# Patient Record
Sex: FEMALE | Race: WHITE | Employment: FULL TIME | URBAN - METROPOLITAN AREA
[De-identification: names, ages, dates, MRNs, and addresses within clinical notes are randomized per-mention and may not be internally consistent; named-entity substitution may affect disease eponyms.]

---

## 2017-01-10 ENCOUNTER — MED REC SCAN ONLY (OUTPATIENT)
Dept: FAMILY MEDICINE CLINIC | Facility: CLINIC | Age: 47
End: 2017-01-10

## 2017-01-17 NOTE — TELEPHONE ENCOUNTER
LOV - 12/13/16 Sinusitis  LRF - 11/03/16 #250 strips 1 RF  Future Appointments  Date Time Provider Jovani Haque   1/28/2017 9:00 PM SCHEDULE BY DATE [facility]

## 2017-01-23 RX ORDER — SIMVASTATIN 40 MG
TABLET ORAL
Qty: 30 TABLET | Refills: 0 | Status: SHIPPED | OUTPATIENT
Start: 2017-01-23 | End: 2017-02-25

## 2017-01-23 RX ORDER — PAROXETINE HYDROCHLORIDE 40 MG/1
TABLET, FILM COATED ORAL
Qty: 30 TABLET | Refills: 0 | Status: SHIPPED | OUTPATIENT
Start: 2017-01-23 | End: 2017-02-25

## 2017-01-23 RX ORDER — BUPROPION HYDROCHLORIDE 150 MG/1
TABLET ORAL
Qty: 30 TABLET | Refills: 0 | Status: SHIPPED | OUTPATIENT
Start: 2017-01-23 | End: 2017-02-25

## 2017-01-23 RX ORDER — LEVOTHYROXINE SODIUM 137 UG/1
TABLET ORAL
Qty: 30 TABLET | Refills: 0 | Status: SHIPPED | OUTPATIENT
Start: 2017-01-23 | End: 2017-02-25

## 2017-01-23 NOTE — TELEPHONE ENCOUNTER
LOV:  12/13/16 for sinusitis   Last lipid: 12/26/15    BUPROPION HCL ER, XL, 150 MG Oral Tablet 24 Hr 30 tablet 0 12/27/2016       Sig :  TAKE 1 TABLET BY MOUTH EVERY DAY       Route:   (none)       LEVOTHYROXINE SODIUM 137 MCG Oral Tab 30 tablet 0 12/27/2

## 2017-01-25 NOTE — TELEPHONE ENCOUNTER
LOV  12/13/2016    Last refill    HUMALOG 100 UNIT/ML Subcutaneous Solution 40 mL 5 7/18/2016       Sig :  ADMINISTER UP  UNITS VIA INSULIN PUMP DAILY AS DIRECTED       Patient taking differently :  Basal Rate: 12A=2.8, 3A=3,7A=1.85,1230P=2.8,4P=3.15

## 2017-01-26 RX ORDER — INSULIN LISPRO 100 [IU]/ML
INJECTION, SOLUTION INTRAVENOUS; SUBCUTANEOUS
Qty: 40 ML | Refills: 0 | Status: SHIPPED | OUTPATIENT
Start: 2017-01-26 | End: 2017-02-25

## 2017-01-28 ENCOUNTER — OFFICE VISIT (OUTPATIENT)
Dept: SLEEP CENTER | Facility: HOSPITAL | Age: 47
End: 2017-01-28
Attending: FAMILY MEDICINE
Payer: MEDICAID

## 2017-01-28 PROCEDURE — 95811 POLYSOM 6/>YRS CPAP 4/> PARM: CPT

## 2017-01-30 ENCOUNTER — OFFICE VISIT (OUTPATIENT)
Dept: FAMILY MEDICINE CLINIC | Facility: CLINIC | Age: 47
End: 2017-01-30

## 2017-01-30 ENCOUNTER — PATIENT OUTREACH (OUTPATIENT)
Dept: FAMILY MEDICINE CLINIC | Facility: CLINIC | Age: 47
End: 2017-01-30

## 2017-01-30 VITALS
RESPIRATION RATE: 20 BRPM | DIASTOLIC BLOOD PRESSURE: 76 MMHG | BODY MASS INDEX: 47 KG/M2 | HEART RATE: 88 BPM | TEMPERATURE: 98 F | WEIGHT: 255 LBS | SYSTOLIC BLOOD PRESSURE: 136 MMHG | OXYGEN SATURATION: 98 %

## 2017-01-30 DIAGNOSIS — E11.8 TYPE 2 DIABETES MELLITUS WITH COMPLICATION, WITH LONG-TERM CURRENT USE OF INSULIN (HCC): ICD-10-CM

## 2017-01-30 DIAGNOSIS — Z79.4 TYPE 2 DIABETES MELLITUS WITH COMPLICATION, WITH LONG-TERM CURRENT USE OF INSULIN (HCC): Primary | ICD-10-CM

## 2017-01-30 DIAGNOSIS — L91.8 SKIN TAG: ICD-10-CM

## 2017-01-30 DIAGNOSIS — E11.8 TYPE 2 DIABETES MELLITUS WITH COMPLICATION, WITH LONG-TERM CURRENT USE OF INSULIN (HCC): Primary | ICD-10-CM

## 2017-01-30 DIAGNOSIS — Z00.00 ANNUAL PHYSICAL EXAM: Primary | ICD-10-CM

## 2017-01-30 DIAGNOSIS — Z12.31 VISIT FOR SCREENING MAMMOGRAM: ICD-10-CM

## 2017-01-30 DIAGNOSIS — Z79.4 TYPE 2 DIABETES MELLITUS WITH COMPLICATION, WITH LONG-TERM CURRENT USE OF INSULIN (HCC): ICD-10-CM

## 2017-01-30 DIAGNOSIS — Z01.419 ENCOUNTER FOR GYNECOLOGICAL EXAMINATION WITHOUT ABNORMAL FINDING: ICD-10-CM

## 2017-01-30 DIAGNOSIS — Z12.4 PAP SMEAR FOR CERVICAL CANCER SCREENING: ICD-10-CM

## 2017-01-30 PROCEDURE — 87624 HPV HI-RISK TYP POOLED RSLT: CPT | Performed by: FAMILY MEDICINE

## 2017-01-30 PROCEDURE — 99396 PREV VISIT EST AGE 40-64: CPT | Performed by: FAMILY MEDICINE

## 2017-01-30 PROCEDURE — 88175 CYTOPATH C/V AUTO FLUID REDO: CPT | Performed by: FAMILY MEDICINE

## 2017-01-30 PROCEDURE — 82570 ASSAY OF URINE CREATININE: CPT | Performed by: FAMILY MEDICINE

## 2017-01-30 PROCEDURE — 82043 UR ALBUMIN QUANTITATIVE: CPT | Performed by: FAMILY MEDICINE

## 2017-01-30 NOTE — PROGRESS NOTES
HPI:   Leoncio Rogers is a 42year old female who presents for a complete physical and pap. Needs referral for skin tag removal.  Large skin tag over left groin. Onset: years. Not painful. Gets caught on clothing. No other concerns today. DAILY WITH BREAKFAST Disp: 30 tablet Rfl: 0   Esomeprazole Magnesium 20 MG Oral Capsule Delayed Release Take 20 mg by mouth every morning before breakfast. Disp:  Rfl:    CALCIUM + D OR 1000 mg 2 tablets daily Disp:  Rfl:    Glucose Blood (ONETOUCH ULTRA B uncontrolled    • Hypercholesterolemia    • Obese    • GERD (gastroesophageal reflux disease)    • Cholelithiasis    • Multiple thyroid nodules      on rt,2011, lt.-2008   • Anxiety    • Extrinsic asthma, unspecified    • High cholesterol    • Unspecified REVIEW OF SYSTEMS:   GENERAL: feels well otherwise  SKIN: denies any unusual skin lesions  EYES:denies blurred vision or double vision  HEENT: denies nasal congestion, sinus pain or ST  LUNGS: denies shortness of breath or cough with exertion  CARDIOVA in 1 year. F/U with Dr. Crys Marti for DM check in 3 months - HbA1c prior to visit.

## 2017-01-31 LAB
CREAT UR-SCNC: 138 MG/DL
MICROALBUMIN UR-MCNC: 1.85 MG/DL
MICROALBUMIN/CREAT 24H UR-RTO: 13.4 UG/MG (ref ?–30)

## 2017-02-02 NOTE — PROGRESS NOTES
Quick Note:    Pt will be notified of findings from CPAP titration by coordinator.  However, will not be seen in clinic per [facility] policy until below issue is addressed:    [facility] ONLY: Patient is suspended for bad debt; no services should be rendered until forma

## 2017-02-02 NOTE — PROCEDURES
[address]       Accredited by the [facility] ([facility])    PATIENT'S NAME:        Mary Vasquez  ATTENDING PHYSICIAN:   Pacheco White M.D. REFERRING PHYSICIAN:   SHANTE Heredia Junior 10%; stage REM 40%. RESPIRATORY MEASURES:  The patient was initiated on CPAP at 5 cm of water and titrated up to a final pressure of 15 cm of water.   On this setting, the patient had an overall apnea-hypopnea index of 3 and was able to achieve stage REM

## 2017-02-03 LAB
LAST PAP RESULT: NORMAL
PAP HISTORY (OTHER THAN LAST PAP): NORMAL

## 2017-02-04 ENCOUNTER — HOSPITAL ENCOUNTER (OUTPATIENT)
Dept: MAMMOGRAPHY | Age: 47
Discharge: HOME | End: 2017-02-04
Attending: FAMILY MEDICINE
Payer: MEDICAID

## 2017-02-04 ENCOUNTER — OFFICE VISIT (OUTPATIENT)
Dept: FAMILY MEDICINE CLINIC | Facility: CLINIC | Age: 47
End: 2017-02-04

## 2017-02-04 ENCOUNTER — NURSE ONLY (OUTPATIENT)
Dept: FAMILY MEDICINE CLINIC | Facility: CLINIC | Age: 47
End: 2017-02-04

## 2017-02-04 VITALS
RESPIRATION RATE: 16 BRPM | WEIGHT: 251.5 LBS | BODY MASS INDEX: 46 KG/M2 | HEART RATE: 80 BPM | TEMPERATURE: 99 F | SYSTOLIC BLOOD PRESSURE: 138 MMHG | DIASTOLIC BLOOD PRESSURE: 74 MMHG

## 2017-02-04 DIAGNOSIS — G47.33 OBSTRUCTIVE SLEEP APNEA: Primary | ICD-10-CM

## 2017-02-04 DIAGNOSIS — Z00.00 ANNUAL PHYSICAL EXAM: ICD-10-CM

## 2017-02-04 DIAGNOSIS — Z12.31 VISIT FOR SCREENING MAMMOGRAM: ICD-10-CM

## 2017-02-04 DIAGNOSIS — Z01.419 ENCOUNTER FOR GYNECOLOGICAL EXAMINATION WITHOUT ABNORMAL FINDING: ICD-10-CM

## 2017-02-04 LAB
ALBUMIN SERPL-MCNC: 3.9 G/DL (ref 3.5–4.8)
ALP LIVER SERPL-CCNC: 80 U/L (ref 37–98)
ALT SERPL-CCNC: 30 U/L (ref 14–54)
AST SERPL-CCNC: 13 U/L (ref 15–41)
BASOPHILS # BLD AUTO: 0.06 X10(3) UL (ref 0–0.1)
BASOPHILS NFR BLD AUTO: 0.6 %
BILIRUB SERPL-MCNC: 0.4 MG/DL (ref 0.1–2)
BUN BLD-MCNC: 11 MG/DL (ref 8–20)
CALCIUM BLD-MCNC: 8.7 MG/DL (ref 8.3–10.3)
CHLORIDE: 104 MMOL/L (ref 101–111)
CHOLEST SMN-MCNC: 162 MG/DL (ref ?–200)
CO2: 28 MMOL/L (ref 22–32)
CREAT BLD-MCNC: 0.73 MG/DL (ref 0.55–1.02)
EOSINOPHIL # BLD AUTO: 0.23 X10(3) UL (ref 0–0.3)
EOSINOPHIL NFR BLD AUTO: 2.4 %
ERYTHROCYTE [DISTWIDTH] IN BLOOD BY AUTOMATED COUNT: 12.6 % (ref 11.5–16)
GLUCOSE BLD-MCNC: 99 MG/DL (ref 70–99)
HCT VFR BLD AUTO: 43.7 % (ref 34–50)
HDLC SERPL-MCNC: 64 MG/DL (ref 45–?)
HDLC SERPL: 2.53 {RATIO} (ref ?–4.44)
HGB BLD-MCNC: 14.6 G/DL (ref 12–16)
HPV I/H RISK 1 DNA SPEC QL NAA+PROBE: NEGATIVE
IMMATURE GRANULOCYTE COUNT: 0.03 X10(3) UL (ref 0–1)
IMMATURE GRANULOCYTE RATIO %: 0.3 %
LDLC SERPL CALC-MCNC: 89 MG/DL (ref ?–130)
LYMPHOCYTES # BLD AUTO: 2.66 X10(3) UL (ref 0.9–4)
LYMPHOCYTES NFR BLD AUTO: 27.2 %
M PROTEIN MFR SERPL ELPH: 7.2 G/DL (ref 6.1–8.3)
MCH RBC QN AUTO: 29.7 PG (ref 27–33.2)
MCHC RBC AUTO-ENTMCNC: 33.4 G/DL (ref 31–37)
MCV RBC AUTO: 89 FL (ref 81–100)
MONOCYTES # BLD AUTO: 0.63 X10(3) UL (ref 0.1–0.6)
MONOCYTES NFR BLD AUTO: 6.4 %
NEUTROPHIL ABS PRELIM: 6.17 X10 (3) UL (ref 1.3–6.7)
NEUTROPHILS # BLD AUTO: 6.17 X10(3) UL (ref 1.3–6.7)
NEUTROPHILS NFR BLD AUTO: 63.1 %
NONHDLC SERPL-MCNC: 98 MG/DL (ref ?–130)
PLATELET # BLD AUTO: 310 10(3)UL (ref 150–450)
POTASSIUM SERPL-SCNC: 4.3 MMOL/L (ref 3.6–5.1)
RBC # BLD AUTO: 4.91 X10(6)UL (ref 3.8–5.1)
RED CELL DISTRIBUTION WIDTH-SD: 41.1 FL (ref 35.1–46.3)
SODIUM SERPL-SCNC: 139 MMOL/L (ref 136–144)
TRIGLYCERIDES: 44 MG/DL (ref ?–150)
TSI SER-ACNC: 2.25 MIU/ML (ref 0.35–5.5)
VLDL: 9 MG/DL (ref 5–40)
WBC # BLD AUTO: 9.8 X10(3) UL (ref 4–13)

## 2017-02-04 PROCEDURE — 84443 ASSAY THYROID STIM HORMONE: CPT | Performed by: FAMILY MEDICINE

## 2017-02-04 PROCEDURE — 36415 COLL VENOUS BLD VENIPUNCTURE: CPT | Performed by: FAMILY MEDICINE

## 2017-02-04 PROCEDURE — 85025 COMPLETE CBC W/AUTO DIFF WBC: CPT | Performed by: FAMILY MEDICINE

## 2017-02-04 PROCEDURE — 99214 OFFICE O/P EST MOD 30 MIN: CPT | Performed by: FAMILY MEDICINE

## 2017-02-04 PROCEDURE — 80061 LIPID PANEL: CPT | Performed by: FAMILY MEDICINE

## 2017-02-04 PROCEDURE — 80053 COMPREHEN METABOLIC PANEL: CPT | Performed by: FAMILY MEDICINE

## 2017-02-04 PROCEDURE — 77067 SCR MAMMO BI INCL CAD: CPT

## 2017-02-04 NOTE — PROGRESS NOTES
CC: follow up sleep apnea titration    HPI:     Successful titration. Patient reports the best night sleep in years and felt tremendously better the next day. AHI corrected from 42 to 3. Lowers O2 sat associated was 92.6%.      ROS: no cp or sob, no palpita Packs/Day: 0.25  Years: 2         Types: Cigarettes      Last Attempt to quit: 11/16/1997    Alcohol Use: Yes           0.0 oz/week       0 Standard drinks or equivalent per week       Comment: occasional    EXAM:   /74 mmHg  Pulse 80  Temp(Src) 98.8

## 2017-02-08 ENCOUNTER — OFFICE VISIT (OUTPATIENT)
Dept: FAMILY MEDICINE CLINIC | Facility: CLINIC | Age: 47
End: 2017-02-08

## 2017-02-08 VITALS
RESPIRATION RATE: 20 BRPM | BODY MASS INDEX: 46 KG/M2 | HEART RATE: 98 BPM | OXYGEN SATURATION: 97 % | TEMPERATURE: 98 F | SYSTOLIC BLOOD PRESSURE: 138 MMHG | DIASTOLIC BLOOD PRESSURE: 74 MMHG | WEIGHT: 251 LBS

## 2017-02-08 DIAGNOSIS — J01.00 ACUTE NON-RECURRENT MAXILLARY SINUSITIS: Primary | ICD-10-CM

## 2017-02-08 PROCEDURE — 99213 OFFICE O/P EST LOW 20 MIN: CPT | Performed by: FAMILY MEDICINE

## 2017-02-08 RX ORDER — AZITHROMYCIN 250 MG/1
TABLET, FILM COATED ORAL
Qty: 6 TABLET | Refills: 0 | Status: SHIPPED | OUTPATIENT
Start: 2017-02-08 | End: 2017-02-14 | Stop reason: ALTCHOICE

## 2017-02-08 NOTE — PROGRESS NOTES
HPI:   Latasha Anderson is a 42year old female who presents for sinus pain and pressure over b/l maxillary sinuses for 5 days. Worsening. Nasal congestion with discolored drainage. Slight cough for 2 days. Non-productive. No SOB or chest tightness.   H 6/25/2011   • Dysphagia 6/25/2011   • Thyroiditis, unspecified 6/25/2011   • Unspecified asthma(493.90) 5/7/2012   • Nontoxic multinodular goiter 7/5/2011   • Other and unspecified hyperlipidemia 7/11/2011   • Unspecified musculoskeletal disorders and symp CHOLECYSTECTOMY      OTHER      Comment wisdom    COLONOSCOPY N/A 10/21/2016    Comment Procedure: COLONOSCOPY;  Surgeon: Swathi Mclaughlin DO;  Location: [address] [place] [address] ENDOSCOPY      Family History   Problem Relation Age of Onset   • Heart Disease Father    • Other Statement Selected

## 2017-02-09 ENCOUNTER — TELEPHONE (OUTPATIENT)
Dept: FAMILY MEDICINE CLINIC | Facility: CLINIC | Age: 47
End: 2017-02-09

## 2017-02-09 NOTE — TELEPHONE ENCOUNTER
Pt was seen yesterday for sinus infection. Pt stated she woke up and right eye was tearing. Eye was not crusted over. Eye is not itchy. Eye is painful. Right eye blood shot.   Pt wants to make sure she is ok with just the z-marlon or if something else glenn

## 2017-02-13 RX ORDER — FERROUS SULFATE 325(65) MG
TABLET ORAL
Qty: 30 TABLET | Refills: 0 | Status: SHIPPED | OUTPATIENT
Start: 2017-02-13 | End: 2017-04-05

## 2017-02-13 NOTE — TELEPHONE ENCOUNTER
LOV-2/9/17  LRF-12/27/16 #30+0  Future Appointments  Date Time Provider Jovani Haque   2/14/2017 3:00 PM Alejandra Ngo DO [facility]

## 2017-02-14 ENCOUNTER — TELEPHONE (OUTPATIENT)
Dept: FAMILY MEDICINE CLINIC | Facility: CLINIC | Age: 47
End: 2017-02-14

## 2017-02-14 ENCOUNTER — OFFICE VISIT (OUTPATIENT)
Dept: FAMILY MEDICINE CLINIC | Facility: CLINIC | Age: 47
End: 2017-02-14

## 2017-02-14 VITALS
WEIGHT: 251 LBS | BODY MASS INDEX: 46 KG/M2 | TEMPERATURE: 98 F | SYSTOLIC BLOOD PRESSURE: 128 MMHG | DIASTOLIC BLOOD PRESSURE: 86 MMHG

## 2017-02-14 DIAGNOSIS — L91.8 SKIN TAG: Primary | ICD-10-CM

## 2017-02-14 PROCEDURE — 99242 OFF/OP CONSLTJ NEW/EST SF 20: CPT | Performed by: SURGERY

## 2017-02-14 PROCEDURE — 88304 TISSUE EXAM BY PATHOLOGIST: CPT | Performed by: SURGERY

## 2017-02-14 NOTE — TELEPHONE ENCOUNTER
Michelle Celaya called and stated the CPAP needs to get pre-approved by insurance. They wanted to update us on the status of the order. Patient notified.

## 2017-02-15 NOTE — H&P
[facility]  Progress Note    Elmo Brito Patient Status:  No patient class for patient encounter    3/12/1970 MRN [id number]   Location [address] [place] [address] Attending No att. providers found   Hosp Day #  PCP Siria Anderson Female stress incontinence     Anxiety     Sleep apnea     GERD (gastroesophageal reflux disease)     Obese     Plantar fasciitis     Type I (juvenile type) diabetes mellitus without mention of complication, not stated as uncontrolled     S/P right knee a

## 2017-02-21 ENCOUNTER — OFFICE VISIT (OUTPATIENT)
Dept: FAMILY MEDICINE CLINIC | Facility: CLINIC | Age: 47
End: 2017-02-21

## 2017-02-21 DIAGNOSIS — L91.8 SKIN TAG: Primary | ICD-10-CM

## 2017-02-21 PROCEDURE — 99024 POSTOP FOLLOW-UP VISIT: CPT | Performed by: SURGERY

## 2017-02-21 NOTE — PROGRESS NOTES
Pt here for suture removal from L groin. 3 single sutures removed w/o complication. Incision is clean and dry. No signs of infection or swelling. Steri-strips applied. Pt aware strips will fall off with a week or two. Pt aware path is benign no cancer.  Pt

## 2017-02-23 ENCOUNTER — TELEPHONE (OUTPATIENT)
Dept: FAMILY MEDICINE CLINIC | Facility: CLINIC | Age: 47
End: 2017-02-23

## 2017-02-27 RX ORDER — INSULIN LISPRO 100 [IU]/ML
INJECTION, SOLUTION INTRAVENOUS; SUBCUTANEOUS
Qty: 40 ML | Refills: 0 | Status: SHIPPED | OUTPATIENT
Start: 2017-02-27 | End: 2017-04-05

## 2017-02-27 RX ORDER — SIMVASTATIN 40 MG
TABLET ORAL
Qty: 30 TABLET | Refills: 0 | Status: SHIPPED | OUTPATIENT
Start: 2017-02-27 | End: 2017-04-05

## 2017-02-27 RX ORDER — PAROXETINE HYDROCHLORIDE 40 MG/1
TABLET, FILM COATED ORAL
Qty: 30 TABLET | Refills: 0 | Status: SHIPPED | OUTPATIENT
Start: 2017-02-27 | End: 2017-04-05

## 2017-02-27 RX ORDER — BUPROPION HYDROCHLORIDE 150 MG/1
TABLET ORAL
Qty: 30 TABLET | Refills: 0 | Status: SHIPPED | OUTPATIENT
Start: 2017-02-27 | End: 2017-04-05

## 2017-02-27 RX ORDER — LEVOTHYROXINE SODIUM 137 UG/1
TABLET ORAL
Qty: 30 TABLET | Refills: 0 | Status: SHIPPED | OUTPATIENT
Start: 2017-02-27 | End: 2017-04-07

## 2017-02-27 NOTE — TELEPHONE ENCOUNTER
LOV: 2/8/17 for sinusitis  HGB A1C: 9/9/16 (controlled)    HUMALOG 100 UNIT/ML Subcutaneous Solution 40 mL 0 1/26/2017       Sig :  ADMINISTER UP  UNITS VIA INSULIN PUMP DAILY AS DIRECTED       Route:   (none)        BUPROPION HCL ER, XL, 150 MG Oral

## 2017-03-03 RX ORDER — IBUPROFEN 600 MG/1
TABLET ORAL
Qty: 2 KIT | Refills: 0 | Status: SHIPPED | OUTPATIENT
Start: 2017-03-03 | End: 2018-06-11

## 2017-03-03 NOTE — TELEPHONE ENCOUNTER
Pt calling stating that she is completely out of her Insulin Injection kit because she had to use her last one this morning. She just wanted to make sure Dr. Stephanie Nava knew that she would need it refilled asap.

## 2017-03-03 NOTE — TELEPHONE ENCOUNTER
LOV  02/04/2017    Last refill    GLUCAGON EMERGENCY 1 MG Injection Kit 2 kit 0 1/3/2017      Sig :  INJECT 1MG INTO THE SKIN ONCE AS NEEDED FOR HYPOGLYCEMIA      Medication pending. Please advise. No future appointments.

## 2017-03-16 NOTE — PROGRESS NOTES
Patient coming in for an appointment today. Pt will provide specimen at appointment.
range of motion is not limited and there is no muscle tenderness.

## 2017-04-05 ENCOUNTER — TELEPHONE (OUTPATIENT)
Dept: FAMILY MEDICINE CLINIC | Facility: CLINIC | Age: 47
End: 2017-04-05

## 2017-04-06 RX ORDER — BUPROPION HYDROCHLORIDE 150 MG/1
TABLET ORAL
Qty: 30 TABLET | Refills: 2 | Status: SHIPPED | OUTPATIENT
Start: 2017-04-06 | End: 2017-06-06

## 2017-04-06 RX ORDER — INSULIN LISPRO 100 [IU]/ML
INJECTION, SOLUTION INTRAVENOUS; SUBCUTANEOUS
Qty: 40 ML | Refills: 2 | Status: SHIPPED | OUTPATIENT
Start: 2017-04-06 | End: 2018-05-25

## 2017-04-06 RX ORDER — SIMVASTATIN 40 MG
TABLET ORAL
Qty: 30 TABLET | Refills: 2 | Status: SHIPPED | OUTPATIENT
Start: 2017-04-06 | End: 2017-06-06

## 2017-04-06 RX ORDER — PAROXETINE HYDROCHLORIDE 40 MG/1
TABLET, FILM COATED ORAL
Qty: 30 TABLET | Refills: 2 | Status: SHIPPED | OUTPATIENT
Start: 2017-04-06 | End: 2017-06-06

## 2017-04-06 RX ORDER — FERROUS SULFATE 325(65) MG
TABLET ORAL
Qty: 30 TABLET | Refills: 2 | Status: SHIPPED | OUTPATIENT
Start: 2017-04-06 | End: 2017-07-09

## 2017-04-06 NOTE — TELEPHONE ENCOUNTER
Appointment scheduled.   Future Appointments  Date Time Provider Jovani Haque   4/10/2017 4:15 PM Rome Garcia DO [facility]

## 2017-04-06 NOTE — TELEPHONE ENCOUNTER
LOV: 2/8/17 for sinusitis  TSH: 2/4/17     HUMALOG 100 UNIT/ML Subcutaneous Solution 40 mL 0 2/27/2017      Sig :  ADMINISTER UP  UNITS VIA INSULIN PUMP DAILY AS DIRECTED       Route:   (none)        BUPROPION HCL ER, XL, 150 MG Oral Tablet 24 Hr 30

## 2017-04-06 NOTE — TELEPHONE ENCOUNTER
Patient stated she would need two insulin pens. Humalog and Lantus  Patient has been on the pump for 13 years and is unsure how she would manage with the pens. Patient currently has enough to last a week.   Patient not sure if she would need an appointmen

## 2017-04-07 ENCOUNTER — TELEPHONE (OUTPATIENT)
Dept: FAMILY MEDICINE CLINIC | Facility: CLINIC | Age: 47
End: 2017-04-07

## 2017-04-07 RX ORDER — LEVOTHYROXINE SODIUM 137 UG/1
137 TABLET ORAL
Qty: 30 TABLET | Refills: 2 | Status: SHIPPED | OUTPATIENT
Start: 2017-04-07 | End: 2017-06-06

## 2017-04-07 NOTE — TELEPHONE ENCOUNTER
Order for diabetic supplies received via fax. Order for diabetic supplies signed and faxed to Nia Mauricio at [phone]. LM to notify patient order sent via fax.

## 2017-04-07 NOTE — TELEPHONE ENCOUNTER
LOV: 2/8/17 for sinusitis     LEVOTHYROXINE SODIUM 137 MCG Oral Tab 30 tablet 0 2/27/2017      Sig :  TAKE 1 TABLET BY MOUTH DAILY       Route:   (none)       Future Appointments  Date Time Provider Jovani Haque   4/10/2017 4:15 PM DO TIMI Johnson

## 2017-04-10 ENCOUNTER — TELEPHONE (OUTPATIENT)
Dept: FAMILY MEDICINE CLINIC | Facility: CLINIC | Age: 47
End: 2017-04-10

## 2017-04-10 ENCOUNTER — OFFICE VISIT (OUTPATIENT)
Dept: FAMILY MEDICINE CLINIC | Facility: CLINIC | Age: 47
End: 2017-04-10

## 2017-04-10 VITALS
SYSTOLIC BLOOD PRESSURE: 128 MMHG | BODY MASS INDEX: 47 KG/M2 | RESPIRATION RATE: 14 BRPM | WEIGHT: 255.19 LBS | DIASTOLIC BLOOD PRESSURE: 78 MMHG | HEART RATE: 104 BPM | TEMPERATURE: 99 F

## 2017-04-10 DIAGNOSIS — Z79.4 DIABETES MELLITUS DUE TO UNDERLYING CONDITION WITH DIABETIC NEPHROPATHY, WITH LONG-TERM CURRENT USE OF INSULIN (HCC): ICD-10-CM

## 2017-04-10 DIAGNOSIS — Z79.4 TYPE 2 DIABETES MELLITUS WITH COMPLICATION, WITH LONG-TERM CURRENT USE OF INSULIN (HCC): Primary | ICD-10-CM

## 2017-04-10 DIAGNOSIS — E11.8 TYPE 2 DIABETES MELLITUS WITH COMPLICATION, WITH LONG-TERM CURRENT USE OF INSULIN (HCC): Primary | ICD-10-CM

## 2017-04-10 DIAGNOSIS — F43.21 ADJUSTMENT DISORDER WITH DEPRESSED MOOD: ICD-10-CM

## 2017-04-10 DIAGNOSIS — E78.00 PURE HYPERCHOLESTEROLEMIA: ICD-10-CM

## 2017-04-10 DIAGNOSIS — E06.9 THYROIDITIS, UNSPECIFIED: ICD-10-CM

## 2017-04-10 DIAGNOSIS — E08.21 DIABETES MELLITUS DUE TO UNDERLYING CONDITION WITH DIABETIC NEPHROPATHY, WITH LONG-TERM CURRENT USE OF INSULIN (HCC): ICD-10-CM

## 2017-04-10 PROCEDURE — 99214 OFFICE O/P EST MOD 30 MIN: CPT | Performed by: FAMILY MEDICINE

## 2017-04-10 NOTE — TELEPHONE ENCOUNTER
Patient's insurance card states Bao Hernandez as PCP. Referral cannot be placed until insurance is updated. Patient notified.

## 2017-04-10 NOTE — PROGRESS NOTES
CC: meds check    HPI:     DM: chronic, stable, may need to do lantus/humalog if the pump supplies dont arrive in time. Lipids: chronic, stable. Due for labs. Thyroid: chronic, stable, due for repeat labs.      Mood: stable    ROS: weight stable, no Packs/Day: 0.00  Years:           Types: Cigarettes      Quit date: 03/01/2017    Smokeless Status: Never Used                        Alcohol Use: Yes           0.0 oz/week       0 Standard drinks or equivalent per week       Comment: occasi

## 2017-04-10 NOTE — TELEPHONE ENCOUNTER
Mona Mancilla from [employer] called and stated a referral for patient's pump needs to be placed. Once the referral is authorized, an authorization number needs to be faxed to [employer].   Patient's new insurance needs to be updated before a referral can be margi

## 2017-04-11 PROBLEM — E08.21 DIABETES MELLITUS DUE TO UNDERLYING CONDITION WITH DIABETIC NEPHROPATHY (HCC): Status: ACTIVE | Noted: 2017-04-11

## 2017-04-11 NOTE — TELEPHONE ENCOUNTER
Spoke with Poppy Rg from [facility]  Referral placed for pump supplies  Authorization number: [id number]  [employer] stated they will fax the authorization to [address] [place] [address].        reference number for updated insurance is [id number]

## 2017-04-12 ENCOUNTER — MED REC SCAN ONLY (OUTPATIENT)
Dept: FAMILY MEDICINE CLINIC | Facility: CLINIC | Age: 47
End: 2017-04-12

## 2017-04-12 ENCOUNTER — TELEPHONE (OUTPATIENT)
Dept: FAMILY MEDICINE CLINIC | Facility: CLINIC | Age: 47
End: 2017-04-12

## 2017-04-12 NOTE — TELEPHONE ENCOUNTER
Patient notified. Patient verbalized understanding. Lantus samples up front. Patient to call Jad Hernandez for dosing instructions.

## 2017-05-09 ENCOUNTER — TELEPHONE (OUTPATIENT)
Dept: FAMILY MEDICINE CLINIC | Facility: CLINIC | Age: 47
End: 2017-05-09

## 2017-05-09 DIAGNOSIS — Z79.4 TYPE 2 DIABETES MELLITUS WITH COMPLICATION, WITH LONG-TERM CURRENT USE OF INSULIN (HCC): Primary | ICD-10-CM

## 2017-05-09 DIAGNOSIS — E11.8 TYPE 2 DIABETES MELLITUS WITH COMPLICATION, WITH LONG-TERM CURRENT USE OF INSULIN (HCC): Primary | ICD-10-CM

## 2017-05-09 RX ORDER — BLOOD-GLUCOSE METER
1 EACH MISCELLANEOUS 2 TIMES DAILY
Qty: 1 DEVICE | Refills: 0 | Status: SHIPPED | OUTPATIENT
Start: 2017-05-09 | End: 2018-05-09

## 2017-05-09 RX ORDER — LANCETS
EACH MISCELLANEOUS
Qty: 600 BOX | Refills: 3 | Status: SHIPPED | OUTPATIENT
Start: 2017-05-09 | End: 2017-05-11 | Stop reason: ALTCHOICE

## 2017-05-09 NOTE — TELEPHONE ENCOUNTER
Patient due for dilated eye exam   Needs new testing supplies. Referral pended. Testing supplies pended.

## 2017-05-11 ENCOUNTER — OFFICE VISIT (OUTPATIENT)
Dept: FAMILY MEDICINE CLINIC | Facility: CLINIC | Age: 47
End: 2017-05-11

## 2017-05-11 VITALS
RESPIRATION RATE: 14 BRPM | OXYGEN SATURATION: 96 % | TEMPERATURE: 98 F | WEIGHT: 255 LBS | SYSTOLIC BLOOD PRESSURE: 136 MMHG | HEIGHT: 62 IN | DIASTOLIC BLOOD PRESSURE: 84 MMHG | HEART RATE: 72 BPM | BODY MASS INDEX: 46.93 KG/M2

## 2017-05-11 DIAGNOSIS — M65.9 FLEXOR TENOSYNOVITIS OF THUMB: Primary | ICD-10-CM

## 2017-05-11 PROCEDURE — 99214 OFFICE O/P EST MOD 30 MIN: CPT | Performed by: FAMILY MEDICINE

## 2017-05-11 RX ORDER — PREDNISONE 20 MG/1
20 TABLET ORAL DAILY
Qty: 21 TABLET | Refills: 0 | Status: SHIPPED | OUTPATIENT
Start: 2017-05-11 | End: 2017-05-11 | Stop reason: CLARIF

## 2017-05-11 RX ORDER — PREDNISONE 20 MG/1
40 TABLET ORAL DAILY
Qty: 10 TABLET | Refills: 0 | Status: SHIPPED | OUTPATIENT
Start: 2017-05-11 | End: 2017-05-16

## 2017-05-11 NOTE — PROGRESS NOTES
CC: thumb pain    HPI:     Location rt thumb and wrist  Quality popping  Severity moderate  Duration 5 days  Timing constant with intermittent exacerbation    ROS: no bruising or edema, no rash, some decreased     Past Medical History   Diagnosis Date Status: Former Smoker                   Packs/Day: 0.00  Years:           Types: Cigarettes      Quit date: 03/01/2017    Smokeless Status: Never Used                        Alcohol Use: Yes           0.0 oz/week       0 Standard drinks or equivalent per w

## 2017-05-20 ENCOUNTER — TELEPHONE (OUTPATIENT)
Dept: FAMILY MEDICINE CLINIC | Facility: CLINIC | Age: 47
End: 2017-05-20

## 2017-05-20 NOTE — TELEPHONE ENCOUNTER
Received fax from [facility] us that patient has started PAP therapy and recommends f/u with Dr. Samual Pallas 31-90 days after starting. Yaakov  Fax is in nurse folder under pending.

## 2017-05-22 NOTE — TELEPHONE ENCOUNTER
Patient advised. Patient verbalized understanding. Appointment scheduled.   Future Appointments  Date Time Provider Jovani Haque   6/26/2017 4:00 PM Marianna Yanez DO [facility]

## 2017-06-01 ENCOUNTER — OFFICE VISIT (OUTPATIENT)
Dept: FAMILY MEDICINE CLINIC | Facility: CLINIC | Age: 47
End: 2017-06-01

## 2017-06-01 VITALS
BODY MASS INDEX: 47 KG/M2 | DIASTOLIC BLOOD PRESSURE: 74 MMHG | SYSTOLIC BLOOD PRESSURE: 128 MMHG | TEMPERATURE: 98 F | HEART RATE: 92 BPM | OXYGEN SATURATION: 98 % | WEIGHT: 255 LBS | RESPIRATION RATE: 14 BRPM

## 2017-06-01 DIAGNOSIS — G56.02 CARPAL TUNNEL SYNDROME OF LEFT WRIST: ICD-10-CM

## 2017-06-01 DIAGNOSIS — Z79.4 TYPE 2 DIABETES MELLITUS WITH COMPLICATION, WITH LONG-TERM CURRENT USE OF INSULIN (HCC): ICD-10-CM

## 2017-06-01 DIAGNOSIS — E11.8 TYPE 2 DIABETES MELLITUS WITH COMPLICATION, WITH LONG-TERM CURRENT USE OF INSULIN (HCC): ICD-10-CM

## 2017-06-01 DIAGNOSIS — M65.9 FLEXOR TENOSYNOVITIS OF THUMB: Primary | ICD-10-CM

## 2017-06-01 PROCEDURE — 99213 OFFICE O/P EST LOW 20 MIN: CPT | Performed by: FAMILY MEDICINE

## 2017-06-01 NOTE — PROGRESS NOTES
CC: follow up hand issues    HPI:     Rt hand: the thumb still painful despite oral steroid therapy. She would like further evaluation with specialist.     Lt hand: ongoing carpal tunnel syndrome. Apparently needs surgery.     ROS: some left hand weakness

## 2017-06-05 ENCOUNTER — TELEPHONE (OUTPATIENT)
Dept: FAMILY MEDICINE CLINIC | Facility: CLINIC | Age: 47
End: 2017-06-05

## 2017-06-05 DIAGNOSIS — M79.644 THUMB PAIN, RIGHT: ICD-10-CM

## 2017-06-05 DIAGNOSIS — G56.02 CARPAL TUNNEL SYNDROME OF LEFT WRIST: Primary | ICD-10-CM

## 2017-06-05 NOTE — TELEPHONE ENCOUNTER
Patient would like new ortho referral.  Patient does not want to go through [facility]. Please advise.

## 2017-06-06 NOTE — TELEPHONE ENCOUNTER
Spoke with patient regarding referral for ortho. Patient stated she cannot see [facility] due to unpaid bill by ex . Patient is going to double check with [facility]. If she cannot see them, referral pended for Dr. Armando coburn.   Patient will call [facility]

## 2017-06-07 NOTE — TELEPHONE ENCOUNTER
LOV - 06/01/2017 carpal tunnel     LRF -  Bupropion 04/06/17 #30 RF 2  Simvastatin 04/06/17 #30 RF 2  Paroxetine 04/06/17 #30 RF 2  Levothyroxine 04/07/17 #30 RF 2    Future Appointments  Date Time Provider Jovani Haque   6/26/2017 4:00 PM Gemini Houser

## 2017-06-08 ENCOUNTER — TELEPHONE (OUTPATIENT)
Dept: FAMILY MEDICINE CLINIC | Facility: CLINIC | Age: 47
End: 2017-06-08

## 2017-06-08 RX ORDER — LEVOTHYROXINE SODIUM 137 UG/1
TABLET ORAL
Qty: 30 TABLET | Refills: 0 | Status: SHIPPED | OUTPATIENT
Start: 2017-06-08 | End: 2017-09-01

## 2017-06-08 RX ORDER — SIMVASTATIN 40 MG
TABLET ORAL
Qty: 30 TABLET | Refills: 0 | Status: SHIPPED | OUTPATIENT
Start: 2017-06-08 | End: 2017-08-02

## 2017-06-08 RX ORDER — PAROXETINE HYDROCHLORIDE 40 MG/1
TABLET, FILM COATED ORAL
Qty: 30 TABLET | Refills: 0 | Status: SHIPPED | OUTPATIENT
Start: 2017-06-08 | End: 2017-08-02

## 2017-06-08 RX ORDER — BUPROPION HYDROCHLORIDE 150 MG/1
TABLET ORAL
Qty: 30 TABLET | Refills: 0 | Status: SHIPPED | OUTPATIENT
Start: 2017-06-08 | End: 2017-06-26 | Stop reason: DRUGHIGH

## 2017-06-08 NOTE — TELEPHONE ENCOUNTER
Referral for Dr. Yessenia Matamoros [facility] canceled. Pt requested referral for alternative physician. Referral placed for Dr. Armando coburn. Patient given contact information.

## 2017-06-08 NOTE — TELEPHONE ENCOUNTER
Referral for Dr. Greene [facility] canceled. Pt requested referral for alternative physician. Referral placed for Dr. Socorro coburn. Patient given contact information.

## 2017-06-08 NOTE — TELEPHONE ENCOUNTER
Referral updated to preferred provider. Referral faxed to number listed. Called [facility] and verified provider is within network. Patient notified referral updated, and placed up front for .

## 2017-06-14 NOTE — TELEPHONE ENCOUNTER
LOV  06/01/2017    Last refill    HUMALOG 100 UNIT/ML Subcutaneous Solution 40 mL 2 4/6/2017      Sig :  ADMINISTER UP  UNITS VIA INSULIN PUMP DAILY AS DIRECTED      Medication pending. Please advise.     Future Appointments  Date Time Provider Depar

## 2017-06-15 RX ORDER — INSULIN ASPART 100 [IU]/ML
INJECTION, SOLUTION INTRAVENOUS; SUBCUTANEOUS
Qty: 40 ML | Refills: 0 | Status: SHIPPED | OUTPATIENT
Start: 2017-06-15 | End: 2017-07-05

## 2017-06-26 ENCOUNTER — OFFICE VISIT (OUTPATIENT)
Dept: FAMILY MEDICINE CLINIC | Facility: CLINIC | Age: 47
End: 2017-06-26

## 2017-06-26 VITALS
HEART RATE: 94 BPM | HEIGHT: 62 IN | WEIGHT: 255 LBS | RESPIRATION RATE: 12 BRPM | BODY MASS INDEX: 46.93 KG/M2 | DIASTOLIC BLOOD PRESSURE: 84 MMHG | OXYGEN SATURATION: 97 % | SYSTOLIC BLOOD PRESSURE: 134 MMHG | TEMPERATURE: 99 F

## 2017-06-26 DIAGNOSIS — G47.33 OSA ON CPAP: Primary | ICD-10-CM

## 2017-06-26 DIAGNOSIS — Z99.89 OSA ON CPAP: Primary | ICD-10-CM

## 2017-06-26 DIAGNOSIS — R45.86 MOOD CHANGES: ICD-10-CM

## 2017-06-26 PROCEDURE — 99214 OFFICE O/P EST MOD 30 MIN: CPT | Performed by: FAMILY MEDICINE

## 2017-06-26 RX ORDER — BUPROPION HYDROCHLORIDE 300 MG/1
300 TABLET ORAL DAILY
Qty: 30 TABLET | Refills: 0 | Status: SHIPPED | OUTPATIENT
Start: 2017-06-26 | End: 2017-07-18

## 2017-06-26 NOTE — PROGRESS NOTES
CC: follow up     HPI:     CORINNE: pt using CPAP. Only sleeping 5-6 hours per night. She is dreaming indicating that she is getting REM sleep. She still gets sleepy at times. Mood changes: is on wellbutrin and paxil. Feels she needs higher dose.  More emot musculoskeletal disorders and symptoms referable to neck 8/5/2011   • Unspecified tinnitus 2/18/2012   • Unspecified urinary incontinence 12/5/2011   • Visual impairment     glasses     EXAM:    /84 (BP Location: Left arm, Patient Position: Sitting,

## 2017-06-26 NOTE — ADDENDUM NOTE
Encounter addended by: Violetta Lau RN on: 6/26/2017  5:16 PM<BR>    Actions taken: Letter status changed

## 2017-06-29 ENCOUNTER — TELEPHONE (OUTPATIENT)
Dept: FAMILY MEDICINE CLINIC | Facility: CLINIC | Age: 47
End: 2017-06-29

## 2017-06-29 DIAGNOSIS — G56.02 CARPAL TUNNEL SYNDROME ON LEFT: Primary | ICD-10-CM

## 2017-06-29 NOTE — TELEPHONE ENCOUNTER
Spoke with Jimmy Salazar at [facility] to verify surgeon is within network. Jimmy Salazar stated provider is coming up as an in-network provider. Referral faxed to Dr. Sherri Reveles office.

## 2017-06-29 NOTE — TELEPHONE ENCOUNTER
DR Flores Malave IS DOING PTS SURGERY ON July 7TH THEY SAID SHE NEEDS A REFERRAL FOR THE SURGERY.   FAX # [phone]

## 2017-06-29 NOTE — TELEPHONE ENCOUNTER
Referral pended for Dr. Chaitanya Rdz. Called Dr. Parish Noel office for dx code/CPT code. Dx code left carpal tunnel syndrome  Referral pended. Please advise if ok to place.

## 2017-07-01 ENCOUNTER — HOSPITAL ENCOUNTER (OUTPATIENT)
Dept: GENERAL RADIOLOGY | Age: 47
Discharge: HOME | End: 2017-07-01
Attending: ORTHOPAEDIC SURGERY
Payer: COMMERCIAL

## 2017-07-01 ENCOUNTER — EKG ENCOUNTER (OUTPATIENT)
Dept: LAB | Age: 47
End: 2017-07-01
Attending: ORTHOPAEDIC SURGERY
Payer: COMMERCIAL

## 2017-07-01 ENCOUNTER — LAB ENCOUNTER (OUTPATIENT)
Dept: LAB | Age: 47
End: 2017-07-01
Attending: ORTHOPAEDIC SURGERY
Payer: COMMERCIAL

## 2017-07-01 DIAGNOSIS — G56.02 CARPAL TUNNEL SYNDROME, LEFT UPPER LIMB: ICD-10-CM

## 2017-07-01 DIAGNOSIS — G56.02 CARPAL TUNNEL SYNDROME ON LEFT: ICD-10-CM

## 2017-07-01 DIAGNOSIS — Z01.818 PRE-OP TESTING: Primary | ICD-10-CM

## 2017-07-01 LAB
ALBUMIN SERPL-MCNC: 3.6 G/DL (ref 3.5–4.8)
ALP LIVER SERPL-CCNC: 74 U/L (ref 37–98)
ALT SERPL-CCNC: 24 U/L (ref 14–54)
AST SERPL-CCNC: 11 U/L (ref 15–41)
BILIRUB SERPL-MCNC: 0.3 MG/DL (ref 0.1–2)
BUN BLD-MCNC: 13 MG/DL (ref 8–20)
CALCIUM BLD-MCNC: 9 MG/DL (ref 8.3–10.3)
CHLORIDE: 107 MMOL/L (ref 101–111)
CO2: 31 MMOL/L (ref 22–32)
CREAT BLD-MCNC: 0.96 MG/DL (ref 0.55–1.02)
GLUCOSE BLD-MCNC: 152 MG/DL (ref 70–99)
M PROTEIN MFR SERPL ELPH: 6.7 G/DL (ref 6.1–8.3)
POTASSIUM SERPL-SCNC: 3.9 MMOL/L (ref 3.6–5.1)
SODIUM SERPL-SCNC: 141 MMOL/L (ref 136–144)

## 2017-07-01 PROCEDURE — 71020 XR CHEST PA + LAT CHEST (CPT=71020): CPT | Performed by: ORTHOPAEDIC SURGERY

## 2017-07-01 PROCEDURE — 80053 COMPREHEN METABOLIC PANEL: CPT

## 2017-07-01 PROCEDURE — 36415 COLL VENOUS BLD VENIPUNCTURE: CPT

## 2017-07-01 PROCEDURE — 93010 ELECTROCARDIOGRAM REPORT: CPT | Performed by: INTERNAL MEDICINE

## 2017-07-01 PROCEDURE — 93005 ELECTROCARDIOGRAM TRACING: CPT

## 2017-07-02 LAB
ATRIAL RATE: 81 BPM
P AXIS: 72 DEGREES
P-R INTERVAL: 132 MS
Q-T INTERVAL: 374 MS
QRS DURATION: 84 MS
QTC CALCULATION (BEZET): 434 MS
R AXIS: 47 DEGREES
T AXIS: 35 DEGREES
VENTRICULAR RATE: 81 BPM

## 2017-07-03 ENCOUNTER — TELEPHONE (OUTPATIENT)
Dept: FAMILY MEDICINE CLINIC | Facility: CLINIC | Age: 47
End: 2017-07-03

## 2017-07-06 ENCOUNTER — TELEPHONE (OUTPATIENT)
Dept: FAMILY MEDICINE CLINIC | Facility: CLINIC | Age: 47
End: 2017-07-06

## 2017-07-06 NOTE — TELEPHONE ENCOUNTER
Spoke with Julissa from Dr. Keke Vizcarra office. Informed her clinical information from last OV needs to be faxed. She will fax last OV notes to [facility] at [phone]  Informed referral is pending until information is received.   Verbalized understandi

## 2017-07-06 NOTE — TELEPHONE ENCOUNTER
Julissa called state she received a call from the surgery center regarding the referral and the site where she is having the surgery is not on there. So the site needs to be [facility]. The referral needs to be updated.  Pt is having surger

## 2017-07-06 NOTE — TELEPHONE ENCOUNTER
LOV : 6/26/17 for CORINNE  Pt completed eye exam 6/30/17  A1C: 9/9/17    NOVOLOG 100 UNIT/ML Subcutaneous Solution 40 mL 0 6/15/2017    Sig :  ADMINISTER UP  UNITS VIA INSULIN PUMP DAILY AS DIRECTED     Route:   (none)       Future Appointments  Date Chinmay Schrader

## 2017-07-07 ENCOUNTER — MED REC SCAN ONLY (OUTPATIENT)
Dept: FAMILY MEDICINE CLINIC | Facility: CLINIC | Age: 47
End: 2017-07-07

## 2017-07-07 RX ORDER — INSULIN ASPART 100 [IU]/ML
INJECTION, SOLUTION INTRAVENOUS; SUBCUTANEOUS
Qty: 40 ML | Refills: 1 | Status: SHIPPED | OUTPATIENT
Start: 2017-07-07 | End: 2017-08-30

## 2017-07-07 NOTE — TELEPHONE ENCOUNTER
Pt called stated she is going to need her Novolog today she just had surgery so her sugars are going to be off for a least a week due to having a steroid shot.

## 2017-07-10 NOTE — TELEPHONE ENCOUNTER
LOV: 6/26/17 for CROINNE    FERROUS SULFATE 325 (65 Fe) MG Oral Tab 30 tablet 2 4/6/2017    Sig :  TAKE 1 TABLET BY MOUTH DAILY WITH BREAKFAST     Route:   (none)       Future Appointments  Date Time Provider Jovani Haque   7/25/2017 4:30 PM Guerline Faulkner

## 2017-07-11 RX ORDER — FERROUS SULFATE 325(65) MG
TABLET ORAL
Qty: 30 TABLET | Refills: 1 | Status: SHIPPED | OUTPATIENT
Start: 2017-07-11 | End: 2017-09-18

## 2017-07-18 PROCEDURE — 86038 ANTINUCLEAR ANTIBODIES: CPT | Performed by: FAMILY MEDICINE

## 2017-07-18 PROCEDURE — 86225 DNA ANTIBODY NATIVE: CPT | Performed by: FAMILY MEDICINE

## 2017-07-18 PROCEDURE — 85652 RBC SED RATE AUTOMATED: CPT | Performed by: FAMILY MEDICINE

## 2017-07-18 PROCEDURE — 86235 NUCLEAR ANTIGEN ANTIBODY: CPT | Performed by: FAMILY MEDICINE

## 2017-07-18 PROCEDURE — 86431 RHEUMATOID FACTOR QUANT: CPT | Performed by: FAMILY MEDICINE

## 2017-07-18 PROCEDURE — 84550 ASSAY OF BLOOD/URIC ACID: CPT | Performed by: FAMILY MEDICINE

## 2017-07-18 PROCEDURE — 86140 C-REACTIVE PROTEIN: CPT | Performed by: FAMILY MEDICINE

## 2017-07-18 NOTE — PROGRESS NOTES
CC: follow up mood    HPI:     Overall the increase in bupropion to 300 mg daily has helped the mood. Less irritable. She would like to continue.      Joint pain:   Location multiple  Quality dull aching to knife like  Severity moderate  Duration 6 months mention of complication, not stated as uncontrolled    • Unspecified asthma(493.90) 5/7/2012   • Unspecified disorder of thyroid    • Unspecified hypothyroidism 8/26/2011   • Unspecified musculoskeletal disorders and symptoms referable to neck 8/5/2011   •

## 2017-07-26 ENCOUNTER — HOSPITAL ENCOUNTER (OUTPATIENT)
Dept: OCCUPATIONAL MEDICINE | Facility: HOSPITAL | Age: 47
Setting detail: THERAPIES SERIES
Discharge: HOME | End: 2017-07-26
Attending: ORTHOPAEDIC SURGERY
Payer: COMMERCIAL

## 2017-07-26 DIAGNOSIS — G56.02 CARPAL TUNNEL SYNDROME, LEFT UPPER LIMB: ICD-10-CM

## 2017-07-26 PROCEDURE — 97110 THERAPEUTIC EXERCISES: CPT

## 2017-07-26 PROCEDURE — 97165 OT EVAL LOW COMPLEX 30 MIN: CPT

## 2017-07-26 NOTE — PROGRESS NOTES
OCCUPATIONAL THERAPY UPPER EXTREMITY EVALUATION   Referring Physician: Dr. Remy Bailey  Diagnosis: L CTS  With surgical release      Date of Service: 7/26/2017     PATIENT SUMMARY   Kirill Lewis is a 41year old  female who presents to therapy today Adi WNL    EDEMA:  No wrist edema    ROM: WNL MASON UE except below  Shoulder  WNL Elbow  WNL Wrist     Flexion: R =WNL, L =44  Extension: R =WNL, L =60  Ulnar Deviation: R =WNL L= 20  Radial Deviation R =WNL, L= 20     AROM/PROM:(Degrees)  LEFT HAND: [phone].  If you have any questions, please contact me at Dept: [phone]    Sincerely,  Electronically signed by therapist: ABHISHEK Maki/L   CLT    Physician's certification required: Yes  I certify the need for these services furnished un

## 2017-08-01 NOTE — TELEPHONE ENCOUNTER
LOV   07/18/2017    Last refill    PAROXETINE HCL 40 MG Oral Tab 30 tablet 0 6/8/2017    Sig :  TAKE 1 TABLET BY MOUTH EVERY MORNING       SIMVASTATIN 40 MG Oral Tab 30 tablet 0 6/8/2017    Sig :  TAKE 1 TABLET BY MOUTH EVERY NIGHT         LEVOTHYROXINE SO

## 2017-08-02 RX ORDER — PAROXETINE HYDROCHLORIDE 40 MG/1
TABLET, FILM COATED ORAL
Qty: 30 TABLET | Refills: 5 | Status: SHIPPED | OUTPATIENT
Start: 2017-08-02 | End: 2018-02-12

## 2017-08-02 RX ORDER — LEVOTHYROXINE SODIUM 137 UG/1
TABLET ORAL
Qty: 30 TABLET | Refills: 5 | Status: SHIPPED | OUTPATIENT
Start: 2017-08-02 | End: 2018-02-12

## 2017-08-02 RX ORDER — SIMVASTATIN 40 MG
TABLET ORAL
Qty: 30 TABLET | Refills: 5 | Status: SHIPPED | OUTPATIENT
Start: 2017-08-02 | End: 2018-02-12

## 2017-08-03 ENCOUNTER — APPOINTMENT (OUTPATIENT)
Dept: OCCUPATIONAL MEDICINE | Facility: HOSPITAL | Age: 47
End: 2017-08-03
Attending: ORTHOPAEDIC SURGERY
Payer: COMMERCIAL

## 2017-08-08 ENCOUNTER — APPOINTMENT (OUTPATIENT)
Dept: OCCUPATIONAL MEDICINE | Facility: HOSPITAL | Age: 47
End: 2017-08-08
Attending: ORTHOPAEDIC SURGERY
Payer: COMMERCIAL

## 2017-08-08 ENCOUNTER — MED REC SCAN ONLY (OUTPATIENT)
Dept: FAMILY MEDICINE CLINIC | Facility: CLINIC | Age: 47
End: 2017-08-08

## 2017-08-10 ENCOUNTER — APPOINTMENT (OUTPATIENT)
Dept: OCCUPATIONAL MEDICINE | Facility: HOSPITAL | Age: 47
End: 2017-08-10
Attending: ORTHOPAEDIC SURGERY
Payer: COMMERCIAL

## 2017-08-14 ENCOUNTER — TELEPHONE (OUTPATIENT)
Dept: FAMILY MEDICINE CLINIC | Facility: CLINIC | Age: 47
End: 2017-08-14

## 2017-08-14 ENCOUNTER — OFFICE VISIT (OUTPATIENT)
Dept: FAMILY MEDICINE CLINIC | Facility: CLINIC | Age: 47
End: 2017-08-14

## 2017-08-14 ENCOUNTER — TELEPHONE (OUTPATIENT)
Dept: ENDOCRINOLOGY CLINIC | Facility: CLINIC | Age: 47
End: 2017-08-14

## 2017-08-14 VITALS
HEIGHT: 60 IN | OXYGEN SATURATION: 97 % | RESPIRATION RATE: 14 BRPM | HEART RATE: 97 BPM | DIASTOLIC BLOOD PRESSURE: 84 MMHG | SYSTOLIC BLOOD PRESSURE: 136 MMHG | TEMPERATURE: 98 F | BODY MASS INDEX: 51.24 KG/M2 | WEIGHT: 261 LBS

## 2017-08-14 DIAGNOSIS — Z79.4 TYPE 2 DIABETES MELLITUS WITH COMPLICATION, WITH LONG-TERM CURRENT USE OF INSULIN (HCC): Primary | ICD-10-CM

## 2017-08-14 DIAGNOSIS — E10.9 TYPE 1 DIABETES MELLITUS WITHOUT COMPLICATION (HCC): Primary | ICD-10-CM

## 2017-08-14 DIAGNOSIS — M25.551 PAIN OF BOTH HIP JOINTS: ICD-10-CM

## 2017-08-14 DIAGNOSIS — E11.8 TYPE 2 DIABETES MELLITUS WITH COMPLICATION, WITH LONG-TERM CURRENT USE OF INSULIN (HCC): Primary | ICD-10-CM

## 2017-08-14 DIAGNOSIS — M25.552 PAIN OF BOTH HIP JOINTS: ICD-10-CM

## 2017-08-14 DIAGNOSIS — R76.0 ABNORMAL ANTIBODY TITER: ICD-10-CM

## 2017-08-14 PROCEDURE — 99214 OFFICE O/P EST MOD 30 MIN: CPT | Performed by: FAMILY MEDICINE

## 2017-08-14 RX ORDER — BLOOD SUGAR DIAGNOSTIC
1 STRIP MISCELLANEOUS DAILY
Qty: 1 BOX | Refills: 0 | Status: SHIPPED | OUTPATIENT
Start: 2017-08-14 | End: 2018-08-14

## 2017-08-14 NOTE — TELEPHONE ENCOUNTER
Dr. Libia Contreras called today, insulin pump has button-push error.  I hope it's okay I have ordered her syringes for her vials of humalog at home and Levemir pen and pen needles to cover her until [employer] ships her a new pump which normally takes 24-

## 2017-08-14 NOTE — PROGRESS NOTES
CC: blood sugar issue    HPI:     The insulin pump failed. Is going on a basal bolus regimen until new pump arrives. Also has pos ALVARO titer with very low pos single analyte. She has ongoing joint and muscle pain.      ROS: no edema or rash, no cp or sob impairment     glasses         EXAM: /84 (BP Location: Right arm, Patient Position: Sitting, Cuff Size: large)   Pulse 97   Temp 98 °F (36.7 °C) (Temporal)   Resp 14   Ht 60\"   Wt 261 lb   SpO2 97%   BMI 50.97 kg/m²     A/P:   Type 2 diabetes mellit

## 2017-08-14 NOTE — TELEPHONE ENCOUNTER
Patient's pump broke one hour ago. Patient already called company to get replacement. Patient does not know how much Humalog to take. Patient would need a long lasting insulin at night as well.   Patient wanted to speak with [facility] for recommendat

## 2017-08-14 NOTE — TELEPHONE ENCOUNTER
Pt called stated she talked to the diabetic dept and she is ok. She stated she has an appt with Dr Dedra Holland today and will explain it to him then.

## 2017-08-15 ENCOUNTER — APPOINTMENT (OUTPATIENT)
Dept: OCCUPATIONAL MEDICINE | Facility: HOSPITAL | Age: 47
End: 2017-08-15
Attending: ORTHOPAEDIC SURGERY
Payer: COMMERCIAL

## 2017-08-15 ENCOUNTER — TELEPHONE (OUTPATIENT)
Dept: ENDOCRINOLOGY CLINIC | Facility: CLINIC | Age: 47
End: 2017-08-15

## 2017-08-15 ENCOUNTER — MED REC SCAN ONLY (OUTPATIENT)
Dept: FAMILY MEDICINE CLINIC | Facility: CLINIC | Age: 47
End: 2017-08-15

## 2017-08-17 ENCOUNTER — TELEPHONE (OUTPATIENT)
Dept: FAMILY MEDICINE CLINIC | Facility: CLINIC | Age: 47
End: 2017-08-17

## 2017-08-17 DIAGNOSIS — G47.30 SLEEP APNEA, UNSPECIFIED TYPE: Primary | ICD-10-CM

## 2017-08-17 NOTE — TELEPHONE ENCOUNTER
Received fax from [facility]. Order for CPAP supplies requested. Spoke with patient and this supplies is needed. Codes are as follows: nabilaanne 9, [id number], [id number], [id number], [id number], [id number], [id number], [id number], [id number], [id number], [id number], [id number], [id number], [id number]. Order placed.

## 2017-08-22 ENCOUNTER — TELEPHONE (OUTPATIENT)
Dept: FAMILY MEDICINE CLINIC | Facility: CLINIC | Age: 47
End: 2017-08-22

## 2017-08-22 ENCOUNTER — APPOINTMENT (OUTPATIENT)
Dept: OCCUPATIONAL MEDICINE | Facility: HOSPITAL | Age: 47
End: 2017-08-22
Attending: ORTHOPAEDIC SURGERY
Payer: COMMERCIAL

## 2017-08-22 NOTE — TELEPHONE ENCOUNTER
Patient's CPAP supplies referral has been approved by insurance. Patient notified.   Referral and approval faxed to George Franklin at [place] [address] [phone]

## 2017-08-30 NOTE — TELEPHONE ENCOUNTER
LOV: 8/14/17 for diabetes  A1C: 9/9/16  Urine micro: 1/30/17    NOVOLOG 100 UNIT/ML Subcutaneous Solution 40 mL 1 7/7/2017    Sig :  ADMINISTER UP  UNITS VIA INSULIN PUMP DAILY AS DIRECTED     Route:   (none)       No future appointments.   Please adv

## 2017-08-31 ENCOUNTER — TELEPHONE (OUTPATIENT)
Dept: FAMILY MEDICINE CLINIC | Facility: CLINIC | Age: 47
End: 2017-08-31

## 2017-08-31 RX ORDER — INSULIN ASPART 100 [IU]/ML
INJECTION, SOLUTION INTRAVENOUS; SUBCUTANEOUS
Qty: 40 ML | Refills: 0 | Status: SHIPPED | OUTPATIENT
Start: 2017-08-31 | End: 2017-09-23

## 2017-08-31 NOTE — TELEPHONE ENCOUNTER
Routing to Dr. Dietz Prior. Please advise if patient should be worked in or should go to [address] [place] [address]? Thank you.

## 2017-08-31 NOTE — TELEPHONE ENCOUNTER
Pt called stated she is on day 13 with a HA. Pt stated it is a pounding HA and she thinks that it could be a migraine. Sensitivity to light and noise, back of neck hurts, no other symptoms. Pt was wondering if she could be seen tomorrow.

## 2017-09-01 ENCOUNTER — OFFICE VISIT (OUTPATIENT)
Dept: FAMILY MEDICINE CLINIC | Facility: CLINIC | Age: 47
End: 2017-09-01

## 2017-09-01 VITALS
SYSTOLIC BLOOD PRESSURE: 130 MMHG | HEART RATE: 92 BPM | RESPIRATION RATE: 16 BRPM | BODY MASS INDEX: 51.46 KG/M2 | DIASTOLIC BLOOD PRESSURE: 70 MMHG | OXYGEN SATURATION: 97 % | WEIGHT: 262.13 LBS | HEIGHT: 60 IN | TEMPERATURE: 98 F

## 2017-09-01 DIAGNOSIS — G44.52 NEW DAILY PERSISTENT HEADACHE: Primary | ICD-10-CM

## 2017-09-01 PROCEDURE — 99214 OFFICE O/P EST MOD 30 MIN: CPT | Performed by: FAMILY MEDICINE

## 2017-09-01 RX ORDER — SUMATRIPTAN 50 MG/1
50 TABLET, FILM COATED ORAL EVERY 2 HOUR PRN
Qty: 9 TABLET | Refills: 1 | Status: SHIPPED | OUTPATIENT
Start: 2017-09-01 | End: 2017-09-11

## 2017-09-01 RX ORDER — BUTALBITAL, ACETAMINOPHEN AND CAFFEINE 50; 325; 40 MG/1; MG/1; MG/1
1 TABLET ORAL EVERY 4 HOURS PRN
Qty: 20 TABLET | Refills: 0 | Status: SHIPPED | OUTPATIENT
Start: 2017-09-01 | End: 2017-09-11 | Stop reason: ALTCHOICE

## 2017-09-01 NOTE — PROGRESS NOTES
CC: headache    HPI:     Location around the whole head  Quality pressure, band like, throbbing  Severity moderate  Duration 2 weeks  Timing daily  Modifying factors has tried excedrine, aleve, tylenol - not helping  Associated symptoms possible visual kriss referable to neck 8/5/2011   • Unspecified tinnitus 2/18/2012   • Unspecified urinary incontinence 12/5/2011   • Visual impairment     glasses     EXAM:   /70 (BP Location: Left arm, Patient Position: Sitting, Cuff Size: adult)   Pulse 92   Temp 98 °

## 2017-09-07 ENCOUNTER — OFFICE VISIT (OUTPATIENT)
Dept: FAMILY MEDICINE CLINIC | Facility: CLINIC | Age: 47
End: 2017-09-07

## 2017-09-07 VITALS
HEIGHT: 60 IN | SYSTOLIC BLOOD PRESSURE: 132 MMHG | DIASTOLIC BLOOD PRESSURE: 78 MMHG | OXYGEN SATURATION: 97 % | TEMPERATURE: 97 F | HEART RATE: 92 BPM | BODY MASS INDEX: 50.87 KG/M2 | RESPIRATION RATE: 16 BRPM | WEIGHT: 259.13 LBS

## 2017-09-07 DIAGNOSIS — J98.01 BRONCHOSPASM: Primary | ICD-10-CM

## 2017-09-07 PROCEDURE — 99214 OFFICE O/P EST MOD 30 MIN: CPT | Performed by: FAMILY MEDICINE

## 2017-09-07 PROCEDURE — 94640 AIRWAY INHALATION TREATMENT: CPT | Performed by: FAMILY MEDICINE

## 2017-09-07 RX ORDER — PROMETHAZINE HYDROCHLORIDE AND CODEINE PHOSPHATE 6.25; 1 MG/5ML; MG/5ML
5 SYRUP ORAL EVERY 6 HOURS PRN
Qty: 120 ML | Refills: 0 | Status: SHIPPED | OUTPATIENT
Start: 2017-09-07 | End: 2018-03-09

## 2017-09-07 RX ORDER — ALBUTEROL SULFATE 2.5 MG/3ML
2.5 SOLUTION RESPIRATORY (INHALATION) ONCE
Status: COMPLETED | OUTPATIENT
Start: 2017-09-07 | End: 2017-09-07

## 2017-09-07 RX ORDER — PREDNISONE 20 MG/1
40 TABLET ORAL DAILY
Qty: 10 TABLET | Refills: 0 | Status: SHIPPED | OUTPATIENT
Start: 2017-09-07 | End: 2017-09-12

## 2017-09-07 RX ORDER — ALBUTEROL SULFATE 2.5 MG/3ML
2.5 SOLUTION RESPIRATORY (INHALATION) EVERY 4 HOURS PRN
Qty: 150 ML | Refills: 0 | Status: SHIPPED | OUTPATIENT
Start: 2017-09-07 | End: 2018-05-25

## 2017-09-07 RX ADMIN — ALBUTEROL SULFATE 2.5 MG: 2.5 SOLUTION RESPIRATORY (INHALATION) at 15:12:00

## 2017-09-07 NOTE — PROGRESS NOTES
CC: cough      HPI:     Location chest  Quality tight  Severity moderate  Duration <24 hours  Timing frequent    ROS: no fever, no sputum or hemoptysis, still having headaches    Past Medical History:   Diagnosis Date   • Abdominal pain, unspecified site 1 Packs/day: 0.00      Years: 0.00         Types: Cigarettes     Quit date: 3/1/2017  Smokeless tobacco: Never Used                      Alcohol use: Yes           0.0 oz/week     Comment: occasional    EXAM:   /78 (BP

## 2017-09-11 ENCOUNTER — OFFICE VISIT (OUTPATIENT)
Dept: FAMILY MEDICINE CLINIC | Facility: CLINIC | Age: 47
End: 2017-09-11

## 2017-09-11 VITALS
OXYGEN SATURATION: 98 % | DIASTOLIC BLOOD PRESSURE: 80 MMHG | HEIGHT: 61.75 IN | HEART RATE: 101 BPM | TEMPERATURE: 99 F | RESPIRATION RATE: 14 BRPM | BODY MASS INDEX: 47.66 KG/M2 | WEIGHT: 259 LBS | SYSTOLIC BLOOD PRESSURE: 132 MMHG

## 2017-09-11 DIAGNOSIS — G43.709 CHRONIC MIGRAINE WITHOUT AURA WITHOUT STATUS MIGRAINOSUS, NOT INTRACTABLE: Primary | ICD-10-CM

## 2017-09-11 PROCEDURE — 99214 OFFICE O/P EST MOD 30 MIN: CPT | Performed by: FAMILY MEDICINE

## 2017-09-11 RX ORDER — SUMATRIPTAN 50 MG/1
50 TABLET, FILM COATED ORAL EVERY 2 HOUR PRN
Qty: 9 TABLET | Refills: 1 | Status: SHIPPED | OUTPATIENT
Start: 2017-09-11 | End: 2020-02-11

## 2017-09-11 NOTE — PROGRESS NOTES
CC: follow up headache    HPI:     Overall the headaches are persisting. Moderate to severe at times. Had 2 days headache free. Now back again. She is not sure which medication to use. Recent prednisone for the respiratory issues did seem to help.  Stress i tinnitus 2/18/2012   • Unspecified urinary incontinence 12/5/2011   • Visual impairment     glasses     Smoking status: Former Smoker                                                              Packs/day: 0.00      Years: 0.00         Types: Cigarettes

## 2017-09-21 RX ORDER — FERROUS SULFATE 325(65) MG
TABLET ORAL
Qty: 30 TABLET | Refills: 5 | Status: SHIPPED | OUTPATIENT
Start: 2017-09-21 | End: 2018-07-20

## 2017-09-22 ENCOUNTER — OFFICE VISIT (OUTPATIENT)
Dept: FAMILY MEDICINE CLINIC | Facility: CLINIC | Age: 47
End: 2017-09-22

## 2017-09-22 VITALS
WEIGHT: 259 LBS | HEART RATE: 94 BPM | TEMPERATURE: 98 F | SYSTOLIC BLOOD PRESSURE: 136 MMHG | HEIGHT: 61.75 IN | RESPIRATION RATE: 14 BRPM | DIASTOLIC BLOOD PRESSURE: 84 MMHG | OXYGEN SATURATION: 97 % | BODY MASS INDEX: 47.66 KG/M2

## 2017-09-22 DIAGNOSIS — M71.22 SYNOVIAL CYST OF LEFT POPLITEAL SPACE: ICD-10-CM

## 2017-09-22 DIAGNOSIS — B37.0 ORAL CANDIDIASIS: Primary | ICD-10-CM

## 2017-09-22 PROCEDURE — 99214 OFFICE O/P EST MOD 30 MIN: CPT | Performed by: FAMILY MEDICINE

## 2017-09-22 RX ORDER — CLOTRIMAZOLE 10 MG/1
10 LOZENGE ORAL; TOPICAL 3 TIMES DAILY
Qty: 21 TROCHE | Refills: 0 | Status: SHIPPED | OUTPATIENT
Start: 2017-09-22 | End: 2017-09-29

## 2017-09-22 NOTE — PROGRESS NOTES
CC: sore throat, knee pain    HPI:   Throat:   Location diffuse  Quality aching, burning, not typical feeling  Severity moderate  Duration several days  Context recent steroid use    Knee pain:   Location left knee   Quality tight  Severity moderate  Durat symptoms referable to neck 8/5/2011   • Unspecified tinnitus 2/18/2012   • Unspecified urinary incontinence 12/5/2011   • Visual impairment     glasses     EXAM:   /84 (BP Location: Left arm, Patient Position: Sitting, Cuff Size: large)   Pulse 94

## 2017-09-23 RX ORDER — INSULIN ASPART 100 [IU]/ML
INJECTION, SOLUTION INTRAVENOUS; SUBCUTANEOUS
Qty: 40 ML | Refills: 2 | Status: SHIPPED | OUTPATIENT
Start: 2017-09-23 | End: 2018-05-12

## 2017-09-23 NOTE — TELEPHONE ENCOUNTER
LOV: 9/22/17 for oral candidiasis    NOVOLOG 100 UNIT/ML Subcutaneous Solution 40 mL 0 8/31/2017    Sig :  ADMINISTER UP  UNITS VIA INSULIN PUMP DAILY AS DIRECTED     Route:   (none)       Future Appointments  Date Time Provider Jovani Conteh

## 2017-09-25 ENCOUNTER — TELEPHONE (OUTPATIENT)
Dept: FAMILY MEDICINE CLINIC | Facility: CLINIC | Age: 47
End: 2017-09-25

## 2017-09-25 DIAGNOSIS — E10.9 TYPE 1 DIABETES MELLITUS WITHOUT COMPLICATION (HCC): Primary | ICD-10-CM

## 2017-09-26 NOTE — TELEPHONE ENCOUNTER
Spoke with [facility] at [facility] - referral number [id number]   (for diabetic supplies) extended for 6 months.

## 2017-09-29 ENCOUNTER — HOSPITAL ENCOUNTER (OUTPATIENT)
Dept: ULTRASOUND IMAGING | Facility: HOSPITAL | Age: 47
Discharge: HOME | End: 2017-09-29
Attending: FAMILY MEDICINE
Payer: COMMERCIAL

## 2017-09-29 DIAGNOSIS — M71.22 SYNOVIAL CYST OF LEFT POPLITEAL SPACE: ICD-10-CM

## 2017-09-29 PROCEDURE — 76882 US LMTD JT/FCL EVL NVASC XTR: CPT | Performed by: FAMILY MEDICINE

## 2017-10-05 ENCOUNTER — OFFICE VISIT (OUTPATIENT)
Dept: FAMILY MEDICINE CLINIC | Facility: CLINIC | Age: 47
End: 2017-10-05

## 2017-10-05 VITALS
BODY MASS INDEX: 48 KG/M2 | DIASTOLIC BLOOD PRESSURE: 78 MMHG | RESPIRATION RATE: 20 BRPM | WEIGHT: 262.81 LBS | TEMPERATURE: 99 F | SYSTOLIC BLOOD PRESSURE: 122 MMHG | HEART RATE: 92 BPM

## 2017-10-05 DIAGNOSIS — M71.22 SYNOVIAL CYST OF LEFT POPLITEAL SPACE: Primary | ICD-10-CM

## 2017-10-05 NOTE — PROGRESS NOTES
CC: knee pain    HPI:     Left knee pain ongoing.  The u/s confirms complex Bakers cyst.     ROS: pos edema    EXAM:   /78   Pulse 92   Temp 98.6 °F (37 °C) (Temporal)   Resp 20   Wt 262 lb 12.8 oz   BMI 48.46 kg/m²       A/P:  Synovial cyst of left p

## 2017-10-06 ENCOUNTER — TELEPHONE (OUTPATIENT)
Dept: FAMILY MEDICINE CLINIC | Facility: CLINIC | Age: 47
End: 2017-10-06

## 2017-10-06 NOTE — TELEPHONE ENCOUNTER
Pt called stated she notes a note stating that she still can't work out so she does not get charged for [employer].

## 2017-10-27 ENCOUNTER — TELEPHONE (OUTPATIENT)
Dept: FAMILY MEDICINE CLINIC | Facility: CLINIC | Age: 47
End: 2017-10-27

## 2017-10-27 NOTE — TELEPHONE ENCOUNTER
PT CAN NOT GET INSULIN UNTIL NOV 1 DUE TO HIGH DEDUCTABLE. PT WONDERING IF THERE IS ANY SAMPLES OF NOVOLOG OR HUMALOG    PLEASE CALL.  PT HAS TO CANCEL APPT ON Monday DUE TO INS REASONS    THANK YOU

## 2017-11-04 ENCOUNTER — TELEPHONE (OUTPATIENT)
Dept: FAMILY MEDICINE CLINIC | Facility: CLINIC | Age: 47
End: 2017-11-04

## 2017-11-04 RX ORDER — INSULIN LISPRO 100 [IU]/ML
INJECTION, SOLUTION INTRAVENOUS; SUBCUTANEOUS
Qty: 1 VIAL | Refills: 0 | COMMUNITY
Start: 2017-11-04 | End: 2018-05-25

## 2017-11-04 NOTE — TELEPHONE ENCOUNTER
No humalog or novolog samples available. Patient notified. Advised patient to contact [facility] to see if they have any samples available. [facility] location. Samples of both insulins available.   Patient will go to pick some up today bef

## 2017-11-04 NOTE — TELEPHONE ENCOUNTER
Suman is having trouble getting her insulin from her insurance company. Patient states that Dagoberto Moya RN is familiar with her situation.      NOVOLOG 100 UNIT/ML Subcutaneous Solution 40 mL 2 9/23/2017     Sig: ADMINISTER UP  UNITS VIA INSULIN PUMP DA

## 2017-11-04 NOTE — TELEPHONE ENCOUNTER
Jason Frankel from [facility] called wondering if we had any Novolog or humalog insulin files or pens. Per Freda Can and Lillian yes we do. Can we set aside 2 Humolog viles for pt? They will send pt over here to .

## 2017-11-06 ENCOUNTER — TELEPHONE (OUTPATIENT)
Dept: ENDOCRINOLOGY CLINIC | Facility: CLINIC | Age: 47
End: 2017-11-06

## 2017-11-06 DIAGNOSIS — Z79.4 DIABETES MELLITUS DUE TO UNDERLYING CONDITION WITH DIABETIC NEPHROPATHY, WITH LONG-TERM CURRENT USE OF INSULIN (HCC): Primary | ICD-10-CM

## 2017-11-06 DIAGNOSIS — E08.21 DIABETES MELLITUS DUE TO UNDERLYING CONDITION WITH DIABETIC NEPHROPATHY, WITH LONG-TERM CURRENT USE OF INSULIN (HCC): Primary | ICD-10-CM

## 2017-11-06 RX ORDER — INSULIN LISPRO 100 [IU]/ML
INJECTION, SOLUTION INTRAVENOUS; SUBCUTANEOUS
Qty: 2 VIAL | Refills: 0 | Status: CANCELLED | COMMUNITY
Start: 2017-11-06

## 2017-11-06 NOTE — TELEPHONE ENCOUNTER
Patient called because she is low on insulin and asked for samples until she can get into the [facility]. Per Nati Gallegos, it is OK to give her 2 sample vials of Humalog. She will pick it up Tuesday.   Lot [id number]   Exp 2/2020

## 2017-11-30 ENCOUNTER — MED REC SCAN ONLY (OUTPATIENT)
Dept: FAMILY MEDICINE CLINIC | Facility: CLINIC | Age: 47
End: 2017-11-30

## 2017-12-21 ENCOUNTER — MED REC SCAN ONLY (OUTPATIENT)
Dept: FAMILY MEDICINE CLINIC | Facility: CLINIC | Age: 47
End: 2017-12-21

## 2018-01-02 ENCOUNTER — TELEPHONE (OUTPATIENT)
Dept: ENDOCRINOLOGY CLINIC | Facility: CLINIC | Age: 48
End: 2018-01-02

## 2018-01-15 ENCOUNTER — TELEPHONE (OUTPATIENT)
Dept: ENDOCRINOLOGY CLINIC | Facility: CLINIC | Age: 48
End: 2018-01-15

## 2018-01-15 NOTE — TELEPHONE ENCOUNTER
Pt. called to ask if I had any extra medtronic pump supplies; I informed her that a pt. Switched to a different pump so has some supplies. Pt. Will come .

## 2018-01-30 ENCOUNTER — TELEPHONE (OUTPATIENT)
Dept: FAMILY MEDICINE CLINIC | Facility: CLINIC | Age: 48
End: 2018-01-30

## 2018-01-30 NOTE — TELEPHONE ENCOUNTER
Chris Perez called [facility] stated that pt needs samples of Humalog and Novalog was wondering if we had any. She stated pt needs these today and has an insulin pump.

## 2018-01-30 NOTE — TELEPHONE ENCOUNTER
ANDREIA for [facility] - we have two samples of novolog flex pens. Will this work for the patient?

## 2018-02-01 NOTE — TELEPHONE ENCOUNTER
Pt. Called & LM that she still needs more infusion sets if I have some. Returned her call & pt. Said she will come to [place] location tomorrow to pick them up.  I also informed her that I checked with my Medtronic rep & she would bring some to the main

## 2018-02-12 RX ORDER — SIMVASTATIN 40 MG
TABLET ORAL
Qty: 30 TABLET | Refills: 5 | Status: SHIPPED | OUTPATIENT
Start: 2018-02-12 | End: 2018-05-25

## 2018-02-12 RX ORDER — LEVOTHYROXINE SODIUM 137 UG/1
137 TABLET ORAL
Qty: 30 TABLET | Refills: 5 | Status: SHIPPED | OUTPATIENT
Start: 2018-02-12 | End: 2018-03-09

## 2018-02-12 RX ORDER — PAROXETINE HYDROCHLORIDE 40 MG/1
TABLET, FILM COATED ORAL
Qty: 30 TABLET | Refills: 5 | Status: SHIPPED | OUTPATIENT
Start: 2018-02-12 | End: 2018-05-25

## 2018-02-12 RX ORDER — BUPROPION HYDROCHLORIDE 300 MG/1
300 TABLET ORAL DAILY
Qty: 90 TABLET | Refills: 1 | Status: SHIPPED | OUTPATIENT
Start: 2018-02-12 | End: 2018-05-25

## 2018-02-12 NOTE — TELEPHONE ENCOUNTER
LOV: 10/5/17 synovial cyst  Patient would like all medications sent to [facility] except synthroid. She would [facility].     SIMVASTATIN 40 MG Oral Tab 30 tablet 5 8/2/2017    Sig :  TAKE 1 TABLET BY MOUTH EVERY NIGHT     Route:   (none)

## 2018-02-28 ENCOUNTER — TELEPHONE (OUTPATIENT)
Dept: ENDOCRINOLOGY CLINIC | Facility: CLINIC | Age: 48
End: 2018-02-28

## 2018-02-28 NOTE — TELEPHONE ENCOUNTER
Returned pt.'s call re: samples she is requesting. Pt. Informed I would leave them up front in [place] location . Pt. V/u & was agreeable w/plan.

## 2018-03-05 ENCOUNTER — TELEPHONE (OUTPATIENT)
Dept: FAMILY MEDICINE CLINIC | Facility: CLINIC | Age: 48
End: 2018-03-05

## 2018-03-05 DIAGNOSIS — E10.8 TYPE 1 DIABETES MELLITUS WITH COMPLICATION (HCC): Primary | ICD-10-CM

## 2018-03-05 NOTE — TELEPHONE ENCOUNTER
Patient needs DME for diabetic pump supplies. [facility]: , [id number]  Referral placed. Referral pending approval with insurance company.

## 2018-03-08 ENCOUNTER — TELEPHONE (OUTPATIENT)
Dept: FAMILY MEDICINE CLINIC | Facility: CLINIC | Age: 48
End: 2018-03-08

## 2018-03-09 ENCOUNTER — OFFICE VISIT (OUTPATIENT)
Dept: FAMILY MEDICINE CLINIC | Facility: CLINIC | Age: 48
End: 2018-03-09

## 2018-03-09 ENCOUNTER — MED REC SCAN ONLY (OUTPATIENT)
Dept: FAMILY MEDICINE CLINIC | Facility: CLINIC | Age: 48
End: 2018-03-09

## 2018-03-09 VITALS
OXYGEN SATURATION: 98 % | RESPIRATION RATE: 18 BRPM | BODY MASS INDEX: 49.15 KG/M2 | WEIGHT: 257 LBS | HEART RATE: 71 BPM | DIASTOLIC BLOOD PRESSURE: 84 MMHG | HEIGHT: 60.5 IN | TEMPERATURE: 98 F | SYSTOLIC BLOOD PRESSURE: 130 MMHG

## 2018-03-09 DIAGNOSIS — Z12.39 BREAST CANCER SCREENING: Primary | ICD-10-CM

## 2018-03-09 DIAGNOSIS — E03.8 HYPOTHYROIDISM DUE TO HASHIMOTO'S THYROIDITIS: ICD-10-CM

## 2018-03-09 DIAGNOSIS — E06.3 HYPOTHYROIDISM DUE TO HASHIMOTO'S THYROIDITIS: ICD-10-CM

## 2018-03-09 DIAGNOSIS — E11.65 TYPE 2 DIABETES MELLITUS WITH HYPERGLYCEMIA, WITH LONG-TERM CURRENT USE OF INSULIN (HCC): ICD-10-CM

## 2018-03-09 DIAGNOSIS — Z79.4 TYPE 2 DIABETES MELLITUS WITH HYPERGLYCEMIA, WITH LONG-TERM CURRENT USE OF INSULIN (HCC): ICD-10-CM

## 2018-03-09 DIAGNOSIS — M65.312 TRIGGER FINGER OF LEFT THUMB: ICD-10-CM

## 2018-03-09 LAB
ALBUMIN SERPL-MCNC: 3.6 G/DL (ref 3.5–4.8)
ALP LIVER SERPL-CCNC: 85 U/L (ref 37–98)
ALT SERPL-CCNC: 20 U/L (ref 14–54)
AST SERPL-CCNC: 9 U/L (ref 15–41)
BASOPHILS # BLD AUTO: 0.04 X10(3) UL (ref 0–0.1)
BASOPHILS NFR BLD AUTO: 0.3 %
BILIRUB SERPL-MCNC: 0.3 MG/DL (ref 0.1–2)
BUN BLD-MCNC: 15 MG/DL (ref 8–20)
CALCIUM BLD-MCNC: 8.6 MG/DL (ref 8.3–10.3)
CHLORIDE: 107 MMOL/L (ref 101–111)
CHOLEST SMN-MCNC: 155 MG/DL (ref ?–200)
CO2: 25 MMOL/L (ref 22–32)
CREAT BLD-MCNC: 0.74 MG/DL (ref 0.55–1.02)
CREAT UR-SCNC: 91.4 MG/DL
EOSINOPHIL # BLD AUTO: 0.2 X10(3) UL (ref 0–0.3)
EOSINOPHIL NFR BLD AUTO: 1.7 %
ERYTHROCYTE [DISTWIDTH] IN BLOOD BY AUTOMATED COUNT: 12.4 % (ref 11.5–16)
EST. AVERAGE GLUCOSE BLD GHB EST-MCNC: 131 MG/DL (ref 68–126)
FREE T4: 0.8 NG/DL (ref 0.9–1.8)
GLUCOSE BLD-MCNC: 98 MG/DL (ref 70–99)
HBA1C MFR BLD HPLC: 6.2 % (ref ?–5.7)
HCT VFR BLD AUTO: 39.6 % (ref 34–50)
HDLC SERPL-MCNC: 48 MG/DL (ref 45–?)
HDLC SERPL: 3.23 {RATIO} (ref ?–4.44)
HGB BLD-MCNC: 13.4 G/DL (ref 12–16)
IMMATURE GRANULOCYTE COUNT: 0.03 X10(3) UL (ref 0–1)
IMMATURE GRANULOCYTE RATIO %: 0.3 %
LDLC SERPL CALC-MCNC: 92 MG/DL (ref ?–130)
LYMPHOCYTES # BLD AUTO: 3.06 X10(3) UL (ref 0.9–4)
LYMPHOCYTES NFR BLD AUTO: 26.7 %
M PROTEIN MFR SERPL ELPH: 7.1 G/DL (ref 6.1–8.3)
MCH RBC QN AUTO: 29.5 PG (ref 27–33.2)
MCHC RBC AUTO-ENTMCNC: 33.8 G/DL (ref 31–37)
MCV RBC AUTO: 87 FL (ref 81–100)
MICROALBUMIN UR-MCNC: 1.09 MG/DL
MICROALBUMIN/CREAT 24H UR-RTO: 11.9 UG/MG (ref ?–30)
MONOCYTES # BLD AUTO: 0.73 X10(3) UL (ref 0.1–1)
MONOCYTES NFR BLD AUTO: 6.4 %
NEUTROPHIL ABS PRELIM: 7.38 X10 (3) UL (ref 1.3–6.7)
NEUTROPHILS # BLD AUTO: 7.38 X10(3) UL (ref 1.3–6.7)
NEUTROPHILS NFR BLD AUTO: 64.6 %
NONHDLC SERPL-MCNC: 107 MG/DL (ref ?–130)
PLATELET # BLD AUTO: 298 10(3)UL (ref 150–450)
POTASSIUM SERPL-SCNC: 3.7 MMOL/L (ref 3.6–5.1)
RBC # BLD AUTO: 4.55 X10(6)UL (ref 3.8–5.1)
RED CELL DISTRIBUTION WIDTH-SD: 39.6 FL (ref 35.1–46.3)
SODIUM SERPL-SCNC: 140 MMOL/L (ref 136–144)
THYROXINE (T4): 9.3 UG/DL (ref 4.5–10.9)
TRIGL SERPL-MCNC: 75 MG/DL (ref ?–150)
TSI SER-ACNC: 5.65 MIU/ML (ref 0.35–5.5)
VLDLC SERPL CALC-MCNC: 15 MG/DL (ref 5–40)
WBC # BLD AUTO: 11.4 X10(3) UL (ref 4–13)

## 2018-03-09 PROCEDURE — 84439 ASSAY OF FREE THYROXINE: CPT | Performed by: FAMILY MEDICINE

## 2018-03-09 PROCEDURE — 82043 UR ALBUMIN QUANTITATIVE: CPT | Performed by: FAMILY MEDICINE

## 2018-03-09 PROCEDURE — 83036 HEMOGLOBIN GLYCOSYLATED A1C: CPT | Performed by: FAMILY MEDICINE

## 2018-03-09 PROCEDURE — 80061 LIPID PANEL: CPT | Performed by: FAMILY MEDICINE

## 2018-03-09 PROCEDURE — 82570 ASSAY OF URINE CREATININE: CPT | Performed by: FAMILY MEDICINE

## 2018-03-09 PROCEDURE — 80050 GENERAL HEALTH PANEL: CPT | Performed by: FAMILY MEDICINE

## 2018-03-09 PROCEDURE — 99214 OFFICE O/P EST MOD 30 MIN: CPT | Performed by: FAMILY MEDICINE

## 2018-03-09 RX ORDER — LEVOTHYROXINE SODIUM 137 UG/1
137 TABLET ORAL
Qty: 30 TABLET | Refills: 5 | Status: SHIPPED | OUTPATIENT
Start: 2018-03-09 | End: 2018-09-10

## 2018-03-09 NOTE — PROGRESS NOTES
CC: multiple issues    HPI:     Thumb: left sided, painful with flexion, feels a bump    Knee: due for mri as previously ordered. DM educator. Due for visit. Due for labs today. Thyroid: no medication for 5 months. Weight: elevated.  Diet has be Unspecified tinnitus 2/18/2012   • Unspecified urinary incontinence 12/5/2011   • Visual impairment     glasses     Smoking status: Former Smoker                                                              Packs/day: 0.00      Years: 0.00         Types: C STIM HORMONE    3. Trigger finger of left thumb  May need injection.   - ORTHOPEDIC - INTERNAL    4. Breast cancer screening  Check mammogram.     - [facility] SCREENING BILAT (CPT=77067);  Future      Orders Placed This Encounter      Microalb/Creat Ratio, Random

## 2018-03-16 ENCOUNTER — TELEPHONE (OUTPATIENT)
Dept: FAMILY MEDICINE CLINIC | Facility: CLINIC | Age: 48
End: 2018-03-16

## 2018-03-16 NOTE — TELEPHONE ENCOUNTER
Patient's symptoms started Monday. Patient states she has a headache  Sore throat. Congestion. No fever. Patient is not sure if she has body aches or chills. Patient wanted to know if she can taker OTC cold medication.   Informed patient she may, but a

## 2018-03-17 ENCOUNTER — OFFICE VISIT (OUTPATIENT)
Dept: FAMILY MEDICINE CLINIC | Facility: CLINIC | Age: 48
End: 2018-03-17

## 2018-03-17 VITALS
TEMPERATURE: 98 F | HEIGHT: 60.5 IN | BODY MASS INDEX: 49.15 KG/M2 | SYSTOLIC BLOOD PRESSURE: 122 MMHG | HEART RATE: 108 BPM | OXYGEN SATURATION: 98 % | RESPIRATION RATE: 16 BRPM | DIASTOLIC BLOOD PRESSURE: 72 MMHG | WEIGHT: 257 LBS

## 2018-03-17 DIAGNOSIS — J02.9 SORE THROAT: ICD-10-CM

## 2018-03-17 DIAGNOSIS — J01.00 ACUTE NON-RECURRENT MAXILLARY SINUSITIS: Primary | ICD-10-CM

## 2018-03-17 LAB — CONTROL LINE PRESENT WITH A CLEAR BACKGROUND (YES/NO): YES YES/NO

## 2018-03-17 PROCEDURE — 87880 STREP A ASSAY W/OPTIC: CPT | Performed by: PHYSICIAN ASSISTANT

## 2018-03-17 PROCEDURE — 99213 OFFICE O/P EST LOW 20 MIN: CPT | Performed by: PHYSICIAN ASSISTANT

## 2018-03-17 RX ORDER — AMOXICILLIN 875 MG/1
875 TABLET, COATED ORAL 2 TIMES DAILY
Qty: 20 TABLET | Refills: 0 | Status: SHIPPED | OUTPATIENT
Start: 2018-03-17 | End: 2018-03-27

## 2018-03-17 NOTE — PATIENT INSTRUCTIONS
1.  Amoxicillin twice daily for 10 days.    2.  Encourage fluids, humidifier/vaporizor at bedside, elevate head of bed (sleep with extra pillow), vapor rub to chest, steam therapy if no fever, warm compresses for sinus pressure if no fever, salt water gargl · Over-the-counter decongestants may be used unless a similar medicine was prescribed. Nasal sprays work the fastest. Use one that contains phenylephrine or oxymetazoline. First blow the nose gently. Then use the spray.  Do not use these medicines more ofte © 7134-3923 [address] [facility], [facility] [place]. All rights reserved. This information is not intended as a substitute for professional medical care. Always follow your healthcare professional's instructions.

## 2018-03-17 NOTE — PROGRESS NOTES
CHIEF COMPLAINT:   Patient presents with:  Headache: HA, sore throat , ear pain , runny nose 4 days       HPI:   Diya Friend is a 57year old female who presents for upper respiratory symptoms for  4 days.  Patient reports sore throat, congestion, s Insulin Syringe-Needle U-100 (BD INSULIN SYRINGE ULTRAFINE) 31G X 5/16\" 1 ML Does not apply Misc Inject 1 Syringe into the skin daily.  Use with long-acting insulin for pump failure (currently occurring) Disp: 1 Box Rfl: 0   Insulin Aspart (NOVOLOG SC) Inj • Lipoma of unspecified site 11/22/2011   • Multiple thyroid nodules     on rt,2011, lt.-2008   • Neoplasm of uncertain behavior of skin 4/24/2012   • Nontoxic multinodular goiter 7/5/2011   • Obese    • Other abnormal clinical finding 2/18/2012   • Other Smokeless tobacco: Never Used                      Comment: socially  Alcohol use: Yes           0.0 oz/week     Comment: socially        REVIEW OF SYSTEMS:   GENERAL: decreased appetite d/t sore throat  SKIN: no rashes or abnormal skin lesions  HEENT: See The patient is asked to f/u with PCP if sx's persist or worsen. Patient Instructions   1. Amoxicillin twice daily for 10 days.    2.  Encourage fluids, humidifier/vaporizor at bedside, elevate head of bed (sleep with extra pillow), vapor rub to chest, susan · Over-the-counter decongestants may be used unless a similar medicine was prescribed. Nasal sprays work the fastest. Use one that contains phenylephrine or oxymetazoline. First blow the nose gently. Then use the spray.  Do not use these medicines more ofte © 0849-5518 [address]. [address] [facility], [address] [place]. All rights reserved. This information is not intended as a substitute for professional medical care. Always follow your healthcare professional's instructions.             Toshia

## 2018-03-19 ENCOUNTER — HOSPITAL ENCOUNTER (OUTPATIENT)
Dept: MRI IMAGING | Age: 48
Discharge: HOME | End: 2018-03-19
Attending: FAMILY MEDICINE
Payer: COMMERCIAL

## 2018-03-19 DIAGNOSIS — M71.22 SYNOVIAL CYST OF LEFT POPLITEAL SPACE: ICD-10-CM

## 2018-03-19 PROCEDURE — 73721 MRI JNT OF LWR EXTRE W/O DYE: CPT | Performed by: FAMILY MEDICINE

## 2018-03-20 ENCOUNTER — TELEPHONE (OUTPATIENT)
Dept: FAMILY MEDICINE CLINIC | Facility: CLINIC | Age: 48
End: 2018-03-20

## 2018-03-20 NOTE — TELEPHONE ENCOUNTER
Pt called stated she was seen in the [facility] on Friday and they gave her an antibiotic and she is still not feeling well and coughing was wondering if DR. Ivan Dominguez wants to see her or if he wants to put her on something else.

## 2018-03-20 NOTE — TELEPHONE ENCOUNTER
Patient seen in the [facility] 3/17  Patient started on amox  Patient states she is still \"hacking\"  Dry unproductive cough  Patient states has a lot of sinus pressure  Patient has a headache  Nasal drainage - clear  Patient is not sure if she has a temp  Patie

## 2018-03-23 RX ORDER — PAROXETINE HYDROCHLORIDE 40 MG/1
TABLET, FILM COATED ORAL
Qty: 30 TABLET | Refills: 0 | Status: SHIPPED | OUTPATIENT
Start: 2018-03-23 | End: 2018-05-25

## 2018-03-26 ENCOUNTER — TELEPHONE (OUTPATIENT)
Dept: FAMILY MEDICINE CLINIC | Facility: CLINIC | Age: 48
End: 2018-03-26

## 2018-03-26 ENCOUNTER — NURSE ONLY (OUTPATIENT)
Dept: ENDOCRINOLOGY CLINIC | Facility: CLINIC | Age: 48
End: 2018-03-26

## 2018-03-26 DIAGNOSIS — Z79.4 TYPE 2 DIABETES MELLITUS WITH HYPERGLYCEMIA, WITH LONG-TERM CURRENT USE OF INSULIN (HCC): ICD-10-CM

## 2018-03-26 DIAGNOSIS — R93.6 ABNORMAL MRI, KNEE: Primary | ICD-10-CM

## 2018-03-26 DIAGNOSIS — E11.65 TYPE 2 DIABETES MELLITUS WITH HYPERGLYCEMIA, WITH LONG-TERM CURRENT USE OF INSULIN (HCC): ICD-10-CM

## 2018-03-26 PROCEDURE — G0108 DIAB MANAGE TRN  PER INDIV: HCPCS | Performed by: DIETITIAN, REGISTERED

## 2018-03-26 NOTE — TELEPHONE ENCOUNTER
Pt stated she cannot see the doctor you referred her to. Can you refer her to another orthopedic doctor?

## 2018-03-26 NOTE — TELEPHONE ENCOUNTER
Spoke with patient, patient states she cannot go to [facility] ortho. Referral placed for [facility]  Contact information given to patient.

## 2018-03-28 ENCOUNTER — TELEPHONE (OUTPATIENT)
Dept: ENDOCRINOLOGY CLINIC | Facility: CLINIC | Age: 48
End: 2018-03-28

## 2018-03-28 VITALS — DIASTOLIC BLOOD PRESSURE: 84 MMHG | SYSTOLIC BLOOD PRESSURE: 126 MMHG

## 2018-03-28 DIAGNOSIS — IMO0001 UNCONTROLLED TYPE 2 DIABETES MELLITUS WITHOUT COMPLICATION, WITH LONG-TERM CURRENT USE OF INSULIN: Primary | ICD-10-CM

## 2018-03-28 NOTE — PROGRESS NOTES
Brooks Patiñoix  : 3/12/1970 was seen for Insulin Pump Follow up:    Date: 3/26/2018 Referring MD: Dr. Fawn Edmonds  Start time: 4pm End time: 5pm    Insertion site assessed: no lypodystrophy    Type of pump: Medtronic Paradigm    Type of infusion set

## 2018-04-02 ENCOUNTER — TELEPHONE (OUTPATIENT)
Dept: ENDOCRINOLOGY CLINIC | Facility: CLINIC | Age: 48
End: 2018-04-02

## 2018-04-02 DIAGNOSIS — IMO0001 UNCONTROLLED TYPE 2 DIABETES MELLITUS WITHOUT COMPLICATION, WITH LONG-TERM CURRENT USE OF INSULIN: Primary | ICD-10-CM

## 2018-04-02 NOTE — TELEPHONE ENCOUNTER
Pt. cancelled her appt. for tomorrow because she is afraid her insurance won't cover it. Her new Member ID # is [id number] & Grp. # is [id number]. I contacted her insurance co. [address] @[phone] .  He didn't see any problems with cove

## 2018-04-03 ENCOUNTER — NURSE ONLY (OUTPATIENT)
Dept: ENDOCRINOLOGY CLINIC | Facility: CLINIC | Age: 48
End: 2018-04-03

## 2018-04-03 VITALS — WEIGHT: 259.63 LBS | SYSTOLIC BLOOD PRESSURE: 136 MMHG | DIASTOLIC BLOOD PRESSURE: 66 MMHG | BODY MASS INDEX: 50 KG/M2

## 2018-04-03 DIAGNOSIS — E08.21 DIABETES MELLITUS DUE TO UNDERLYING CONDITION WITH DIABETIC NEPHROPATHY, UNSPECIFIED WHETHER LONG TERM INSULIN USE (HCC): ICD-10-CM

## 2018-04-03 PROCEDURE — 95249 CONT GLUC MNTR PT PROV EQP: CPT | Performed by: DIETITIAN, REGISTERED

## 2018-04-03 NOTE — PROGRESS NOTES
Becca King  3/12/1970 was seen for Diagnostic Continuous Glucose Sensor Initial Evaluation:    Date: 4/3/2018  Start time: 4pm End time: 4:30pm    Dexcom G4 Platinum  Sensor Lot: [id number]  Expiration: 18  Sensor placed: mid-abd    Patient was i

## 2018-04-09 RX ORDER — BUPROPION HYDROCHLORIDE 300 MG/1
TABLET ORAL
Qty: 90 TABLET | Refills: 0 | Status: SHIPPED | OUTPATIENT
Start: 2018-04-09 | End: 2018-05-25

## 2018-04-09 RX ORDER — PAROXETINE HYDROCHLORIDE 40 MG/1
TABLET, FILM COATED ORAL
Qty: 90 TABLET | Refills: 0 | Status: SHIPPED | OUTPATIENT
Start: 2018-04-09 | End: 2018-05-25

## 2018-04-09 NOTE — TELEPHONE ENCOUNTER
Patient needs rx sent to [facility]  Patient cannot get rx from [place] transferred over.       PARoxetine HCl 40 MG Oral Tab 30 tablet 5 2/12/2018    Sig :  TAKE 1 TABLET BY MOUTH EVERY MORNING     Route:   (none)       BuPROPion HCl ER, XL, 300 MG Oral Tab

## 2018-04-10 ENCOUNTER — NURSE ONLY (OUTPATIENT)
Dept: ENDOCRINOLOGY CLINIC | Facility: CLINIC | Age: 48
End: 2018-04-10

## 2018-04-10 DIAGNOSIS — IMO0001 UNCONTROLLED TYPE 2 DIABETES MELLITUS WITHOUT COMPLICATION, WITH LONG-TERM CURRENT USE OF INSULIN: ICD-10-CM

## 2018-04-10 PROCEDURE — G0108 DIAB MANAGE TRN  PER INDIV: HCPCS | Performed by: DIETITIAN, REGISTERED

## 2018-04-10 NOTE — PROGRESS NOTES
Rashmi Naya  : 3/12/1970 was seen for Continuous Glucose Sensor Follow-up Visit:    Date: 4/10/2018 Referring MD: Dr. Rhys Rodriguez Start time: 4:15pm End time: 5:15pm      Sensor Type: Dexcom     Site Assessment: starting to come off;had hit arm on rac

## 2018-04-11 VITALS — BODY MASS INDEX: 50 KG/M2 | DIASTOLIC BLOOD PRESSURE: 70 MMHG | SYSTOLIC BLOOD PRESSURE: 132 MMHG | WEIGHT: 258.38 LBS

## 2018-05-10 ENCOUNTER — TELEPHONE (OUTPATIENT)
Dept: FAMILY MEDICINE CLINIC | Facility: CLINIC | Age: 48
End: 2018-05-10

## 2018-05-10 DIAGNOSIS — E10.65 UNCONTROLLED TYPE 1 DIABETES MELLITUS WITH COMPLICATION (HCC): Primary | ICD-10-CM

## 2018-05-10 DIAGNOSIS — E10.8 UNCONTROLLED TYPE 1 DIABETES MELLITUS WITH COMPLICATION (HCC): Primary | ICD-10-CM

## 2018-05-14 ENCOUNTER — TELEPHONE (OUTPATIENT)
Dept: FAMILY MEDICINE CLINIC | Facility: CLINIC | Age: 48
End: 2018-05-14

## 2018-05-14 NOTE — TELEPHONE ENCOUNTER
NOVOLOG 100 UNIT/ML Subcutaneous Solution 40 mL 2 9/23/2017   Last labs 03/09/18. HgbA1C <5.7 % 6.2       Last seen on 03/09/18. /84. No future appointments. Thank you.

## 2018-05-15 RX ORDER — INSULIN ASPART 100 [IU]/ML
INJECTION, SOLUTION INTRAVENOUS; SUBCUTANEOUS
Qty: 40 ML | Refills: 0 | Status: SHIPPED | OUTPATIENT
Start: 2018-05-15 | End: 2018-06-07

## 2018-05-15 NOTE — TELEPHONE ENCOUNTER
Called dariela to check status of referral.  Per Varsha Zavala she will work on right away. Awaiting determination.

## 2018-05-19 ENCOUNTER — TELEPHONE (OUTPATIENT)
Dept: FAMILY MEDICINE CLINIC | Facility: CLINIC | Age: 48
End: 2018-05-19

## 2018-05-19 NOTE — TELEPHONE ENCOUNTER
Patient having surgery stefanie Lyons  Pre-op paperwork faxed to Dr. Sewell [place] office [phone]  Patient aware we will call to schedule once paperwork is received.

## 2018-05-19 NOTE — TELEPHONE ENCOUNTER
LOV:  3/9/18 Breast Cancer screening  Lipid: 3/9/18    simvastatin 40 MG Oral Tab 30 tablet 5 2/12/2018    Sig :  TAKE 1 TABLET BY MOUTH EVERY NIGHT     Route:   (none)       No future appointments. Please advise.

## 2018-05-21 ENCOUNTER — TELEPHONE (OUTPATIENT)
Dept: ENDOCRINOLOGY CLINIC | Facility: CLINIC | Age: 48
End: 2018-05-21

## 2018-05-21 DIAGNOSIS — E10.9 TYPE 1 DIABETES MELLITUS WITHOUT COMPLICATION (HCC): Primary | ICD-10-CM

## 2018-05-21 NOTE — TELEPHONE ENCOUNTER
Pt is scheduled for insulin pump training for upgrad to new Medtronic pump. I have pended an order. Please associate dx and sign order.  Thank you for the referral.

## 2018-05-21 NOTE — TELEPHONE ENCOUNTER
Glucose Blood (DONNA CONTOUR NEXT TEST) In Vitro Strip 250 strip 11 3/28/2018 3/28/2019   Sig :  Test 6-8 times daily     Route:   (none)       Spoke with pharmacy - patient should have refills available.   Per pharmacist they will get med refilled for venessa

## 2018-05-24 NOTE — TELEPHONE ENCOUNTER
LOV:  3/9/18 breast cancer screening  Lipid: 3/9/18    simvastatin 40 MG Oral Tab 30 tablet 5 2/12/2018    Sig :  TAKE 1 TABLET BY MOUTH EVERY NIGHT     Route:   (none)       Future Appointments  Date Time Provider Jovani Haque   5/25/2018 8:15 AM ELENITA

## 2018-05-25 ENCOUNTER — EKG ENCOUNTER (OUTPATIENT)
Dept: LAB | Age: 48
End: 2018-05-25
Attending: ORTHOPAEDIC SURGERY
Payer: COMMERCIAL

## 2018-05-25 ENCOUNTER — LAB ENCOUNTER (OUTPATIENT)
Dept: LAB | Age: 48
End: 2018-05-25
Attending: ORTHOPAEDIC SURGERY
Payer: COMMERCIAL

## 2018-05-25 ENCOUNTER — OFFICE VISIT (OUTPATIENT)
Dept: FAMILY MEDICINE CLINIC | Facility: CLINIC | Age: 48
End: 2018-05-25

## 2018-05-25 VITALS
RESPIRATION RATE: 16 BRPM | SYSTOLIC BLOOD PRESSURE: 138 MMHG | OXYGEN SATURATION: 96 % | HEART RATE: 84 BPM | TEMPERATURE: 98 F | WEIGHT: 260 LBS | BODY MASS INDEX: 50 KG/M2 | DIASTOLIC BLOOD PRESSURE: 70 MMHG

## 2018-05-25 DIAGNOSIS — Z01.818 PREOP EXAMINATION: ICD-10-CM

## 2018-05-25 DIAGNOSIS — S83.249A TEAR OF MEDIAL MENISCUS OF KNEE: ICD-10-CM

## 2018-05-25 DIAGNOSIS — M17.9 OSTEOARTHRITIS OF KNEE, UNSPECIFIED (CODE): ICD-10-CM

## 2018-05-25 DIAGNOSIS — Z01.818 PREOP EXAMINATION: Primary | ICD-10-CM

## 2018-05-25 DIAGNOSIS — R26.2 DIFFICULTY WALKING: Primary | ICD-10-CM

## 2018-05-25 DIAGNOSIS — S83.282D ACUTE LATERAL MENISCUS TEAR OF LEFT KNEE, SUBSEQUENT ENCOUNTER: ICD-10-CM

## 2018-05-25 DIAGNOSIS — R26.9 GAIT ABNORMALITY: ICD-10-CM

## 2018-05-25 DIAGNOSIS — E11.9 TYPE 2 DIABETES MELLITUS WITHOUT COMPLICATION, WITH LONG-TERM CURRENT USE OF INSULIN (HCC): ICD-10-CM

## 2018-05-25 DIAGNOSIS — M25.562 LEFT KNEE PAIN: ICD-10-CM

## 2018-05-25 DIAGNOSIS — Z79.4 TYPE 2 DIABETES MELLITUS WITHOUT COMPLICATION, WITH LONG-TERM CURRENT USE OF INSULIN (HCC): ICD-10-CM

## 2018-05-25 PROCEDURE — 87086 URINE CULTURE/COLONY COUNT: CPT

## 2018-05-25 PROCEDURE — 85025 COMPLETE CBC W/AUTO DIFF WBC: CPT

## 2018-05-25 PROCEDURE — 36415 COLL VENOUS BLD VENIPUNCTURE: CPT

## 2018-05-25 PROCEDURE — 83036 HEMOGLOBIN GLYCOSYLATED A1C: CPT

## 2018-05-25 PROCEDURE — 93010 ELECTROCARDIOGRAM REPORT: CPT | Performed by: INTERNAL MEDICINE

## 2018-05-25 PROCEDURE — 99214 OFFICE O/P EST MOD 30 MIN: CPT | Performed by: FAMILY MEDICINE

## 2018-05-25 PROCEDURE — 85652 RBC SED RATE AUTOMATED: CPT

## 2018-05-25 PROCEDURE — 93005 ELECTROCARDIOGRAM TRACING: CPT

## 2018-05-25 PROCEDURE — 81001 URINALYSIS AUTO W/SCOPE: CPT

## 2018-05-25 PROCEDURE — 80053 COMPREHEN METABOLIC PANEL: CPT

## 2018-05-25 PROCEDURE — 86140 C-REACTIVE PROTEIN: CPT

## 2018-05-25 RX ORDER — SIMVASTATIN 40 MG
TABLET ORAL
Qty: 30 TABLET | Refills: 0 | Status: SHIPPED | OUTPATIENT
Start: 2018-05-25 | End: 2018-05-25

## 2018-05-25 RX ORDER — SIMVASTATIN 40 MG
TABLET ORAL
Qty: 30 TABLET | Refills: 5 | Status: SHIPPED | OUTPATIENT
Start: 2018-05-25 | End: 2019-01-13

## 2018-05-25 NOTE — PROGRESS NOTES
CC: preop exam    HPI:     I am being asked to see the patient in consultation by Dr. Dot Godoy for evaluation of type 2 diabetes prior to left knee arthroscopy. Generally feeling ok. The left knee is having a lot of pain.  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

## 2018-05-26 ENCOUNTER — TELEPHONE (OUTPATIENT)
Dept: FAMILY MEDICINE CLINIC | Facility: CLINIC | Age: 48
End: 2018-05-26

## 2018-05-26 RX ORDER — FLUCONAZOLE 150 MG/1
150 TABLET ORAL ONCE
Qty: 1 TABLET | Refills: 0 | Status: SHIPPED | OUTPATIENT
Start: 2018-05-26 | End: 2018-05-26

## 2018-05-26 NOTE — TELEPHONE ENCOUNTER
Spoke with patient. Results reviewed, urine culture still pending. She is symptom free but will rx empirically for the yeast given diabetic status. Fluconazole sent.

## 2018-05-26 NOTE — TELEPHONE ENCOUNTER
Phoned patient cell and work number, could not reach patient. Left message for patient to call back regarding preop testing findings (urine).

## 2018-05-29 ENCOUNTER — TELEPHONE (OUTPATIENT)
Dept: FAMILY MEDICINE CLINIC | Facility: CLINIC | Age: 48
End: 2018-05-29

## 2018-05-29 NOTE — TELEPHONE ENCOUNTER
[facility]gery Center looking for recent labs done by Amber Trujillo to be faxed over. Surgery is Fri, June 1.

## 2018-05-29 NOTE — PROGRESS NOTES
Labs reviewed and acceptable. EKG normal. Medically stable and cleared for surgery with Dr. Paula Damon.

## 2018-05-31 ENCOUNTER — NURSE ONLY (OUTPATIENT)
Dept: ENDOCRINOLOGY CLINIC | Facility: CLINIC | Age: 48
End: 2018-05-31

## 2018-05-31 DIAGNOSIS — E10.9 TYPE 1 DIABETES MELLITUS WITHOUT COMPLICATION (HCC): ICD-10-CM

## 2018-06-01 VITALS — DIASTOLIC BLOOD PRESSURE: 74 MMHG | SYSTOLIC BLOOD PRESSURE: 136 MMHG | BODY MASS INDEX: 50 KG/M2 | WEIGHT: 261.38 LBS

## 2018-06-02 NOTE — PROGRESS NOTES
Benja Lundberg  DOB3/12/1970 was seen for Initial Pump Education:    Date: 5/31/2018 Referring MD: Dr. Melida Armstrong Start time: 3:30pm End time: 5pm    Assessment: /74 (BP Location: Left arm, Patient Position: Sitting, Cuff Size: large)   Wt 261 lb 6.

## 2018-06-08 ENCOUNTER — MED REC SCAN ONLY (OUTPATIENT)
Dept: FAMILY MEDICINE CLINIC | Facility: CLINIC | Age: 48
End: 2018-06-08

## 2018-06-08 RX ORDER — INSULIN ASPART 100 [IU]/ML
INJECTION, SOLUTION INTRAVENOUS; SUBCUTANEOUS
Qty: 80 ML | Refills: 2 | Status: SHIPPED | OUTPATIENT
Start: 2018-06-08 | End: 2018-10-25

## 2018-06-08 NOTE — TELEPHONE ENCOUNTER
LOV: 5/25/18 for pre-op exam    NOVOLOG 100 UNIT/ML Subcutaneous Solution 40 mL 0 5/15/2018    Sig :  ADMINISTER UP  UNITS VIA INSULIN PUMP DAILY AS DIRECTED     Route:   (none)       No future appointments. Please advise.

## 2018-06-22 NOTE — TELEPHONE ENCOUNTER
LOV:  5/25/18 for pre-op exam    PAROXETINE HCL 40 MG Oral Tab (Discontinued) 30 tablet 0 3/23/2018 5/25/2018   Sig :  TAKE 1 TABLET BY MOUTH EVERY MORNING     Route:   (none)     Reason for Discontinue:   Duplicate therapy       Future Appointments  Date

## 2018-06-22 NOTE — TELEPHONE ENCOUNTER
LOV: 5/25/18 for pre-op    BUPROPION HCL ER, XL, 300 MG Oral Tablet 24 Hr  90 tablet 0 4/9/2018 5/25/2018   Sig :  TAKE 1 TABLET(300 MG) BY MOUTH DAILY     Route:   (none)       Future Appointments  Date Time Provider Jovani Haque   6/29/2018 9:00 AM

## 2018-06-26 RX ORDER — PAROXETINE HYDROCHLORIDE 40 MG/1
TABLET, FILM COATED ORAL
Qty: 90 TABLET | Refills: 0 | Status: SHIPPED | OUTPATIENT
Start: 2018-06-26 | End: 2018-10-15

## 2018-06-26 RX ORDER — BUPROPION HYDROCHLORIDE 300 MG/1
TABLET ORAL
Qty: 90 TABLET | Refills: 0 | Status: SHIPPED | OUTPATIENT
Start: 2018-06-26 | End: 2018-09-24

## 2018-06-27 ENCOUNTER — APPOINTMENT (OUTPATIENT)
Dept: PHYSICAL THERAPY | Age: 48
End: 2018-06-27
Attending: ORTHOPAEDIC SURGERY
Payer: COMMERCIAL

## 2018-06-29 ENCOUNTER — NURSE ONLY (OUTPATIENT)
Dept: ENDOCRINOLOGY CLINIC | Facility: CLINIC | Age: 48
End: 2018-06-29

## 2018-06-29 DIAGNOSIS — E11.9 TYPE 2 DIABETES MELLITUS WITHOUT COMPLICATION, WITH LONG-TERM CURRENT USE OF INSULIN (HCC): ICD-10-CM

## 2018-06-29 DIAGNOSIS — Z79.4 TYPE 2 DIABETES MELLITUS WITHOUT COMPLICATION, WITH LONG-TERM CURRENT USE OF INSULIN (HCC): ICD-10-CM

## 2018-06-30 NOTE — PROGRESS NOTES
Ministerio Mcnally  : 3/12/1970 was seen for Insulin Pump Follow up:  VARSHA Reddy Certified pump  for Medtronic present at the appt.  For auto mode training  Date: 2018   Start time: 9am End time: 10:15am    Insertion site assessed: no lypodystroph

## 2018-07-02 ENCOUNTER — TELEPHONE (OUTPATIENT)
Dept: FAMILY MEDICINE CLINIC | Facility: CLINIC | Age: 48
End: 2018-07-02

## 2018-07-02 ENCOUNTER — OFFICE VISIT (OUTPATIENT)
Dept: PHYSICAL THERAPY | Age: 48
End: 2018-07-02
Attending: ORTHOPAEDIC SURGERY
Payer: COMMERCIAL

## 2018-07-02 DIAGNOSIS — M25.562 LEFT KNEE PAIN: ICD-10-CM

## 2018-07-02 PROCEDURE — 97161 PT EVAL LOW COMPLEX 20 MIN: CPT | Performed by: PHYSICAL THERAPIST

## 2018-07-02 PROCEDURE — 97140 MANUAL THERAPY 1/> REGIONS: CPT | Performed by: PHYSICAL THERAPIST

## 2018-07-02 PROCEDURE — 97110 THERAPEUTIC EXERCISES: CPT | Performed by: PHYSICAL THERAPIST

## 2018-07-02 NOTE — TELEPHONE ENCOUNTER
----- Message from María Hernandez RN sent at 3/12/2018 10:59 AM CDT -----  Repeat all labs in July.

## 2018-07-02 NOTE — TELEPHONE ENCOUNTER
Spoke with pt,  Notify her due for repeat all labs in July per DR. Theodora Osorio. Pt states she got some Blood work on 05/25/18 before her surgery. Surgery was on 06/01/18. Pt is wondering if dr. Theodora Osorio still wants this blood work ? ? Please advise.

## 2018-07-02 NOTE — PROGRESS NOTES
LOWER EXTREMITY EVALUATION:   Referring Physician: Dr. Ga  Diagnosis: Left knee pain (M25.562)       DOS: 6/1/2018  Date of Service: 7/2/2018     PATIENT SUMMARY   Gala Charles is a 57year old y/o female who presents to therapy today with complaint contributing to her functional deficits. Current tissue impairments impacts her ADL's as listed above. Rahel Rosas would benefit from skilled Physical Therapy to address the above impairments to return to prior ADL's.      Precautions:  Drug Allergy  OBJECTIVE: visits)  · Pt will increase hip and knee strength to grossly 4+/5 to be able to get up and down from the floor safely (12 visits)  · Pt will improve SLS to >10s to improve safety and independence with gait on uneven surfaces such as grass (8 visits)  · Pt

## 2018-07-07 ENCOUNTER — TELEPHONE (OUTPATIENT)
Dept: FAMILY MEDICINE CLINIC | Facility: CLINIC | Age: 48
End: 2018-07-07

## 2018-07-07 NOTE — TELEPHONE ENCOUNTER
Spoke with pt,  Pt aware Hill Stevenson is not in the office and she will like her asthma medication sent over to her pharmacy on Monday when Dr. Barrie Gannon is here.     Pt states her son and daugther have different insurance and we do not take their insurance but

## 2018-07-07 NOTE — TELEPHONE ENCOUNTER
PROAIR  (90 BASE) MCG/ACT Inhalation Aero Soln 8.5 g 0 10/31/2016     Sig: INHALE 2 PUFFS INTO LUNGS BY MOUTH EVERY 4 HOURS AS NEEDED FOR WHEEZING      Last labs 03/09/18  Last seen on 05/25/18 CC: Pre- op px  /70.   Future Appointments  Date T

## 2018-07-07 NOTE — TELEPHONE ENCOUNTER
asthama patient. Has been using her daughter's inhaler. Would like inhaler called in to [address], [place]. Does not want to come in for appointment if she does not have to.     Also, wants to know if Dr. Naman Saunders refers any of their jay

## 2018-07-09 ENCOUNTER — APPOINTMENT (OUTPATIENT)
Dept: PHYSICAL THERAPY | Age: 48
End: 2018-07-09
Attending: ORTHOPAEDIC SURGERY
Payer: COMMERCIAL

## 2018-07-09 ENCOUNTER — TELEPHONE (OUTPATIENT)
Dept: PHYSICAL THERAPY | Age: 48
End: 2018-07-09

## 2018-07-09 RX ORDER — ALBUTEROL SULFATE 90 UG/1
AEROSOL, METERED RESPIRATORY (INHALATION)
Qty: 8.5 G | Refills: 0 | Status: SHIPPED | OUTPATIENT
Start: 2018-07-09 | End: 2018-07-18

## 2018-07-11 ENCOUNTER — OFFICE VISIT (OUTPATIENT)
Dept: PHYSICAL THERAPY | Age: 48
End: 2018-07-11
Attending: ORTHOPAEDIC SURGERY
Payer: COMMERCIAL

## 2018-07-11 PROCEDURE — 97112 NEUROMUSCULAR REEDUCATION: CPT | Performed by: PHYSICAL THERAPIST

## 2018-07-11 PROCEDURE — 97140 MANUAL THERAPY 1/> REGIONS: CPT | Performed by: PHYSICAL THERAPIST

## 2018-07-11 PROCEDURE — 97110 THERAPEUTIC EXERCISES: CPT | Performed by: PHYSICAL THERAPIST

## 2018-07-11 NOTE — PROGRESS NOTES
Referring Physician: Dr. Vineet Millan    Dx:  Left knee pain (W84.433),    DOS: 6/1/2018          Authorized # of Visits:  15       Next MD visit: none scheduled Fall Risk: standard         Precautions: n/a       Subjective: Reports that her knee is sore and feel TX#: 6/ Date:               TX#: 7/ Date:               TX#: 8/   Nustep x 6', hill profile         Calf stretch x 2, 1 min         BB tilt x 1 min AP, lat         Tandem stance ball toss x 15 each way         Lateral stepping x 2L rtb         Monster walk

## 2018-07-13 ENCOUNTER — NURSE ONLY (OUTPATIENT)
Dept: ENDOCRINOLOGY CLINIC | Facility: CLINIC | Age: 48
End: 2018-07-13

## 2018-07-13 VITALS — SYSTOLIC BLOOD PRESSURE: 138 MMHG | WEIGHT: 261.81 LBS | BODY MASS INDEX: 50 KG/M2 | DIASTOLIC BLOOD PRESSURE: 78 MMHG

## 2018-07-13 DIAGNOSIS — Z79.4 TYPE 2 DIABETES MELLITUS WITHOUT COMPLICATION, WITH LONG-TERM CURRENT USE OF INSULIN (HCC): ICD-10-CM

## 2018-07-13 DIAGNOSIS — E11.9 TYPE 2 DIABETES MELLITUS WITHOUT COMPLICATION, WITH LONG-TERM CURRENT USE OF INSULIN (HCC): ICD-10-CM

## 2018-07-13 NOTE — PROGRESS NOTES
Charisse Cantu  : 3/12/1970 was seen for Insulin Pump Follow up:  VARSHA Reddy, Certified Medtronic  present for visit  Date: 2018  Referring MD: Dr. Gio Rausch   Start time: 2:45pm End time: 3:45pm    Insertion site assessed: no problem

## 2018-07-14 ENCOUNTER — TELEPHONE (OUTPATIENT)
Dept: FAMILY MEDICINE CLINIC | Facility: CLINIC | Age: 48
End: 2018-07-14

## 2018-07-14 NOTE — TELEPHONE ENCOUNTER
Pt called stated she needs to have a 1 step TB test done. Was wondering if she could have it done on Friday and read on Sat.

## 2018-07-14 NOTE — TELEPHONE ENCOUNTER
Patient stated she needs Tb screen for school, told her to bring forms to document if needed. Will have DB order. Patient informed after test administered she must return 48-72 hours after to have test read, patient verbalized understanding.     Please call

## 2018-07-16 ENCOUNTER — OFFICE VISIT (OUTPATIENT)
Dept: PHYSICAL THERAPY | Age: 48
End: 2018-07-16
Attending: ORTHOPAEDIC SURGERY
Payer: COMMERCIAL

## 2018-07-16 PROCEDURE — 97110 THERAPEUTIC EXERCISES: CPT | Performed by: PHYSICAL THERAPIST

## 2018-07-16 PROCEDURE — 97112 NEUROMUSCULAR REEDUCATION: CPT | Performed by: PHYSICAL THERAPIST

## 2018-07-16 PROCEDURE — 97140 MANUAL THERAPY 1/> REGIONS: CPT | Performed by: PHYSICAL THERAPIST

## 2018-07-16 NOTE — TELEPHONE ENCOUNTER
Nurse appointment scheduled. Please place order if ok to proceed with PPD placement.     Future Appointments  Date Time Provider Jovani Haque   7/18/2018 3:30 PM Zach Peterson PT ELENITA [place]   7/21/2018 9:15 AM [facility] NURSE [facility]

## 2018-07-16 NOTE — PROGRESS NOTES
Referring Physician: Dr. Alicia Sellers    Dx:  Left knee pain (H05.786),    DOS: 6/1/2018          Authorized # of Visits:  15       Next MD visit: none scheduled Fall Risk: standard         Precautions: n/a       Subjective: Reports that her knee is sore and feel TX#: 5/ Date:               TX#: 6/ Date:               TX#: 7/ Date:               TX#: 8/   Nustep x 6', hill profile X 10' random profile        Calf stretch x 2, 1 min X 2 min        BB tilt x 1 min AP, lat 1 min AP        Tandem stance ball to

## 2018-07-19 NOTE — TELEPHONE ENCOUNTER
LOV: 5/25/18 for pre-op    Glucagon, rDNA, (GLUCAGON EMERGENCY) 1 MG Injection Kit 2 kit 0 6/11/2018    Sig :  INJECT 1MG INTO THE SKIN ONCE AS NEEDED FOR HYPOGLYCEMIA       FERROUS SULFATE 325 (65 Fe) MG Oral Tab 30 tablet 5 9/21/2017    Sig :  TAKE 1 TAB

## 2018-07-20 RX ORDER — IBUPROFEN 600 MG/1
TABLET ORAL
Qty: 2 KIT | Refills: 0 | Status: SHIPPED | OUTPATIENT
Start: 2018-07-20 | End: 2020-07-20

## 2018-07-20 RX ORDER — IBUPROFEN 600 MG/1
TABLET ORAL
Qty: 2 KIT | Refills: 0 | OUTPATIENT
Start: 2018-07-20

## 2018-07-20 RX ORDER — FERROUS SULFATE 325(65) MG
TABLET ORAL
Qty: 30 TABLET | Refills: 0 | Status: SHIPPED | OUTPATIENT
Start: 2018-07-20 | End: 2019-03-15

## 2018-07-20 NOTE — TELEPHONE ENCOUNTER
LOV: 5/25/18     FERROUS SULFATE 325 (65 Fe) MG Oral Tab 30 tablet 5 9/21/2017    Sig :  TAKE 1 TABLET BY MOUTH DAILY WITH BREAKFAST     Route:   (none)       Future Appointments  Date Time Provider Jovani Haque   7/21/2018 9:15 AM [facility] NURSE EM

## 2018-07-21 ENCOUNTER — NURSE ONLY (OUTPATIENT)
Dept: FAMILY MEDICINE CLINIC | Facility: CLINIC | Age: 48
End: 2018-07-21

## 2018-07-21 PROCEDURE — 86580 TB INTRADERMAL TEST: CPT | Performed by: FAMILY MEDICINE

## 2018-07-23 ENCOUNTER — APPOINTMENT (OUTPATIENT)
Dept: PHYSICAL THERAPY | Age: 48
End: 2018-07-23
Attending: ORTHOPAEDIC SURGERY
Payer: COMMERCIAL

## 2018-07-23 ENCOUNTER — NURSE ONLY (OUTPATIENT)
Dept: FAMILY MEDICINE CLINIC | Facility: CLINIC | Age: 48
End: 2018-07-23

## 2018-07-23 ENCOUNTER — TELEPHONE (OUTPATIENT)
Dept: PHYSICAL THERAPY | Age: 48
End: 2018-07-23

## 2018-07-23 LAB — INDURATION (): 0 MM (ref 0–11)

## 2018-07-23 NOTE — PROGRESS NOTES
PT came in for a TB read. TB was negative. Gave pt results she needed for school. No other questions from patient.

## 2018-07-24 RX ORDER — FERROUS SULFATE 325(65) MG
TABLET ORAL
Qty: 90 TABLET | Refills: 0 | Status: SHIPPED | OUTPATIENT
Start: 2018-07-24 | End: 2018-12-04

## 2018-07-26 ENCOUNTER — OFFICE VISIT (OUTPATIENT)
Dept: FAMILY MEDICINE CLINIC | Facility: CLINIC | Age: 48
End: 2018-07-26

## 2018-07-26 VITALS
BODY MASS INDEX: 48.76 KG/M2 | HEART RATE: 106 BPM | TEMPERATURE: 98 F | RESPIRATION RATE: 20 BRPM | DIASTOLIC BLOOD PRESSURE: 80 MMHG | HEIGHT: 62 IN | WEIGHT: 265 LBS | SYSTOLIC BLOOD PRESSURE: 136 MMHG

## 2018-07-26 DIAGNOSIS — M75.21 BICEPS TENDINITIS OF RIGHT UPPER EXTREMITY: Primary | ICD-10-CM

## 2018-07-26 PROCEDURE — 99214 OFFICE O/P EST MOD 30 MIN: CPT | Performed by: FAMILY MEDICINE

## 2018-07-26 RX ORDER — METHYLPREDNISOLONE 4 MG/1
TABLET ORAL
Qty: 1 KIT | Refills: 0 | Status: SHIPPED | OUTPATIENT
Start: 2018-07-26 | End: 2019-01-04

## 2018-07-26 NOTE — PROGRESS NOTES
CC: shoulder pain    HPI:     Location rt sided anterior, superior  Quality sharp shooting  Severity moderate  Duration 6 weeks  Timing intermittent  Context did start after using crutches due to knee surgery  Modifying factors tried nothing to treat; wors disorders and symptoms referable to neck 8/5/2011   • Unspecified tinnitus 2/18/2012   • Unspecified urinary incontinence 12/5/2011   • Visual impairment     glasses     Smoking status: Former Smoker

## 2018-07-31 NOTE — TELEPHONE ENCOUNTER
LOV: 7/26/18 for bicep tendinitis    PROAIR  (90 Base) MCG/ACT Inhalation Aero Soln 8.5 g 0 7/20/2018    Sig : Giselle Cordon 2 PUFFS BY MOUTH INTO THE LUNGS EVERY 4 HOURS AS NEEDED FOR WHEEZING       Future Appointments  Date Time Provider Department Cent

## 2018-08-06 ENCOUNTER — APPOINTMENT (OUTPATIENT)
Dept: PHYSICAL THERAPY | Age: 48
End: 2018-08-06
Attending: ORTHOPAEDIC SURGERY
Payer: COMMERCIAL

## 2018-08-08 ENCOUNTER — APPOINTMENT (OUTPATIENT)
Dept: PHYSICAL THERAPY | Age: 48
End: 2018-08-08
Attending: ORTHOPAEDIC SURGERY
Payer: COMMERCIAL

## 2018-08-13 ENCOUNTER — HOSPITAL ENCOUNTER (OUTPATIENT)
Age: 48
Discharge: HOME | End: 2018-08-13
Attending: FAMILY MEDICINE
Payer: COMMERCIAL

## 2018-08-13 VITALS
OXYGEN SATURATION: 97 % | RESPIRATION RATE: 20 BRPM | HEART RATE: 94 BPM | HEIGHT: 62 IN | WEIGHT: 245 LBS | BODY MASS INDEX: 45.08 KG/M2 | TEMPERATURE: 98 F | DIASTOLIC BLOOD PRESSURE: 84 MMHG | SYSTOLIC BLOOD PRESSURE: 130 MMHG

## 2018-08-13 DIAGNOSIS — S61.512A LACERATION OF LEFT WRIST, INITIAL ENCOUNTER: Primary | ICD-10-CM

## 2018-08-13 PROCEDURE — 99202 OFFICE O/P NEW SF 15 MIN: CPT

## 2018-08-13 PROCEDURE — 99212 OFFICE O/P EST SF 10 MIN: CPT

## 2018-08-13 NOTE — ED INITIAL ASSESSMENT (HPI)
0300 Pt cut her left hand laceration, 2 cm laceration Pt states the wound was bleeding at the time but bleeding controlled at this time, Last Tetanus was 8/26/2014.

## 2018-08-14 NOTE — ED PROVIDER NOTES
Patient Seen in: [facility] [place] [facility] [place]    History   Patient presents with:  Laceration Abrasion (integumentary)    Stated Complaint: left hand laceration    HPI    57year old female presents for left wrist laceration.  States she accidentally s Unspecified asthma(493.90) 5/7/2012   • Unspecified disorder of thyroid    • Unspecified hypothyroidism 8/26/2011   • Unspecified musculoskeletal disorders and symptoms referable to neck 8/5/2011   • Unspecified tinnitus 2/18/2012   • Unspecified urinary i Constitutional: She is oriented to person, place, and time. She appears well-developed and well-nourished. Cardiovascular: Normal rate, regular rhythm, normal heart sounds and intact distal pulses.     Pulmonary/Chest: Effort normal and breath sounds no

## 2018-08-15 ENCOUNTER — TELEPHONE (OUTPATIENT)
Dept: PHYSICAL THERAPY | Age: 48
End: 2018-08-15

## 2018-08-20 ENCOUNTER — APPOINTMENT (OUTPATIENT)
Dept: PHYSICAL THERAPY | Age: 48
End: 2018-08-20
Attending: ORTHOPAEDIC SURGERY
Payer: COMMERCIAL

## 2018-08-27 ENCOUNTER — APPOINTMENT (OUTPATIENT)
Dept: PHYSICAL THERAPY | Age: 48
End: 2018-08-27
Attending: ORTHOPAEDIC SURGERY
Payer: COMMERCIAL

## 2018-08-28 ENCOUNTER — TELEPHONE (OUTPATIENT)
Dept: FAMILY MEDICINE CLINIC | Facility: CLINIC | Age: 48
End: 2018-08-28

## 2018-08-28 DIAGNOSIS — IMO0001 UNCONTROLLED DIABETES MELLITUS TYPE 1 WITHOUT COMPLICATIONS: Primary | ICD-10-CM

## 2018-08-28 NOTE — TELEPHONE ENCOUNTER
Received fax from [facility] for supplies, calling pt to make sure pt did request these supplies. Left message for patient to call back.

## 2018-08-29 ENCOUNTER — APPOINTMENT (OUTPATIENT)
Dept: PHYSICAL THERAPY | Age: 48
End: 2018-08-29
Attending: ORTHOPAEDIC SURGERY
Payer: COMMERCIAL

## 2018-08-29 NOTE — TELEPHONE ENCOUNTER
Call to [facility] and left message for Cb Hu to call back. Need clarification on provider referral request for patient.  Advised office phone number

## 2018-08-30 NOTE — TELEPHONE ENCOUNTER
Referral place x medical equipment.     Dx: Uncontrolled diabetes mellitus type 1 without complications [facility] [K28.55 (ICD-10-CM)]   Comments: HCPS Codes:  -OELEM MMT-397 QCKSTPRDGM 40 UNITS QTY # 4  -DGYI MMT-332A 10PK PRDGM 40 UNITS  QTY # 4  A9

## 2018-09-04 ENCOUNTER — MED REC SCAN ONLY (OUTPATIENT)
Dept: FAMILY MEDICINE CLINIC | Facility: CLINIC | Age: 48
End: 2018-09-04

## 2018-09-05 ENCOUNTER — APPOINTMENT (OUTPATIENT)
Dept: PHYSICAL THERAPY | Age: 48
End: 2018-09-05
Attending: ORTHOPAEDIC SURGERY
Payer: COMMERCIAL

## 2018-09-10 ENCOUNTER — APPOINTMENT (OUTPATIENT)
Dept: PHYSICAL THERAPY | Age: 48
End: 2018-09-10
Attending: ORTHOPAEDIC SURGERY
Payer: COMMERCIAL

## 2018-09-11 ENCOUNTER — TELEPHONE (OUTPATIENT)
Dept: FAMILY MEDICINE CLINIC | Facility: CLINIC | Age: 48
End: 2018-09-11

## 2018-09-11 RX ORDER — LEVOTHYROXINE SODIUM 137 UG/1
TABLET ORAL
Qty: 90 TABLET | Refills: 1 | Status: SHIPPED | OUTPATIENT
Start: 2018-09-11 | End: 2019-01-13

## 2018-09-11 NOTE — TELEPHONE ENCOUNTER
LOV: 7/26/18 for bicep tendinitis  TSH: 3/9/18    Levothyroxine Sodium 137 MCG Oral Tab 30 tablet 5 3/9/2018    Sig :  Take 137 mcg by mouth once daily. Route:   Oral       No future appointments. Please advise.

## 2018-09-11 NOTE — TELEPHONE ENCOUNTER
Insertion site where he pump was - swollen to size of hand  Area is hard and tender to the touch. Area very pink  No bruising. Denies drainage. No blister. No fever.   Sugars are controlled  Patient changed insertion site yesterday after it became swoll

## 2018-09-11 NOTE — TELEPHONE ENCOUNTER
Pt called stated where her insulin pump site is it's all red and hard very tender to the touch. No fever, no oozing from the size. Pt was wondering what she should do.  If called with in the next 10 mins will answer phone but other wise can't talk until aft

## 2018-09-14 ENCOUNTER — OFFICE VISIT (OUTPATIENT)
Dept: FAMILY MEDICINE CLINIC | Facility: CLINIC | Age: 48
End: 2018-09-14

## 2018-09-14 VITALS
WEIGHT: 245 LBS | RESPIRATION RATE: 16 BRPM | BODY MASS INDEX: 45.08 KG/M2 | TEMPERATURE: 99 F | HEART RATE: 103 BPM | HEIGHT: 62 IN | SYSTOLIC BLOOD PRESSURE: 132 MMHG | DIASTOLIC BLOOD PRESSURE: 74 MMHG

## 2018-09-14 DIAGNOSIS — Z79.4 TYPE 2 DIABETES MELLITUS WITH OTHER SKIN COMPLICATION, WITH LONG-TERM CURRENT USE OF INSULIN (HCC): Primary | ICD-10-CM

## 2018-09-14 DIAGNOSIS — E11.628 TYPE 2 DIABETES MELLITUS WITH OTHER SKIN COMPLICATION, WITH LONG-TERM CURRENT USE OF INSULIN (HCC): Primary | ICD-10-CM

## 2018-09-14 LAB
CREAT UR-SCNC: 47.8 MG/DL
MICROALBUMIN UR-MCNC: 0.54 MG/DL
MICROALBUMIN/CREAT 24H UR-RTO: 11.3 UG/MG (ref ?–30)

## 2018-09-14 PROCEDURE — 99214 OFFICE O/P EST MOD 30 MIN: CPT | Performed by: FAMILY MEDICINE

## 2018-09-14 PROCEDURE — 82043 UR ALBUMIN QUANTITATIVE: CPT | Performed by: FAMILY MEDICINE

## 2018-09-14 PROCEDURE — 36415 COLL VENOUS BLD VENIPUNCTURE: CPT | Performed by: FAMILY MEDICINE

## 2018-09-14 PROCEDURE — 83036 HEMOGLOBIN GLYCOSYLATED A1C: CPT | Performed by: FAMILY MEDICINE

## 2018-09-14 PROCEDURE — 82570 ASSAY OF URINE CREATININE: CPT | Performed by: FAMILY MEDICINE

## 2018-09-14 RX ORDER — AMOXICILLIN AND CLAVULANATE POTASSIUM 875; 125 MG/1; MG/1
1 TABLET, FILM COATED ORAL 2 TIMES DAILY
Qty: 20 TABLET | Refills: 0 | Status: SHIPPED | OUTPATIENT
Start: 2018-09-14 | End: 2018-09-24

## 2018-09-15 LAB
EST. AVERAGE GLUCOSE BLD GHB EST-MCNC: 140 MG/DL (ref 68–126)
HBA1C MFR BLD HPLC: 6.5 % (ref ?–5.7)

## 2018-09-21 NOTE — PROGRESS NOTES
HPI:    Patient ID: Karson Mccann is a 57year old female. CC: skin lesion          Skin   This is a new (redness and swelling in the abdominal wall) problem. The current episode started 1 to 4 weeks ago. The problem occurs constantly.  The problem has No date: Type I (juvenile type) diabetes mellitus without mention of   complication, not stated as uncontrolled  5/7/2012: Unspecified asthma(493.90)  No date: Unspecified disorder of thyroid  8/26/2011: Unspecified hypothyroidism  8/5/2011: Unspecified mu NOVOLOG 100 UNIT/ML Subcutaneous Solution ADMINISTER UP  UNITS VIA INSULIN PUMP DAILY AS DIRECTED Disp: 80 mL Rfl: 2   SIMVASTATIN 40 MG Oral Tab TAKE 1 TABLET BY MOUTH EVERY NIGHT Disp: 30 tablet Rfl: 5   Glucose Blood (DONNA CONTOUR NEXT TEST) In V Sig: Take 1 tablet by mouth 2 (two) times daily for 10 days.        Imaging & Referrals:  None       ND#[id number]

## 2018-09-24 NOTE — TELEPHONE ENCOUNTER
BUPROPION HCL ER, XL, 300 MG Oral Tablet 24 Hr 90 tablet 0 6/26/2018    Sig :  TAKE 1 TABLET BY MOUTH EVERY DAY       Last labs 09/14/18  Last seen on 09/14/18  CC: skin lesion  /74.   Future Appointments   Date Time Provider Jovani Haque   10/11

## 2018-09-25 RX ORDER — BUPROPION HYDROCHLORIDE 300 MG/1
TABLET ORAL
Qty: 90 TABLET | Refills: 0 | Status: SHIPPED | OUTPATIENT
Start: 2018-09-25 | End: 2019-02-26

## 2018-10-05 ENCOUNTER — TELEPHONE (OUTPATIENT)
Dept: FAMILY MEDICINE CLINIC | Facility: CLINIC | Age: 48
End: 2018-10-05

## 2018-10-16 RX ORDER — PAROXETINE HYDROCHLORIDE 40 MG/1
TABLET, FILM COATED ORAL
Qty: 90 TABLET | Refills: 0 | Status: SHIPPED | OUTPATIENT
Start: 2018-10-16 | End: 2019-01-13

## 2018-10-23 ENCOUNTER — TELEPHONE (OUTPATIENT)
Dept: FAMILY MEDICINE CLINIC | Facility: CLINIC | Age: 48
End: 2018-10-23

## 2018-10-23 NOTE — TELEPHONE ENCOUNTER
Received information from insurance. Medication needs to be ordered as 90 day supply.     Last refill of Novolog - 6/8/18 - #80 ml with 2 refills with sig - Administer up to 120 units via insulin pump daily as directed   Per Dr. Mariah marte to change to 9

## 2018-10-24 NOTE — TELEPHONE ENCOUNTER
Spoke with patient. Patient states she needs refills x her Novolog. Patient states she does not have the SIG x her insulin. Patient would call back with the Novolog directions.

## 2018-10-25 ENCOUNTER — TELEPHONE (OUTPATIENT)
Dept: FAMILY MEDICINE CLINIC | Facility: CLINIC | Age: 48
End: 2018-10-25

## 2018-10-25 RX ORDER — INSULIN ASPART 100 [IU]/ML
INJECTION, SOLUTION INTRAVENOUS; SUBCUTANEOUS
Qty: 120 ML | Refills: 1 | Status: SHIPPED | OUTPATIENT
Start: 2018-10-25 | End: 2019-01-14

## 2018-10-25 NOTE — TELEPHONE ENCOUNTER
Pt called stated she injects up to 120 units per day for her insulin. So she can get her insulin refilled. Sig updated.   Rx sent to patient's pharmacy for 90 day supply

## 2018-10-25 NOTE — TELEPHONE ENCOUNTER
Pt called stated she injects up to 120 units per day for her insulin. So she can get her insulin refilled.

## 2018-11-20 ENCOUNTER — TELEPHONE (OUTPATIENT)
Dept: FAMILY MEDICINE CLINIC | Facility: CLINIC | Age: 48
End: 2018-11-20

## 2018-11-20 RX ORDER — POLYMYXIN B SULFATE AND TRIMETHOPRIM 1; 10000 MG/ML; [USP'U]/ML
1 SOLUTION OPHTHALMIC 3 TIMES DAILY
Qty: 1 BOTTLE | Refills: 0 | Status: SHIPPED | OUTPATIENT
Start: 2018-11-20 | End: 2018-11-27

## 2018-11-20 NOTE — TELEPHONE ENCOUNTER
Per verbal Dr. Gurmeet Mendoza send polytrim eye drops one drop to right eye TID for 7 days and follow up Friday  Left detailed message for patient per patient request

## 2018-11-20 NOTE — TELEPHONE ENCOUNTER
Pt called stated she thinks she may have pink eye. Stated she is not able to make it in today or tomorrow. She was wondering if Dr. Crys Marti would be able to call in eye drops for her.

## 2018-11-20 NOTE — TELEPHONE ENCOUNTER
Symptoms started two days ago  Patient initially thought it was allergies but took zyrtec with no relief  Patient thinks it may be pink eye  Right eye - red  Eye is itchy  Eye is crusty - yellow drainage  No fever.   Patient cannot come in today - stuck at

## 2018-11-30 ENCOUNTER — TELEPHONE (OUTPATIENT)
Dept: FAMILY MEDICINE CLINIC | Facility: CLINIC | Age: 48
End: 2018-11-30

## 2018-11-30 DIAGNOSIS — E10.9 TYPE 1 DIABETES MELLITUS WITHOUT COMPLICATION (HCC): Primary | ICD-10-CM

## 2018-11-30 NOTE — TELEPHONE ENCOUNTER
Patient needs a referral for diabetic supplies. Referral placed - awaiting approval from insurance to fax.

## 2018-12-07 RX ORDER — LIDOCAINE HYDROCHLORIDE 20 MG/ML
SOLUTION ORAL; TOPICAL
Qty: 90 TABLET | Refills: 0 | Status: SHIPPED | OUTPATIENT
Start: 2018-12-07 | End: 2018-12-11

## 2018-12-11 ENCOUNTER — OFFICE VISIT (OUTPATIENT)
Dept: FAMILY MEDICINE CLINIC | Facility: CLINIC | Age: 48
End: 2018-12-11

## 2018-12-11 VITALS
WEIGHT: 267 LBS | TEMPERATURE: 98 F | HEART RATE: 102 BPM | DIASTOLIC BLOOD PRESSURE: 68 MMHG | OXYGEN SATURATION: 98 % | BODY MASS INDEX: 49 KG/M2 | SYSTOLIC BLOOD PRESSURE: 130 MMHG

## 2018-12-11 DIAGNOSIS — S09.90XA CLOSED HEAD INJURY, INITIAL ENCOUNTER: Primary | ICD-10-CM

## 2018-12-11 DIAGNOSIS — S16.1XXA STRAIN OF NECK MUSCLE, INITIAL ENCOUNTER: ICD-10-CM

## 2018-12-11 PROCEDURE — 99214 OFFICE O/P EST MOD 30 MIN: CPT | Performed by: FAMILY MEDICINE

## 2018-12-11 RX ORDER — CYCLOBENZAPRINE HCL 10 MG
10 TABLET ORAL 3 TIMES DAILY PRN
Qty: 15 TABLET | Refills: 0 | Status: SHIPPED | OUTPATIENT
Start: 2018-12-11 | End: 2018-12-26

## 2018-12-11 NOTE — PROGRESS NOTES
CC: follow up ER -head injury    HPI:     Location at family residence  Quality contusion  Severity moderate  Duration couple days  Context pt tried being on hover board, fell backward, hit occiput forcefully   Modifying factors went to ER, had CT scan, se diabetes mellitus without mention of complication, not stated as uncontrolled    • Unspecified asthma(493.90) 5/7/2012   • Unspecified disorder of thyroid    • Unspecified hypothyroidism 8/26/2011   • Unspecified musculoskeletal disorders and symptoms refe

## 2019-01-03 ENCOUNTER — TELEPHONE (OUTPATIENT)
Dept: FAMILY MEDICINE CLINIC | Facility: CLINIC | Age: 49
End: 2019-01-03

## 2019-01-03 NOTE — TELEPHONE ENCOUNTER
Patient woke up sandi banks with charley horse in hamstring  Patient states knee is locked. Patient states it is painful to walk.   No injury  No bruising around the knee  Swelling around knee  Patient has been taking muscle relaxer (flexeril) last night

## 2019-01-03 NOTE — TELEPHONE ENCOUNTER
Pt called stated she is having Rt knee issues. She stated she was having issues since Christmas Eve and it is not the same now. Was wondering if she could be seen.

## 2019-01-04 ENCOUNTER — OFFICE VISIT (OUTPATIENT)
Dept: FAMILY MEDICINE CLINIC | Facility: CLINIC | Age: 49
End: 2019-01-04
Payer: COMMERCIAL

## 2019-01-04 VITALS
BODY MASS INDEX: 49 KG/M2 | RESPIRATION RATE: 18 BRPM | TEMPERATURE: 99 F | HEART RATE: 102 BPM | HEIGHT: 62 IN | OXYGEN SATURATION: 98 % | DIASTOLIC BLOOD PRESSURE: 88 MMHG | SYSTOLIC BLOOD PRESSURE: 126 MMHG

## 2019-01-04 DIAGNOSIS — M25.361 INSTABILITY OF RIGHT KNEE JOINT: ICD-10-CM

## 2019-01-04 DIAGNOSIS — M23.91 INTERNAL DERANGEMENT OF RIGHT KNEE: Primary | ICD-10-CM

## 2019-01-04 PROCEDURE — 99214 OFFICE O/P EST MOD 30 MIN: CPT | Performed by: FAMILY MEDICINE

## 2019-01-04 RX ORDER — TRAMADOL HYDROCHLORIDE 50 MG/1
50 TABLET ORAL EVERY 6 HOURS PRN
Qty: 30 TABLET | Refills: 0 | Status: SHIPPED | OUTPATIENT
Start: 2019-01-04 | End: 2019-03-15

## 2019-01-04 RX ORDER — METHYLPREDNISOLONE 4 MG/1
TABLET ORAL
Qty: 1 KIT | Refills: 0 | Status: SHIPPED | OUTPATIENT
Start: 2019-01-04 | End: 2019-03-15 | Stop reason: ALTCHOICE

## 2019-01-04 NOTE — PROGRESS NOTES
CC: knee pain    HPI:     Location rt sided  Quality sharp, tight, unstable, locking  Severity severe  Duration several weeks has been worse, but pain on and off for several months  Timing constant  Context has had previous meniscal surgery  Modifying fact Unspecified hypothyroidism 8/26/2011   • Unspecified musculoskeletal disorders and symptoms referable to neck 8/5/2011   • Unspecified tinnitus 2/18/2012   • Unspecified urinary incontinence 12/5/2011   • Visual impairment     glasses     Social History

## 2019-01-05 ENCOUNTER — TELEPHONE (OUTPATIENT)
Dept: FAMILY MEDICINE CLINIC | Facility: CLINIC | Age: 49
End: 2019-01-05

## 2019-01-05 NOTE — TELEPHONE ENCOUNTER
Patient wants to know if Dr. Lauren Dial will write a letter stating she can not attend  [employer] at this time due to a knee injury. They will hold her membership for 3 months and not charge her for re activation is she has letter.

## 2019-01-05 NOTE — TELEPHONE ENCOUNTER
Left message that Dr. Amarilis Arroyo will write letter for health club and patient can pick it up front office.

## 2019-01-05 NOTE — TELEPHONE ENCOUNTER
Pt was seen yesterday for knee injury wants a note sent to [employer] to suspend the membership for 3 months. Please call.

## 2019-01-10 ENCOUNTER — TELEPHONE (OUTPATIENT)
Dept: FAMILY MEDICINE CLINIC | Facility: CLINIC | Age: 49
End: 2019-01-10

## 2019-01-10 RX ORDER — MELOXICAM 15 MG/1
15 TABLET ORAL DAILY
Qty: 15 TABLET | Refills: 0 | Status: SHIPPED | OUTPATIENT
Start: 2019-01-10 | End: 2019-01-31

## 2019-01-14 ENCOUNTER — TELEPHONE (OUTPATIENT)
Dept: FAMILY MEDICINE CLINIC | Facility: CLINIC | Age: 49
End: 2019-01-14

## 2019-01-14 DIAGNOSIS — M25.361 UNSTABLE RIGHT KNEE: ICD-10-CM

## 2019-01-14 DIAGNOSIS — M23.91 DERANGEMENT OF RIGHT KNEE: Primary | ICD-10-CM

## 2019-01-14 RX ORDER — LEVOTHYROXINE SODIUM 137 UG/1
TABLET ORAL
Qty: 90 TABLET | Refills: 0 | Status: SHIPPED | OUTPATIENT
Start: 2019-01-14 | End: 2019-07-24

## 2019-01-14 RX ORDER — SIMVASTATIN 40 MG
TABLET ORAL
Qty: 30 TABLET | Refills: 0 | Status: SHIPPED | OUTPATIENT
Start: 2019-01-14 | End: 2019-02-25

## 2019-01-14 RX ORDER — INSULIN LISPRO 100 [IU]/ML
INJECTION, SOLUTION INTRAVENOUS; SUBCUTANEOUS
Qty: 120 ML | Refills: 1 | Status: SHIPPED | OUTPATIENT
Start: 2019-01-14 | End: 2019-08-15

## 2019-01-14 RX ORDER — PAROXETINE HYDROCHLORIDE 40 MG/1
TABLET, FILM COATED ORAL
Qty: 90 TABLET | Refills: 0 | Status: SHIPPED | OUTPATIENT
Start: 2019-01-14 | End: 2019-04-30

## 2019-01-14 NOTE — TELEPHONE ENCOUNTER
Toy Lowry from [place] called regarding MRI prior auth. She stated she would like a call back due to the MRI being denied.

## 2019-01-14 NOTE — TELEPHONE ENCOUNTER
Last OV 1/4/19 for internal derangement of her knee. TSH on 3/9/18 was 5.6 and cholesterol was 155.       PAROXETINE HCL 40 MG Oral Tab 90 tablet 0 10/16/2018    Sig :  TAKE 1 TABLET BY MOUTH EVERY MORNING     Route:   (none)       SIMVASTATIN 40 MG Oral T

## 2019-01-14 NOTE — TELEPHONE ENCOUNTER
Fax received from [facility] in [place] [facility] on [address]  stating Novolog U-100 is not covered. Plan alternative is Humalog. Please advise, ok to change? *Humalog pended below with the same directions as Novolog*    Last A1C on 9/14/18 was 6.5.

## 2019-01-24 NOTE — TELEPHONE ENCOUNTER
Per referral:       Note    Patient is Eligible for coverage and has [facility]  No auth required for specialist visits. For tracking only. Patient notified and verbalized understanding.

## 2019-01-31 ENCOUNTER — HOSPITAL ENCOUNTER (OUTPATIENT)
Dept: GENERAL RADIOLOGY | Age: 49
Discharge: HOME | End: 2019-01-31
Attending: FAMILY MEDICINE
Payer: COMMERCIAL

## 2019-01-31 ENCOUNTER — OFFICE VISIT (OUTPATIENT)
Dept: FAMILY MEDICINE CLINIC | Facility: CLINIC | Age: 49
End: 2019-01-31
Payer: COMMERCIAL

## 2019-01-31 VITALS
HEART RATE: 84 BPM | SYSTOLIC BLOOD PRESSURE: 130 MMHG | OXYGEN SATURATION: 98 % | TEMPERATURE: 98 F | DIASTOLIC BLOOD PRESSURE: 68 MMHG | WEIGHT: 265 LBS | RESPIRATION RATE: 18 BRPM | BODY MASS INDEX: 48 KG/M2

## 2019-01-31 DIAGNOSIS — M79.672 LEFT FOOT PAIN: Primary | ICD-10-CM

## 2019-01-31 DIAGNOSIS — M79.672 LEFT FOOT PAIN: ICD-10-CM

## 2019-01-31 DIAGNOSIS — M23.91 DERANGEMENT OF RIGHT KNEE: ICD-10-CM

## 2019-01-31 DIAGNOSIS — M25.361 UNSTABLE RIGHT KNEE: ICD-10-CM

## 2019-01-31 PROCEDURE — 73630 X-RAY EXAM OF FOOT: CPT | Performed by: FAMILY MEDICINE

## 2019-01-31 PROCEDURE — 73560 X-RAY EXAM OF KNEE 1 OR 2: CPT | Performed by: FAMILY MEDICINE

## 2019-01-31 PROCEDURE — 99214 OFFICE O/P EST MOD 30 MIN: CPT | Performed by: FAMILY MEDICINE

## 2019-01-31 RX ORDER — OMEPRAZOLE 40 MG/1
40 CAPSULE, DELAYED RELEASE ORAL DAILY
Qty: 90 CAPSULE | Refills: 0 | Status: SHIPPED | OUTPATIENT
Start: 2019-01-31 | End: 2019-04-30

## 2019-01-31 RX ORDER — MELOXICAM 15 MG/1
15 TABLET ORAL DAILY
Qty: 15 TABLET | Refills: 0 | Status: SHIPPED | OUTPATIENT
Start: 2019-01-31 | End: 2019-02-11

## 2019-01-31 NOTE — PROGRESS NOTES
CC: foot pain    HPI:     Location left sided forefoot  Quality sharp pain  Severity moderate to severe  Duration 1 week  Timing constant  Context no known injury  Associated symptoms bruising and edema    ROS:   GENERAL HEALTH: feels well otherwise  SKIN: • Unspecified tinnitus 2/18/2012   • Unspecified urinary incontinence 12/5/2011   • Visual impairment     glasses     Social History    Tobacco Use      Smoking status: Former Smoker        Types: Cigarettes        Quit date: 3/1/2017        Years since

## 2019-02-02 ENCOUNTER — TELEPHONE (OUTPATIENT)
Dept: FAMILY MEDICINE CLINIC | Facility: CLINIC | Age: 49
End: 2019-02-02

## 2019-02-05 NOTE — TELEPHONE ENCOUNTER
Per [facility] sent by Dr. Amber Trujillo:    Foot XR is unrevealing. Finish Rx and f/u if not resolved. Knee XR shows worsening arthritis. LMTCB for pt.

## 2019-02-11 NOTE — TELEPHONE ENCOUNTER
LOV 1/31/19 for L foot pain. Meloxicam 15 MG Oral Tab 15 tablet 0 1/31/2019    Sig :  Take 1 tablet (15 mg total) by mouth daily.      Route:   Oral       Future Appointments   Date Time Provider Jovani Shabnam   2/19/2019  3:15 PM Chad Field PT

## 2019-02-12 RX ORDER — MELOXICAM 15 MG/1
TABLET ORAL
Qty: 15 TABLET | Refills: 0 | Status: SHIPPED | OUTPATIENT
Start: 2019-02-12 | End: 2019-03-19

## 2019-02-19 ENCOUNTER — TELEPHONE (OUTPATIENT)
Dept: FAMILY MEDICINE CLINIC | Facility: CLINIC | Age: 49
End: 2019-02-19

## 2019-02-19 ENCOUNTER — APPOINTMENT (OUTPATIENT)
Dept: PHYSICAL THERAPY | Age: 49
End: 2019-02-19
Attending: FAMILY MEDICINE
Payer: COMMERCIAL

## 2019-02-19 DIAGNOSIS — M25.361 UNSTABLE RIGHT KNEE: Primary | ICD-10-CM

## 2019-02-19 DIAGNOSIS — M23.91 DERANGEMENT OF RIGHT KNEE: ICD-10-CM

## 2019-02-19 NOTE — TELEPHONE ENCOUNTER
Patient advised and verbalized understanding. Referral and contact information given for Dr. Sonu Diallo.

## 2019-02-19 NOTE — TELEPHONE ENCOUNTER
Patient stated PT will cost her $6,000 out of pockets, which she cannot pay. Wanted our office to know, and asked if there might be other alternatives.

## 2019-02-22 ENCOUNTER — APPOINTMENT (OUTPATIENT)
Dept: PHYSICAL THERAPY | Age: 49
End: 2019-02-22
Attending: FAMILY MEDICINE
Payer: COMMERCIAL

## 2019-02-25 ENCOUNTER — APPOINTMENT (OUTPATIENT)
Dept: PHYSICAL THERAPY | Age: 49
End: 2019-02-25
Attending: FAMILY MEDICINE
Payer: COMMERCIAL

## 2019-02-25 RX ORDER — SIMVASTATIN 40 MG
TABLET ORAL
Qty: 90 TABLET | Refills: 0 | Status: SHIPPED | OUTPATIENT
Start: 2019-02-25 | End: 2019-07-13

## 2019-02-25 NOTE — TELEPHONE ENCOUNTER
LOV: 1/31/19 for left foot pain  Lipid: 3/9/18    SIMVASTATIN 40 MG Oral Tab 30 tablet 0 1/14/2019    Sig :  TAKE 1 TABLET BY MOUTH EVERY EVENING     Route:   (none)       No future appointments. Please advise.

## 2019-02-26 RX ORDER — BUPROPION HYDROCHLORIDE 300 MG/1
TABLET ORAL
Qty: 90 TABLET | Refills: 0 | Status: SHIPPED | OUTPATIENT
Start: 2019-02-26 | End: 2019-06-01

## 2019-02-26 NOTE — TELEPHONE ENCOUNTER
BUPROPION HCL ER, XL, 300 MG Oral Tablet 24 Hr 90 tablet 0 9/25/2018     Sig: TAKE 1 TABLET BY MOUTH EVERY DAY    Sent to pharmacy as: BuPROPion HCl ER (XL) 300 MG Oral Tablet Extended Release 24 Hour      Last seen on 01/31/19 cc:foot pain. /68.

## 2019-02-27 ENCOUNTER — APPOINTMENT (OUTPATIENT)
Dept: PHYSICAL THERAPY | Age: 49
End: 2019-02-27
Attending: FAMILY MEDICINE
Payer: COMMERCIAL

## 2019-03-01 ENCOUNTER — HOSPITAL ENCOUNTER (OUTPATIENT)
Age: 49
Discharge: HOME | End: 2019-03-01
Payer: COMMERCIAL

## 2019-03-01 ENCOUNTER — APPOINTMENT (OUTPATIENT)
Dept: GENERAL RADIOLOGY | Age: 49
End: 2019-03-01
Attending: NURSE PRACTITIONER
Payer: COMMERCIAL

## 2019-03-01 ENCOUNTER — TELEPHONE (OUTPATIENT)
Dept: FAMILY MEDICINE CLINIC | Facility: CLINIC | Age: 49
End: 2019-03-01

## 2019-03-01 VITALS
BODY MASS INDEX: 45 KG/M2 | WEIGHT: 246 LBS | HEART RATE: 82 BPM | DIASTOLIC BLOOD PRESSURE: 72 MMHG | OXYGEN SATURATION: 97 % | RESPIRATION RATE: 16 BRPM | TEMPERATURE: 98 F | SYSTOLIC BLOOD PRESSURE: 135 MMHG

## 2019-03-01 DIAGNOSIS — M19.072 ARTHRITIS OF LEFT FOOT: Primary | ICD-10-CM

## 2019-03-01 PROCEDURE — 99213 OFFICE O/P EST LOW 20 MIN: CPT

## 2019-03-01 PROCEDURE — 73630 X-RAY EXAM OF FOOT: CPT | Performed by: NURSE PRACTITIONER

## 2019-03-01 PROCEDURE — 73610 X-RAY EXAM OF ANKLE: CPT | Performed by: NURSE PRACTITIONER

## 2019-03-01 NOTE — TELEPHONE ENCOUNTER
Patient states foot is very painful - has been for a few weeks.   Patient states yesterday she stepped down on it yesterday and felt something tweak/pop  Foot is swollen and bruised  Patient has been trying to ice the area   Patient works until 6 pm  [employer]

## 2019-03-01 NOTE — TELEPHONE ENCOUNTER
Stated her left foot is getting worse. Cannot stand on it or walk on it. Hurts all the time    Should she go to [employer]  [facility] (has to work all day) or are there other things she can do?

## 2019-03-02 NOTE — ED PROVIDER NOTES
[place] [address] [facility]    [place]   Patient presents with: Foot Pain    Stated Complaint: Left foot pain no injury    46-year-old female who presents to the immediate care with complaints of right foot/ankle pain since 2/28.   P • Other abnormal clinical finding 2/18/2012   • Other acquired absence of organ 11/14/2011   • Other and unspecified hyperlipidemia 7/11/2011   • Pain in joint, lower leg 03/21/12   • Pain in joint, site unspecified 6/25/2011   • Plantar fasciitis    • Rou Review of Systems   Constitutional: Negative. HENT: Negative. Musculoskeletal:        Foot and ankle pain   Hematological: Negative. Psychiatric/Behavioral: Negative. All other systems reviewed and are negative.       Positive for stated complai PROCEDURE:  XR ANKLE (MIN 3 VIEWS) LEFT (CPT=73610)  TECHNIQUE:  Three views were obtained. COMPARISON:  None.   INDICATIONS:  Left foot pain no injury  PATIENT STATED HISTORY: (As transcribed by Technologist)  Patient states to have pain to the left later CONCLUSION:  No acute abnormality. Calcaneal enthesopathy and mild degenerative changes.     Dictated by: Radha Gagnon MD on 1/31/2019 at 16:38     Approved by: Radha Gagnon MD                       [facility]   I have discussed with the patient and/or family t

## 2019-03-02 NOTE — ED INITIAL ASSESSMENT (HPI)
1/31 Pt saw PCP for Xrays and WNL, Pt states pain was \"stable\"    2/28 Pt was stepping down stairs and \"I felt an intense pain in my ankle to toes\"  Pt c/o medial right foot pain and ankle.

## 2019-03-04 ENCOUNTER — APPOINTMENT (OUTPATIENT)
Dept: PHYSICAL THERAPY | Age: 49
End: 2019-03-04
Attending: FAMILY MEDICINE
Payer: COMMERCIAL

## 2019-03-06 ENCOUNTER — APPOINTMENT (OUTPATIENT)
Dept: PHYSICAL THERAPY | Age: 49
End: 2019-03-06
Attending: FAMILY MEDICINE
Payer: COMMERCIAL

## 2019-03-06 NOTE — TELEPHONE ENCOUNTER
LOV 1/31/19 for foot pain. Last CBC done 5/25/18 with Dr. Kathrine Carrel. FERROUS SULFATE 325 (65 Fe) MG Oral Tab 30 tablet 0 7/20/2018    Sig :  TAKE 1 TABLET BY MOUTH DAILY WITH BREAKFAST     Route:   (none)       No future appointments. Please advise.

## 2019-03-08 RX ORDER — LIDOCAINE HYDROCHLORIDE 20 MG/ML
SOLUTION ORAL; TOPICAL
Qty: 90 TABLET | Refills: 0 | Status: SHIPPED | OUTPATIENT
Start: 2019-03-08 | End: 2020-02-21

## 2019-03-08 RX ORDER — LIDOCAINE HYDROCHLORIDE 20 MG/ML
SOLUTION ORAL; TOPICAL
Qty: 90 TABLET | Refills: 0 | Status: SHIPPED | OUTPATIENT
Start: 2019-03-08 | End: 2019-03-15

## 2019-03-08 NOTE — TELEPHONE ENCOUNTER
LOV: 1/31/19 for left foot pain    FEROSUL 325 (65 Fe) MG Oral Tab (Discontinued) 90 tablet 0 12/7/2018 1/31/19   Sig :  TAKE 1 TABLET BY MOUTH DAILY WITH BREAKFAST     Route:   (none)     Reason for Discontinue:   Duplicate therapy       Future Appointmen

## 2019-03-11 ENCOUNTER — APPOINTMENT (OUTPATIENT)
Dept: PHYSICAL THERAPY | Age: 49
End: 2019-03-11
Attending: FAMILY MEDICINE
Payer: COMMERCIAL

## 2019-03-14 ENCOUNTER — APPOINTMENT (OUTPATIENT)
Dept: PHYSICAL THERAPY | Age: 49
End: 2019-03-14
Attending: FAMILY MEDICINE
Payer: COMMERCIAL

## 2019-03-15 ENCOUNTER — OFFICE VISIT (OUTPATIENT)
Dept: FAMILY MEDICINE CLINIC | Facility: CLINIC | Age: 49
End: 2019-03-15
Payer: COMMERCIAL

## 2019-03-15 VITALS
DIASTOLIC BLOOD PRESSURE: 81 MMHG | WEIGHT: 261 LBS | RESPIRATION RATE: 18 BRPM | TEMPERATURE: 99 F | SYSTOLIC BLOOD PRESSURE: 122 MMHG | BODY MASS INDEX: 48 KG/M2 | HEART RATE: 81 BPM

## 2019-03-15 DIAGNOSIS — M79.10 MYALGIA: Primary | ICD-10-CM

## 2019-03-15 DIAGNOSIS — Z00.00 GENERAL MEDICAL EXAM: ICD-10-CM

## 2019-03-15 DIAGNOSIS — M25.50 ARTHRALGIA, UNSPECIFIED JOINT: ICD-10-CM

## 2019-03-15 LAB
ALBUMIN SERPL-MCNC: 3.8 G/DL (ref 3.4–5)
ALBUMIN/GLOB SERPL: 1.2 {RATIO} (ref 1–2)
ALP LIVER SERPL-CCNC: 76 U/L (ref 39–100)
ALT SERPL-CCNC: 27 U/L (ref 13–56)
ANA SCREEN: POSITIVE
ANION GAP SERPL CALC-SCNC: 7 MMOL/L (ref 0–18)
AST SERPL-CCNC: 12 U/L (ref 15–37)
BASOPHILS # BLD AUTO: 0.03 X10(3) UL (ref 0–0.2)
BASOPHILS NFR BLD AUTO: 0.3 %
BILIRUB SERPL-MCNC: 0.5 MG/DL (ref 0.1–2)
BUN BLD-MCNC: 13 MG/DL (ref 7–18)
BUN/CREAT SERPL: 16.9 (ref 10–20)
CALCIUM BLD-MCNC: 9 MG/DL (ref 8.5–10.1)
CENTROMERE AUTOAB: <100 AU/ML (ref ?–100)
CHLORIDE SERPL-SCNC: 105 MMOL/L (ref 98–107)
CHOLEST SMN-MCNC: 196 MG/DL (ref ?–200)
CK SERPL-CCNC: 255 U/L (ref 26–192)
CO2 SERPL-SCNC: 29 MMOL/L (ref 21–32)
CREAT BLD-MCNC: 0.77 MG/DL (ref 0.55–1.02)
CRP SERPL-MCNC: 1.05 MG/DL (ref ?–0.3)
DEPRECATED RDW RBC AUTO: 42 FL (ref 35.1–46.3)
DSDNA AUTOAB: <100 IU/ML (ref ?–100)
EOSINOPHIL # BLD AUTO: 0.13 X10(3) UL (ref 0–0.7)
EOSINOPHIL NFR BLD AUTO: 1.4 %
ERYTHROCYTE [DISTWIDTH] IN BLOOD BY AUTOMATED COUNT: 12.9 % (ref 11–15)
EST. AVERAGE GLUCOSE BLD GHB EST-MCNC: 131 MG/DL (ref 68–126)
GLOBULIN PLAS-MCNC: 3.3 G/DL (ref 2.8–4.4)
GLUCOSE BLD-MCNC: 116 MG/DL (ref 70–99)
HBA1C MFR BLD HPLC: 6.2 % (ref ?–5.7)
HCT VFR BLD AUTO: 41.3 % (ref 35–48)
HDLC SERPL-MCNC: 53 MG/DL (ref 40–59)
HGB BLD-MCNC: 13.9 G/DL (ref 12–16)
HISTONE AUTOAB: <100 AU/ML (ref ?–100)
IMM GRANULOCYTES # BLD AUTO: 0.02 X10(3) UL (ref 0–1)
IMM GRANULOCYTES NFR BLD: 0.2 %
JO-1 AUTOAB: <100 AU/ML (ref ?–100)
LDLC SERPL CALC-MCNC: 129 MG/DL (ref ?–100)
LYMPHOCYTES # BLD AUTO: 2.91 X10(3) UL (ref 1–4)
LYMPHOCYTES NFR BLD AUTO: 32.4 %
M PROTEIN MFR SERPL ELPH: 7.1 G/DL (ref 6.4–8.2)
MCH RBC QN AUTO: 30.1 PG (ref 26–34)
MCHC RBC AUTO-ENTMCNC: 33.7 G/DL (ref 31–37)
MCV RBC AUTO: 89.4 FL (ref 80–100)
MONOCYTES # BLD AUTO: 0.61 X10(3) UL (ref 0.1–1)
MONOCYTES NFR BLD AUTO: 6.8 %
NEUTROPHILS # BLD AUTO: 5.28 X10 (3) UL (ref 1.5–7.7)
NEUTROPHILS # BLD AUTO: 5.28 X10(3) UL (ref 1.5–7.7)
NEUTROPHILS NFR BLD AUTO: 58.9 %
NONHDLC SERPL-MCNC: 143 MG/DL (ref ?–130)
OSMOLALITY SERPL CALC.SUM OF ELEC: 293 MOSM/KG (ref 275–295)
PLATELET # BLD AUTO: 251 10(3)UL (ref 150–450)
POTASSIUM SERPL-SCNC: 3.9 MMOL/L (ref 3.5–5.1)
RBC # BLD AUTO: 4.62 X10(6)UL (ref 3.8–5.3)
RHEUMATOID FACT SERPL-ACNC: <10 IU/ML (ref ?–15)
RNP AUTOAB: <100 AU/ML (ref ?–100)
SCL-70 AUTOAB: <100 AU/ML (ref ?–100)
SED RATE-ML: 8 MM/HR (ref 0–25)
SM AUTOAB (SMITH): <100 AU/ML (ref ?–100)
SODIUM SERPL-SCNC: 141 MMOL/L (ref 136–145)
SSA AUTOAB: 135 AU/ML (ref ?–100)
SSB AUTOAB: <100 AU/ML (ref ?–100)
TRIGL SERPL-MCNC: 69 MG/DL (ref 30–149)
TSI SER-ACNC: 1.56 MIU/ML (ref 0.36–3.74)
URATE SERPL-MCNC: 4.5 MG/DL (ref 2.6–6)
VLDLC SERPL CALC-MCNC: 14 MG/DL (ref 0–30)
WBC # BLD AUTO: 9 X10(3) UL (ref 4–11)

## 2019-03-15 PROCEDURE — 86235 NUCLEAR ANTIGEN ANTIBODY: CPT | Performed by: FAMILY MEDICINE

## 2019-03-15 PROCEDURE — 80061 LIPID PANEL: CPT | Performed by: FAMILY MEDICINE

## 2019-03-15 PROCEDURE — 84550 ASSAY OF BLOOD/URIC ACID: CPT | Performed by: FAMILY MEDICINE

## 2019-03-15 PROCEDURE — 99213 OFFICE O/P EST LOW 20 MIN: CPT | Performed by: FAMILY MEDICINE

## 2019-03-15 PROCEDURE — 86038 ANTINUCLEAR ANTIBODIES: CPT | Performed by: FAMILY MEDICINE

## 2019-03-15 PROCEDURE — 36415 COLL VENOUS BLD VENIPUNCTURE: CPT | Performed by: FAMILY MEDICINE

## 2019-03-15 PROCEDURE — 86225 DNA ANTIBODY NATIVE: CPT | Performed by: FAMILY MEDICINE

## 2019-03-15 PROCEDURE — 82550 ASSAY OF CK (CPK): CPT | Performed by: FAMILY MEDICINE

## 2019-03-15 PROCEDURE — 86431 RHEUMATOID FACTOR QUANT: CPT | Performed by: FAMILY MEDICINE

## 2019-03-15 PROCEDURE — 85652 RBC SED RATE AUTOMATED: CPT | Performed by: FAMILY MEDICINE

## 2019-03-15 PROCEDURE — 86140 C-REACTIVE PROTEIN: CPT | Performed by: FAMILY MEDICINE

## 2019-03-15 PROCEDURE — 83036 HEMOGLOBIN GLYCOSYLATED A1C: CPT | Performed by: FAMILY MEDICINE

## 2019-03-15 PROCEDURE — 80050 GENERAL HEALTH PANEL: CPT | Performed by: FAMILY MEDICINE

## 2019-03-15 NOTE — PROGRESS NOTES
CC: myalgias/arthralgias    HPI:     Location diffuse muscles, joints  Quality aching, stiff  Severity moderate  Duration chronic years  Timing frequent  Context getting worse lately  Modifying factors having to get help with some daily activities; she has • Unspecified disorder of thyroid    • Unspecified hypothyroidism 8/26/2011   • Unspecified musculoskeletal disorders and symptoms referable to neck 8/5/2011   • Unspecified tinnitus 2/18/2012   • Unspecified urinary incontinence 12/5/2011   • Visual imp Aero Soln INHALE 2 PUFFS BY MOUTH EVERY 4 HOURS AS NEEDED FOR WHEEZING Disp: 8.5 g Rfl: 0   SUMAtriptan Succinate (IMITREX) 50 MG Oral Tab Take 1 tablet (50 mg total) by mouth every 2 (two) hours as needed for Migraine.  Use at onset; repeat once after 2 HR

## 2019-03-19 ENCOUNTER — PATIENT MESSAGE (OUTPATIENT)
Dept: FAMILY MEDICINE CLINIC | Facility: CLINIC | Age: 49
End: 2019-03-19

## 2019-03-19 RX ORDER — MELOXICAM 15 MG/1
TABLET ORAL
Qty: 15 TABLET | Refills: 0 | Status: SHIPPED | OUTPATIENT
Start: 2019-03-19 | End: 2019-04-03

## 2019-03-19 NOTE — TELEPHONE ENCOUNTER
LOV: 3/15/19 for myalgia    MELOXICAM 15 MG Oral Tab 15 tablet 0 2/12/2019    Sig :  TAKE 1 TABLET BY MOUTH DAILY     Route:   (none)       No future appointments.   Please advise

## 2019-03-19 NOTE — TELEPHONE ENCOUNTER
From: Young Connolly  To: Anton Pérez DO  Sent: 3/19/2019 4:48 PM CDT  Subject: Test Results Question    I need a name of someone to go see?   Drew Parsons

## 2019-03-20 ENCOUNTER — TELEPHONE (OUTPATIENT)
Dept: FAMILY MEDICINE CLINIC | Facility: CLINIC | Age: 49
End: 2019-03-20

## 2019-04-01 ENCOUNTER — MED REC SCAN ONLY (OUTPATIENT)
Dept: FAMILY MEDICINE CLINIC | Facility: CLINIC | Age: 49
End: 2019-04-01

## 2019-04-03 NOTE — TELEPHONE ENCOUNTER
LOV 3/15/19 for a general exam and myalgia. MELOXICAM 15 MG Oral Tab 15 tablet 0 3/19/2019    Sig :  TAKE 1 TABLET BY MOUTH DAILY     Route:   (none)       No future appointments. Please advise.

## 2019-04-04 RX ORDER — MELOXICAM 15 MG/1
TABLET ORAL
Qty: 15 TABLET | Refills: 0 | Status: SHIPPED | OUTPATIENT
Start: 2019-04-04 | End: 2020-02-11

## 2019-04-08 ENCOUNTER — TELEPHONE (OUTPATIENT)
Dept: FAMILY MEDICINE CLINIC | Facility: CLINIC | Age: 49
End: 2019-04-08

## 2019-04-08 DIAGNOSIS — Z12.39 BREAST SCREENING, UNSPECIFIED: Primary | ICD-10-CM

## 2019-04-29 ENCOUNTER — TELEPHONE (OUTPATIENT)
Dept: FAMILY MEDICINE CLINIC | Facility: CLINIC | Age: 49
End: 2019-04-29

## 2019-04-29 NOTE — TELEPHONE ENCOUNTER
Order signed by [facility] and faxed to [employer] for pts pump supply refills.

## 2019-04-30 RX ORDER — OMEPRAZOLE 40 MG/1
CAPSULE, DELAYED RELEASE ORAL
Qty: 90 CAPSULE | Refills: 0 | Status: SHIPPED | OUTPATIENT
Start: 2019-04-30 | End: 2019-07-24

## 2019-04-30 RX ORDER — PAROXETINE HYDROCHLORIDE 40 MG/1
TABLET, FILM COATED ORAL
Qty: 90 TABLET | Refills: 0 | Status: SHIPPED | OUTPATIENT
Start: 2019-04-30 | End: 2019-07-24

## 2019-04-30 NOTE — TELEPHONE ENCOUNTER
LOV: 3/15/19 for myalgia    PAROXETINE HCL 40 MG Oral Tab 90 tablet 0 1/14/2019    Sig:   TAKE 1 TABLET BY MOUTH EVERY MORNING     Route:   (none)       Omeprazole 40 MG Oral Capsule Delayed Release 90 capsule 0 1/31/2019 1/26/2020   Sig:   Take 1 capsule

## 2019-05-07 ENCOUNTER — TELEPHONE (OUTPATIENT)
Dept: FAMILY MEDICINE CLINIC | Facility: CLINIC | Age: 49
End: 2019-05-07

## 2019-05-07 NOTE — TELEPHONE ENCOUNTER
Received order form from [address] [place] [address] signed by Dr. Dietz Prior and faxed [phone]

## 2019-05-13 RX ORDER — BLOOD SUGAR DIAGNOSTIC
STRIP MISCELLANEOUS
Qty: 200 STRIP | Refills: 0 | Status: SHIPPED | OUTPATIENT
Start: 2019-05-13 | End: 2019-06-15

## 2019-06-03 RX ORDER — BUPROPION HYDROCHLORIDE 300 MG/1
TABLET ORAL
Qty: 90 TABLET | Refills: 0 | Status: SHIPPED | OUTPATIENT
Start: 2019-06-03 | End: 2019-08-20

## 2019-06-03 NOTE — TELEPHONE ENCOUNTER
Medication Quantity Refills Start End   BUPROPION HCL ER, XL, 300 MG Oral Tablet 24 Hr 90 tablet 0 2/26/2019    Sig:   TAKE 1 TABLET BY MOUTH EVERY DAY       Last labs 03/15/19. Last seen on 03/15/19 CC:Myalgias/arthralgias. /81.   No future appo

## 2019-06-18 RX ORDER — BLOOD SUGAR DIAGNOSTIC
STRIP MISCELLANEOUS
Qty: 200 STRIP | Refills: 0 | Status: SHIPPED | OUTPATIENT
Start: 2019-06-18 | End: 2019-09-20

## 2019-07-01 ENCOUNTER — TELEPHONE (OUTPATIENT)
Dept: FAMILY MEDICINE CLINIC | Facility: CLINIC | Age: 49
End: 2019-07-01

## 2019-07-01 NOTE — TELEPHONE ENCOUNTER
LMTCB. Fax received from [facility] stating pt may not be compliant with her Simvastatin. Please verify with pt that she is still taking. Fax sent to scan.

## 2019-07-02 ENCOUNTER — MED REC SCAN ONLY (OUTPATIENT)
Dept: FAMILY MEDICINE CLINIC | Facility: CLINIC | Age: 49
End: 2019-07-02

## 2019-07-03 NOTE — TELEPHONE ENCOUNTER
Pt calling back, she states she found an extra bottle of Simvastatin so she has not needed a refill in a while. She is still taking it daily.

## 2019-07-15 RX ORDER — SIMVASTATIN 40 MG
TABLET ORAL
Qty: 90 TABLET | Refills: 0 | Status: SHIPPED | OUTPATIENT
Start: 2019-07-15 | End: 2019-11-23

## 2019-07-15 NOTE — TELEPHONE ENCOUNTER
LOV 3/15/19 for myalgia, arthralgia, general exam.  Last Tchol: 3/15/19 - 196. SIMVASTATIN 40 MG Oral Tab 90 tablet 0 2/25/2019    Sig:   TAKE 1 TABLET BY MOUTH EVERY EVENING     Route:   (none)       No future appointments.

## 2019-07-24 NOTE — TELEPHONE ENCOUNTER
LOV 3/15/19 for a general exam.  TSH done on the above date.     LEVOTHYROXINE SODIUM 137 MCG Oral Tab 90 tablet 0 1/14/2019    Sig:   TAKE 1 TABLET BY MOUTH EVERY DAY     Route:   (none)       PAROXETINE HCL 40 MG Oral Tab 90 tablet 0 4/30/2019    Sig:   T

## 2019-07-25 RX ORDER — OMEPRAZOLE 40 MG/1
CAPSULE, DELAYED RELEASE ORAL
Qty: 90 CAPSULE | Refills: 0 | Status: SHIPPED | OUTPATIENT
Start: 2019-07-25 | End: 2019-10-28

## 2019-07-25 RX ORDER — PAROXETINE HYDROCHLORIDE 40 MG/1
TABLET, FILM COATED ORAL
Qty: 90 TABLET | Refills: 0 | Status: SHIPPED | OUTPATIENT
Start: 2019-07-25 | End: 2019-10-28

## 2019-07-25 RX ORDER — LEVOTHYROXINE SODIUM 137 UG/1
TABLET ORAL
Qty: 90 TABLET | Refills: 0 | Status: SHIPPED | OUTPATIENT
Start: 2019-07-25 | End: 2019-10-28

## 2019-08-07 NOTE — TELEPHONE ENCOUNTER
LOV 3/15/19 for a general exam.  BW also done on that date.     FEROSUL 325 (65 Fe) MG Oral Tab 90 tablet 0 3/8/2019    Sig:   TAKE 1 TABLET BY MOUTH DAILY WITH BREAKFAST     Route:   (none)       Future Appointments   Date Time Provider Jovani Haque

## 2019-08-08 NOTE — TELEPHONE ENCOUNTER
LOV: 3/15/19 for myalgia    FEROSUL 325 (65 Fe) MG Oral Tab 90 tablet 0 3/8/2019    Sig:   TAKE 1 TABLET BY MOUTH DAILY WITH BREAKFAST     Route:   (none)       Future Appointments   Date Time Provider Jovani Haque   8/15/2019 11:30 AM Mickey Obregon

## 2019-08-10 RX ORDER — LIDOCAINE HYDROCHLORIDE 20 MG/ML
SOLUTION ORAL; TOPICAL
Qty: 90 TABLET | Refills: 0 | Status: SHIPPED | OUTPATIENT
Start: 2019-08-10 | End: 2019-12-07

## 2019-08-10 RX ORDER — LIDOCAINE HYDROCHLORIDE 20 MG/ML
SOLUTION ORAL; TOPICAL
Qty: 90 TABLET | Refills: 0 | Status: SHIPPED | OUTPATIENT
Start: 2019-08-10 | End: 2019-10-29

## 2019-08-15 NOTE — TELEPHONE ENCOUNTER
LOV: 3/15/19 for myalgia  hgb a1C: 3/15/19    Insulin Lispro (HUMALOG) 100 UNIT/ML Subcutaneous Solution 120 mL 1 1/14/2019    Sig:   Administer up to 120 units daily via insulin pump as directed. Route:   (none)       No future appointments.   Please a

## 2019-08-16 RX ORDER — INSULIN LISPRO 100 [IU]/ML
INJECTION, SOLUTION INTRAVENOUS; SUBCUTANEOUS
Qty: 120 ML | Refills: 0 | Status: SHIPPED | OUTPATIENT
Start: 2019-08-16 | End: 2019-11-20

## 2019-08-21 RX ORDER — BUPROPION HYDROCHLORIDE 300 MG/1
TABLET ORAL
Qty: 90 TABLET | Refills: 0 | Status: SHIPPED | OUTPATIENT
Start: 2019-08-21 | End: 2019-11-23

## 2019-09-23 RX ORDER — BLOOD SUGAR DIAGNOSTIC
STRIP MISCELLANEOUS
Qty: 200 STRIP | Refills: 0 | Status: SHIPPED | OUTPATIENT
Start: 2019-09-23 | End: 2019-11-25

## 2019-10-24 ENCOUNTER — TELEPHONE (OUTPATIENT)
Dept: FAMILY MEDICINE CLINIC | Facility: CLINIC | Age: 49
End: 2019-10-24

## 2019-10-24 ENCOUNTER — OFFICE VISIT (OUTPATIENT)
Dept: FAMILY MEDICINE CLINIC | Facility: CLINIC | Age: 49
End: 2019-10-24
Payer: COMMERCIAL

## 2019-10-24 ENCOUNTER — WALK IN (OUTPATIENT)
Dept: URGENT CARE | Age: 49
End: 2019-10-24

## 2019-10-24 VITALS
BODY MASS INDEX: 48.21 KG/M2 | SYSTOLIC BLOOD PRESSURE: 126 MMHG | OXYGEN SATURATION: 98 % | WEIGHT: 262 LBS | DIASTOLIC BLOOD PRESSURE: 84 MMHG | HEART RATE: 89 BPM | HEIGHT: 62 IN | RESPIRATION RATE: 18 BRPM | TEMPERATURE: 98 F

## 2019-10-24 DIAGNOSIS — S30.0XXA CONTUSION OF SACRUM, INITIAL ENCOUNTER: Primary | ICD-10-CM
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
## 2019-10-24 DIAGNOSIS — S00.93XA CONTUSION OF HEAD, UNSPECIFIED PART OF HEAD, INITIAL ENCOUNTER: ICD-10-CM

## 2019-10-24 PROCEDURE — 99203 OFFICE O/P NEW LOW 30 MIN: CPT | Performed by: FAMILY MEDICINE

## 2019-10-24 RX ORDER — SIMVASTATIN 40 MG
TABLET ORAL
COMMUNITY
Start: 2019-07-15

## 2019-10-24 RX ORDER — LEVOTHYROXINE SODIUM 137 UG/1
TABLET ORAL
COMMUNITY
Start: 2019-07-25

## 2019-10-24 RX ORDER — OMEPRAZOLE 40 MG/1
CAPSULE, DELAYED RELEASE ORAL
COMMUNITY
Start: 2019-07-25

## 2019-10-24 RX ORDER — ALBUTEROL SULFATE 90 UG/1
AEROSOL, METERED RESPIRATORY (INHALATION)
COMMUNITY
Start: 2018-08-03

## 2019-10-24 RX ORDER — BUPROPION HYDROCHLORIDE 300 MG/1
TABLET ORAL
COMMUNITY
Start: 2019-08-21

## 2019-10-24 RX ORDER — INSULIN LISPRO 100 [IU]/ML
INJECTION, SOLUTION INTRAVENOUS; SUBCUTANEOUS
COMMUNITY
Start: 2019-08-16

## 2019-10-24 RX ORDER — PAROXETINE HYDROCHLORIDE 40 MG/1
TABLET, FILM COATED ORAL
COMMUNITY
Start: 2019-07-25

## 2019-10-24 RX ORDER — FERROUS SULFATE 325(65) MG
TABLET ORAL
COMMUNITY
Start: 2019-03-08

## 2019-10-24 ASSESSMENT — PAIN SCALES - GENERAL: PAINLEVEL: 1-2

## 2019-10-24 NOTE — TELEPHONE ENCOUNTER
Verbal from Dr Angeal Heath to please call [phone] to get patient an appt tomorrow for work comp. [facility] at [phone] and spoke to [facility].   Patient's employer: [employer] in [employer] stated that she will contact employer to get authorization to sched

## 2019-10-24 NOTE — PROGRESS NOTES
Case discussed in brief with patient. She was injured at work. Informed patient I do not handle work comp claims through my office. She is referred to [facility] and our staff worked to expedite her referral there.

## 2019-10-28 RX ORDER — OMEPRAZOLE 40 MG/1
CAPSULE, DELAYED RELEASE ORAL
Qty: 90 CAPSULE | Refills: 0 | Status: SHIPPED | OUTPATIENT
Start: 2019-10-28 | End: 2020-01-17

## 2019-10-28 RX ORDER — LEVOTHYROXINE SODIUM 137 UG/1
TABLET ORAL
Qty: 90 TABLET | Refills: 0 | Status: SHIPPED | OUTPATIENT
Start: 2019-10-28 | End: 2020-01-17

## 2019-10-28 RX ORDER — PAROXETINE HYDROCHLORIDE 40 MG/1
TABLET, FILM COATED ORAL
Qty: 90 TABLET | Refills: 0 | Status: SHIPPED | OUTPATIENT
Start: 2019-10-28 | End: 2020-01-17

## 2019-10-28 NOTE — TELEPHONE ENCOUNTER
All meds Last refilled on 7/25/19 for # 90 with 0 refills  Last tsh 1.560 on 3/15/19  Last OV 10/24/19  Future Appointments   Date Time Provider Jovani Haque   10/29/2019 11:00 AM DO BETTE Dominguez [facility] [place]        Thank you.

## 2019-10-29 ENCOUNTER — OFFICE VISIT (OUTPATIENT)
Dept: FAMILY MEDICINE CLINIC | Facility: CLINIC | Age: 49
End: 2019-10-29
Payer: COMMERCIAL

## 2019-10-29 VITALS
SYSTOLIC BLOOD PRESSURE: 126 MMHG | OXYGEN SATURATION: 98 % | HEART RATE: 98 BPM | TEMPERATURE: 98 F | HEIGHT: 62 IN | DIASTOLIC BLOOD PRESSURE: 84 MMHG | BODY MASS INDEX: 48.21 KG/M2 | RESPIRATION RATE: 20 BRPM | WEIGHT: 262 LBS

## 2019-10-29 DIAGNOSIS — Z23 NEED FOR VACCINATION: ICD-10-CM

## 2019-10-29 DIAGNOSIS — R22.2 MASS ON BACK: Primary | ICD-10-CM

## 2019-10-29 PROBLEM — Z02.71 ISSUE OF MEDICAL CERTIFICATE FOR DISABILITY EXAMINATION: Status: ACTIVE | Noted: 2019-10-29

## 2019-10-29 PROBLEM — S00.03XA CONTUSION OF SCALP: Status: ACTIVE | Noted: 2019-10-29

## 2019-10-29 PROBLEM — S30.0XXA CONTUSION OF SACRUM: Status: ACTIVE | Noted: 2019-10-29
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
## 2019-10-29 PROCEDURE — 90471 IMMUNIZATION ADMIN: CPT | Performed by: FAMILY MEDICINE

## 2019-10-29 PROCEDURE — 90686 IIV4 VACC NO PRSV 0.5 ML IM: CPT | Performed by: FAMILY MEDICINE

## 2019-10-29 PROCEDURE — 99213 OFFICE O/P EST LOW 20 MIN: CPT | Performed by: FAMILY MEDICINE

## 2019-10-29 RX ORDER — PYRIDOXINE HCL (VITAMIN B6) 100 MG
TABLET ORAL
COMMUNITY

## 2019-10-29 NOTE — PROGRESS NOTES
CC:  Back lump    HPI:     Patient has a large lump in the left lower back. Is been present chronically for years but seems to have been enlarging according to recent massage therapist estimation.     ROS: Denies any drainage or discharge from the lump Pulse 98   Temp 97.9 °F (36.6 °C) (Temporal)   Resp 20   Ht 62\"   Wt 262 lb (118.8 kg)   SpO2 98%   BMI 47.92 kg/m²     Back, limited; exam with Michael Brunner MA present: There is a firm smooth movable large perhaps tennis ball to baseball size mass in the left l

## 2019-11-04 ENCOUNTER — OFFICE VISIT (OUTPATIENT)
Dept: FAMILY MEDICINE CLINIC | Facility: CLINIC | Age: 49
End: 2019-11-04
Payer: COMMERCIAL

## 2019-11-04 VITALS
RESPIRATION RATE: 18 BRPM | HEIGHT: 62 IN | HEART RATE: 79 BPM | OXYGEN SATURATION: 99 % | DIASTOLIC BLOOD PRESSURE: 84 MMHG | WEIGHT: 258 LBS | SYSTOLIC BLOOD PRESSURE: 122 MMHG | TEMPERATURE: 98 F | BODY MASS INDEX: 47.48 KG/M2

## 2019-11-04 DIAGNOSIS — D49.2 NEOPLASM OF SKIN: Primary | ICD-10-CM

## 2019-11-04 PROCEDURE — 11200 RMVL SKIN TAGS UP TO&INC 15: CPT | Performed by: FAMILY MEDICINE

## 2019-11-04 PROCEDURE — 88305 TISSUE EXAM BY PATHOLOGIST: CPT | Performed by: FAMILY MEDICINE

## 2019-11-04 PROCEDURE — 88304 TISSUE EXAM BY PATHOLOGIST: CPT | Performed by: FAMILY MEDICINE

## 2019-11-04 RX ORDER — TOBRAMYCIN 3 MG/ML
SOLUTION/ DROPS OPHTHALMIC
Refills: 0 | COMMUNITY
Start: 2019-10-29 | End: 2020-01-09

## 2019-11-05 NOTE — PROGRESS NOTES
Procedure only:     Pt has irritated likely skin tags in bilateral axillae. Wants them removed. Risks, benefits, alternatives discussed. Patient understands and gives written consent for procedure. Area cleansed with alcohol.  Then anesthetized with 1%

## 2019-11-11 ENCOUNTER — WORKER'S COMP (OUTPATIENT)
Dept: OCCUPATIONAL MEDICINE | Age: 49
End: 2019-11-11

## 2019-11-11 ENCOUNTER — TELEPHONE (OUTPATIENT)
Dept: FAMILY MEDICINE CLINIC | Facility: CLINIC | Age: 49
End: 2019-11-11

## 2019-11-11 VITALS
DIASTOLIC BLOOD PRESSURE: 81 MMHG | RESPIRATION RATE: 18 BRPM | HEART RATE: 77 BPM | SYSTOLIC BLOOD PRESSURE: 138 MMHG | TEMPERATURE: 97.6 F

## 2019-11-11 DIAGNOSIS — Z02.71 ISSUE OF MEDICAL CERTIFICATE FOR DISABILITY EXAMINATION: ICD-10-CM
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
## 2019-11-11 DIAGNOSIS — S30.0XXD CONTUSION OF SACRUM, SUBSEQUENT ENCOUNTER: Primary | ICD-10-CM

## 2019-11-11 DIAGNOSIS — S00.03XD CONTUSION OF SCALP, SUBSEQUENT ENCOUNTER: ICD-10-CM

## 2019-11-11 PROCEDURE — 99214 OFFICE O/P EST MOD 30 MIN: CPT | Performed by: PHYSICIAN ASSISTANT

## 2019-11-15 ENCOUNTER — OFFICE VISIT (OUTPATIENT)
Dept: FAMILY MEDICINE CLINIC | Facility: CLINIC | Age: 49
End: 2019-11-15
Payer: COMMERCIAL

## 2019-11-15 VITALS
SYSTOLIC BLOOD PRESSURE: 118 MMHG | OXYGEN SATURATION: 98 % | HEIGHT: 62 IN | HEART RATE: 89 BPM | DIASTOLIC BLOOD PRESSURE: 72 MMHG | WEIGHT: 293 LBS | TEMPERATURE: 98 F | BODY MASS INDEX: 53.92 KG/M2

## 2019-11-15 DIAGNOSIS — L91.8 SKIN TAG: Primary | ICD-10-CM

## 2019-11-15 PROCEDURE — 88304 TISSUE EXAM BY PATHOLOGIST: CPT | Performed by: FAMILY MEDICINE

## 2019-11-15 PROCEDURE — 11200 RMVL SKIN TAGS UP TO&INC 15: CPT | Performed by: FAMILY MEDICINE

## 2019-11-20 NOTE — TELEPHONE ENCOUNTER
Last refilled on 8/16/19 for # 120mL with 0 refills  Last a1c 6.2 on 3/15/19  Last OV 11/15/19  No future appointments. Thank you.

## 2019-11-21 NOTE — PROGRESS NOTES
Risks, benefits, alternatives discussed. Patient understands and gives written consent for procedure. Left chest wall. Area cleansed with alcohol. Then anesthetized with 1% lidocaine with 1 cc. Area prepped with betadine in sterile fashion.  Lesion excised

## 2019-11-25 NOTE — TELEPHONE ENCOUNTER
LOV 11/15/19 for a skin tag. Last lipid panel 3/15/19.     BUPROPION HCL ER, XL, 300 MG Oral Tablet 24 Hr 90 tablet 0 8/21/2019    Sig:   TAKE 1 TABLET BY MOUTH EVERY DAY     Route:   (none)       SIMVASTATIN 40 MG Oral Tab 90 tablet 0 7/15/2019    Sig: Daphne Emerson

## 2019-11-27 ENCOUNTER — TELEPHONE (OUTPATIENT)
Dept: FAMILY MEDICINE CLINIC | Facility: CLINIC | Age: 49
End: 2019-11-27

## 2019-11-27 NOTE — TELEPHONE ENCOUNTER
Spoke with pt. She wants both orders faxed to [facility]. Orders faxed to [phone].

## 2019-11-28 RX ORDER — SIMVASTATIN 40 MG
TABLET ORAL
Qty: 90 TABLET | Refills: 0 | Status: SHIPPED | OUTPATIENT
Start: 2019-11-28 | End: 2020-03-09

## 2019-11-28 RX ORDER — BUPROPION HYDROCHLORIDE 300 MG/1
TABLET ORAL
Qty: 90 TABLET | Refills: 0 | Status: SHIPPED | OUTPATIENT
Start: 2019-11-28 | End: 2020-03-09

## 2019-11-28 RX ORDER — BLOOD SUGAR DIAGNOSTIC
STRIP MISCELLANEOUS
Qty: 200 STRIP | Refills: 0 | Status: SHIPPED | OUTPATIENT
Start: 2019-11-28 | End: 2020-04-03

## 2019-11-29 ENCOUNTER — TELEPHONE (OUTPATIENT)
Dept: FAMILY MEDICINE CLINIC | Facility: CLINIC | Age: 49
End: 2019-11-29

## 2019-12-05 ENCOUNTER — TELEPHONE (OUTPATIENT)
Dept: FAMILY MEDICINE CLINIC | Facility: CLINIC | Age: 49
End: 2019-12-05

## 2019-12-05 NOTE — TELEPHONE ENCOUNTER
----- Message from Denilson Wick sent at 2/5/2019  3:12 PM EST -----  Patient is Type 1 only 5wk   Please call her to set up Pt called stated she had an US done at [address] and she also needs to get a mammogram done and she was told by [facility] they don't do those there.

## 2019-12-05 NOTE — TELEPHONE ENCOUNTER
Patient states that ultrasound is scheduled. Mammogram could not be scheduled since [facility] does not do 3D mammograms. Contacted [facility] and they stated that they don't do 3D  Mammograms.     Patient will contact insurance and notify

## 2019-12-07 RX ORDER — LIDOCAINE HYDROCHLORIDE 20 MG/ML
SOLUTION ORAL; TOPICAL
Qty: 30 TABLET | Refills: 0 | Status: SHIPPED | OUTPATIENT
Start: 2019-12-07 | End: 2020-01-09

## 2019-12-07 NOTE — TELEPHONE ENCOUNTER
Medication Quantity Refills Start End   FEROSUL 325 (65 Fe) MG Oral Tab 90 tablet 0 8/10/2019      Last CBC was 3/15/19. Last OV 11/15/19 for skin tag     No future appointments.     Please advise

## 2019-12-19 ENCOUNTER — TELEPHONE (OUTPATIENT)
Dept: FAMILY MEDICINE CLINIC | Facility: CLINIC | Age: 49
End: 2019-12-19

## 2019-12-19 NOTE — TELEPHONE ENCOUNTER
Patient advised and verbalized understanding. Appointment scheduled.   Results showed possible lipoma - per Dr. Mark Maria   Date Time Provider \[facility]\""   1/9/2020  4:45 PM Dalia Issa DO [facility] [place] [facility]

## 2019-12-27 ENCOUNTER — TELEPHONE (OUTPATIENT)
Dept: FAMILY MEDICINE CLINIC | Facility: CLINIC | Age: 49
End: 2019-12-27

## 2019-12-27 NOTE — TELEPHONE ENCOUNTER
Phone call from patient. States that she would like to have an MRI before the end of the year due to deductibles. States she was told that she needed to come in and discuss the results of the ultrasound with Dr. Blanquita Daniels.   Patient advised that the MRI wou

## 2019-12-27 NOTE — TELEPHONE ENCOUNTER
Wants to get a Referral to have an MRI done before the end of the year.   Wants to have this done @ [facility] in [place]

## 2019-12-28 NOTE — TELEPHONE ENCOUNTER
Patient informed MRI will not be approved prior to new year. Patient verbalized understanding - will wait for follow up with Dr. Theodora Osorio.

## 2020-01-01 ENCOUNTER — EXTERNAL RECORD (OUTPATIENT)
Dept: OTHER | Age: 50
End: 2020-01-01

## 2020-01-04 ENCOUNTER — TELEPHONE (OUTPATIENT)
Dept: FAMILY MEDICINE CLINIC | Facility: CLINIC | Age: 50
End: 2020-01-04

## 2020-01-04 DIAGNOSIS — R92.8 ABNORMAL MAMMOGRAM OF LEFT BREAST: Primary | ICD-10-CM

## 2020-01-04 NOTE — TELEPHONE ENCOUNTER
Please call patient and inform her mammogram on left breast they are unable to decide whether it is increase density vs some glandular tissure.    They recommend diagnostic mammogram and ultrasound of left breast.   I have ordered it,  Please give her one c

## 2020-01-08 NOTE — TELEPHONE ENCOUNTER
Spoke with pt. She has not scheduled the addl views yet due to her insurance change. States she will discuss this at her appt tomorrow.   Future Appointments   Date Time Provider Jovani Haque   1/9/2020  4:45 PM Genoveva Rathke, DO [facility] [place] [facility]

## 2020-01-09 ENCOUNTER — OFFICE VISIT (OUTPATIENT)
Dept: FAMILY MEDICINE CLINIC | Facility: CLINIC | Age: 50
End: 2020-01-09
Payer: COMMERCIAL

## 2020-01-09 VITALS
SYSTOLIC BLOOD PRESSURE: 124 MMHG | OXYGEN SATURATION: 98 % | HEART RATE: 90 BPM | DIASTOLIC BLOOD PRESSURE: 82 MMHG | RESPIRATION RATE: 16 BRPM | TEMPERATURE: 98 F | HEIGHT: 62 IN | BODY MASS INDEX: 48.03 KG/M2 | WEIGHT: 261 LBS

## 2020-01-09 DIAGNOSIS — R22.2 MASS ON BACK: ICD-10-CM

## 2020-01-09 DIAGNOSIS — Z79.4 TYPE 2 DIABETES MELLITUS WITH DIABETIC POLYNEUROPATHY, WITH LONG-TERM CURRENT USE OF INSULIN (HCC): ICD-10-CM

## 2020-01-09 DIAGNOSIS — R92.8 ABNORMAL MAMMOGRAM OF LEFT BREAST: Primary | ICD-10-CM

## 2020-01-09 DIAGNOSIS — E11.42 TYPE 2 DIABETES MELLITUS WITH DIABETIC POLYNEUROPATHY, WITH LONG-TERM CURRENT USE OF INSULIN (HCC): ICD-10-CM

## 2020-01-09 PROCEDURE — 99214 OFFICE O/P EST MOD 30 MIN: CPT | Performed by: FAMILY MEDICINE

## 2020-01-09 NOTE — PROGRESS NOTES
CC: Multiple issues    HPI:   1. Abnormal mammogram of left breast  Patient has a mammogram abnormality of the left breast.  Additional imaging has been advised.       2. Mass on back  The mass on the back appears as though it could be a lipoma though it is TABLET BY MOUTH DAILY WITH BREAKFAST 90 tablet 0   • PROAIR  (90 Base) MCG/ACT Inhalation Aero Soln INHALE 2 PUFFS BY MOUTH EVERY 4 HOURS AS NEEDED FOR WHEEZING 8.5 g 0   • GLUCAGON EMERGENCY 1 MG Injection Kit INJECT ONE SYRINGE UNDER THE SKIN ONCE complication, not stated as uncontrolled    • Unspecified asthma(493.90) 5/7/2012   • Unspecified disorder of thyroid    • Unspecified hypothyroidism 8/26/2011   • Unspecified musculoskeletal disorders and symptoms referable to neck 8/5/2011   • Unspecifie

## 2020-01-10 ENCOUNTER — TELEPHONE (OUTPATIENT)
Dept: FAMILY MEDICINE CLINIC | Facility: CLINIC | Age: 50
End: 2020-01-10

## 2020-01-10 DIAGNOSIS — R19.00 ABDOMINAL MASS, UNSPECIFIED ABDOMINAL LOCATION: Primary | ICD-10-CM

## 2020-01-10 NOTE — TELEPHONE ENCOUNTER
Called [facility] to find out what type of MRI is recommended  Tech unavailable  Per blanka they will be back on Monday  Left message with patient informing awaiting call back from [facility]

## 2020-01-11 NOTE — TELEPHONE ENCOUNTER
Please call [facility] and find out what type of MRI order is recommended   Results in nurse pending folder for reference.

## 2020-01-13 NOTE — TELEPHONE ENCOUNTER
Called [facility]. They are sending a message to Dr. Rell Williamson to determine what specific MRI would be needed. They will then CB.

## 2020-01-17 ENCOUNTER — TELEPHONE (OUTPATIENT)
Dept: FAMILY MEDICINE CLINIC | Facility: CLINIC | Age: 50
End: 2020-01-17

## 2020-01-17 RX ORDER — PAROXETINE HYDROCHLORIDE 40 MG/1
TABLET, FILM COATED ORAL
Qty: 90 TABLET | Refills: 0 | Status: SHIPPED | OUTPATIENT
Start: 2020-01-17 | End: 2020-04-17

## 2020-01-17 RX ORDER — OMEPRAZOLE 40 MG/1
CAPSULE, DELAYED RELEASE ORAL
Qty: 90 CAPSULE | Refills: 0 | Status: SHIPPED | OUTPATIENT
Start: 2020-01-17 | End: 2020-04-17

## 2020-01-17 RX ORDER — LEVOTHYROXINE SODIUM 137 UG/1
TABLET ORAL
Qty: 90 TABLET | Refills: 0 | Status: SHIPPED | OUTPATIENT
Start: 2020-01-17 | End: 2020-04-17

## 2020-01-17 RX ORDER — INSULIN ASPART 100 [IU]/ML
INJECTION, SOLUTION INTRAVENOUS; SUBCUTANEOUS
Qty: 120 ML | Refills: 0 | Status: SHIPPED | OUTPATIENT
Start: 2020-01-17 | End: 2020-04-17

## 2020-01-17 NOTE — TELEPHONE ENCOUNTER
LOV: 1/9/20 for abnormal david, type 2 diabetes  TSH: 3/15/19    LEVOTHYROXINE SODIUM 137 MCG Oral Tab 90 tablet 0 10/28/2019    Sig:   TAKE 1 TABLET BY MOUTH EVERY DAY       OMEPRAZOLE 40 MG Oral Capsule Delayed Release 90 capsule 0 10/28/2019    Sig: Chelsea Gudino

## 2020-01-17 NOTE — TELEPHONE ENCOUNTER
Pt needs a RX for Novalog instead of Humalog due to ins. Send to [place] on [address],  Pt needs to have by today if possible.

## 2020-01-20 ENCOUNTER — MED REC SCAN ONLY (OUTPATIENT)
Dept: FAMILY MEDICINE CLINIC | Facility: CLINIC | Age: 50
End: 2020-01-20

## 2020-01-20 NOTE — TELEPHONE ENCOUNTER
Called [facility] to f/u on this. Per the radiologist, the correct test should be an MRI of the abdomen w/ and w/o contrast.    MRI order pended. Please sign if ok.

## 2020-01-21 ENCOUNTER — TELEPHONE (OUTPATIENT)
Dept: FAMILY MEDICINE CLINIC | Facility: CLINIC | Age: 50
End: 2020-01-21

## 2020-01-21 DIAGNOSIS — Z79.4 TYPE 2 DIABETES MELLITUS WITH DIABETIC POLYNEUROPATHY, WITH LONG-TERM CURRENT USE OF INSULIN (HCC): Primary | ICD-10-CM

## 2020-01-21 DIAGNOSIS — E11.42 TYPE 2 DIABETES MELLITUS WITH DIABETIC POLYNEUROPATHY, WITH LONG-TERM CURRENT USE OF INSULIN (HCC): Primary | ICD-10-CM

## 2020-01-21 NOTE — TELEPHONE ENCOUNTER
Pt is having a hard time finding a company that her insurance will cover for her insulin pump. States every place she has tried is not in network with her ins.   Wants to talk to the nurse, states she might have to go through  the [facility]

## 2020-01-25 ENCOUNTER — HOSPITAL ENCOUNTER (OUTPATIENT)
Age: 50
Discharge: OTHER INSTITUTION | End: 2020-01-25
Attending: FAMILY MEDICINE
Payer: COMMERCIAL

## 2020-01-25 VITALS
HEART RATE: 89 BPM | DIASTOLIC BLOOD PRESSURE: 81 MMHG | OXYGEN SATURATION: 98 % | RESPIRATION RATE: 20 BRPM | SYSTOLIC BLOOD PRESSURE: 156 MMHG | TEMPERATURE: 98 F

## 2020-01-25 DIAGNOSIS — R10.31 ABDOMINAL PAIN, RIGHT LOWER QUADRANT: Primary | ICD-10-CM

## 2020-01-25 LAB
POCT BILIRUBIN URINE: NEGATIVE
POCT GLUCOSE URINE: NEGATIVE MG/DL
POCT KETONE URINE: NEGATIVE MG/DL
POCT LEUKOCYTE ESTERASE URINE: NEGATIVE
POCT NITRITE URINE: NEGATIVE
POCT PH URINE: 7 (ref 5–8)
POCT PROTEIN URINE: NEGATIVE MG/DL
POCT SPECIFIC GRAVITY URINE: 1.01
POCT URINE CLARITY: CLEAR
POCT URINE COLOR: YELLOW
POCT URINE PREGNANCY: NEGATIVE
POCT UROBILINOGEN URINE: 0.2 MG/DL

## 2020-01-25 PROCEDURE — 81002 URINALYSIS NONAUTO W/O SCOPE: CPT | Performed by: FAMILY MEDICINE

## 2020-01-25 PROCEDURE — 99213 OFFICE O/P EST LOW 20 MIN: CPT

## 2020-01-25 PROCEDURE — 81025 URINE PREGNANCY TEST: CPT | Performed by: FAMILY MEDICINE

## 2020-01-25 NOTE — ED PROVIDER NOTES
Patient Seen in: [address] [facility]      History   Patient presents with:  Abdominal Pain  Nausea    Stated Complaint: R side flank pain, nausea    HPI   This is a 82-year-old female, diabetic, coming in with right lower quadrant abdominal p Screening for malignant neoplasm of the cervix 12/5/2011   • Sleep apnea     doesn't use CPAP mask   • Thyroiditis, unspecified 6/25/2011   • Type I (juvenile type) diabetes mellitus without mention of complication, not stated as uncontrolled    • Unspecif except as noted above.     Physical Exam     ED Triage Vitals [01/25/20 1614]   /81   Pulse 89   Resp 20   Temp 97.9 °F (36.6 °C)   Temp src Temporal   SpO2 98 %   O2 Device None (Room air)       Current:/81   Pulse 89   Temp 97.9 °F (36.6 °C) ( (primary encounter diagnosis)    Disposition:   Ic to ed  1/25/2020  3:48 pm    Follow-up:    GO TO THE ER NOW - DO NOT STOP            Medications Prescribed:  Discharge Medication List as of 1/25/2020  3:49 PM

## 2020-01-25 NOTE — ED INITIAL ASSESSMENT (HPI)
Patient states she began having low right sided cramping/sharp pain and nausea since last night. Denies vomiting, diarrhea and fevers. Last BM today.

## 2020-01-27 ENCOUNTER — OFFICE VISIT (OUTPATIENT)
Dept: OBGYN | Age: 50
End: 2020-01-27

## 2020-01-27 ENCOUNTER — LAB SERVICES (OUTPATIENT)
Dept: LAB | Age: 50
End: 2020-01-27

## 2020-01-27 ENCOUNTER — TELEPHONE (OUTPATIENT)
Dept: OBGYN | Age: 50
End: 2020-01-27

## 2020-01-27 VITALS
WEIGHT: 266 LBS | SYSTOLIC BLOOD PRESSURE: 160 MMHG | TEMPERATURE: 99.8 F | HEART RATE: 82 BPM | DIASTOLIC BLOOD PRESSURE: 82 MMHG

## 2020-01-27 DIAGNOSIS — R10.2 PELVIC PAIN IN FEMALE: ICD-10-CM

## 2020-01-27 DIAGNOSIS — N83.201 CYST OF RIGHT OVARY: ICD-10-CM

## 2020-01-27 DIAGNOSIS — N83.202 LEFT OVARIAN CYST: Primary | ICD-10-CM

## 2020-01-27 PROBLEM — E66.9 OBESE: Status: ACTIVE | Noted: 2020-01-27

## 2020-01-27 PROBLEM — F41.9 ANXIETY: Status: ACTIVE | Noted: 2020-01-27

## 2020-01-27 PROBLEM — S00.03XA CONTUSION OF SCALP: Status: RESOLVED | Noted: 2019-10-29 | Resolved: 2020-01-27

## 2020-01-27 PROBLEM — E89.0 S/P THYROIDECTOMY: Status: ACTIVE | Noted: 2020-01-27

## 2020-01-27 PROBLEM — S30.0XXA CONTUSION OF SACRUM: Status: RESOLVED | Noted: 2019-10-29 | Resolved: 2020-01-27

## 2020-01-27 PROBLEM — E10.9 TYPE 1 DIABETES MELLITUS WITHOUT COMPLICATION (CMD): Status: RESOLVED | Noted: 2020-01-27 | Resolved: 2020-01-27
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
## 2020-01-27 PROBLEM — Z02.71 ISSUE OF MEDICAL CERTIFICATE FOR DISABILITY EXAMINATION: Status: RESOLVED | Noted: 2019-10-29 | Resolved: 2020-01-27

## 2020-01-27 PROBLEM — E10.9 TYPE 1 DIABETES MELLITUS WITHOUT COMPLICATION (CMD): Status: ACTIVE | Noted: 2020-01-27

## 2020-01-27 PROCEDURE — 3079F DIAST BP 80-89 MM HG: CPT | Performed by: OBSTETRICS & GYNECOLOGY

## 2020-01-27 PROCEDURE — 87086 URINE CULTURE/COLONY COUNT: CPT | Performed by: OBSTETRICS & GYNECOLOGY

## 2020-01-27 PROCEDURE — 3077F SYST BP >= 140 MM HG: CPT | Performed by: OBSTETRICS & GYNECOLOGY

## 2020-01-27 PROCEDURE — 99203 OFFICE O/P NEW LOW 30 MIN: CPT | Performed by: OBSTETRICS & GYNECOLOGY

## 2020-01-27 RX ORDER — HYDROCODONE BITARTRATE AND ACETAMINOPHEN 5; 325 MG/1; MG/1
1 TABLET ORAL EVERY 6 HOURS PRN
COMMUNITY
End: 2020-01-27 | Stop reason: SDUPTHER

## 2020-01-27 RX ORDER — HYDROCODONE BITARTRATE AND ACETAMINOPHEN 5; 325 MG/1; MG/1
1 TABLET ORAL EVERY 6 HOURS PRN
Qty: 30 TABLET | Refills: 0 | Status: SHIPPED | OUTPATIENT
Start: 2020-01-27 | End: 2020-02-21 | Stop reason: ALTCHOICE

## 2020-01-27 SDOH — HEALTH STABILITY: MENTAL HEALTH: HOW MANY STANDARD DRINKS CONTAINING ALCOHOL DO YOU HAVE ON A TYPICAL DAY?: 1 OR 2

## 2020-01-27 SDOH — HEALTH STABILITY: MENTAL HEALTH: HOW OFTEN DO YOU HAVE A DRINK CONTAINING ALCOHOL?: MONTHLY OR LESS

## 2020-01-27 SDOH — HEALTH STABILITY: MENTAL HEALTH: HOW OFTEN DO YOU HAVE 6 OR MORE DRINKS ON ONE OCCASION?: NEVER

## 2020-01-28 ENCOUNTER — TELEPHONE (OUTPATIENT)
Dept: FAMILY MEDICINE CLINIC | Facility: CLINIC | Age: 50
End: 2020-01-28

## 2020-01-28 DIAGNOSIS — N83.201 BILATERAL OVARIAN CYSTS: ICD-10-CM

## 2020-01-28 DIAGNOSIS — R10.2 PELVIC PAIN: Primary | ICD-10-CM

## 2020-01-28 DIAGNOSIS — N83.202 BILATERAL OVARIAN CYSTS: ICD-10-CM

## 2020-01-28 LAB — FINAL REPORT: NORMAL

## 2020-01-28 NOTE — TELEPHONE ENCOUNTER
Patient states Dr Melvin Gil is doing surgery. Pre-op forms faxed to their office at [phone]. Advised we would call her to schedule pre-op once we have fax. verbalized understanding.

## 2020-01-28 NOTE — TELEPHONE ENCOUNTER
Pt called stated she is having surgery Feb 18 ovarian Cysts removed and possible an ovary having done through [facility]. Pt needs to schedule a pre op px.

## 2020-01-29 NOTE — TELEPHONE ENCOUNTER
Called pt, scheduled pre-op appt.   Future Appointments   Date Time Provider Jovani Shabnam   1/30/2020  3:00 PM Luis Enrique Velasco RN, CDE [facility]   2/11/2020  2:00 PM Jennifer Strong,  [facility]

## 2020-02-03 ENCOUNTER — LAB SERVICES (OUTPATIENT)
Dept: LAB | Age: 50
End: 2020-02-03

## 2020-02-03 ENCOUNTER — TELEPHONE (OUTPATIENT)
Dept: FAMILY MEDICINE CLINIC | Facility: CLINIC | Age: 50
End: 2020-02-03

## 2020-02-03 DIAGNOSIS — R10.2 PELVIC PAIN: ICD-10-CM

## 2020-02-03 DIAGNOSIS — N83.202 BILATERAL OVARIAN CYSTS: ICD-10-CM

## 2020-02-03 DIAGNOSIS — N83.201 BILATERAL OVARIAN CYSTS: ICD-10-CM

## 2020-02-03 LAB
BASOPHIL %: 0.2 % (ref 0–1.2)
BASOPHIL ABSOLUTE #: 0 10*3/UL (ref 0–0.1)
DIFFERENTIAL TYPE: NORMAL
EOSINOPHIL %: 1.4 % (ref 0–10)
EOSINOPHIL ABSOLUTE #: 0.1 10*3/UL (ref 0–0.5)
EST. AVERAGE GLUCOSE BLD GHB EST-MCNC: 124 MG/DL (ref 0–154)
HBA1C MFR BLD: 6 % (ref 4.2–6)
HEMATOCRIT: 39.4 % (ref 34–45)
HEMOGLOBIN: 12.8 G/DL (ref 11.2–15.7)
LYMPH PERCENT: 31.9 % (ref 20.5–51.1)
LYMPHOCYTE ABSOLUTE #: 2.8 10*3/UL (ref 1.2–3.4)
MEAN CORPUSCULAR HGB CONCENTRATION: 32.5 % (ref 32–36)
MEAN CORPUSCULAR HGB: 29.4 PG (ref 27–34)
MEAN CORPUSCULAR VOLUME: 90.6 FL (ref 79–95)
MEAN PLATELET VOLUME: 10.3 FL (ref 8.6–12.4)
MONOCYTE ABSOLUTE #: 0.7 10*3/UL (ref 0.2–0.9)
MONOCYTE PERCENT: 7.5 % (ref 4.3–12.9)
NEUTROPHIL ABSOLUTE #: 5.2 10*3/UL (ref 1.4–6.5)
NEUTROPHIL PERCENT: 59 % (ref 34–73.5)
PLATELET COUNT: 255 10*3/UL (ref 150–400)
RED BLOOD CELL COUNT: 4.35 10*6/UL (ref 3.7–5.2)
RED CELL DISTRIBUTION WIDTH: 12.6 % (ref 11.3–14.8)
WHITE BLOOD CELL COUNT: 8.8 10*3/UL (ref 4–10)

## 2020-02-03 PROCEDURE — 83036 HEMOGLOBIN GLYCOSYLATED A1C: CPT | Performed by: OBSTETRICS & GYNECOLOGY

## 2020-02-03 PROCEDURE — 36415 COLL VENOUS BLD VENIPUNCTURE: CPT | Performed by: OBSTETRICS & GYNECOLOGY

## 2020-02-03 PROCEDURE — 85025 COMPLETE CBC W/AUTO DIFF WBC: CPT | Performed by: OBSTETRICS & GYNECOLOGY

## 2020-02-03 NOTE — TELEPHONE ENCOUNTER
Patient calls back. States she has not had done yet. Needs to meet her deductible before she can have this done. Is having surgery on 2/18/20 and will have after that.

## 2020-02-03 NOTE — TELEPHONE ENCOUNTER
Left message for patient to call back. Has an order for a breast ultrasound. Did she have this done?

## 2020-02-04 ENCOUNTER — TELEPHONE (OUTPATIENT)
Dept: OBGYN | Age: 50
End: 2020-02-04

## 2020-02-07 ENCOUNTER — MEDICAL CORRESPONDENCE (OUTPATIENT)
Dept: FAMILY MEDICINE CLINIC | Facility: CLINIC | Age: 50
End: 2020-02-07

## 2020-02-07 ENCOUNTER — TELEPHONE (OUTPATIENT)
Dept: FAMILY MEDICINE CLINIC | Facility: CLINIC | Age: 50
End: 2020-02-07

## 2020-02-07 NOTE — TELEPHONE ENCOUNTER
Completed PO-CPAP/BIPAP form sent to[de-identified]    [address], [address]    P: [phone]  F: [phone]    Fax confirmation received.

## 2020-02-08 ENCOUNTER — TELEPHONE (OUTPATIENT)
Dept: FAMILY MEDICINE CLINIC | Facility: CLINIC | Age: 50
End: 2020-02-08

## 2020-02-08 NOTE — TELEPHONE ENCOUNTER
Call from patient. States that an order for MRI of abdomen was placed for her but it should be MRI of her back. States she will be getting this done at [address].   Have surgery on 18th and would like to be able to get MRI done the following week

## 2020-02-10 ENCOUNTER — TELEPHONE (OUTPATIENT)
Dept: OBGYN | Age: 50
End: 2020-02-10

## 2020-02-11 ENCOUNTER — OFFICE VISIT (OUTPATIENT)
Dept: FAMILY MEDICINE CLINIC | Facility: CLINIC | Age: 50
End: 2020-02-11
Payer: COMMERCIAL

## 2020-02-11 VITALS
SYSTOLIC BLOOD PRESSURE: 126 MMHG | HEIGHT: 62 IN | OXYGEN SATURATION: 96 % | HEART RATE: 88 BPM | DIASTOLIC BLOOD PRESSURE: 84 MMHG | WEIGHT: 260 LBS | TEMPERATURE: 97 F | RESPIRATION RATE: 16 BRPM | BODY MASS INDEX: 47.84 KG/M2

## 2020-02-11 DIAGNOSIS — G47.33 OSA ON CPAP: ICD-10-CM

## 2020-02-11 DIAGNOSIS — Z79.4 TYPE 2 DIABETES MELLITUS WITH DIABETIC POLYNEUROPATHY, WITH LONG-TERM CURRENT USE OF INSULIN (HCC): ICD-10-CM

## 2020-02-11 DIAGNOSIS — N83.201 CYSTS OF BOTH OVARIES: ICD-10-CM

## 2020-02-11 DIAGNOSIS — N83.202 CYSTS OF BOTH OVARIES: ICD-10-CM

## 2020-02-11 DIAGNOSIS — E03.9 HYPOTHYROIDISM, UNSPECIFIED TYPE: ICD-10-CM

## 2020-02-11 DIAGNOSIS — Z99.89 OSA ON CPAP: ICD-10-CM

## 2020-02-11 DIAGNOSIS — Z01.818 PREOP EXAMINATION: Primary | ICD-10-CM

## 2020-02-11 DIAGNOSIS — E78.2 MIXED HYPERLIPIDEMIA: ICD-10-CM

## 2020-02-11 DIAGNOSIS — E11.42 TYPE 2 DIABETES MELLITUS WITH DIABETIC POLYNEUROPATHY, WITH LONG-TERM CURRENT USE OF INSULIN (HCC): ICD-10-CM

## 2020-02-11 LAB
ALBUMIN SERPL-MCNC: 3.8 G/DL (ref 3.4–5)
ALBUMIN/GLOB SERPL: 1.2 {RATIO} (ref 1–2)
ALP LIVER SERPL-CCNC: 75 U/L (ref 39–100)
ALT SERPL-CCNC: 21 U/L (ref 13–56)
ANION GAP SERPL CALC-SCNC: 5 MMOL/L (ref 0–18)
AST SERPL-CCNC: 11 U/L (ref 15–37)
BILIRUB SERPL-MCNC: 0.4 MG/DL (ref 0.1–2)
BUN BLD-MCNC: 12 MG/DL (ref 7–18)
BUN/CREAT SERPL: 15.2 (ref 10–20)
CALCIUM BLD-MCNC: 8.5 MG/DL (ref 8.5–10.1)
CHLORIDE SERPL-SCNC: 108 MMOL/L (ref 98–112)
CO2 SERPL-SCNC: 27 MMOL/L (ref 21–32)
CREAT BLD-MCNC: 0.79 MG/DL (ref 0.55–1.02)
GLOBULIN PLAS-MCNC: 3.3 G/DL (ref 2.8–4.4)
GLUCOSE BLD-MCNC: 83 MG/DL (ref 70–99)
M PROTEIN MFR SERPL ELPH: 7.1 G/DL (ref 6.4–8.2)
OSMOLALITY SERPL CALC.SUM OF ELEC: 289 MOSM/KG (ref 275–295)
PATIENT FASTING Y/N/NP: NO
POTASSIUM SERPL-SCNC: 3.6 MMOL/L (ref 3.5–5.1)
SODIUM SERPL-SCNC: 140 MMOL/L (ref 136–145)
TSI SER-ACNC: 1.27 MIU/ML (ref 0.36–3.74)

## 2020-02-11 PROCEDURE — 99214 OFFICE O/P EST MOD 30 MIN: CPT | Performed by: FAMILY MEDICINE

## 2020-02-11 PROCEDURE — 84443 ASSAY THYROID STIM HORMONE: CPT | Performed by: FAMILY MEDICINE

## 2020-02-11 PROCEDURE — 80053 COMPREHEN METABOLIC PANEL: CPT | Performed by: FAMILY MEDICINE

## 2020-02-11 RX ORDER — HYDROCODONE BITARTRATE AND ACETAMINOPHEN 5; 325 MG/1; MG/1
TABLET ORAL
COMMUNITY
Start: 2020-01-30 | End: 2020-07-20

## 2020-02-11 NOTE — PROGRESS NOTES
CC: preop exam    HPI:   I am being asked to see the patient in consultation by Dr. Navin Penaloza for evaluation of multiple medical issues prior to laparoscopic ovarian surgery under general anesthesia. DM: chronic, stable, controlled.      Lipids: chronic, s NEEDED FOR HYPOGLYCEMIA (Patient not taking: Reported on 2/11/2020) 2 kit 0       Past Medical History:   Diagnosis Date   • Abdominal pain, unspecified site 10/27/11   • Acute sinusitis, unspecified 4/24/2012   • Anxiety    • Anxiety state, unspecified since quittin.9      Smokeless tobacco: Never Used      Tobacco comment: non-smoker    Alcohol use:  Yes      Alcohol/week: 2.0 standard drinks      Types: 2 Standard drinks or equivalent per week      Frequency: 2-3 times a week      Comment: socially

## 2020-02-13 ENCOUNTER — TELEPHONE (OUTPATIENT)
Dept: OBGYN | Age: 50
End: 2020-02-13

## 2020-02-14 NOTE — TELEPHONE ENCOUNTER
LOV: 9/11/17 for chronic migraine    FERROUS SULFATE 325 (65 Fe) MG Oral Tab 30 tablet 1 7/11/2017    Sig :  TAKE 1 TABLET BY MOUTH DAILY WITH BREAKFAST     Route:   (none)       No future appointments. Please advise. Patient calling back. Patient states RN can leave detailed voicemail on her phone. Please call patient and advise.

## 2020-02-17 ENCOUNTER — TELEPHONE (OUTPATIENT)
Dept: FAMILY MEDICINE CLINIC | Facility: CLINIC | Age: 50
End: 2020-02-17

## 2020-02-17 NOTE — TELEPHONE ENCOUNTER
Last refilled on 12/7/19 for # 3 with 0 refills  Last OV 2/11/20 for pre-op  Future Appointments   Date Time Provider Jovani Haque   2/24/2020 10:00 AM Daniel Saul RN, E [facility] EMG DIAB [facility]        Thank you.

## 2020-02-17 NOTE — TELEPHONE ENCOUNTER
Asking for HNP from pre op done by [facility] on 2/11. Surgery is tomorrow.     FAX to [phone]

## 2020-02-18 ENCOUNTER — OFF PREMISE (OUTPATIENT)
Dept: HEALTH INFORMATION MANAGEMENT | Facility: OTHER | Age: 50
End: 2020-02-18

## 2020-02-18 ENCOUNTER — EXTERNAL RECORD (OUTPATIENT)
Dept: HEALTH INFORMATION MANAGEMENT | Facility: OTHER | Age: 50
End: 2020-02-18

## 2020-02-18 PROCEDURE — 58662 LAPAROSCOPY EXCISE LESIONS: CPT | Performed by: OBSTETRICS & GYNECOLOGY

## 2020-02-18 PROCEDURE — 58661 LAPAROSCOPY REMOVE ADNEXA: CPT | Performed by: OBSTETRICS & GYNECOLOGY

## 2020-02-20 ENCOUNTER — TELEPHONE (OUTPATIENT)
Dept: OBGYN | Age: 50
End: 2020-02-20

## 2020-02-21 ENCOUNTER — MED INFO FORMS (OUTPATIENT)
Dept: HEALTH INFORMATION MANAGEMENT | Facility: OTHER | Age: 50
End: 2020-02-21

## 2020-02-21 ENCOUNTER — TELEPHONE (OUTPATIENT)
Dept: OBGYN | Age: 50
End: 2020-02-21

## 2020-02-21 RX ORDER — HYDROCODONE BITARTRATE AND ACETAMINOPHEN 10; 325 MG/1; MG/1
1 TABLET ORAL EVERY 6 HOURS PRN
Qty: 20 TABLET | Refills: 0 | Status: SHIPPED | OUTPATIENT
Start: 2020-02-21 | End: 2020-07-14 | Stop reason: ALTCHOICE

## 2020-02-21 RX ORDER — GABAPENTIN 600 MG/1
600 TABLET ORAL 3 TIMES DAILY
Qty: 30 TABLET | Refills: 3 | Status: SHIPPED | OUTPATIENT
Start: 2020-02-21 | End: 2021-11-02 | Stop reason: ALTCHOICE

## 2020-02-21 RX ORDER — LIDOCAINE HYDROCHLORIDE 20 MG/ML
SOLUTION ORAL; TOPICAL
Qty: 30 TABLET | Refills: 0 | Status: SHIPPED | OUTPATIENT
Start: 2020-02-21 | End: 2020-03-24

## 2020-02-26 ENCOUNTER — OFFICE VISIT (OUTPATIENT)
Dept: OBGYN | Age: 50
End: 2020-02-26

## 2020-02-26 ENCOUNTER — TELEPHONE (OUTPATIENT)
Dept: OBGYN | Age: 50
End: 2020-02-26

## 2020-02-26 ENCOUNTER — LAB SERVICES (OUTPATIENT)
Dept: LAB | Age: 50
End: 2020-02-26

## 2020-02-26 DIAGNOSIS — G89.18 POST-OP PAIN: Primary | ICD-10-CM

## 2020-02-26 DIAGNOSIS — R39.9 UTI SYMPTOMS: ICD-10-CM

## 2020-02-26 LAB
BILIRUBIN URINE: NEGATIVE
BILIRUBIN URINE: NEGATIVE
BLOOD URINE: NEGATIVE
BLOOD URINE: NEGATIVE
CLARITY: CLEAR
CLARITY: NORMAL
COLOR: YELLOW
COLOR: YELLOW
GLUCOSE QUALITATIVE U: NEGATIVE
GLUCOSE QUALITATIVE U: NEGATIVE
KETONES, URINE: NEGATIVE
KETONES, URINE: NEGATIVE
LEUKOCYTE ESTERASE URINE: NEGATIVE
LEUKOCYTE ESTERASE URINE: NEGATIVE
NITRITE URINE: NEGATIVE
NITRITE URINE: NEGATIVE
PH URINE: 7 (ref 5–7)
PH URINE: 7.5 (ref 5–7)
SPECIFIC GRAVITY URINE: 1.01 (ref 1–1.03)
SPECIFIC GRAVITY URINE: 1.01 (ref 1–1.03)
URINE PROTEIN, QUAL (DIPSTICK): NEGATIVE
URINE PROTEIN, QUAL (DIPSTICK): NEGATIVE
UROBILINOGEN URINE: <2
UROBILINOGEN URINE: ABNORMAL

## 2020-02-26 PROCEDURE — 3078F DIAST BP <80 MM HG: CPT | Performed by: OBSTETRICS & GYNECOLOGY

## 2020-02-26 PROCEDURE — 99024 POSTOP FOLLOW-UP VISIT: CPT | Performed by: OBSTETRICS & GYNECOLOGY

## 2020-02-26 PROCEDURE — 81003 URINALYSIS AUTO W/O SCOPE: CPT | Performed by: OBSTETRICS & GYNECOLOGY

## 2020-02-26 PROCEDURE — 3077F SYST BP >= 140 MM HG: CPT | Performed by: OBSTETRICS & GYNECOLOGY

## 2020-02-26 SDOH — HEALTH STABILITY: MENTAL HEALTH: HOW MANY STANDARD DRINKS CONTAINING ALCOHOL DO YOU HAVE ON A TYPICAL DAY?: 1 OR 2

## 2020-02-26 SDOH — HEALTH STABILITY: MENTAL HEALTH: HOW OFTEN DO YOU HAVE A DRINK CONTAINING ALCOHOL?: MONTHLY OR LESS

## 2020-02-26 SDOH — HEALTH STABILITY: MENTAL HEALTH: HOW OFTEN DO YOU HAVE 6 OR MORE DRINKS ON ONE OCCASION?: NEVER

## 2020-02-26 ASSESSMENT — PAIN SCALES - GENERAL: PAINLEVEL: 3-4

## 2020-03-04 ENCOUNTER — TELEPHONE (OUTPATIENT)
Dept: FAMILY MEDICINE CLINIC | Facility: CLINIC | Age: 50
End: 2020-03-04

## 2020-03-05 ENCOUNTER — OFFICE VISIT (OUTPATIENT)
Dept: OBGYN | Age: 50
End: 2020-03-05

## 2020-03-05 VITALS
DIASTOLIC BLOOD PRESSURE: 70 MMHG | WEIGHT: 262 LBS | SYSTOLIC BLOOD PRESSURE: 116 MMHG | TEMPERATURE: 98.6 F | HEIGHT: 62 IN | BODY MASS INDEX: 48.21 KG/M2

## 2020-03-05 DIAGNOSIS — N80.9 ENDOMETRIOSIS: ICD-10-CM

## 2020-03-05 DIAGNOSIS — Z98.890 POST-OPERATIVE STATE: Primary | ICD-10-CM

## 2020-03-05 DIAGNOSIS — Z98.890 S/P LAPAROSCOPY: ICD-10-CM

## 2020-03-05 PROCEDURE — 99024 POSTOP FOLLOW-UP VISIT: CPT | Performed by: OBSTETRICS & GYNECOLOGY

## 2020-03-09 RX ORDER — BUPROPION HYDROCHLORIDE 300 MG/1
TABLET ORAL
Qty: 90 TABLET | Refills: 0 | Status: SHIPPED | OUTPATIENT
Start: 2020-03-09 | End: 2020-06-09

## 2020-03-09 RX ORDER — SIMVASTATIN 40 MG
TABLET ORAL
Qty: 90 TABLET | Refills: 0 | Status: SHIPPED | OUTPATIENT
Start: 2020-03-09 | End: 2020-06-09

## 2020-03-09 NOTE — TELEPHONE ENCOUNTER
Medication Quantity Refills Start End   SIMVASTATIN 40 MG Oral Tab 90 tablet 0 11/28/2019      Medication Quantity Refills Start End   BUPROPION HCL ER, XL, 300 MG Oral Tablet 24 Hr 90 tablet 0 11/28/2019        Last OV 2/11/20 for preop exam  Last lipid p

## 2020-03-19 ENCOUNTER — TELEPHONE (OUTPATIENT)
Dept: FAMILY MEDICINE CLINIC | Facility: CLINIC | Age: 50
End: 2020-03-19

## 2020-03-19 NOTE — TELEPHONE ENCOUNTER
Misty Dey from [address] and stated they need an order faxed for a breast US and Diagnostic Mammogram faxed to [phone]

## 2020-03-24 RX ORDER — LIDOCAINE HYDROCHLORIDE 20 MG/ML
SOLUTION ORAL; TOPICAL
Qty: 90 TABLET | Refills: 0 | Status: SHIPPED | OUTPATIENT
Start: 2020-03-24 | End: 2020-06-15

## 2020-03-24 NOTE — TELEPHONE ENCOUNTER
Medication Quantity Refills Start End   FEROSUL 325 (65 Fe) MG Oral Tab 30 tablet 0 2/21/2020      Last OV 2/11/20 for preop   No future appointments. Please advise.  Thank you

## 2020-03-30 ENCOUNTER — TELEPHONE (OUTPATIENT)
Dept: FAMILY MEDICINE CLINIC | Facility: CLINIC | Age: 50
End: 2020-03-30

## 2020-04-03 RX ORDER — BLOOD SUGAR DIAGNOSTIC
STRIP MISCELLANEOUS
Qty: 200 STRIP | Refills: 0 | Status: SHIPPED | OUTPATIENT
Start: 2020-04-03

## 2020-04-06 ENCOUNTER — PATIENT MESSAGE (OUTPATIENT)
Dept: FAMILY MEDICINE CLINIC | Facility: CLINIC | Age: 50
End: 2020-04-06

## 2020-04-06 DIAGNOSIS — E11.42 TYPE 2 DIABETES MELLITUS WITH DIABETIC POLYNEUROPATHY, WITH LONG-TERM CURRENT USE OF INSULIN (HCC): Primary | ICD-10-CM

## 2020-04-06 DIAGNOSIS — Z79.4 TYPE 2 DIABETES MELLITUS WITH DIABETIC POLYNEUROPATHY, WITH LONG-TERM CURRENT USE OF INSULIN (HCC): Primary | ICD-10-CM

## 2020-04-06 RX ORDER — BLOOD-GLUCOSE METER
EACH MISCELLANEOUS
Qty: 1 KIT | Refills: 0 | Status: SHIPPED | OUTPATIENT
Start: 2020-04-06 | End: 2020-04-10

## 2020-04-06 NOTE — TELEPHONE ENCOUNTER
From: Cedrick Nurse  To: Hue Berger DO  Sent: 4/6/2020 11:20 AM CDT  Subject: Prescription Question    My current bg meter isn't covered by my insurance. [facility] said they would send you a new request on Saturday.  Just following up to see if you re

## 2020-04-06 NOTE — TELEPHONE ENCOUNTER
Called [facility] and was advised that preferred brand is Teranodeva meter and supplies. Able to send new supplies?

## 2020-04-07 ENCOUNTER — PATIENT MESSAGE (OUTPATIENT)
Dept: FAMILY MEDICINE CLINIC | Facility: CLINIC | Age: 50
End: 2020-04-07

## 2020-04-07 RX ORDER — LANCETS 33 GAUGE
1 EACH MISCELLANEOUS 4 TIMES DAILY
Qty: 400 EACH | Refills: 1 | Status: SHIPPED | OUTPATIENT
Start: 2020-04-07

## 2020-04-07 NOTE — TELEPHONE ENCOUNTER
From: Henry Kern  To: Sandip Martinez DO  Sent: 4/7/2020 1:55 PM CDT  Subject: Prescription Question    Hi all,    I was able to  my new blood glucose meter and strips yesterday. I need a script sent for the lancets that go with the new meter.  Jerry German

## 2020-04-07 NOTE — TELEPHONE ENCOUNTER
Guerline Faulkner, DO  Guerline Faulkner Nurse 10 minutes ago (2:26 PM)     Ok to send rx for 400 lancets measure 4 times daily with 1 refill.    Juanito 327    Routing comment

## 2020-04-10 ENCOUNTER — TELEPHONE (OUTPATIENT)
Dept: ENDOCRINOLOGY CLINIC | Facility: CLINIC | Age: 50
End: 2020-04-10

## 2020-04-10 DIAGNOSIS — E10.9 TYPE 1 DIABETES MELLITUS WITHOUT COMPLICATION (HCC): Primary | ICD-10-CM

## 2020-04-10 DIAGNOSIS — E08.21 DIABETES MELLITUS DUE TO UNDERLYING CONDITION WITH DIABETIC NEPHROPATHY, WITH LONG-TERM CURRENT USE OF INSULIN (HCC): Primary | ICD-10-CM

## 2020-04-10 DIAGNOSIS — Z79.4 DIABETES MELLITUS DUE TO UNDERLYING CONDITION WITH DIABETIC NEPHROPATHY, WITH LONG-TERM CURRENT USE OF INSULIN (HCC): Primary | ICD-10-CM

## 2020-04-10 RX ORDER — BLOOD SUGAR DIAGNOSTIC
STRIP MISCELLANEOUS
Qty: 250 STRIP | Refills: 5 | COMMUNITY
Start: 2020-04-10

## 2020-04-10 RX ORDER — BLOOD-GLUCOSE TRANSMITTER
EACH MISCELLANEOUS
Qty: 1 EACH | Refills: 3 | Status: SHIPPED | OUTPATIENT
Start: 2020-04-10 | End: 2021-03-02

## 2020-04-10 RX ORDER — BLOOD SUGAR DIAGNOSTIC
STRIP MISCELLANEOUS
Qty: 350 EACH | Refills: 3 | Status: SHIPPED | OUTPATIENT
Start: 2020-04-10 | End: 2020-07-20

## 2020-04-10 RX ORDER — BLOOD-GLUCOSE SENSOR
EACH MISCELLANEOUS
Qty: 9 EACH | Refills: 3 | Status: SHIPPED | OUTPATIENT
Start: 2020-04-10 | End: 2021-03-02

## 2020-04-10 RX ORDER — BLOOD-GLUCOSE,RECEIVER,CONT
EACH MISCELLANEOUS
Qty: 1 DEVICE | Refills: 0 | Status: SHIPPED | OUTPATIENT
Start: 2020-04-10

## 2020-04-10 NOTE — TELEPHONE ENCOUNTER
Spoke w/pt & she would have to pay $600 for a new Medtronic CGM. She prefers to try Dexcom G6. Pt. informed I will start the process.

## 2020-04-10 NOTE — TELEPHONE ENCOUNTER
Received request for Accu-chek testing supplies due to insurance coverage. However, pt. Uses a Medtronic insulin pump so requested a P.A. for Contour Next strips & was approved starting 4/30/20 - 4/30-21. Copy of approval will be faxed to office.

## 2020-04-13 NOTE — TELEPHONE ENCOUNTER
Copy of PA approval for Contour Next Test Strips from 410/20-4/10/21 sent to scanning. Pharmacy advised.

## 2020-04-17 RX ORDER — OMEPRAZOLE 40 MG/1
CAPSULE, DELAYED RELEASE ORAL
Qty: 90 CAPSULE | Refills: 0 | Status: SHIPPED | OUTPATIENT
Start: 2020-04-17 | End: 2020-07-15

## 2020-04-17 RX ORDER — PAROXETINE HYDROCHLORIDE 40 MG/1
TABLET, FILM COATED ORAL
Qty: 90 TABLET | Refills: 0 | Status: SHIPPED | OUTPATIENT
Start: 2020-04-17 | End: 2020-07-15

## 2020-04-17 RX ORDER — INSULIN ASPART 100 [IU]/ML
INJECTION, SOLUTION INTRAVENOUS; SUBCUTANEOUS
Qty: 120 ML | Refills: 0 | Status: SHIPPED | OUTPATIENT
Start: 2020-04-17 | End: 2020-07-15

## 2020-04-17 RX ORDER — LEVOTHYROXINE SODIUM 137 UG/1
TABLET ORAL
Qty: 90 TABLET | Refills: 0 | Status: SHIPPED | OUTPATIENT
Start: 2020-04-17 | End: 2020-06-27

## 2020-04-17 NOTE — TELEPHONE ENCOUNTER
LOV: 2/11/20 pre-op  A1C: 3/15/19 6.2  TSH: 2/11/20 1.270    PAROXETINE HCL 40 MG Oral Tab 90 tablet 0 1/17/2020    Sig:   TAKE 1 TABLET BY MOUTH EVERY MORNING       OMEPRAZOLE 40 MG Oral Capsule Delayed Release 90 capsule 0 1/17/2020    Sig:   TAKE ONE CA

## 2020-04-24 NOTE — TELEPHONE ENCOUNTER
Pt. received her Dexcom. G6 today. Informed I would send her an invite for [employer] to share her data. Will need to give me her username & password for Medtronic. Pt was agreeable w/plan.

## 2020-05-01 ENCOUNTER — OFFICE VISIT (OUTPATIENT)
Dept: OBGYN | Age: 50
End: 2020-05-01

## 2020-05-01 VITALS
WEIGHT: 263 LBS | HEART RATE: 78 BPM | BODY MASS INDEX: 48.1 KG/M2 | DIASTOLIC BLOOD PRESSURE: 82 MMHG | TEMPERATURE: 98 F | SYSTOLIC BLOOD PRESSURE: 130 MMHG

## 2020-05-01 DIAGNOSIS — Z30.46 NEXPLANON REMOVAL: Primary | ICD-10-CM

## 2020-05-01 DIAGNOSIS — Z32.02 ENCOUNTER FOR PREGNANCY TEST WITH RESULT NEGATIVE: ICD-10-CM

## 2020-05-01 DIAGNOSIS — Z30.017 NEXPLANON INSERTION: ICD-10-CM

## 2020-05-01 LAB — B-HCG UR QL: NEGATIVE

## 2020-05-01 PROCEDURE — 81025 URINE PREGNANCY TEST: CPT | Performed by: OBSTETRICS & GYNECOLOGY

## 2020-05-01 PROCEDURE — 11983 REMOVE/INSERT DRUG IMPLANT: CPT | Performed by: OBSTETRICS & GYNECOLOGY

## 2020-05-01 SDOH — HEALTH STABILITY: MENTAL HEALTH: HOW OFTEN DO YOU HAVE 6 OR MORE DRINKS ON ONE OCCASION?: NEVER

## 2020-05-01 SDOH — HEALTH STABILITY: MENTAL HEALTH: HOW OFTEN DO YOU HAVE A DRINK CONTAINING ALCOHOL?: MONTHLY OR LESS

## 2020-05-01 SDOH — HEALTH STABILITY: MENTAL HEALTH: HOW MANY STANDARD DRINKS CONTAINING ALCOHOL DO YOU HAVE ON A TYPICAL DAY?: 1 OR 2

## 2020-05-14 ENCOUNTER — VIRTUAL PHONE E/M (OUTPATIENT)
Dept: FAMILY MEDICINE CLINIC | Facility: CLINIC | Age: 50
End: 2020-05-14

## 2020-05-14 DIAGNOSIS — M17.0 PRIMARY OSTEOARTHRITIS OF BOTH KNEES: Primary | ICD-10-CM

## 2020-05-14 PROCEDURE — 99442 PHONE E/M BY PHYS 11-20 MIN: CPT | Performed by: FAMILY MEDICINE

## 2020-05-14 RX ORDER — TRAMADOL HYDROCHLORIDE 50 MG/1
50 TABLET ORAL EVERY 6 HOURS PRN
Qty: 30 TABLET | Refills: 0 | Status: SHIPPED | OUTPATIENT
Start: 2020-05-14 | End: 2020-12-16

## 2020-05-14 NOTE — PROGRESS NOTES
Virtual Telephone Check-In    Berna Knight verbally consents to a Virtual/Telephone Check-In visit on 05/14/20. Patient understands and accepts financial responsibility for any deductible, co-insurance and/or co-pays associated with this service. stick route 4 (four) times daily.  400 each 1   • CONTOUR NEXT TEST In Vitro Strip USE TO TEST 6 TO 8 TIMES EVERY  strip 0   • FEROSUL 325 (65 Fe) MG Oral Tab TAKE 1 TABLET BY MOUTH DAILY WITH BREAKFAST 90 tablet 0   • SIMVASTATIN 40 MG Oral Tab TAKE in joint, lower leg 03/21/12   • Pain in joint, site unspecified 6/25/2011   • Plantar fasciitis    • Routine gynecological examination 12/5/2011   • Screening for malignant neoplasm of the cervix 12/5/2011   • Sleep apnea     doesn't use CPAP mask   • Thy

## 2020-05-26 ENCOUNTER — PATIENT MESSAGE (OUTPATIENT)
Dept: FAMILY MEDICINE CLINIC | Facility: CLINIC | Age: 50
End: 2020-05-26

## 2020-05-27 ENCOUNTER — TELEPHONE (OUTPATIENT)
Dept: OBGYN | Age: 50
End: 2020-05-27

## 2020-05-27 NOTE — TELEPHONE ENCOUNTER
From: Celina Burns  To: Neva Monday, DO  Sent: 5/26/2020 9:12 PM CDT  Subject: Visit Follow-up Question    Hi Dr Supriya Plaza,    I thought when we talked over the phone, you said that I needed to have xrays on my knees.  I'm not seeing anything in my chart

## 2020-05-29 ENCOUNTER — E-ADVICE (OUTPATIENT)
Dept: OBGYN | Age: 50
End: 2020-05-29

## 2020-06-05 NOTE — TELEPHONE ENCOUNTER
Verbal from Dr Patti Villanueva to please reschedule patient from 11/12/19 at 9:15. Another pt with skin tag removal coming in tomorrow as well. Instruments will not be sterile. Patient advised and appt moved.      Future Appointments   Date Time Provider Departme RN

## 2020-06-05 NOTE — TELEPHONE ENCOUNTER
Last refilled on 3/9/20 for # 90 with 0 refills  Bupropion 3/9/20 #90 0 refill  Last lipid 3/15/19 protocol failed  Last OV 5/14/20 televisit for osteoarthritis  Future Appointments   Date Time Provider Jovani Haque   6/8/2020  4:30 PM Chad Field

## 2020-06-08 ENCOUNTER — OFFICE VISIT (OUTPATIENT)
Dept: PHYSICAL THERAPY | Age: 50
End: 2020-06-08
Attending: FAMILY MEDICINE
Payer: COMMERCIAL

## 2020-06-08 DIAGNOSIS — M17.0 PRIMARY OSTEOARTHRITIS OF BOTH KNEES: ICD-10-CM

## 2020-06-08 PROCEDURE — 97110 THERAPEUTIC EXERCISES: CPT

## 2020-06-08 PROCEDURE — 97162 PT EVAL MOD COMPLEX 30 MIN: CPT

## 2020-06-08 PROCEDURE — 97140 MANUAL THERAPY 1/> REGIONS: CPT

## 2020-06-08 NOTE — PROGRESS NOTES
INITIAL EVALUATION:   Referring Physician: Dr. Nona Marques  Diagnosis:   -Primary osteoarthritis of both knees   -Back pain  -Hip pain  -Abnormality of gait      Date of Service: 6/8/2020     PATIENT SUMMARY    [place] Erickson \"Penelope\"is a 52year old fem day    OBJECTIVE:   Observation/gait/posture:   -Genu valgus, gait is wide based with short stride length; lateral trunk lean bilaterally corresponding to ipsilatleral stance phase  Functional assessment:    -Sit stand is limited secondary to requirement o length:   -1-2 joint hip flexor shortness  Neural dynamics:   -SLR to 90 with sensitizing maneuver of DF with posterior pulling    Today’s Treatment and Response:  -Trail treatment consisted patellar femoral mobilization with instruction in quad stretch, s Precautions:  None    PLAN OF CARE:    Goals:    (16 visits)  1.   Patient will demonstrate at least 110 degrees of knee flexion without pain complaint allow her to attend to movement transitions with minimal to no complaint of anterior knee pain/sympto Davion Childress

## 2020-06-09 ENCOUNTER — E-ADVICE (OUTPATIENT)
Dept: OBGYN | Age: 50
End: 2020-06-09

## 2020-06-09 DIAGNOSIS — N92.1 MENORRHAGIA WITH IRREGULAR CYCLE: Primary | ICD-10-CM

## 2020-06-09 RX ORDER — SIMVASTATIN 40 MG
TABLET ORAL
Qty: 90 TABLET | Refills: 0 | Status: SHIPPED | OUTPATIENT
Start: 2020-06-09 | End: 2020-09-05

## 2020-06-09 RX ORDER — MEGESTROL ACETATE 20 MG/1
20 TABLET ORAL DAILY
Qty: 7 TABLET | Refills: 0 | Status: SHIPPED | OUTPATIENT
Start: 2020-06-09 | End: 2020-09-01 | Stop reason: ALTCHOICE

## 2020-06-09 RX ORDER — BUPROPION HYDROCHLORIDE 300 MG/1
TABLET ORAL
Qty: 90 TABLET | Refills: 0 | Status: SHIPPED | OUTPATIENT
Start: 2020-06-09 | End: 2020-09-09

## 2020-06-10 ENCOUNTER — TELEPHONE (OUTPATIENT)
Dept: PHYSICAL THERAPY | Age: 50
End: 2020-06-10

## 2020-06-10 ENCOUNTER — APPOINTMENT (OUTPATIENT)
Dept: PHYSICAL THERAPY | Age: 50
End: 2020-06-10
Attending: FAMILY MEDICINE
Payer: COMMERCIAL

## 2020-06-10 ENCOUNTER — LAB SERVICES (OUTPATIENT)
Dept: LAB | Age: 50
End: 2020-06-10

## 2020-06-10 DIAGNOSIS — N92.1 MENORRHAGIA WITH IRREGULAR CYCLE: ICD-10-CM

## 2020-06-10 LAB
BASOPHIL %: 0.2 % (ref 0–1.2)
BASOPHIL ABSOLUTE #: 0 10*3/UL (ref 0–0.1)
DIFFERENTIAL TYPE: NORMAL
EOSINOPHIL %: 1.8 % (ref 0–10)
EOSINOPHIL ABSOLUTE #: 0.2 10*3/UL (ref 0–0.5)
HEMATOCRIT: 37.4 % (ref 34–45)
HEMOGLOBIN: 12.7 G/DL (ref 11.2–15.7)
LYMPH PERCENT: 29.5 % (ref 20.5–51.1)
LYMPHOCYTE ABSOLUTE #: 2.9 10*3/UL (ref 1.2–3.4)
MEAN CORPUSCULAR HGB CONCENTRATION: 34 % (ref 32–36)
MEAN CORPUSCULAR HGB: 30.1 PG (ref 27–34)
MEAN CORPUSCULAR VOLUME: 88.6 FL (ref 79–95)
MEAN PLATELET VOLUME: 10.5 FL (ref 8.6–12.4)
MONOCYTE ABSOLUTE #: 0.7 10*3/UL (ref 0.2–0.9)
MONOCYTE PERCENT: 7.5 % (ref 4.3–12.9)
NEUTROPHIL ABSOLUTE #: 5.9 10*3/UL (ref 1.4–6.5)
NEUTROPHIL PERCENT: 61 % (ref 34–73.5)
PLATELET COUNT: 303 10*3/UL (ref 150–400)
RED BLOOD CELL COUNT: 4.22 10*6/UL (ref 3.7–5.2)
RED CELL DISTRIBUTION WIDTH: 12.6 % (ref 11.3–14.8)
WHITE BLOOD CELL COUNT: 9.7 10*3/UL (ref 4–10)

## 2020-06-10 PROCEDURE — 85025 COMPLETE CBC W/AUTO DIFF WBC: CPT | Performed by: OBSTETRICS & GYNECOLOGY

## 2020-06-10 PROCEDURE — 36415 COLL VENOUS BLD VENIPUNCTURE: CPT | Performed by: OBSTETRICS & GYNECOLOGY

## 2020-06-11 ENCOUNTER — E-ADVICE (OUTPATIENT)
Dept: OBGYN | Age: 50
End: 2020-06-11

## 2020-06-15 ENCOUNTER — TELEPHONE (OUTPATIENT)
Dept: PHYSICAL THERAPY | Age: 50
End: 2020-06-15

## 2020-06-15 ENCOUNTER — TELEPHONE (OUTPATIENT)
Dept: FAMILY MEDICINE CLINIC | Facility: CLINIC | Age: 50
End: 2020-06-15

## 2020-06-15 RX ORDER — LIDOCAINE HYDROCHLORIDE 20 MG/ML
SOLUTION ORAL; TOPICAL
Qty: 90 TABLET | Refills: 0 | Status: SHIPPED | OUTPATIENT
Start: 2020-06-15 | End: 2020-09-21

## 2020-06-15 NOTE — TELEPHONE ENCOUNTER
Last refilled on 03/24/2020 for # 90 with *0 rf. Last seen on 05/14/2020 virtual phone apt.      Future Appointments   Date Time Provider Jovani Shabnam   6/16/2020  3:30 PM Jose Wahl [facility]   6/18/2020  5:00 PM REINIER Wahl

## 2020-06-16 ENCOUNTER — APPOINTMENT (OUTPATIENT)
Dept: PHYSICAL THERAPY | Age: 50
End: 2020-06-16
Attending: FAMILY MEDICINE
Payer: COMMERCIAL

## 2020-06-18 ENCOUNTER — OFFICE VISIT (OUTPATIENT)
Dept: PHYSICAL THERAPY | Age: 50
End: 2020-06-18
Attending: FAMILY MEDICINE
Payer: COMMERCIAL

## 2020-06-18 DIAGNOSIS — M17.0 PRIMARY OSTEOARTHRITIS OF BOTH KNEES: ICD-10-CM

## 2020-06-18 PROCEDURE — 97110 THERAPEUTIC EXERCISES: CPT

## 2020-06-18 PROCEDURE — 97140 MANUAL THERAPY 1/> REGIONS: CPT

## 2020-06-18 NOTE — PROGRESS NOTES
Dx:       -Primary osteoarthritis of both knees   -Back pain  -Hip pain  -Abnormality of gait       Authorized # of Visits:  No prior authorization is required per appointment notes        Next MD visit: none scheduled  Fall Risk: standard           Precau Manual Therapy   -L/S distraction; C/R stretch                          Charges:    Therapeutic excercise x 2  Manual Therapy x 1         Total Timed Treatment: 40 min    Total Treatment Time: 40 min

## 2020-06-19 ENCOUNTER — E-ADVICE (OUTPATIENT)
Dept: OBGYN | Age: 50
End: 2020-06-19

## 2020-06-22 ENCOUNTER — OFFICE VISIT (OUTPATIENT)
Dept: PHYSICAL THERAPY | Age: 50
End: 2020-06-22
Attending: FAMILY MEDICINE
Payer: COMMERCIAL

## 2020-06-22 DIAGNOSIS — M17.0 PRIMARY OSTEOARTHRITIS OF BOTH KNEES: ICD-10-CM

## 2020-06-22 PROCEDURE — 97110 THERAPEUTIC EXERCISES: CPT

## 2020-06-22 PROCEDURE — 97140 MANUAL THERAPY 1/> REGIONS: CPT

## 2020-06-22 RX ORDER — MEGESTROL ACETATE 20 MG/1
20 TABLET ORAL 2 TIMES DAILY
Qty: 14 TABLET | Refills: 0 | Status: SHIPPED | OUTPATIENT
Start: 2020-06-22 | End: 2020-09-01 | Stop reason: ALTCHOICE

## 2020-06-22 NOTE — PROGRESS NOTES
Dx:       -Primary osteoarthritis of both knees   -Back pain  -Hip pain  -Abnormality of gait       Authorized # of Visits:  No prior authorization is required per appointment notes        Next MD visit: none scheduled  Fall Risk: standard           Precau alternating LE march x 3 minutes  -Prone rectus femoris stretch  -Prone Iliopsoas stretch   Therapeutic excercise   -Supine 90/90 neural mobilization x 20 reps   -Supine PPT with leg slide x 20 reps x 2 sets  -Supine hooklying uniteral bent knee fall out:

## 2020-06-24 ENCOUNTER — APPOINTMENT (OUTPATIENT)
Dept: PHYSICAL THERAPY | Age: 50
End: 2020-06-24
Attending: FAMILY MEDICINE
Payer: COMMERCIAL

## 2020-06-26 ENCOUNTER — E-ADVICE (OUTPATIENT)
Dept: OBGYN | Age: 50
End: 2020-06-26

## 2020-06-26 DIAGNOSIS — N92.1 MENORRHAGIA WITH IRREGULAR CYCLE: Primary | ICD-10-CM

## 2020-06-26 RX ORDER — TRANEXAMIC ACID 650 MG/1
650 TABLET ORAL 3 TIMES DAILY
Qty: 15 TABLET | Refills: 0 | Status: SHIPPED | OUTPATIENT
Start: 2020-06-26 | End: 2020-09-01 | Stop reason: ALTCHOICE

## 2020-06-27 RX ORDER — LEVOTHYROXINE SODIUM 137 UG/1
TABLET ORAL
Qty: 90 TABLET | Refills: 0 | Status: SHIPPED | OUTPATIENT
Start: 2020-06-27 | End: 2020-09-23

## 2020-06-27 NOTE — TELEPHONE ENCOUNTER
LOV 5/26/20 virtual visit for osteoarthritis.   TSH done 2/11/20    LEVOTHYROXINE SODIUM 137 MCG Oral Tab 90 tablet 0 4/17/2020    Sig:   TAKE 1 TABLET BY MOUTH EVERY DAY     Route:   (none)       Future Appointments   Date Time Provider Jovani Haque

## 2020-06-29 ENCOUNTER — OFFICE VISIT (OUTPATIENT)
Dept: PHYSICAL THERAPY | Age: 50
End: 2020-06-29
Attending: FAMILY MEDICINE
Payer: COMMERCIAL

## 2020-06-29 DIAGNOSIS — M17.0 PRIMARY OSTEOARTHRITIS OF BOTH KNEES: ICD-10-CM

## 2020-06-29 PROCEDURE — 97140 MANUAL THERAPY 1/> REGIONS: CPT

## 2020-06-29 PROCEDURE — 97110 THERAPEUTIC EXERCISES: CPT

## 2020-06-29 NOTE — PROGRESS NOTES
Dx:       -Primary osteoarthritis of both knees   -Back pain  -Hip pain  -Abnormality of gait       Authorized # of Visits:  No prior authorization is required per appointment notes        Next MD visit: none scheduled  Fall Risk: standard           Precau excercise   -Supine 90/90 neural mobilization x 20 reps   -Supine PPT with leg slide x 20 reps x 2 sets  -Supine hooklying uniteral bent knee fall out:  20 reps x 2 sets each LE  -Supine PPT with alternating LE march x 3 minutes  -S/L gluteus medius level

## 2020-06-30 ENCOUNTER — IMAGING SERVICES (OUTPATIENT)
Dept: ULTRASOUND IMAGING | Age: 50
End: 2020-06-30
Attending: OBSTETRICS & GYNECOLOGY

## 2020-06-30 DIAGNOSIS — N92.1 MENORRHAGIA WITH IRREGULAR CYCLE: ICD-10-CM

## 2020-06-30 PROCEDURE — 76856 US EXAM PELVIC COMPLETE: CPT | Performed by: RADIOLOGY

## 2020-06-30 PROCEDURE — 76830 TRANSVAGINAL US NON-OB: CPT | Performed by: RADIOLOGY

## 2020-07-01 ENCOUNTER — APPOINTMENT (OUTPATIENT)
Dept: PHYSICAL THERAPY | Age: 50
End: 2020-07-01
Attending: FAMILY MEDICINE
Payer: COMMERCIAL

## 2020-07-01 ENCOUNTER — TELEPHONE (OUTPATIENT)
Dept: PHYSICAL THERAPY | Age: 50
End: 2020-07-01

## 2020-07-02 ENCOUNTER — E-ADVICE (OUTPATIENT)
Dept: OBGYN | Age: 50
End: 2020-07-02

## 2020-07-03 ENCOUNTER — E-ADVICE (OUTPATIENT)
Dept: OBGYN | Age: 50
End: 2020-07-03

## 2020-07-06 ENCOUNTER — TELEPHONE (OUTPATIENT)
Dept: OBGYN | Age: 50
End: 2020-07-06

## 2020-07-06 ENCOUNTER — TELEPHONE (OUTPATIENT)
Dept: FAMILY MEDICINE CLINIC | Facility: CLINIC | Age: 50
End: 2020-07-06

## 2020-07-06 NOTE — TELEPHONE ENCOUNTER
Pt needs preop px, Surgery scheduled 7/24/20 @ [facility] in [place] for Histerectomy With Dr Michael Gagnon  Ph. [phone]

## 2020-07-10 NOTE — TELEPHONE ENCOUNTER
Apt scheduled X pre op surgery. Future Appointments   Date Time Provider Jovani Haque   7/16/2020  5:00 PM MD Lillian Peraza Dr. pt.      No availability to make apt before her surgery date due to dr. Kenisha Redd schedule

## 2020-07-14 ENCOUNTER — OFFICE VISIT (OUTPATIENT)
Dept: OBGYN | Age: 50
End: 2020-07-14

## 2020-07-14 ENCOUNTER — MED INFO FORMS (OUTPATIENT)
Dept: HEALTH INFORMATION MANAGEMENT | Facility: OTHER | Age: 50
End: 2020-07-14

## 2020-07-14 VITALS
SYSTOLIC BLOOD PRESSURE: 128 MMHG | BODY MASS INDEX: 45.82 KG/M2 | WEIGHT: 249 LBS | HEIGHT: 62 IN | DIASTOLIC BLOOD PRESSURE: 60 MMHG

## 2020-07-14 DIAGNOSIS — E66.01 CLASS 3 SEVERE OBESITY DUE TO EXCESS CALORIES WITH BODY MASS INDEX (BMI) OF 45.0 TO 49.9 IN ADULT, UNSPECIFIED WHETHER SERIOUS COMORBIDITY PRESENT (CMD): ICD-10-CM

## 2020-07-14 DIAGNOSIS — N92.1 MENORRHAGIA WITH IRREGULAR CYCLE: Primary | ICD-10-CM

## 2020-07-14 DIAGNOSIS — N93.9 ABNORMAL UTERINE BLEEDING: ICD-10-CM

## 2020-07-14 PROCEDURE — 99214 OFFICE O/P EST MOD 30 MIN: CPT | Performed by: OBSTETRICS & GYNECOLOGY

## 2020-07-14 PROCEDURE — 3078F DIAST BP <80 MM HG: CPT | Performed by: OBSTETRICS & GYNECOLOGY

## 2020-07-14 PROCEDURE — 3074F SYST BP LT 130 MM HG: CPT | Performed by: OBSTETRICS & GYNECOLOGY

## 2020-07-14 ASSESSMENT — ENCOUNTER SYMPTOMS
COLOR CHANGE: 0
HEADACHES: 0
ABDOMINAL PAIN: 0
ABDOMINAL DISTENTION: 0
CHEST TIGHTNESS: 0
CONFUSION: 0
BRUISES/BLEEDS EASILY: 0
APPETITE CHANGE: 0
SHORTNESS OF BREATH: 0
DIZZINESS: 0
COUGH: 0
NERVOUS/ANXIOUS: 0
CONSTIPATION: 0
ACTIVITY CHANGE: 0
PHOTOPHOBIA: 0
SLEEP DISTURBANCE: 0
UNEXPECTED WEIGHT CHANGE: 0

## 2020-07-14 ASSESSMENT — VISUAL ACUITY: OU: 1

## 2020-07-15 RX ORDER — OMEPRAZOLE 40 MG/1
CAPSULE, DELAYED RELEASE ORAL
Qty: 90 CAPSULE | Refills: 0 | Status: SHIPPED | OUTPATIENT
Start: 2020-07-15 | End: 2020-10-16

## 2020-07-15 RX ORDER — PAROXETINE HYDROCHLORIDE 40 MG/1
TABLET, FILM COATED ORAL
Qty: 90 TABLET | Refills: 0 | Status: SHIPPED | OUTPATIENT
Start: 2020-07-15 | End: 2020-10-16

## 2020-07-15 RX ORDER — INSULIN ASPART 100 [IU]/ML
INJECTION, SOLUTION INTRAVENOUS; SUBCUTANEOUS
Qty: 120 ML | Refills: 0 | Status: SHIPPED | OUTPATIENT
Start: 2020-07-15 | End: 2020-10-16

## 2020-07-15 RX ORDER — LANCETS
EACH MISCELLANEOUS
Qty: 408 EACH | Refills: 0 | Status: SHIPPED | OUTPATIENT
Start: 2020-07-15 | End: 2020-11-23

## 2020-07-15 NOTE — TELEPHONE ENCOUNTER
Last refilled on 4/17/20 for # 120mL with 0 refills  Last a1c 6.2 on 3/15/19  Last OV  5/14/20 televisit  Future Appointments   Date Time Provider Jovani Haque   7/16/2020  5:00 PM Rupesh Jaime MD [facility]        Thank you.

## 2020-07-15 NOTE — TELEPHONE ENCOUNTER
Diabetic Supplies Protocol Passed7/14 7:38 PM   Appointment in the past 12 or next 3 months     Lancets 4/7/20 #400 1 refill-refill sent  Paroxetine 4/17/20 #90 0 refill  Omeprazole 4/17/20 #90 0 refill  Last OV 5/14/20 televisit   Future Appointments   Da

## 2020-07-16 ENCOUNTER — TELEPHONE (OUTPATIENT)
Dept: FAMILY MEDICINE CLINIC | Facility: CLINIC | Age: 50
End: 2020-07-16

## 2020-07-16 ENCOUNTER — TELEPHONE (OUTPATIENT)
Dept: OBGYN | Age: 50
End: 2020-07-16

## 2020-07-16 NOTE — TELEPHONE ENCOUNTER
Received fax from Dr Cherie Mathis to [facility]    Fax results o Dr Karen Harper at [place] [address] at [phone]

## 2020-07-16 NOTE — TELEPHONE ENCOUNTER
Patient coming today for Preop with Dr Coty Morales. Re-faxed pre op paperwork to Dr Bibi George office.

## 2020-07-20 ENCOUNTER — E-ADVICE (OUTPATIENT)
Dept: OBGYN | Age: 50
End: 2020-07-20

## 2020-07-20 ENCOUNTER — OFFICE VISIT (OUTPATIENT)
Dept: FAMILY MEDICINE CLINIC | Facility: CLINIC | Age: 50
End: 2020-07-20
Payer: COMMERCIAL

## 2020-07-20 ENCOUNTER — TELEPHONE (OUTPATIENT)
Dept: FAMILY MEDICINE CLINIC | Facility: CLINIC | Age: 50
End: 2020-07-20

## 2020-07-20 VITALS
RESPIRATION RATE: 16 BRPM | BODY MASS INDEX: 46.38 KG/M2 | DIASTOLIC BLOOD PRESSURE: 82 MMHG | TEMPERATURE: 98 F | SYSTOLIC BLOOD PRESSURE: 126 MMHG | HEIGHT: 62 IN | OXYGEN SATURATION: 98 % | HEART RATE: 95 BPM | WEIGHT: 252 LBS

## 2020-07-20 DIAGNOSIS — N92.1 MENORRHAGIA WITH IRREGULAR CYCLE: ICD-10-CM

## 2020-07-20 DIAGNOSIS — E11.42 TYPE 2 DIABETES MELLITUS WITH DIABETIC POLYNEUROPATHY, WITH LONG-TERM CURRENT USE OF INSULIN (HCC): ICD-10-CM

## 2020-07-20 DIAGNOSIS — Z79.4 TYPE 2 DIABETES MELLITUS WITH DIABETIC POLYNEUROPATHY, WITH LONG-TERM CURRENT USE OF INSULIN (HCC): ICD-10-CM

## 2020-07-20 DIAGNOSIS — E03.9 HYPOTHYROIDISM, UNSPECIFIED TYPE: ICD-10-CM

## 2020-07-20 DIAGNOSIS — E78.2 MIXED HYPERLIPIDEMIA: ICD-10-CM

## 2020-07-20 DIAGNOSIS — Z01.818 PREOP EXAMINATION: Primary | ICD-10-CM

## 2020-07-20 LAB
ALBUMIN SERPL-MCNC: 3.7 G/DL (ref 3.4–5)
ALBUMIN/GLOB SERPL: 1.1 {RATIO} (ref 1–2)
ALP LIVER SERPL-CCNC: 81 U/L (ref 39–100)
ALT SERPL-CCNC: 24 U/L (ref 13–56)
ANION GAP SERPL CALC-SCNC: 2 MMOL/L (ref 0–18)
APTT PPP: 30.3 SECONDS (ref 25.4–36.1)
AST SERPL-CCNC: 14 U/L (ref 15–37)
BILIRUB SERPL-MCNC: 0.4 MG/DL (ref 0.1–2)
BUN BLD-MCNC: 11 MG/DL (ref 7–18)
BUN/CREAT SERPL: 11.5 (ref 10–20)
CALCIUM BLD-MCNC: 8.7 MG/DL (ref 8.5–10.1)
CHLORIDE SERPL-SCNC: 107 MMOL/L (ref 98–112)
CHOLEST SMN-MCNC: 167 MG/DL (ref ?–200)
CO2 SERPL-SCNC: 29 MMOL/L (ref 21–32)
CREAT BLD-MCNC: 0.96 MG/DL (ref 0.55–1.02)
DEPRECATED RDW RBC AUTO: 39.8 FL (ref 35.1–46.3)
ERYTHROCYTE [DISTWIDTH] IN BLOOD BY AUTOMATED COUNT: 12.3 % (ref 11–15)
GLOBULIN PLAS-MCNC: 3.4 G/DL (ref 2.8–4.4)
GLUCOSE BLD-MCNC: 139 MG/DL (ref 70–99)
HCT VFR BLD AUTO: 40.2 % (ref 35–48)
HDLC SERPL-MCNC: 46 MG/DL (ref 40–59)
HGB BLD-MCNC: 13.3 G/DL (ref 12–16)
LDLC SERPL CALC-MCNC: 102 MG/DL (ref ?–100)
M PROTEIN MFR SERPL ELPH: 7.1 G/DL (ref 6.4–8.2)
MCH RBC QN AUTO: 29.8 PG (ref 26–34)
MCHC RBC AUTO-ENTMCNC: 33.1 G/DL (ref 31–37)
MCV RBC AUTO: 90.1 FL (ref 80–100)
NONHDLC SERPL-MCNC: 121 MG/DL (ref ?–130)
OSMOLALITY SERPL CALC.SUM OF ELEC: 288 MOSM/KG (ref 275–295)
PATIENT FASTING Y/N/NP: NO
PATIENT FASTING Y/N/NP: NO
PLATELET # BLD AUTO: 296 10(3)UL (ref 150–450)
POTASSIUM SERPL-SCNC: 4 MMOL/L (ref 3.5–5.1)
RBC # BLD AUTO: 4.46 X10(6)UL (ref 3.8–5.3)
SODIUM SERPL-SCNC: 138 MMOL/L (ref 136–145)
TRIGL SERPL-MCNC: 93 MG/DL (ref 30–149)
TSI SER-ACNC: 1.66 MIU/ML (ref 0.36–3.74)
VLDLC SERPL CALC-MCNC: 19 MG/DL (ref 0–30)
WBC # BLD AUTO: 10.4 X10(3) UL (ref 4–11)

## 2020-07-20 PROCEDURE — 80053 COMPREHEN METABOLIC PANEL: CPT | Performed by: FAMILY MEDICINE

## 2020-07-20 PROCEDURE — 3079F DIAST BP 80-89 MM HG: CPT | Performed by: FAMILY MEDICINE

## 2020-07-20 PROCEDURE — 99214 OFFICE O/P EST MOD 30 MIN: CPT | Performed by: FAMILY MEDICINE

## 2020-07-20 PROCEDURE — 85610 PROTHROMBIN TIME: CPT | Performed by: FAMILY MEDICINE

## 2020-07-20 PROCEDURE — 85730 THROMBOPLASTIN TIME PARTIAL: CPT | Performed by: FAMILY MEDICINE

## 2020-07-20 PROCEDURE — 80061 LIPID PANEL: CPT | Performed by: FAMILY MEDICINE

## 2020-07-20 PROCEDURE — 85027 COMPLETE CBC AUTOMATED: CPT | Performed by: FAMILY MEDICINE

## 2020-07-20 PROCEDURE — 93000 ELECTROCARDIOGRAM COMPLETE: CPT | Performed by: FAMILY MEDICINE

## 2020-07-20 PROCEDURE — 83036 HEMOGLOBIN GLYCOSYLATED A1C: CPT | Performed by: FAMILY MEDICINE

## 2020-07-20 PROCEDURE — 3074F SYST BP LT 130 MM HG: CPT | Performed by: FAMILY MEDICINE

## 2020-07-20 PROCEDURE — 3008F BODY MASS INDEX DOCD: CPT | Performed by: FAMILY MEDICINE

## 2020-07-20 PROCEDURE — 84443 ASSAY THYROID STIM HORMONE: CPT | Performed by: FAMILY MEDICINE

## 2020-07-20 NOTE — PROGRESS NOTES
CC: preop exam    HPI:     Patient presents for preop examination today. She has menorrhagia with irregular cycle. She is scheduled for hysterectomy. Currently has no other complaints. Feeling well.     Diabetes has been well controlled with insulin pum Transmit (DEXCOM G6 TRANSMITTER) Does not apply Misc Replace every 90 days 1 each 3   • Continuous Blood Gluc Sensor (DEXCOM G6 SENSOR) Does not apply Misc Replace sensor every 10 days 9 each 3   • Continuous Blood Gluc  (DEXCOM G6 ) Does n fasciitis    • Routine gynecological examination 12/5/2011   • Screening for malignant neoplasm of the cervix 12/5/2011   • Sleep apnea     doesn't use CPAP mask   • Thyroiditis, unspecified 6/25/2011   • Type I (juvenile type) diabetes mellitus without me suspicious lesions  HEENT: atraumatic, normocephalic,ears and throat are clear  NECK: supple,no adenopathy,no bruits  LUNGS: clear to auscultation  CARDIO: RRR without murmur  GI: good BS's,no masses, HSM or tenderness  EXTREMITIES: no cyanosis, clubbing o

## 2020-07-21 ENCOUNTER — TELEPHONE (OUTPATIENT)
Dept: FAMILY MEDICINE CLINIC | Facility: CLINIC | Age: 50
End: 2020-07-21

## 2020-07-21 ENCOUNTER — VIRTUAL PHONE E/M (OUTPATIENT)
Dept: FAMILY MEDICINE CLINIC | Facility: CLINIC | Age: 50
End: 2020-07-21
Payer: COMMERCIAL

## 2020-07-21 ENCOUNTER — E-ADVICE (OUTPATIENT)
Dept: OBGYN | Age: 50
End: 2020-07-21

## 2020-07-21 DIAGNOSIS — Z20.822 ENCOUNTER BY TELEHEALTH FOR SUSPECTED COVID-19: Primary | ICD-10-CM

## 2020-07-21 LAB
EST. AVERAGE GLUCOSE BLD GHB EST-MCNC: 117 MG/DL (ref 68–126)
HBA1C MFR BLD HPLC: 5.7 % (ref ?–5.7)
INR BLD: 0.96 (ref 0.89–1.11)
PSA SERPL DL<=0.01 NG/ML-MCNC: 13.1 SECONDS (ref 12.4–14.6)

## 2020-07-21 PROCEDURE — 99441 PHONE E/M BY PHYS 5-10 MIN: CPT | Performed by: FAMILY MEDICINE

## 2020-07-21 NOTE — TELEPHONE ENCOUNTER
Pts daughter Aidan Richey tested positive for covid,  Pt is having surgery on 7/27/20, Pts wants to know if she can get tested today, since her daughter Dola Leyden is getting tested today @ 6.

## 2020-07-21 NOTE — TELEPHONE ENCOUNTER
Pt called back, she talked to the Testing center and she needs a different order. The current order in place is for her surgery.

## 2020-07-21 NOTE — PROGRESS NOTES
Virtual Telephone Check-In    Jerrod Chang verbally consent Patient has been referred to the [facility] website at [email or web address] to review the yearly Consent to Treat document.     Patient understands and accepts financial responsibility for any de

## 2020-07-21 NOTE — TELEPHONE ENCOUNTER
Patient's daughter Covid +. An order was placed by [facility] for her upcoming surgery, but she can only have that done 4 days prior to surgery. Wants new order to have testing done right now since daughter is positive. Routing to covering provider.

## 2020-07-22 ENCOUNTER — TELEPHONE (OUTPATIENT)
Dept: FAMILY MEDICINE CLINIC | Facility: CLINIC | Age: 50
End: 2020-07-22

## 2020-07-22 NOTE — TELEPHONE ENCOUNTER
[facility] Pre admission called lvm stated they pt's Hx and PX and any pre-op orders faxed to [phone].

## 2020-07-22 NOTE — TELEPHONE ENCOUNTER
Yes okay to fax office note, EKG, lab results. She is medically stable and cleared to proceed with surgery.

## 2020-07-25 ENCOUNTER — EXTERNAL RECORD (OUTPATIENT)
Dept: HEALTH INFORMATION MANAGEMENT | Facility: OTHER | Age: 50
End: 2020-07-25

## 2020-07-25 LAB
SARS-COV-2 RNA SPEC QL NAA+PROBE: NEGATIVE
SPECIMEN SOURCE: NORMAL

## 2020-07-27 ENCOUNTER — EXTERNAL RECORD (OUTPATIENT)
Dept: HEALTH INFORMATION MANAGEMENT | Facility: OTHER | Age: 50
End: 2020-07-27

## 2020-07-27 PROCEDURE — 58571 TLH W/T/O 250 G OR LESS: CPT | Performed by: OBSTETRICS & GYNECOLOGY

## 2020-07-28 ENCOUNTER — TELEPHONE (OUTPATIENT)
Dept: OBGYN | Age: 50
End: 2020-07-28

## 2020-07-30 ENCOUNTER — TELEPHONE (OUTPATIENT)
Dept: OBGYN | Age: 50
End: 2020-07-30

## 2020-07-31 RX ORDER — HYDROCODONE BITARTRATE AND ACETAMINOPHEN 7.5; 325 MG/1; MG/1
1 TABLET ORAL EVERY 6 HOURS PRN
Qty: 10 TABLET | Refills: 0 | Status: SHIPPED | OUTPATIENT
Start: 2020-07-31 | End: 2020-08-07 | Stop reason: SDUPTHER

## 2020-08-03 ENCOUNTER — E-ADVICE (OUTPATIENT)
Dept: OBGYN | Age: 50
End: 2020-08-03

## 2020-08-03 RX ORDER — CEPHALEXIN 500 MG/1
500 CAPSULE ORAL 4 TIMES DAILY
Qty: 28 CAPSULE | Refills: 0 | Status: SHIPPED | OUTPATIENT
Start: 2020-08-03 | End: 2021-11-02 | Stop reason: ALTCHOICE

## 2020-08-03 RX ORDER — HYDROCODONE BITARTRATE AND ACETAMINOPHEN 7.5; 325 MG/1; MG/1
1 TABLET ORAL EVERY 6 HOURS PRN
Qty: 10 TABLET | Refills: 0 | Status: SHIPPED | OUTPATIENT
Start: 2020-08-03 | End: 2020-08-13 | Stop reason: SDUPTHER

## 2020-08-06 NOTE — TELEPHONE ENCOUNTER
From: Anant Stafford  To: Rell Hurst DO  Sent: 8/6/2020 2:43 PM CDT  Subject: Referral Request    Looking for a referral for Charley Ballard, I'm wanting to have her start with a psychiatrist to help with her medication.     Thanks  [employer]

## 2020-08-07 ENCOUNTER — OFFICE VISIT (OUTPATIENT)
Dept: OBGYN | Age: 50
End: 2020-08-07

## 2020-08-07 VITALS
TEMPERATURE: 97.6 F | HEART RATE: 81 BPM | WEIGHT: 255 LBS | BODY MASS INDEX: 46.93 KG/M2 | HEIGHT: 62 IN | DIASTOLIC BLOOD PRESSURE: 66 MMHG | SYSTOLIC BLOOD PRESSURE: 138 MMHG

## 2020-08-07 DIAGNOSIS — Z30.46 NEXPLANON REMOVAL: ICD-10-CM

## 2020-08-07 DIAGNOSIS — Z98.890 POST-OPERATIVE STATE: Primary | ICD-10-CM

## 2020-08-07 DIAGNOSIS — Z90.710 HISTORY OF ROBOT-ASSISTED LAPAROSCOPIC HYSTERECTOMY: ICD-10-CM

## 2020-08-07 PROCEDURE — 11982 REMOVE DRUG IMPLANT DEVICE: CPT | Performed by: OBSTETRICS & GYNECOLOGY

## 2020-08-07 PROCEDURE — 99024 POSTOP FOLLOW-UP VISIT: CPT | Performed by: OBSTETRICS & GYNECOLOGY

## 2020-08-07 RX ORDER — HYDROCODONE BITARTRATE AND ACETAMINOPHEN 7.5; 325 MG/1; MG/1
1 TABLET ORAL EVERY 6 HOURS PRN
Qty: 15 TABLET | Refills: 0 | Status: SHIPPED | OUTPATIENT
Start: 2020-08-07 | End: 2021-11-02 | Stop reason: ALTCHOICE

## 2020-08-11 ENCOUNTER — APPOINTMENT (OUTPATIENT)
Dept: OBGYN | Age: 50
End: 2020-08-11

## 2020-08-13 ENCOUNTER — PATIENT MESSAGE (OUTPATIENT)
Dept: FAMILY MEDICINE CLINIC | Facility: CLINIC | Age: 50
End: 2020-08-13

## 2020-08-13 ENCOUNTER — E-ADVICE (OUTPATIENT)
Dept: OBGYN | Age: 50
End: 2020-08-13

## 2020-08-13 RX ORDER — HYDROCODONE BITARTRATE AND ACETAMINOPHEN 7.5; 325 MG/1; MG/1
1 TABLET ORAL EVERY 6 HOURS PRN
Qty: 15 TABLET | Refills: 0 | Status: SHIPPED | OUTPATIENT
Start: 2020-08-13 | End: 2021-11-02 | Stop reason: ALTCHOICE

## 2020-08-13 NOTE — TELEPHONE ENCOUNTER
From: Eliseo Keenan  To: Hernan Ramirez DO  Sent: 8/13/2020 10:57 AM CDT  Subject: Non-Urgent Medical Question    Wondering if something can be recommended for whatever this is on my finger.  I tried a wart remover on a bandaid once and didn't see a nguyen

## 2020-08-14 RX ORDER — HYDROCODONE BITARTRATE AND ACETAMINOPHEN 7.5; 325 MG/1; MG/1
1 TABLET ORAL EVERY 6 HOURS PRN
Qty: 15 TABLET | Refills: 0 | OUTPATIENT
Start: 2020-08-14

## 2020-08-22 ENCOUNTER — E-ADVICE (OUTPATIENT)
Dept: OBGYN | Age: 50
End: 2020-08-22

## 2020-08-26 ENCOUNTER — TELEPHONE (OUTPATIENT)
Dept: OBGYN | Age: 50
End: 2020-08-26

## 2020-08-27 ENCOUNTER — TELEPHONE (OUTPATIENT)
Dept: FAMILY MEDICINE CLINIC | Facility: CLINIC | Age: 50
End: 2020-08-27

## 2020-08-27 NOTE — TELEPHONE ENCOUNTER
Pt called stated she works at [employer] and had lunch with someone who works at [employer] who tested positive for Laurie Pan. They sent everyone home from work so she was wondering if she needs to get tested.

## 2020-08-27 NOTE — TELEPHONE ENCOUNTER
MAXIM Cali    Patient possibly exposed to someone at work who is COVID positive. Patient has a headache - no different than her typical headache  Loose stool this afternoon. Patient denies fever.   Patient denies difficulty breathing/SOB  Work is requiring patient to be tested in order to return  Patient informed about state testing site, as well as physic's IC  [facility] rapid test.

## 2020-09-01 ENCOUNTER — OFFICE VISIT (OUTPATIENT)
Dept: OBGYN | Age: 50
End: 2020-09-01

## 2020-09-01 ENCOUNTER — PATIENT MESSAGE (OUTPATIENT)
Dept: FAMILY MEDICINE CLINIC | Facility: CLINIC | Age: 50
End: 2020-09-01

## 2020-09-01 ENCOUNTER — TELEMEDICINE (OUTPATIENT)
Dept: FAMILY MEDICINE CLINIC | Facility: CLINIC | Age: 50
End: 2020-09-01
Payer: COMMERCIAL

## 2020-09-01 ENCOUNTER — APPOINTMENT (OUTPATIENT)
Dept: OBGYN | Age: 50
End: 2020-09-01

## 2020-09-01 VITALS
DIASTOLIC BLOOD PRESSURE: 80 MMHG | HEIGHT: 62 IN | HEART RATE: 98 BPM | SYSTOLIC BLOOD PRESSURE: 136 MMHG | BODY MASS INDEX: 47.29 KG/M2 | WEIGHT: 257 LBS | TEMPERATURE: 97.7 F

## 2020-09-01 DIAGNOSIS — M62.838 NECK MUSCLE SPASM: Primary | ICD-10-CM

## 2020-09-01 DIAGNOSIS — Z90.710 HISTORY OF ROBOT-ASSISTED LAPAROSCOPIC HYSTERECTOMY: ICD-10-CM

## 2020-09-01 DIAGNOSIS — Z98.890 POST-OPERATIVE STATE: Primary | ICD-10-CM

## 2020-09-01 PROCEDURE — 99024 POSTOP FOLLOW-UP VISIT: CPT | Performed by: OBSTETRICS & GYNECOLOGY

## 2020-09-01 PROCEDURE — 99213 OFFICE O/P EST LOW 20 MIN: CPT | Performed by: FAMILY MEDICINE

## 2020-09-01 RX ORDER — CYCLOBENZAPRINE HCL 10 MG
10 TABLET ORAL NIGHTLY
Qty: 10 TABLET | Refills: 0 | Status: SHIPPED | OUTPATIENT
Start: 2020-09-01 | End: 2020-09-11

## 2020-09-01 NOTE — PROGRESS NOTES
No chief complaint on file. Left sided upper back pain. This visit is conducted using Telemedicine with live, interactive video and audio.     Patient has been referred to the [facility] website at [email or web address] to review the yearly Consent to Melo EVERY DAY 90 tablet 0   • traMADol HCl 50 MG Oral Tab Take 1 tablet (50 mg total) by mouth every 6 (six) hours as needed for Pain.  30 tablet 0   • Glucose Blood (ACCU-CHEK GUIDE) In Vitro Strip Test 6-8 times daily 250 strip 5   • Continuous Blood Gluc Tra (gastroesophageal reflux disease)    • Headache(784.0) 4/24/2012   • High cholesterol    • Hypercholesterolemia    • Lipoma of unspecified site 11/22/2011   • Multiple thyroid nodules     on rt,2011, lt.-2008   • Neoplasm of uncertain behavior of skin 4/24 Father    • Other (Other) Father    • Diabetes Maternal Grandmother    • Other (Other) Maternal Grandmother         lymphoma   • Skin cancer Mother    • Other (Other) Mother    • Other (Other) Sister         Crohn's disease   • Asthma Other         3 child

## 2020-09-04 ENCOUNTER — APPOINTMENT (OUTPATIENT)
Dept: OBGYN | Age: 50
End: 2020-09-04

## 2020-09-05 RX ORDER — SIMVASTATIN 40 MG
TABLET ORAL
Qty: 90 TABLET | Refills: 0 | Status: SHIPPED | OUTPATIENT
Start: 2020-09-05 | End: 2020-12-19

## 2020-09-05 NOTE — TELEPHONE ENCOUNTER
LOV-7/20/20-pre-op  LRF-bupropion-6/9/20#90+0 pended  LRF-simvastatin-6/9/20#90+0 protocol passed med refilled  No future appointments.

## 2020-09-08 ENCOUNTER — APPOINTMENT (OUTPATIENT)
Dept: OBGYN | Age: 50
End: 2020-09-08

## 2020-09-09 RX ORDER — BUPROPION HYDROCHLORIDE 300 MG/1
TABLET ORAL
Qty: 90 TABLET | Refills: 0 | Status: SHIPPED | OUTPATIENT
Start: 2020-09-09 | End: 2020-12-07

## 2020-09-14 NOTE — TELEPHONE ENCOUNTER
Last refilled on 6/15/20 for # 90 with 0 refills  Last Hgb 13.3 on 7/20/20-no iron-ferritin  Last OV 9/1/20 saw SC for neck spasm  No future appointments. Thank you.

## 2020-09-18 ENCOUNTER — IMAGING SERVICES (OUTPATIENT)
Dept: GENERAL RADIOLOGY | Age: 50
End: 2020-09-18
Attending: PHYSICIAN ASSISTANT

## 2020-09-18 ENCOUNTER — WORKER'S COMP (OUTPATIENT)
Dept: OCCUPATIONAL MEDICINE | Age: 50
End: 2020-09-18
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
## 2020-09-18 DIAGNOSIS — S63.501A SPRAIN OF RIGHT WRIST, INITIAL ENCOUNTER: ICD-10-CM

## 2020-09-18 DIAGNOSIS — S43.401A SPRAIN OF RIGHT SHOULDER, UNSPECIFIED SHOULDER SPRAIN TYPE, INITIAL ENCOUNTER: ICD-10-CM

## 2020-09-18 DIAGNOSIS — S50.01XA CONTUSION OF RIGHT ELBOW, INITIAL ENCOUNTER: ICD-10-CM

## 2020-09-18 DIAGNOSIS — Z02.71 ISSUE OF MEDICAL CERTIFICATE FOR DISABILITY EXAMINATION: ICD-10-CM

## 2020-09-18 DIAGNOSIS — S60.00XA CONTUSION OF FINGER OF RIGHT HAND, UNSPECIFIED FINGER, INITIAL ENCOUNTER: ICD-10-CM

## 2020-09-18 DIAGNOSIS — S60.00XA CONTUSION OF FINGER OF RIGHT HAND, UNSPECIFIED FINGER, INITIAL ENCOUNTER: Primary | ICD-10-CM

## 2020-09-18 DIAGNOSIS — S80.01XA CONTUSION OF RIGHT KNEE, INITIAL ENCOUNTER: ICD-10-CM

## 2020-09-18 PROCEDURE — 3077F SYST BP >= 140 MM HG: CPT | Performed by: PHYSICIAN ASSISTANT

## 2020-09-18 PROCEDURE — 73130 X-RAY EXAM OF HAND: CPT | Performed by: RADIOLOGY

## 2020-09-18 PROCEDURE — 3079F DIAST BP 80-89 MM HG: CPT | Performed by: PHYSICIAN ASSISTANT

## 2020-09-18 PROCEDURE — 73030 X-RAY EXAM OF SHOULDER: CPT | Performed by: RADIOLOGY

## 2020-09-18 PROCEDURE — 73110 X-RAY EXAM OF WRIST: CPT | Performed by: RADIOLOGY

## 2020-09-18 PROCEDURE — 99204 OFFICE O/P NEW MOD 45 MIN: CPT | Performed by: PHYSICIAN ASSISTANT

## 2020-09-18 PROCEDURE — A6449 LT COMPRES BAND >=3" <5"/YD: HCPCS | Performed by: PHYSICIAN ASSISTANT

## 2020-09-21 VITALS
DIASTOLIC BLOOD PRESSURE: 81 MMHG | HEIGHT: 62 IN | BODY MASS INDEX: 47.66 KG/M2 | WEIGHT: 259 LBS | TEMPERATURE: 97.6 F | SYSTOLIC BLOOD PRESSURE: 146 MMHG | RESPIRATION RATE: 18 BRPM | HEART RATE: 91 BPM

## 2020-09-21 RX ORDER — LIDOCAINE HYDROCHLORIDE 20 MG/ML
SOLUTION ORAL; TOPICAL
Qty: 90 TABLET | Refills: 0 | Status: SHIPPED | OUTPATIENT
Start: 2020-09-21 | End: 2020-12-18

## 2020-09-23 ENCOUNTER — PATIENT MESSAGE (OUTPATIENT)
Dept: FAMILY MEDICINE CLINIC | Facility: CLINIC | Age: 50
End: 2020-09-23

## 2020-09-23 RX ORDER — LEVOTHYROXINE SODIUM 137 UG/1
TABLET ORAL
Qty: 90 TABLET | Refills: 0 | Status: SHIPPED | OUTPATIENT
Start: 2020-09-23 | End: 2021-03-18

## 2020-09-23 RX ORDER — ESTRADIOL 0.05 MG/D
PATCH TRANSDERMAL
Qty: 4 PATCH | Refills: 2 | Status: SHIPPED | OUTPATIENT
Start: 2020-09-23 | End: 2020-12-18

## 2020-09-23 NOTE — TELEPHONE ENCOUNTER
From: Elza Alicia  To: Nati Bellamy MD  Sent: 9/23/2020 11:31 AM CDT  Subject: Other    I completely forgot to  Kathrin's physical, can it be mailed?

## 2020-09-23 NOTE — TELEPHONE ENCOUNTER
LOV 7/20/20 for a pre op. TSH done on the above date was WNL. LEVOTHYROXINE SODIUM 137 MCG Oral Tab 90 tablet 0 6/27/2020    Sig:   TAKE 1 TABLET BY MOUTH EVERY DAY     Route:   (none)       No future appointments.        Thyroid Supplements Protocol Pa

## 2020-09-25 ENCOUNTER — WORKER'S COMP (OUTPATIENT)
Dept: OCCUPATIONAL MEDICINE | Age: 50
End: 2020-09-25

## 2020-09-25 VITALS
TEMPERATURE: 97.8 F | RESPIRATION RATE: 18 BRPM | SYSTOLIC BLOOD PRESSURE: 142 MMHG | DIASTOLIC BLOOD PRESSURE: 81 MMHG | HEART RATE: 77 BPM

## 2020-09-25 DIAGNOSIS — S43.401D SPRAIN OF RIGHT SHOULDER, UNSPECIFIED SHOULDER SPRAIN TYPE, SUBSEQUENT ENCOUNTER: ICD-10-CM

## 2020-09-25 DIAGNOSIS — S60.00XD CONTUSION OF FINGER OF RIGHT HAND, UNSPECIFIED FINGER, SUBSEQUENT ENCOUNTER: Primary | ICD-10-CM

## 2020-09-25 DIAGNOSIS — S50.01XD CONTUSION OF RIGHT ELBOW, SUBSEQUENT ENCOUNTER: ICD-10-CM

## 2020-09-25 DIAGNOSIS — Z02.71 ISSUE OF MEDICAL CERTIFICATE FOR DISABILITY EXAMINATION: ICD-10-CM

## 2020-09-25 DIAGNOSIS — S63.501D SPRAIN OF RIGHT WRIST, SUBSEQUENT ENCOUNTER: ICD-10-CM
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
## 2020-09-25 DIAGNOSIS — S80.01XD CONTUSION OF RIGHT KNEE, SUBSEQUENT ENCOUNTER: ICD-10-CM

## 2020-09-25 PROCEDURE — 3077F SYST BP >= 140 MM HG: CPT | Performed by: PHYSICIAN ASSISTANT

## 2020-09-25 PROCEDURE — 99214 OFFICE O/P EST MOD 30 MIN: CPT | Performed by: PHYSICIAN ASSISTANT

## 2020-09-25 PROCEDURE — 3079F DIAST BP 80-89 MM HG: CPT | Performed by: PHYSICIAN ASSISTANT

## 2020-10-15 NOTE — TELEPHONE ENCOUNTER
LOV: 9/1/20 for neck muscle spasm    OMEPRAZOLE 40 MG Oral Capsule Delayed Release 90 capsule 0 7/15/2020    Sig:   TAKE 1 CAPSULE BY MOUTH DAILY       PAROXETINE HCL 40 MG Oral Tab 90 tablet 0 7/15/2020    Sig:   TAKE 1 TABLET BY MOUTH EVERY MORNING

## 2020-10-15 NOTE — TELEPHONE ENCOUNTER
LOV: 9/1/20 for neck muscle spasm  7/20/20: HGB A1C 5.7    INSULIN ASPART 100 UNIT/ML Subcutaneous Solution 120 mL 0 7/15/2020    Sig:   INJECT UP  UNITS DAILY VIA INSULIN PUMP AS DIRECTED     Route:   (none)       No future appointments.   Please adv

## 2020-10-16 RX ORDER — INSULIN ASPART 100 [IU]/ML
INJECTION, SOLUTION INTRAVENOUS; SUBCUTANEOUS
Qty: 120 ML | Refills: 0 | Status: SHIPPED | OUTPATIENT
Start: 2020-10-16 | End: 2021-01-08

## 2020-10-16 RX ORDER — INSULIN ASPART 100 [IU]/ML
INJECTION, SOLUTION INTRAVENOUS; SUBCUTANEOUS
Qty: 120 ML | Refills: 0 | Status: SHIPPED | OUTPATIENT
Start: 2020-10-16 | End: 2020-12-16

## 2020-10-16 RX ORDER — OMEPRAZOLE 40 MG/1
CAPSULE, DELAYED RELEASE ORAL
Qty: 90 CAPSULE | Refills: 0 | Status: SHIPPED | OUTPATIENT
Start: 2020-10-16 | End: 2021-01-14

## 2020-10-16 RX ORDER — PAROXETINE HYDROCHLORIDE 40 MG/1
TABLET, FILM COATED ORAL
Qty: 90 TABLET | Refills: 0 | Status: SHIPPED | OUTPATIENT
Start: 2020-10-16 | End: 2020-12-16

## 2020-10-16 NOTE — TELEPHONE ENCOUNTER
Last refilled: 7/15/20 - 120mL - 0 refills    Last OV: 9/1/20 - telemed - neck spasm -     Last a1c: 7/20/20 - 5.7      No future appts       Please advise.

## 2020-10-22 ENCOUNTER — TELEPHONE (OUTPATIENT)
Dept: FAMILY MEDICINE CLINIC | Facility: CLINIC | Age: 50
End: 2020-10-22

## 2020-10-22 NOTE — TELEPHONE ENCOUNTER
Patient has had a tension headache for 1 week  A lot of pressure in front of head and back of neck  Denies double vision/blurred vision  Patient has tried massage  Patient goes to chiropractor  Patient has tried tylenol and aleve with no resolution  No fev

## 2020-10-24 ENCOUNTER — HOSPITAL ENCOUNTER (OUTPATIENT)
Age: 50
Discharge: HOME | End: 2020-10-24
Payer: COMMERCIAL

## 2020-10-24 VITALS
DIASTOLIC BLOOD PRESSURE: 74 MMHG | RESPIRATION RATE: 20 BRPM | OXYGEN SATURATION: 98 % | HEART RATE: 61 BPM | TEMPERATURE: 99 F | SYSTOLIC BLOOD PRESSURE: 141 MMHG

## 2020-10-24 DIAGNOSIS — R51.9 ACUTE NONINTRACTABLE HEADACHE, UNSPECIFIED HEADACHE TYPE: Primary | ICD-10-CM

## 2020-10-24 DIAGNOSIS — Z20.822 ENCOUNTER FOR SCREENING LABORATORY TESTING FOR COVID-19 VIRUS: ICD-10-CM

## 2020-10-24 PROCEDURE — 99213 OFFICE O/P EST LOW 20 MIN: CPT | Performed by: PHYSICIAN ASSISTANT

## 2020-10-24 RX ORDER — BUTALBITAL, ACETAMINOPHEN AND CAFFEINE 50; 325; 40 MG/1; MG/1; MG/1
1-2 TABLET ORAL EVERY 6 HOURS PRN
Qty: 10 TABLET | Refills: 0 | Status: SHIPPED | OUTPATIENT
Start: 2020-10-24 | End: 2020-12-02

## 2020-10-24 NOTE — ED PROVIDER NOTES
Patient Seen in: Immediate [address] [place] [address]      History   Patient presents with:  Headache    Stated Complaint: headache    HPI    28-year-old female presents to the immediate care for evaluation of headache across her forehead for the past 8 days.   Patient • Type I (juvenile type) diabetes mellitus without mention of complication, not stated as uncontrolled    • Unspecified asthma(493.90) 5/7/2012   • Unspecified disorder of thyroid    • Unspecified hypothyroidism 8/26/2011   • Unspecified musculoskeletal Temporal   SpO2 98 %   O2 Device None (Room air)       Current:/74   Pulse 61   Temp 98.5 °F (36.9 °C) (Temporal)   Resp 20   LMP 09/24/2019 (Approximate)   SpO2 98%         Physical Exam  Vitals signs and nursing note reviewed.    Constitutional: symptoms feels like previous headaches. She denies any Covid symptoms, but would like a test.  Will send for test.  Reviewed EMR, patient was prescribed Fioricet and Imitrex by primary care physician in 2017.   She states that she will like to give the Fio

## 2020-11-05 ENCOUNTER — IMMUNIZATION (OUTPATIENT)
Dept: FAMILY MEDICINE CLINIC | Facility: CLINIC | Age: 50
End: 2020-11-05
Payer: COMMERCIAL

## 2020-11-05 DIAGNOSIS — Z23 NEED FOR VACCINATION: ICD-10-CM

## 2020-11-05 PROCEDURE — 90686 IIV4 VACC NO PRSV 0.5 ML IM: CPT | Performed by: FAMILY MEDICINE

## 2020-11-05 PROCEDURE — 90471 IMMUNIZATION ADMIN: CPT | Performed by: FAMILY MEDICINE

## 2020-11-23 RX ORDER — LANCETS
1 EACH MISCELLANEOUS 4 TIMES DAILY
Qty: 408 EACH | Refills: 0 | Status: SHIPPED | OUTPATIENT
Start: 2020-11-23

## 2020-11-23 NOTE — TELEPHONE ENCOUNTER
Diabetic Supplies Protocol [id number]/23/2020 08:33 AM   Appointment in the past 12 or next 3 months

## 2020-11-23 NOTE — TELEPHONE ENCOUNTER
Last refilled on 7/15/20 for # 408 with 0 refills  Last labs a1c 5.7 on 7/20/20  Last OV televisit 9/1/20 with Dr Diya Miranda  No future appointments. Thank you.

## 2020-11-30 ENCOUNTER — PATIENT MESSAGE (OUTPATIENT)
Dept: FAMILY MEDICINE CLINIC | Facility: CLINIC | Age: 50
End: 2020-11-30

## 2020-11-30 NOTE — TELEPHONE ENCOUNTER
From: Scar Pope  To: Maria Dolores Bello MD  Sent: 11/30/2020 1:29 PM CST  Subject: Prescription Question    Just following up on my prescription request, it was denied and I'm curious why or what I need to do to have it filled it's the only thing that's

## 2020-12-01 NOTE — TELEPHONE ENCOUNTER
Butalbital-APAP-Caffeine -40 MG Oral Tab () 10 tablet 0 10/24/2020 10/31/2020   Sig:   Take 1-2 tablets by mouth every 6 (six) hours as needed for Pain. Route:   Oral       LOV 20 televisit with SC for a muscle spasm.   Future Appointme

## 2020-12-02 RX ORDER — BUTALBITAL, ACETAMINOPHEN AND CAFFEINE 50; 325; 40 MG/1; MG/1; MG/1
1-2 TABLET ORAL DAILY PRN
Qty: 10 TABLET | Refills: 0 | Status: SHIPPED | OUTPATIENT
Start: 2020-12-02

## 2020-12-07 RX ORDER — BUPROPION HYDROCHLORIDE 300 MG/1
300 TABLET ORAL DAILY
Qty: 90 TABLET | Refills: 0 | Status: SHIPPED | OUTPATIENT
Start: 2020-12-07 | End: 2020-12-16

## 2020-12-07 NOTE — TELEPHONE ENCOUNTER
Last refilled on 9/9/20 for # 90 with 0 refills  Last OV televisit 9/1/20  Future Appointments   Date Time Provider Jovani Haque   12/16/2020  9:00 AM Justino Chen MD [facility]        Thank you.

## 2020-12-08 ENCOUNTER — OFFICE VISIT (OUTPATIENT)
Dept: FAMILY MEDICINE CLINIC | Facility: CLINIC | Age: 50
End: 2020-12-08
Payer: COMMERCIAL

## 2020-12-08 ENCOUNTER — PATIENT MESSAGE (OUTPATIENT)
Dept: FAMILY MEDICINE CLINIC | Facility: CLINIC | Age: 50
End: 2020-12-08

## 2020-12-08 VITALS
DIASTOLIC BLOOD PRESSURE: 60 MMHG | WEIGHT: 259 LBS | HEART RATE: 88 BPM | TEMPERATURE: 98 F | SYSTOLIC BLOOD PRESSURE: 130 MMHG | HEIGHT: 62 IN | BODY MASS INDEX: 47.66 KG/M2 | OXYGEN SATURATION: 97 % | RESPIRATION RATE: 16 BRPM

## 2020-12-08 DIAGNOSIS — T14.8XXA BRUISING: Primary | ICD-10-CM

## 2020-12-08 PROCEDURE — 3008F BODY MASS INDEX DOCD: CPT | Performed by: NURSE PRACTITIONER

## 2020-12-08 PROCEDURE — 3078F DIAST BP <80 MM HG: CPT | Performed by: NURSE PRACTITIONER

## 2020-12-08 PROCEDURE — 85025 COMPLETE CBC W/AUTO DIFF WBC: CPT | Performed by: NURSE PRACTITIONER

## 2020-12-08 PROCEDURE — 85610 PROTHROMBIN TIME: CPT | Performed by: NURSE PRACTITIONER

## 2020-12-08 PROCEDURE — 80053 COMPREHEN METABOLIC PANEL: CPT | Performed by: NURSE PRACTITIONER

## 2020-12-08 PROCEDURE — 99213 OFFICE O/P EST LOW 20 MIN: CPT | Performed by: NURSE PRACTITIONER

## 2020-12-08 PROCEDURE — 3075F SYST BP GE 130 - 139MM HG: CPT | Performed by: NURSE PRACTITIONER

## 2020-12-08 NOTE — TELEPHONE ENCOUNTER
Future Appointments   Date Time Provider Jovani Shabnam   12/8/2020  2:00 PM LILIA Regalado [facility]   12/16/2020  9:00 AM Delbert Valerio MD [facility]

## 2020-12-08 NOTE — TELEPHONE ENCOUNTER
From: Noé Paniagua  To: Leif Silverman MD  Sent: 12/8/2020 10:23 AM CST  Subject: Non-Urgent Medical Question    Good morning    Sorry for the gross picture. This is happening every time I insert my sensor (Dexcom) and sometimes my insulin pump.  What's

## 2020-12-08 NOTE — PATIENT INSTRUCTIONS
Continue to monitor area. Make sure you are rotating the area used. Avoid blood thining medications and alcohol right now. Follow up as needed if symptoms continue or worsen     Labs today, will call you with results.       Contact diabetic educa

## 2020-12-08 NOTE — TELEPHONE ENCOUNTER
Can she get in with Frances Diaz today? Better to take a look at that.   Unless she has a diab educator or clinic she goes to - then she can go there also

## 2020-12-10 NOTE — PROGRESS NOTES
HPI:    Patient ID: Robert Cavanaugh is a 52year old female. Patient presents to the clinic today for evaluation of large bruise on left lower quadrant of abdomen. Patient is diabetic and uses glucose monitoring sensor to help control blood sugars.   P • FEROSUL 325 (65 Fe) MG Oral Tab TAKE 1 TABLET BY MOUTH DAILY WITH BREAKFAST 90 tablet 0   • SIMVASTATIN 40 MG Oral Tab TAKE 1 TABLET BY MOUTH EVERY EVENING 90 tablet 0   • traMADol HCl 50 MG Oral Tab Take 1 tablet (50 mg total) by mouth every 6 (six) jannette Constitutional: She is oriented to person, place, and time. She appears well-developed and well-nourished. HENT:   Head: Normocephalic and atraumatic.    Right Ear: External ear normal.   Left Ear: External ear normal.   Eyes: Pupils are equal, round, and

## 2020-12-14 ENCOUNTER — NURSE ONLY (OUTPATIENT)
Dept: ENDOCRINOLOGY CLINIC | Facility: CLINIC | Age: 50
End: 2020-12-14
Payer: COMMERCIAL

## 2020-12-14 DIAGNOSIS — E11.9 TYPE 2 DIABETES MELLITUS WITHOUT COMPLICATION, WITH LONG-TERM CURRENT USE OF INSULIN (HCC): ICD-10-CM

## 2020-12-14 DIAGNOSIS — Z79.4 TYPE 2 DIABETES MELLITUS WITHOUT COMPLICATION, WITH LONG-TERM CURRENT USE OF INSULIN (HCC): ICD-10-CM

## 2020-12-14 PROCEDURE — G0108 DIAB MANAGE TRN  PER INDIV: HCPCS | Performed by: DIETITIAN, REGISTERED

## 2020-12-15 NOTE — PROGRESS NOTES
Wesley Moser  : 3/12/1970 was seen for Insulin Pump Follow up:    Date: 2020  Referring Provider: Dr. Thi Ruiz  Start time: 5:00pm End time: 6:00pm     Insertion site assessed:using mainly abd for pump infusion sites     Type of pump: Medtronic

## 2020-12-16 ENCOUNTER — TELEPHONE (OUTPATIENT)
Dept: FAMILY MEDICINE CLINIC | Facility: CLINIC | Age: 50
End: 2020-12-16

## 2020-12-16 ENCOUNTER — OFFICE VISIT (OUTPATIENT)
Dept: FAMILY MEDICINE CLINIC | Facility: CLINIC | Age: 50
End: 2020-12-16
Payer: COMMERCIAL

## 2020-12-16 VITALS
TEMPERATURE: 97 F | HEIGHT: 62 IN | SYSTOLIC BLOOD PRESSURE: 132 MMHG | DIASTOLIC BLOOD PRESSURE: 84 MMHG | RESPIRATION RATE: 18 BRPM | OXYGEN SATURATION: 98 % | HEART RATE: 79 BPM | BODY MASS INDEX: 47.11 KG/M2 | WEIGHT: 256 LBS

## 2020-12-16 DIAGNOSIS — Z12.31 BREAST CANCER SCREENING BY MAMMOGRAM: Primary | ICD-10-CM

## 2020-12-16 DIAGNOSIS — K21.9 GASTROESOPHAGEAL REFLUX DISEASE, UNSPECIFIED WHETHER ESOPHAGITIS PRESENT: ICD-10-CM

## 2020-12-16 DIAGNOSIS — R92.8 ABNORMAL MAMMOGRAM: ICD-10-CM

## 2020-12-16 DIAGNOSIS — Z00.00 ANNUAL PHYSICAL EXAM: ICD-10-CM

## 2020-12-16 PROCEDURE — 3075F SYST BP GE 130 - 139MM HG: CPT | Performed by: FAMILY MEDICINE

## 2020-12-16 PROCEDURE — 99396 PREV VISIT EST AGE 40-64: CPT | Performed by: FAMILY MEDICINE

## 2020-12-16 PROCEDURE — 3008F BODY MASS INDEX DOCD: CPT | Performed by: FAMILY MEDICINE

## 2020-12-16 PROCEDURE — 3079F DIAST BP 80-89 MM HG: CPT | Performed by: FAMILY MEDICINE

## 2020-12-16 RX ORDER — PAROXETINE HYDROCHLORIDE 40 MG/1
40 TABLET, FILM COATED ORAL EVERY MORNING
Qty: 90 TABLET | Refills: 0 | Status: SHIPPED | OUTPATIENT
Start: 2020-12-16 | End: 2021-01-08

## 2020-12-16 RX ORDER — BUPROPION HYDROCHLORIDE 300 MG/1
300 TABLET ORAL DAILY
Qty: 90 TABLET | Refills: 0 | Status: SHIPPED | OUTPATIENT
Start: 2020-12-16 | End: 2021-06-01

## 2020-12-16 NOTE — TELEPHONE ENCOUNTER
Faxed mammogram order to [address] at [phone]. Patient advised. Verbalized understanding.

## 2020-12-16 NOTE — PROGRESS NOTES
HPI:   Celina Burns is a 52year old female who presents for a complete physical exam.     Pap S/P HYSTERECTOMY  Mammogram due    Asthma  Well controlled  Albuterol use is rare  No nocturnal cough or cough with exertion    Has anxiety and depression  D 07/21/2018         Current Outpatient Medications   Medication Sig Dispense Refill   • buPROPion HCl ER, XL, 300 MG Oral Tablet 24 Hr Take 1 tablet (300 mg total) by mouth daily.  90 tablet 0   • Butalbital-APAP-Caffeine -40 MG Oral Tab Take 1-2 table Does not apply Misc TEST 8 TIMES PER DAY.  100 each 11      Past Medical History:   Diagnosis Date   • Abdominal pain, unspecified site 10/27/11   • Acute sinusitis, unspecified 4/24/2012   • Anxiety    • Anxiety state, unspecified    • Asthma    • Cholelit LEFT     • CYST ASPIRATION RIGHT     • KNEE ARTHROSCOPY Right 4/21/2015    Performed by Tiffany Rasheed MD at [facility] MAIN OR   • [id number]  11/14/2011   • MESH (IMPLANTABLE)      bladder   • OTHER      wisdom   • OTHER SURGICAL HISTORY  x2 pain  NEURO: denies headaches or dizziness  PSYCHE: see HPI  EXAM:   /84   Pulse 79   Temp 97 °F (36.1 °C) (Temporal)   Resp 18   Ht 5' 2\" (1.575 m)   Wt 256 lb (116.1 kg)   LMP 09/24/2019 (Approximate)   SpO2 98%   BMI 46.82 kg/m²   Body mass index

## 2020-12-18 RX ORDER — ESTRADIOL 0.05 MG/D
PATCH TRANSDERMAL
Qty: 12 PATCH | Refills: 2 | Status: SHIPPED | OUTPATIENT
Start: 2020-12-18 | End: 2021-03-09 | Stop reason: SDUPTHER

## 2020-12-18 RX ORDER — FERROUS SULFATE 325(65) MG
1 TABLET ORAL
Qty: 90 TABLET | Refills: 0 | Status: SHIPPED | OUTPATIENT
Start: 2020-12-18 | End: 2021-04-02

## 2020-12-18 NOTE — TELEPHONE ENCOUNTER
LOV: 12/16/20 for annual    FEROSUL 325 (65 Fe) MG Oral Tab 90 tablet 0 9/21/2020    Sig:   TAKE 1 TABLET BY MOUTH DAILY WITH BREAKFAST       Future Appointments   Date Time Provider Jovani Haque   1/19/2021  2:45 PM LILIA Collins [facility]

## 2020-12-19 ENCOUNTER — TELEPHONE (OUTPATIENT)
Dept: FAMILY MEDICINE CLINIC | Facility: CLINIC | Age: 50
End: 2020-12-19

## 2020-12-19 RX ORDER — SIMVASTATIN 40 MG
40 TABLET ORAL EVERY EVENING
Qty: 90 TABLET | Refills: 0 | Status: SHIPPED | OUTPATIENT
Start: 2020-12-19 | End: 2021-01-08

## 2020-12-19 NOTE — TELEPHONE ENCOUNTER
Last refilled on 9/5/20 for # 90 with 0 refills  Last labs lipid 7/20/20  Last OV 12/16/20  Future Appointments   Date Time Provider Jovani Haque   1/19/2021  2:45 PM LILIA Pena [facility] [address]        Refill sent

## 2020-12-21 ENCOUNTER — TELEPHONE (OUTPATIENT)
Dept: FAMILY MEDICINE CLINIC | Facility: CLINIC | Age: 50
End: 2020-12-21

## 2021-01-05 ENCOUNTER — PATIENT MESSAGE (OUTPATIENT)
Dept: FAMILY MEDICINE CLINIC | Facility: CLINIC | Age: 51
End: 2021-01-05

## 2021-01-06 NOTE — TELEPHONE ENCOUNTER
[address], [address], [place], [place]   Phone - [phone], Fax - [phone]   Office hours are:   Monday - Friday- 7:30-11:30, 12:30-3pm   Saturday - 6:30-11:30   Sunday - closed     [address], [place]   Phone: [phone]

## 2021-01-06 NOTE — TELEPHONE ENCOUNTER
From: Desirae Aguilar  To: Wilman Hutton MD  Sent: 1/5/2021 5:11 PM CST  Subject: Non-Urgent Medical Question    I needing a bit of help, I am supposed have a fasting blood draw. I work at 4:30am and off on [address] is closed.

## 2021-01-08 LAB
CHOL/HDLC RATIO: 2.7 (CALC)
CHOLESTEROL, TOTAL: 170 MG/DL
HDL CHOLESTEROL: 64 MG/DL
HEMOGLOBIN A1C: 5.6 % OF TOTAL HGB
LDL-CHOLESTEROL: 92 MG/DL (CALC)
NON-HDL CHOLESTEROL: 106 MG/DL (CALC)
TRIGLYCERIDES: 49 MG/DL
TSH W/REFLEX TO FT4: 1.41 MIU/L

## 2021-01-08 RX ORDER — INSULIN ASPART 100 [IU]/ML
INJECTION, SOLUTION INTRAVENOUS; SUBCUTANEOUS
Qty: 120 ML | Refills: 1 | Status: SHIPPED | OUTPATIENT
Start: 2021-01-08 | End: 2022-01-31

## 2021-01-08 RX ORDER — PAROXETINE HYDROCHLORIDE 40 MG/1
40 TABLET, FILM COATED ORAL EVERY MORNING
Qty: 90 TABLET | Refills: 3 | Status: SHIPPED | OUTPATIENT
Start: 2021-01-08 | End: 2021-10-28

## 2021-01-08 RX ORDER — SIMVASTATIN 40 MG
40 TABLET ORAL EVERY EVENING
Qty: 90 TABLET | Refills: 3 | Status: SHIPPED | OUTPATIENT
Start: 2021-01-08 | End: 2021-09-28

## 2021-01-08 NOTE — TELEPHONE ENCOUNTER
Confirmed pharmacy with patient.     LOV: 12/16/20 for annual  Lipid:1/7/21  A1C: 1/7/21    INSULIN ASPART 100 UNIT/ML Subcutaneous Solution 120 mL 0 10/16/2020    Sig:   INJECT UP  UNITS DAILY VIA INSULIN PUMP AS DIRECTED       PARoxetine HCl 40 MG O

## 2021-01-08 NOTE — TELEPHONE ENCOUNTER
[facility] called, they were not able to send electronically, they did not have updated information.       simvastatin 40 MG Oral Tab   buPROPion HCl ER, XL, 300 MG Oral Tablet 24 Hr  Insulin Aspart (Solution) NOVOLOG 100 UNIT/ML     PARoxetine HCl 40 MG

## 2021-01-11 PROBLEM — R07.0 THROAT PAIN IN ADULT: Status: ACTIVE | Noted: 2021-01-11

## 2021-01-14 ENCOUNTER — TELEPHONE (OUTPATIENT)
Dept: FAMILY MEDICINE CLINIC | Facility: CLINIC | Age: 51
End: 2021-01-14

## 2021-01-14 RX ORDER — OMEPRAZOLE 40 MG/1
40 CAPSULE, DELAYED RELEASE ORAL DAILY
Qty: 90 CAPSULE | Refills: 0 | Status: SHIPPED | OUTPATIENT
Start: 2021-01-14 | End: 2021-04-03

## 2021-01-14 NOTE — TELEPHONE ENCOUNTER
Last refilled on 10/16/20 for # 90 with 0 refills  Last OV 12/16/20  Future Appointments   Date Time Provider Jovani Haque   1/19/2021  2:45 PM LILIA Ceballos [facility] [address] [place]   2/1/2021  4:15 PM [facility] [place]

## 2021-01-19 ENCOUNTER — OFFICE VISIT (OUTPATIENT)
Dept: ENDOCRINOLOGY CLINIC | Facility: CLINIC | Age: 51
End: 2021-01-19
Payer: COMMERCIAL

## 2021-01-19 VITALS
TEMPERATURE: 98 F | DIASTOLIC BLOOD PRESSURE: 60 MMHG | HEART RATE: 84 BPM | WEIGHT: 260 LBS | BODY MASS INDEX: 47.84 KG/M2 | OXYGEN SATURATION: 99 % | SYSTOLIC BLOOD PRESSURE: 128 MMHG | HEIGHT: 62 IN

## 2021-01-19 DIAGNOSIS — Z96.41 INSULIN PUMP IN PLACE: ICD-10-CM

## 2021-01-19 DIAGNOSIS — E11.65 TYPE 2 DIABETES MELLITUS WITH HYPERGLYCEMIA, WITH LONG-TERM CURRENT USE OF INSULIN (HCC): Primary | ICD-10-CM

## 2021-01-19 DIAGNOSIS — Z79.4 TYPE 2 DIABETES MELLITUS WITH HYPERGLYCEMIA, WITH LONG-TERM CURRENT USE OF INSULIN (HCC): Primary | ICD-10-CM

## 2021-01-19 DIAGNOSIS — E78.5 DYSLIPIDEMIA: ICD-10-CM

## 2021-01-19 LAB
CREAT UR-SCNC: 24.9 MG/DL
MICROALBUMIN UR-MCNC: 0.57 MG/DL
MICROALBUMIN/CREAT 24H UR-RTO: 22.9 UG/MG (ref ?–30)

## 2021-01-19 PROCEDURE — 95251 CONT GLUC MNTR ANALYSIS I&R: CPT | Performed by: NURSE PRACTITIONER

## 2021-01-19 PROCEDURE — 3074F SYST BP LT 130 MM HG: CPT | Performed by: NURSE PRACTITIONER

## 2021-01-19 PROCEDURE — 82043 UR ALBUMIN QUANTITATIVE: CPT | Performed by: NURSE PRACTITIONER

## 2021-01-19 PROCEDURE — 3008F BODY MASS INDEX DOCD: CPT | Performed by: NURSE PRACTITIONER

## 2021-01-19 PROCEDURE — 3078F DIAST BP <80 MM HG: CPT | Performed by: NURSE PRACTITIONER

## 2021-01-19 PROCEDURE — 82570 ASSAY OF URINE CREATININE: CPT | Performed by: NURSE PRACTITIONER

## 2021-01-19 PROCEDURE — 99214 OFFICE O/P EST MOD 30 MIN: CPT | Performed by: NURSE PRACTITIONER

## 2021-01-19 RX ORDER — INSULIN GLARGINE 300 U/ML
INJECTION, SOLUTION SUBCUTANEOUS
Qty: 3 ML | Refills: 0 | COMMUNITY
Start: 2021-01-19

## 2021-01-19 NOTE — PATIENT INSTRUCTIONS
Your A1C: 5.6%   We have adjusted your pump to avoid lows -especially given your new dietary intake  Avoid disconnecting pump       The A1C test provides us with your average blood sugar for the past 3 months.  Keeping an A1C less than 7% helps reduce or de pump company to see if the pump is bad. It is important to have a back- up plan in case your pump stops working or the infusion set comes out.      If your infusion set comes out:  Always carry extra pump supplies with you in case of this but also carry s (preferably less than 150)   Call for persistent blood sugars less than  75 or more than  200.    Blood sugars greater than 200 are not acceptable to reach your goal of improving diabetes      Watch for low blood sugars: (less than 70 )  Symptoms of low blo be seen more frequently. · Yearly blood work may also required for many medications to insure safe prescribing. If you are due for labs, you will have 30 days to complete the  requested labs before future refills are authorized.    · In the event that you

## 2021-01-19 NOTE — PROGRESS NOTES
Juliet Conrad is a 52year old female who presents today to establish for 1-diabetes management.    Primary care physician: Rocky Rao MD  A1C on 1-7-2021 was 5.6% reflecting well controlled DM over past 3 m but recently met w CDE and frequent hypogl A1C 6.2 (H) 03/15/2019     07/20/2020       Lab Results   Component Value Date    CHOLEST 170 01/07/2021    TRIG 49 01/07/2021    HDL 64 01/07/2021    LDL 92 01/07/2021    MICROALBCREA 11.3 09/14/2018    CREATSERUM 0.83 12/08/2020    GFRNAA 84 12 hyperlipidemia 7/11/2011   • Pain in joint, lower leg 03/21/12   • Pain in joint, site unspecified 6/25/2011   • Pain in joints    • Painful swallowing     Not constant   • Plantar fasciitis    • Routine gynecological examination 12/5/2011   • Screening fo per session: 1 or 2      Comment: socially    Drug use: No    Family History   Problem Relation Age of Onset   • Heart Disease Father    • Hypertension Father    • Asthma Father    • Diabetes Father    • Other (Other) Father    • Heart Attack Father    • D Glucagon, rDNA, (GLUCAGON EMERGENCY) 1 MG Injection Kit INJECT ONE SYRINGE UNDER THE SKIN ONCE AS NEEDED FOR HYPOGLYCEMIA 2 kit 0   • Glucose Blood (ACCU-CHEK GUIDE) In Vitro Strip Test 6-8 times daily 250 strip 5   • Continuous Blood Gluc Transmit (DEXCOM is oriented to person, place, and time. She appears well-developed and well-nourished. No distress. Eyes: Conjunctivae are normal.   Cardiovascular: Normal rate and regular rhythm.     Pulmonary/Chest: Effort normal and breath sounds normal.   Neurologica (Fasting < 130 and post prandial <647 ) and complications associated with hyperglycemia and uncontrolled DM (on AVS)   Recommended SMBG 4- x daily if not using CGM   Reviewed s/s and treatment of hypoglycemia (on AVS)   Reminded to continue with lifestyle with the patient today. questions were also answered to the best of my knowledge.

## 2021-01-20 NOTE — TELEPHONE ENCOUNTER
PARoxetine HCl 40 MG Oral Tab 30 tablet 5 2/12/2018     Last labs 03/09/18. Last seen on 03/09/18. /84.   Future Appointments  Date Time Provider Jovani Haque   3/22/2018 8:20 AM OS RAJAN RM1 OS [facility] [place]   3/26/2018 4:00 PM Vidal Lopes, yes

## 2021-02-03 ENCOUNTER — PATIENT MESSAGE (OUTPATIENT)
Dept: ENDOCRINOLOGY CLINIC | Facility: CLINIC | Age: 51
End: 2021-02-03

## 2021-02-03 DIAGNOSIS — Z79.4 TYPE 2 DIABETES MELLITUS WITH HYPERGLYCEMIA, WITH LONG-TERM CURRENT USE OF INSULIN (HCC): Primary | ICD-10-CM

## 2021-02-03 DIAGNOSIS — E11.65 TYPE 2 DIABETES MELLITUS WITH HYPERGLYCEMIA, WITH LONG-TERM CURRENT USE OF INSULIN (HCC): Primary | ICD-10-CM

## 2021-02-03 NOTE — TELEPHONE ENCOUNTER
Francesco Lou,  What info should we give patient on pumps? I can add dexcom's number [phone]. Thanks.

## 2021-02-03 NOTE — TELEPHONE ENCOUNTER
From: Jerrod Chang  To: LILIA Roca  Sent: 2/3/2021 12:43 PM CST  Subject: Non-Urgent Medical Question    When I was in for my visit we talked about getting a new pump. I would like to explore options. Can you point me to how to do this?    Al

## 2021-03-01 ENCOUNTER — PATIENT MESSAGE (OUTPATIENT)
Dept: ENDOCRINOLOGY CLINIC | Facility: CLINIC | Age: 51
End: 2021-03-01

## 2021-03-01 DIAGNOSIS — Z79.4 DIABETES MELLITUS DUE TO UNDERLYING CONDITION WITH DIABETIC NEPHROPATHY, WITH LONG-TERM CURRENT USE OF INSULIN (HCC): ICD-10-CM

## 2021-03-01 DIAGNOSIS — E08.21 DIABETES MELLITUS DUE TO UNDERLYING CONDITION WITH DIABETIC NEPHROPATHY, WITH LONG-TERM CURRENT USE OF INSULIN (HCC): ICD-10-CM

## 2021-03-02 RX ORDER — BLOOD-GLUCOSE SENSOR
EACH MISCELLANEOUS
Qty: 9 EACH | Refills: 3 | Status: SHIPPED | OUTPATIENT
Start: 2021-03-02 | End: 2021-03-15

## 2021-03-02 RX ORDER — BLOOD-GLUCOSE TRANSMITTER
EACH MISCELLANEOUS
Qty: 1 EACH | Refills: 3 | Status: SHIPPED | OUTPATIENT
Start: 2021-03-02 | End: 2021-03-15

## 2021-03-02 NOTE — TELEPHONE ENCOUNTER
From: Henry Kern  To: LILIA Oleary  Sent: 3/1/2021 6:48 PM CST  Subject: Prescription Question    Good evening,    Just realized that I don't have any refills/scripts for my Dexcom to be filled.  I am switching everything to a mail order thro

## 2021-03-08 ENCOUNTER — TELEPHONE (OUTPATIENT)
Dept: ENDOCRINOLOGY CLINIC | Facility: CLINIC | Age: 51
End: 2021-03-08

## 2021-03-08 NOTE — TELEPHONE ENCOUNTER
CMN, chart notes, insurance card and face sheet faxed to Ruth at [facility], [phone] to start process for omnipod pump per LILIA Phoenix.

## 2021-03-08 NOTE — TELEPHONE ENCOUNTER
In reviewing chart for omnipod CMN, I see earlier diagnoses of Type 1, but currently type 2. Called patient for approximate diagnosis date, also asked which type. Pt states she doesn't know, has been told both by different providers.   Diabetes began with

## 2021-03-15 ENCOUNTER — TELEPHONE (OUTPATIENT)
Dept: ENDOCRINOLOGY CLINIC | Facility: CLINIC | Age: 51
End: 2021-03-15

## 2021-03-15 DIAGNOSIS — E08.21 DIABETES MELLITUS DUE TO UNDERLYING CONDITION WITH DIABETIC NEPHROPATHY, WITH LONG-TERM CURRENT USE OF INSULIN (HCC): ICD-10-CM

## 2021-03-15 DIAGNOSIS — Z79.4 DIABETES MELLITUS DUE TO UNDERLYING CONDITION WITH DIABETIC NEPHROPATHY, WITH LONG-TERM CURRENT USE OF INSULIN (HCC): ICD-10-CM

## 2021-03-15 RX ORDER — BLOOD-GLUCOSE TRANSMITTER
EACH MISCELLANEOUS
Qty: 1 EACH | Refills: 3 | Status: SHIPPED | OUTPATIENT
Start: 2021-03-15

## 2021-03-15 RX ORDER — BLOOD-GLUCOSE SENSOR
EACH MISCELLANEOUS
Qty: 9 EACH | Refills: 3 | Status: SHIPPED | OUTPATIENT
Start: 2021-03-15

## 2021-03-17 ENCOUNTER — TELEPHONE (OUTPATIENT)
Dept: FAMILY MEDICINE CLINIC | Facility: CLINIC | Age: 51
End: 2021-03-17

## 2021-03-17 ENCOUNTER — OFFICE VISIT (OUTPATIENT)
Dept: FAMILY MEDICINE CLINIC | Facility: CLINIC | Age: 51
End: 2021-03-17
Payer: COMMERCIAL

## 2021-03-17 VITALS
SYSTOLIC BLOOD PRESSURE: 126 MMHG | WEIGHT: 235 LBS | HEIGHT: 62 IN | OXYGEN SATURATION: 99 % | BODY MASS INDEX: 43.24 KG/M2 | DIASTOLIC BLOOD PRESSURE: 84 MMHG | RESPIRATION RATE: 18 BRPM | TEMPERATURE: 97 F | HEART RATE: 68 BPM

## 2021-03-17 DIAGNOSIS — N63.20 LEFT BREAST LUMP: Primary | ICD-10-CM

## 2021-03-17 DIAGNOSIS — S29.019A THORACIC MYOFASCIAL STRAIN, INITIAL ENCOUNTER: ICD-10-CM

## 2021-03-17 PROCEDURE — 3079F DIAST BP 80-89 MM HG: CPT | Performed by: FAMILY MEDICINE

## 2021-03-17 PROCEDURE — 99214 OFFICE O/P EST MOD 30 MIN: CPT | Performed by: FAMILY MEDICINE

## 2021-03-17 PROCEDURE — 3074F SYST BP LT 130 MM HG: CPT | Performed by: FAMILY MEDICINE

## 2021-03-17 PROCEDURE — 3008F BODY MASS INDEX DOCD: CPT | Performed by: FAMILY MEDICINE

## 2021-03-17 RX ORDER — INSULIN PUMP CONTROLLER
EACH MISCELLANEOUS
COMMUNITY
Start: 2021-03-16 | End: 2021-09-09 | Stop reason: ALTCHOICE

## 2021-03-17 RX ORDER — CYCLOBENZAPRINE HCL 10 MG
10 TABLET ORAL NIGHTLY PRN
Qty: 15 TABLET | Refills: 0 | Status: SHIPPED | OUTPATIENT
Start: 2021-03-17 | End: 2021-04-01

## 2021-03-17 NOTE — TELEPHONE ENCOUNTER
Pt was seen today she called her insurance to see if she can get her imaging done at [facility].  Per pt is going back to [facility]   Ph. [phone]

## 2021-03-17 NOTE — PROGRESS NOTES
Patient presents with:  Lump: LT x 5 days       HPI:    Patient ID: Vidal Almendarez is a 54year old female c/o lump in left breast. Incidentally noticed approx 5 days ago. Not painful. Was sore when push on it the first day. No nipple discharge.  No skin PATCH     • Ferrous Sulfate (FEROSUL) 325 (65 Fe) MG Oral Tab Take 1 tablet (325 mg total) by mouth daily with breakfast. 90 tablet 0   • buPROPion HCl ER, XL, 300 MG Oral Tablet 24 Hr Take 1 tablet (300 mg total) by mouth daily.  90 tablet 0   • Butalbital Decorative tattoo    • Depression    • Depressive disorder, not elsewhere classified 11/22/2011   • Diabetes mellitus due to underlying condition with diabetic nephropathy ([facility] [address] 4/11/2017   • Diarrhea, unspecified    • Dizziness and giddiness 2/18/2012   • 10/21/2016    Performed by Severo Scanlon DO at [facility] ENDOSCOPY   • CYST ASPIRATION LEFT     • CYST ASPIRATION RIGHT     • KNEE ARTHROSCOPY Right 4/21/2015    Performed by Jacobo Michele MD at [facility] MAIN OR   • [id number]  11/14/2011   • ME Normal rate and regular rhythm. Heart sounds: No murmur heard. Pulmonary:      Effort: Pulmonary effort is normal.      Breath sounds: Normal breath sounds.    Chest:      Breasts:         Right: No mass, nipple discharge, skin change or tenderness

## 2021-03-18 ENCOUNTER — PATIENT MESSAGE (OUTPATIENT)
Dept: FAMILY MEDICINE CLINIC | Facility: CLINIC | Age: 51
End: 2021-03-18

## 2021-03-18 DIAGNOSIS — Z23 NEED FOR VACCINATION: ICD-10-CM

## 2021-03-18 RX ORDER — LEVOTHYROXINE SODIUM 137 UG/1
137 TABLET ORAL DAILY
Qty: 90 TABLET | Refills: 1 | Status: SHIPPED | OUTPATIENT
Start: 2021-03-18 | End: 2021-06-25

## 2021-03-18 NOTE — TELEPHONE ENCOUNTER
LOV: 3/17/21 for left breast lump  TSH: 1/7/21    LEVOTHYROXINE SODIUM 137 MCG Oral Tab 90 tablet 0 9/23/2020    Sig:   TAKE 1 TABLET BY MOUTH EVERY DAY         Thyroid Supplements Protocol [id number]/18/2021 01:45 PM   TSH test in past 12 months Protocol [facility]

## 2021-03-18 NOTE — TELEPHONE ENCOUNTER
From: Eliseo Keenan  To: Shannan Pedraza MD  Sent: 3/18/2021 12:16 PM CDT  Subject: Prescription Question    Can I have a refill sent to [facility] for my thyroid medicine?  I don't have any or refills on it at [place]

## 2021-03-22 NOTE — TELEPHONE ENCOUNTER
Per Abby Rodriguez, patient recevied omni pod pump supplies last week. She needs to see an educator for a pump start. Nataliya Parry will call and schedule for pump start.

## 2021-03-26 NOTE — TELEPHONE ENCOUNTER
Recommendations     1.) Advance diet as tolerated to low fiber per SLP texture recommendations once medically appropriate.   2.) If EN warranted, continue Impact Peptide 1.5; goal rate at 35mL/hr. Begin at 10mL/hr and advance 5-10mL Q8hr as tolerated to goal rate at 35mL/hr. EN provides 1260kcal, 78gm of protein, 646mL of free water. Add free water per MD recommendations. Hold EN for residuals >500cc.   3.) Monitor electrolytes and replace as needed.   4.) Continue TPN of 70gm AA, 100gm of dextrose, 50gm of lipids to provide 1120kcal.      Goals: pt to tolerate >85% of EEN/EPN by RD follow up  Nutrition Goal Status: new  Communication of RD Recs: other (comment)(POC)   Training scheduled for 3/29/21. Training documents sent to educator and patient instructions sent to patient.

## 2021-03-29 ENCOUNTER — NURSE ONLY (OUTPATIENT)
Dept: ENDOCRINOLOGY CLINIC | Facility: CLINIC | Age: 51
End: 2021-03-29
Payer: COMMERCIAL

## 2021-03-29 DIAGNOSIS — Z79.4 TYPE 2 DIABETES MELLITUS WITHOUT COMPLICATION, WITH LONG-TERM CURRENT USE OF INSULIN (HCC): ICD-10-CM

## 2021-03-29 DIAGNOSIS — E11.9 TYPE 2 DIABETES MELLITUS WITHOUT COMPLICATION, WITH LONG-TERM CURRENT USE OF INSULIN (HCC): ICD-10-CM

## 2021-03-31 NOTE — PROGRESS NOTES
Zia Wales  DOB3/12/1970 was seen for Intro to Pump Education: No charge due to contract with [employer]    Date: 3/29/2021  Referring Provider: DONNA Shirley  Start time: 4:00pm End time: 5:30pm    Type of pump: Omnipod Dash  (Pt. previously wore Medtron filling reservoir / inserting infusion set    Comments:   - Follow-up in 3 days-phone check-in  - Follow-up with DONNA Ku on 4/6/21    Patient verbalized understanding and has no further questions at this time.     Teresa Vizcarra RN, CDE

## 2021-04-01 ENCOUNTER — TELEPHONE (OUTPATIENT)
Dept: ENDOCRINOLOGY CLINIC | Facility: CLINIC | Age: 51
End: 2021-04-01

## 2021-04-02 NOTE — TELEPHONE ENCOUNTER
Pt. contacted regarding insulin pump start. States that she has been doing well;just a \"little nervous\" to give the whole bolus for [place]  (afraid of going low). So she cut back on bolus so her levels were higher.  She also was just more comfortable using

## 2021-04-03 RX ORDER — OMEPRAZOLE 40 MG/1
40 CAPSULE, DELAYED RELEASE ORAL DAILY
Qty: 90 CAPSULE | Refills: 0 | Status: SHIPPED | OUTPATIENT
Start: 2021-04-03 | End: 2021-05-26

## 2021-04-03 NOTE — TELEPHONE ENCOUNTER
LOV 3/17/21 for a breast lump. Omeprazole 40 MG Oral Capsule Delayed Release 90 capsule 0 1/14/2021    Sig:   Take 1 capsule (40 mg total) by mouth daily.        Future Appointments   Date Time Provider Jovani Haque   4/6/2021  3:15 PM Bethel Ospina

## 2021-04-06 ENCOUNTER — OFFICE VISIT (OUTPATIENT)
Dept: ENDOCRINOLOGY CLINIC | Facility: CLINIC | Age: 51
End: 2021-04-06
Payer: COMMERCIAL

## 2021-04-06 VITALS
OXYGEN SATURATION: 97 % | HEIGHT: 62 IN | WEIGHT: 245 LBS | HEART RATE: 78 BPM | BODY MASS INDEX: 45.08 KG/M2 | TEMPERATURE: 98 F | SYSTOLIC BLOOD PRESSURE: 110 MMHG | DIASTOLIC BLOOD PRESSURE: 70 MMHG

## 2021-04-06 DIAGNOSIS — Z96.41 INSULIN PUMP IN PLACE: ICD-10-CM

## 2021-04-06 DIAGNOSIS — Z79.4 TYPE 2 DIABETES MELLITUS WITH HYPERGLYCEMIA, WITH LONG-TERM CURRENT USE OF INSULIN (HCC): Primary | ICD-10-CM

## 2021-04-06 DIAGNOSIS — E11.65 TYPE 2 DIABETES MELLITUS WITH HYPERGLYCEMIA, WITH LONG-TERM CURRENT USE OF INSULIN (HCC): Primary | ICD-10-CM

## 2021-04-06 DIAGNOSIS — E78.5 DYSLIPIDEMIA: ICD-10-CM

## 2021-04-06 PROBLEM — R07.0 THROAT PAIN IN ADULT: Status: RESOLVED | Noted: 2021-01-11 | Resolved: 2021-04-06

## 2021-04-06 PROBLEM — E08.21 DIABETES MELLITUS DUE TO UNDERLYING CONDITION WITH DIABETIC NEPHROPATHY (HCC): Status: RESOLVED | Noted: 2017-04-11 | Resolved: 2021-04-06

## 2021-04-06 PROCEDURE — 3008F BODY MASS INDEX DOCD: CPT | Performed by: NURSE PRACTITIONER

## 2021-04-06 PROCEDURE — 3078F DIAST BP <80 MM HG: CPT | Performed by: NURSE PRACTITIONER

## 2021-04-06 PROCEDURE — 3074F SYST BP LT 130 MM HG: CPT | Performed by: NURSE PRACTITIONER

## 2021-04-06 PROCEDURE — 83036 HEMOGLOBIN GLYCOSYLATED A1C: CPT | Performed by: NURSE PRACTITIONER

## 2021-04-06 PROCEDURE — 99214 OFFICE O/P EST MOD 30 MIN: CPT | Performed by: NURSE PRACTITIONER

## 2021-04-06 PROCEDURE — 95251 CONT GLUC MNTR ANALYSIS I&R: CPT | Performed by: NURSE PRACTITIONER

## 2021-04-06 NOTE — PROGRESS NOTES
Vidal Almendarez is a 54year old female who presents today for type 1-diabetes management.    Primary care physician: Kimberlee Parker MD     In the past 3m her DM control has remained well controlled as evidenced by A1C today 5.9%  (last A1C 5.6%)   Has los Active insulin time: 4 hrs          HGBA1C:    Lab Results   Component Value Date    A1C 5.9 (A) 04/06/2021    A1C 5.6 01/07/2021    A1C 5.7 (H) 07/20/2020     07/20/2020       Lab Results   Component Value Date    CHOLEST 170 01/07/2021    T SURGICAL HISTORY  x1    sinus   • REMOVAL GALLBLADDER     • SLING UROLOGY N/A 1/9/2014    Performed by Cassy Mclaughlin MD at [facility]   • THYROIDECTOMY     • TOTAL ABDOM HYSTERECTOMY  7/2020     Social History    Tobacco Use      Smoking statu UNIT/ML Subcutaneous Solution Pen-injector Inject 50 units when off pump 3 mL 0   • simvastatin 40 MG Oral Tab Take 1 tablet (40 mg total) by mouth every evening.  90 tablet 3   • PARoxetine HCl 40 MG Oral Tab Take 1 tablet (40 mg total) by mouth every morn CARDIOVASCULAR: denies chest pain  GI: denies abdominal pain, nausea, vomiting or diarrhea   : denies dysuria  MUSCULOSKELETAL: denies pain  PSYCHE: denies depression or anxiety      Physical exam:  /70   Pulse 78   Temp 97.9 °F (36.6 °C)   Ht 5' back up plan: toujeo 26  units once daily when OFF pump (has active rx)   Travel letter in my chart for sensor/pump travel for [employer]   Continue site rotation for insulin pump infusion sites to avoid lipodystrophy   Reviewed clinical significance of A1c, shalini glucose trends, discussing treatment options, lifestyle modifications and completing documentation   The risks and benefits of my recommendations, as well as other treatment options were discussed with the patient today.  questions were also answered to the

## 2021-04-06 NOTE — PATIENT INSTRUCTIONS
Your A1C: 5.9%   This is really good for you   The A1C test provides us with your average blood sugar for the past 3 months. Keeping an A1C less than 7% helps reduce or delay health problems that are related to diabetes.    The main goal of diabetes treatme plan in case your pump stops working or the infusion set comes out.      If your infusion set comes out:  Always carry extra pump supplies with you in case of this but also carry syringes in case you need to inject insulin until you can re-insert the infusi CHANGING OR STOPPING ANY MEDICATIONS. Blood sugar targets:  Before breakfast:   (preferably less than  110)  2 hours After meals: less than 180 (preferably less than 150)   Call for persistent blood sugars less than  75 or more than  200.    Blood best provide you care, patients receiving routine medications need to be seen at least twice per year however if the A1C is above 8% you will be need to be seen more frequently.    · Yearly blood work may also required for many medications to insure safe pr

## 2021-04-08 ENCOUNTER — HOSPITAL ENCOUNTER (OUTPATIENT)
Age: 51
Discharge: HOME | End: 2021-04-08
Payer: COMMERCIAL

## 2021-04-08 ENCOUNTER — APPOINTMENT (OUTPATIENT)
Dept: GENERAL RADIOLOGY | Age: 51
End: 2021-04-08
Attending: NURSE PRACTITIONER
Payer: COMMERCIAL

## 2021-04-08 VITALS
BODY MASS INDEX: 43.24 KG/M2 | SYSTOLIC BLOOD PRESSURE: 153 MMHG | HEART RATE: 62 BPM | TEMPERATURE: 98 F | DIASTOLIC BLOOD PRESSURE: 80 MMHG | HEIGHT: 62 IN | OXYGEN SATURATION: 98 % | RESPIRATION RATE: 18 BRPM | WEIGHT: 235 LBS

## 2021-04-08 DIAGNOSIS — S69.90XA FINGER INJURY: Primary | ICD-10-CM

## 2021-04-08 PROCEDURE — 73140 X-RAY EXAM OF FINGER(S): CPT | Performed by: NURSE PRACTITIONER

## 2021-04-08 PROCEDURE — 99213 OFFICE O/P EST LOW 20 MIN: CPT | Performed by: NURSE PRACTITIONER

## 2021-04-08 PROCEDURE — A4570 SPLINT: HCPCS | Performed by: NURSE PRACTITIONER

## 2021-04-08 NOTE — ED INITIAL ASSESSMENT (HPI)
Pt sts smashed 3rd left finger between dresser and the floor this afternoon. Unable to bend to DIP joint. Pt is right handed.

## 2021-04-09 NOTE — ED PROVIDER NOTES
Patient Seen in: [facility]      History   Patient presents with:  Arm or Hand Injury    Stated Complaint: finger injury    HPI/Subjective:   51-year-old female presents to the IC with complaints of left third finger injury.   Patient states sh CHOLECYSTECTOMY  11/14/2011   • MESH (IMPLANTABLE)      bladder   • OTHER      wisdom   • OTHER SURGICAL HISTORY  x2    total thyroidectomy   • OTHER SURGICAL HISTORY  x1    sinus   • REMOVAL GALLBLADDER     • SLING UROLOGY N/A 1/9/2014    Performed by Russell Shi swelling: Is noted around the DIP joint of the third finger  - deformity/defect: No deformity  - crepitus: Negative crepitus  - ecchymosis/bruising: Mild bruising is noted to the dorsal aspect of the DIP joint of the third digit  - tenderness over phalange agrees to return for any concerns, problems or complications as discussed and voiced understanding and all questions were answered at this time.                              Disposition and Plan     Clinical Impression:  Finger injury  (primary encounter di

## 2021-04-10 ENCOUNTER — TELEPHONE (OUTPATIENT)
Dept: FAMILY MEDICINE CLINIC | Facility: CLINIC | Age: 51
End: 2021-04-10

## 2021-04-10 NOTE — TELEPHONE ENCOUNTER
Patient seen in [address] [place] [address] for broken finger  Patient was told to follow up with hand specialist  Gave patient contact information for Dr. Donnie Vogel  Patient states no referral needed.

## 2021-04-17 RX ORDER — LIDOCAINE HYDROCHLORIDE 20 MG/ML
SOLUTION ORAL; TOPICAL
Qty: 90 TABLET | Refills: 0 | OUTPATIENT
Start: 2021-04-17

## 2021-04-17 NOTE — TELEPHONE ENCOUNTER
This Rx was just sent for #90:    Ferrous Sulfate (FEROSUL) 325 (65 Fe) MG Oral Tab 90 tablet 0 4/2/2021    Sig:   Take 1 tablet (325 mg total) by mouth daily with breakfast.     Route:   Oral       Rx refused.

## 2021-05-07 ENCOUNTER — PATIENT MESSAGE (OUTPATIENT)
Dept: FAMILY MEDICINE CLINIC | Facility: CLINIC | Age: 51
End: 2021-05-07

## 2021-05-07 NOTE — TELEPHONE ENCOUNTER
From: Rafaela Lawrence  To: Karri Gomez MD  Sent: 5/7/2021 4:30 PM CDT  Subject: Non-Urgent Medical Question    [facility] An gave me a name of a hand doctor and I can't seem to find it. Could I please get the name and number again.     Sorry and thanks   [facility]

## 2021-05-17 ENCOUNTER — TELEPHONE (OUTPATIENT)
Dept: FAMILY MEDICINE CLINIC | Facility: CLINIC | Age: 51
End: 2021-05-17

## 2021-05-17 ENCOUNTER — OFFICE VISIT (OUTPATIENT)
Dept: FAMILY MEDICINE CLINIC | Facility: CLINIC | Age: 51
End: 2021-05-17
Payer: COMMERCIAL

## 2021-05-17 ENCOUNTER — HOSPITAL ENCOUNTER (OUTPATIENT)
Dept: GENERAL RADIOLOGY | Age: 51
Discharge: HOME | End: 2021-05-17
Attending: FAMILY MEDICINE
Payer: COMMERCIAL

## 2021-05-17 ENCOUNTER — PATIENT MESSAGE (OUTPATIENT)
Dept: FAMILY MEDICINE CLINIC | Facility: CLINIC | Age: 51
End: 2021-05-17

## 2021-05-17 VITALS
TEMPERATURE: 97 F | WEIGHT: 240 LBS | BODY MASS INDEX: 44.16 KG/M2 | HEIGHT: 62 IN | RESPIRATION RATE: 18 BRPM | OXYGEN SATURATION: 97 % | HEART RATE: 75 BPM | SYSTOLIC BLOOD PRESSURE: 122 MMHG | DIASTOLIC BLOOD PRESSURE: 80 MMHG

## 2021-05-17 DIAGNOSIS — M17.0 PRIMARY OSTEOARTHRITIS OF BOTH KNEES: ICD-10-CM

## 2021-05-17 DIAGNOSIS — R35.0 FREQUENCY OF URINATION: Primary | ICD-10-CM

## 2021-05-17 PROCEDURE — 3079F DIAST BP 80-89 MM HG: CPT | Performed by: FAMILY MEDICINE

## 2021-05-17 PROCEDURE — 87086 URINE CULTURE/COLONY COUNT: CPT | Performed by: FAMILY MEDICINE

## 2021-05-17 PROCEDURE — 99214 OFFICE O/P EST MOD 30 MIN: CPT | Performed by: FAMILY MEDICINE

## 2021-05-17 PROCEDURE — 73560 X-RAY EXAM OF KNEE 1 OR 2: CPT | Performed by: FAMILY MEDICINE

## 2021-05-17 PROCEDURE — 81003 URINALYSIS AUTO W/O SCOPE: CPT | Performed by: FAMILY MEDICINE

## 2021-05-17 PROCEDURE — 3008F BODY MASS INDEX DOCD: CPT | Performed by: FAMILY MEDICINE

## 2021-05-17 PROCEDURE — 3074F SYST BP LT 130 MM HG: CPT | Performed by: FAMILY MEDICINE

## 2021-05-17 RX ORDER — CIPROFLOXACIN 500 MG/1
500 TABLET, FILM COATED ORAL 2 TIMES DAILY
Qty: 10 TABLET | Refills: 0 | Status: SHIPPED | OUTPATIENT
Start: 2021-05-17 | End: 2021-05-22

## 2021-05-17 NOTE — TELEPHONE ENCOUNTER
From: Young Connolly  To: Chelsea Sanabria MD  Sent: 5/17/2021 8:41 AM CDT  Subject: Non-Urgent Medical Question    I'm pretty sure I have some sort of UTI and not sure what to do.  I have pain when I'm standing like pressure     Help!!

## 2021-05-17 NOTE — TELEPHONE ENCOUNTER
Noé Peck MD 1 minute ago (8:41 AM)     I'm pretty sure I have some sort of UTI and not sure what to do. I have pain when I'm standing like pressure      Help!!      Called patient. Sx started Saturday.  Vaginal pain that comes and

## 2021-05-17 NOTE — PROGRESS NOTES
Elza Alicia is a 54year old female.   HPI:   Patient presents with symptoms of UTI x 2-3 days  C/O  Dysuria: Yes  Urinary Frequency: Yes  Urinary Urgency: Yes  Hematuria: No  Fever/Chills: No  Suprapubic pressure or pain: No      Current Outpatient Me needed for Pain or Headaches. 10 tablet 0   • Accu-Chek FastClix Lancets Does not apply Misc 1 lancet by Finger stick route 4 (four) times daily.  408 each 0   • Glucagon, rDNA, (GLUCAGON EMERGENCY) 1 MG Injection Kit INJECT ONE SYRINGE UNDER THE SKIN ONCE non-smoker    Vaping Use      Vaping Use: Never used    Alcohol use:  Yes      Alcohol/week: 2.0 standard drinks      Types: 2 Standard drinks or equivalent per week      Comment: socially    Drug use: No        REVIEW OF SYSTEMS:   GENERAL HEALTH: feels we

## 2021-05-18 ENCOUNTER — TELEPHONE (OUTPATIENT)
Dept: FAMILY MEDICINE CLINIC | Facility: CLINIC | Age: 51
End: 2021-05-18

## 2021-05-18 NOTE — TELEPHONE ENCOUNTER
----- Message from Chelsea Sanabria MD sent at 5/17/2021 10:56 PM CDT -----  Both knees show arthritis. Left is worse  Would she like to get eval by ortho?  If yes, refer to Dr. Garfield Motta

## 2021-05-18 NOTE — TELEPHONE ENCOUNTER
Patient advised. Verbalized understanding.    klaus like info sent via [facility]    Dr Harshil Li Phone: [phone]

## 2021-05-19 ENCOUNTER — TELEPHONE (OUTPATIENT)
Dept: FAMILY MEDICINE CLINIC | Facility: CLINIC | Age: 51
End: 2021-05-19

## 2021-05-19 NOTE — TELEPHONE ENCOUNTER
Patient advised. Verbalized understanding. Bought azo and that helped with the pain  Still taking the cipro. After she took the azo yesterday morning  States her heart started pounding. Lasted for 2 hours. Did not take her HR.    Read the back of the az

## 2021-05-19 NOTE — TELEPHONE ENCOUNTER
----- Message from Kimberly Heredia MD sent at 5/19/2021 12:33 PM CDT -----  Urine cx looks ok. Are her sx better?

## 2021-05-25 VITALS
HEART RATE: 77 BPM | TEMPERATURE: 99.5 F | OXYGEN SATURATION: 98 % | RESPIRATION RATE: 14 BRPM | DIASTOLIC BLOOD PRESSURE: 80 MMHG | SYSTOLIC BLOOD PRESSURE: 140 MMHG | WEIGHT: 263 LBS | TEMPERATURE: 97.6 F | DIASTOLIC BLOOD PRESSURE: 68 MMHG | BODY MASS INDEX: 48.4 KG/M2 | HEIGHT: 62 IN | RESPIRATION RATE: 16 BRPM | HEART RATE: 82 BPM | SYSTOLIC BLOOD PRESSURE: 144 MMHG

## 2021-05-26 RX ORDER — OMEPRAZOLE 40 MG/1
CAPSULE, DELAYED RELEASE ORAL
Qty: 90 CAPSULE | Refills: 0 | Status: SHIPPED | OUTPATIENT
Start: 2021-05-26 | End: 2021-08-04

## 2021-05-26 NOTE — TELEPHONE ENCOUNTER
LOV 5/17/21 for UTI. Omeprazole 40 MG Oral Capsule Delayed Release 90 capsule 0 4/3/2021    Sig:   Take 1 capsule (40 mg total) by mouth daily.      Route:   Oral       Future Appointments   Date Time Provider Jovani Haque   6/1/2021 11:50 AM AZUL

## 2021-05-28 ENCOUNTER — PATIENT OUTREACH (OUTPATIENT)
Dept: FAMILY MEDICINE CLINIC | Facility: CLINIC | Age: 51
End: 2021-05-28

## 2021-05-29 ENCOUNTER — PATIENT MESSAGE (OUTPATIENT)
Dept: FAMILY MEDICINE CLINIC | Facility: CLINIC | Age: 51
End: 2021-05-29

## 2021-05-29 RX ORDER — AMOXICILLIN AND CLAVULANATE POTASSIUM 875; 125 MG/1; MG/1
1 TABLET, FILM COATED ORAL 2 TIMES DAILY
Qty: 20 TABLET | Refills: 0 | Status: SHIPPED | OUTPATIENT
Start: 2021-05-29 | End: 2021-06-08

## 2021-05-29 NOTE — TELEPHONE ENCOUNTER
From: Celina Burns  To: Giovanni Severe, MD  Sent: 5/29/2021 9:22 AM CDT  Subject: Non-Urgent Medical Question    I noticed this 2 days ago, honestly have no idea how long it's been there.  Have been putting a CBD ointment on it because I don't know what

## 2021-05-29 NOTE — TELEPHONE ENCOUNTER
LMTCB  Location? Has the redness increased? Drainage? Did it have a clear blister on top? Avoid squeezing.   May be an infected

## 2021-05-29 NOTE — TELEPHONE ENCOUNTER
The area is on her lower stomach. She just noticed it 2 days ago. Redness has worsened in the last 2 days. No drainage. Not painful. No blister on top but the skin peels away from it. Not itching. Per DB, will send abx.   Pt should wait 24 hours be

## 2021-05-31 ENCOUNTER — PATIENT MESSAGE (OUTPATIENT)
Dept: FAMILY MEDICINE CLINIC | Facility: CLINIC | Age: 51
End: 2021-05-31

## 2021-06-01 RX ORDER — BUPROPION HYDROCHLORIDE 300 MG/1
300 TABLET ORAL DAILY
Qty: 90 TABLET | Refills: 0 | Status: SHIPPED | OUTPATIENT
Start: 2021-06-01 | End: 2021-08-04

## 2021-06-01 NOTE — TELEPHONE ENCOUNTER
Last refilled on 12/16/20 for # 90 with 0 refills  Last OV 5/17/21  Future Appointments   Date Time Provider Jovani Haque   8/17/2021  4:30 PM LILIA Martinez [facility] [address] [address]        Thank you.

## 2021-06-01 NOTE — TELEPHONE ENCOUNTER
From: Krista Thompson  To: Jb Reed MD  Sent: 5/31/2021 9:05 PM CDT  Subject: Prescription Question    I am hoping to have [facility] for Bupropion XL 300mg for me. Please let me know if I need to do something else.     Thank

## 2021-06-25 ENCOUNTER — PATIENT MESSAGE (OUTPATIENT)
Dept: FAMILY MEDICINE CLINIC | Facility: CLINIC | Age: 51
End: 2021-06-25

## 2021-06-25 RX ORDER — LEVOTHYROXINE SODIUM 137 UG/1
137 TABLET ORAL DAILY
Qty: 90 TABLET | Refills: 0 | Status: SHIPPED | OUTPATIENT
Start: 2021-06-25 | End: 2021-09-17

## 2021-06-25 NOTE — TELEPHONE ENCOUNTER
From: Juliet Conrad  To: Rocky Rao MD  Sent: 6/25/2021 8:30 AM CDT  Subject: Prescription Question    Could I have a refill called into [facility] for the levyothorxine (sorry no clue how to spell it)     [phone]    Thanks!

## 2021-06-25 NOTE — TELEPHONE ENCOUNTER
LOV: 5/17/21 for urinary frequency   TSH: 1/7/21    Levothyroxine Sodium 137 MCG Oral Tab 90 tablet 1 3/18/2021    Sig:   Take 137 mcg by mouth daily.        Thyroid Supplements Protocol [id number]/25/2021 08:37 AM   TSH test in past 12 months Protocol Detail

## 2021-07-28 ENCOUNTER — TELEPHONE (OUTPATIENT)
Dept: FAMILY MEDICINE CLINIC | Facility: CLINIC | Age: 51
End: 2021-07-28

## 2021-07-28 NOTE — TELEPHONE ENCOUNTER
Pt coming in for a Tb test   Future Appointments   Date Time Provider Jovani Haque   8/2/2021  8:30 AM EMG [facility] NURSE EMGOSW [facility] [place]   8/17/2021  4:30 PM Luis Felipe Bruno APRN [facility] [address]

## 2021-08-02 ENCOUNTER — NURSE ONLY (OUTPATIENT)
Dept: FAMILY MEDICINE CLINIC | Facility: CLINIC | Age: 51
End: 2021-08-02
Payer: COMMERCIAL

## 2021-08-02 PROCEDURE — 86580 TB INTRADERMAL TEST: CPT | Performed by: FAMILY MEDICINE

## 2021-08-03 ENCOUNTER — PATIENT MESSAGE (OUTPATIENT)
Dept: ENDOCRINOLOGY CLINIC | Facility: CLINIC | Age: 51
End: 2021-08-03

## 2021-08-04 ENCOUNTER — NURSE ONLY (OUTPATIENT)
Dept: FAMILY MEDICINE CLINIC | Facility: CLINIC | Age: 51
End: 2021-08-04
Payer: COMMERCIAL

## 2021-08-04 ENCOUNTER — TELEPHONE (OUTPATIENT)
Dept: FAMILY MEDICINE CLINIC | Facility: CLINIC | Age: 51
End: 2021-08-04

## 2021-08-04 ENCOUNTER — PATIENT MESSAGE (OUTPATIENT)
Dept: ENDOCRINOLOGY CLINIC | Facility: CLINIC | Age: 51
End: 2021-08-04

## 2021-08-04 ENCOUNTER — PATIENT MESSAGE (OUTPATIENT)
Dept: FAMILY MEDICINE CLINIC | Facility: CLINIC | Age: 51
End: 2021-08-04

## 2021-08-04 DIAGNOSIS — Z01.84 IMMUNITY STATUS TESTING: Primary | ICD-10-CM

## 2021-08-04 RX ORDER — BUPROPION HYDROCHLORIDE 300 MG/1
TABLET ORAL
Qty: 90 TABLET | Refills: 0 | Status: SHIPPED | OUTPATIENT
Start: 2021-08-04 | End: 2021-10-28

## 2021-08-04 RX ORDER — OMEPRAZOLE 40 MG/1
CAPSULE, DELAYED RELEASE ORAL
Qty: 90 CAPSULE | Refills: 0 | Status: SHIPPED | OUTPATIENT
Start: 2021-08-04 | End: 2021-10-17

## 2021-08-04 RX ORDER — LIDOCAINE HYDROCHLORIDE 20 MG/ML
SOLUTION ORAL; TOPICAL
Qty: 90 TABLET | Refills: 0 | Status: SHIPPED | OUTPATIENT
Start: 2021-08-04 | End: 2021-10-28

## 2021-08-04 NOTE — TELEPHONE ENCOUNTER
Omeprazole Last refilled on 5/26/21 for # 90 with 0 refills  Bupropion 6/1/21 #90 0 refill  Ferrous sulfate 4/17/21 #90 0 refill  Last OV 5/17/21  Future Appointments   Date Time Provider Jovani Haque   8/4/2021 10:00 AM EMG OS[facility] NURSE EMGOSW [facility]

## 2021-08-04 NOTE — TELEPHONE ENCOUNTER
Would dexcom would be more affordable for her through DME? Do we need to start order through [employer] for pump?

## 2021-08-04 NOTE — TELEPHONE ENCOUNTER
Pt has an RN appt for [facility].   Need an order   Future Appointments   Date Time Provider Jovani Shabnam   8/10/2021  8:15 AM EMG OS[facility] NURSE EMGOSW [facility] [place]   8/17/2021  4:30 PM Suyapa Bruno APRN [facility] [address]

## 2021-08-04 NOTE — TELEPHONE ENCOUNTER
From: Nevin Oswald  To: LILIA Jensen  Sent: 8/4/2021 10:14 AM CDT  Subject: Non-Urgent Medical Question    No I'm not driving!! Lol     Thanks, I just gave you the number someone at [facility] gave me yesterday.    Thank you!!

## 2021-08-04 NOTE — TELEPHONE ENCOUNTER
Called pt to get more info. She is a student at [employer]. and needs an immunization record. Pt notified we usually draw titers for Hep B, MMR, and Varicella. Pt will find out exactly what she needs and let us know.   Pt also notified she may need to start the

## 2021-08-04 NOTE — TELEPHONE ENCOUNTER
Emailed face sheet and pt insurance card to [facility] at [facility] for quote on dexcom supplies.

## 2021-08-04 NOTE — TELEPHONE ENCOUNTER
From: Bonifacio Israel  To: Pawan Malik MD  Sent: 8/4/2021 4:16 PM CDT  Subject: Non-Urgent Medical Question    The immunizations they are looking for are   Measles, mumps, rubella   Tetanus Diphtheria Pertussis    Is this something the blood test will

## 2021-08-04 NOTE — TELEPHONE ENCOUNTER
From: Jeremiah Izquierdo  To: LILIA Monge  Sent: 8/3/2021 4:58 PM CDT  Subject: Prescription Question    Hi!! I have switched insurance due to an employer change and was going through my benefits and my Omnipod isn't covered.  Not sure if my [facility]

## 2021-08-04 NOTE — TELEPHONE ENCOUNTER
[facility] provider services @ [phone] regarding pump authorization/coverage. Omnipod is covered at 100% once her $2000 deductible is met (currently $243 met).   [employer] pump is covered at 100% and patient does not need to meet deductible per

## 2021-08-05 NOTE — TELEPHONE ENCOUNTER
Future Appointments   Date Time Provider Jovani Haque   8/10/2021  8:15 AM EMG [facility] NURSE EMGOSW [facility] [place]   8/17/2021  4:30 PM Rosalio Bruno APRN [facility] [place]      The immunizations they are looking for are   Measles, mumps, rubel

## 2021-08-10 ENCOUNTER — NURSE ONLY (OUTPATIENT)
Dept: FAMILY MEDICINE CLINIC | Facility: CLINIC | Age: 51
End: 2021-08-10
Payer: COMMERCIAL

## 2021-08-10 DIAGNOSIS — Z01.84 IMMUNITY STATUS TESTING: ICD-10-CM

## 2021-08-10 LAB
RUBV IGG SER QL: POSITIVE
RUBV IGG SER-ACNC: >500 IU/ML (ref 10–?)

## 2021-08-10 PROCEDURE — 86735 MUMPS ANTIBODY: CPT | Performed by: NURSE PRACTITIONER

## 2021-08-10 PROCEDURE — 86765 RUBEOLA ANTIBODY: CPT | Performed by: NURSE PRACTITIONER

## 2021-08-10 PROCEDURE — 86762 RUBELLA ANTIBODY: CPT | Performed by: NURSE PRACTITIONER

## 2021-08-10 NOTE — PROGRESS NOTES
Bonifacio Israel presents today for nurse visit. Labs ordered by Dr Clay Avina.  2 gold drawn from right forearm area with butterfly. Patient tolerated well. Left office in stable condition.

## 2021-08-11 ENCOUNTER — TELEPHONE (OUTPATIENT)
Dept: FAMILY MEDICINE CLINIC | Facility: CLINIC | Age: 51
End: 2021-08-11

## 2021-08-11 ENCOUNTER — PATIENT MESSAGE (OUTPATIENT)
Dept: FAMILY MEDICINE CLINIC | Facility: CLINIC | Age: 51
End: 2021-08-11

## 2021-08-11 DIAGNOSIS — Z23 NEED FOR VACCINATION AGAINST MEASLES: Primary | ICD-10-CM

## 2021-08-11 LAB
MEV IGG SER-ACNC: 11.2 AU/ML (ref 16.5–?)
MUV IGG SER IA-ACNC: 15.6 AU/ML (ref 11–?)

## 2021-08-11 NOTE — TELEPHONE ENCOUNTER
----- Message from LILIA Bhatia sent at 8/10/2021  4:48 PM CDT -----  Rubella immune, awaiting Mumps and Measles immunity testing

## 2021-08-11 NOTE — TELEPHONE ENCOUNTER
From: Carin Escoto  To: Salvatore Bauer MD  Sent: 8/11/2021 9:40 AM CDT  Subject: Test Results Question    Good morning,    Would it be possible that once the final titer test comes back that I could get a letter with those results along with the date o

## 2021-08-11 NOTE — TELEPHONE ENCOUNTER
----- Message from LILIA Reich sent at 8/11/2021 12:59 PM CDT -----  Immune to Mumps  Not immune to Rubeola (measles)    Needs MMR booster

## 2021-08-12 ENCOUNTER — PATIENT MESSAGE (OUTPATIENT)
Dept: FAMILY MEDICINE CLINIC | Facility: CLINIC | Age: 51
End: 2021-08-12

## 2021-08-12 ENCOUNTER — NURSE ONLY (OUTPATIENT)
Dept: FAMILY MEDICINE CLINIC | Facility: CLINIC | Age: 51
End: 2021-08-12
Payer: COMMERCIAL

## 2021-08-12 PROCEDURE — 90707 MMR VACCINE SC: CPT | Performed by: NURSE PRACTITIONER

## 2021-08-12 PROCEDURE — 90471 IMMUNIZATION ADMIN: CPT | Performed by: NURSE PRACTITIONER

## 2021-08-12 NOTE — TELEPHONE ENCOUNTER
Future Appointments   Date Time Provider Jovani Haque   8/12/2021  3:00 PM EMG [facility] NURSE EMGOSW [facility] [place]   8/13/2021 12:45 PM Amita Henry MD EMG [facility]TH[facility] [place]   8/17/2021  4:30 PM LILIA Benavides EM[facility]ABCTRNA [facility] [place]   9/17/

## 2021-08-12 NOTE — PROGRESS NOTES
Urine pregnancy not completed as pt doesn't have a uterus  MMR given to left arm.  Pt elio well and left the clinic in stable condition    I asked if she needs proof of the booster- she will check with her university to see what documentation she needs and g

## 2021-08-12 NOTE — TELEPHONE ENCOUNTER
From: Martha Corrales  To: Dayanara Riddle MD  Sent: 8/12/2021 3:47 PM CDT  Subject: Non-Urgent Medical Question    Hi Ladies  Just talked with the health services department at [address]. They said they just need the results sent to them.  You can either type a l

## 2021-08-13 ENCOUNTER — OFFICE VISIT (OUTPATIENT)
Dept: ORTHOPEDICS CLINIC | Facility: CLINIC | Age: 51
End: 2021-08-13
Payer: COMMERCIAL

## 2021-08-13 VITALS — HEART RATE: 84 BPM | OXYGEN SATURATION: 99 %

## 2021-08-13 DIAGNOSIS — M15.1 DEGENERATIVE ARTHRITIS OF DISTAL INTERPHALANGEAL JOINT OF MIDDLE FINGER OF LEFT HAND: Primary | ICD-10-CM

## 2021-08-13 PROCEDURE — 99204 OFFICE O/P NEW MOD 45 MIN: CPT | Performed by: ORTHOPAEDIC SURGERY

## 2021-08-13 NOTE — PROGRESS NOTES
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

## 2021-08-13 NOTE — H&P
Clinic Note [facility]     Assessment/Plan:  54year old female    1. Left middle finger DIP joint arthritis–given failure to respond to nonsurgical management patient wishes to proceed with DIP fusion.   The risks associated with surgery include but night.     Occupation:     Past Medical History:   Diagnosis Date   • Anxiety    • Anxiety state, unspecified    • Asthma    • Back pain    • Depression    • Disorder of thyroid    • Esophageal reflux 5/7/2012   • GERD (gastroesophageal re (DEXCOM G6 SENSOR) Does not apply Misc Replace sensor every 10 days 9 each 3   • Continuous Blood Gluc Transmit (DEXCOM G6 TRANSMITTER) Does not apply Misc Replace every 90 days 1 each 3   • Insulin Glargine, 1 Unit Dial, (TOUJEO SOLOSTAR) 300 UNIT/ML Subc FOR WHEEZING 8.5 g 0   • ONETOUCH DELICA LANCETS FINE Does not apply Misc TEST 8 TIMES PER DAY.  100 each 11       Sulfa Antibiotics           Comment:Unknown; happened before adolescent period  Erythromycin Base       NAUSEA ONLY  Adhesive Tape           H issues          Link MD Dewey  Hand, Wrist, & Elbow Surgery  [facility]  [facility] [address] [facility]. [facility]  [email or web address]  t: [id number]  f: [phone]

## 2021-08-17 ENCOUNTER — TELEPHONE (OUTPATIENT)
Dept: ORTHOPEDICS CLINIC | Facility: CLINIC | Age: 51
End: 2021-08-17

## 2021-08-17 ENCOUNTER — TELEPHONE (OUTPATIENT)
Dept: ENDOCRINOLOGY CLINIC | Facility: CLINIC | Age: 51
End: 2021-08-17

## 2021-08-17 ENCOUNTER — OFFICE VISIT (OUTPATIENT)
Dept: ENDOCRINOLOGY CLINIC | Facility: CLINIC | Age: 51
End: 2021-08-17
Payer: COMMERCIAL

## 2021-08-17 VITALS
DIASTOLIC BLOOD PRESSURE: 74 MMHG | HEART RATE: 80 BPM | OXYGEN SATURATION: 95 % | RESPIRATION RATE: 18 BRPM | WEIGHT: 254.38 LBS | BODY MASS INDEX: 47 KG/M2 | SYSTOLIC BLOOD PRESSURE: 126 MMHG

## 2021-08-17 DIAGNOSIS — Z96.41 INSULIN PUMP IN PLACE: ICD-10-CM

## 2021-08-17 DIAGNOSIS — E11.65 TYPE 2 DIABETES MELLITUS WITH HYPERGLYCEMIA, WITH LONG-TERM CURRENT USE OF INSULIN (HCC): Primary | ICD-10-CM

## 2021-08-17 DIAGNOSIS — E78.5 DYSLIPIDEMIA: ICD-10-CM

## 2021-08-17 DIAGNOSIS — Z79.4 TYPE 2 DIABETES MELLITUS WITH HYPERGLYCEMIA, WITH LONG-TERM CURRENT USE OF INSULIN (HCC): Primary | ICD-10-CM

## 2021-08-17 DIAGNOSIS — M15.1 DEGENERATIVE ARTHRITIS OF DISTAL INTERPHALANGEAL JOINT OF MIDDLE FINGER OF LEFT HAND: Primary | ICD-10-CM

## 2021-08-17 LAB
CARTRIDGE LOT#: 831 NUMERIC
HEMOGLOBIN A1C: 5.9 % (ref 4.3–5.6)

## 2021-08-17 PROCEDURE — 99214 OFFICE O/P EST MOD 30 MIN: CPT | Performed by: NURSE PRACTITIONER

## 2021-08-17 PROCEDURE — 3078F DIAST BP <80 MM HG: CPT | Performed by: NURSE PRACTITIONER

## 2021-08-17 PROCEDURE — 83036 HEMOGLOBIN GLYCOSYLATED A1C: CPT | Performed by: NURSE PRACTITIONER

## 2021-08-17 PROCEDURE — 95251 CONT GLUC MNTR ANALYSIS I&R: CPT | Performed by: NURSE PRACTITIONER

## 2021-08-17 PROCEDURE — 3074F SYST BP LT 130 MM HG: CPT | Performed by: NURSE PRACTITIONER

## 2021-08-17 NOTE — PROGRESS NOTES
Viviane Massey is a 54year old female who presents today for type 1-diabetes management. Primary care physician: Talia Sanchez MD     In the past 3m her Diabetes control has remained well controlled.  Today's A1C 5.9%  ( last A1C 5.9%)     Due to insu settings:   I:C   12am: 11  13:00p: 9  18:30p: 11        ISF:  1:30                               Target goals: 95 mg/dl  (correct above 95 mg/dL)           Active insulin time: 4 hrs          HGBA1C:    Lab Results   Component Value Date    A1C 5.9 (A) 08 (IMPLANTABLE)      bladder   • OTHER      wisdom   • OTHER SURGICAL HISTORY  x2    total thyroidectomy   • OTHER SURGICAL HISTORY  x1    sinus   • REMOVAL GALLBLADDER     • SLING OPER STRES INCONTINENCE  1/9/2014    Procedure: Reji Bhatti;  Surgeon: Cortney Perez, Sensor (DEXCOM G6 SENSOR) Does not apply Misc Replace sensor every 10 days 9 each 3   • Continuous Blood Gluc Transmit (DEXCOM G6 TRANSMITTER) Does not apply Misc Replace every 90 days 1 each 3   • Insulin Glargine, 1 Unit Dial, (TOUJEO SOLOSTAR) 300 UNIT/ Soln INHALE 2 PUFFS BY MOUTH EVERY 4 HOURS AS NEEDED FOR WHEEZING 8.5 g 0   • ONETOUCH DELICA LANCETS FINE Does not apply Misc TEST 8 TIMES PER DAY.  100 each 11       DM associated review of  symptoms:   Endocrine: Polyuria, polyphagia, polydipsia: no  Taurus newer glucagon : Baqsimi or Gvoke    Insulin pump back up plan:  Toujeo 26  units once daily when OFF pump (has active rx)   Travel letter in my chart for sensor/pump travel for [employer]   Continue site rotation for insulin pump infusion sites to avoid lipodystr

## 2021-08-17 NOTE — TELEPHONE ENCOUNTER
OR BOOKING SHEET   Soft Tissue/Degenerative  Name: Elza Alicia  MRN: [id number]   : 3/12/1970  Diagnosis:  [x]? 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 Outpatient  []?  Ov marcaine - No Epi - 1:1 mix)  []? Local (1% lidocaine WITH epi, 0.5% marcaine - 1:1 mix)  []? Lead Hand  [x]? Jovita Decree  []? Mini C-arm  [x]? Full C-arm  []? Arthrex 3.5 mm Swivel Locks   []? ?  3-0 Supramid Suture  [x]?    Accumed Acutwist     Posi

## 2021-08-17 NOTE — TELEPHONE ENCOUNTER
Allyson Weir case has been scheduled/rescheduled at [address] [place] [address] on 9/15/2021 at [facility]  Received:  Today  Jose Alejandro Quintanilla RMA  Patient Name: Josue Manning    MRN: [id number]     LEFT DISTAL [facility] Alina Mancera

## 2021-08-17 NOTE — TELEPHONE ENCOUNTER
Surgeon:  Dr. Isabell Crouch     Date of Surgery: 9/15/21        Post Op Appt: 9/24/21     Facility: [facility]     IP vs OP:  OP     Surgical Assistant Obtained: [employer] REQUESTED      Preadmission Testing Ordered: COVID 19 TESTING TO BE DONE @ [facility]

## 2021-08-17 NOTE — PATIENT INSTRUCTIONS
We are here to help you manage your diabetes.  Please continue with your primary care physician/provider for your routine health care maintenance     Your A1C: 5.9  This is stable for you and we will continue to work together on lowering your blood sugars t sweating  · Numbness or tingling in the lips or tongue    Treatment of Low Blood sugar Action Plan  1. Check blood glucose to be sure that it is low. You can’t always go by symptoms. If in doubt, treat your low blood glucose anyway.   2. Take 15 grams of ca your request.     Thank you   Earnestine Sheehan   [phone]

## 2021-08-18 ENCOUNTER — TELEPHONE (OUTPATIENT)
Dept: ENDOCRINOLOGY CLINIC | Facility: CLINIC | Age: 51
End: 2021-08-18

## 2021-08-18 NOTE — TELEPHONE ENCOUNTER
[employer] form filled out and signed by pt    Fax to [address]: [phone]  Fax confirmed    Copy sent to scan  Form placed in folder

## 2021-08-20 ENCOUNTER — PATIENT MESSAGE (OUTPATIENT)
Dept: FAMILY MEDICINE CLINIC | Facility: CLINIC | Age: 51
End: 2021-08-20

## 2021-08-20 ENCOUNTER — TELEMEDICINE (OUTPATIENT)
Dept: FAMILY MEDICINE CLINIC | Facility: CLINIC | Age: 51
End: 2021-08-20

## 2021-08-20 DIAGNOSIS — R42 VERTIGO: ICD-10-CM

## 2021-08-20 DIAGNOSIS — J01.00 ACUTE MAXILLARY SINUSITIS, RECURRENCE NOT SPECIFIED: Primary | ICD-10-CM

## 2021-08-20 DIAGNOSIS — H69.91 EUSTACHIAN TUBE DISORDER, RIGHT: ICD-10-CM

## 2021-08-20 PROCEDURE — 99214 OFFICE O/P EST MOD 30 MIN: CPT | Performed by: FAMILY MEDICINE

## 2021-08-20 RX ORDER — MECLIZINE HYDROCHLORIDE 25 MG/1
25 TABLET ORAL 3 TIMES DAILY PRN
Qty: 20 TABLET | Refills: 0 | Status: SHIPPED | OUTPATIENT
Start: 2021-08-20 | End: 2021-08-27

## 2021-08-20 RX ORDER — METHYLPREDNISOLONE 4 MG/1
TABLET ORAL
Qty: 1 EACH | Refills: 0 | Status: SHIPPED | OUTPATIENT
Start: 2021-08-20 | End: 2021-09-09 | Stop reason: ALTCHOICE

## 2021-08-20 RX ORDER — AMOXICILLIN AND CLAVULANATE POTASSIUM 875; 125 MG/1; MG/1
1 TABLET, FILM COATED ORAL 2 TIMES DAILY
Qty: 20 TABLET | Refills: 0 | Status: SHIPPED | OUTPATIENT
Start: 2021-08-20 | End: 2021-08-30

## 2021-08-20 NOTE — TELEPHONE ENCOUNTER
From: Arlyn Sanabria  To: Oriana Bella MD  Sent: 8/20/2021 2:08 PM CDT  Subject: Non-Urgent Medical Question    Happy Friday,    I have a horrible cold, sinus pressure. I've been taking day/NyQuil would that cause my dizziness to be more noticeable?  I

## 2021-08-20 NOTE — PROGRESS NOTES
No chief complaint on file. Dizziness  This visit is conducted using Telemedicine with live, interactive video and audio. Patient has been referred to the [facility] website at [email or web address] to review the yearly Consent to Treat document.     Pa 137 MCG Oral Tab Take 137 mcg by mouth daily.  90 tablet 0   • Insulin Disposable Pump (OMNIPOD DASH 5 PACK PODS) Does not apply Misc      • Continuous Blood Gluc Sensor (DEXCOM G6 SENSOR) Does not apply Misc Replace sensor every 10 days 9 each 3   • Contin strip 0   • Calcium 500-125 MG-UNIT Oral Tab Take by mouth.  (Patient not taking: Reported on 8/17/2021 )     • PROAIR  (90 Base) MCG/ACT Inhalation Aero Soln INHALE 2 PUFFS BY MOUTH EVERY 4 HOURS AS NEEDED FOR WHEEZING 8.5 g 0   • ONETOUCH DELICA LA • TOTAL ABDOM HYSTERECTOMY  7/2020      Family History   Problem Relation Age of Onset   • Heart Disease Father    • Hypertension Father    • Asthma Father    • Diabetes Father    • Other (Other) Father    • Heart Attack Father    • Diabetes Maternal Gra office. If warning symptoms then go to the emergency room. - Meclizine HCl 25 MG Oral Tab; Take 1 tablet (25 mg total) by mouth 3 (three) times daily as needed for Dizziness. Dispense: 20 tablet; Refill: 0    3.  Eustachian tube disorder, right  Medicati

## 2021-08-25 ENCOUNTER — TELEPHONE (OUTPATIENT)
Dept: FAMILY MEDICINE CLINIC | Facility: CLINIC | Age: 51
End: 2021-08-25

## 2021-08-27 ENCOUNTER — TELEPHONE (OUTPATIENT)
Dept: ENDOCRINOLOGY CLINIC | Facility: CLINIC | Age: 51
End: 2021-08-27

## 2021-08-27 ENCOUNTER — TELEPHONE (OUTPATIENT)
Dept: ORTHOPEDICS CLINIC | Facility: CLINIC | Age: 51
End: 2021-08-27

## 2021-08-27 NOTE — TELEPHONE ENCOUNTER
Form signed and fax confirmed to [facility] @ [phone]. Will send to scan.

## 2021-08-27 NOTE — TELEPHONE ENCOUNTER
Received call from [facility] re: pump order. Request was in rightfax from 08/23/2021. Will prep for provider review and signature.

## 2021-08-27 NOTE — TELEPHONE ENCOUNTER
B/L knee xrays have been completed in 05/2021 with OA noted in both images. No new imaging needed at this time.

## 2021-08-27 NOTE — TELEPHONE ENCOUNTER
Imaging was completed for this patient for MASON KNEES, but it needs to be reviewed to see if additional imaging is needed.   If so, please enter the appropriate RX and let the patient know to come in before their appointment to complete the additional imagin

## 2021-09-02 ENCOUNTER — PATIENT MESSAGE (OUTPATIENT)
Dept: ENDOCRINOLOGY CLINIC | Facility: CLINIC | Age: 51
End: 2021-09-02

## 2021-09-02 NOTE — TELEPHONE ENCOUNTER
From: Christiano Muniz  To: LILIA Pepe  Sent: 9/2/2021 11:13 AM CDT  Subject: Non-Urgent Medical Question    My pump came today. Now what?

## 2021-09-09 ENCOUNTER — DIABETIC EDUCATION (OUTPATIENT)
Dept: ENDOCRINOLOGY CLINIC | Facility: CLINIC | Age: 51
End: 2021-09-09
Payer: COMMERCIAL

## 2021-09-09 DIAGNOSIS — Z79.4 TYPE 2 DIABETES MELLITUS WITH HYPERGLYCEMIA, WITH LONG-TERM CURRENT USE OF INSULIN (HCC): Primary | ICD-10-CM

## 2021-09-09 DIAGNOSIS — E11.65 TYPE 2 DIABETES MELLITUS WITH HYPERGLYCEMIA, WITH LONG-TERM CURRENT USE OF INSULIN (HCC): Primary | ICD-10-CM

## 2021-09-09 RX ORDER — ACETAMINOPHEN 500 MG
1000 TABLET ORAL ONCE
Status: CANCELLED | OUTPATIENT
Start: 2021-09-09 | End: 2021-09-09

## 2021-09-12 ENCOUNTER — LABORATORY ENCOUNTER (OUTPATIENT)
Dept: LAB | Facility: HOSPITAL | Age: 51
End: 2021-09-12
Attending: ORTHOPAEDIC SURGERY
Payer: COMMERCIAL

## 2021-09-12 DIAGNOSIS — M15.1 DEGENERATIVE ARTHRITIS OF DISTAL INTERPHALANGEAL JOINT OF MIDDLE FINGER OF LEFT HAND: ICD-10-CM

## 2021-09-12 LAB
ANION GAP SERPL CALC-SCNC: 6 MMOL/L (ref 0–18)
BUN BLD-MCNC: 13 MG/DL (ref 7–18)
CALCIUM BLD-MCNC: 8.8 MG/DL (ref 8.5–10.1)
CHLORIDE SERPL-SCNC: 109 MMOL/L (ref 98–112)
CO2 SERPL-SCNC: 26 MMOL/L (ref 21–32)
CREAT BLD-MCNC: 0.78 MG/DL
GLUCOSE BLD-MCNC: 119 MG/DL (ref 70–99)
OSMOLALITY SERPL CALC.SUM OF ELEC: 293 MOSM/KG (ref 275–295)
PATIENT FASTING Y/N/NP: YES
POTASSIUM SERPL-SCNC: 4.2 MMOL/L (ref 3.5–5.1)
SODIUM SERPL-SCNC: 141 MMOL/L (ref 136–145)

## 2021-09-12 PROCEDURE — 36415 COLL VENOUS BLD VENIPUNCTURE: CPT

## 2021-09-12 PROCEDURE — 93005 ELECTROCARDIOGRAM TRACING: CPT

## 2021-09-12 PROCEDURE — 93010 ELECTROCARDIOGRAM REPORT: CPT | Performed by: INTERNAL MEDICINE

## 2021-09-12 PROCEDURE — 80048 BASIC METABOLIC PNL TOTAL CA: CPT

## 2021-09-13 ENCOUNTER — TELEPHONE (OUTPATIENT)
Dept: ENDOCRINOLOGY CLINIC | Facility: CLINIC | Age: 51
End: 2021-09-13

## 2021-09-13 LAB
ATRIAL RATE: 68 BPM
P AXIS: 72 DEGREES
P-R INTERVAL: 144 MS
Q-T INTERVAL: 388 MS
QRS DURATION: 82 MS
QTC CALCULATION (BEZET): 412 MS
R AXIS: 36 DEGREES
SARS-COV-2 RNA RESP QL NAA+PROBE: NOT DETECTED
T AXIS: 35 DEGREES
VENTRICULAR RATE: 68 BPM

## 2021-09-13 NOTE — TELEPHONE ENCOUNTER
Pt started on Control IQ 9/9 (had been on Omnipod and Dexcom). Pt has no questions about the pump. Reminded her to keep the [employer] mobile uzma open/running in the background. Pt has f/u appt with diabetes educator 9/28 and with diabetes provider 1/11/22.

## 2021-09-15 ENCOUNTER — ANESTHESIA EVENT (OUTPATIENT)
Dept: SURGERY | Facility: HOSPITAL | Age: 51
End: 2021-09-15
Payer: COMMERCIAL

## 2021-09-15 ENCOUNTER — HOSPITAL ENCOUNTER (OUTPATIENT)
Facility: HOSPITAL | Age: 51
Setting detail: HOSPITAL OUTPATIENT SURGERY
Discharge: HOME | End: 2021-09-15
Attending: ORTHOPAEDIC SURGERY | Admitting: ORTHOPAEDIC SURGERY
Payer: COMMERCIAL

## 2021-09-15 ENCOUNTER — ANESTHESIA (OUTPATIENT)
Dept: SURGERY | Facility: HOSPITAL | Age: 51
End: 2021-09-15
Payer: COMMERCIAL

## 2021-09-15 ENCOUNTER — APPOINTMENT (OUTPATIENT)
Dept: GENERAL RADIOLOGY | Facility: HOSPITAL | Age: 51
End: 2021-09-15
Attending: ORTHOPAEDIC SURGERY
Payer: COMMERCIAL

## 2021-09-15 VITALS
TEMPERATURE: 97 F | WEIGHT: 254.44 LBS | RESPIRATION RATE: 20 BRPM | DIASTOLIC BLOOD PRESSURE: 78 MMHG | HEART RATE: 82 BPM | HEIGHT: 62 IN | SYSTOLIC BLOOD PRESSURE: 155 MMHG | BODY MASS INDEX: 46.82 KG/M2 | OXYGEN SATURATION: 97 %

## 2021-09-15 DIAGNOSIS — M15.1 DEGENERATIVE ARTHRITIS OF DISTAL INTERPHALANGEAL JOINT OF MIDDLE FINGER OF LEFT HAND: Primary | ICD-10-CM

## 2021-09-15 LAB — GLUCOSE BLD-MCNC: 107 MG/DL (ref 70–99)

## 2021-09-15 PROCEDURE — 26860 FUSION OF FINGER JOINT: CPT | Performed by: ORTHOPAEDIC SURGERY

## 2021-09-15 PROCEDURE — 76000 FLUOROSCOPY <1 HR PHYS/QHP: CPT | Performed by: ORTHOPAEDIC SURGERY

## 2021-09-15 PROCEDURE — 0RGX04Z FUSION OF LEFT FINGER PHALANGEAL JOINT WITH INTERNAL FIXATION DEVICE, OPEN APPROACH: ICD-10-PCS | Performed by: ORTHOPAEDIC SURGERY

## 2021-09-15 DEVICE — 30MM ACUTWIST® ACUTRAK COMPRESSION SCREW
Type: IMPLANTABLE DEVICE | Site: FINGER | Status: FUNCTIONAL
Brand: ACUMED

## 2021-09-15 RX ORDER — HYDROCODONE BITARTRATE AND ACETAMINOPHEN 5; 325 MG/1; MG/1
2 TABLET ORAL AS NEEDED
Status: CANCELLED | OUTPATIENT
Start: 2021-09-15

## 2021-09-15 RX ORDER — DEXTROSE MONOHYDRATE 25 G/50ML
50 INJECTION, SOLUTION INTRAVENOUS
Status: DISCONTINUED | OUTPATIENT
Start: 2021-09-15 | End: 2021-09-15 | Stop reason: HOSPADM

## 2021-09-15 RX ORDER — NALOXONE HYDROCHLORIDE 0.4 MG/ML
80 INJECTION, SOLUTION INTRAMUSCULAR; INTRAVENOUS; SUBCUTANEOUS AS NEEDED
Status: CANCELLED | OUTPATIENT
Start: 2021-09-15 | End: 2021-09-15

## 2021-09-15 RX ORDER — HYDROCODONE BITARTRATE AND ACETAMINOPHEN 5; 325 MG/1; MG/1
1 TABLET ORAL EVERY 6 HOURS PRN
Qty: 10 TABLET | Refills: 0 | Status: SHIPPED | OUTPATIENT
Start: 2021-09-15

## 2021-09-15 RX ORDER — MIDAZOLAM HYDROCHLORIDE 1 MG/ML
1 INJECTION INTRAMUSCULAR; INTRAVENOUS EVERY 5 MIN PRN
Status: CANCELLED | OUTPATIENT
Start: 2021-09-15 | End: 2021-09-15

## 2021-09-15 RX ORDER — ACETAMINOPHEN 500 MG
1000 TABLET ORAL EVERY 6 HOURS PRN
COMMUNITY

## 2021-09-15 RX ORDER — LIDOCAINE HYDROCHLORIDE 10 MG/ML
INJECTION, SOLUTION INFILTRATION; PERINEURAL AS NEEDED
Status: DISCONTINUED | OUTPATIENT
Start: 2021-09-15 | End: 2021-09-15 | Stop reason: HOSPADM

## 2021-09-15 RX ORDER — HYDROCODONE BITARTRATE AND ACETAMINOPHEN 5; 325 MG/1; MG/1
1 TABLET ORAL AS NEEDED
Status: CANCELLED | OUTPATIENT
Start: 2021-09-15

## 2021-09-15 RX ORDER — CEFAZOLIN SODIUM/WATER 2 G/20 ML
2 SYRINGE (ML) INTRAVENOUS ONCE
Status: COMPLETED | OUTPATIENT
Start: 2021-09-15 | End: 2021-09-15

## 2021-09-15 RX ORDER — HYDROMORPHONE HYDROCHLORIDE 1 MG/ML
0.5 INJECTION, SOLUTION INTRAMUSCULAR; INTRAVENOUS; SUBCUTANEOUS EVERY 5 MIN PRN
Status: CANCELLED | OUTPATIENT
Start: 2021-09-15 | End: 2021-09-15

## 2021-09-15 RX ORDER — SODIUM CHLORIDE, SODIUM LACTATE, POTASSIUM CHLORIDE, CALCIUM CHLORIDE 600; 310; 30; 20 MG/100ML; MG/100ML; MG/100ML; MG/100ML
INJECTION, SOLUTION INTRAVENOUS CONTINUOUS
Status: CANCELLED | OUTPATIENT
Start: 2021-09-15

## 2021-09-15 RX ORDER — ONDANSETRON 2 MG/ML
4 INJECTION INTRAMUSCULAR; INTRAVENOUS AS NEEDED
Status: CANCELLED | OUTPATIENT
Start: 2021-09-15 | End: 2021-09-15

## 2021-09-15 RX ORDER — DEXAMETHASONE SODIUM PHOSPHATE 4 MG/ML
4 VIAL (ML) INJECTION AS NEEDED
Status: CANCELLED | OUTPATIENT
Start: 2021-09-15 | End: 2021-09-15

## 2021-09-15 RX ORDER — BUPIVACAINE HYDROCHLORIDE 5 MG/ML
INJECTION, SOLUTION EPIDURAL; INTRACAUDAL AS NEEDED
Status: DISCONTINUED | OUTPATIENT
Start: 2021-09-15 | End: 2021-09-15 | Stop reason: HOSPADM

## 2021-09-15 RX ORDER — ACETAMINOPHEN 500 MG
1000 TABLET ORAL ONCE AS NEEDED
Status: CANCELLED | OUTPATIENT
Start: 2021-09-15 | End: 2021-09-15

## 2021-09-15 RX ORDER — SODIUM CHLORIDE, SODIUM LACTATE, POTASSIUM CHLORIDE, CALCIUM CHLORIDE 600; 310; 30; 20 MG/100ML; MG/100ML; MG/100ML; MG/100ML
INJECTION, SOLUTION INTRAVENOUS CONTINUOUS
Status: DISCONTINUED | OUTPATIENT
Start: 2021-09-15 | End: 2021-09-15

## 2021-09-15 RX ORDER — DEXTROSE MONOHYDRATE 25 G/50ML
50 INJECTION, SOLUTION INTRAVENOUS
Status: CANCELLED | OUTPATIENT
Start: 2021-09-15

## 2021-09-15 RX ADMIN — SODIUM CHLORIDE, SODIUM LACTATE, POTASSIUM CHLORIDE, CALCIUM CHLORIDE: 600; 310; 30; 20 INJECTION, SOLUTION INTRAVENOUS at 12:35:00

## 2021-09-15 RX ADMIN — CEFAZOLIN SODIUM/WATER 2 G: 2 G/20 ML SYRINGE (ML) INTRAVENOUS at 11:48:00

## 2021-09-15 NOTE — ANESTHESIA POSTPROCEDURE EVALUATION
[address] [facility] Patient Status:  Hospital Outpatient Surgery   Age/Gender 54year old female MRN [id number]   [facility] SURGERY Attending Gabriel Hawthorne MD   Hosp Day # 0 PCP Maria Dolores Bello MD       Anesthesia Post-op Note

## 2021-09-15 NOTE — BRIEF OP NOTE
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

## 2021-09-15 NOTE — H&P
Clinic Note [facility]     Assessment/Plan:  54year old female    1. Left middle finger DIP joint arthritis–given failure to respond to nonsurgical management patient wishes to proceed with DIP fusion.   The risks associated with surgery include but pressure or use. She has tried NSAIDs and splinting without any significant durable pain relief. Patient's pain does not wake her up at night.     Occupation:     Past Medical History:   Diagnosis Date   • Anxiety    • Anxiety state, uns Comment:Asthma attack  Family History   Problem Relation Age of Onset   • Heart Disease Father    • Hypertension Father    • Asthma Father    • Diabetes Father    • Other (Other) Father    • Heart Attack Father    • Diabetes Maternal Grandmother    • Othe

## 2021-09-15 NOTE — ANESTHESIA PREPROCEDURE EVALUATION
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 [M15.1]    Pre-op Diagnosis: Degenerative arthritis of distal interphalangeal joint of middle finge 50 units when off pump, Disp: 3 mL, Rfl: 0, Past Month at Unknown time  simvastatin 40 MG Oral Tab, Take 1 tablet (40 mg total) by mouth every evening., Disp: 90 tablet, Rfl: 3, 9/15/2021 at Unknown time  PARoxetine HCl 40 MG Oral Tab, Take 1 tablet (40 mg 1  CONTOUR NEXT TEST In Vitro Strip, USE TO TEST 6 TO 8 TIMES EVERY DAY, Disp: 200 strip, Rfl: 0  PROAIR  (90 Base) MCG/ACT Inhalation Aero Soln, INHALE 2 PUFFS BY MOUTH EVERY 4 HOURS AS NEEDED FOR WHEEZING, Disp: 8.5 g, Rfl: 0, More than a month at Years since quittin.5      Smokeless tobacco: Never Used      Tobacco comment: non-smoker    Alcohol use: Not Currently      Alcohol/week: 0.0 standard drinks      Drug use: No     Available pre-op labs reviewed.      Lab Results   Component Value Cosmo

## 2021-09-15 NOTE — OPERATIVE REPORT
Operative Note    Patient Name: Clarita Cooper    Preoperative Diagnosis:     1. 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    Degenerative arthritis of distal interphala the insertion onto the distal phalanx. The DIP joint was identified. Osteophytes were rongeured off. A Walsh blade was used to release the collateral ligaments allowing visualization of the middle phalanx head and base of the distal phalanx.   [email or web address]  t: [id number]  f: [phone]

## 2021-09-15 NOTE — ADDENDUM NOTE
Addendum  created 09/15/21 1412 by Benjamin Sam MD    Review and Sign - Ready for Procedure, Review and Sign - Signed

## 2021-09-16 ENCOUNTER — TELEPHONE (OUTPATIENT)
Dept: ORTHOPEDICS CLINIC | Facility: CLINIC | Age: 51
End: 2021-09-16

## 2021-09-16 ENCOUNTER — TELEPHONE (OUTPATIENT)
Dept: PHYSICAL THERAPY | Facility: HOSPITAL | Age: 51
End: 2021-09-16

## 2021-09-16 DIAGNOSIS — M15.1 DEGENERATIVE ARTHRITIS OF DISTAL INTERPHALANGEAL JOINT OF MIDDLE FINGER OF LEFT HAND: Primary | ICD-10-CM

## 2021-09-16 RX ORDER — GABAPENTIN 600 MG/1
600 TABLET ORAL DAILY
Qty: 30 TABLET | Refills: 0 | Status: SHIPPED | OUTPATIENT
Start: 2021-09-16

## 2021-09-16 NOTE — TELEPHONE ENCOUNTER
Patient is in a lot of pain from yesterday's surgery with Dr Jose Reynolds. She is allergic to anti-inflammatory medications. She is asking if she could get a script for gabapentin. Her [address] in [place] is on file.

## 2021-09-16 NOTE — TELEPHONE ENCOUNTER
Patient states that she has tried the Norco and it is not helping with her pain. 8/10 pain. No swelling. No fever or chills. She is allergic to NSAIDs. Has had 14 previous surgeries. States previously she has taken Gabapentin and that helps her better.  Sta

## 2021-09-17 ENCOUNTER — TELEPHONE (OUTPATIENT)
Dept: PHYSICAL THERAPY | Facility: HOSPITAL | Age: 51
End: 2021-09-17

## 2021-09-17 RX ORDER — LEVOTHYROXINE SODIUM 137 UG/1
TABLET ORAL
Qty: 90 TABLET | Refills: 0 | Status: SHIPPED | OUTPATIENT
Start: 2021-09-17 | End: 2021-11-29

## 2021-09-20 ENCOUNTER — PATIENT MESSAGE (OUTPATIENT)
Dept: ORTHOPEDICS CLINIC | Facility: CLINIC | Age: 51
End: 2021-09-20

## 2021-09-20 ENCOUNTER — TELEPHONE (OUTPATIENT)
Dept: PHYSICAL THERAPY | Facility: HOSPITAL | Age: 51
End: 2021-09-20

## 2021-09-20 DIAGNOSIS — M15.1 DEGENERATIVE ARTHRITIS OF DISTAL INTERPHALANGEAL JOINT OF MIDDLE FINGER OF LEFT HAND: Primary | ICD-10-CM

## 2021-09-20 NOTE — TELEPHONE ENCOUNTER
From: Jerrdo Chagn  To: Jack Fatima MD  Sent: 9/20/2021 12:06 PM CDT  Subject: OT    Hi,     I’m sorry to bother you. I have had a very difficult time getting an appointment scheduled for OT.  They scheduled appointments and didn’t even tell me I had

## 2021-09-21 ENCOUNTER — TELEPHONE (OUTPATIENT)
Dept: PHYSICAL THERAPY | Facility: HOSPITAL | Age: 51
End: 2021-09-21

## 2021-09-22 ENCOUNTER — TELEPHONE (OUTPATIENT)
Dept: PHYSICAL THERAPY | Facility: HOSPITAL | Age: 51
End: 2021-09-22

## 2021-09-24 ENCOUNTER — OFFICE VISIT (OUTPATIENT)
Dept: ORTHOPEDICS CLINIC | Facility: CLINIC | Age: 51
End: 2021-09-24
Payer: COMMERCIAL

## 2021-09-24 ENCOUNTER — HOSPITAL ENCOUNTER (OUTPATIENT)
Dept: GENERAL RADIOLOGY | Age: 51
Discharge: HOME | End: 2021-09-24
Attending: ORTHOPAEDIC SURGERY
Payer: COMMERCIAL

## 2021-09-24 VITALS — HEART RATE: 69 BPM | OXYGEN SATURATION: 99 %

## 2021-09-24 DIAGNOSIS — M15.1 DEGENERATIVE ARTHRITIS OF DISTAL INTERPHALANGEAL JOINT OF MIDDLE FINGER OF LEFT HAND: ICD-10-CM

## 2021-09-24 DIAGNOSIS — M15.1 DEGENERATIVE ARTHRITIS OF DISTAL INTERPHALANGEAL JOINT OF MIDDLE FINGER OF LEFT HAND: Primary | ICD-10-CM

## 2021-09-24 PROCEDURE — 99024 POSTOP FOLLOW-UP VISIT: CPT | Performed by: ORTHOPAEDIC SURGERY

## 2021-09-24 PROCEDURE — 73140 X-RAY EXAM OF FINGER(S): CPT | Performed by: ORTHOPAEDIC SURGERY

## 2021-09-24 NOTE — PROGRESS NOTES
[facility] Post-[facility] Visit    A/P: 54year old  female s/p absolute left middle finger DIP joint fusion on 9/15/2021 progressing as expected. Continue DIP extension splinting.   Patient will follow up in 1 week for suture removal.  Patient should work

## 2021-09-28 ENCOUNTER — TELEMEDICINE (OUTPATIENT)
Dept: ENDOCRINOLOGY CLINIC | Facility: CLINIC | Age: 51
End: 2021-09-28

## 2021-09-28 ENCOUNTER — APPOINTMENT (OUTPATIENT)
Dept: OCCUPATIONAL MEDICINE | Age: 51
End: 2021-09-28
Attending: ORTHOPAEDIC SURGERY
Payer: COMMERCIAL

## 2021-09-28 DIAGNOSIS — Z79.4 TYPE 2 DIABETES MELLITUS WITH HYPERGLYCEMIA, WITH LONG-TERM CURRENT USE OF INSULIN (HCC): Primary | ICD-10-CM

## 2021-09-28 DIAGNOSIS — E11.65 TYPE 2 DIABETES MELLITUS WITH HYPERGLYCEMIA, WITH LONG-TERM CURRENT USE OF INSULIN (HCC): Primary | ICD-10-CM

## 2021-09-28 PROCEDURE — 95251 CONT GLUC MNTR ANALYSIS I&R: CPT | Performed by: NURSE PRACTITIONER

## 2021-09-28 PROCEDURE — G0108 DIAB MANAGE TRN  PER INDIV: HCPCS | Performed by: DIETITIAN, REGISTERED

## 2021-09-28 RX ORDER — SIMVASTATIN 40 MG
TABLET ORAL
Qty: 90 TABLET | Refills: 0 | Status: SHIPPED | OUTPATIENT
Start: 2021-09-28 | End: 2021-12-08

## 2021-09-28 NOTE — TELEPHONE ENCOUNTER
Cholesterol Medication Protocol Passed 09/27/2021 06:32 PM   Protocol Details  ALT < 80    ALT resulted within past year    Lipid panel within past 12 months    Appointment within past 12 or next 3 months        Last refilled on 01/08/21  for # 90  with 3

## 2021-09-28 NOTE — PROGRESS NOTES
Jeremiah Izquierdo  3/12/1970 was seen for Continuous Glucose Sensor & Insulin Pump Follow-up Visit:    9/28/2021   Referring Provider: Nai Arnett   Start time: 10:30 End time: 11:00    Due to COVID-19 ACTION PLAN, the patient's office visit was conducted features (activity mode & extended bolus - she tried extended bolus once - doesn't see much of a need for it)  Patient verbalized understanding and has no further questions at this time.   Thank you for the referral.  Patient to follow up with diabetes prov

## 2021-09-30 ENCOUNTER — APPOINTMENT (OUTPATIENT)
Dept: OCCUPATIONAL MEDICINE | Age: 51
End: 2021-09-30
Attending: ORTHOPAEDIC SURGERY
Payer: COMMERCIAL

## 2021-10-01 ENCOUNTER — MED REC SCAN ONLY (OUTPATIENT)
Dept: ORTHOPEDICS CLINIC | Facility: CLINIC | Age: 51
End: 2021-10-01

## 2021-10-01 ENCOUNTER — OFFICE VISIT (OUTPATIENT)
Dept: ORTHOPEDICS CLINIC | Facility: CLINIC | Age: 51
End: 2021-10-01
Payer: COMMERCIAL

## 2021-10-01 VITALS — HEART RATE: 84 BPM | OXYGEN SATURATION: 99 %

## 2021-10-01 DIAGNOSIS — M15.1 DEGENERATIVE ARTHRITIS OF DISTAL INTERPHALANGEAL JOINT OF MIDDLE FINGER OF LEFT HAND: Primary | ICD-10-CM

## 2021-10-01 PROCEDURE — 99024 POSTOP FOLLOW-UP VISIT: CPT | Performed by: ORTHOPAEDIC SURGERY

## 2021-10-07 ENCOUNTER — OFFICE VISIT (OUTPATIENT)
Dept: ORTHOPEDICS CLINIC | Facility: CLINIC | Age: 51
End: 2021-10-07
Payer: COMMERCIAL

## 2021-10-07 VITALS — HEART RATE: 78 BPM | OXYGEN SATURATION: 99 % | BODY MASS INDEX: 48.21 KG/M2 | WEIGHT: 262 LBS | HEIGHT: 62 IN

## 2021-10-07 DIAGNOSIS — M17.0 PRIMARY OSTEOARTHRITIS OF BOTH KNEES: Primary | ICD-10-CM

## 2021-10-07 PROCEDURE — 3008F BODY MASS INDEX DOCD: CPT | Performed by: ORTHOPAEDIC SURGERY

## 2021-10-07 PROCEDURE — 20610 DRAIN/INJ JOINT/BURSA W/O US: CPT | Performed by: ORTHOPAEDIC SURGERY

## 2021-10-07 PROCEDURE — 99213 OFFICE O/P EST LOW 20 MIN: CPT | Performed by: ORTHOPAEDIC SURGERY

## 2021-10-07 RX ORDER — TRIAMCINOLONE ACETONIDE 40 MG/ML
80 INJECTION, SUSPENSION INTRA-ARTICULAR; INTRAMUSCULAR ONCE
Status: COMPLETED | OUTPATIENT
Start: 2021-10-07 | End: 2021-10-07

## 2021-10-07 RX ADMIN — TRIAMCINOLONE ACETONIDE 80 MG: 40 INJECTION, SUSPENSION INTRA-ARTICULAR; INTRAMUSCULAR at 09:10:00

## 2021-10-07 NOTE — H&P
[facility] New Patient Note    CC: Patient presents with:  Knee Pain: bilateral knee pain       HPI: Very pleasant 54year old female presents today with complaints of bilateral knee pain. She states this is been going on for years.   She has prior h Procedure: COLONOSCOPY;  Surgeon: Sona Lee DO;  Location: [facility] ENDOSCOPY   • CYST ASPIRATION LEFT     • CYST ASPIRATION RIGHT     • LAPAROSCOPIC CHOLECYSTECTOMY  11/14/2011   • MESH (IMPLANTABLE)      bladder   • OTHER      wisdom   • OTHER SURGICAL H Tab Take 1 tablet (40 mg total) by mouth every morning.  90 tablet 3   • insulin aspart 100 UNIT/ML Subcutaneous Solution Inject up to 120 units daily via insulin pump as directed (Patient taking differently: Inject up to 120 units daily via insulin pump as Father    • Asthma Father    • Diabetes Father    • Other (Other) Father    • Heart Attack Father    • Diabetes Maternal Grandmother    • Other (Other) Maternal Grandmother         lymphoma   • Skin cancer Mother    • Other (Other) Mother    • Other (Other if these were weightbearing views, but they do demonstrate definite medial joint space narrowing, osteophyte formation more significant in the patellofemoral joint, 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       Labs: Hemoglobin A1c 5.9

## 2021-10-07 NOTE — PATIENT INSTRUCTIONS
What Is Arthritis? Arthritis is a disease that affects the joints. Joints are the parts where bones meet and move. It can affect any joint in your body. There are many types of arthritis.  They include:   · Osteoarthritis  · Rheumatoid arthritis  · Gout  · The joint's range of motion can become limited. Symptoms  Arthritis can affect any joint. Weight-bearing joints, such as the hips and knees, are often affected. Common symptoms are joint pain and stiffness.  Pain and stiffness may get worse with inactivi pounds. Losing one single pound takes multiple pounds of pressure off the joints. Losing 10 pounds can take 50 pounds of pressure off the joints. The tips below may help:  · Start a weight-loss program with the help of your healthcare provider.   · Ask your exercise part of your life. Talk with your healthcare provider about what is safe for you. Make sure you:  · Choose exercises that improve joint motion and make your muscles stronger. Learn how to do exercises correctly and safely.  Consider talking with a weights. · Isometric exercises are done by tightening the muscles without moving the joint. This may be a good way to strengthen the muscles around a stiff joint. · Avoid deep flexion or deep squatting (do not bend the knee more than 90 degrees).   · Focu Stretching and flexibility activities such as yoga and ramiro chi may improve pain and joint motion.  You might try:  · Yoga, including chair yoga, helps to keep your joints strong and flexible  · Ramiro Chi, an ancient type of exercise with slow, gentle movement falls  · Splints for your wrists or other joints  · A brace to support a weak knee joint  · Orthotics for toe and foot involvement    Medical and surgical treatments  Discuss medical treatments with your healthcare provider to help reduce your pain and imp very small incisions. The cartilage is smoothed. Any pieces of cartilage that have broken off are removed. · Total joint replacement. The entire joint is taken out and replaced with a manmade joint using metal, ceramic, or plastic.  This is most often done

## 2021-10-15 ENCOUNTER — PATIENT MESSAGE (OUTPATIENT)
Dept: FAMILY MEDICINE CLINIC | Facility: CLINIC | Age: 51
End: 2021-10-15

## 2021-10-15 DIAGNOSIS — M79.661 RIGHT CALF PAIN: Primary | ICD-10-CM

## 2021-10-15 NOTE — TELEPHONE ENCOUNTER
From: Arlyn Sanabria  To: Oriana Bella MD  Sent: 10/15/2021 2:06 PM CDT  Subject: Legs cramping     Hi Ladies,    Need some guidance I went to be last night with my right calf achy.  About 45 minutes after I got in bed my calf and my quad started crampi

## 2021-10-17 RX ORDER — OMEPRAZOLE 40 MG/1
CAPSULE, DELAYED RELEASE ORAL
Qty: 90 CAPSULE | Refills: 0 | Status: SHIPPED | OUTPATIENT
Start: 2021-10-17 | End: 2021-10-20

## 2021-10-18 ENCOUNTER — TELEPHONE (OUTPATIENT)
Dept: FAMILY MEDICINE CLINIC | Facility: CLINIC | Age: 51
End: 2021-10-18

## 2021-10-18 ENCOUNTER — HOSPITAL ENCOUNTER (OUTPATIENT)
Dept: ULTRASOUND IMAGING | Age: 51
Discharge: HOME | End: 2021-10-18
Attending: FAMILY MEDICINE
Payer: COMMERCIAL

## 2021-10-18 DIAGNOSIS — M79.661 RIGHT CALF PAIN: ICD-10-CM

## 2021-10-18 PROCEDURE — 93971 EXTREMITY STUDY: CPT | Performed by: FAMILY MEDICINE

## 2021-10-18 NOTE — TELEPHONE ENCOUNTER
PA form faxed to [facility] today for Omeprazole. Waiting outcome. Form in the scan scan pending folder.

## 2021-10-18 NOTE — TELEPHONE ENCOUNTER
Spoke with pt. Has hx of leg cramps, intermittently. Current pain started on Thursday in right calf. Has continue to get cramps with movement and at rest. Feels tight. No swelling. No known injury. Feels 'sore. No redness.     Pt happens to be in the IC

## 2021-10-19 ENCOUNTER — PATIENT MESSAGE (OUTPATIENT)
Dept: FAMILY MEDICINE CLINIC | Facility: CLINIC | Age: 51
End: 2021-10-19

## 2021-10-19 NOTE — TELEPHONE ENCOUNTER
From: Arlyn Sanabria  To: Oriana Bella MD  Sent: 10/19/2021 10:40 AM CDT  Subject: Ultrasound     Good morning, thankfully my ultrasound didn’t show a clot. At least that’s why I was allowed to go home. I have classes today from 1:00-6:00.  Was hoping t

## 2021-10-20 ENCOUNTER — TELEPHONE (OUTPATIENT)
Dept: FAMILY MEDICINE CLINIC | Facility: CLINIC | Age: 51
End: 2021-10-20

## 2021-10-20 RX ORDER — OMEPRAZOLE 40 MG/1
40 CAPSULE, DELAYED RELEASE ORAL DAILY
Qty: 90 CAPSULE | Refills: 0 | Status: SHIPPED | OUTPATIENT
Start: 2021-10-20 | End: 2022-01-25

## 2021-10-20 RX ORDER — CYCLOBENZAPRINE HCL 10 MG
10 TABLET ORAL AS NEEDED
Qty: 10 TABLET | Refills: 0 | Status: SHIPPED | OUTPATIENT
Start: 2021-10-20

## 2021-10-20 NOTE — TELEPHONE ENCOUNTER
PA for omeprazole has been denied by [employer]    Pt can use [facility] for a 90 day supply    Spoke with the patient and advised that the PA has been denied- advised that [facility] has a discount  She v/u asks that we send the omeprazole to the [facility] in [place]

## 2021-10-25 RX ORDER — ESTRADIOL 0.05 MG/D
PATCH TRANSDERMAL
Qty: 12 PATCH | OUTPATIENT
Start: 2021-10-25

## 2021-10-27 ENCOUNTER — E-ADVICE (OUTPATIENT)
Dept: OBGYN | Age: 51
End: 2021-10-27

## 2021-10-27 RX ORDER — ESTRADIOL 0.05 MG/D
1 PATCH TRANSDERMAL
Qty: 4 EACH | Refills: 0 | Status: SHIPPED | OUTPATIENT
Start: 2021-11-01 | End: 2021-11-02 | Stop reason: SDUPTHER

## 2021-10-28 RX ORDER — LIDOCAINE HYDROCHLORIDE 20 MG/ML
SOLUTION ORAL; TOPICAL
Qty: 90 TABLET | Refills: 0 | Status: SHIPPED | OUTPATIENT
Start: 2021-10-28 | End: 2022-01-26

## 2021-10-28 RX ORDER — BUPROPION HYDROCHLORIDE 300 MG/1
TABLET ORAL
Qty: 90 TABLET | Refills: 0 | Status: SHIPPED | OUTPATIENT
Start: 2021-10-28 | End: 2022-01-26

## 2021-10-28 RX ORDER — PAROXETINE HYDROCHLORIDE 40 MG/1
TABLET, FILM COATED ORAL
Qty: 90 TABLET | Refills: 0 | Status: SHIPPED | OUTPATIENT
Start: 2021-10-28 | End: 2022-01-26

## 2021-10-28 NOTE — TELEPHONE ENCOUNTER
Last refilled on 01/08/21 for # 90  with 3 rf.     Last seen on 08/20/21 (dizzness ).telemedicine   Future Appointments   Date Time Provider Jovani Shabnam   11/12/2021  9:45 AM Clem Rodriguez MD [facility]   1/11/2022  8:15 AM Laurie Velasquez

## 2021-10-28 NOTE — TELEPHONE ENCOUNTER
Last refilled on 8/4/21 for # 90 with 0 refills  Last OV televisit 8/20/21  Future Appointments   Date Time Provider Jovani Haque   11/12/2021  9:45 AM Kulwant Turcios MD [facility]   1/11/2022  8:15 AM LILIA Pena [facility]

## 2021-11-02 ENCOUNTER — OFFICE VISIT (OUTPATIENT)
Dept: OBGYN | Age: 51
End: 2021-11-02

## 2021-11-02 VITALS
DIASTOLIC BLOOD PRESSURE: 80 MMHG | BODY MASS INDEX: 47.29 KG/M2 | OXYGEN SATURATION: 99 % | WEIGHT: 257 LBS | HEART RATE: 74 BPM | HEIGHT: 62 IN | SYSTOLIC BLOOD PRESSURE: 128 MMHG | TEMPERATURE: 97.4 F

## 2021-11-02 DIAGNOSIS — Z79.890 POSTMENOPAUSAL HRT (HORMONE REPLACEMENT THERAPY): ICD-10-CM

## 2021-11-02 DIAGNOSIS — Z01.419 WELL WOMAN EXAM WITH ROUTINE GYNECOLOGICAL EXAM: Primary | ICD-10-CM

## 2021-11-02 PROCEDURE — 99396 PREV VISIT EST AGE 40-64: CPT | Performed by: OBSTETRICS & GYNECOLOGY

## 2021-11-02 PROCEDURE — 3074F SYST BP LT 130 MM HG: CPT | Performed by: OBSTETRICS & GYNECOLOGY

## 2021-11-02 PROCEDURE — 3079F DIAST BP 80-89 MM HG: CPT | Performed by: OBSTETRICS & GYNECOLOGY

## 2021-11-02 RX ORDER — ESTRADIOL 0.05 MG/D
1 PATCH TRANSDERMAL
Qty: 4 EACH | Refills: 11 | Status: SHIPPED | OUTPATIENT
Start: 2021-11-08 | End: 2022-11-03 | Stop reason: SDUPTHER

## 2021-11-02 ASSESSMENT — PATIENT HEALTH QUESTIONNAIRE - PHQ9
1. LITTLE INTEREST OR PLEASURE IN DOING THINGS: NOT AT ALL
CLINICAL INTERPRETATION OF PHQ9 SCORE: NO FURTHER SCREENING NEEDED
SUM OF ALL RESPONSES TO PHQ9 QUESTIONS 1 AND 2: 0
2. FEELING DOWN, DEPRESSED OR HOPELESS: NOT AT ALL
SUM OF ALL RESPONSES TO PHQ9 QUESTIONS 1 AND 2: 0
SUM OF ALL RESPONSES TO PHQ9 QUESTIONS 1 AND 2: 0
CLINICAL INTERPRETATION OF PHQ2 SCORE: NO FURTHER SCREENING NEEDED

## 2021-11-12 ENCOUNTER — OFFICE VISIT (OUTPATIENT)
Dept: ORTHOPEDICS CLINIC | Facility: CLINIC | Age: 51
End: 2021-11-12
Payer: COMMERCIAL

## 2021-11-12 ENCOUNTER — HOSPITAL ENCOUNTER (OUTPATIENT)
Dept: GENERAL RADIOLOGY | Age: 51
Discharge: HOME | End: 2021-11-12
Attending: ORTHOPAEDIC SURGERY
Payer: COMMERCIAL

## 2021-11-12 VITALS — OXYGEN SATURATION: 99 % | HEART RATE: 75 BPM

## 2021-11-12 DIAGNOSIS — M15.1 DEGENERATIVE ARTHRITIS OF DISTAL INTERPHALANGEAL JOINT OF MIDDLE FINGER OF LEFT HAND: ICD-10-CM

## 2021-11-12 DIAGNOSIS — M15.1 DEGENERATIVE ARTHRITIS OF DISTAL INTERPHALANGEAL JOINT OF MIDDLE FINGER OF LEFT HAND: Primary | ICD-10-CM

## 2021-11-12 PROCEDURE — 73140 X-RAY EXAM OF FINGER(S): CPT | Performed by: ORTHOPAEDIC SURGERY

## 2021-11-12 PROCEDURE — 99024 POSTOP FOLLOW-UP VISIT: CPT | Performed by: ORTHOPAEDIC SURGERY

## 2021-11-12 NOTE — PROGRESS NOTES
[facility] Post-[facility] Visit    A/P: 54year old  female s/p absolute left middle finger DIP joint fusion on 9/15/2021 progressing as expected. With radiographic signs of bridging bone, discontinue splinting. WBAT.      Next Visit: Prn    HPI:   Her pain

## 2021-11-29 RX ORDER — LEVOTHYROXINE SODIUM 137 UG/1
TABLET ORAL
Qty: 90 TABLET | Refills: 0 | Status: SHIPPED | OUTPATIENT
Start: 2021-11-29

## 2021-11-29 NOTE — PROGRESS NOTES
[facility] Post-[facility] Visit    A/P: 54year old  female s/p left middle finger DIP joint fusion on 9/15/2021 progressing as expected. Continue DIP extension splinting. Sutures removed today. Continue to work on PIP motion.     Weight Bearing Restricti

## 2021-11-29 NOTE — TELEPHONE ENCOUNTER
LOV 5/17/21 for urinary symptoms.     Thyroid Supplements Protocol Passed 11/28/2021 11:46 AM   Protocol Details  TSH test in past 12 months    TSH value between 0.350 and 5.500 IU/ml    Appointment in past 12 or next 3 months     Future Appointments   Date

## 2021-12-08 RX ORDER — SIMVASTATIN 40 MG
TABLET ORAL
Qty: 90 TABLET | Refills: 0 | Status: SHIPPED | OUTPATIENT
Start: 2021-12-08

## 2021-12-08 NOTE — TELEPHONE ENCOUNTER
Cholesterol Medication Protocol Passed 12/08/2021 09:00 AM   Protocol Details  ALT < 80    ALT resulted within past year    Lipid panel within past 12 months    Appointment within past 12 or next 3 months     Last refill 9/28/21 #90 0 refill

## 2021-12-10 ENCOUNTER — PATIENT MESSAGE (OUTPATIENT)
Dept: ORTHOPEDICS CLINIC | Facility: CLINIC | Age: 51
End: 2021-12-10

## 2021-12-10 NOTE — TELEPHONE ENCOUNTER
From: Scar Pope  To: Nany Wolf MD  Sent: 12/10/2021 2:29 PM CST  Subject: Shot appointment     Hi there! Happy Friday, need to make an appointment for another round of the shot in my knees. Wondering how to make that with the uzma?  They are a

## 2021-12-20 ENCOUNTER — TELEPHONE (OUTPATIENT)
Dept: FAMILY MEDICINE CLINIC | Facility: CLINIC | Age: 51
End: 2021-12-20

## 2021-12-20 NOTE — TELEPHONE ENCOUNTER
Pt has an appt today @ 3,  States her daughter was exposed to covid,  Pt and daughter went to get a covid test, still waiting on results,  No sx. Ok to be seen or reschedule?   Please advise

## 2021-12-23 ENCOUNTER — PATIENT MESSAGE (OUTPATIENT)
Dept: FAMILY MEDICINE CLINIC | Facility: CLINIC | Age: 51
End: 2021-12-23

## 2021-12-23 NOTE — TELEPHONE ENCOUNTER
From: Anant Stafford  To: Piter Decker MD  Sent: 12/23/2021 11:35 AM CST  Subject: Kathie Ortiz Sayer and staff,    I have had a scratchy throat that started just at night and now I have it during the day. It’s annoying not hurtful.  I have ha

## 2021-12-23 NOTE — TELEPHONE ENCOUNTER
Patient never directly exposed  Patient tested for COVID on 12/19 - negative results  Patient having a headache on and off  Scratchy/irritated throat  Denies congestion  Denies post nasal drip  Denies fever  Denies SOB/difficulty breathing  Blood sugars ha

## 2022-01-06 ENCOUNTER — TELEMEDICINE (OUTPATIENT)
Dept: FAMILY MEDICINE CLINIC | Facility: CLINIC | Age: 52
End: 2022-01-06
Payer: COMMERCIAL

## 2022-01-06 DIAGNOSIS — R51.9 SINUS HEADACHE: Primary | ICD-10-CM

## 2022-01-06 DIAGNOSIS — M54.2 BILATERAL POSTERIOR NECK PAIN: ICD-10-CM

## 2022-01-06 PROCEDURE — 99213 OFFICE O/P EST LOW 20 MIN: CPT | Performed by: NURSE PRACTITIONER

## 2022-01-06 RX ORDER — NAPROXEN 500 MG/1
500 TABLET ORAL 2 TIMES DAILY WITH MEALS
Qty: 60 TABLET | Refills: 0 | Status: SHIPPED | OUTPATIENT
Start: 2022-01-06

## 2022-01-06 NOTE — PROGRESS NOTES
Virtual/Telephone Check-In    Josse Mak  verbally consents to a Virtual/Telephone Check-In service on 1/6/2022 . Patient understands and accepts financial responsibility for any deductible, co-insurance and/or co-pays associated with this service. for 5 nights then nightly PRN. 10 tablet 0   • Omeprazole 40 MG Oral Capsule Delayed Release Take 1 capsule (40 mg total) by mouth daily.  90 capsule 0   • HYDROcodone-acetaminophen (NORCO) 5-325 MG Oral Tab Take 1 tablet by mouth every 6 (six) hours as nee times daily 250 strip 5   • Continuous Blood Gluc  (DEXCOM G6 ) Does not apply Device Use as directed 1 Device 0   • OneTouch Delica Lancets 53Z Does not apply Misc 1 lancet by Finger stick route 4 (four) times daily.  400 each 1   • CONTOUR Procedure: SLING UROLOGY;  Surgeon: Josue Mancera MD;  Location: [address] [place] [address]   • THYROIDECTOMY     • TOTAL ABDOM HYSTERECTOMY  7/2020      Family History   Problem Relation Age of Onset   • Heart Disease Father    • Hypertension Father    • headache    -  Primary    Bilateral posterior neck pain            Discussed supportive care. Will start Naproxen (which she has tolerated in the past) for sinus pressure, as well as neck discomfort. If neck not improving, may benefit from imaging and PT.

## 2022-01-14 ENCOUNTER — OFFICE VISIT (OUTPATIENT)
Dept: ORTHOPEDICS CLINIC | Facility: CLINIC | Age: 52
End: 2022-01-14
Payer: COMMERCIAL

## 2022-01-14 VITALS — DIASTOLIC BLOOD PRESSURE: 76 MMHG | HEART RATE: 79 BPM | SYSTOLIC BLOOD PRESSURE: 130 MMHG | OXYGEN SATURATION: 99 %

## 2022-01-14 DIAGNOSIS — M17.0 PRIMARY OSTEOARTHRITIS OF BOTH KNEES: Primary | ICD-10-CM

## 2022-01-14 PROCEDURE — 3078F DIAST BP <80 MM HG: CPT | Performed by: ORTHOPAEDIC SURGERY

## 2022-01-14 PROCEDURE — 3075F SYST BP GE 130 - 139MM HG: CPT | Performed by: ORTHOPAEDIC SURGERY

## 2022-01-14 PROCEDURE — 20610 DRAIN/INJ JOINT/BURSA W/O US: CPT | Performed by: ORTHOPAEDIC SURGERY

## 2022-01-14 RX ORDER — TRIAMCINOLONE ACETONIDE 40 MG/ML
80 INJECTION, SUSPENSION INTRA-ARTICULAR; INTRAMUSCULAR ONCE
Status: COMPLETED | OUTPATIENT
Start: 2022-01-14 | End: 2022-01-14

## 2022-01-14 RX ADMIN — TRIAMCINOLONE ACETONIDE 80 MG: 40 INJECTION, SUSPENSION INTRA-ARTICULAR; INTRAMUSCULAR at 08:10:00

## 2022-01-18 ENCOUNTER — TELEPHONE (OUTPATIENT)
Dept: ENDOCRINOLOGY CLINIC | Facility: CLINIC | Age: 52
End: 2022-01-18

## 2022-01-18 DIAGNOSIS — E11.65 TYPE 2 DIABETES MELLITUS WITH HYPERGLYCEMIA, WITH LONG-TERM CURRENT USE OF INSULIN (HCC): Primary | ICD-10-CM

## 2022-01-18 DIAGNOSIS — Z79.4 TYPE 2 DIABETES MELLITUS WITH HYPERGLYCEMIA, WITH LONG-TERM CURRENT USE OF INSULIN (HCC): Primary | ICD-10-CM

## 2022-01-18 PROCEDURE — 95251 CONT GLUC MNTR ANALYSIS I&R: CPT | Performed by: NURSE PRACTITIONER

## 2022-01-18 NOTE — TELEPHONE ENCOUNTER
My chart message asking for pump adjustment  Wearing T slim/Dexcom   Dexcom download: 1-5-2022 through 1-    SMBG 4 x daily if NOT using CGM   Coefficient of variation: -   GMI: estimated A1C 6.7     Average glucose: 123 mg/dl     In target range:

## 2022-01-25 RX ORDER — OMEPRAZOLE 40 MG/1
CAPSULE, DELAYED RELEASE ORAL
Qty: 90 CAPSULE | Refills: 0 | Status: SHIPPED | OUTPATIENT
Start: 2022-01-25

## 2022-01-25 NOTE — TELEPHONE ENCOUNTER
Last refilled on 10/20/21 for # 90 with 0 refills  Last OV televisit 1/6/22  Future Appointments   Date Time Provider Jovani Smithisti   2/3/2022  7:30 AM LILIA Bejarano [facility]   2/4/2022  3:00 PM Lam Massey MD [facility]

## 2022-01-26 RX ORDER — LIDOCAINE HYDROCHLORIDE 20 MG/ML
SOLUTION ORAL; TOPICAL
Qty: 90 TABLET | Refills: 0 | Status: SHIPPED | OUTPATIENT
Start: 2022-01-26

## 2022-01-26 RX ORDER — PAROXETINE HYDROCHLORIDE 40 MG/1
TABLET, FILM COATED ORAL
Qty: 90 TABLET | Refills: 0 | Status: SHIPPED | OUTPATIENT
Start: 2022-01-26

## 2022-01-26 RX ORDER — BUPROPION HYDROCHLORIDE 300 MG/1
TABLET ORAL
Qty: 90 TABLET | Refills: 0 | Status: SHIPPED | OUTPATIENT
Start: 2022-01-26

## 2022-01-26 NOTE — TELEPHONE ENCOUNTER
Last refilled on 10/28/21 for # 90 with 0 refills  No iron/ferritin labs on file  Last OV televisit 1/6/22  Future Appointments   Date Time Provider Jovani Haque   2/3/2022  7:30 AM LILIA Sim [facility] [address]   2/4/2022  3:00 P

## 2022-01-31 RX ORDER — INSULIN ASPART 100 [IU]/ML
INJECTION, SOLUTION INTRAVENOUS; SUBCUTANEOUS
Qty: 120 ML | Refills: 1 | Status: SHIPPED | OUTPATIENT
Start: 2022-01-31

## 2022-01-31 RX ORDER — NAPROXEN 500 MG/1
TABLET ORAL
Qty: 60 TABLET | Refills: 0 | OUTPATIENT
Start: 2022-01-31

## 2022-02-03 ENCOUNTER — OFFICE VISIT (OUTPATIENT)
Dept: ENDOCRINOLOGY CLINIC | Facility: CLINIC | Age: 52
End: 2022-02-03
Payer: COMMERCIAL

## 2022-02-03 VITALS
HEART RATE: 70 BPM | WEIGHT: 268.63 LBS | OXYGEN SATURATION: 18 % | SYSTOLIC BLOOD PRESSURE: 124 MMHG | BODY MASS INDEX: 49 KG/M2 | DIASTOLIC BLOOD PRESSURE: 62 MMHG

## 2022-02-03 DIAGNOSIS — E11.9 TYPE 2 DIABETES MELLITUS WITHOUT COMPLICATION, WITH LONG-TERM CURRENT USE OF INSULIN (HCC): Primary | ICD-10-CM

## 2022-02-03 DIAGNOSIS — Z79.4 TYPE 2 DIABETES MELLITUS WITHOUT COMPLICATION, WITH LONG-TERM CURRENT USE OF INSULIN (HCC): Primary | ICD-10-CM

## 2022-02-03 LAB
CARTRIDGE LOT#: 889 NUMERIC
CREAT UR-SCNC: 33.5 MG/DL
HEMOGLOBIN A1C: 5.9 % (ref 4.3–5.6)
MICROALBUMIN UR-MCNC: <0.5 MG/DL

## 2022-02-03 PROCEDURE — 3074F SYST BP LT 130 MM HG: CPT | Performed by: NURSE PRACTITIONER

## 2022-02-03 PROCEDURE — 82570 ASSAY OF URINE CREATININE: CPT | Performed by: NURSE PRACTITIONER

## 2022-02-03 PROCEDURE — 3061F NEG MICROALBUMINURIA REV: CPT | Performed by: FAMILY MEDICINE

## 2022-02-03 PROCEDURE — 3044F HG A1C LEVEL LT 7.0%: CPT | Performed by: FAMILY MEDICINE

## 2022-02-03 PROCEDURE — 3078F DIAST BP <80 MM HG: CPT | Performed by: NURSE PRACTITIONER

## 2022-02-03 PROCEDURE — 99214 OFFICE O/P EST MOD 30 MIN: CPT | Performed by: NURSE PRACTITIONER

## 2022-02-03 PROCEDURE — 83036 HEMOGLOBIN GLYCOSYLATED A1C: CPT | Performed by: NURSE PRACTITIONER

## 2022-02-03 PROCEDURE — 82043 UR ALBUMIN QUANTITATIVE: CPT | Performed by: NURSE PRACTITIONER

## 2022-02-03 PROCEDURE — 95251 CONT GLUC MNTR ANALYSIS I&R: CPT | Performed by: NURSE PRACTITIONER

## 2022-02-03 RX ORDER — GLUCAGON INJECTION, SOLUTION 1 MG/.2ML
1 INJECTION, SOLUTION SUBCUTANEOUS ONCE AS NEEDED
Qty: 0.4 ML | Refills: 0 | Status: SHIPPED | OUTPATIENT
Start: 2022-02-03 | End: 2022-02-03

## 2022-02-03 RX ORDER — INSULIN GLARGINE 300 U/ML
INJECTION, SOLUTION SUBCUTANEOUS
Qty: 1 EACH | Refills: 0 | COMMUNITY
Start: 2022-02-03

## 2022-02-04 ENCOUNTER — OFFICE VISIT (OUTPATIENT)
Dept: FAMILY MEDICINE CLINIC | Facility: CLINIC | Age: 52
End: 2022-02-04
Payer: COMMERCIAL

## 2022-02-04 VITALS
WEIGHT: 266 LBS | BODY MASS INDEX: 50.22 KG/M2 | DIASTOLIC BLOOD PRESSURE: 86 MMHG | SYSTOLIC BLOOD PRESSURE: 126 MMHG | TEMPERATURE: 98 F | RESPIRATION RATE: 18 BRPM | OXYGEN SATURATION: 98 % | HEIGHT: 61 IN | HEART RATE: 101 BPM

## 2022-02-04 DIAGNOSIS — F41.9 ANXIETY: ICD-10-CM

## 2022-02-04 DIAGNOSIS — D17.1 LIPOMA OF TORSO: ICD-10-CM

## 2022-02-04 DIAGNOSIS — J45.20 MILD INTERMITTENT ASTHMA WITHOUT COMPLICATION: ICD-10-CM

## 2022-02-04 DIAGNOSIS — H93.13 TINNITUS OF BOTH EARS: ICD-10-CM

## 2022-02-04 DIAGNOSIS — Z00.00 GENERAL MEDICAL EXAMINATION: Primary | ICD-10-CM

## 2022-02-04 DIAGNOSIS — Z12.11 COLON CANCER SCREENING: ICD-10-CM

## 2022-02-04 DIAGNOSIS — E03.9 HYPOTHYROIDISM, UNSPECIFIED TYPE: ICD-10-CM

## 2022-02-04 DIAGNOSIS — Z12.31 BREAST CANCER SCREENING BY MAMMOGRAM: ICD-10-CM

## 2022-02-04 PROCEDURE — 3079F DIAST BP 80-89 MM HG: CPT | Performed by: FAMILY MEDICINE

## 2022-02-04 PROCEDURE — 3074F SYST BP LT 130 MM HG: CPT | Performed by: FAMILY MEDICINE

## 2022-02-04 PROCEDURE — 3008F BODY MASS INDEX DOCD: CPT | Performed by: FAMILY MEDICINE

## 2022-02-04 PROCEDURE — 99396 PREV VISIT EST AGE 40-64: CPT | Performed by: FAMILY MEDICINE

## 2022-02-04 RX ORDER — GLUCAGON INJECTION, SOLUTION 1 MG/.2ML
INJECTION, SOLUTION SUBCUTANEOUS
COMMUNITY
Start: 2022-02-04

## 2022-02-08 ENCOUNTER — PATIENT MESSAGE (OUTPATIENT)
Dept: FAMILY MEDICINE CLINIC | Facility: CLINIC | Age: 52
End: 2022-02-08

## 2022-02-08 NOTE — TELEPHONE ENCOUNTER
Last david 1/30/2020    Dr Kyle Lynn phone [phone]  Dr. Ruelas File phone # [phone]   Fax: [phone]

## 2022-03-07 ENCOUNTER — TELEPHONE (OUTPATIENT)
Dept: FAMILY MEDICINE CLINIC | Facility: CLINIC | Age: 52
End: 2022-03-07

## 2022-03-07 NOTE — TELEPHONE ENCOUNTER
Overdue labs  [facility] sent  Future Appointments   Date Time Provider Jovani Haque   3/22/2022  4:30 PM Anni Roy DO [facility] EMG POST ACUTE MEDICAL SPECIALTY [facility]   3/24/2022  7:40 AM [facility] RAJAN [facility]   3/28/2022  3:00 PM LILIA Izaguirre SGINP ECC SUB GI   4/18/2022  8:00 AM Timoteo Rosen MD EMG ORTHO 75 [facility]

## 2022-03-14 RX ORDER — LEVOTHYROXINE SODIUM 137 UG/1
TABLET ORAL
Qty: 30 TABLET | Refills: 0 | Status: SHIPPED | OUTPATIENT
Start: 2022-03-14

## 2022-03-14 NOTE — TELEPHONE ENCOUNTER
LOV 2/4/22 for an annual exam.    Thyroid Supplements Protocol Failed 03/13/2022 10:18 AM   Protocol Details  TSH test in past 12 months    TSH value between 0.350 and 5.500 IU/ml    Appointment in past 12 or next 3 months     MCM. Needs labs done.

## 2022-03-22 ENCOUNTER — OFFICE VISIT (OUTPATIENT)
Dept: SURGERY | Facility: CLINIC | Age: 52
End: 2022-03-22
Payer: COMMERCIAL

## 2022-03-22 VITALS — BODY MASS INDEX: 52.3 KG/M2 | HEART RATE: 88 BPM | WEIGHT: 277 LBS | TEMPERATURE: 98 F | HEIGHT: 61 IN

## 2022-03-22 DIAGNOSIS — R22.2 MASS OF SKIN OF BACK: Primary | ICD-10-CM

## 2022-03-22 PROCEDURE — 3008F BODY MASS INDEX DOCD: CPT | Performed by: SURGERY

## 2022-03-22 PROCEDURE — 99243 OFF/OP CNSLTJ NEW/EST LOW 30: CPT | Performed by: SURGERY

## 2022-03-23 ENCOUNTER — PATIENT MESSAGE (OUTPATIENT)
Dept: FAMILY MEDICINE CLINIC | Facility: CLINIC | Age: 52
End: 2022-03-23

## 2022-03-23 NOTE — TELEPHONE ENCOUNTER
Jean-Paul Chan MD 1 minute ago (7:49 AM)           Ella Correia, they got ahold of someone sorry for the problem

## 2022-03-24 LAB
ABSOLUTE BASOPHILS: 47 CELLS/UL (ref 0–200)
ABSOLUTE EOSINOPHILS: 160 CELLS/UL (ref 15–500)
ABSOLUTE LYMPHOCYTES: 2660 CELLS/UL (ref 850–3900)
ABSOLUTE MONOCYTES: 658 CELLS/UL (ref 200–950)
ABSOLUTE NEUTROPHILS: 5875 CELLS/UL (ref 1500–7800)
ALBUMIN/GLOBULIN RATIO: 1.9 (CALC) (ref 1–2.5)
ALBUMIN: 4.2 G/DL (ref 3.6–5.1)
ALKALINE PHOSPHATASE: 80 U/L (ref 37–153)
ALT: 27 U/L (ref 6–29)
AST: 16 U/L (ref 10–35)
BASOPHILS: 0.5 %
BILIRUBIN, TOTAL: 0.5 MG/DL (ref 0.2–1.2)
BUN: 16 MG/DL (ref 7–25)
CALCIUM: 9.1 MG/DL (ref 8.6–10.4)
CARBON DIOXIDE: 26 MMOL/L (ref 20–32)
CHLORIDE: 105 MMOL/L (ref 98–110)
CHOL/HDLC RATIO: 2.8 (CALC)
CHOLESTEROL, TOTAL: 185 MG/DL
CREATININE: 0.91 MG/DL (ref 0.5–1.05)
EGFR IF AFRICN AM: 85 ML/MIN/1.73M2
EGFR IF NONAFRICN AM: 74 ML/MIN/1.73M2
EOSINOPHILS: 1.7 %
GLOBULIN: 2.2 G/DL (CALC) (ref 1.9–3.7)
GLUCOSE: 107 MG/DL (ref 65–99)
HDL CHOLESTEROL: 66 MG/DL
HEMATOCRIT: 40.9 % (ref 35–45)
HEMOGLOBIN: 13.8 G/DL (ref 11.7–15.5)
LDL-CHOLESTEROL: 102 MG/DL (CALC)
LYMPHOCYTES: 28.3 %
MCH: 30.2 PG (ref 27–33)
MCV: 89.5 FL (ref 80–100)
MONOCYTES: 7 %
MPV: 10.1 FL (ref 7.5–12.5)
NEUTROPHILS: 62.5 %
NON-HDL CHOLESTEROL: 119 MG/DL (CALC)
PLATELET COUNT: 256 THOUSAND/UL (ref 140–400)
POTASSIUM: 4.1 MMOL/L (ref 3.5–5.3)
PROTEIN, TOTAL: 6.4 G/DL (ref 6.1–8.1)
RDW: 12.9 % (ref 11–15)
RED BLOOD CELL COUNT: 4.57 MILLION/UL (ref 3.8–5.1)
SODIUM: 142 MMOL/L (ref 135–146)
TRIGLYCERIDES: 79 MG/DL
TSH W/REFLEX TO FT4: 2.62 MIU/L
WHITE BLOOD CELL COUNT: 9.4 THOUSAND/UL (ref 3.8–10.8)

## 2022-03-28 ENCOUNTER — TELEPHONE (OUTPATIENT)
Dept: SURGERY | Facility: CLINIC | Age: 52
End: 2022-03-28

## 2022-03-29 ENCOUNTER — TELEMEDICINE (OUTPATIENT)
Dept: FAMILY MEDICINE CLINIC | Facility: CLINIC | Age: 52
End: 2022-03-29

## 2022-03-29 ENCOUNTER — TELEPHONE (OUTPATIENT)
Dept: FAMILY MEDICINE CLINIC | Facility: CLINIC | Age: 52
End: 2022-03-29

## 2022-03-29 DIAGNOSIS — J45.21 MILD INTERMITTENT ASTHMA WITH EXACERBATION: Primary | ICD-10-CM

## 2022-03-29 PROCEDURE — 99214 OFFICE O/P EST MOD 30 MIN: CPT | Performed by: FAMILY MEDICINE

## 2022-03-29 RX ORDER — PREDNISONE 20 MG/1
20 TABLET ORAL 2 TIMES DAILY
Qty: 14 TABLET | Refills: 0 | Status: SHIPPED | OUTPATIENT
Start: 2022-03-29 | End: 2022-04-05

## 2022-03-29 NOTE — TELEPHONE ENCOUNTER
Patient advised. Verbalized understanding.    Future Appointments   Date Time Provider Jovani Haque   3/29/2022 11:00 AM Raul Zayas MD EMGOSW [facility] [place]   4/18/2022  8:00 AM Vazquez Acevedo MD EMG ORTHO 75 [facility]   4/22/2022  3:30 PM LILIA Hernandez SGINP ECC SUB GI   4/27/2022 10:40 AM PF RAJAN RM2 PF MAMMO Maceo

## 2022-03-29 NOTE — TELEPHONE ENCOUNTER
Call from patient. States she did teledoc visit 2 days ago for sore throat, congestion, ears clogged. Prescribed azithromycin 500mg 1 tablet daily. Also taking dayquil and naproxen for headaches. Concerned because she also has asthma. Took at home Covid test and it was negative. Occasionally SOB. Allergic to ibuprofen.    Requesting steroid  No openings today  Please advise

## 2022-03-29 NOTE — TELEPHONE ENCOUNTER
Pt called states she is not getting better she can feel this settling in her chest and she has a Migraine headache. Pt saw the message from the nurse and states she cant take Motrin because she is allergic,    Pt had a teledoc appt through her job and was prescribed an Antibiotic. Pt states this is not helping, states she tried to tell the Teledoc this is not Bacterial but viral.     Wants to know if she can get a steroid rx. Has been taking Dayquil and she took 1 Naproxin.       Please advise

## 2022-03-30 ENCOUNTER — PATIENT MESSAGE (OUTPATIENT)
Dept: ENDOCRINOLOGY CLINIC | Facility: CLINIC | Age: 52
End: 2022-03-30

## 2022-03-31 RX ORDER — SIMVASTATIN 40 MG
TABLET ORAL
Qty: 90 TABLET | Refills: 0 | Status: SHIPPED | OUTPATIENT
Start: 2022-03-31

## 2022-03-31 NOTE — TELEPHONE ENCOUNTER
Contacted patient to discuss concerns with elevated glucose. Patient started oral steroid therapy 2 days ago on Tuesday. Patient on Tandem T-slim insulin pump with control IQ. Dexcom data reviewed. No significant hyperglycemia noted over last few days. Due to control IQ, basal rates will automatically increase as needed. Recommended patient correct more frequently as needed for hyperglycemia. Patient verbalizes understanding. Will send condition update next week.     Wyatt RODRIGUES, BC-ADM, [facility]

## 2022-04-02 ENCOUNTER — PATIENT MESSAGE (OUTPATIENT)
Dept: FAMILY MEDICINE CLINIC | Facility: CLINIC | Age: 52
End: 2022-04-02

## 2022-04-02 NOTE — TELEPHONE ENCOUNTER
Finish steroid. Can she try some claritin D or allergra D. She just finish antibiotics and steroid so give some time. Call office on Monday if symptoms no better.

## 2022-04-02 NOTE — TELEPHONE ENCOUNTER
Please advise - patient was taking dayquil to help manage symptoms  Patient looking for alternative  Patient has already finished azithromycin and is on steroids.

## 2022-04-15 ENCOUNTER — PATIENT MESSAGE (OUTPATIENT)
Dept: FAMILY MEDICINE CLINIC | Facility: CLINIC | Age: 52
End: 2022-04-15

## 2022-04-15 ENCOUNTER — EKG ENCOUNTER (OUTPATIENT)
Dept: LAB | Age: 52
End: 2022-04-15
Attending: SURGERY
Payer: COMMERCIAL

## 2022-04-15 DIAGNOSIS — R22.2 MASS OF SKIN OF BACK: ICD-10-CM

## 2022-04-15 LAB
ATRIAL RATE: 76 BPM
P AXIS: 73 DEGREES
P-R INTERVAL: 138 MS
Q-T INTERVAL: 398 MS
QRS DURATION: 78 MS
QTC CALCULATION (BEZET): 447 MS
R AXIS: 58 DEGREES
T AXIS: 48 DEGREES
VENTRICULAR RATE: 76 BPM

## 2022-04-15 PROCEDURE — 93010 ELECTROCARDIOGRAM REPORT: CPT | Performed by: INTERNAL MEDICINE

## 2022-04-15 PROCEDURE — 93005 ELECTROCARDIOGRAM TRACING: CPT

## 2022-04-15 RX ORDER — LEVOTHYROXINE SODIUM 137 UG/1
TABLET ORAL
Qty: 30 TABLET | Refills: 0 | Status: SHIPPED | OUTPATIENT
Start: 2022-04-15

## 2022-04-18 ENCOUNTER — OFFICE VISIT (OUTPATIENT)
Dept: ORTHOPEDICS CLINIC | Facility: CLINIC | Age: 52
End: 2022-04-18
Payer: COMMERCIAL

## 2022-04-18 VITALS — OXYGEN SATURATION: 99 % | HEART RATE: 76 BPM

## 2022-04-18 DIAGNOSIS — M17.0 PRIMARY OSTEOARTHRITIS OF BOTH KNEES: Primary | ICD-10-CM

## 2022-04-18 PROCEDURE — 20610 DRAIN/INJ JOINT/BURSA W/O US: CPT | Performed by: ORTHOPAEDIC SURGERY

## 2022-04-18 RX ORDER — TRIAMCINOLONE ACETONIDE 40 MG/ML
80 INJECTION, SUSPENSION INTRA-ARTICULAR; INTRAMUSCULAR ONCE
Status: COMPLETED | OUTPATIENT
Start: 2022-04-18 | End: 2022-04-18

## 2022-04-18 RX ADMIN — TRIAMCINOLONE ACETONIDE 80 MG: 40 INJECTION, SUSPENSION INTRA-ARTICULAR; INTRAMUSCULAR at 08:43:00

## 2022-04-18 NOTE — PROCEDURES
After informed consent, the patient's right and left knees were marked, locally anesthetized with skin refrigerant, prepped with topical antiseptic, and injected with a mixture of 1mL 40mg/mL Kenalog, 2mL 1% lidocaine and 2mL 0.5% marcaine through the inferolateral portal.  A band-aid was applied. The patient tolerated the procedure well.     Senait Cotter MD, [address] [place]  Phone [phone]  Fax [phone]

## 2022-04-19 ENCOUNTER — OFFICE VISIT (OUTPATIENT)
Dept: FAMILY MEDICINE CLINIC | Facility: CLINIC | Age: 52
End: 2022-04-19
Payer: COMMERCIAL

## 2022-04-19 VITALS
BODY MASS INDEX: 53.24 KG/M2 | SYSTOLIC BLOOD PRESSURE: 146 MMHG | HEART RATE: 93 BPM | RESPIRATION RATE: 18 BRPM | WEIGHT: 282 LBS | OXYGEN SATURATION: 98 % | TEMPERATURE: 98 F | DIASTOLIC BLOOD PRESSURE: 80 MMHG | HEIGHT: 61 IN

## 2022-04-19 DIAGNOSIS — M79.89 LEG SWELLING: Primary | ICD-10-CM

## 2022-04-19 PROCEDURE — 3077F SYST BP >= 140 MM HG: CPT | Performed by: FAMILY MEDICINE

## 2022-04-19 PROCEDURE — 3008F BODY MASS INDEX DOCD: CPT | Performed by: FAMILY MEDICINE

## 2022-04-19 PROCEDURE — 99214 OFFICE O/P EST MOD 30 MIN: CPT | Performed by: FAMILY MEDICINE

## 2022-04-19 PROCEDURE — 3079F DIAST BP 80-89 MM HG: CPT | Performed by: FAMILY MEDICINE

## 2022-04-20 ENCOUNTER — TELEPHONE (OUTPATIENT)
Dept: SURGERY | Facility: CLINIC | Age: 52
End: 2022-04-20

## 2022-04-21 RX ORDER — OMEPRAZOLE 40 MG/1
CAPSULE, DELAYED RELEASE ORAL
Qty: 90 CAPSULE | Refills: 0 | Status: SHIPPED | OUTPATIENT
Start: 2022-04-21

## 2022-04-21 NOTE — TELEPHONE ENCOUNTER
Last refilled on 1/25/22 for # 90 with 0 refills  Last OV 4/19/22  Future Appointments   Date Time Provider Jovani Haque   4/22/2022  8:00 AM OS COVID RESOURCE OS LAB [facility]   4/27/2022 10:40 AM PF RAJAN RM2 PF LUIS Burk[place]        Thank you.

## 2022-04-22 ENCOUNTER — LAB ENCOUNTER (OUTPATIENT)
Dept: LAB | Age: 52
End: 2022-04-22
Attending: SURGERY
Payer: COMMERCIAL

## 2022-04-22 DIAGNOSIS — R22.2 MASS OF SKIN OF BACK: ICD-10-CM

## 2022-04-22 RX ORDER — LIDOCAINE HYDROCHLORIDE 20 MG/ML
SOLUTION ORAL; TOPICAL
Qty: 90 TABLET | Refills: 0 | Status: SHIPPED | OUTPATIENT
Start: 2022-04-22

## 2022-04-22 RX ORDER — PAROXETINE HYDROCHLORIDE 40 MG/1
TABLET, FILM COATED ORAL
Qty: 90 TABLET | Refills: 0 | Status: SHIPPED | OUTPATIENT
Start: 2022-04-22

## 2022-04-22 RX ORDER — BUPROPION HYDROCHLORIDE 300 MG/1
TABLET ORAL
Qty: 90 TABLET | Refills: 0 | Status: SHIPPED | OUTPATIENT
Start: 2022-04-22

## 2022-04-23 LAB — SARS-COV-2 RNA RESP QL NAA+PROBE: NOT DETECTED

## 2022-04-27 ENCOUNTER — LAB ENCOUNTER (OUTPATIENT)
Dept: LAB | Age: 52
End: 2022-04-27
Attending: SURGERY
Payer: COMMERCIAL

## 2022-04-27 DIAGNOSIS — R22.2 MASS OF SKIN OF BACK: ICD-10-CM

## 2022-04-28 ENCOUNTER — ANESTHESIA EVENT (OUTPATIENT)
Dept: SURGERY | Facility: HOSPITAL | Age: 52
End: 2022-04-28
Payer: COMMERCIAL

## 2022-04-28 LAB — SARS-COV-2 RNA RESP QL NAA+PROBE: NOT DETECTED

## 2022-04-29 ENCOUNTER — HOSPITAL ENCOUNTER (OUTPATIENT)
Facility: HOSPITAL | Age: 52
Setting detail: HOSPITAL OUTPATIENT SURGERY
Discharge: HOME | End: 2022-04-29
Attending: SURGERY | Admitting: SURGERY
Payer: COMMERCIAL

## 2022-04-29 ENCOUNTER — ANESTHESIA (OUTPATIENT)
Dept: SURGERY | Facility: HOSPITAL | Age: 52
End: 2022-04-29
Payer: COMMERCIAL

## 2022-04-29 VITALS
HEIGHT: 61 IN | RESPIRATION RATE: 16 BRPM | OXYGEN SATURATION: 95 % | BODY MASS INDEX: 52.15 KG/M2 | DIASTOLIC BLOOD PRESSURE: 84 MMHG | SYSTOLIC BLOOD PRESSURE: 172 MMHG | HEART RATE: 90 BPM | TEMPERATURE: 98 F | WEIGHT: 276.25 LBS

## 2022-04-29 DIAGNOSIS — R22.2 MASS OF SKIN OF BACK: Primary | ICD-10-CM

## 2022-04-29 LAB
GLUCOSE BLD-MCNC: 123 MG/DL (ref 70–99)
GLUCOSE BLD-MCNC: 128 MG/DL (ref 70–99)

## 2022-04-29 PROCEDURE — 0JB80ZZ EXCISION OF ABDOMEN SUBCUTANEOUS TISSUE AND FASCIA, OPEN APPROACH: ICD-10-PCS | Performed by: SURGERY

## 2022-04-29 PROCEDURE — 82962 GLUCOSE BLOOD TEST: CPT

## 2022-04-29 PROCEDURE — 88304 TISSUE EXAM BY PATHOLOGIST: CPT | Performed by: SURGERY

## 2022-04-29 RX ORDER — BUPIVACAINE HYDROCHLORIDE 5 MG/ML
INJECTION, SOLUTION EPIDURAL; INTRACAUDAL AS NEEDED
Status: DISCONTINUED | OUTPATIENT
Start: 2022-04-29 | End: 2022-04-29 | Stop reason: HOSPADM

## 2022-04-29 RX ORDER — SODIUM CHLORIDE, SODIUM LACTATE, POTASSIUM CHLORIDE, CALCIUM CHLORIDE 600; 310; 30; 20 MG/100ML; MG/100ML; MG/100ML; MG/100ML
INJECTION, SOLUTION INTRAVENOUS CONTINUOUS
Status: DISCONTINUED | OUTPATIENT
Start: 2022-04-29 | End: 2022-04-29

## 2022-04-29 RX ORDER — ROCURONIUM BROMIDE 10 MG/ML
INJECTION, SOLUTION INTRAVENOUS AS NEEDED
Status: DISCONTINUED | OUTPATIENT
Start: 2022-04-29 | End: 2022-04-29 | Stop reason: SURG

## 2022-04-29 RX ORDER — HEPARIN SODIUM 5000 [USP'U]/ML
5000 INJECTION, SOLUTION INTRAVENOUS; SUBCUTANEOUS ONCE
Status: COMPLETED | OUTPATIENT
Start: 2022-04-29 | End: 2022-04-29

## 2022-04-29 RX ORDER — NALOXONE HYDROCHLORIDE 0.4 MG/ML
80 INJECTION, SOLUTION INTRAMUSCULAR; INTRAVENOUS; SUBCUTANEOUS AS NEEDED
Status: DISCONTINUED | OUTPATIENT
Start: 2022-04-29 | End: 2022-04-29

## 2022-04-29 RX ORDER — NICOTINE POLACRILEX 4 MG
15 LOZENGE BUCCAL
Status: DISCONTINUED | OUTPATIENT
Start: 2022-04-29 | End: 2022-04-29 | Stop reason: HOSPADM

## 2022-04-29 RX ORDER — LABETALOL HYDROCHLORIDE 5 MG/ML
5 INJECTION, SOLUTION INTRAVENOUS EVERY 5 MIN PRN
Status: DISCONTINUED | OUTPATIENT
Start: 2022-04-29 | End: 2022-04-29

## 2022-04-29 RX ORDER — GABAPENTIN 100 MG/1
100 CAPSULE ORAL 3 TIMES DAILY
Qty: 6 CAPSULE | Refills: 0 | Status: SHIPPED | OUTPATIENT
Start: 2022-04-29

## 2022-04-29 RX ORDER — DEXTROSE MONOHYDRATE 25 G/50ML
50 INJECTION, SOLUTION INTRAVENOUS
Status: DISCONTINUED | OUTPATIENT
Start: 2022-04-29 | End: 2022-04-29 | Stop reason: HOSPADM

## 2022-04-29 RX ORDER — LIDOCAINE HYDROCHLORIDE 10 MG/ML
INJECTION, SOLUTION EPIDURAL; INFILTRATION; INTRACAUDAL; PERINEURAL AS NEEDED
Status: DISCONTINUED | OUTPATIENT
Start: 2022-04-29 | End: 2022-04-29 | Stop reason: SURG

## 2022-04-29 RX ORDER — ACETAMINOPHEN 500 MG
1000 TABLET ORAL ONCE AS NEEDED
Status: DISCONTINUED | OUTPATIENT
Start: 2022-04-29 | End: 2022-04-29

## 2022-04-29 RX ORDER — NICOTINE POLACRILEX 4 MG
30 LOZENGE BUCCAL
Status: DISCONTINUED | OUTPATIENT
Start: 2022-04-29 | End: 2022-04-29 | Stop reason: HOSPADM

## 2022-04-29 RX ORDER — HYDROCODONE BITARTRATE AND ACETAMINOPHEN 5; 325 MG/1; MG/1
1 TABLET ORAL EVERY 6 HOURS PRN
Qty: 15 TABLET | Refills: 0 | Status: SHIPPED | OUTPATIENT
Start: 2022-04-29

## 2022-04-29 RX ORDER — DEXAMETHASONE SODIUM PHOSPHATE 4 MG/ML
VIAL (ML) INJECTION AS NEEDED
Status: DISCONTINUED | OUTPATIENT
Start: 2022-04-29 | End: 2022-04-29 | Stop reason: SURG

## 2022-04-29 RX ORDER — MEPERIDINE HYDROCHLORIDE 25 MG/ML
12.5 INJECTION INTRAMUSCULAR; INTRAVENOUS; SUBCUTANEOUS AS NEEDED
Status: DISCONTINUED | OUTPATIENT
Start: 2022-04-29 | End: 2022-04-29

## 2022-04-29 RX ORDER — ACETAMINOPHEN 500 MG
1000 TABLET ORAL ONCE
Status: DISCONTINUED | OUTPATIENT
Start: 2022-04-29 | End: 2022-04-29 | Stop reason: HOSPADM

## 2022-04-29 RX ORDER — METOCLOPRAMIDE HYDROCHLORIDE 5 MG/ML
10 INJECTION INTRAMUSCULAR; INTRAVENOUS AS NEEDED
Status: DISCONTINUED | OUTPATIENT
Start: 2022-04-29 | End: 2022-04-29

## 2022-04-29 RX ORDER — HYDROCODONE BITARTRATE AND ACETAMINOPHEN 5; 325 MG/1; MG/1
2 TABLET ORAL AS NEEDED
Status: COMPLETED | OUTPATIENT
Start: 2022-04-29 | End: 2022-04-29

## 2022-04-29 RX ORDER — HYDROCODONE BITARTRATE AND ACETAMINOPHEN 5; 325 MG/1; MG/1
1 TABLET ORAL AS NEEDED
Status: COMPLETED | OUTPATIENT
Start: 2022-04-29 | End: 2022-04-29

## 2022-04-29 RX ORDER — ONDANSETRON 2 MG/ML
4 INJECTION INTRAMUSCULAR; INTRAVENOUS AS NEEDED
Status: DISCONTINUED | OUTPATIENT
Start: 2022-04-29 | End: 2022-04-29

## 2022-04-29 RX ORDER — MIDAZOLAM HYDROCHLORIDE 1 MG/ML
1 INJECTION INTRAMUSCULAR; INTRAVENOUS EVERY 5 MIN PRN
Status: DISCONTINUED | OUTPATIENT
Start: 2022-04-29 | End: 2022-04-29

## 2022-04-29 RX ORDER — HYDROMORPHONE HYDROCHLORIDE 1 MG/ML
0.4 INJECTION, SOLUTION INTRAMUSCULAR; INTRAVENOUS; SUBCUTANEOUS EVERY 5 MIN PRN
Status: DISCONTINUED | OUTPATIENT
Start: 2022-04-29 | End: 2022-04-29

## 2022-04-29 RX ORDER — ONDANSETRON 2 MG/ML
INJECTION INTRAMUSCULAR; INTRAVENOUS AS NEEDED
Status: DISCONTINUED | OUTPATIENT
Start: 2022-04-29 | End: 2022-04-29 | Stop reason: SURG

## 2022-04-29 RX ORDER — HYDROMORPHONE HYDROCHLORIDE 1 MG/ML
INJECTION, SOLUTION INTRAMUSCULAR; INTRAVENOUS; SUBCUTANEOUS
Status: COMPLETED
Start: 2022-04-29 | End: 2022-04-29

## 2022-04-29 RX ADMIN — DEXAMETHASONE SODIUM PHOSPHATE 4 MG: 4 MG/ML VIAL (ML) INJECTION at 16:19:00

## 2022-04-29 RX ADMIN — SODIUM CHLORIDE, SODIUM LACTATE, POTASSIUM CHLORIDE, CALCIUM CHLORIDE: 600; 310; 30; 20 INJECTION, SOLUTION INTRAVENOUS at 17:24:00

## 2022-04-29 RX ADMIN — ONDANSETRON 4 MG: 2 INJECTION INTRAMUSCULAR; INTRAVENOUS at 16:19:00

## 2022-04-29 RX ADMIN — ROCURONIUM BROMIDE 30 MG: 10 INJECTION, SOLUTION INTRAVENOUS at 16:26:00

## 2022-04-29 RX ADMIN — LIDOCAINE HYDROCHLORIDE 50 MG: 10 INJECTION, SOLUTION EPIDURAL; INFILTRATION; INTRACAUDAL; PERINEURAL at 16:19:00

## 2022-04-29 NOTE — ANESTHESIA PROCEDURE NOTES
Airway  Date/Time: 4/29/2022 4:20 PM  Urgency: elective    Airway not difficult    General Information and Staff    Patient location during procedure: OR  Anesthesiologist: Chase Phillip MD  Performed: anesthesiologist     Indications and Patient Condition  Indications for airway management: anesthesia  Sedation level: deep  Preoxygenated: yes  Patient position: sniffing  Mask difficulty assessment: 1 - vent by mask    Final Airway Details  Final airway type: endotracheal airway      Successful airway: ETT  Cuffed: yes   Successful intubation technique: direct laryngoscopy  Endotracheal tube insertion site: oral  Blade: Pool  Blade size: #4  ETT size (mm): 7.0    Cormack-Lehane Classification: grade I - full view of glottis  Placement verified by: chest auscultation and capnometry   Measured from: lips  ETT to lips (cm): 22  Number of attempts at approach: 1

## 2022-04-29 NOTE — TELEPHONE ENCOUNTER
Referral placed, and pending with insurance. Metronidazole Pregnancy And Lactation Text: This medication is Pregnancy Category B and considered safe during pregnancy.  It is also excreted in breast milk.

## 2022-04-30 NOTE — OPERATIVE REPORT
[address]    PATIENT'S NAME: Krish Barbosa   ATTENDING PHYSICIAN: Bell David D.O.   OPERATING PHYSICIAN: Bell David D.O.   PATIENT ACCOUNT#:   [de-identified]    LOCATION:  [address] [place] [address]  MEDICAL RECORD #:   [id number]       YOB: 1970  ADMISSION DATE:       04/29/2022      OPERATION DATE:  04/29/2022    OPERATIVE REPORT      PREOPERATIVE DIAGNOSIS:  Left flank mass. POSTOPERATIVE DIAGNOSIS:  Left flank mass. PROCEDURE:  Excision of 20 x 9 cm subfascial left flank mass. ASSISTANT:  Jayde Hilario PA-C    ANESTHESIA:  General.     COMPLICATIONS:  None. OPERATIVE TECHNIQUE:  An informed consent was previously obtained. The patient was taken to the operative suite and placed in a supine position. General inhalational anesthetic was administered by the anesthesia department, and the patient was placed in the right side-lying position. The left flank area was prepped and draped in usual sterile fashion. A transverse skin incision 15 cm in length was made directly over the mass. Sharp dissection carried down through the skin and subcutaneous tissue. Hemostasis secured using handheld electrocautery. There were some large veins coursing across the surface of the mass. These were handled by hemostatic clamps and handheld electrocautery and divided. The mass was circumferentially dissected and freed from the surrounding tissues, and using blunt and sharp dissection, dissection carried out using handheld electrocautery. The mass was handed off intact for pathological evaluation. The vascular bundle supplying the mass was clamped, divided, and ligated with 3-0 Vicryl suture. Hemostasis was secured within the wound using handheld electrocautery. Copious irrigation was carried out within the dissection area. All fluid was suctioned clear. Hemostasis secured using handheld electrocautery.   The subcutaneous tissues were approximated using interrupted 3-0 Vicryl suture. A 19 Simon drain brought out through a lateral incision, sutured in place using 2-0 silk suture x1. Skin edges approximated using 4-0 Monocryl in a running subdermal fashion. Overlying Mastisol and Steri-Strips were applied. Sterile dressing was applied. The patient was transported from the operative suite to Recovery in stable condition.     Dictated By Hilda Nina D.O.  d: 04/29/2022 17:23:25  t: 04/29/2022 23:01:33  [facility] [id number]  UR/    cc: Augustus Jc D.O.

## 2022-05-05 ENCOUNTER — HOSPITAL ENCOUNTER (OUTPATIENT)
Age: 52
Discharge: HOME | End: 2022-05-05
Payer: COMMERCIAL

## 2022-05-05 ENCOUNTER — TELEPHONE (OUTPATIENT)
Dept: FAMILY MEDICINE CLINIC | Facility: CLINIC | Age: 52
End: 2022-05-05

## 2022-05-05 VITALS
HEART RATE: 88 BPM | SYSTOLIC BLOOD PRESSURE: 153 MMHG | TEMPERATURE: 97 F | DIASTOLIC BLOOD PRESSURE: 67 MMHG | OXYGEN SATURATION: 97 % | RESPIRATION RATE: 18 BRPM

## 2022-05-05 DIAGNOSIS — Z11.52 ENCOUNTER FOR SCREENING FOR COVID-19: Primary | ICD-10-CM

## 2022-05-05 DIAGNOSIS — B34.9 VIRAL SYNDROME: ICD-10-CM

## 2022-05-05 LAB — SARS-COV-2 RNA RESP QL NAA+PROBE: NOT DETECTED

## 2022-05-05 PROCEDURE — 99213 OFFICE O/P EST LOW 20 MIN: CPT | Performed by: NURSE PRACTITIONER

## 2022-05-05 PROCEDURE — U0002 COVID-19 LAB TEST NON-CDC: HCPCS | Performed by: NURSE PRACTITIONER

## 2022-05-05 NOTE — TELEPHONE ENCOUNTER
Pt spoke with Dr. Stacey Davenport yesterday regarding getting an infusion b/c pt's daughters have covid. Pt stated that she doesn't remember much of the conversation as to why Dr. Stacey Davenport wanted her to get the infusion due to her being on Norco.  She is wondering if it's necessary. She just had surgery ad doesn't want to be exposed to any germs at the [address] [place] [address].

## 2022-05-05 NOTE — TELEPHONE ENCOUNTER
If she tested positive then due to her dm she might be qualify for monoclonal treatment. She will qualify only if positive and 5 days window of her symptoms start. ic will decide if she qualify or not. It is not necessary but can help fight infection.

## 2022-05-05 NOTE — ED INITIAL ASSESSMENT (HPI)
Pt c/o headache, congestion that started on Monday. Pt daughters have covid.   Pt just had lipoma removed from back last friday

## 2022-05-05 NOTE — TELEPHONE ENCOUNTER
Patient advised. Verbalized understanding. Patient states she does have headache and phelmy throat. States she will come to IC today for testing.

## 2022-05-06 ENCOUNTER — OFFICE VISIT (OUTPATIENT)
Dept: SURGERY | Facility: CLINIC | Age: 52
End: 2022-05-06

## 2022-05-06 VITALS — TEMPERATURE: 97 F | DIASTOLIC BLOOD PRESSURE: 67 MMHG | HEART RATE: 88 BPM | SYSTOLIC BLOOD PRESSURE: 153 MMHG

## 2022-05-06 DIAGNOSIS — Z48.03 CHANGE OR REMOVAL OF DRAINS: ICD-10-CM

## 2022-05-06 DIAGNOSIS — Z98.890 POST-OPERATIVE STATE: Primary | ICD-10-CM

## 2022-05-06 PROCEDURE — 3078F DIAST BP <80 MM HG: CPT

## 2022-05-06 PROCEDURE — 3077F SYST BP >= 140 MM HG: CPT

## 2022-05-06 PROCEDURE — 99024 POSTOP FOLLOW-UP VISIT: CPT

## 2022-05-08 ENCOUNTER — HOSPITAL ENCOUNTER (OUTPATIENT)
Age: 52
Discharge: HOME | End: 2022-05-08
Payer: COMMERCIAL

## 2022-05-08 VITALS
DIASTOLIC BLOOD PRESSURE: 58 MMHG | OXYGEN SATURATION: 97 % | HEART RATE: 63 BPM | SYSTOLIC BLOOD PRESSURE: 142 MMHG | TEMPERATURE: 97 F | RESPIRATION RATE: 16 BRPM

## 2022-05-08 DIAGNOSIS — U07.1 COVID-19: ICD-10-CM

## 2022-05-08 DIAGNOSIS — R05.9 COUGH: Primary | ICD-10-CM

## 2022-05-08 LAB
GLUCOSE BLD-MCNC: 85 MG/DL (ref 70–99)
SARS-COV-2 RNA RESP QL NAA+PROBE: DETECTED

## 2022-05-08 RX ORDER — BEBTELOVIMAB 87.5 MG/ML
175 INJECTION, SOLUTION INTRAVENOUS ONCE
Status: COMPLETED | OUTPATIENT
Start: 2022-05-08 | End: 2022-05-08

## 2022-05-08 NOTE — ED QUICK NOTES
Patient's insulin pump is showing a blood sugar of 60. She is drinking juice and was given additional juice and crackers. She is awake, alert and oriented. Provider notified.

## 2022-05-08 NOTE — ED INITIAL ASSESSMENT (HPI)
Pt has 2 family members at home who are positive for covid. Patient c/o coughing, headache, and congestion since Friday.

## 2022-05-08 NOTE — ED QUICK NOTES
Pt states her bs is 70. Apple juice and crackers given. Provider notified. Will continue to monitor.

## 2022-05-09 ENCOUNTER — TELEPHONE (OUTPATIENT)
Dept: CASE MANAGEMENT | Age: 52
End: 2022-05-09

## 2022-05-09 NOTE — TELEPHONE ENCOUNTER
Called pt to get an update. She states she does not feel any worse than she did yesterday. She was diagnosed with COVID just yesterday. She has a pulse ox at home and has been monitoring her oxygen levels at home. She states she was hovering around 97% yesterday but has not checked yet this morning. Advised pt to go to the ER if her oxygen dips below 90%, if she develops SOB, chest pain, or difficulty breathing. Pt states overall she is just very fatigued. Pt verbalized understanding and will call if she has any questions.   Future Appointments   Date Time Provider Jovani Haque   5/26/2022  1:40 PM ELENITA TOLENTINO RM1 ELENITA Burkett

## 2022-05-09 NOTE — TELEPHONE ENCOUNTER
Pt received MAB infusion at [facility] on 5/8/22 for COVID-19. Please follow-up with pt for post-infusion assessment and home monitoring if needed. Thank you.

## 2022-05-10 RX ORDER — LEVOTHYROXINE SODIUM 137 UG/1
TABLET ORAL
Qty: 90 TABLET | Refills: 0 | Status: SHIPPED | OUTPATIENT
Start: 2022-05-10

## 2022-05-10 RX ORDER — LEVOTHYROXINE SODIUM 137 UG/1
TABLET ORAL
Qty: 30 TABLET | Refills: 0 | Status: SHIPPED | OUTPATIENT
Start: 2022-05-10 | End: 2022-05-10

## 2022-05-12 NOTE — TELEPHONE ENCOUNTER
Last refilled on 4/21/22 for # 90 with 0 refills  Last OV 4/19/22  Future Appointments   Date Time Provider Jovani Haque   5/26/2022  1:40 PM PF RAJAN RM1 PF LUIS Burkett        Thank you.

## 2022-05-13 RX ORDER — OMEPRAZOLE 40 MG/1
CAPSULE, DELAYED RELEASE ORAL
Qty: 30 CAPSULE | Refills: 0 | Status: SHIPPED | OUTPATIENT
Start: 2022-05-13 | End: 2022-05-15

## 2022-05-14 NOTE — TELEPHONE ENCOUNTER
LOV 4/19/22 for leg swelling. This was sent yesterday for #30. Pharmacy requesting #90. Ok to do?     Future Appointments   Date Time Provider Jovani Haque   5/26/2022  1:40 PM PF RAJAN RM1 PF LUIS Burkett

## 2022-05-15 RX ORDER — OMEPRAZOLE 40 MG/1
CAPSULE, DELAYED RELEASE ORAL
Qty: 90 CAPSULE | Refills: 0 | Status: SHIPPED | OUTPATIENT
Start: 2022-05-15

## 2022-06-07 ENCOUNTER — HOSPITAL ENCOUNTER (OUTPATIENT)
Dept: MAMMOGRAPHY | Age: 52
Discharge: HOME | End: 2022-06-07
Attending: FAMILY MEDICINE
Payer: COMMERCIAL

## 2022-06-07 DIAGNOSIS — Z12.31 BREAST CANCER SCREENING BY MAMMOGRAM: ICD-10-CM

## 2022-06-07 PROCEDURE — 77063 BREAST TOMOSYNTHESIS BI: CPT | Performed by: FAMILY MEDICINE

## 2022-06-07 PROCEDURE — 77067 SCR MAMMO BI INCL CAD: CPT | Performed by: FAMILY MEDICINE

## 2022-06-08 ENCOUNTER — TELEPHONE (OUTPATIENT)
Dept: ENDOCRINOLOGY CLINIC | Facility: CLINIC | Age: 52
End: 2022-06-08

## 2022-06-08 NOTE — TELEPHONE ENCOUNTER
Can you schedule her to video visit.
From: Jerrod Chang  To: Escobar Garcia DO  Sent: 9/1/2020 11:01 AM CDT  Subject: Prescription Question    I have pain in my back close to my shoulder blade, I've tried aleve and Tylenol to try to calm the muscles. Icing and biofreeze rub on it.  What els
Future Appointments   Date Time Provider Jovani Haque   9/1/2020  2:20 PM Guerline Claire MD EMGOSW MAR[facility] [place]
no

## 2022-06-10 NOTE — TELEPHONE ENCOUNTER
Received fax from [address] [place] [address],[address] approval letter for Dexcom G6 Sensor granted: 06/08/2022- 06/08/2023    Approval letter sent to scan

## 2022-06-15 ENCOUNTER — TELEPHONE (OUTPATIENT)
Dept: ENDOCRINOLOGY CLINIC | Facility: CLINIC | Age: 52
End: 2022-06-15

## 2022-06-15 NOTE — TELEPHONE ENCOUNTER
Pt called stating Dexcom supplies cost over $700 and states unable to afford at this time, pt is not completely yet    Called patient pharmacy and spoke with Tj and he stated the patient has a very high deductible 9 sensors cost over $500

## 2022-06-16 NOTE — TELEPHONE ENCOUNTER
DME filled out and will have KR review and sign tomorrow    Called and spoke to the patient letting her know what is going on, pt states understanding and has enough to last her until next week

## 2022-06-17 NOTE — TELEPHONE ENCOUNTER
KR reviewed and signed DME, attached facesheet with insurance card, and last OV notes     Fax: [phone]  Fax confirmed    Copy sent to scan  Form placed in brown folder

## 2022-06-22 ENCOUNTER — PATIENT MESSAGE (OUTPATIENT)
Dept: ENDOCRINOLOGY CLINIC | Facility: CLINIC | Age: 52
End: 2022-06-22

## 2022-06-23 NOTE — TELEPHONE ENCOUNTER
Called and spoke with pt and they will be coming tomorrow for a nurse visit to  the Dexcom sensors, also had the patient make a follow up appointment with KR next week and let pt know needs to get lab work done    [employer] out to [place] our [employer] rep to stop by next week to have KR sign for Dexcom samples

## 2022-06-23 NOTE — TELEPHONE ENCOUNTER
Ok to provide 2 sensors for dexcom   Review of chart indicates pt never completed type 1 DM labs - ordered 9/2021 but not completed  She should complete these labs - I will order again to avoid confusion at lab     Also, is dexcom covered better under DME - see separate encounter

## 2022-06-24 ENCOUNTER — NURSE ONLY (OUTPATIENT)
Dept: ENDOCRINOLOGY CLINIC | Facility: CLINIC | Age: 52
End: 2022-06-24
Payer: COMMERCIAL

## 2022-06-29 RX ORDER — SIMVASTATIN 40 MG
TABLET ORAL
Qty: 90 TABLET | Refills: 0 | Status: SHIPPED | OUTPATIENT
Start: 2022-06-29

## 2022-06-29 NOTE — TELEPHONE ENCOUNTER
LOV 4/19/22    Cholesterol Medication Protocol Passed 06/28/2022 11:25 PM   Protocol Details  ALT < 80    ALT resulted within past year    Lipid panel within past 12 months    Appointment within past 12 or next 3 months     Future Appointments   Date Time Provider Jovani Haque   7/1/2022  9:45 AM LILIA Dueñas EM[facility]CTRNA [facility] [address]        Rx sent.

## 2022-07-01 ENCOUNTER — OFFICE VISIT (OUTPATIENT)
Dept: ENDOCRINOLOGY CLINIC | Facility: CLINIC | Age: 52
End: 2022-07-01
Payer: COMMERCIAL

## 2022-07-01 VITALS
OXYGEN SATURATION: 98 % | HEART RATE: 78 BPM | RESPIRATION RATE: 20 BRPM | WEIGHT: 279 LBS | BODY MASS INDEX: 51.34 KG/M2 | HEIGHT: 62 IN | DIASTOLIC BLOOD PRESSURE: 62 MMHG | SYSTOLIC BLOOD PRESSURE: 124 MMHG

## 2022-07-01 DIAGNOSIS — E11.9 TYPE 2 DIABETES MELLITUS WITHOUT COMPLICATION, WITH LONG-TERM CURRENT USE OF INSULIN (HCC): Primary | ICD-10-CM

## 2022-07-01 DIAGNOSIS — Z79.4 TYPE 2 DIABETES MELLITUS WITHOUT COMPLICATION, WITH LONG-TERM CURRENT USE OF INSULIN (HCC): Primary | ICD-10-CM

## 2022-07-01 LAB
CARTRIDGE LOT#: 970 NUMERIC
HEMOGLOBIN A1C: 5.7 % (ref 4.3–5.6)

## 2022-07-01 PROCEDURE — 3044F HG A1C LEVEL LT 7.0%: CPT | Performed by: FAMILY MEDICINE

## 2022-07-01 RX ORDER — INSULIN ASPART 100 [IU]/ML
INJECTION, SOLUTION INTRAVENOUS; SUBCUTANEOUS
Qty: 150 ML | Refills: 1 | Status: SHIPPED | OUTPATIENT
Start: 2022-07-01

## 2022-07-01 RX ORDER — INSULIN GLARGINE 300 U/ML
INJECTION, SOLUTION SUBCUTANEOUS
Qty: 6 ML | Refills: 1 | Status: SHIPPED | OUTPATIENT
Start: 2022-07-01

## 2022-07-05 ENCOUNTER — TELEPHONE (OUTPATIENT)
Dept: ENDOCRINOLOGY CLINIC | Facility: CLINIC | Age: 52
End: 2022-07-05

## 2022-07-05 ENCOUNTER — LAB ENCOUNTER (OUTPATIENT)
Dept: LAB | Age: 52
End: 2022-07-05
Attending: NURSE PRACTITIONER
Payer: COMMERCIAL

## 2022-07-05 ENCOUNTER — PATIENT MESSAGE (OUTPATIENT)
Dept: ORTHOPEDICS CLINIC | Facility: CLINIC | Age: 52
End: 2022-07-05

## 2022-07-05 DIAGNOSIS — E11.9 TYPE 2 DIABETES MELLITUS WITHOUT COMPLICATION, WITH LONG-TERM CURRENT USE OF INSULIN (HCC): ICD-10-CM

## 2022-07-05 DIAGNOSIS — Z79.4 TYPE 2 DIABETES MELLITUS WITHOUT COMPLICATION, WITH LONG-TERM CURRENT USE OF INSULIN (HCC): ICD-10-CM

## 2022-07-05 PROCEDURE — 86341 ISLET CELL ANTIBODY: CPT | Performed by: NURSE PRACTITIONER

## 2022-07-05 PROCEDURE — 84681 ASSAY OF C-PEPTIDE: CPT | Performed by: NURSE PRACTITIONER

## 2022-07-05 RX ORDER — INSULIN GLARGINE 300 U/ML
INJECTION, SOLUTION SUBCUTANEOUS
Qty: 6 ML | Refills: 1 | Status: SHIPPED | OUTPATIENT
Start: 2022-07-05

## 2022-07-05 NOTE — TELEPHONE ENCOUNTER
Andrew left message to clarify daily amount of toujeo. Returned call and gave 53 units only when off insulin pump/pump back up plan.

## 2022-07-07 LAB
C-PEPTIDE, SERUM OR PLASMA: 1.8 NG/ML
GLUTAMIC ACID DECARBOXYLASE AB: <5 IU/ML

## 2022-07-09 ENCOUNTER — PATIENT MESSAGE (OUTPATIENT)
Dept: FAMILY MEDICINE CLINIC | Facility: CLINIC | Age: 52
End: 2022-07-09

## 2022-07-09 RX ORDER — NAPROXEN 500 MG/1
500 TABLET ORAL DAILY PRN
Qty: 30 TABLET | Refills: 0 | Status: SHIPPED | OUTPATIENT
Start: 2022-07-09

## 2022-07-09 NOTE — TELEPHONE ENCOUNTER
From: Refugio Patterson  To: Garrett Webber MD  Sent: 7/9/2022 10:39 AM CDT  Subject: Naproxen    I have been using 500mg Naproxen on the days I work for help with pain. I have an appointment with Dr Antione Pavon to talk about the pain, could I get a refill sent to [facility]?      Thanks  [employer]

## 2022-07-13 ENCOUNTER — OFFICE VISIT (OUTPATIENT)
Dept: FAMILY MEDICINE CLINIC | Facility: CLINIC | Age: 52
End: 2022-07-13
Payer: COMMERCIAL

## 2022-07-13 VITALS
TEMPERATURE: 98 F | RESPIRATION RATE: 16 BRPM | HEIGHT: 62 IN | HEART RATE: 78 BPM | DIASTOLIC BLOOD PRESSURE: 78 MMHG | WEIGHT: 279 LBS | SYSTOLIC BLOOD PRESSURE: 130 MMHG | BODY MASS INDEX: 51.34 KG/M2 | OXYGEN SATURATION: 98 %

## 2022-07-13 DIAGNOSIS — M25.50 ARTHRALGIA, UNSPECIFIED JOINT: Primary | ICD-10-CM

## 2022-07-13 DIAGNOSIS — M17.0 OSTEOARTHRITIS OF BOTH KNEES, UNSPECIFIED OSTEOARTHRITIS TYPE: ICD-10-CM

## 2022-07-13 DIAGNOSIS — M79.604 PAIN IN BOTH LOWER EXTREMITIES: ICD-10-CM

## 2022-07-13 DIAGNOSIS — R60.0 BILATERAL LEG EDEMA: ICD-10-CM

## 2022-07-13 DIAGNOSIS — M79.605 PAIN IN BOTH LOWER EXTREMITIES: ICD-10-CM

## 2022-07-13 LAB — ERYTHROCYTE [SEDIMENTATION RATE] IN BLOOD: 14 MM/HR

## 2022-07-13 PROCEDURE — 85652 RBC SED RATE AUTOMATED: CPT | Performed by: FAMILY MEDICINE

## 2022-07-13 PROCEDURE — 3075F SYST BP GE 130 - 139MM HG: CPT | Performed by: FAMILY MEDICINE

## 2022-07-13 PROCEDURE — 3008F BODY MASS INDEX DOCD: CPT | Performed by: FAMILY MEDICINE

## 2022-07-13 PROCEDURE — 3078F DIAST BP <80 MM HG: CPT | Performed by: FAMILY MEDICINE

## 2022-07-13 PROCEDURE — 99214 OFFICE O/P EST MOD 30 MIN: CPT | Performed by: FAMILY MEDICINE

## 2022-07-14 ENCOUNTER — TELEPHONE (OUTPATIENT)
Dept: FAMILY MEDICINE CLINIC | Facility: CLINIC | Age: 52
End: 2022-07-14

## 2022-07-14 ENCOUNTER — PATIENT MESSAGE (OUTPATIENT)
Dept: FAMILY MEDICINE CLINIC | Facility: CLINIC | Age: 52
End: 2022-07-14

## 2022-07-14 NOTE — TELEPHONE ENCOUNTER
Patient will confirm if she needs a TB skin test or blood test and send a [facility]

## 2022-07-14 NOTE — TELEPHONE ENCOUNTER
Pt needs an order for TB  Test,  Has an appt Saturday 7/16  Future Appointments   Date Time Provider Jovani Shabnam   7/16/2022 10:00 AM EMG [facility] NURSE [facility] [place]   7/16/2022 10:30 AM OS XR RM1 OS XRAY [place]   7/18/2022 11:00 AM EMG OSWEGO NURSE [facility] [place]   8/10/2022  3:40 PM Basim Solo MD EMG ORTHO 75 [facility]   11/2/2022  8:15 AM LILIA Salamanca EMGDIABCTRNA [facility] [address]

## 2022-07-14 NOTE — TELEPHONE ENCOUNTER
From: Tacho Rivera  To: Oliva Uribe MD  Sent: 7/14/2022 12:19 PM CDT  Subject: TB Test    I spoke with my internship and they prefer the skin test

## 2022-07-14 NOTE — TELEPHONE ENCOUNTER
Patient messaged back and confirmed mantoux tb skin test needed  Please place orders if okay to give.

## 2022-07-15 NOTE — TELEPHONE ENCOUNTER
Order placed  Future Appointments   Date Time Provider Jovani Haque   7/16/2022 10:00 AM EMG OSWEGO NURSE EMGOSW [facility] [place]   7/16/2022 10:30 AM OS XR RM1 OS XRAY [place]   7/18/2022 11:00 AM EMG OSWEGO NURSE EMGOSW [facility] [place]   8/10/2022  3:40 PM Dana Taylor MD EMG ORTHO 75 [facility]   11/2/2022  8:15 AM LILIA Mason [facility]CTRNA [facility] [address]

## 2022-07-16 ENCOUNTER — HOSPITAL ENCOUNTER (OUTPATIENT)
Dept: GENERAL RADIOLOGY | Age: 52
Discharge: HOME | End: 2022-07-16
Attending: FAMILY MEDICINE
Payer: COMMERCIAL

## 2022-07-16 ENCOUNTER — NURSE ONLY (OUTPATIENT)
Dept: FAMILY MEDICINE CLINIC | Facility: CLINIC | Age: 52
End: 2022-07-16
Payer: COMMERCIAL

## 2022-07-16 DIAGNOSIS — M79.605 PAIN IN BOTH LOWER EXTREMITIES: ICD-10-CM

## 2022-07-16 DIAGNOSIS — M79.604 PAIN IN BOTH LOWER EXTREMITIES: ICD-10-CM

## 2022-07-16 PROCEDURE — 72110 X-RAY EXAM L-2 SPINE 4/>VWS: CPT | Performed by: FAMILY MEDICINE

## 2022-07-16 NOTE — PROGRESS NOTES
Patient here for PPD placement. Tolerated well. Left office in stable condiiton. Advised to return in 48-72 hrs for read. Verbalized understanding.

## 2022-07-18 ENCOUNTER — TELEPHONE (OUTPATIENT)
Dept: FAMILY MEDICINE CLINIC | Facility: CLINIC | Age: 52
End: 2022-07-18

## 2022-07-18 ENCOUNTER — NURSE ONLY (OUTPATIENT)
Dept: FAMILY MEDICINE CLINIC | Facility: CLINIC | Age: 52
End: 2022-07-18
Payer: COMMERCIAL

## 2022-07-18 LAB — INDURATION (): 0 MM (ref 0–11)

## 2022-07-21 ENCOUNTER — PATIENT MESSAGE (OUTPATIENT)
Dept: FAMILY MEDICINE CLINIC | Facility: CLINIC | Age: 52
End: 2022-07-21

## 2022-07-21 NOTE — TELEPHONE ENCOUNTER
LRF 5/15/22 #90  LOV  7/13/22  Future Appointments   Date Time Provider Jovani Haque   8/9/2022 10:45 AM ALISON Hemphill [facility] [place]   8/10/2022  3:40 PM Josefa Farris MD EMG ORTHO 75 [facility]   9/27/2022  8:40 AM JAIME, PROCEDURE SGIEDW None   11/2/2022  8:15 AM LILIA Myers EMG[facility]CTRNA [facility] [address] [address]

## 2022-07-25 DIAGNOSIS — Z79.4 DIABETES MELLITUS DUE TO UNDERLYING CONDITION WITH DIABETIC NEPHROPATHY, WITH LONG-TERM CURRENT USE OF INSULIN (HCC): ICD-10-CM

## 2022-07-25 DIAGNOSIS — E08.21 DIABETES MELLITUS DUE TO UNDERLYING CONDITION WITH DIABETIC NEPHROPATHY, WITH LONG-TERM CURRENT USE OF INSULIN (HCC): ICD-10-CM

## 2022-07-25 RX ORDER — OMEPRAZOLE 40 MG/1
40 CAPSULE, DELAYED RELEASE ORAL DAILY
Qty: 90 CAPSULE | Refills: 0 | Status: SHIPPED | OUTPATIENT
Start: 2022-07-25

## 2022-07-25 RX ORDER — BLOOD-GLUCOSE SENSOR
EACH MISCELLANEOUS
Qty: 9 EACH | Refills: 3 | Status: SHIPPED | OUTPATIENT
Start: 2022-07-25

## 2022-07-25 RX ORDER — BLOOD-GLUCOSE TRANSMITTER
EACH MISCELLANEOUS
Qty: 1 EACH | Refills: 3 | Status: SHIPPED | OUTPATIENT
Start: 2022-07-25

## 2022-07-25 NOTE — TELEPHONE ENCOUNTER
Last refilled on 4/22/22 for # 90 with 0 refills  Last OV 7/13/22  Future Appointments   Date Time Provider Jovani Shabnam   8/9/2022 10:45 AM ALISON Rossi [facility] [place]   8/10/2022  3:40 PM Isabel Horton MD EMG ORTHO 75 [facility]   9/27/2022  8:40 AM JAIME, PROCEDURE SGIEDW None   11/2/2022  8:15 AM LILIA Whitney [facility]CTRNA [facility] [address]        Thank you.

## 2022-07-27 RX ORDER — PAROXETINE HYDROCHLORIDE 40 MG/1
TABLET, FILM COATED ORAL
Qty: 90 TABLET | Refills: 0 | Status: SHIPPED | OUTPATIENT
Start: 2022-07-27

## 2022-07-27 RX ORDER — LIDOCAINE HYDROCHLORIDE 20 MG/ML
SOLUTION ORAL; TOPICAL
Qty: 90 TABLET | Refills: 0 | Status: SHIPPED | OUTPATIENT
Start: 2022-07-27

## 2022-07-27 RX ORDER — BUPROPION HYDROCHLORIDE 300 MG/1
TABLET ORAL
Qty: 90 TABLET | Refills: 0 | Status: SHIPPED | OUTPATIENT
Start: 2022-07-27

## 2022-07-28 ENCOUNTER — TELEPHONE (OUTPATIENT)
Dept: ENDOCRINOLOGY CLINIC | Facility: CLINIC | Age: 52
End: 2022-07-28

## 2022-07-28 NOTE — TELEPHONE ENCOUNTER
Received Dexcom patient assistant form filled, placed form on [address] [facility]

## 2022-08-10 ENCOUNTER — OFFICE VISIT (OUTPATIENT)
Dept: ORTHOPEDICS CLINIC | Facility: CLINIC | Age: 52
End: 2022-08-10
Payer: COMMERCIAL

## 2022-08-10 VITALS — WEIGHT: 169.19 LBS | HEIGHT: 61.5 IN | HEART RATE: 82 BPM | BODY MASS INDEX: 31.53 KG/M2 | OXYGEN SATURATION: 98 %

## 2022-08-10 DIAGNOSIS — E66.01 MORBID OBESITY (HCC): ICD-10-CM

## 2022-08-10 DIAGNOSIS — M17.0 PRIMARY OSTEOARTHRITIS OF BOTH KNEES: Primary | ICD-10-CM

## 2022-08-10 PROCEDURE — 3008F BODY MASS INDEX DOCD: CPT | Performed by: ORTHOPAEDIC SURGERY

## 2022-08-10 PROCEDURE — 99213 OFFICE O/P EST LOW 20 MIN: CPT | Performed by: ORTHOPAEDIC SURGERY

## 2022-08-10 NOTE — PROGRESS NOTES
[facility] Progress Note    Subjective: Patient returns today for both knees. She states that the steroid injections are not providing as much relief as they used to. She gets about a month, maybe 2, with good relief before symptoms return. She notes that she recently had excision of a tumor from her back, states that since that time she has had significantly increased pain in her low back, radiating down the thighs and legs. This is different from her typical knee pain. Objective: Patient is awake alert in no distress. No effusion noted to the knees. She ambulates with a nonantalgic gait. Has active full extension of the knees. Assessment/Plan: 32-year-old female with symptomatic radiographically mild to moderate bilateral knee osteoarthritis. Revisited the discussion of treatment options. She did ask what the next steps are if steroid injections are not helping. We did discuss possibility of other injections including Zilretta, viscosupplementation. Did discuss possibility of referral for radiofrequency ablation. We also discussed the importance of BMI optimization, both for presurgical knee pain as well as to optimize in anticipation of potential knee replacement in the future. We would want to repeat knee x-rays at some point if and when planning knee replacement surgery. She expressed understanding and agreement with this discussion. We did provide [facility] referral in place Zilretta order. She will follow-up for injections when these are obtained.     Concepcion Oconnell MD, [address] [facility]  Phone [phone]  Fax [phone]

## 2022-08-17 ENCOUNTER — OFFICE VISIT (OUTPATIENT)
Dept: INTERNAL MEDICINE CLINIC | Facility: CLINIC | Age: 52
End: 2022-08-17
Payer: COMMERCIAL

## 2022-08-17 VITALS
OXYGEN SATURATION: 98 % | HEART RATE: 75 BPM | RESPIRATION RATE: 16 BRPM | WEIGHT: 267 LBS | HEIGHT: 61 IN | SYSTOLIC BLOOD PRESSURE: 134 MMHG | DIASTOLIC BLOOD PRESSURE: 78 MMHG | BODY MASS INDEX: 50.41 KG/M2

## 2022-08-17 DIAGNOSIS — E03.9 HYPOTHYROIDISM, UNSPECIFIED TYPE: ICD-10-CM

## 2022-08-17 DIAGNOSIS — E66.01 OBESITY, MORBID, BMI 50 OR HIGHER (HCC): ICD-10-CM

## 2022-08-17 DIAGNOSIS — G47.33 OSA ON CPAP: ICD-10-CM

## 2022-08-17 DIAGNOSIS — E11.9 TYPE 2 DIABETES MELLITUS WITHOUT COMPLICATION, WITH LONG-TERM CURRENT USE OF INSULIN (HCC): ICD-10-CM

## 2022-08-17 DIAGNOSIS — Z79.4 TYPE 2 DIABETES MELLITUS WITHOUT COMPLICATION, WITH LONG-TERM CURRENT USE OF INSULIN (HCC): ICD-10-CM

## 2022-08-17 DIAGNOSIS — F43.9 STRESS: ICD-10-CM

## 2022-08-17 DIAGNOSIS — Z51.81 THERAPEUTIC DRUG MONITORING: Primary | ICD-10-CM

## 2022-08-17 DIAGNOSIS — Z99.89 OSA ON CPAP: ICD-10-CM

## 2022-08-17 DIAGNOSIS — E78.5 DYSLIPIDEMIA: ICD-10-CM

## 2022-08-17 DIAGNOSIS — F41.9 ANXIETY: ICD-10-CM

## 2022-08-17 PROBLEM — E89.0 S/P THYROIDECTOMY: Status: ACTIVE | Noted: 2020-01-27

## 2022-08-17 PROBLEM — Z90.89 S/P THYROIDECTOMY: Status: ACTIVE | Noted: 2020-01-27

## 2022-08-17 PROBLEM — Z98.890 S/P THYROIDECTOMY: Status: ACTIVE | Noted: 2020-01-27

## 2022-08-17 PROCEDURE — 99214 OFFICE O/P EST MOD 30 MIN: CPT | Performed by: NURSE PRACTITIONER

## 2022-08-17 PROCEDURE — 3078F DIAST BP <80 MM HG: CPT | Performed by: NURSE PRACTITIONER

## 2022-08-17 PROCEDURE — 3075F SYST BP GE 130 - 139MM HG: CPT | Performed by: NURSE PRACTITIONER

## 2022-08-17 PROCEDURE — 3008F BODY MASS INDEX DOCD: CPT | Performed by: NURSE PRACTITIONER

## 2022-08-17 NOTE — PATIENT INSTRUCTIONS
We are here to support you with weight loss, but please remember that you still need your primary care provider for your routine health maintenance. PLAN:  Will check out gustavo with DM APRN (starting dose is 2.5mg weekly x 4 weeks)   Follow up with me in 6 weeks  Schedule follow up appointments: Jagdeep Chong (dietitian) or Josue Morales (presurgery dietitian)   Check for insurance coverage for dietitian and labwork prior to scheduling appointment. Please try to work on the following dietary changes:  1. Goals: Aim for 20-30 grams of protein/ meal  i. Aim for 150 grams of carbohydrates/day  ii. Eat 4-6 vegetables/day  iii. Avoid skipping meals- eat every 4-5 hours  iv. Aim for 3 meals/day  2. Drink lots of water and cut down on soda/juice consumption if soda/juice drinker  3. Focus on protein: (15-30 grams with each meal) ie. greek yogurt, cottage cheese, string cheese, hard boiled eggs  4. Healthy snacks: peanut butter and apples, hummus and carrots, berries, nuts (1/4 cup), tuna and crackers                 Protein Shakes: Premier protein or Core Power                Protein Bars: Rx Bars, Oatmega, Power Crunch                 Sargento balanced breaks (cheese and nuts)- without chocolate  5. Reduce carbohydrates which includes sweets as well as rice, pasta, potatoes, bread, corn and instead choose whole grain options or more protein or vegetables (4-6 servings of vegetables per day)  6. Get a good night of sleep  7. Try to decrease stress in life     Please download apps:  1. \"My Fitness Pal\" (other option is Lose it)) to help you to monitor daily dietary intake and you will be able to see if you are eating the right amount of calories, protein, carbs                With My Fitness Pal-->When you set-up the uzma or need to adjust settings:                Goals should include:                  Lose 1.5-2 lbs per week                Activity level: not very active (can't count exercise towards calorie number per day)                   ** Daily INPUT> Look at nutrition section-- \"nutrients\" and it will break down your macros for the day (ie. Protein, carbs, fibers, sugars and fats). Try to stay within these numbers daily     2. \"7 minute workout\" to help with exercise/activity which takes 7 minutes of your day and that you can do at home! 3. \"Calm\" or \"Headspace\" which helps with mindfulness, meditation, clarity, sleep, and jude to your daily life. 4. [employer] blog for healthy recipe ideas  5. [employer] for low carb resources    HIGH PROTEIN SNACK IDEAS  -cottage cheese  -plain yogurt  -kefir  -hard-boiled eggs  -natural cheeses  -nuts (measure portion size)   -unsweetened nut butters  -dried edamame   -ekaterina seeds soaked in water or almond milk  -soy nuts  -cured meats (monitor for sodium issues)   -hummus with vegetables  -bean dip with vegetables     FRUIT  Low carb fruit options   Raspberries: Half a cup (60 grams) contains 3 grams of carbs. Blackberries: Half a cup (70 grams) contains 4 grams of carbs. Strawberries: Half a cup (100 grams) contains 6 grams of carbs. Blueberries: Half a cup (50 grams) contains 6 grams of carbs. Plum: One medium-sized (80 grams) contains 6 grams of carbs.      VEGETABLES  Low carb vegetables

## 2022-08-19 ENCOUNTER — OFFICE VISIT (OUTPATIENT)
Dept: SURGERY | Facility: CLINIC | Age: 52
End: 2022-08-19
Payer: COMMERCIAL

## 2022-08-19 ENCOUNTER — TELEPHONE (OUTPATIENT)
Dept: INTERNAL MEDICINE CLINIC | Facility: CLINIC | Age: 52
End: 2022-08-19

## 2022-08-19 ENCOUNTER — PATIENT MESSAGE (OUTPATIENT)
Dept: INTERNAL MEDICINE CLINIC | Facility: CLINIC | Age: 52
End: 2022-08-19

## 2022-08-19 VITALS
WEIGHT: 262 LBS | HEART RATE: 70 BPM | HEIGHT: 62 IN | DIASTOLIC BLOOD PRESSURE: 62 MMHG | SYSTOLIC BLOOD PRESSURE: 120 MMHG | BODY MASS INDEX: 48.21 KG/M2

## 2022-08-19 DIAGNOSIS — G89.29 CHRONIC BILATERAL LOW BACK PAIN WITHOUT SCIATICA: ICD-10-CM

## 2022-08-19 DIAGNOSIS — M51.36 DDD (DEGENERATIVE DISC DISEASE), LUMBAR: ICD-10-CM

## 2022-08-19 DIAGNOSIS — M54.50 CHRONIC BILATERAL LOW BACK PAIN WITHOUT SCIATICA: ICD-10-CM

## 2022-08-19 DIAGNOSIS — M79.604 PAIN OF RIGHT LOWER EXTREMITY: Primary | ICD-10-CM

## 2022-08-19 RX ORDER — PREGABALIN 100 MG/1
CAPSULE ORAL
Qty: 30 CAPSULE | Refills: 0 | Status: SHIPPED | OUTPATIENT
Start: 2022-08-19

## 2022-08-19 NOTE — TELEPHONE ENCOUNTER
From: LILIA Laura  To: Maria Dolores Roth  Sent: 8/19/2022 10:01 AM CDT  Subject: medication    Hello Ms. Syeda Messina,  I did hear back from DM LILIA- about possibly starting mounjaro, what she said is that \"we will need to reduce her insulin once she begins. I would start with at least 10% reduction in her basal rates across the board. Then if she is noticing low glucose have her call us for follow up\". Does this sound good? Would you like me to send in the [facility] script for you?   LILIA Meier

## 2022-08-19 NOTE — PROGRESS NOTES
Pt is here today as a new patient for lower back pain. States she having pain at the base line of her lower back. Has pain in MASON hips, consent pain in her right shin.

## 2022-08-22 RX ORDER — TIRZEPATIDE 2.5 MG/.5ML
2.5 INJECTION, SOLUTION SUBCUTANEOUS WEEKLY
Qty: 2 ML | Refills: 0 | Status: SHIPPED | OUTPATIENT
Start: 2022-08-22

## 2022-08-23 ENCOUNTER — TELEPHONE (OUTPATIENT)
Dept: ORTHOPEDICS CLINIC | Facility: CLINIC | Age: 52
End: 2022-08-23

## 2022-08-23 NOTE — TELEPHONE ENCOUNTER
Injections received     Please call patient and make appointment for Bilateral knee Zilretta injections. Here at [facility].  Once scheduled please send back to me with date and time

## 2022-08-26 ENCOUNTER — OFFICE VISIT (OUTPATIENT)
Dept: ORTHOPEDICS CLINIC | Facility: CLINIC | Age: 52
End: 2022-08-26
Payer: COMMERCIAL

## 2022-08-26 ENCOUNTER — TELEPHONE (OUTPATIENT)
Dept: SURGERY | Facility: CLINIC | Age: 52
End: 2022-08-26

## 2022-08-26 DIAGNOSIS — M17.0 PRIMARY OSTEOARTHRITIS OF BOTH KNEES: Primary | ICD-10-CM

## 2022-08-26 DIAGNOSIS — M54.16 LUMBAR RADICULOPATHY: Primary | ICD-10-CM

## 2022-08-26 PROCEDURE — 20610 DRAIN/INJ JOINT/BURSA W/O US: CPT | Performed by: PHYSICIAN ASSISTANT

## 2022-08-26 RX ORDER — LEVOTHYROXINE SODIUM 137 UG/1
TABLET ORAL
Qty: 90 TABLET | Refills: 0 | Status: SHIPPED | OUTPATIENT
Start: 2022-08-26

## 2022-08-26 NOTE — TELEPHONE ENCOUNTER
Received a call from [facility] that pt is returning phone call regarding MRI denial.    Phone call routed to nursing    Spoke with pt    Informed pt that MRI imaging scheduled was denied- pt acknowledged and was greatful for the outreach phone call     Discussed with pt that provider suggested PT before attempting to have MRI authorized again. Pt acknowledged and states she is open to try PT and requesting to have an external PT order to work better with her schedule. Informed pt that request will be routed to PA for review and we will notify her of provider feedback. Pt acknowledged and was appreciative.      Call was ended

## 2022-08-26 NOTE — TELEPHONE ENCOUNTER
Patient scheduled for 8/27/22      MRI Lumbar denial reason: For Low back pain this test should be used if it is likely to result in a specific change in treatment. Such a change might be related to surgery or a procedure. Notes ere reviewed and do not show that the patient is going to have surgery  or a procedure.  Therefore this is not medically necessary    Peer to Peer can be scheduled (most of the time the same day) by calling [phone]    Request DM#[id number]

## 2022-08-26 NOTE — TELEPHONE ENCOUNTER
Discussed with Zak Vasquez who states, pt should do PT prior to imaging. Once patient completes PT or has attempted PT we can attempt to have MRI authorized     Called to inform patient: Left detailed message informing pt of denial and requesting she not have imaging completed. Advised pt to call our office if she has any questions. Since was not able to speak to patient directly, called central scheduling to advise of denial and for pt to not have imaging completed tomorrow. Completing imaging without authorization can result of out of pocket expense for patient.

## 2022-08-26 NOTE — PROCEDURES
After informed consent, the patient's right and left knees were marked, locally anesthetized with skin refrigerant, prepped with topical antiseptic, and injected with 5mL of 32mg/5mL Zilretta through the inferolateral portal.  A band-aid was applied. The patient tolerated the procedure well.     Mumtaz Morales PA-C  [address] [facility]

## 2022-08-29 NOTE — TELEPHONE ENCOUNTER
Called to inform pt that PT order was placed. Pt states she will call back with location information for PT once she has selected one. Pt is appreciative.

## 2022-09-12 ENCOUNTER — TELEPHONE (OUTPATIENT)
Dept: INTERNAL MEDICINE CLINIC | Facility: CLINIC | Age: 52
End: 2022-09-12

## 2022-09-12 RX ORDER — TIRZEPATIDE 2.5 MG/.5ML
2.5 INJECTION, SOLUTION SUBCUTANEOUS WEEKLY
Qty: 2 ML | Refills: 0 | OUTPATIENT
Start: 2022-09-12

## 2022-09-12 RX ORDER — TIRZEPATIDE 5 MG/.5ML
5 INJECTION, SOLUTION SUBCUTANEOUS WEEKLY
Qty: 2 ML | Refills: 0 | Status: SHIPPED | OUTPATIENT
Start: 2022-09-12

## 2022-09-12 NOTE — TELEPHONE ENCOUNTER
Requesting Mounjaro  LOV: 8/17/22  RTC: 6 weeks  Last Relevant Labs: 7/1/22  Filled: 8/22/22 #2ml with 0 refills  Mounjaro 2.5 mg  Future Appointments   Date Time Provider Jovani Haque   10/7/2022  7:40 AM Ben Emanuel APRN [facility]

## 2022-09-13 NOTE — TELEPHONE ENCOUNTER
Information received from [employer] asking to verify what other meds tried in the past,   Faxed back list  Humalog, Novolog, Lantus, Toujeo, Januvia, Byetta, glipizide
Jack Gibson, can we just tell her to use the coupon-- and not run it with insurance ?   If not, I would say to try ozempic
PA requested for Mounjaro 5 mg  Does have DM   Need to fill out form for [employer]   ID [id number]  On insulin  Already on mounjaro  DM E11.9  Obesity E66.021  CORINNE  G47.33  Dyslipidemia E78.5    Form filled out and faxed back with last note.
Pa denied, must try 2 formulatory alternative drug like trulicity, victoza, ozempic or rybelsus
[id number]

## 2022-09-19 RX ORDER — PREGABALIN 100 MG/1
100 CAPSULE ORAL 2 TIMES DAILY
Qty: 60 CAPSULE | Refills: 0 | Status: SHIPPED | OUTPATIENT
Start: 2022-09-19 | End: 2022-10-19

## 2022-09-22 ENCOUNTER — PATIENT MESSAGE (OUTPATIENT)
Dept: SURGERY | Facility: CLINIC | Age: 52
End: 2022-09-22

## 2022-09-22 NOTE — TELEPHONE ENCOUNTER
Pt agreed to new prescription for a lesser dose per provider recommendation.     Routed to provider for review

## 2022-09-23 RX ORDER — PREGABALIN 50 MG/1
CAPSULE ORAL
Qty: 60 CAPSULE | Refills: 1 | Status: SHIPPED | OUTPATIENT
Start: 2022-09-23

## 2022-10-03 RX ORDER — SIMVASTATIN 40 MG
TABLET ORAL
Qty: 90 TABLET | Refills: 0 | Status: SHIPPED | OUTPATIENT
Start: 2022-10-03

## 2022-10-04 ENCOUNTER — PATIENT MESSAGE (OUTPATIENT)
Dept: FAMILY MEDICINE CLINIC | Facility: CLINIC | Age: 52
End: 2022-10-04

## 2022-10-04 ENCOUNTER — PATIENT MESSAGE (OUTPATIENT)
Dept: ORTHOPEDICS CLINIC | Facility: CLINIC | Age: 52
End: 2022-10-04

## 2022-10-04 ENCOUNTER — TELEPHONE (OUTPATIENT)
Dept: FAMILY MEDICINE CLINIC | Facility: CLINIC | Age: 52
End: 2022-10-04

## 2022-10-04 RX ORDER — NAPROXEN 500 MG/1
500 TABLET ORAL 2 TIMES DAILY WITH MEALS
Qty: 20 TABLET | Refills: 0 | Status: SHIPPED | OUTPATIENT
Start: 2022-10-04

## 2022-10-04 NOTE — TELEPHONE ENCOUNTER
From: Margo Alarcon  To: Dania Garsia MD  Sent: 10/4/2022 9:46 AM CDT  Subject: Knee Injection     Good Morning,    The knee injection we did this last time has been working wonderful! When do I need to make the next appointment knowing you have to get authorization and the shot ordered?      Thanks   [employer]

## 2022-10-04 NOTE — TELEPHONE ENCOUNTER
I would recommend naproxen for now for pain. If her back pain gets worse go to ic otherwise I will send message to Dr. Dayana Freeman to see if she would like to see her in office tomorrow. If worse or can no breath chest pain go to ic.

## 2022-10-07 ENCOUNTER — TELEPHONE (OUTPATIENT)
Dept: PHYSICAL THERAPY | Facility: HOSPITAL | Age: 52
End: 2022-10-07

## 2022-10-07 ENCOUNTER — TELEMEDICINE (OUTPATIENT)
Dept: INTERNAL MEDICINE CLINIC | Facility: CLINIC | Age: 52
End: 2022-10-07
Payer: COMMERCIAL

## 2022-10-07 DIAGNOSIS — G47.33 OSA ON CPAP: ICD-10-CM

## 2022-10-07 DIAGNOSIS — E03.9 HYPOTHYROIDISM, UNSPECIFIED TYPE: ICD-10-CM

## 2022-10-07 DIAGNOSIS — E66.01 OBESITY, MORBID, BMI 50 OR HIGHER (HCC): ICD-10-CM

## 2022-10-07 DIAGNOSIS — E78.5 DYSLIPIDEMIA: ICD-10-CM

## 2022-10-07 DIAGNOSIS — Z99.89 OSA ON CPAP: ICD-10-CM

## 2022-10-07 DIAGNOSIS — Z79.4 TYPE 2 DIABETES MELLITUS WITHOUT COMPLICATION, WITH LONG-TERM CURRENT USE OF INSULIN (HCC): ICD-10-CM

## 2022-10-07 DIAGNOSIS — F41.9 ANXIETY: ICD-10-CM

## 2022-10-07 DIAGNOSIS — F43.9 STRESS: ICD-10-CM

## 2022-10-07 DIAGNOSIS — E11.9 TYPE 2 DIABETES MELLITUS WITHOUT COMPLICATION, WITH LONG-TERM CURRENT USE OF INSULIN (HCC): ICD-10-CM

## 2022-10-07 DIAGNOSIS — Z51.81 THERAPEUTIC DRUG MONITORING: Primary | ICD-10-CM

## 2022-10-07 RX ORDER — TIRZEPATIDE 7.5 MG/.5ML
7.5 INJECTION, SOLUTION SUBCUTANEOUS WEEKLY
Qty: 2 ML | Refills: 0 | Status: SHIPPED | OUTPATIENT
Start: 2022-10-07

## 2022-10-07 NOTE — PATIENT INSTRUCTIONS
We are here to support you with weight loss, but please remember that you still need your primary care provider for your routine health maintenance. PLAN:  Will increase mounjaro 7.5mg weekly  Continue to watch insulin levels   Follow up with me in 5-6 weeks  Schedule follow up appointments: Franky Poe (dietitian) or Heri Ramirez (presurgery dietitian)   Check for insurance coverage for dietitian and labwork prior to scheduling appointment. Please try to work on the following dietary changes:  1. Goals: Aim for 20-30 grams of protein/ meal  i. Aim for 150 grams of carbohydrates/day  ii. Eat 4-6 vegetables/day  iii. Avoid skipping meals- eat every 4-5 hours  iv. Aim for 3 meals/day  2. Drink lots of water and cut down on soda/juice consumption if soda/juice drinker  3. Focus on protein: (15-30 grams with each meal) ie. greek yogurt, cottage cheese, string cheese, hard boiled eggs  4. Healthy snacks: peanut butter and apples, hummus and carrots, berries, nuts (1/4 cup), tuna and crackers                 Protein Shakes: Premier protein or Core Power                Protein Bars: Rx Bars, Oatmega, Power Crunch                 Sargento balanced breaks (cheese and nuts)- without chocolate  5. Reduce carbohydrates which includes sweets as well as rice, pasta, potatoes, bread, corn and instead choose whole grain options or more protein or vegetables (4-6 servings of vegetables per day)  6. Get a good night of sleep  7. Try to decrease stress in life     Please download apps:  1. \"My Fitness Pal\" (other option is Lose it)) to help you to monitor daily dietary intake and you will be able to see if you are eating the right amount of calories, protein, carbs                With My Fitness Pal-->When you set-up the uzma or need to adjust settings:                Goals should include:                  Lose 1.5-2 lbs per week                Activity level: not very active (can't count exercise towards calorie number per day)                   ** Daily INPUT> Look at nutrition section-- \"nutrients\" and it will break down your macros for the day (ie. Protein, carbs, fibers, sugars and fats). Try to stay within these numbers daily     2. \"7 minute workout\" to help with exercise/activity which takes 7 minutes of your day and that you can do at home! 3. \"Calm\" or \"Headspace\" which helps with mindfulness, meditation, clarity, sleep, and jude to your daily life. 4. [employer] blog for healthy recipe ideas  5. [employer] for low carb resources    HIGH PROTEIN SNACK IDEAS  -cottage cheese  -plain yogurt  -kefir  -hard-boiled eggs  -natural cheeses  -nuts (measure portion size)   -unsweetened nut butters  -dried edamame   -ekaterina seeds soaked in water or almond milk  -soy nuts  -cured meats (monitor for sodium issues)   -hummus with vegetables  -bean dip with vegetables     FRUIT  Low carb fruit options   Raspberries: Half a cup (60 grams) contains 3 grams of carbs. Blackberries: Half a cup (70 grams) contains 4 grams of carbs. Strawberries: Half a cup (100 grams) contains 6 grams of carbs. Blueberries: Half a cup (50 grams) contains 6 grams of carbs. Plum: One medium-sized (80 grams) contains 6 grams of carbs.      VEGETABLES  Low carb vegetables

## 2022-10-10 ENCOUNTER — OFFICE VISIT (OUTPATIENT)
Dept: PHYSICAL THERAPY | Age: 52
End: 2022-10-10
Attending: PHYSICIAN ASSISTANT
Payer: COMMERCIAL

## 2022-10-10 DIAGNOSIS — M54.50 CHRONIC BILATERAL LOW BACK PAIN, UNSPECIFIED WHETHER SCIATICA PRESENT: Primary | ICD-10-CM

## 2022-10-10 DIAGNOSIS — G89.29 CHRONIC BILATERAL LOW BACK PAIN, UNSPECIFIED WHETHER SCIATICA PRESENT: Primary | ICD-10-CM

## 2022-10-10 DIAGNOSIS — M79.604 LOWER LIMB PAIN, LATERAL, RIGHT: ICD-10-CM

## 2022-10-10 DIAGNOSIS — M51.36 DDD (DEGENERATIVE DISC DISEASE), LUMBAR: ICD-10-CM

## 2022-10-10 PROCEDURE — 97110 THERAPEUTIC EXERCISES: CPT

## 2022-10-10 PROCEDURE — 97162 PT EVAL MOD COMPLEX 30 MIN: CPT

## 2022-10-12 ENCOUNTER — PATIENT MESSAGE (OUTPATIENT)
Dept: FAMILY MEDICINE CLINIC | Facility: CLINIC | Age: 52
End: 2022-10-12

## 2022-10-13 ENCOUNTER — TELEPHONE (OUTPATIENT)
Dept: FAMILY MEDICINE CLINIC | Facility: CLINIC | Age: 52
End: 2022-10-13

## 2022-10-13 NOTE — TELEPHONE ENCOUNTER
She will need to be seen. She can go to ic or if Dr. Carol Talley can see her tomorrow. If sym get worse or she has fever sob wheezing lip and tongue swelling go to ic or er immediately.

## 2022-10-14 ENCOUNTER — OFFICE VISIT (OUTPATIENT)
Dept: PHYSICAL THERAPY | Age: 52
End: 2022-10-14
Attending: PHYSICIAN ASSISTANT
Payer: COMMERCIAL

## 2022-10-14 DIAGNOSIS — M54.50 CHRONIC BILATERAL LOW BACK PAIN, UNSPECIFIED WHETHER SCIATICA PRESENT: Primary | ICD-10-CM

## 2022-10-14 DIAGNOSIS — M79.604 LOWER LIMB PAIN, LATERAL, RIGHT: ICD-10-CM

## 2022-10-14 DIAGNOSIS — M51.36 DDD (DEGENERATIVE DISC DISEASE), LUMBAR: ICD-10-CM

## 2022-10-14 DIAGNOSIS — G89.29 CHRONIC BILATERAL LOW BACK PAIN, UNSPECIFIED WHETHER SCIATICA PRESENT: Primary | ICD-10-CM

## 2022-10-14 PROCEDURE — 97140 MANUAL THERAPY 1/> REGIONS: CPT

## 2022-10-14 PROCEDURE — 97110 THERAPEUTIC EXERCISES: CPT

## 2022-10-17 ENCOUNTER — PATIENT MESSAGE (OUTPATIENT)
Dept: INTERNAL MEDICINE CLINIC | Facility: CLINIC | Age: 52
End: 2022-10-17

## 2022-10-19 ENCOUNTER — OFFICE VISIT (OUTPATIENT)
Dept: PHYSICAL THERAPY | Age: 52
End: 2022-10-19
Attending: PHYSICIAN ASSISTANT
Payer: COMMERCIAL

## 2022-10-19 DIAGNOSIS — M54.50 CHRONIC BILATERAL LOW BACK PAIN, UNSPECIFIED WHETHER SCIATICA PRESENT: Primary | ICD-10-CM

## 2022-10-19 DIAGNOSIS — G89.29 CHRONIC BILATERAL LOW BACK PAIN, UNSPECIFIED WHETHER SCIATICA PRESENT: Primary | ICD-10-CM

## 2022-10-19 DIAGNOSIS — M79.604 LOWER LIMB PAIN, LATERAL, RIGHT: ICD-10-CM

## 2022-10-19 DIAGNOSIS — M51.36 DDD (DEGENERATIVE DISC DISEASE), LUMBAR: ICD-10-CM

## 2022-10-19 PROCEDURE — 97140 MANUAL THERAPY 1/> REGIONS: CPT

## 2022-10-19 PROCEDURE — 97112 NEUROMUSCULAR REEDUCATION: CPT

## 2022-10-21 ENCOUNTER — APPOINTMENT (OUTPATIENT)
Dept: PHYSICAL THERAPY | Age: 52
End: 2022-10-21
Attending: PHYSICIAN ASSISTANT
Payer: COMMERCIAL

## 2022-10-21 ENCOUNTER — TELEPHONE (OUTPATIENT)
Dept: PHYSICAL THERAPY | Age: 52
End: 2022-10-21

## 2022-10-21 NOTE — TELEPHONE ENCOUNTER
Therapist called pt to f/u re: no-show today's visit. Pt apologized that she forgot and confirmed next visit.

## 2022-10-25 ENCOUNTER — TELEPHONE (OUTPATIENT)
Dept: INTERNAL MEDICINE CLINIC | Facility: CLINIC | Age: 52
End: 2022-10-25

## 2022-10-28 ENCOUNTER — OFFICE VISIT (OUTPATIENT)
Dept: PHYSICAL THERAPY | Age: 52
End: 2022-10-28
Attending: PHYSICIAN ASSISTANT
Payer: COMMERCIAL

## 2022-10-28 DIAGNOSIS — G89.29 CHRONIC BILATERAL LOW BACK PAIN, UNSPECIFIED WHETHER SCIATICA PRESENT: Primary | ICD-10-CM

## 2022-10-28 DIAGNOSIS — M51.36 DDD (DEGENERATIVE DISC DISEASE), LUMBAR: ICD-10-CM

## 2022-10-28 DIAGNOSIS — M79.604 LOWER LIMB PAIN, LATERAL, RIGHT: ICD-10-CM

## 2022-10-28 DIAGNOSIS — M54.50 CHRONIC BILATERAL LOW BACK PAIN, UNSPECIFIED WHETHER SCIATICA PRESENT: Primary | ICD-10-CM

## 2022-10-28 PROCEDURE — 97140 MANUAL THERAPY 1/> REGIONS: CPT

## 2022-10-28 PROCEDURE — 97110 THERAPEUTIC EXERCISES: CPT

## 2022-10-30 ENCOUNTER — PATIENT MESSAGE (OUTPATIENT)
Dept: SURGERY | Facility: CLINIC | Age: 52
End: 2022-10-30

## 2022-10-30 DIAGNOSIS — M54.16 LUMBAR RADICULOPATHY: Primary | ICD-10-CM

## 2022-10-31 ENCOUNTER — TELEMEDICINE (OUTPATIENT)
Dept: INTERNAL MEDICINE CLINIC | Facility: CLINIC | Age: 52
End: 2022-10-31
Payer: COMMERCIAL

## 2022-10-31 DIAGNOSIS — E66.01 OBESITY, MORBID, BMI 50 OR HIGHER (HCC): ICD-10-CM

## 2022-10-31 DIAGNOSIS — Z79.4 TYPE 2 DIABETES MELLITUS WITHOUT COMPLICATION, WITH LONG-TERM CURRENT USE OF INSULIN (HCC): ICD-10-CM

## 2022-10-31 DIAGNOSIS — G47.33 OSA ON CPAP: ICD-10-CM

## 2022-10-31 DIAGNOSIS — Z51.81 THERAPEUTIC DRUG MONITORING: Primary | ICD-10-CM

## 2022-10-31 DIAGNOSIS — E03.9 HYPOTHYROIDISM, UNSPECIFIED TYPE: ICD-10-CM

## 2022-10-31 DIAGNOSIS — E78.5 DYSLIPIDEMIA: ICD-10-CM

## 2022-10-31 DIAGNOSIS — F41.9 ANXIETY: ICD-10-CM

## 2022-10-31 DIAGNOSIS — F43.9 STRESS: ICD-10-CM

## 2022-10-31 DIAGNOSIS — E11.9 TYPE 2 DIABETES MELLITUS WITHOUT COMPLICATION, WITH LONG-TERM CURRENT USE OF INSULIN (HCC): ICD-10-CM

## 2022-10-31 DIAGNOSIS — Z99.89 OSA ON CPAP: ICD-10-CM

## 2022-10-31 PROCEDURE — 99213 OFFICE O/P EST LOW 20 MIN: CPT | Performed by: NURSE PRACTITIONER

## 2022-10-31 RX ORDER — TIRZEPATIDE 5 MG/.5ML
5 INJECTION, SOLUTION SUBCUTANEOUS WEEKLY
Qty: 2 ML | Refills: 0 | Status: SHIPPED | OUTPATIENT
Start: 2022-10-31

## 2022-10-31 NOTE — PATIENT INSTRUCTIONS
Next steps:  1. Fill your prescribed medication and take as discussed and prescribed: mounjaro 5mg weekly   2. Schedule a personal nutrition consultation with one of our registered dieticians     Please try to work on the following dietary changes:  1. Drink water with meals and throughout the day, cut down on soda and/or juice if consumed. Consider flavored water options like Bubbly, Spindrift, Hint and Pilar. 2.  Eat breakfast daily and focus on having protein with each meal, examples include: greek yogurt, cottage cheese, hard boiled egg, whole grain toast with peanut butter. 3.  Reduce refined carbohydrates and sugars which includes items such as sweets, as well as rice, pasta, and bread and make sure to choose whole grain options when having them with just 1 serving per meal about the size of your inner palm. 4.  Consume non starchy veggies daily working towards making them a good 50% of your daily food intake. Add them to lunch and dinner consistently. 5.  Start a daily probiotic: VSL#3 is recommended, (order on line at www.vsl3. com). Take 1 capsule daily with water for 30 days, then reduce to 1 every other day (this will reduce the cost). Capsules can be left out for 2 weeks, but then must be refrigerated. Please download uzma My Fitness Rosaura Reynaga! Or Net Diary to monitor daily dietary intake and you will be able to see if you are eating the right amount of calories or too much or too little which would hinder weight loss. Additionally this will help to see your daily carbohydrate and protein intake. When you set the uzma up choose 1-2 lbs/week as a goal.  Keeping a paper food journal is an option as well to remain accountable for your choices- this is the start to mindful eating! A low calorie diet has been consistently shown to support weight loss. Continue or start exercising to help establish a routine.  If not already exercising begin with 1 day and progress as able with long-term goal of 30 minutes 5 days a week at a minimum. Meditation daily can help manage and control stress. Chronic stress can make weight loss difficult. Exercising is one way to help with stress, but meditation using the CALM Shaniqua or another comparable alternative can be done in your home or place of work with little time commitment. This Shaniqua can also help work on behavior change and improve sleep. Check out the segment under Calm Masterclass and listen to The 4 Pillars of Health. A great way to begin learning about the foundation of lifestyle with practical tips to use in your every day. Check out www.yourweightmatters. org blog for continued daily support and education along this weight loss journey! Patient Resources:     Personal Training/Fitness Classes/Health Coaching     Abhijit De Los Santos and Leos Sophiaside @ [email or web address]/ Full fitness center with group fitness and personal training. Discount available as client of [facility]. Health Coaching and Personal Training with Naila Shaikh at our [facility]- individual weekly coaching with option to add personal training and small group fitness classes targeted at weight loss- [phone] and/or email @ Lenny Dial. [email or web address]  360FIT [place] [email or web address] Group Fitness [phone] and/or email Ari Michael at [email or web address]. Modern Mast  [address] [facility] with multiple locations: [employer]et[employer] (www.FrameBuzz), Mirage Innovations The KeyLemon Fitness (www.WeArePopup.com. Modern Mast), Fit Body Bootcamp (www.Safe N Clearbodybootcamp.Modern Mast), Mopapp (www.StumbleUpon), The Exercise  (www.exercisecoach.Modern Mast)     Online Fitness  Fitness  on [employer] in 10 DVD series- www. hixpw23BWS. Modern Mast  Sit and Be Fit - Chair exercise series Www.sitandbefit. org  Hip Hop Fit with Siva oLya at www.hiphopfit. net     Apps for on the WorldDoc 7 Minute Workout (orange box with white 7) - free on the go HIIT training shaniqua  Peloton Shaniqua @ www. Stellar Biotechnologies     Nutrition Trackers and Tools  LoseIT! And My Fitness Pal apps and on line for tracking nutrition  NOOM - virtual health coaching  FitFoundation (healthy meals on the go) in [facility] @ www. zuwvniajjkcwh4s. Kam Snowden MD @ www.ZeroPoint Clean TechdBooker and Dock Starch (keto and low carb plans recommended) @ www. VJEBJF59.OXN, Metabolic Meals @ www. GigamonMetabolicMeals. com - individual prepared meals to go  RunTitle, StadiumPark App, [employer] Machines, Every Plate, Impress Software Solutions- on line meal delivery programs for preparation at home  [place] MaistorPlus in [place] for homemade meals to go @ www.mealYour.MD. Oatmeal  Diet Doctor @ www. dietdoctor. Oatmeal - low carb swaps  YummElemental Technologies - meal prep and planning shaniqua (www.yummly. com)     Stress Management/Behavior/Mindful Eating  CALM meditation shaniqua (www.LightPole)  Headspace  Am I Hungry? Mindful eating virtual  shaniqua  Www.yourweightmatters. org - Obesity Action Coalition sponsored Blog posts daily  Motivation shaniqua (black box with white \")- daily supportive messages sent to your phone     Books/Video Education/Podcasts  Mindless Eating by Elma Rivas  Why We Get Sick by Mayank Benoit (a book about insulin resistance)  Atomic Habits by Jayne Jacobson (a book about taking small steps to promote greater behavior change)   Can't Hurt Me by Sarai Remedies (a book exploring the power of discipline in achieving your goals)  The End of Dieting: How to Live for Life by Dr. Todd Haas M.D. or listen to The [address] [facility] Episode 61: Understanding \"Nutritarian\" Eating w/Dr. Todd Haas  Your Body in Balance: The World [employer] Corporation of Food, Hormones, and Health by Dr. Angelica Quiñones  The Menopause Diet Plan by Taya Madrid and [facility] AT [facility]  The Complete Guide to fasting by Dr. Anton Green, [address] [facility] by Yoel Grady, Ph.D, R.D.   Weight Loss Surgery Will Not Treat Food Addiction by Milagro Kaur Ph.D  The Nadege Jarquin on plant based nutrition  Fed Up - documentary about obesity (Free on Utube)  The Truth About Sugar - documentary on sugar (Free on Utube, https://youtu. be/5C4jlljYE8h)  The Dr. Comer  by Dr. Harsh Arellano MD  Fitlosophy Fitspiration - journal to better health (found at [employer] in fitness aisle)  What Happened to You?- a look at the impact trauma has on behavior written by Nicole Mooney and Dr. Monroe Fernandez Again by Geeta Cruz - discovering your true self after trauma  Rohan Pineda talk on Ziffi, The Call to [employer]  Podcasts: The Exam Room by the Physician's Committee, Nutrition Facts by Dr. Travis Horowitz    We are here to support you with weight loss, but please remember that you still need your primary care provider for your routine health maintenance.

## 2022-10-31 NOTE — TELEPHONE ENCOUNTER
Last refilled on 7/27/22 for # 90 with 0 refills  Last OV 7/13/22  Future Appointments   Date Time Provider Jovani Marinai   11/2/2022  8:15 AM LILIA Woodward [facility]   11/2/2022  7:00 PM Jennifer Somers, PT [facility] [place]   11/4/2022  9:30 AM Jennifer Somers, PT [facility] [place]   11/18/2022  8:00 AM Jennifer Somers, PT [facility] [place]   11/28/2022  9:20 AM LILIA Cowart EMG[facility]I [facility] [address]   12/13/2022  3:00 PM Markus Sanon DO [address] [facility] AT [facility]   12/19/2022 11:20 AM Stanley Rodríguez APRN [facility] [address]   12/27/2022  7:00 AM JAIME, [place] SGIEDW None        Thank you.

## 2022-11-01 ENCOUNTER — HOSPITAL ENCOUNTER (INPATIENT)
Facility: HOSPITAL | Age: 52
LOS: 1 days | Discharge: HOME | End: 2022-11-02
Attending: EMERGENCY MEDICINE | Admitting: HOSPITALIST
Payer: COMMERCIAL

## 2022-11-01 ENCOUNTER — APPOINTMENT (OUTPATIENT)
Dept: GENERAL RADIOLOGY | Facility: HOSPITAL | Age: 52
End: 2022-11-01
Attending: EMERGENCY MEDICINE
Payer: COMMERCIAL

## 2022-11-01 DIAGNOSIS — I48.91 ATRIAL FIBRILLATION WITH RVR (HCC): Primary | ICD-10-CM

## 2022-11-01 LAB
ALBUMIN SERPL-MCNC: 3.6 G/DL (ref 3.4–5)
ALBUMIN/GLOB SERPL: 1.2 {RATIO} (ref 1–2)
ALP LIVER SERPL-CCNC: 84 U/L
ALT SERPL-CCNC: 27 U/L
ANION GAP SERPL CALC-SCNC: 2 MMOL/L (ref 0–18)
ANION GAP SERPL CALC-SCNC: 3 MMOL/L (ref 0–18)
APTT PPP: 28.8 SECONDS (ref 23.3–35.6)
APTT PPP: 46.9 SECONDS (ref 23.3–35.6)
AST SERPL-CCNC: 17 U/L (ref 15–37)
ATRIAL RATE: 0 BPM
ATRIAL RATE: 147 BPM
BASOPHILS # BLD AUTO: 0.05 X10(3) UL (ref 0–0.2)
BASOPHILS NFR BLD AUTO: 0.6 %
BILIRUB SERPL-MCNC: 0.4 MG/DL (ref 0.1–2)
BUN BLD-MCNC: 11 MG/DL (ref 7–18)
BUN BLD-MCNC: 11 MG/DL (ref 7–18)
CALCIUM BLD-MCNC: 8.6 MG/DL (ref 8.5–10.1)
CALCIUM BLD-MCNC: 9 MG/DL (ref 8.5–10.1)
CHLORIDE SERPL-SCNC: 108 MMOL/L (ref 98–112)
CHLORIDE SERPL-SCNC: 114 MMOL/L (ref 98–112)
CO2 SERPL-SCNC: 26 MMOL/L (ref 21–32)
CO2 SERPL-SCNC: 30 MMOL/L (ref 21–32)
CREAT BLD-MCNC: 0.82 MG/DL
CREAT BLD-MCNC: 0.83 MG/DL
EOSINOPHIL # BLD AUTO: 0.28 X10(3) UL (ref 0–0.7)
EOSINOPHIL NFR BLD AUTO: 3.1 %
ERYTHROCYTE [DISTWIDTH] IN BLOOD BY AUTOMATED COUNT: 12.8 %
ERYTHROCYTE [DISTWIDTH] IN BLOOD BY AUTOMATED COUNT: 13 %
EST. AVERAGE GLUCOSE BLD GHB EST-MCNC: 114 MG/DL (ref 68–126)
GFR SERPLBLD BASED ON 1.73 SQ M-ARVRAT: 86 ML/MIN/1.73M2 (ref 60–?)
GFR SERPLBLD BASED ON 1.73 SQ M-ARVRAT: 87 ML/MIN/1.73M2 (ref 60–?)
GLOBULIN PLAS-MCNC: 3.1 G/DL (ref 2.8–4.4)
GLUCOSE BLD-MCNC: 110 MG/DL (ref 70–99)
GLUCOSE BLD-MCNC: 111 MG/DL (ref 70–99)
GLUCOSE BLD-MCNC: 118 MG/DL (ref 70–99)
GLUCOSE BLD-MCNC: 121 MG/DL (ref 70–99)
GLUCOSE BLD-MCNC: 85 MG/DL (ref 70–99)
HBA1C MFR BLD: 5.6 % (ref ?–5.7)
HCT VFR BLD AUTO: 42.8 %
HCT VFR BLD AUTO: 44.6 %
HGB BLD-MCNC: 14.8 G/DL
HGB BLD-MCNC: 15.1 G/DL
IMM GRANULOCYTES # BLD AUTO: 0.01 X10(3) UL (ref 0–1)
IMM GRANULOCYTES NFR BLD: 0.1 %
LYMPHOCYTES # BLD AUTO: 3.01 X10(3) UL (ref 1–4)
LYMPHOCYTES NFR BLD AUTO: 33.1 %
MCH RBC QN AUTO: 30 PG (ref 26–34)
MCH RBC QN AUTO: 30.3 PG (ref 26–34)
MCHC RBC AUTO-ENTMCNC: 33.9 G/DL (ref 31–37)
MCHC RBC AUTO-ENTMCNC: 34.6 G/DL (ref 31–37)
MCV RBC AUTO: 86.8 FL
MCV RBC AUTO: 89.6 FL
MONOCYTES # BLD AUTO: 0.56 X10(3) UL (ref 0.1–1)
MONOCYTES NFR BLD AUTO: 6.2 %
NEUTROPHILS # BLD AUTO: 5.18 X10 (3) UL (ref 1.5–7.7)
NEUTROPHILS # BLD AUTO: 5.18 X10(3) UL (ref 1.5–7.7)
NEUTROPHILS NFR BLD AUTO: 56.9 %
OSMOLALITY SERPL CALC.SUM OF ELEC: 290 MOSM/KG (ref 275–295)
OSMOLALITY SERPL CALC.SUM OF ELEC: 297 MOSM/KG (ref 275–295)
PLATELET # BLD AUTO: 256 10(3)UL (ref 150–450)
PLATELET # BLD AUTO: 257 10(3)UL (ref 150–450)
POTASSIUM SERPL-SCNC: 3.5 MMOL/L (ref 3.5–5.1)
POTASSIUM SERPL-SCNC: 4.2 MMOL/L (ref 3.5–5.1)
PROT SERPL-MCNC: 6.7 G/DL (ref 6.4–8.2)
Q-T INTERVAL: 280 MS
Q-T INTERVAL: 532 MS
QRS DURATION: 180 MS
QRS DURATION: 78 MS
QTC CALCULATION (BEZET): 455 MS
QTC CALCULATION (BEZET): 508 MS
R AXIS: 101 DEGREES
R AXIS: 33 DEGREES
RBC # BLD AUTO: 4.93 X10(6)UL
RBC # BLD AUTO: 4.98 X10(6)UL
SARS-COV-2 RNA RESP QL NAA+PROBE: NOT DETECTED
SODIUM SERPL-SCNC: 140 MMOL/L (ref 136–145)
SODIUM SERPL-SCNC: 143 MMOL/L (ref 136–145)
T AXIS: 43 DEGREES
T AXIS: 79 DEGREES
TROPONIN I HIGH SENSITIVITY: 10 NG/L
TROPONIN I HIGH SENSITIVITY: 11 NG/L
TROPONIN I HIGH SENSITIVITY: 7 NG/L
TSI SER-ACNC: 1.11 MIU/ML (ref 0.36–3.74)
VENTRICULAR RATE: 159 BPM
VENTRICULAR RATE: 55 BPM
WBC # BLD AUTO: 11.1 X10(3) UL (ref 4–11)
WBC # BLD AUTO: 9.1 X10(3) UL (ref 4–11)

## 2022-11-01 PROCEDURE — 5A09357 ASSISTANCE WITH RESPIRATORY VENTILATION, LESS THAN 24 CONSECUTIVE HOURS, CONTINUOUS POSITIVE AIRWAY PRESSURE: ICD-10-PCS | Performed by: INTERNAL MEDICINE

## 2022-11-01 PROCEDURE — 71045 X-RAY EXAM CHEST 1 VIEW: CPT | Performed by: EMERGENCY MEDICINE

## 2022-11-01 PROCEDURE — 99223 1ST HOSP IP/OBS HIGH 75: CPT | Performed by: HOSPITALIST

## 2022-11-01 RX ORDER — NICOTINE POLACRILEX 4 MG
15 LOZENGE BUCCAL
Status: DISCONTINUED | OUTPATIENT
Start: 2022-11-01 | End: 2022-11-02

## 2022-11-01 RX ORDER — PANTOPRAZOLE SODIUM 40 MG/1
40 TABLET, DELAYED RELEASE ORAL
Status: DISCONTINUED | OUTPATIENT
Start: 2022-11-02 | End: 2022-11-02

## 2022-11-01 RX ORDER — DEXTROSE MONOHYDRATE 25 G/50ML
50 INJECTION, SOLUTION INTRAVENOUS
Status: DISCONTINUED | OUTPATIENT
Start: 2022-11-01 | End: 2022-11-02

## 2022-11-01 RX ORDER — HEPARIN SODIUM AND DEXTROSE 10000; 5 [USP'U]/100ML; G/100ML
1000 INJECTION INTRAVENOUS ONCE
Status: COMPLETED | OUTPATIENT
Start: 2022-11-01 | End: 2022-11-02

## 2022-11-01 RX ORDER — BUPROPION HYDROCHLORIDE 300 MG/1
300 TABLET ORAL DAILY
Status: DISCONTINUED | OUTPATIENT
Start: 2022-11-02 | End: 2022-11-02

## 2022-11-01 RX ORDER — METOPROLOL TARTRATE 5 MG/5ML
2.5 INJECTION INTRAVENOUS EVERY 6 HOURS PRN
Status: DISCONTINUED | OUTPATIENT
Start: 2022-11-01 | End: 2022-11-02

## 2022-11-01 RX ORDER — PREGABALIN 100 MG/1
100 CAPSULE ORAL 2 TIMES DAILY
Status: DISCONTINUED | OUTPATIENT
Start: 2022-11-01 | End: 2022-11-02

## 2022-11-01 RX ORDER — PAROXETINE HYDROCHLORIDE 40 MG/1
TABLET, FILM COATED ORAL
Qty: 90 TABLET | Refills: 0 | Status: SHIPPED | OUTPATIENT
Start: 2022-11-01

## 2022-11-01 RX ORDER — MELATONIN
3 NIGHTLY PRN
Status: DISCONTINUED | OUTPATIENT
Start: 2022-11-01 | End: 2022-11-02

## 2022-11-01 RX ORDER — HEPARIN SODIUM AND DEXTROSE 10000; 5 [USP'U]/100ML; G/100ML
INJECTION INTRAVENOUS CONTINUOUS
Status: DISCONTINUED | OUTPATIENT
Start: 2022-11-01 | End: 2022-11-02

## 2022-11-01 RX ORDER — HEPARIN SODIUM 1000 [USP'U]/ML
5000 INJECTION, SOLUTION INTRAVENOUS; SUBCUTANEOUS ONCE
Status: COMPLETED | OUTPATIENT
Start: 2022-11-01 | End: 2022-11-01

## 2022-11-01 RX ORDER — PREGABALIN 100 MG/1
100 CAPSULE ORAL 2 TIMES DAILY
COMMUNITY
End: 2022-11-04 | Stop reason: CLARIF

## 2022-11-01 RX ORDER — ONDANSETRON 2 MG/ML
4 INJECTION INTRAMUSCULAR; INTRAVENOUS EVERY 6 HOURS PRN
Status: DISCONTINUED | OUTPATIENT
Start: 2022-11-01 | End: 2022-11-02

## 2022-11-01 RX ORDER — PAROXETINE HYDROCHLORIDE 20 MG/1
40 TABLET, FILM COATED ORAL EVERY MORNING
Status: DISCONTINUED | OUTPATIENT
Start: 2022-11-02 | End: 2022-11-02

## 2022-11-01 RX ORDER — PROCHLORPERAZINE EDISYLATE 5 MG/ML
5 INJECTION INTRAMUSCULAR; INTRAVENOUS EVERY 8 HOURS PRN
Status: DISCONTINUED | OUTPATIENT
Start: 2022-11-01 | End: 2022-11-02

## 2022-11-01 RX ORDER — ATORVASTATIN CALCIUM 20 MG/1
20 TABLET, FILM COATED ORAL NIGHTLY
Status: DISCONTINUED | OUTPATIENT
Start: 2022-11-01 | End: 2022-11-02

## 2022-11-01 RX ORDER — ACETAMINOPHEN 500 MG
500 TABLET ORAL EVERY 4 HOURS PRN
Status: DISCONTINUED | OUTPATIENT
Start: 2022-11-01 | End: 2022-11-02

## 2022-11-01 RX ORDER — NICOTINE POLACRILEX 4 MG
30 LOZENGE BUCCAL
Status: DISCONTINUED | OUTPATIENT
Start: 2022-11-01 | End: 2022-11-02

## 2022-11-01 NOTE — PLAN OF CARE
Received pt at 1430. AxOx4. Room air. Denies pain/SOB. Vitals stable. Afib w rates varying from 100-130s on tele. Heparin gtt and cardizem gtt infusing. Please refer to flowsheets for further assessments. Plan for HR control on cardizem, heparin gtt per ACS protocol. Daughter at bedside. Plan of care updated with pt and family, questions answered, verbalized understanding. Safety precautions in place. Instructed to call staff for help. Will continue to monitor.     Problem: Patient/Family Goals  Goal: Patient/Family Long Term Goal  Description: Patient's Long Term Goal: go home    Interventions:  - heparin gtt, cardizem gtt  - See additional Care Plan goals for specific interventions  Outcome: Progressing  Goal: Patient/Family Short Term Goal  Description: Patient's Short Term Goal: feel better    Interventions:   - HR control  - See additional Care Plan goals for specific interventions  Outcome: Progressing

## 2022-11-01 NOTE — ED QUICK NOTES
Recd report from [facility]    Patient updated on POC, awaiting rpt Trop and clean IP room assignment. Denies any complaints/requests at this time. Call light within reach, wctm closely. Family at bedside.

## 2022-11-01 NOTE — ED INITIAL ASSESSMENT (HPI)
To the ED with c/o waking up at 530am with sensation of heart racing and heart pounding. States apple watch reported rate 140-160. States unable to catch on EKG but ongoing over past year. + LU and dizziness. States those s/s are new with the elevated HR.  No pain ambulatory

## 2022-11-02 ENCOUNTER — APPOINTMENT (OUTPATIENT)
Dept: PHYSICAL THERAPY | Age: 52
End: 2022-11-02
Attending: PHYSICIAN ASSISTANT
Payer: COMMERCIAL

## 2022-11-02 ENCOUNTER — APPOINTMENT (OUTPATIENT)
Dept: CV DIAGNOSTICS | Facility: HOSPITAL | Age: 52
End: 2022-11-02
Attending: INTERNAL MEDICINE
Payer: COMMERCIAL

## 2022-11-02 VITALS
RESPIRATION RATE: 18 BRPM | WEIGHT: 244.06 LBS | DIASTOLIC BLOOD PRESSURE: 67 MMHG | HEIGHT: 62 IN | OXYGEN SATURATION: 95 % | BODY MASS INDEX: 44.91 KG/M2 | TEMPERATURE: 99 F | HEART RATE: 72 BPM | SYSTOLIC BLOOD PRESSURE: 137 MMHG

## 2022-11-02 LAB
APTT PPP: 55.2 SECONDS (ref 23.3–35.6)
ATRIAL RATE: 340 BPM
ATRIAL RATE: 54 BPM
GLUCOSE BLD-MCNC: 114 MG/DL (ref 70–99)
P AXIS: 43 DEGREES
P-R INTERVAL: 120 MS
PLATELET # BLD AUTO: 240 10(3)UL (ref 150–450)
Q-T INTERVAL: 346 MS
Q-T INTERVAL: 452 MS
QRS DURATION: 106 MS
QRS DURATION: 78 MS
QTC CALCULATION (BEZET): 428 MS
QTC CALCULATION (BEZET): 434 MS
R AXIS: 26 DEGREES
R AXIS: 28 DEGREES
T AXIS: 19 DEGREES
T AXIS: 28 DEGREES
VENTRICULAR RATE: 54 BPM
VENTRICULAR RATE: 95 BPM

## 2022-11-02 PROCEDURE — 93306 TTE W/DOPPLER COMPLETE: CPT | Performed by: INTERNAL MEDICINE

## 2022-11-02 PROCEDURE — 99239 HOSP IP/OBS DSCHRG MGMT >30: CPT | Performed by: INTERNAL MEDICINE

## 2022-11-02 RX ORDER — DILTIAZEM HYDROCHLORIDE 180 MG/1
180 CAPSULE, EXTENDED RELEASE ORAL DAILY
Status: DISCONTINUED | OUTPATIENT
Start: 2022-11-02 | End: 2022-11-02

## 2022-11-02 RX ORDER — POLYETHYLENE GLYCOL 3350 17 G/17G
17 POWDER, FOR SOLUTION ORAL DAILY
Status: DISCONTINUED | OUTPATIENT
Start: 2022-11-02 | End: 2022-11-02

## 2022-11-02 RX ORDER — DILTIAZEM HYDROCHLORIDE 180 MG/1
180 CAPSULE, EXTENDED RELEASE ORAL DAILY
Qty: 30 CAPSULE | Refills: 3 | Status: SHIPPED | OUTPATIENT
Start: 2022-11-02 | End: 2023-11-02

## 2022-11-02 NOTE — PROGRESS NOTES
11/02/22 1202   Clinical Encounter Type   Visited With Patient and family together   Routine Visit Follow-up  (patient was sleeping.)        attempted a follow up. Patient was sleeping. Patient/Caregiver provided printed discharge information.

## 2022-11-02 NOTE — PROGRESS NOTES
Pt discharged home. Tele removed, IV removed. F/U instructions provided and discussed. Pt and family verbalized understanding. Rx is given. Discussed adverse reactions and side effects of all new medications and provided handouts. Pt brought down by staff with belongings. Pt left denying any complaints of pain, malaise, or cardiac symptoms. All needs met by staff.

## 2022-11-02 NOTE — PLAN OF CARE
Pt declines complaints of pain, malaise, or cardiac symptoms. A&Ox4, lungs clear with equal expansion, on rm air. Pt in NSR, on monitor, started eliquis today. Abdomen soft and non-tender with active bowel sounds in all 4 quadrants, continent of B&B. Patient  updated with plan of care. Problem: Patient/Family Goals  Goal: Patient/Family Long Term Goal  Description: Patient's Long Term Goal: go home    Interventions:  -Eliquis  -cardizem PO  - See additional Care Plan goals for specific interventions  Outcome: Progressing  Goal: Patient/Family Short Term Goal  Description: Patient's Short Term Goal: feel better    Interventions:   - HR control  -Labs  -Mds to see  - See additional Care Plan goals for specific interventions  Outcome: Progressing     Problem: CARDIOVASCULAR - ADULT  Goal: Maintains optimal cardiac output and hemodynamic stability  Description: INTERVENTIONS:  - Monitor vital signs, rhythm, and trends  - Monitor for bleeding, hypotension and signs of decreased cardiac output  - Evaluate effectiveness of vasoactive medications to optimize hemodynamic stability  - Monitor arterial and/or venous puncture sites for bleeding and/or hematoma  - Assess quality of pulses, skin color and temperature  - Assess for signs of decreased coronary artery perfusion - ex.  Angina  - Evaluate fluid balance, assess for edema, trend weights  Outcome: Progressing  Goal: Absence of cardiac arrhythmias or at baseline  Description: INTERVENTIONS:  - Continuous cardiac monitoring, monitor vital signs, obtain 12 lead EKG if indicated  - Evaluate effectiveness of antiarrhythmic and heart rate control medications as ordered  - Initiate emergency measures for life threatening arrhythmias  - Monitor electrolytes and administer replacement therapy as ordered  Outcome: Progressing     Problem: RESPIRATORY - ADULT  Goal: Achieves optimal ventilation and oxygenation  Description: INTERVENTIONS:  - Assess for changes in respiratory status  - Assess for changes in mentation and behavior  - Position to facilitate oxygenation and minimize respiratory effort  - Oxygen supplementation based on oxygen saturation or ABGs  - Provide Smoking Cessation handout, if applicable  - Encourage broncho-pulmonary hygiene including cough, deep breathe, Incentive Spirometry  - Assess the need for suctioning and perform as needed  - Assess and instruct to report SOB or any respiratory difficulty  - Respiratory Therapy support as indicated  - Manage/alleviate anxiety  - Monitor for signs/symptoms of CO2 retention  Outcome: Progressing     Problem: METABOLIC/FLUID AND ELECTROLYTES - ADULT  Goal: Glucose maintained within prescribed range  Description: INTERVENTIONS:  - Monitor Blood Glucose as ordered  - Assess for signs and symptoms of hyperglycemia and hypoglycemia  - Administer ordered medications to maintain glucose within target range  - Assess barriers to adequate nutritional intake and initiate nutrition consult as needed  - Instruct patient on self management of diabetes  Outcome: Progressing  Goal: Electrolytes maintained within normal limits  Description: INTERVENTIONS:  - Monitor labs and rhythm and assess patient for signs and symptoms of electrolyte imbalances  - Administer electrolyte replacement as ordered  - Monitor response to electrolyte replacements, including rhythm and repeat lab results as appropriate  - Fluid restriction as ordered  - Instruct patient on fluid and nutrition restrictions as appropriate  Outcome: Progressing     Problem: SKIN/TISSUE INTEGRITY - ADULT  Goal: Skin integrity remains intact  Description: INTERVENTIONS  - Assess and document risk factors for pressure ulcer development  - Assess and document skin integrity  - Monitor for areas of redness and/or skin breakdown  - Initiate interventions, skin care algorithm/standards of care as needed  Outcome: Progressing     Problem: PAIN - ADULT  Goal: Verbalizes/displays adequate comfort level or patient's stated pain goal  Description: INTERVENTIONS:  - Encourage pt to monitor pain and request assistance  - Assess pain using appropriate pain scale  - Administer analgesics based on type and severity of pain and evaluate response  - Implement non-pharmacological measures as appropriate and evaluate response  - Consider cultural and social influences on pain and pain management  - Manage/alleviate anxiety  - Utilize distraction and/or relaxation techniques  - Monitor for opioid side effects  - Notify MD/LIP if interventions unsuccessful or patient reports new pain  - Anticipate increased pain with activity and pre-medicate as appropriate  Outcome: Progressing     Problem: RISK FOR INFECTION - ADULT  Goal: Absence of fever/infection during anticipated neutropenic period  Description: INTERVENTIONS  - Monitor WBC  - Administer growth factors as ordered  - Implement neutropenic guidelines  Outcome: Progressing     Problem: SAFETY ADULT - FALL  Goal: Free from fall injury  Description: INTERVENTIONS:  - Assess pt frequently for physical needs  - Identify cognitive and physical deficits and behaviors that affect risk of falls.   - [place] fall precautions as indicated by assessment.  - Educate pt/family on patient safety including physical limitations  - Instruct pt to call for assistance with activity based on assessment  - Modify environment to reduce risk of injury  - Provide assistive devices as appropriate  - Consider OT/PT consult to assist with strengthening/mobility  - Encourage toileting schedule  Outcome: Progressing     Problem: DISCHARGE PLANNING  Goal: Discharge to home or other facility with appropriate resources  Description: INTERVENTIONS:  - Identify barriers to discharge w/pt and caregiver  - Include patient/family/discharge partner in discharge planning  - Arrange for needed discharge resources and transportation as appropriate  - Identify discharge learning needs (meds, wound care, etc)  - Arrange for interpreters to assist at discharge as needed  - Consider post-discharge preferences of patient/family/discharge partner  - Complete POLST form as appropriate  - Assess patient's ability to be responsible for managing their own health  - Refer to Case Management Department for coordinating discharge planning if the patient needs post-hospital services based on physician/LIP order or complex needs related to functional status, cognitive ability or social support system  Outcome: Progressing

## 2022-11-02 NOTE — PLAN OF CARE
Assumed care of pt at [facility]. Pt A&Ox 4. On RA- CPAP NOC; SpO2 remaining greater than 92% with no complaints of SOB. AFIB with rates in the 100's on tele; c/o cardiac symptoms; palpations. Pt denies chest pain or discomfort; seems to be resting comfortably at this time. Pt up with SBA, tolerating well. POC continue heparin gtt per ACS/AFIB protocol. Continue Cardizem gtt for rate control. NPO @0000. Echo. Possible FREEMAN/Cardioversion. Plan of care reviewed with pt; all questions answered at this time. Bed in lowest position; call light within reach. High fall risk precautions are in place per unit protocol. 2004 pt converted to NSR; Cardizem decreased to 5mg/hr. EKG to confirm. [employer] cards paged to discontinue Cardizem gtt. 2100 Cardizem gtt stopped. Pt states she no longer feels palpitations. Heparin gtt remains. Problem: Patient/Family Goals  Goal: Patient/Family Long Term Goal  Description: Patient's Long Term Goal: go home    Interventions:  - heparin gtt, cardizem gtt  - See additional Care Plan goals for specific interventions  Outcome: Progressing  Goal: Patient/Family Short Term Goal  Description: Patient's Short Term Goal: feel better    Interventions:   - HR control  - See additional Care Plan goals for specific interventions  Outcome: Progressing     Problem: CARDIOVASCULAR - ADULT  Goal: Maintains optimal cardiac output and hemodynamic stability  Description: INTERVENTIONS:  - Monitor vital signs, rhythm, and trends  - Monitor for bleeding, hypotension and signs of decreased cardiac output  - Evaluate effectiveness of vasoactive medications to optimize hemodynamic stability  - Monitor arterial and/or venous puncture sites for bleeding and/or hematoma  - Assess quality of pulses, skin color and temperature  - Assess for signs of decreased coronary artery perfusion - ex.  Angina  - Evaluate fluid balance, assess for edema, trend weights  Outcome: Progressing  Goal: Absence of cardiac arrhythmias or at baseline  Description: INTERVENTIONS:  - Continuous cardiac monitoring, monitor vital signs, obtain 12 lead EKG if indicated  - Evaluate effectiveness of antiarrhythmic and heart rate control medications as ordered  - Initiate emergency measures for life threatening arrhythmias  - Monitor electrolytes and administer replacement therapy as ordered  Outcome: Progressing     Problem: RESPIRATORY - ADULT  Goal: Achieves optimal ventilation and oxygenation  Description: INTERVENTIONS:  - Assess for changes in respiratory status  - Assess for changes in mentation and behavior  - Position to facilitate oxygenation and minimize respiratory effort  - Oxygen supplementation based on oxygen saturation or ABGs  - Provide Smoking Cessation handout, if applicable  - Encourage broncho-pulmonary hygiene including cough, deep breathe, Incentive Spirometry  - Assess the need for suctioning and perform as needed  - Assess and instruct to report SOB or any respiratory difficulty  - Respiratory Therapy support as indicated  - Manage/alleviate anxiety  - Monitor for signs/symptoms of CO2 retention  Outcome: Progressing     Problem: METABOLIC/FLUID AND ELECTROLYTES - ADULT  Goal: Glucose maintained within prescribed range  Description: INTERVENTIONS:  - Monitor Blood Glucose as ordered  - Assess for signs and symptoms of hyperglycemia and hypoglycemia  - Administer ordered medications to maintain glucose within target range  - Assess barriers to adequate nutritional intake and initiate nutrition consult as needed  - Instruct patient on self management of diabetes  Outcome: Progressing  Goal: Electrolytes maintained within normal limits  Description: INTERVENTIONS:  - Monitor labs and rhythm and assess patient for signs and symptoms of electrolyte imbalances  - Administer electrolyte replacement as ordered  - Monitor response to electrolyte replacements, including rhythm and repeat lab results as appropriate  - Fluid restriction as ordered  - Instruct patient on fluid and nutrition restrictions as appropriate  Outcome: Progressing     Problem: SKIN/TISSUE INTEGRITY - ADULT  Goal: Skin integrity remains intact  Description: INTERVENTIONS  - Assess and document risk factors for pressure ulcer development  - Assess and document skin integrity  - Monitor for areas of redness and/or skin breakdown  - Initiate interventions, skin care algorithm/standards of care as needed  Outcome: Progressing     Problem: PAIN - ADULT  Goal: Verbalizes/displays adequate comfort level or patient's stated pain goal  Description: INTERVENTIONS:  - Encourage pt to monitor pain and request assistance  - Assess pain using appropriate pain scale  - Administer analgesics based on type and severity of pain and evaluate response  - Implement non-pharmacological measures as appropriate and evaluate response  - Consider cultural and social influences on pain and pain management  - Manage/alleviate anxiety  - Utilize distraction and/or relaxation techniques  - Monitor for opioid side effects  - Notify MD/LIP if interventions unsuccessful or patient reports new pain  - Anticipate increased pain with activity and pre-medicate as appropriate  Outcome: Progressing     Problem: RISK FOR INFECTION - ADULT  Goal: Absence of fever/infection during anticipated neutropenic period  Description: INTERVENTIONS  - Monitor WBC  - Administer growth factors as ordered  - Implement neutropenic guidelines  Outcome: Progressing     Problem: SAFETY ADULT - FALL  Goal: Free from fall injury  Description: INTERVENTIONS:  - Assess pt frequently for physical needs  - Identify cognitive and physical deficits and behaviors that affect risk of falls.   - [place] fall precautions as indicated by assessment.  - Educate pt/family on patient safety including physical limitations  - Instruct pt to call for assistance with activity based on assessment  - Modify environment to reduce risk of injury  - Provide assistive devices as appropriate  - Consider OT/PT consult to assist with strengthening/mobility  - Encourage toileting schedule  Outcome: Progressing     Problem: DISCHARGE PLANNING  Goal: Discharge to home or other facility with appropriate resources  Description: INTERVENTIONS:  - Identify barriers to discharge w/pt and caregiver  - Include patient/family/discharge partner in discharge planning  - Arrange for needed discharge resources and transportation as appropriate  - Identify discharge learning needs (meds, wound care, etc)  - Arrange for interpreters to assist at discharge as needed  - Consider post-discharge preferences of patient/family/discharge partner  - Complete POLST form as appropriate  - Assess patient's ability to be responsible for managing their own health  - Refer to Case Management Department for coordinating discharge planning if the patient needs post-hospital services based on physician/LIP order or complex needs related to functional status, cognitive ability or social support system  Outcome: Progressing

## 2022-11-03 ENCOUNTER — PATIENT OUTREACH (OUTPATIENT)
Dept: CASE MANAGEMENT | Age: 52
End: 2022-11-03

## 2022-11-03 DIAGNOSIS — Z02.9 ENCOUNTERS FOR UNSPECIFIED ADMINISTRATIVE PURPOSE: ICD-10-CM

## 2022-11-03 RX ORDER — PREGABALIN 50 MG/1
50 CAPSULE ORAL 2 TIMES DAILY
Qty: 60 CAPSULE | Refills: 0 | Status: SHIPPED | OUTPATIENT
Start: 2022-11-03

## 2022-11-03 RX ORDER — LIDOCAINE HYDROCHLORIDE 20 MG/ML
SOLUTION ORAL; TOPICAL
Qty: 90 TABLET | Refills: 0 | Status: SHIPPED | OUTPATIENT
Start: 2022-11-03

## 2022-11-03 RX ORDER — PREGABALIN 100 MG/1
100 CAPSULE ORAL 2 TIMES DAILY
Qty: 60 CAPSULE | Refills: 0 | Status: SHIPPED | OUTPATIENT
Start: 2022-11-03

## 2022-11-03 NOTE — TELEPHONE ENCOUNTER
A new MRI Lumbar was ordered as she has had ongoing radiating neuropathic leg pain for 6 months despite increasing Lyrica and working with PT. A new MRI is needed to evaluate the pain generator and plan therapeutic injections or possible surgery.

## 2022-11-03 NOTE — PAYOR COMM NOTE
Discharge Notification    Patient Name: Petra Parry: Renetta Marques POS/EDMOND  Subscriber #: [id number]  Authorization Number: [id number]  Admit Date/Time: 11/1/2022 11:13 AM  Discharge Date/Time: 11/2/2022 3:30 PM

## 2022-11-03 NOTE — TELEPHONE ENCOUNTER
Pt requesting refill for 100mg  Refill pended and routed to provider in order to take along with the 50mg previously ordered as per Mychart conversation between provider and patient.

## 2022-11-03 NOTE — TELEPHONE ENCOUNTER
Yes, a new order would need to be placed, and the order from 08/19/22 should be discontinued. Also, a note would be needed from the provider stating after review of PT notes and medication the patient has failed to improve. Then the patient would need to schedule with [employer] which would prompt the prior authorization to start with [employer].

## 2022-11-04 ENCOUNTER — OFFICE VISIT (OUTPATIENT)
Dept: INTERNAL MEDICINE CLINIC | Facility: CLINIC | Age: 52
End: 2022-11-04
Payer: COMMERCIAL

## 2022-11-04 ENCOUNTER — APPOINTMENT (OUTPATIENT)
Dept: PHYSICAL THERAPY | Age: 52
End: 2022-11-04
Attending: PHYSICIAN ASSISTANT
Payer: COMMERCIAL

## 2022-11-04 VITALS
HEIGHT: 62 IN | SYSTOLIC BLOOD PRESSURE: 118 MMHG | DIASTOLIC BLOOD PRESSURE: 68 MMHG | BODY MASS INDEX: 45.45 KG/M2 | TEMPERATURE: 97 F | HEART RATE: 73 BPM | OXYGEN SATURATION: 97 % | RESPIRATION RATE: 18 BRPM | WEIGHT: 247 LBS

## 2022-11-04 DIAGNOSIS — I48.91 ATRIAL FIBRILLATION WITH RVR (HCC): Primary | ICD-10-CM

## 2022-11-04 DIAGNOSIS — M79.661 PAIN IN RIGHT SHIN: ICD-10-CM

## 2022-11-04 DIAGNOSIS — E11.9 TYPE 2 DIABETES MELLITUS WITHOUT COMPLICATION, WITH LONG-TERM CURRENT USE OF INSULIN (HCC): ICD-10-CM

## 2022-11-04 DIAGNOSIS — Z79.4 TYPE 2 DIABETES MELLITUS WITHOUT COMPLICATION, WITH LONG-TERM CURRENT USE OF INSULIN (HCC): ICD-10-CM

## 2022-11-04 RX ORDER — INSULIN ASPART 100 [IU]/ML
160 INJECTION, SOLUTION INTRAVENOUS; SUBCUTANEOUS DAILY
COMMUNITY

## 2022-11-04 RX ORDER — MULTIVIT-MIN/IRON FUM/FOLIC AC 7.5 MG-4
1 TABLET ORAL DAILY
COMMUNITY

## 2022-11-04 RX ORDER — LORATADINE 10 MG/1
10 TABLET ORAL DAILY
COMMUNITY

## 2022-11-04 RX ORDER — ESTRADIOL 0.05 MG/D
PATCH TRANSDERMAL
Qty: 4 PATCH | Refills: 0 | Status: SHIPPED | OUTPATIENT
Start: 2022-11-04 | End: 2022-11-11 | Stop reason: SDUPTHER

## 2022-11-09 ENCOUNTER — OFFICE VISIT (OUTPATIENT)
Dept: ENDOCRINOLOGY CLINIC | Facility: CLINIC | Age: 52
End: 2022-11-09
Payer: COMMERCIAL

## 2022-11-09 ENCOUNTER — APPOINTMENT (OUTPATIENT)
Dept: PHYSICAL THERAPY | Age: 52
End: 2022-11-09
Attending: PHYSICIAN ASSISTANT
Payer: COMMERCIAL

## 2022-11-09 VITALS
BODY MASS INDEX: 45 KG/M2 | HEART RATE: 84 BPM | DIASTOLIC BLOOD PRESSURE: 68 MMHG | SYSTOLIC BLOOD PRESSURE: 124 MMHG | OXYGEN SATURATION: 98 % | WEIGHT: 247.81 LBS | RESPIRATION RATE: 18 BRPM

## 2022-11-09 DIAGNOSIS — E11.9 TYPE 2 DIABETES MELLITUS WITHOUT COMPLICATION, WITH LONG-TERM CURRENT USE OF INSULIN (HCC): Primary | ICD-10-CM

## 2022-11-09 DIAGNOSIS — Z79.4 TYPE 2 DIABETES MELLITUS WITHOUT COMPLICATION, WITH LONG-TERM CURRENT USE OF INSULIN (HCC): Primary | ICD-10-CM

## 2022-11-09 DIAGNOSIS — Z97.8 USES SELF-APPLIED CONTINUOUS GLUCOSE MONITORING DEVICE: ICD-10-CM

## 2022-11-09 DIAGNOSIS — Z96.41 INSULIN PUMP IN PLACE: ICD-10-CM

## 2022-11-09 PROCEDURE — 3074F SYST BP LT 130 MM HG: CPT | Performed by: NURSE PRACTITIONER

## 2022-11-09 PROCEDURE — 95251 CONT GLUC MNTR ANALYSIS I&R: CPT | Performed by: NURSE PRACTITIONER

## 2022-11-09 PROCEDURE — 99214 OFFICE O/P EST MOD 30 MIN: CPT | Performed by: NURSE PRACTITIONER

## 2022-11-09 PROCEDURE — 3078F DIAST BP <80 MM HG: CPT | Performed by: NURSE PRACTITIONER

## 2022-11-10 ENCOUNTER — PATIENT MESSAGE (OUTPATIENT)
Dept: ENDOCRINOLOGY CLINIC | Facility: CLINIC | Age: 52
End: 2022-11-10

## 2022-11-11 ENCOUNTER — OFFICE VISIT (OUTPATIENT)
Dept: OBGYN | Age: 52
End: 2022-11-11

## 2022-11-11 ENCOUNTER — APPOINTMENT (OUTPATIENT)
Dept: OBGYN | Age: 52
End: 2022-11-11

## 2022-11-11 VITALS
BODY MASS INDEX: 46.01 KG/M2 | SYSTOLIC BLOOD PRESSURE: 122 MMHG | HEIGHT: 62 IN | HEART RATE: 82 BPM | WEIGHT: 250 LBS | DIASTOLIC BLOOD PRESSURE: 70 MMHG | TEMPERATURE: 97.7 F

## 2022-11-11 DIAGNOSIS — Z01.419 WELL WOMAN EXAM WITH ROUTINE GYNECOLOGICAL EXAM: Primary | ICD-10-CM

## 2022-11-11 DIAGNOSIS — Z79.890 POSTMENOPAUSAL HRT (HORMONE REPLACEMENT THERAPY): ICD-10-CM

## 2022-11-11 PROCEDURE — 99396 PREV VISIT EST AGE 40-64: CPT | Performed by: OBSTETRICS & GYNECOLOGY

## 2022-11-11 PROCEDURE — 3078F DIAST BP <80 MM HG: CPT | Performed by: OBSTETRICS & GYNECOLOGY

## 2022-11-11 PROCEDURE — 3074F SYST BP LT 130 MM HG: CPT | Performed by: OBSTETRICS & GYNECOLOGY

## 2022-11-11 RX ORDER — ESTRADIOL 0.05 MG/D
1 PATCH TRANSDERMAL
Qty: 4 PATCH | Refills: 11 | Status: SHIPPED | OUTPATIENT
Start: 2022-11-14 | End: 2023-11-14

## 2022-11-11 RX ORDER — TIRZEPATIDE 5 MG/.5ML
5 INJECTION, SOLUTION SUBCUTANEOUS
COMMUNITY

## 2022-11-11 RX ORDER — DILTIAZEM HYDROCHLORIDE 180 MG/1
180 CAPSULE, COATED, EXTENDED RELEASE ORAL 2 TIMES DAILY
COMMUNITY

## 2022-11-11 RX ORDER — PREGABALIN 150 MG/1
150 CAPSULE ORAL 2 TIMES DAILY
COMMUNITY

## 2022-11-11 ASSESSMENT — PATIENT HEALTH QUESTIONNAIRE - PHQ9
2. FEELING DOWN, DEPRESSED OR HOPELESS: NOT AT ALL
CLINICAL INTERPRETATION OF PHQ2 SCORE: NO FURTHER SCREENING NEEDED
SUM OF ALL RESPONSES TO PHQ9 QUESTIONS 1 AND 2: 0
SUM OF ALL RESPONSES TO PHQ9 QUESTIONS 1 AND 2: 0
1. LITTLE INTEREST OR PLEASURE IN DOING THINGS: NOT AT ALL

## 2022-11-13 PROBLEM — M79.661 PAIN IN RIGHT SHIN: Status: ACTIVE | Noted: 2022-11-13

## 2022-11-15 ENCOUNTER — HOSPITAL ENCOUNTER (OUTPATIENT)
Dept: MRI IMAGING | Age: 52
Discharge: HOME | End: 2022-11-15
Attending: PHYSICIAN ASSISTANT
Payer: COMMERCIAL

## 2022-11-15 DIAGNOSIS — M54.16 LUMBAR RADICULOPATHY: ICD-10-CM

## 2022-11-15 PROCEDURE — 72148 MRI LUMBAR SPINE W/O DYE: CPT | Performed by: PHYSICIAN ASSISTANT

## 2022-11-16 ENCOUNTER — PATIENT MESSAGE (OUTPATIENT)
Dept: ORTHOPEDICS CLINIC | Facility: CLINIC | Age: 52
End: 2022-11-16

## 2022-11-16 ENCOUNTER — TELEPHONE (OUTPATIENT)
Dept: SURGERY | Facility: CLINIC | Age: 52
End: 2022-11-16

## 2022-11-16 DIAGNOSIS — M54.16 LUMBAR RADICULOPATHY: Primary | ICD-10-CM

## 2022-11-16 DIAGNOSIS — M17.0 PRIMARY OSTEOARTHRITIS OF BOTH KNEES: Primary | ICD-10-CM

## 2022-11-17 NOTE — TELEPHONE ENCOUNTER
From: Monico Boxer  To: Nelsy Luo MD  Sent: 11/16/2022 10:57 PM CST  Subject: Knee injection     I was trying to find the message regarding my gel injections and when I am able to receive it again? I have had consistent pain again and hoping I could be able to get it again?      Adele Bello

## 2022-11-18 ENCOUNTER — OFFICE VISIT (OUTPATIENT)
Dept: PHYSICAL THERAPY | Age: 52
End: 2022-11-18
Attending: PHYSICIAN ASSISTANT
Payer: COMMERCIAL

## 2022-11-18 ENCOUNTER — TELEPHONE (OUTPATIENT)
Dept: ORTHOPEDICS CLINIC | Facility: CLINIC | Age: 52
End: 2022-11-18

## 2022-11-18 DIAGNOSIS — M54.50 CHRONIC BILATERAL LOW BACK PAIN, UNSPECIFIED WHETHER SCIATICA PRESENT: Primary | ICD-10-CM

## 2022-11-18 DIAGNOSIS — M79.604 LOWER LIMB PAIN, LATERAL, RIGHT: ICD-10-CM

## 2022-11-18 DIAGNOSIS — M51.36 DDD (DEGENERATIVE DISC DISEASE), LUMBAR: ICD-10-CM

## 2022-11-18 DIAGNOSIS — G89.29 CHRONIC BILATERAL LOW BACK PAIN, UNSPECIFIED WHETHER SCIATICA PRESENT: Primary | ICD-10-CM

## 2022-11-18 PROCEDURE — 97112 NEUROMUSCULAR REEDUCATION: CPT

## 2022-11-18 PROCEDURE — 97140 MANUAL THERAPY 1/> REGIONS: CPT

## 2022-11-18 PROCEDURE — 97110 THERAPEUTIC EXERCISES: CPT

## 2022-11-18 NOTE — TELEPHONE ENCOUNTER
Patient has been scheduled for bilateral injection with Dr. Roslyn Severance in [place].  Appointment is on 11/30 and Avani Buck is in office

## 2022-11-25 ENCOUNTER — PATIENT MESSAGE (OUTPATIENT)
Dept: ORTHOPEDICS CLINIC | Facility: CLINIC | Age: 52
End: 2022-11-25

## 2022-11-28 ENCOUNTER — OFFICE VISIT (OUTPATIENT)
Dept: INTERNAL MEDICINE CLINIC | Facility: CLINIC | Age: 52
End: 2022-11-28
Payer: COMMERCIAL

## 2022-11-28 VITALS
WEIGHT: 251 LBS | HEIGHT: 61 IN | HEART RATE: 86 BPM | RESPIRATION RATE: 16 BRPM | BODY MASS INDEX: 47.39 KG/M2 | DIASTOLIC BLOOD PRESSURE: 78 MMHG | SYSTOLIC BLOOD PRESSURE: 120 MMHG

## 2022-11-28 DIAGNOSIS — G47.33 OSA ON CPAP: ICD-10-CM

## 2022-11-28 DIAGNOSIS — Z51.81 THERAPEUTIC DRUG MONITORING: Primary | ICD-10-CM

## 2022-11-28 DIAGNOSIS — F41.9 ANXIETY: ICD-10-CM

## 2022-11-28 DIAGNOSIS — F43.9 STRESS: ICD-10-CM

## 2022-11-28 DIAGNOSIS — E03.9 HYPOTHYROIDISM, UNSPECIFIED TYPE: ICD-10-CM

## 2022-11-28 DIAGNOSIS — E78.5 DYSLIPIDEMIA: ICD-10-CM

## 2022-11-28 DIAGNOSIS — E11.9 TYPE 2 DIABETES MELLITUS WITHOUT COMPLICATION, WITH LONG-TERM CURRENT USE OF INSULIN (HCC): ICD-10-CM

## 2022-11-28 DIAGNOSIS — Z79.4 TYPE 2 DIABETES MELLITUS WITHOUT COMPLICATION, WITH LONG-TERM CURRENT USE OF INSULIN (HCC): ICD-10-CM

## 2022-11-28 DIAGNOSIS — E66.01 OBESITY, MORBID, BMI 50 OR HIGHER (HCC): ICD-10-CM

## 2022-11-28 DIAGNOSIS — Z99.89 OSA ON CPAP: ICD-10-CM

## 2022-11-28 PROCEDURE — 99214 OFFICE O/P EST MOD 30 MIN: CPT | Performed by: NURSE PRACTITIONER

## 2022-11-28 PROCEDURE — 3074F SYST BP LT 130 MM HG: CPT | Performed by: NURSE PRACTITIONER

## 2022-11-28 PROCEDURE — 3078F DIAST BP <80 MM HG: CPT | Performed by: NURSE PRACTITIONER

## 2022-11-28 PROCEDURE — 3008F BODY MASS INDEX DOCD: CPT | Performed by: NURSE PRACTITIONER

## 2022-11-28 RX ORDER — BUPROPION HYDROCHLORIDE 300 MG/1
TABLET ORAL
Qty: 90 TABLET | Refills: 0 | Status: SHIPPED | OUTPATIENT
Start: 2022-11-28

## 2022-11-28 RX ORDER — LEVOTHYROXINE SODIUM 137 UG/1
TABLET ORAL
Qty: 90 TABLET | Refills: 0 | Status: SHIPPED | OUTPATIENT
Start: 2022-11-28

## 2022-11-28 RX ORDER — TIRZEPATIDE 7.5 MG/.5ML
7.5 INJECTION, SOLUTION SUBCUTANEOUS WEEKLY
Qty: 2 ML | Refills: 0 | Status: SHIPPED | OUTPATIENT
Start: 2022-11-28

## 2022-11-28 NOTE — PATIENT INSTRUCTIONS
Next steps:  1. Fill your prescribed medication and take as discussed and prescribed: mounjaro 7.5mg weekly   2. Schedule a personal nutrition consultation with one of our registered dieticians     Please try to work on the following dietary changes:    1. Drink water with meals and throughout the day, cut down on soda and/or juice if consumed. Consider flavored water options like Bubbly, Spindrift, Hint and Pilar. 2.  Eat breakfast daily and focus on having protein with each meal, examples include: greek yogurt, cottage cheese, hard boiled egg, whole grain toast with peanut butter. 3.  Reduce refined carbohydrates and sugars which includes items such as sweets, as well as rice, pasta, and bread and make sure to choose whole grain options when having them with just 1 serving per meal about the size of your inner palm. 4.  Consume non starchy veggies daily working towards making them a good 50% of your daily food intake. Add them to lunch and dinner consistently. 5.  Start a daily probiotic: VSL#3 is recommended, (order on line at www.vsl3. com). Take 1 capsule daily with water for 30 days, then reduce to 1 every other day (this will reduce the cost). Capsules can be left out for 2 weeks, but then must be refrigerated. Please download uzma My Fitness Descanso Creed! Or Net Diary to monitor daily dietary intake and you will be able to see if you are eating the right amount of calories or too much or too little which would hinder weight loss. Additionally this will help to see your daily carbohydrate and protein intake. When you set the uzma up choose 1-2 lbs/week as a goal.  Keeping a paper food journal is an option as well to remain accountable for your choices- this is the start to mindful eating! A low calorie diet has been consistently shown to support weight loss. Continue or start exercising to help establish a routine.  If not already exercising begin with 1 day and progress as able with long-term goal of 30 minutes 5 days a week at a minimum. Meditation daily can help manage and control stress. Chronic stress can make weight loss difficult. Exercising is one way to help with stress, but meditation using the CALM Shaniqua or another comparable alternative can be done in your home or place of work with little time commitment. This Shaniqua can also help work on behavior change and improve sleep. Check out the segment under Calm Masterclass and listen to The 4 Pillars of Health. A great way to begin learning about the foundation of lifestyle with practical tips to use in your every day. Check out www.yourweightmatters. org blog for continued daily support and education along this weight loss journey! Patient Resources:     Personal Training/Fitness Classes/Health Coaching     [facility] and [place] @ [email or web address]/ Full fitness center with group fitness and personal training. Discount available as client of [facility]. Health Coaching and Personal Training with Emily Huang at our [facility]- individual weekly coaching with option to add personal training and small group fitness classes targeted at weight loss- [phone] and/or email @ [email or web address]. [email or web address]  360FIT [place] [email or web address] Group Fitness [phone] and/or email Fernanda Steve at [facility]@[employer]. com  [address] [facility] with multiple locations: [employer]et[employer] (www.Heirloom Computing. Ludi), Eat The Arkimedia Fitness (www.Neverware. Ludi), Fit Body Bootcamp (www.eBuilderbodybootcamp.Ludi), f-star Biotech Fitness (www.Fat Spaniel Technologies. Ludi), The Exercise  (www.exercisecoach.Ludi)     Online Fitness  Fitness  on [employer] in 10 DVD series- www. opdqi91FOW. Ludi  Sit and Be Fit - Chair exercise series Www.sitandbefit. org  Hip Hop Fit with Siva Loya at www.hiphopfit. net     Apps for on the [employer] 7 Minute Workout (orange box with white 7) - free on the go HIIT training shaniqua  Peloton Shaniqua @ www. VisualOn     Nutrition Trackers and Tools  LoseIT! And My Fitness Pal apps and on line for tracking nutrition  NOOM - virtual health coaching  FitFoundation (healthy meals on the go) in Kind[employer] Healthcare[employer]I @ www. exyeruoqvylyb4u. Jessica Flaherty MD @ www.DailysingledMeeGenius and Nai Shi (keto and low carb plans recommended) @ www. BLKOBC33.FRECHO, Metabolic Meals @ www. MyMetabolicMeals. com - individual prepared meals to go  ViS, Who@, [employer], Every Plate, Am[employer]x [employer]- on line meal delivery programs for preparation at home  [employer] in [place] for homemade meals to go @ wwwavocarrot  Diet Doctor @ www. dietdoctor. [employer] - low carb swaps  YuNanoH2O - meal prep and planning shaniqua (www.yummly. com)     Stress Management/Behavior/Mindful Eating  CALM meditation shaniqua (www.Sentient Energy)  Headspace  Am I Hungry? Mindful eating virtual  shaniqua  Www.yourweightmatters. org - Obesity Action Coalition sponsored Blog posts daily  Motivation shaniqua (black box with white \")- daily supportive messages sent to your phone     Books/Video Education/Podcasts  Mindless Eating by Konstantin Warren  Why We Get Sick by Drake Rodriguez (a book about insulin resistance)  Atomic Habits by Augusta Hall (a book about taking small steps to promote greater behavior change)   Can't Hurt Me by Suma Farrar (a book exploring the power of discipline in achieving your goals)  The End of Dieting: How to Live for Life by Dr. Rosanne Decker M.D. or listen to The [facility] Episode 61: Understanding \"Nutritarian\" Eating w/Dr. Rosanne Decker  Your Body in Balance: The World [employer] of Food, Hormones, and Health by Dr. Afsaneh Cohen  The Menopause Diet Plan by Michi Amaro and [facility] AT [facility]  The Complete Guide to fasting by Dr. Lalit Isaac, [address] [facility] by Enzo Li, Ph.D, R.D.   Weight Loss Surgery Will Not Treat Food Addiction by Paulette Lau Ph.D  The Game Changers- Kevin Documentary on plant based nutrition  Fed Up - documentary about obesity (Free on Utube)  The Truth About Sugar - documentary on sugar (Free on Utube, https://youtu. be/7H8ycsiPZ2p)  The Dr. Arlo Dakin by Dr. Kay Pichardo MD  Fitlosophy Fitspiration - journal to better health (found at [employer] in fitness aisle)  What Happened to You?- a look at the impact trauma has on behavior written by Cruz Gauthier and Dr. Gabriela Cook Again by Veena Issa - discovering your true self after trauma  Miriam Altamirano talk on xPeerient, The Call to Courage  Podcasts: The Exam Room by the Physician's Committee, Nutrition Facts by Dr. Lenora Trotter    We are here to support you with weight loss, but please remember that you still need your primary care provider for your routine health maintenance.

## 2022-11-28 NOTE — TELEPHONE ENCOUNTER
Thyroid Supplements Protocol Passed   Last refill 7/27/22 90 0 refill  Last OV 7/13/22  Future Appointments   Date Time Provider Jovani Haque   11/28/2022  9:20 AM LILIA Taylor EMGDARRENI [facility]   11/30/2022  9:00 AM Kelsi Wilson MD EMG ORTHO 75 EM[facility]   12/2/2022  8:45 AM Jesi Elizabeth, PT PFS Physical [facility] [place]   12/7/2022  8:30 AM Dylan Samayoa MD ENIPain EM[facility] [place]   12/9/2022  8:45 AM Jesi Elizabeth, PT PFS Physical [facility] [place]   12/13/2022  3:00 PM Sylvia Healy DO [facility] AT [facility]   12/16/2022  8:45 AM Jesi Elizabeth, PT PFS Physical [facility] [place]   12/19/2022 11:20 AM LILIA Taylor EMGWEI EM[facility]   12/23/2022 10:45 AM ALISON Sam ENINAPER2 EM[facility] [place]   12/27/2022  7:00 AM JAIME, [place] SGIEDW None

## 2022-11-29 ENCOUNTER — TELEPHONE (OUTPATIENT)
Dept: INTERNAL MEDICINE CLINIC | Facility: CLINIC | Age: 52
End: 2022-11-29

## 2022-11-30 ENCOUNTER — OFFICE VISIT (OUTPATIENT)
Dept: ORTHOPEDICS CLINIC | Facility: CLINIC | Age: 52
End: 2022-11-30
Payer: COMMERCIAL

## 2022-11-30 VITALS — OXYGEN SATURATION: 99 % | HEART RATE: 73 BPM

## 2022-11-30 DIAGNOSIS — M17.0 PRIMARY OSTEOARTHRITIS OF BOTH KNEES: Primary | ICD-10-CM

## 2022-11-30 PROCEDURE — 20610 DRAIN/INJ JOINT/BURSA W/O US: CPT | Performed by: ORTHOPAEDIC SURGERY

## 2022-11-30 NOTE — PROCEDURES
After informed consent, the patient's right and left knees were marked, locally anesthetized with skin refrigerant, prepped with topical antiseptic, and injected with 5mL of 32mg/5mL Zilretta through the inferolateral portal.  A band-aid was applied. The patient tolerated the procedure well.     Anthony Win MD, [address] [place] [facility]  Phone [phone]  Fax [phone]

## 2022-12-02 ENCOUNTER — OFFICE VISIT (OUTPATIENT)
Dept: PHYSICAL THERAPY | Age: 52
End: 2022-12-02
Attending: PHYSICIAN ASSISTANT
Payer: COMMERCIAL

## 2022-12-02 DIAGNOSIS — M51.36 DDD (DEGENERATIVE DISC DISEASE), LUMBAR: ICD-10-CM

## 2022-12-02 DIAGNOSIS — M54.50 CHRONIC BILATERAL LOW BACK PAIN, UNSPECIFIED WHETHER SCIATICA PRESENT: Primary | ICD-10-CM

## 2022-12-02 DIAGNOSIS — M79.604 LOWER LIMB PAIN, LATERAL, RIGHT: ICD-10-CM

## 2022-12-02 DIAGNOSIS — G89.29 CHRONIC BILATERAL LOW BACK PAIN, UNSPECIFIED WHETHER SCIATICA PRESENT: Primary | ICD-10-CM

## 2022-12-02 PROCEDURE — 97140 MANUAL THERAPY 1/> REGIONS: CPT

## 2022-12-02 PROCEDURE — 97110 THERAPEUTIC EXERCISES: CPT

## 2022-12-07 ENCOUNTER — TELEPHONE (OUTPATIENT)
Dept: NEUROLOGY | Facility: CLINIC | Age: 52
End: 2022-12-07

## 2022-12-07 ENCOUNTER — OFFICE VISIT (OUTPATIENT)
Dept: PAIN CLINIC | Facility: CLINIC | Age: 52
End: 2022-12-07
Payer: COMMERCIAL

## 2022-12-07 VITALS — HEART RATE: 68 BPM | SYSTOLIC BLOOD PRESSURE: 120 MMHG | DIASTOLIC BLOOD PRESSURE: 74 MMHG | OXYGEN SATURATION: 96 %

## 2022-12-07 DIAGNOSIS — M54.16 LUMBAR RADICULOPATHY: Primary | ICD-10-CM

## 2022-12-07 PROCEDURE — 3078F DIAST BP <80 MM HG: CPT | Performed by: ANESTHESIOLOGY

## 2022-12-07 PROCEDURE — 3074F SYST BP LT 130 MM HG: CPT | Performed by: ANESTHESIOLOGY

## 2022-12-07 PROCEDURE — 99204 OFFICE O/P NEW MOD 45 MIN: CPT | Performed by: ANESTHESIOLOGY

## 2022-12-07 RX ORDER — METHYLPREDNISOLONE 4 MG/1
TABLET ORAL
Qty: 1 EACH | Refills: 0 | Status: SHIPPED | OUTPATIENT
Start: 2022-12-07

## 2022-12-07 RX ORDER — CYCLOBENZAPRINE HCL 10 MG
10 TABLET ORAL 3 TIMES DAILY PRN
Qty: 30 TABLET | Refills: 0 | Status: SHIPPED | OUTPATIENT
Start: 2022-12-07

## 2022-12-07 RX ORDER — TRAMADOL HYDROCHLORIDE 50 MG/1
50 TABLET ORAL EVERY 8 HOURS PRN
Qty: 30 TABLET | Refills: 0 | Status: SHIPPED | OUTPATIENT
Start: 2022-12-07

## 2022-12-07 NOTE — TELEPHONE ENCOUNTER
Prior authorization request completed for: Right L4 TFESI   Authorization # [id number]  Authorization dates: 12/7/2022 - 1/5/2023  CPT codes approved: 66908  Number of visits/dates of service approved: 1  Physician: Dr. Beverly Das   Location: [facility]     Patient can be scheduled. Routed to Navigator.

## 2022-12-07 NOTE — TELEPHONE ENCOUNTER
Question Answer Comment   Anesthesia Type Sedation    Provider Viky Hawa    Location Lab    Procedure Transforaminal    Laterality/Level right L4    Medications to hold apixaban    Medical clearance requested (will send to Pain Navigator) Yes drew   Patient has Medicare coverage?  No    Comments (Please list entire procedure name here.) right lumbar 4 transforaminal epidural steroid injection

## 2022-12-07 NOTE — TELEPHONE ENCOUNTER
Faxed Medical Clearance to Bert Damon at Fax #: [phone];[id number] r'cvd    22    RE: Melvin Buchanan    : 3/12/1970    Dear Dr. Leana Germain,     Your patient is being scheduled for a pain management procedure at [facility].    Procedure:  Right L4 Transforaminal Injection   Date of Procedure: TBD  Physician: Nadine Grijalva- Anesthesiologist     Your patient is currently taking Eliquis.  usually recommends the medication be held for 3 days prior to injection. Please verify patient is cleared to proceed with pain management procedures.

## 2022-12-09 ENCOUNTER — APPOINTMENT (OUTPATIENT)
Dept: PHYSICAL THERAPY | Age: 52
End: 2022-12-09
Attending: PHYSICIAN ASSISTANT
Payer: COMMERCIAL

## 2022-12-13 NOTE — TELEPHONE ENCOUNTER
Patient does not want to move forward at this point with injection as she will be getting a second opinion.

## 2022-12-15 ENCOUNTER — PATIENT MESSAGE (OUTPATIENT)
Dept: INTERNAL MEDICINE CLINIC | Facility: CLINIC | Age: 52
End: 2022-12-15

## 2022-12-16 ENCOUNTER — OFFICE VISIT (OUTPATIENT)
Dept: PHYSICAL THERAPY | Age: 52
End: 2022-12-16
Attending: PHYSICIAN ASSISTANT
Payer: COMMERCIAL

## 2022-12-16 DIAGNOSIS — M79.604 LOWER LIMB PAIN, LATERAL, RIGHT: ICD-10-CM

## 2022-12-16 DIAGNOSIS — G89.29 CHRONIC BILATERAL LOW BACK PAIN, UNSPECIFIED WHETHER SCIATICA PRESENT: Primary | ICD-10-CM

## 2022-12-16 DIAGNOSIS — M51.36 DDD (DEGENERATIVE DISC DISEASE), LUMBAR: ICD-10-CM

## 2022-12-16 DIAGNOSIS — M54.50 CHRONIC BILATERAL LOW BACK PAIN, UNSPECIFIED WHETHER SCIATICA PRESENT: Primary | ICD-10-CM

## 2022-12-16 PROCEDURE — 97110 THERAPEUTIC EXERCISES: CPT

## 2022-12-19 ENCOUNTER — PATIENT MESSAGE (OUTPATIENT)
Dept: INTERNAL MEDICINE CLINIC | Facility: CLINIC | Age: 52
End: 2022-12-19

## 2022-12-20 RX ORDER — SEMAGLUTIDE 1.34 MG/ML
1 INJECTION, SOLUTION SUBCUTANEOUS WEEKLY
Qty: 9 ML | Refills: 0 | Status: SHIPPED | OUTPATIENT
Start: 2022-12-20

## 2022-12-27 ENCOUNTER — ANESTHESIA (OUTPATIENT)
Dept: ENDOSCOPY | Facility: HOSPITAL | Age: 52
End: 2022-12-27
Payer: COMMERCIAL

## 2022-12-27 ENCOUNTER — ANESTHESIA EVENT (OUTPATIENT)
Dept: ENDOSCOPY | Facility: HOSPITAL | Age: 52
End: 2022-12-27
Payer: COMMERCIAL

## 2022-12-27 ENCOUNTER — HOSPITAL ENCOUNTER (OUTPATIENT)
Facility: HOSPITAL | Age: 52
Setting detail: HOSPITAL OUTPATIENT SURGERY
Discharge: HOME | End: 2022-12-27
Attending: INTERNAL MEDICINE | Admitting: INTERNAL MEDICINE
Payer: COMMERCIAL

## 2022-12-27 VITALS
RESPIRATION RATE: 16 BRPM | DIASTOLIC BLOOD PRESSURE: 88 MMHG | TEMPERATURE: 98 F | HEART RATE: 79 BPM | HEIGHT: 62 IN | BODY MASS INDEX: 44.53 KG/M2 | WEIGHT: 242 LBS | SYSTOLIC BLOOD PRESSURE: 140 MMHG | OXYGEN SATURATION: 99 %

## 2022-12-27 DIAGNOSIS — Z86.010 PERSONAL HISTORY OF COLONIC POLYPS: ICD-10-CM

## 2022-12-27 LAB — GLUCOSE BLD-MCNC: 81 MG/DL (ref 70–99)

## 2022-12-27 PROCEDURE — 0DJD8ZZ INSPECTION OF LOWER INTESTINAL TRACT, VIA NATURAL OR ARTIFICIAL OPENING ENDOSCOPIC: ICD-10-PCS | Performed by: INTERNAL MEDICINE

## 2022-12-27 PROCEDURE — 82962 GLUCOSE BLOOD TEST: CPT

## 2022-12-27 RX ORDER — NICOTINE POLACRILEX 4 MG
15 LOZENGE BUCCAL
Status: DISCONTINUED | OUTPATIENT
Start: 2022-12-27 | End: 2022-12-27

## 2022-12-27 RX ORDER — HYDROMORPHONE HYDROCHLORIDE 1 MG/ML
0.6 INJECTION, SOLUTION INTRAMUSCULAR; INTRAVENOUS; SUBCUTANEOUS EVERY 5 MIN PRN
Status: DISCONTINUED | OUTPATIENT
Start: 2022-12-27 | End: 2022-12-27

## 2022-12-27 RX ORDER — HYDROMORPHONE HYDROCHLORIDE 1 MG/ML
0.4 INJECTION, SOLUTION INTRAMUSCULAR; INTRAVENOUS; SUBCUTANEOUS EVERY 5 MIN PRN
Status: DISCONTINUED | OUTPATIENT
Start: 2022-12-27 | End: 2022-12-27

## 2022-12-27 RX ORDER — HYDROMORPHONE HYDROCHLORIDE 1 MG/ML
0.2 INJECTION, SOLUTION INTRAMUSCULAR; INTRAVENOUS; SUBCUTANEOUS EVERY 5 MIN PRN
Status: DISCONTINUED | OUTPATIENT
Start: 2022-12-27 | End: 2022-12-27

## 2022-12-27 RX ORDER — LIDOCAINE HYDROCHLORIDE 10 MG/ML
INJECTION, SOLUTION EPIDURAL; INFILTRATION; INTRACAUDAL; PERINEURAL AS NEEDED
Status: DISCONTINUED | OUTPATIENT
Start: 2022-12-27 | End: 2022-12-27 | Stop reason: SURG

## 2022-12-27 RX ORDER — SODIUM CHLORIDE, SODIUM LACTATE, POTASSIUM CHLORIDE, CALCIUM CHLORIDE 600; 310; 30; 20 MG/100ML; MG/100ML; MG/100ML; MG/100ML
INJECTION, SOLUTION INTRAVENOUS CONTINUOUS
Status: DISCONTINUED | OUTPATIENT
Start: 2022-12-27 | End: 2022-12-27

## 2022-12-27 RX ORDER — NICOTINE POLACRILEX 4 MG
30 LOZENGE BUCCAL
Status: DISCONTINUED | OUTPATIENT
Start: 2022-12-27 | End: 2022-12-27

## 2022-12-27 RX ORDER — NALOXONE HYDROCHLORIDE 0.4 MG/ML
80 INJECTION, SOLUTION INTRAMUSCULAR; INTRAVENOUS; SUBCUTANEOUS AS NEEDED
Status: DISCONTINUED | OUTPATIENT
Start: 2022-12-27 | End: 2022-12-27

## 2022-12-27 RX ORDER — DEXTROSE MONOHYDRATE 25 G/50ML
50 INJECTION, SOLUTION INTRAVENOUS
Status: DISCONTINUED | OUTPATIENT
Start: 2022-12-27 | End: 2022-12-27

## 2022-12-27 RX ORDER — SIMVASTATIN 40 MG
TABLET ORAL
Qty: 90 TABLET | Refills: 0 | Status: SHIPPED | OUTPATIENT
Start: 2022-12-27

## 2022-12-27 RX ADMIN — LIDOCAINE HYDROCHLORIDE 25 MG: 10 INJECTION, SOLUTION EPIDURAL; INFILTRATION; INTRACAUDAL; PERINEURAL at 07:26:00

## 2022-12-27 RX ADMIN — SODIUM CHLORIDE, SODIUM LACTATE, POTASSIUM CHLORIDE, CALCIUM CHLORIDE: 600; 310; 30; 20 INJECTION, SOLUTION INTRAVENOUS at 07:44:00

## 2022-12-27 NOTE — OPERATIVE REPORT
OUTPATIENT PROGRESS NOTE      CHIEF COMPLAINT:  Chief Complaint   Patient presents with   • Office Visit       HPI:  The patient presented to the office for sore and tired eyes. Her eyes feel like they burn at times as well. Her PCP thinks it may be due to her thyroid medication, Levothyroxine. She started that medication about 75 days ago. She uses Visine(red out) maybe once a week, no other artifical tears. She was seen in the middle of last year for a complete eye exam in [place].     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     Comment: [employer]   Tobacco Use   • Smoking status: Never Smoker   • Smokeless tobacco: Never Used   Vaping Use   • Vaping Use: Not on file   Substance and Sexual Activity   • Alcohol use: Yes     Alcohol/week: 4.0 standard drinks     Types: 4 Standard drinks or equivalent per week   • Drug use: No   • Sexual activity: Not on file   Other Topics Concern   •  Service No   • Blood Transfusions No   • Caffeine Concern No   • Occupational Exposure No   • Hobby Hazards No   • Sleep Concern Yes     Comment: trouble staying asleep   • Stress Concern Yes   • Weight Concern No   • Special Diet No   • Back Care No   • Exercise Yes   • Bike Helmet No   • Seat Belt Yes   • Self-Exams Yes   Social History Narrative   • Not on file     Social Determinants of Health     Financial Resource Strain:    • Social Determinants: Financial Resource Strain: Not on file   Food Insecurity:    • Social Determinants: Food Insecurity: Not on file   Transportation Needs:    • Lack of Transportation (Medical): Not on file  Roger Mead Patient Status:  Hospital Outpatient Surgery    3/12/1970 MRN [id number]   Location [id number] [place] [address] Attending Mayank Hinds MD   Date 2022 PCP Elicia Hicks MD     PREOPERATIVE DIAGNOSIS/INDICATION: Screening  POSTOPERTATIVE DIAGNOSIS: Diverticulosis, ICV lipoma  PROCEDURE PERFORMED: COLONOSCOPY  SEDATION: MAC sedation provided by General Anesthesia    TIME OUT WAS PERFORMED    INFORMED CONSENT: Risks, benefits and alternatives to the procedure were explained to the patient including but not limited to bleeding, infection, perforation, adverse drug reactions, pancreatitis and the need for hospitalization and surgery if this occurs, the patient understands and agrees to procedure. PROCEDURE DESCRIPTION: After careful digital rectal examination a pediatric colonoscope was introduced into the patients rectum, advanced pass the recto sigmoid junction, into the descending colon, splenic flexure, transverse colon, hepatic flexure, ascending colon, cecum , confirmed by landmarks, including the appendiceal orifice and ileocecal valve. Careful examination of the above described areas was performed on withdrawal of the endoscope. Retroflexion was performed on the rectum. The patient tolerated the procedure well, there were no immediate complication immediately following the procedure, and the patient was transferred to recovery in stable condition. QUALITY OF PREPARATION: [place] Bowel Preparation Scale:            -      Right colon 2, Transverse colon 3, Left colon 3   FINDINGS/THERAPEUTICS:  - COLON: Lipomatous ICV, mild diffuse diverticulosis  RECOMMENDATIONS:   - Post Colonoscopy precautions, watch for bleeding, infection, perforation, adverse drug reactions   - Repeat colonoscopy in 10 years.     Yee Villafana MD  2022  7:47 AM   • Lack of Transportation (Non-Medical): Not on file   Physical Activity:    • Days of Exercise per Week: Not on file   • Minutes of Exercise per Session: Not on file   Stress:    • Social Determinants: Stress: Not on file   Social Connections:    • Social Determinants: Social Connections: Not on file   Intimate Partner Violence:    • Social Determinants: Intimate Partner Violence Past Fear: Not on file   • Social Determinants: Intimate Partner Violence Current Fear: Not on file         I reviewed testing done by technician found in [facility].    Base Eye Exam     Visual Acuity (Snellen - Linear)       Right Left    Dist cc 20/20 20/20    Correction: Glasses          Tonometry (Applanation, 2:05 PM)       Right Left    Pressure 15 14          Pupils       Pupils    Right PERRL    Left PERRL          Neuro/Psych     Oriented x3: Yes    Mood/Affect: Normal            Slit Lamp and Fundus Exam     External Exam       Right Left    External Normal Normal          Slit Lamp Exam       Right Left    Lids/Lashes Meibomian gland dysfunction Meibomian gland dysfunction    Conjunctiva/Sclera Conjunctivochalasis Conjunctivochalasis    Cornea Shallow tear pool negative corneal staining Shallow tear pool negative corneal staining    Anterior Chamber Deep and quiet Deep and quiet    Iris Round and regular Round and regular    Lens Trace Nuclear sclerosis Trace Nuclear sclerosis                  ASSESSMENT:   1. Meibomian gland dysfunction    2. Conjunctivochalasis of both eyes    3. Dry eye syndrome of both eyes    Dry eye syndrome compounded by a number of factors above including also environment and gender    PLAN:  Instructed on lid hygiene hot compresses in lid scrubs can be helpful and begin using lubricant on a regular basis given a coupon for Systane complete and lid scrub pads also advise that potential benefit from omega-3 will follow-up with a phone call    Orders Placed This Encounter   • PARoxetine (PAXIL) 20 MG tablet      No follow-ups on file.

## 2022-12-31 ENCOUNTER — HOSPITAL ENCOUNTER (OUTPATIENT)
Age: 52
Discharge: HOME | End: 2022-12-31
Payer: COMMERCIAL

## 2022-12-31 ENCOUNTER — APPOINTMENT (OUTPATIENT)
Dept: GENERAL RADIOLOGY | Age: 52
End: 2022-12-31
Attending: PHYSICIAN ASSISTANT
Payer: COMMERCIAL

## 2022-12-31 VITALS
WEIGHT: 242 LBS | RESPIRATION RATE: 16 BRPM | OXYGEN SATURATION: 96 % | HEIGHT: 62 IN | BODY MASS INDEX: 44.53 KG/M2 | HEART RATE: 75 BPM | DIASTOLIC BLOOD PRESSURE: 64 MMHG | TEMPERATURE: 98 F | SYSTOLIC BLOOD PRESSURE: 121 MMHG

## 2022-12-31 DIAGNOSIS — M79.641 RIGHT HAND PAIN: Primary | ICD-10-CM

## 2022-12-31 PROCEDURE — 99213 OFFICE O/P EST LOW 20 MIN: CPT | Performed by: PHYSICIAN ASSISTANT

## 2022-12-31 PROCEDURE — 73130 X-RAY EXAM OF HAND: CPT | Performed by: PHYSICIAN ASSISTANT

## 2023-01-13 ENCOUNTER — PATIENT MESSAGE (OUTPATIENT)
Dept: INTERNAL MEDICINE CLINIC | Facility: CLINIC | Age: 53
End: 2023-01-13

## 2023-01-16 ENCOUNTER — TELEPHONE (OUTPATIENT)
Dept: FAMILY MEDICINE CLINIC | Facility: CLINIC | Age: 53
End: 2023-01-16

## 2023-01-16 NOTE — TELEPHONE ENCOUNTER
Possible subconjunctival hemorrhage from cough or sneeze. .. should be evaluated.  No openings, okay to see Angel Louise this week

## 2023-01-19 ENCOUNTER — TELEPHONE (OUTPATIENT)
Facility: LOCATION | Age: 53
End: 2023-01-19

## 2023-01-22 RX ORDER — PAROXETINE HYDROCHLORIDE 40 MG/1
TABLET, FILM COATED ORAL
Qty: 90 TABLET | Refills: 0 | Status: SHIPPED | OUTPATIENT
Start: 2023-01-22

## 2023-01-22 RX ORDER — LIDOCAINE HYDROCHLORIDE 20 MG/ML
SOLUTION ORAL; TOPICAL
Qty: 90 TABLET | Refills: 0 | Status: SHIPPED | OUTPATIENT
Start: 2023-01-22

## 2023-01-22 RX ORDER — BUPROPION HYDROCHLORIDE 300 MG/1
TABLET ORAL
Qty: 90 TABLET | Refills: 0 | Status: SHIPPED | OUTPATIENT
Start: 2023-01-22

## 2023-01-23 RX ORDER — LEVOTHYROXINE SODIUM 137 UG/1
TABLET ORAL
Qty: 90 TABLET | Refills: 0 | Status: SHIPPED | OUTPATIENT
Start: 2023-01-23

## 2023-02-02 ENCOUNTER — PATIENT MESSAGE (OUTPATIENT)
Dept: ORTHOPEDICS CLINIC | Facility: CLINIC | Age: 53
End: 2023-02-02

## 2023-02-02 DIAGNOSIS — M17.0 PRIMARY OSTEOARTHRITIS OF BOTH KNEES: Primary | ICD-10-CM

## 2023-02-02 NOTE — TELEPHONE ENCOUNTER
[facility] office staff- Please update patients insurance.  Thank you    Last Kiera Lau injection-11/30/22  Due 2/30/23    Order pended

## 2023-02-06 ENCOUNTER — OFFICE VISIT (OUTPATIENT)
Dept: INTERNAL MEDICINE CLINIC | Facility: CLINIC | Age: 53
End: 2023-02-06
Payer: COMMERCIAL

## 2023-02-06 VITALS
SYSTOLIC BLOOD PRESSURE: 138 MMHG | BODY MASS INDEX: 48.33 KG/M2 | WEIGHT: 256 LBS | HEART RATE: 80 BPM | RESPIRATION RATE: 16 BRPM | HEIGHT: 61 IN | DIASTOLIC BLOOD PRESSURE: 78 MMHG

## 2023-02-06 DIAGNOSIS — F43.9 STRESS: ICD-10-CM

## 2023-02-06 DIAGNOSIS — E11.9 TYPE 2 DIABETES MELLITUS WITHOUT COMPLICATION, WITH LONG-TERM CURRENT USE OF INSULIN (HCC): ICD-10-CM

## 2023-02-06 DIAGNOSIS — F41.9 ANXIETY: ICD-10-CM

## 2023-02-06 DIAGNOSIS — Z51.81 THERAPEUTIC DRUG MONITORING: Primary | ICD-10-CM

## 2023-02-06 DIAGNOSIS — Z79.4 TYPE 2 DIABETES MELLITUS WITHOUT COMPLICATION, WITH LONG-TERM CURRENT USE OF INSULIN (HCC): ICD-10-CM

## 2023-02-06 DIAGNOSIS — G47.33 OSA ON CPAP: ICD-10-CM

## 2023-02-06 DIAGNOSIS — E78.5 DYSLIPIDEMIA: ICD-10-CM

## 2023-02-06 DIAGNOSIS — E03.9 HYPOTHYROIDISM, UNSPECIFIED TYPE: ICD-10-CM

## 2023-02-06 DIAGNOSIS — E66.01 OBESITY, MORBID, BMI 50 OR HIGHER (HCC): ICD-10-CM

## 2023-02-06 DIAGNOSIS — Z99.89 OSA ON CPAP: ICD-10-CM

## 2023-02-06 RX ORDER — TIRZEPATIDE 7.5 MG/.5ML
7.5 INJECTION, SOLUTION SUBCUTANEOUS WEEKLY
Qty: 2 ML | Refills: 0 | Status: SHIPPED | OUTPATIENT
Start: 2023-02-06

## 2023-02-06 RX ORDER — ACETAMINOPHEN 500 MG
1000 TABLET ORAL AS DIRECTED
COMMUNITY

## 2023-02-06 NOTE — PATIENT INSTRUCTIONS
Next steps:  1. Fill your prescribed medication and take as discussed and prescribed:   Stop ozempic   Restart mounjaro 7.5mg weekly   2. Schedule a personal nutrition consultation with one of our registered dieticians     Please try to work on the following dietary changes:    1. Drink water with meals and throughout the day, cut down on soda and/or juice if consumed. Consider flavored water options like Bubbly, Spindrift, Hint and Pilar. 2.  Eat breakfast daily and focus on having protein with each meal, examples include: greek yogurt, cottage cheese, hard boiled egg, whole grain toast with peanut butter. 3.  Reduce refined carbohydrates and sugars which includes items such as sweets, as well as rice, pasta, and bread and make sure to choose whole grain options when having them with just 1 serving per meal about the size of your inner palm. 4.  Consume non starchy veggies daily working towards making them a good 50% of your daily food intake. Add them to lunch and dinner consistently. 5.  Start a daily probiotic: VSL#3 is recommended, (order on line at www.vsl3. com). Take 1 capsule daily with water for 30 days, then reduce to 1 every other day (this will reduce the cost). Capsules can be left out for 2 weeks, but then must be refrigerated. Please download uzma My Fitness Christ Kayser! Or Net Diary to monitor daily dietary intake and you will be able to see if you are eating the right amount of calories or too much or too little which would hinder weight loss. Additionally this will help to see your daily carbohydrate and protein intake. When you set the uzma up choose 1-2 lbs/week as a goal.  Keeping a paper food journal is an option as well to remain accountable for your choices- this is the start to mindful eating! A low calorie diet has been consistently shown to support weight loss. Continue or start exercising to help establish a routine.  If not already exercising begin with 1 day and progress as able with long-term goal of 30 minutes 5 days a week at a minimum. Meditation daily can help manage and control stress. Chronic stress can make weight loss difficult. Exercising is one way to help with stress, but meditation using the CALM Shaniqua or another comparable alternative can be done in your home or place of work with little time commitment. This Shaniqua can also help work on behavior change and improve sleep. Check out the segment under Calm Masterclass and listen to The 4 Pillars of Health. A great way to begin learning about the foundation of lifestyle with practical tips to use in your every day. Check out www.yourweightmatters. org blog for continued daily support and education along this weight loss journey! Patient Resources:     Personal Training/Fitness Classes/Health Coaching     Abhijit De Los Santos and Leos Sophiaside @ [email or web address]/ Full fitness center with group fitness and personal training. Discount available as client of [facility]. Health Coaching and Personal Training with Spencer Samson at our [facility]- individual weekly coaching with option to add personal training and small group fitness classes targeted at weight loss- [phone] and/or email @ [email or web address]  360FIT [place] [email or web address] Group Fitness [phone] and/or email Josr Doherty at [email or web address]  [address] [facility] with multiple locations: Aetna (www.Marine Current Turbines. MunchAway), Eat The Tushky Fitness (www.Unitask. MunchAway), Fit Body Bootcamp (www.PassmanbodybootYun Yunp.MunchAway), Pockit (www.Busuu. MunchAway), The Exercise  (www.exercisecoach.MunchAway)     Online Fitness  Fitness  on Whole Foods in 10 DVD series- www. rcuwp85WXM. MunchAway  Sit and Be Fit - Chair exercise series Www.sitandbefit. org  Hip Hop Fit with Siva Loya at www.hiphopfit. net     Apps for on the Go Fitness  Trunity 7 Minute Workout (orange box with white 7) - free on the go HIIT training shaniqua  Peloton Shaniqua @ www. AudioPixels     Nutrition Trackers and Tools  LoseIT! And My Fitness Pal apps and on line for tracking nutrition  NOOM - virtual health coaching  FitFoundation (healthy meals on the go) in [facility] @ www. gcebmeuqxftel8o. Art Nesha DAMON @ www.bistrBarnes & NobledRECOMY.COM and Molly Hernandez (keto and low carb plans recommended) @ www. PPGWKS08.DQP, Metabolic Meals @ www. MyMetabolicMeals. com - individual prepared meals to go  Weimob, Fighters, International Business Machines, Every Plate, JPG Technologies- on line meal delivery programs for preparation at home  [place] Contratan.do in [place] for homemade meals to go @ wwwYOGITECH. Bioxodes  Diet Doctor @ www. dietdoctor. Bioxodes - low carb swaps  DiscountIF - meal prep and planning shaniqua (www.yummly. com)     Stress Management/Behavior/Mindful Eating  CALM meditation shaniqua (www.Moerae Matrix)  Headspace  Am I Hungry? Mindful eating virtual  shaniqua  Www.yourweightmatters. org - Obesity Action Coalition sponsored Blog posts daily  Motivation shaniqua (black box with white \")- daily supportive messages sent to your phone     Books/Video Education/Podcasts  Mindless Eating by Waleska Chisholm  Why We Get Sick by Magdi Ferrer (a book about insulin resistance)  Atomic Habits by Sabina Baez (a book about taking small steps to promote greater behavior change)   Can't Hurt Me by Aloma Carrel (a book exploring the power of discipline in achieving your goals)  The End of Dieting: How to Live for Life by Dr. Dago Marquis M.D. or listen to The [address] [facility] [address] Episode 61: Understanding \"Nutritarian\" Eating w/Dr. Dago Marquis  Your Body in Balance: The World Fuel Services Corporation of Food, Hormones, and Health by Dr. Divine Castro  The Menopause Diet Plan by Gladys Barboza and [facility] AT [facility]  The Complete Guide to fasting by Dr. Chon Yanez, [address] [facility] by Rah Mitchell, Ph.D, R.D.   Weight Loss Surgery Will Not Treat Food Addiction by Dayron Pugh Ph.D  The Ada Lopez on plant based nutrition  Fed Up - documentary about obesity (Free on [employer] Parts & Accessoriestown)  The Truth About Sugar - documentary on sugar (Free on Utube, https://youtu. be/7X8gmudFS9g)  The Dr. Koby Lerma by Dr. Carlee Muñoz MD  Fitlosophy Fitspiration - journal to better health (found at [employer] in fitness aisle)  What Happened to You?- a look at the impact trauma has on behavior written by Cisco Skinner and Dr. Izabella Finney Again by Joaquina Cardenas - discovering your true self after trauma  Dane Olson talk on Oceana, The Call to Courage  Podcasts: The Exam Room by the Physician's Committee, Nutrition Facts by Dr. Uma Bella    We are here to support you with weight loss, but please remember that you still need your primary care provider for your routine health maintenance.

## 2023-02-10 ENCOUNTER — HOSPITAL ENCOUNTER (EMERGENCY)
Age: 53
Discharge: HOME | End: 2023-02-10
Attending: EMERGENCY MEDICINE
Payer: COMMERCIAL

## 2023-02-10 ENCOUNTER — APPOINTMENT (OUTPATIENT)
Dept: CT IMAGING | Age: 53
End: 2023-02-10
Attending: EMERGENCY MEDICINE
Payer: COMMERCIAL

## 2023-02-10 ENCOUNTER — PATIENT MESSAGE (OUTPATIENT)
Dept: FAMILY MEDICINE CLINIC | Facility: CLINIC | Age: 53
End: 2023-02-10

## 2023-02-10 VITALS
WEIGHT: 242 LBS | HEART RATE: 83 BPM | DIASTOLIC BLOOD PRESSURE: 63 MMHG | SYSTOLIC BLOOD PRESSURE: 123 MMHG | HEIGHT: 62 IN | BODY MASS INDEX: 44.53 KG/M2 | RESPIRATION RATE: 18 BRPM | TEMPERATURE: 99 F | OXYGEN SATURATION: 98 %

## 2023-02-10 DIAGNOSIS — R10.9 ABDOMINAL PAIN, ACUTE: Primary | ICD-10-CM

## 2023-02-10 LAB
ALBUMIN SERPL-MCNC: 3.5 G/DL (ref 3.4–5)
ALBUMIN/GLOB SERPL: 1 {RATIO} (ref 1–2)
ALP LIVER SERPL-CCNC: 93 U/L
ALT SERPL-CCNC: 28 U/L
ANION GAP SERPL CALC-SCNC: 3 MMOL/L (ref 0–18)
AST SERPL-CCNC: 10 U/L (ref 15–37)
BASOPHILS # BLD AUTO: 0.04 X10(3) UL (ref 0–0.2)
BASOPHILS NFR BLD AUTO: 0.3 %
BILIRUB SERPL-MCNC: 0.2 MG/DL (ref 0.1–2)
BILIRUB UR QL STRIP.AUTO: NEGATIVE
BUN BLD-MCNC: 10 MG/DL (ref 7–18)
CALCIUM BLD-MCNC: 8.8 MG/DL (ref 8.5–10.1)
CHLORIDE SERPL-SCNC: 109 MMOL/L (ref 98–112)
CLARITY UR REFRACT.AUTO: CLEAR
CO2 SERPL-SCNC: 29 MMOL/L (ref 21–32)
COLOR UR AUTO: YELLOW
CREAT BLD-MCNC: 0.79 MG/DL
EOSINOPHIL # BLD AUTO: 0.17 X10(3) UL (ref 0–0.7)
EOSINOPHIL NFR BLD AUTO: 1.4 %
ERYTHROCYTE [DISTWIDTH] IN BLOOD BY AUTOMATED COUNT: 12.8 %
GFR SERPLBLD BASED ON 1.73 SQ M-ARVRAT: 91 ML/MIN/1.73M2 (ref 60–?)
GLOBULIN PLAS-MCNC: 3.4 G/DL (ref 2.8–4.4)
GLUCOSE BLD-MCNC: 75 MG/DL (ref 70–99)
GLUCOSE UR STRIP.AUTO-MCNC: NEGATIVE MG/DL
HCT VFR BLD AUTO: 40.7 %
HGB BLD-MCNC: 14 G/DL
IMM GRANULOCYTES # BLD AUTO: 0.04 X10(3) UL (ref 0–1)
IMM GRANULOCYTES NFR BLD: 0.3 %
KETONES UR STRIP.AUTO-MCNC: NEGATIVE MG/DL
LEUKOCYTE ESTERASE UR QL STRIP.AUTO: NEGATIVE
LIPASE SERPL-CCNC: 133 U/L (ref 73–393)
LIPASE SERPL-CCNC: 34 U/L (ref 13–75)
LYMPHOCYTES # BLD AUTO: 3.84 X10(3) UL (ref 1–4)
LYMPHOCYTES NFR BLD AUTO: 31.3 %
MCH RBC QN AUTO: 30.4 PG (ref 26–34)
MCHC RBC AUTO-ENTMCNC: 34.4 G/DL (ref 31–37)
MCV RBC AUTO: 88.3 FL
MONOCYTES # BLD AUTO: 0.82 X10(3) UL (ref 0.1–1)
MONOCYTES NFR BLD AUTO: 6.7 %
NEUTROPHILS # BLD AUTO: 7.34 X10 (3) UL (ref 1.5–7.7)
NEUTROPHILS # BLD AUTO: 7.34 X10(3) UL (ref 1.5–7.7)
NEUTROPHILS NFR BLD AUTO: 60 %
NITRITE UR QL STRIP.AUTO: NEGATIVE
OSMOLALITY SERPL CALC.SUM OF ELEC: 290 MOSM/KG (ref 275–295)
PH UR STRIP.AUTO: 6.5 [PH] (ref 5–8)
PLATELET # BLD AUTO: 295 10(3)UL (ref 150–450)
POTASSIUM SERPL-SCNC: 3.6 MMOL/L (ref 3.5–5.1)
PROT SERPL-MCNC: 6.9 G/DL (ref 6.4–8.2)
PROT UR STRIP.AUTO-MCNC: NEGATIVE MG/DL
RBC # BLD AUTO: 4.61 X10(6)UL
RBC UR QL AUTO: NEGATIVE
SODIUM SERPL-SCNC: 141 MMOL/L (ref 136–145)
SP GR UR STRIP.AUTO: 1.01 (ref 1–1.03)
UROBILINOGEN UR STRIP.AUTO-MCNC: 0.2 MG/DL
WBC # BLD AUTO: 12.3 X10(3) UL (ref 4–11)

## 2023-02-10 PROCEDURE — 96375 TX/PRO/DX INJ NEW DRUG ADDON: CPT

## 2023-02-10 PROCEDURE — 96361 HYDRATE IV INFUSION ADD-ON: CPT

## 2023-02-10 PROCEDURE — 99285 EMERGENCY DEPT VISIT HI MDM: CPT

## 2023-02-10 PROCEDURE — 99284 EMERGENCY DEPT VISIT MOD MDM: CPT

## 2023-02-10 PROCEDURE — 80053 COMPREHEN METABOLIC PANEL: CPT | Performed by: EMERGENCY MEDICINE

## 2023-02-10 PROCEDURE — 81003 URINALYSIS AUTO W/O SCOPE: CPT | Performed by: EMERGENCY MEDICINE

## 2023-02-10 PROCEDURE — 83690 ASSAY OF LIPASE: CPT | Performed by: EMERGENCY MEDICINE

## 2023-02-10 PROCEDURE — 74176 CT ABD & PELVIS W/O CONTRAST: CPT | Performed by: EMERGENCY MEDICINE

## 2023-02-10 PROCEDURE — 96374 THER/PROPH/DIAG INJ IV PUSH: CPT

## 2023-02-10 PROCEDURE — 85025 COMPLETE CBC W/AUTO DIFF WBC: CPT | Performed by: EMERGENCY MEDICINE

## 2023-02-10 RX ORDER — DIPHENHYDRAMINE HYDROCHLORIDE 50 MG/ML
50 INJECTION INTRAMUSCULAR; INTRAVENOUS ONCE
Status: COMPLETED | OUTPATIENT
Start: 2023-02-10 | End: 2023-02-10

## 2023-02-10 RX ORDER — METOCLOPRAMIDE HYDROCHLORIDE 5 MG/ML
10 INJECTION INTRAMUSCULAR; INTRAVENOUS ONCE
Status: COMPLETED | OUTPATIENT
Start: 2023-02-10 | End: 2023-02-10

## 2023-02-10 RX ORDER — HYDROMORPHONE HYDROCHLORIDE 1 MG/ML
1 INJECTION, SOLUTION INTRAMUSCULAR; INTRAVENOUS; SUBCUTANEOUS ONCE
Status: COMPLETED | OUTPATIENT
Start: 2023-02-10 | End: 2023-02-10

## 2023-02-10 RX ORDER — DICYCLOMINE HCL 20 MG
20 TABLET ORAL 4 TIMES DAILY PRN
Qty: 30 TABLET | Refills: 0 | Status: SHIPPED | OUTPATIENT
Start: 2023-02-10 | End: 2023-03-12

## 2023-02-10 RX ORDER — ONDANSETRON 4 MG/1
4 TABLET, ORALLY DISINTEGRATING ORAL EVERY 4 HOURS PRN
Qty: 10 TABLET | Refills: 0 | Status: SHIPPED | OUTPATIENT
Start: 2023-02-10

## 2023-02-10 NOTE — ED INITIAL ASSESSMENT (HPI)
Pt to ed with c/o right flank and lower right quadrant pain since 9:30, vomiting and diarrhea yesterday. Denies fevers.

## 2023-02-10 NOTE — TELEPHONE ENCOUNTER
FYI  Right sided back pain  Radiates to front  Rates pain 10/10  Yesterday patient had nausea, vomiting, and diarrhea  Denies fever  Did not urinate as much yesterday  Denies painful urination  Denies urinary frequency   Patient advised to go to ER for eval  Patient verbalized understanding.

## 2023-02-14 ENCOUNTER — OFFICE VISIT (OUTPATIENT)
Dept: ORTHOPEDICS CLINIC | Facility: CLINIC | Age: 53
End: 2023-02-14
Payer: COMMERCIAL

## 2023-02-14 VITALS — OXYGEN SATURATION: 97 % | WEIGHT: 242 LBS | BODY MASS INDEX: 44.53 KG/M2 | HEIGHT: 62 IN | HEART RATE: 87 BPM

## 2023-02-14 DIAGNOSIS — M17.0 PRIMARY OSTEOARTHRITIS OF BOTH KNEES: Primary | ICD-10-CM

## 2023-02-14 PROCEDURE — 3008F BODY MASS INDEX DOCD: CPT | Performed by: PHYSICIAN ASSISTANT

## 2023-02-14 PROCEDURE — 20610 DRAIN/INJ JOINT/BURSA W/O US: CPT | Performed by: PHYSICIAN ASSISTANT

## 2023-02-14 NOTE — PROCEDURES
After informed consent, the patient's right and left knees were marked, locally anesthetized with skin refrigerant, prepped with topical antiseptic, and injected with 5mL of 32mg/5mL Zilretta through the inferolateral portal.  A band-aid was applied. The patient tolerated the procedure well.     Seven Sky PA-C  [address] Orthopedic Surgery

## 2023-02-21 ENCOUNTER — TELEPHONE (OUTPATIENT)
Dept: FAMILY MEDICINE CLINIC | Facility: CLINIC | Age: 53
End: 2023-02-21

## 2023-02-21 NOTE — TELEPHONE ENCOUNTER
Last visit 07/12/2022  Last refill 01/22/2023   No future appts. Moment message sent to patient to follow up.

## 2023-02-23 RX ORDER — BUPROPION HYDROCHLORIDE 300 MG/1
300 TABLET ORAL DAILY
Qty: 90 TABLET | Refills: 0 | Status: SHIPPED | OUTPATIENT
Start: 2023-02-23

## 2023-02-23 NOTE — TELEPHONE ENCOUNTER
LOV: 7/13/22 for pain    BUPROPION  MG Oral Tablet 24 Hr 90 tablet 0 1/22/2023     Sig: TAKE 1 TABLET(300 MG) BY MOUTH DAILY    Sent to pharmacy as: buPROPion HCl ER (XL) 300 MG Oral Tablet Extended Release 24 Hour (Wellbutrin XL)      Future Appointments   Date Time Provider Jovani Haque   3/6/2023  3:15 PM Pranav Kenyon MD EMGOSW [facility] [place]   4/5/2023  1:40 PM LILIA Davis [facility]I [facility]

## 2023-02-24 RX ORDER — LEVOTHYROXINE SODIUM 137 UG/1
TABLET ORAL
Qty: 90 TABLET | Refills: 0 | Status: SHIPPED | OUTPATIENT
Start: 2023-02-24

## 2023-03-08 ENCOUNTER — PATIENT MESSAGE (OUTPATIENT)
Dept: INTERNAL MEDICINE CLINIC | Facility: CLINIC | Age: 53
End: 2023-03-08

## 2023-03-09 RX ORDER — TIRZEPATIDE 7.5 MG/.5ML
7.5 INJECTION, SOLUTION SUBCUTANEOUS WEEKLY
Qty: 2 ML | Refills: 0 | Status: SHIPPED | OUTPATIENT
Start: 2023-03-09

## 2023-03-09 RX ORDER — TIRZEPATIDE 10 MG/.5ML
10 INJECTION, SOLUTION SUBCUTANEOUS WEEKLY
Qty: 2 ML | Refills: 0 | Status: SHIPPED | OUTPATIENT
Start: 2023-03-09

## 2023-03-09 NOTE — TELEPHONE ENCOUNTER
Requesting Mounjaro  LOV: 11/28/22  RTC: 2 months  Last Relevant Labs: 11/1/22  Filled: 2/6/23 #2ml with 0 refills  Mounjaro 7.5 mg    Future Appointments   Date Time Provider Jovani Haque   4/5/2023  1:40 PM LILIA White [facility]

## 2023-03-09 NOTE — TELEPHONE ENCOUNTER
From: Jarret Shelton  To: LILIA Phillips  Sent: 3/8/2023 4:24 PM CST  Subject: Marilyn Patricia    I just called [facility] and was told there are no refills on the 7.5, what should I be doing?

## 2023-03-29 ENCOUNTER — TELEPHONE (OUTPATIENT)
Dept: FAMILY MEDICINE CLINIC | Facility: CLINIC | Age: 53
End: 2023-03-29

## 2023-03-29 NOTE — TELEPHONE ENCOUNTER
[facility]  Does she see sleep/pulm? Have we filled this out before?   [facility] sent to patient  Paperwork in nurse pending folder

## 2023-04-03 RX ORDER — OMEPRAZOLE 40 MG/1
CAPSULE, DELAYED RELEASE ORAL
Qty: 90 CAPSULE | Refills: 0 | Status: SHIPPED | OUTPATIENT
Start: 2023-04-03

## 2023-04-14 ENCOUNTER — PATIENT MESSAGE (OUTPATIENT)
Dept: ORTHOPEDICS CLINIC | Facility: CLINIC | Age: 53
End: 2023-04-14

## 2023-04-14 DIAGNOSIS — M17.0 PRIMARY OSTEOARTHRITIS OF BOTH KNEES: Primary | ICD-10-CM

## 2023-04-17 NOTE — TELEPHONE ENCOUNTER
please see previous message   Patient would like Zilretta injection process started for bilateral knee's

## 2023-04-17 NOTE — TELEPHONE ENCOUNTER
From: Mariah Davidson  To: Domenica Echeverria PA-C  Sent: 4/14/2023 7:01 PM CDT  Subject: Knee Injections    Just following up to see when I can get my injections again.  My pain has increase unfortunately     Thanks

## 2023-04-21 RX ORDER — PAROXETINE HYDROCHLORIDE 40 MG/1
TABLET, FILM COATED ORAL
Qty: 90 TABLET | Refills: 1 | Status: SHIPPED | OUTPATIENT
Start: 2023-04-21

## 2023-04-21 RX ORDER — BUPROPION HYDROCHLORIDE 300 MG/1
TABLET ORAL
Qty: 90 TABLET | Refills: 1 | Status: SHIPPED | OUTPATIENT
Start: 2023-04-21

## 2023-04-21 RX ORDER — LIDOCAINE HYDROCHLORIDE 20 MG/ML
SOLUTION ORAL; TOPICAL
Qty: 90 TABLET | Refills: 0 | OUTPATIENT
Start: 2023-04-21

## 2023-04-24 ENCOUNTER — OFFICE VISIT (OUTPATIENT)
Dept: FAMILY MEDICINE CLINIC | Facility: CLINIC | Age: 53
End: 2023-04-24
Payer: COMMERCIAL

## 2023-04-24 VITALS
HEART RATE: 98 BPM | BODY MASS INDEX: 50.24 KG/M2 | OXYGEN SATURATION: 98 % | HEIGHT: 62 IN | SYSTOLIC BLOOD PRESSURE: 122 MMHG | DIASTOLIC BLOOD PRESSURE: 80 MMHG | TEMPERATURE: 97 F | WEIGHT: 273 LBS | RESPIRATION RATE: 18 BRPM

## 2023-04-24 DIAGNOSIS — E78.5 HYPERLIPIDEMIA, UNSPECIFIED HYPERLIPIDEMIA TYPE: ICD-10-CM

## 2023-04-24 DIAGNOSIS — J45.20 MILD INTERMITTENT ASTHMA WITHOUT COMPLICATION: ICD-10-CM

## 2023-04-24 DIAGNOSIS — Z00.00 ANNUAL PHYSICAL EXAM: Primary | ICD-10-CM

## 2023-04-24 DIAGNOSIS — Z79.4 TYPE 2 DIABETES MELLITUS WITHOUT COMPLICATION, WITH LONG-TERM CURRENT USE OF INSULIN (HCC): ICD-10-CM

## 2023-04-24 DIAGNOSIS — E11.9 TYPE 2 DIABETES MELLITUS WITHOUT COMPLICATION, WITH LONG-TERM CURRENT USE OF INSULIN (HCC): ICD-10-CM

## 2023-04-24 DIAGNOSIS — E03.9 HYPOTHYROIDISM, UNSPECIFIED TYPE: ICD-10-CM

## 2023-04-24 LAB
CREAT UR-SCNC: 25.6 MG/DL
MICROALBUMIN UR-MCNC: <0.5 MG/DL

## 2023-04-24 PROCEDURE — 90677 PCV20 VACCINE IM: CPT | Performed by: FAMILY MEDICINE

## 2023-04-24 PROCEDURE — 90471 IMMUNIZATION ADMIN: CPT | Performed by: FAMILY MEDICINE

## 2023-04-24 PROCEDURE — 82043 UR ALBUMIN QUANTITATIVE: CPT | Performed by: FAMILY MEDICINE

## 2023-04-24 PROCEDURE — 82570 ASSAY OF URINE CREATININE: CPT | Performed by: FAMILY MEDICINE

## 2023-04-24 PROCEDURE — 3074F SYST BP LT 130 MM HG: CPT | Performed by: FAMILY MEDICINE

## 2023-04-24 PROCEDURE — 3079F DIAST BP 80-89 MM HG: CPT | Performed by: FAMILY MEDICINE

## 2023-04-24 PROCEDURE — 3061F NEG MICROALBUMINURIA REV: CPT | Performed by: FAMILY MEDICINE

## 2023-04-24 PROCEDURE — 99396 PREV VISIT EST AGE 40-64: CPT | Performed by: FAMILY MEDICINE

## 2023-04-24 PROCEDURE — 3008F BODY MASS INDEX DOCD: CPT | Performed by: FAMILY MEDICINE

## 2023-04-27 ENCOUNTER — PATIENT MESSAGE (OUTPATIENT)
Dept: FAMILY MEDICINE CLINIC | Facility: CLINIC | Age: 53
End: 2023-04-27

## 2023-04-27 ENCOUNTER — LAB ENCOUNTER (OUTPATIENT)
Dept: LAB | Age: 53
End: 2023-04-27
Attending: FAMILY MEDICINE
Payer: COMMERCIAL

## 2023-04-27 DIAGNOSIS — Z86.2 HX OF IRON DEFICIENCY ANEMIA: ICD-10-CM

## 2023-04-27 DIAGNOSIS — Z79.4 TYPE 2 DIABETES MELLITUS WITHOUT COMPLICATION, WITH LONG-TERM CURRENT USE OF INSULIN (HCC): ICD-10-CM

## 2023-04-27 DIAGNOSIS — E11.9 TYPE 2 DIABETES MELLITUS WITHOUT COMPLICATION, WITH LONG-TERM CURRENT USE OF INSULIN (HCC): ICD-10-CM

## 2023-04-27 LAB
BASOPHILS # BLD AUTO: 0.07 X10(3) UL (ref 0–0.2)
BASOPHILS NFR BLD AUTO: 0.8 %
CHOLEST SERPL-MCNC: 155 MG/DL (ref ?–200)
DEPRECATED HBV CORE AB SER IA-ACNC: 110.4 NG/ML
EOSINOPHIL # BLD AUTO: 0.13 X10(3) UL (ref 0–0.7)
EOSINOPHIL NFR BLD AUTO: 1.4 %
ERYTHROCYTE [DISTWIDTH] IN BLOOD BY AUTOMATED COUNT: 12.6 %
FASTING PATIENT LIPID ANSWER: YES
HCT VFR BLD AUTO: 44.3 %
HDLC SERPL-MCNC: 78 MG/DL (ref 40–59)
HGB BLD-MCNC: 14.3 G/DL
IMM GRANULOCYTES # BLD AUTO: 0.05 X10(3) UL (ref 0–1)
IMM GRANULOCYTES NFR BLD: 0.5 %
LDLC SERPL CALC-MCNC: 66 MG/DL (ref ?–100)
LYMPHOCYTES # BLD AUTO: 2.26 X10(3) UL (ref 1–4)
LYMPHOCYTES NFR BLD AUTO: 24.6 %
MCH RBC QN AUTO: 29.4 PG (ref 26–34)
MCHC RBC AUTO-ENTMCNC: 32.3 G/DL (ref 31–37)
MCV RBC AUTO: 91 FL
MONOCYTES # BLD AUTO: 0.61 X10(3) UL (ref 0.1–1)
MONOCYTES NFR BLD AUTO: 6.7 %
NEUTROPHILS # BLD AUTO: 6.05 X10 (3) UL (ref 1.5–7.7)
NEUTROPHILS # BLD AUTO: 6.05 X10(3) UL (ref 1.5–7.7)
NEUTROPHILS NFR BLD AUTO: 66 %
NONHDLC SERPL-MCNC: 77 MG/DL (ref ?–130)
PLATELET # BLD AUTO: 284 10(3)UL (ref 150–450)
RBC # BLD AUTO: 4.87 X10(6)UL
TRIGL SERPL-MCNC: 51 MG/DL (ref 30–149)
VLDLC SERPL CALC-MCNC: 8 MG/DL (ref 0–30)
WBC # BLD AUTO: 9.2 X10(3) UL (ref 4–11)

## 2023-04-27 PROCEDURE — 36415 COLL VENOUS BLD VENIPUNCTURE: CPT

## 2023-04-27 PROCEDURE — 80061 LIPID PANEL: CPT

## 2023-04-27 PROCEDURE — 82728 ASSAY OF FERRITIN: CPT

## 2023-04-27 PROCEDURE — 85025 COMPLETE CBC W/AUTO DIFF WBC: CPT

## 2023-05-08 RX ORDER — INSULIN ASPART 100 [IU]/ML
INJECTION, SOLUTION INTRAVENOUS; SUBCUTANEOUS
Qty: 150 ML | Refills: 0 | Status: SHIPPED | OUTPATIENT
Start: 2023-05-08

## 2023-05-08 NOTE — TELEPHONE ENCOUNTER
Future Appointments   Date Time Provider Jovani Haque   5/11/2023  2:45 PM LILIA Kunz [facility] [place]   5/24/2023  2:40 PM Ben Emanuel APRN EMGWEI [facility]

## 2023-05-11 ENCOUNTER — OFFICE VISIT (OUTPATIENT)
Dept: ENDOCRINOLOGY CLINIC | Facility: CLINIC | Age: 53
End: 2023-05-11
Payer: COMMERCIAL

## 2023-05-11 ENCOUNTER — TELEPHONE (OUTPATIENT)
Dept: ENDOCRINOLOGY CLINIC | Facility: CLINIC | Age: 53
End: 2023-05-11

## 2023-05-11 VITALS
BODY MASS INDEX: 50 KG/M2 | WEIGHT: 272.38 LBS | DIASTOLIC BLOOD PRESSURE: 72 MMHG | HEART RATE: 82 BPM | SYSTOLIC BLOOD PRESSURE: 124 MMHG | OXYGEN SATURATION: 96 % | RESPIRATION RATE: 18 BRPM

## 2023-05-11 DIAGNOSIS — E11.9 TYPE 2 DIABETES MELLITUS WITHOUT COMPLICATION, WITH LONG-TERM CURRENT USE OF INSULIN (HCC): Primary | ICD-10-CM

## 2023-05-11 DIAGNOSIS — Z79.4 TYPE 2 DIABETES MELLITUS WITHOUT COMPLICATION, WITH LONG-TERM CURRENT USE OF INSULIN (HCC): Primary | ICD-10-CM

## 2023-05-11 LAB
CARTRIDGE LOT#: NORMAL NUMERIC
HEMOGLOBIN A1C: 5.5 % (ref 4.3–5.6)

## 2023-05-11 PROCEDURE — 95251 CONT GLUC MNTR ANALYSIS I&R: CPT | Performed by: NURSE PRACTITIONER

## 2023-05-11 PROCEDURE — 99214 OFFICE O/P EST MOD 30 MIN: CPT | Performed by: NURSE PRACTITIONER

## 2023-05-11 PROCEDURE — 3074F SYST BP LT 130 MM HG: CPT | Performed by: NURSE PRACTITIONER

## 2023-05-11 PROCEDURE — 3078F DIAST BP <80 MM HG: CPT | Performed by: NURSE PRACTITIONER

## 2023-05-11 PROCEDURE — 83036 HEMOGLOBIN GLYCOSYLATED A1C: CPT | Performed by: NURSE PRACTITIONER

## 2023-05-11 PROCEDURE — 3044F HG A1C LEVEL LT 7.0%: CPT | Performed by: FAMILY MEDICINE

## 2023-05-11 NOTE — TELEPHONE ENCOUNTER
Please call patient to schedule consult visit. Ok for 15 minutes and video visit if patient prefers. Thanks.

## 2023-05-12 ENCOUNTER — TELEPHONE (OUTPATIENT)
Dept: ORTHOPEDICS CLINIC | Facility: CLINIC | Age: 53
End: 2023-05-12

## 2023-05-17 ENCOUNTER — OFFICE VISIT (OUTPATIENT)
Dept: FAMILY MEDICINE CLINIC | Facility: CLINIC | Age: 53
End: 2023-05-17
Payer: COMMERCIAL

## 2023-05-17 ENCOUNTER — TELEPHONE (OUTPATIENT)
Dept: FAMILY MEDICINE CLINIC | Facility: CLINIC | Age: 53
End: 2023-05-17

## 2023-05-17 VITALS
HEART RATE: 87 BPM | WEIGHT: 276 LBS | OXYGEN SATURATION: 97 % | BODY MASS INDEX: 50.79 KG/M2 | HEIGHT: 62 IN | SYSTOLIC BLOOD PRESSURE: 124 MMHG | TEMPERATURE: 98 F | RESPIRATION RATE: 18 BRPM | DIASTOLIC BLOOD PRESSURE: 82 MMHG

## 2023-05-17 DIAGNOSIS — Z01.818 PRE-OP EXAMINATION: Primary | ICD-10-CM

## 2023-05-17 DIAGNOSIS — Z79.4 TYPE 2 DIABETES MELLITUS WITHOUT COMPLICATION, WITH LONG-TERM CURRENT USE OF INSULIN (HCC): ICD-10-CM

## 2023-05-17 DIAGNOSIS — E11.9 TYPE 2 DIABETES MELLITUS WITHOUT COMPLICATION, WITH LONG-TERM CURRENT USE OF INSULIN (HCC): ICD-10-CM

## 2023-05-17 PROCEDURE — 99214 OFFICE O/P EST MOD 30 MIN: CPT | Performed by: FAMILY MEDICINE

## 2023-05-17 PROCEDURE — 3008F BODY MASS INDEX DOCD: CPT | Performed by: FAMILY MEDICINE

## 2023-05-17 PROCEDURE — 3074F SYST BP LT 130 MM HG: CPT | Performed by: FAMILY MEDICINE

## 2023-05-17 PROCEDURE — 3079F DIAST BP 80-89 MM HG: CPT | Performed by: FAMILY MEDICINE

## 2023-05-17 NOTE — TELEPHONE ENCOUNTER
Pre op note from 05/17/23  Faxed to : [facility] at fax # [phone]. Confirmation received.

## 2023-05-24 ENCOUNTER — TELEMEDICINE (OUTPATIENT)
Dept: INTERNAL MEDICINE CLINIC | Facility: CLINIC | Age: 53
End: 2023-05-24
Payer: COMMERCIAL

## 2023-05-24 DIAGNOSIS — Z51.81 THERAPEUTIC DRUG MONITORING: Primary | ICD-10-CM

## 2023-05-24 DIAGNOSIS — Z79.4 TYPE 2 DIABETES MELLITUS WITHOUT COMPLICATION, WITH LONG-TERM CURRENT USE OF INSULIN (HCC): ICD-10-CM

## 2023-05-24 DIAGNOSIS — F43.9 STRESS: ICD-10-CM

## 2023-05-24 DIAGNOSIS — E66.01 OBESITY, MORBID, BMI 50 OR HIGHER (HCC): ICD-10-CM

## 2023-05-24 DIAGNOSIS — F41.9 ANXIETY: ICD-10-CM

## 2023-05-24 DIAGNOSIS — E03.9 HYPOTHYROIDISM, UNSPECIFIED TYPE: ICD-10-CM

## 2023-05-24 DIAGNOSIS — Z99.89 OSA ON CPAP: ICD-10-CM

## 2023-05-24 DIAGNOSIS — G47.33 OSA ON CPAP: ICD-10-CM

## 2023-05-24 DIAGNOSIS — E78.5 DYSLIPIDEMIA: ICD-10-CM

## 2023-05-24 DIAGNOSIS — E11.9 TYPE 2 DIABETES MELLITUS WITHOUT COMPLICATION, WITH LONG-TERM CURRENT USE OF INSULIN (HCC): ICD-10-CM

## 2023-05-24 PROCEDURE — 99213 OFFICE O/P EST LOW 20 MIN: CPT | Performed by: NURSE PRACTITIONER

## 2023-05-24 RX ORDER — TIRZEPATIDE 10 MG/.5ML
10 INJECTION, SOLUTION SUBCUTANEOUS WEEKLY
Qty: 2 ML | Refills: 0 | Status: SHIPPED | OUTPATIENT
Start: 2023-05-24

## 2023-05-24 NOTE — PATIENT INSTRUCTIONS
Next steps:  1. Fill your prescribed medication and take as discussed and prescribed: mounjaro 10mg weekly   2. Schedule a personal nutrition consultation with one of our registered dieticians     1. Drink water with meals and throughout the day, cut down on soda and/or juice if consumed. Consider flavored water options like Bubbly, Spindrift, Hint and Pilar. 2.  Eat breakfast daily and focus on having protein with each meal, examples include: greek yogurt, cottage cheese, hard boiled egg, whole grain toast with peanut butter. 3.  Reduce refined carbohydrates and sugars which includes items such as sweets, as well as rice, pasta, and bread and make sure to choose whole grain options when having them with just 1 serving per meal about the size of your inner palm. 4.  Consume non starchy veggies daily working towards making them a good 50% of your daily food intake. Add them to lunch and dinner consistently. 5.  Start a daily probiotic: VSL#3 is recommended, (order on line at www.vsl3. com). Take 1 capsule daily with water for 30 days, then reduce to 1 every other day (this will reduce the cost). Capsules can be left out for 2 weeks, but then must be refrigerated. Please download uzma My Fitness Serena Shoulders! Or Net Diary to monitor daily dietary intake and you will be able to see if you are eating the right amount of calories or too much or too little which would hinder weight loss. Additionally this will help to see your daily carbohydrate and protein intake. When you set the uzma up choose 1-2 lbs/week as a goal.  Keeping a paper food journal is an option as well to remain accountable for your choices- this is the start to mindful eating! A low calorie diet has been consistently shown to support weight loss. Continue or start exercising to help establish a routine. If not already exercising begin with 1 day and progress as able with long-term goal of 30 minutes 5 days a week at a minimum. Meditation daily can help manage and control stress. Chronic stress can make weight loss difficult. Exercising is one way to help with stress, but meditation using the CALM Shaniqua or another comparable alternative can be done in your home or place of work with little time commitment. This Shaniqua can also help work on behavior change and improve sleep. Check out the segment under Calm Masterclass and listen to The 4 Pillars of Health. A great way to begin learning about the foundation of lifestyle with practical tips to use in your every day. Check out www.yourweightmatters. org blog for continued daily support and education along this weight loss journey! Patient Resources:     Personal Training/Fitness Classes/Health Coaching     [facility] CHERYL and Deric Zaragoza @ [email or web address]/ Full fitness center with group fitness and personal training. Discount available as client of [facility]. Health Coaching and Personal Training with Swati Mathew at our [facility]- individual weekly coaching with option to add personal training and small group fitness classes targeted at weight loss- [phone] and/or email @ [email or web address]  360FIT [place] [email or web address] Group Fitness [phone] and/or email Mary Calix at [email or web address]. ColorModules  [address] [facility] with multiple locations: Aetna (www.Pepper Networks), Verdeeco The Family Archival Solutions (www.Tickade. ColorModules), Fit Body Bootcamp (www.Essia Healthp.ColorModules), [place] Oasis (www.Retrofit), The Exercise  (www.exercisecoach.ColorModules)     Online Fitness  Fitness  on Whole Foods in 10 DVD series- www. hfbwp14IXS. ColorModules  Sit and Be Fit - Chair exercise series Www.sitandbefit. org  Hip Hop Fit with Siva Loya at www.hiphopfit. net     Apps for on the Engrade 7 Minute Workout (orange box with white 7) - free on the go HIIT training shaniqua  Peloton Shaniqua @ www. IMayGou     Nutrition Trackers and Tools  LoseIT! And My Fitness Pal apps and on line for tracking nutrition  NOOM - virtual health coaching  FitFoundation (healthy meals on the go) in [facility] @ www. ksdyogchqimzt1p. Simón Martines MD @ www.Blu Health Systemstromd.com and Adrian Loyola (keto and low carb plans recommended) @ www. BXGQYR44.LQE, Metabolic Meals @ www. MyMetabolicMeals. com - individual prepared meals to go  Profitably, Benjamin's Desk, International Business Machines, Every Plate, Access Systems- on line meal delivery programs for preparation at home  [place] Booksir in [place] for homemade meals to go @ wwwStrongView. YinYangMap  Diet Doctor @ www. dietdoctor. YinYangMap - low carb swaps  Yummly - meal prep and planning shaniqua (www.yummly. com)     Stress Management/Behavior/Mindful Eating  CALM meditation shaniqua (wwwAble Device)  Headspace  Am I Hungry? Mindful eating virtual  shaniqua  Www.yourweightmatters. org - Obesity Action Coalition sponsored Blog posts daily  Motivation shaniqua (black box with white \")- daily supportive messages sent to your phone     Books/Video Education/Podcasts  Mindless Eating by Daisha Norman  Why We Get Sick by Jorge Valentino (a book about insulin resistance)  Atomic Habits by Shannan Frausto (a book about taking small steps to promote greater behavior change)   Can't Hurt Me by Reji Madison (a book exploring the power of discipline in achieving your goals)  The End of Dieting: How to Live for Life by Dr. Luis Miguel Cruz M.D. or listen to The [address] [facility] Episode 61: Understanding \"Nutritarian\" Eating w/Dr. Luis Miguel Cruz  Your Body in Balance: The World Fuel Services Corporation of Food, Hormones, and Health by Dr. Cathy Colunga  The Menopause Diet Plan by Jori Anaya and [facility]OSP AT [facility]  The Complete Guide to fasting by Dr. Nevin Hoffman, [address] [facility] by Brandi Flaherty, Ph.D, R.D.   Weight Loss Surgery Will Not Treat Food Addiction by Kuldip Birch Ph.D  The Game Changers- Physicians Formula Documentary on plant based nutrition  Fed Up - documentary about obesity (Free on Utube)  The Truth About Sugar - documentary on sugar (Free on Utube, https://youtu. be/1M0rwdkBO7q)  The Dr. Orosco Hua by Dr. Wyatt Bonilla MD  Fitlosophy Fitspiration - journal to better health (found at [employer] in fitness aisle)  What Happened to You?- a look at the impact trauma has on behavior written by Denisse Ann and Dr. Helen Collins Again by Leidy Gaxiola - discovering your true self after trauma  Jennifer Hernandez talk on Genufood Energy Enzymes, The Call to CareLinx  Podcasts: The Exam Room by the Physician's Committee, Nutrition Facts by Dr. Marilyn Chanel    We are here to support you with weight loss, but please remember that you still need your primary care provider for your routine health maintenance.

## 2023-05-25 ENCOUNTER — HOSPITAL ENCOUNTER (OUTPATIENT)
Dept: ULTRASOUND IMAGING | Age: 53
Discharge: HOME | End: 2023-05-25
Attending: FAMILY MEDICINE
Payer: COMMERCIAL

## 2023-05-25 DIAGNOSIS — M79.89 LEG SWELLING: ICD-10-CM

## 2023-05-25 PROCEDURE — 93970 EXTREMITY STUDY: CPT | Performed by: FAMILY MEDICINE

## 2023-05-31 ENCOUNTER — OFFICE VISIT (OUTPATIENT)
Dept: ORTHOPEDICS CLINIC | Facility: CLINIC | Age: 53
End: 2023-05-31
Payer: COMMERCIAL

## 2023-05-31 VITALS — BODY MASS INDEX: 50.79 KG/M2 | WEIGHT: 276 LBS | HEIGHT: 62 IN

## 2023-05-31 DIAGNOSIS — M17.0 PRIMARY OSTEOARTHRITIS OF BOTH KNEES: Primary | ICD-10-CM

## 2023-05-31 PROCEDURE — 3008F BODY MASS INDEX DOCD: CPT | Performed by: PHYSICIAN ASSISTANT

## 2023-05-31 PROCEDURE — 20610 DRAIN/INJ JOINT/BURSA W/O US: CPT | Performed by: PHYSICIAN ASSISTANT

## 2023-05-31 NOTE — PROCEDURES
Discussed possible further options if Francena Jeffries fails to improve symptoms including viscosupplementation. After informed consent, the patient's right and left knees were marked, locally anesthetized with skin refrigerant, prepped with topical antiseptic, and injected with 5mL of 32mg/5mL Zilretta through the inferolateral portal.  A band-aid was applied. The patient tolerated the procedure well.     Roger Muhammad PA-C  [address] Orthopedic Surgery

## 2023-06-14 ENCOUNTER — PATIENT MESSAGE (OUTPATIENT)
Dept: SURGERY | Facility: CLINIC | Age: 53
End: 2023-06-14

## 2023-06-15 NOTE — TELEPHONE ENCOUNTER
From: Tacho Rivera  To: ALISON Hoffman  Sent: 6/14/2023 1:30 PM CDT  Subject: [facility]    Good afternoon     I am not sure where to direct my question. After seeing Dr Jose Arnett, I was referred to [facility] through [facility]. BS is requesting medical records to be faxed to [facility] to compile the needed paperwork to support the appeal.     Who do I contact to have the needed documentation sent to [facility]?      Thanks

## 2023-06-21 ENCOUNTER — HOSPITAL ENCOUNTER (OUTPATIENT)
Dept: MAMMOGRAPHY | Age: 53
Discharge: HOME | End: 2023-06-21
Attending: FAMILY MEDICINE
Payer: COMMERCIAL

## 2023-06-21 DIAGNOSIS — Z12.31 SCREENING MAMMOGRAM, ENCOUNTER FOR: ICD-10-CM

## 2023-06-21 PROCEDURE — 77067 SCR MAMMO BI INCL CAD: CPT | Performed by: FAMILY MEDICINE

## 2023-06-21 PROCEDURE — 77063 BREAST TOMOSYNTHESIS BI: CPT | Performed by: FAMILY MEDICINE

## 2023-06-24 RX ORDER — SIMVASTATIN 40 MG
TABLET ORAL
Qty: 90 TABLET | Refills: 0 | Status: SHIPPED | OUTPATIENT
Start: 2023-06-24

## 2023-06-25 ENCOUNTER — PATIENT MESSAGE (OUTPATIENT)
Dept: INTERNAL MEDICINE CLINIC | Facility: CLINIC | Age: 53
End: 2023-06-25

## 2023-06-26 ENCOUNTER — PATIENT OUTREACH (OUTPATIENT)
Dept: FAMILY MEDICINE CLINIC | Facility: CLINIC | Age: 53
End: 2023-06-26

## 2023-06-30 ENCOUNTER — TELEPHONE (OUTPATIENT)
Dept: ORTHOPEDICS CLINIC | Facility: CLINIC | Age: 53
End: 2023-06-30

## 2023-06-30 DIAGNOSIS — M79.645 FINGER PAIN, LEFT: Primary | ICD-10-CM

## 2023-07-03 RX ORDER — TIRZEPATIDE 7.5 MG/.5ML
7.5 INJECTION, SOLUTION SUBCUTANEOUS WEEKLY
Qty: 2 ML | Refills: 0 | Status: SHIPPED | OUTPATIENT
Start: 2023-07-03

## 2023-07-06 NOTE — TELEPHONE ENCOUNTER
Last OV 5/17/23  Last refilled on 4/3/23 for # 90 with 0 refills  Future Appointments   Date Time Provider [facility]   7/19/2023  8:00 AM Aileen Cruz PA-C [facility]O 75 [facility]   8/10/2023 12:45 PM NAP XR RM1 NAP XRAY EDW [place]   8/10/2023  1:00 PM Anh Hernández MD EMG ORTHO 75 EM[facility] [employer]   9/11/2023  3:40 PM Stanley Rodríguez APRN EMGWEI EMG [facility]        Thank you.

## 2023-07-08 RX ORDER — OMEPRAZOLE 40 MG/1
40 CAPSULE, DELAYED RELEASE ORAL DAILY
Qty: 90 CAPSULE | Refills: 0 | Status: SHIPPED | OUTPATIENT
Start: 2023-07-08

## 2023-07-20 RX ORDER — OMEPRAZOLE 40 MG/1
40 CAPSULE, DELAYED RELEASE ORAL DAILY
Qty: 90 CAPSULE | Refills: 0 | OUTPATIENT
Start: 2023-07-20

## 2023-07-20 NOTE — TELEPHONE ENCOUNTER
Duplicate request -rx sent 7/8/23    LOV: 5/17/23 for pre-op    Omeprazole 40 MG Oral Capsule Delayed Release  Take 1 capsule (40 mg total) by mouth daily.  Dispense: 90 capsule, Refills: 0 ordered       07/08/2023     Future Appointments   Date Time Provider Jovani Haque   7/26/2023  3:00 PM Vandana Chaidez PA-C EM[facility] ORTHO 75 [facility]   7/27/2023  2:45 PM NAP XR RM1 NAP XRAY EDW [place]   7/27/2023  3:00 PM Aga Laurent MD EMG ORTHO 75 EMG [employer]   9/11/2023  3:40 PM Nisha Davidson APRN EMGWEI EMG [facility] 75th

## 2023-07-26 ENCOUNTER — OFFICE VISIT (OUTPATIENT)
Dept: ORTHOPEDICS CLINIC | Facility: CLINIC | Age: 53
End: 2023-07-26
Payer: COMMERCIAL

## 2023-07-26 VITALS — HEIGHT: 62 IN | BODY MASS INDEX: 50.79 KG/M2 | WEIGHT: 276 LBS

## 2023-07-26 DIAGNOSIS — M17.0 PRIMARY OSTEOARTHRITIS OF BOTH KNEES: Primary | ICD-10-CM

## 2023-07-26 PROCEDURE — 3008F BODY MASS INDEX DOCD: CPT | Performed by: PHYSICIAN ASSISTANT

## 2023-07-26 PROCEDURE — 20610 DRAIN/INJ JOINT/BURSA W/O US: CPT | Performed by: PHYSICIAN ASSISTANT

## 2023-07-26 NOTE — PROCEDURES
After informed consent, the patient's right and left knees were marked, locally anesthetized with skin refrigerant, prepped with topical antiseptic, and injected with 6mL of 48mg/6mL Synvisc One through the inferolateral portal.  A band-aid was applied. The patient tolerated the procedure well.     Sabine Westfall PA-C  [address] Orthopedic Surgery

## 2023-07-28 NOTE — TELEPHONE ENCOUNTER
Requested Prescriptions     Pending Prescriptions Disp Refills    Continuous Blood Gluc Sensor (DEXCOM G6 SENSOR) Does not apply Misc [Pharmacy Med Name: Marcella Jackson MIS SENSOR]  1     Sig: USE 1 SENSOR EVERY 10 DAYS    Continuous Blood Gluc Transmit (DEXCOM G6 TRANSMITTER) Does not apply Misc [Pharmacy Med Name: Marcella Jackson MIS TRANSMIT]  1     Sig: USE 1 TRANSMITTER EVERY 90 DAYS     Your appointments       Date & Time Appointment Department [facility])    Aug 03, 2023  1:55 PM CDT XR FINGER (MIN 2 VIEW) LEFT with NAP XR [facility] ([address])        Aug 03, 2023  2:15 PM CDT Office Visit with Radha Ramírez MD [address], [facility])    Please arrive 15 minutes prior to your scheduled appointment. Please also bring your Insurance card, Photo ID, and your medication bottles or a list of your current medication. If your condition improves and this appointment is no longer needed, please contact your physician office to cancel. Please verify with your primary care provider if your insurance requires a referral.        Sep 11, 2023  3:40 PM CDT Follow Up Visit with LILIA Fowler [address], [address] [place] [address] (EMG [address])    Contact your primary care provider if your insurance requires a referral.    Please arrive 15 minutes prior to your scheduled appointment. Be sure to bring your current Insurance card, Photo ID, and medication bottles or a list of your current medications. A 24 hour notice is required to cancel any appointment or you may be charged a $40 No Show Fee. Important: 24 hour notice is required to cancel any appointment or you may be charged a $40 No Show Fee. Please notify your physician office.                [address] 9600 [facility], [address], Joselo BECERRA [facility] [address]  [address] [place]  [phone] [facility], [address], [facility] [employer]  [address]  [phone]          Last A1c value was 5.5% done 5/11/2023.   No FU appt, messaged via [facility]  Refill 2/10/23  LOV 5/11/23

## 2023-07-31 RX ORDER — PROCHLORPERAZINE 25 MG/1
SUPPOSITORY RECTAL
Qty: 1 EACH | Refills: 0 | Status: SHIPPED | OUTPATIENT
Start: 2023-07-31

## 2023-07-31 RX ORDER — PROCHLORPERAZINE 25 MG/1
SUPPOSITORY RECTAL
Qty: 9 EACH | Refills: 0 | Status: SHIPPED | OUTPATIENT
Start: 2023-07-31

## 2023-08-03 ENCOUNTER — PATIENT MESSAGE (OUTPATIENT)
Dept: INTERNAL MEDICINE CLINIC | Facility: CLINIC | Age: 53
End: 2023-08-03

## 2023-08-03 ENCOUNTER — TELEPHONE (OUTPATIENT)
Dept: ORTHOPEDICS CLINIC | Facility: CLINIC | Age: 53
End: 2023-08-03

## 2023-08-03 ENCOUNTER — OFFICE VISIT (OUTPATIENT)
Dept: ORTHOPEDICS CLINIC | Facility: CLINIC | Age: 53
End: 2023-08-03
Payer: COMMERCIAL

## 2023-08-03 ENCOUNTER — HOSPITAL ENCOUNTER (OUTPATIENT)
Dept: GENERAL RADIOLOGY | Age: 53
Discharge: HOME | End: 2023-08-03
Attending: ORTHOPAEDIC SURGERY
Payer: COMMERCIAL

## 2023-08-03 VITALS — HEIGHT: 62 IN | BODY MASS INDEX: 50.79 KG/M2 | WEIGHT: 276 LBS

## 2023-08-03 DIAGNOSIS — Z79.4 TYPE 2 DIABETES MELLITUS WITHOUT COMPLICATION, WITH LONG-TERM CURRENT USE OF INSULIN (HCC): Primary | ICD-10-CM

## 2023-08-03 DIAGNOSIS — M15.1 DEGENERATIVE ARTHRITIS OF DISTAL INTERPHALANGEAL JOINT OF RING FINGER OF LEFT HAND: Primary | ICD-10-CM

## 2023-08-03 DIAGNOSIS — E11.9 TYPE 2 DIABETES MELLITUS WITHOUT COMPLICATION, WITH LONG-TERM CURRENT USE OF INSULIN (HCC): Primary | ICD-10-CM

## 2023-08-03 DIAGNOSIS — M79.645 FINGER PAIN, LEFT: ICD-10-CM

## 2023-08-03 PROCEDURE — 73140 X-RAY EXAM OF FINGER(S): CPT | Performed by: ORTHOPAEDIC SURGERY

## 2023-08-03 PROCEDURE — 3008F BODY MASS INDEX DOCD: CPT | Performed by: ORTHOPAEDIC SURGERY

## 2023-08-03 PROCEDURE — 99214 OFFICE O/P EST MOD 30 MIN: CPT | Performed by: ORTHOPAEDIC SURGERY

## 2023-08-03 NOTE — TELEPHONE ENCOUNTER
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  (primary encounter diagnosis)    Procedure Codes:    DIP Fusion (72625)   Disposition:    Outpatient   Operative Time:    1 hr   Antibiotics:    Ancef 2g   Anesthesia Type:    Monitored Anesthesia Care (MAC)   Clearance:     NONE   Equipment:    Skeletal [employer] Arthrodesis Screw, Small Power, Full C-arm, Suture: 4-0 nylon monocryl, Bacitrain, Adaptic, 1 inch carlos, 1 inch Alumafoam splint, 1 inch coban   Assistant:    Melba Souza Assistant: Chioma Sparrow PA-C   Follow Up:    10-12 days post op with Salma Contreras   Pain Medication:    Norco 325-5mg   Therapy:    [facility]

## 2023-08-03 NOTE — PROGRESS NOTES
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 hand  (primary encounter diagnosis)    Procedure Codes:   DIP Fusion (61214)   Disposition:   Outpatient   Operative Time:   1 hr   Antibiotics:   Ancef 2g   Anesthesia Type:   Monitored Anesthesia Care (MAC)   Clearance:    NONE   Equipment:   Skeletal Dynamics Arthrodesis Screw, Small Power, Full C-arm, Suture: 4-0 nylon monocryl, Bacitrain, Adaptic, 1 inch carlos, 1 inch Alumafoam splint, 1 inch coban   Assistant:   George Cazares Assistant: Norman Guerrero PA-C   Follow Up:   10-12 days post op with Carlos Manuel Munroe   Pain Medication:   Norco 325-5mg   Therapy:   [facility]

## 2023-08-03 NOTE — TELEPHONE ENCOUNTER
From: Jaclyn Ravi  To: LILIA Urena  Sent: 8/3/2023 3:47 PM CDT  Subject: Theresa Libel afternoon   I am need of a refill of monjauro and spoke with Andrew and they have 10 and 12.5 to send in a refill     Please let me know what I need to do next.     Thanks   [facility]

## 2023-08-04 RX ORDER — TIRZEPATIDE 10 MG/.5ML
10 INJECTION, SOLUTION SUBCUTANEOUS WEEKLY
Qty: 2 ML | Refills: 0 | Status: SHIPPED | OUTPATIENT
Start: 2023-08-04

## 2023-08-14 ENCOUNTER — TELEPHONE (OUTPATIENT)
Dept: FAMILY MEDICINE CLINIC | Facility: CLINIC | Age: 53
End: 2023-08-14

## 2023-08-14 NOTE — TELEPHONE ENCOUNTER
LOV 5/17/23 for a pre-op. She currently takes 300mg daily. Needs appt/VV to discuss?     Future Appointments   Date Time Provider Jovani Haque   9/7/2023  7:30 AM LILIA Dueñas EMGDIABCTRLOLY EMG [address] [place]   9/11/2023  3:40 PM LILIA James [facility]   11/27/2023 10:00 AM ALISON Navarro EMG Leticia Adamson [id number]

## 2023-08-14 NOTE — TELEPHONE ENCOUNTER
Pt called said she would like an increased on her bupropion. Per pt she just feels like she's on emotional roller coaster.  Per pt she does see a therapist.

## 2023-08-14 NOTE — TELEPHONE ENCOUNTER
Pt requested this be a VV as it is hard for her to come in.  VV scheduled:    Future Appointments   Date Time Provider Jovani Shabnam   8/21/2023 10:45 AM Kori Juan MD EMGOSW EMG [place]   9/7/2023  7:30 AM LILIA Brice EMGDIABCTRLOLY EMG [address]   9/11/2023  3:40 PM LILIA Goldsmith EMGDARRENI EMG [facility] 75[facility]   11/27/2023 10:00 AM ALISON Petersen EMG [place] Aranda [id number]

## 2023-08-22 ENCOUNTER — PATIENT MESSAGE (OUTPATIENT)
Dept: INTERNAL MEDICINE CLINIC | Facility: CLINIC | Age: 53
End: 2023-08-22

## 2023-08-28 ENCOUNTER — OFFICE VISIT (OUTPATIENT)
Dept: INTERNAL MEDICINE CLINIC | Facility: CLINIC | Age: 53
End: 2023-08-28
Payer: COMMERCIAL

## 2023-08-28 VITALS
BODY MASS INDEX: 50.03 KG/M2 | WEIGHT: 265 LBS | OXYGEN SATURATION: 96 % | HEIGHT: 61 IN | DIASTOLIC BLOOD PRESSURE: 70 MMHG | SYSTOLIC BLOOD PRESSURE: 126 MMHG | RESPIRATION RATE: 18 BRPM | HEART RATE: 86 BPM

## 2023-08-28 DIAGNOSIS — Z79.4 TYPE 2 DIABETES MELLITUS WITHOUT COMPLICATION, WITH LONG-TERM CURRENT USE OF INSULIN (HCC): ICD-10-CM

## 2023-08-28 DIAGNOSIS — F41.9 ANXIETY: ICD-10-CM

## 2023-08-28 DIAGNOSIS — Z51.81 THERAPEUTIC DRUG MONITORING: Primary | ICD-10-CM

## 2023-08-28 DIAGNOSIS — E11.9 TYPE 2 DIABETES MELLITUS WITHOUT COMPLICATION, WITH LONG-TERM CURRENT USE OF INSULIN (HCC): ICD-10-CM

## 2023-08-28 DIAGNOSIS — E03.9 HYPOTHYROIDISM, UNSPECIFIED TYPE: ICD-10-CM

## 2023-08-28 DIAGNOSIS — F43.9 STRESS: ICD-10-CM

## 2023-08-28 DIAGNOSIS — G47.33 OSA ON CPAP: ICD-10-CM

## 2023-08-28 DIAGNOSIS — E66.01 OBESITY, MORBID, BMI 50 OR HIGHER (HCC): ICD-10-CM

## 2023-08-28 DIAGNOSIS — E78.5 DYSLIPIDEMIA: ICD-10-CM

## 2023-08-28 PROCEDURE — 3074F SYST BP LT 130 MM HG: CPT | Performed by: NURSE PRACTITIONER

## 2023-08-28 PROCEDURE — 3008F BODY MASS INDEX DOCD: CPT | Performed by: NURSE PRACTITIONER

## 2023-08-28 PROCEDURE — 99214 OFFICE O/P EST MOD 30 MIN: CPT | Performed by: NURSE PRACTITIONER

## 2023-08-28 PROCEDURE — 3078F DIAST BP <80 MM HG: CPT | Performed by: NURSE PRACTITIONER

## 2023-08-28 RX ORDER — PREGABALIN 150 MG/1
150 CAPSULE ORAL EVERY MORNING
COMMUNITY
Start: 2023-08-21

## 2023-08-28 RX ORDER — TIRZEPATIDE 12.5 MG/.5ML
12.5 INJECTION, SOLUTION SUBCUTANEOUS WEEKLY
Qty: 2 ML | Refills: 0 | Status: SHIPPED | OUTPATIENT
Start: 2023-08-28

## 2023-08-28 NOTE — PATIENT INSTRUCTIONS
Next steps:  1. Fill your prescribed medication and take as discussed and prescribed: mounjaro 12.5mg weekly   2. Schedule a personal nutrition consultation with one of our registered dieticians       1. Drink water with meals and throughout the day, cut down on soda and/or juice if consumed. Consider flavored water options like Bubbly, Spindrift, Hint and Pilar. 2.  Eat breakfast daily and focus on having protein with each meal, examples include: greek yogurt, cottage cheese, hard boiled egg, whole grain toast with peanut butter. 3.  Reduce refined carbohydrates and sugars which includes items such as sweets, as well as rice, pasta, and bread and make sure to choose whole grain options when having them with just 1 serving per meal about the size of your inner palm. 4.  Consume non starchy veggies daily working towards making them a good 50% of your daily food intake. Add them to lunch and dinner consistently. 5.  Start a daily probiotic: VSL#3 is recommended, (order on line at www.vsl3. com). Take 1 capsule daily with water for 30 days, then reduce to 1 every other day (this will reduce the cost). Capsules can be left out for 2 weeks, but then must be refrigerated. Please download uzma My Fitness Juanell Coad! Or Net Diary to monitor daily dietary intake and you will be able to see if you are eating the right amount of calories or too much or too little which would hinder weight loss. Additionally this will help to see your daily carbohydrate and protein intake. When you set the uzma up choose 1-2 lbs/week as a goal.  Keeping a paper food journal is an option as well to remain accountable for your choices- this is the start to mindful eating! A low calorie diet has been consistently shown to support weight loss. Continue or start exercising to help establish a routine. If not already exercising begin with 1 day and progress as able with long-term goal of 30 minutes 5 days a week at a minimum. Meditation daily can help manage and control stress. Chronic stress can make weight loss difficult. Exercising is one way to help with stress, but meditation using the CALM Shaniqua or another comparable alternative can be done in your home or place of work with little time commitment. This Shaniqua can also help work on behavior change and improve sleep. Check out the segment under Calm Masterclass and listen to The 4 Pillars of Health. A great way to begin learning about the foundation of lifestyle with practical tips to use in your every day. Check out www.yourweightmatters. org blog for continued daily support and education along this weight loss journey! Patient Resources:     Personal Training/Fitness Classes/Health Coaching     Abhijit 112 and Leos Sophiaside @ http://www.mitchell-reyes.charlotte/ Full fitness center with group fitness and personal training. Discount available as client of [facility]. Health Coaching and Personal Training with May Williamson at our [facility]- individual weekly coaching with option to add personal training and small group fitness classes targeted at weight loss- [phone] and/or email @ Pearl Andrade. [email or web address]  360FIT [place] https://Information Gateway.org/. Group Fitness [phone] and/or email Ramu Fry at [email or web address]  [address] [facility] with multiple locations: Aetna (www.In Hand Guides), Eat The bitmovin Fitness (www.Tour Desk. LEHR), Fit Body Bootcamp (www.Endorphinp.LEHR), "Mosec, Mobile Secretary" (www.Supercell), The Exercise  (www.exercisecoach.LEHR)     Online Fitness  Fitness  on [employer] in 10 DVD series- www. scpnr17UBA. LEHR  Sit and Be Fit - Chair exercise series Www.sitandbefit. org  Hip Hop Fit with Siva Loya at www.hiphopfit. net     Apps for on the Frequency 7 Minute Workout (orange box with white 7) - free on the go HIIT training shaniqua  Peloton Shaniqua @ www. WorkThink     Nutrition Trackers and Tools  LoseIT! And My Fitness Pal apps and on line for tracking nutrition  NOOM - virtual health coaching  FitFoundation (healthy meals on the go) in [facility]I @ www. slvryqzjkhgvg7t. Fortino Shabazz MD @ www.Aponia Laboratoriesd.com and Saskia Alonzo (keto and low carb plans recommended) @ www. KSZVQG94.TGI, Metabolic Meals @ www. MyMetabolicMeals. com - individual prepared meals to go  IRI, Fifth Generation Computer, International Business Machines, Every Plate, Educabilia- on line meal delivery programs for preparation at home  AK Navera in [place] for homemade meals to go @ wwwEubios Therapeutica Private Limited  Diet Doctor @ www. dietdoctor. CrystalGenomics - low carb swaps  Yummly - meal prep and planning shaniqua (www.yummly. com)     Stress Management/Behavior/Mindful Eating  CALM meditation shaniqua (wwwViagogo)  Headspace  Am I Hungry? Mindful eating virtual  shaniqua  Www.yourweightmatters. org - Obesity Action Coalition sponsored Blog posts daily  Motivation shaniqua (black box with white \")- daily supportive messages sent to your phone     Books/Video Education/Podcasts  Mindless Eating by Manohar Marrero  Why We Get Sick by Chris Triplett (a book about insulin resistance)  Atomic Habits by Eduardo Avila (a book about taking small steps to promote greater behavior change)   Can't Hurt Me by Kathleen Coats (a book exploring the power of discipline in achieving your goals)  The End of Dieting: How to Live for Life by Dr. Katina Lopez M.D. or listen to The [facility] Episode 61: Understanding \"Nutritarian\" Eating w/Dr. Katina Lopez  Your Body in Balance: The World Fuel Services Corporation of Food, Hormones, and Health by Dr. Alicia Cash  The Menopause Diet Plan by Evens Tejeda and [facility] HOSP AT [facility]  The Complete Guide to fasting by Dr. Teodora Spears, [facility] by Diaz Augustine, Ph.D, R.D.   Weight Loss Surgery Will Not Treat Food Addiction by Kelly Altman Ph.D  The Game Changers- Physician Software Systems Documentary on plant based nutrition  Fed Up - documentary about obesity (Free on Utube)  The Truth About Sugar - documentary on sugar (Free on Utube, https://youtu. be/7X4fgasRT3y)  The Dr. Mallika Grant by Dr. Onur Nieves MD  Fitlosophy Fitspiration - journal to better health (found at [employer] in fitness aisle)  What Happened to You?- a look at the impact trauma has on behavior written by Heather Young and Dr. Gamaliel Ramirez Again by Alondra Horner - discovering your true self after trauma  Virginie Walls talk on [employer]per[employer] Systems, The Call to Courage  Podcasts: The Exam Room by the Physician's Committee, Nutrition Facts by Dr. Gabrielle Condon    We are here to support you with weight loss, but please remember that you still need your primary care provider for your routine health maintenance.

## 2023-08-31 ENCOUNTER — PATIENT MESSAGE (OUTPATIENT)
Dept: ENDOCRINOLOGY CLINIC | Facility: CLINIC | Age: 53
End: 2023-08-31

## 2023-08-31 DIAGNOSIS — Z79.4 TYPE 2 DIABETES MELLITUS WITHOUT COMPLICATION, WITH LONG-TERM CURRENT USE OF INSULIN (HCC): Primary | ICD-10-CM

## 2023-08-31 DIAGNOSIS — E11.9 TYPE 2 DIABETES MELLITUS WITHOUT COMPLICATION, WITH LONG-TERM CURRENT USE OF INSULIN (HCC): Primary | ICD-10-CM

## 2023-08-31 DIAGNOSIS — Z96.41 INSULIN PUMP IN PLACE: ICD-10-CM

## 2023-08-31 RX ORDER — INSULIN ASPART 100 [IU]/ML
INJECTION, SOLUTION INTRAVENOUS; SUBCUTANEOUS
Qty: 150 ML | Refills: 1 | Status: SHIPPED | OUTPATIENT
Start: 2023-08-31

## 2023-09-01 ENCOUNTER — PATIENT MESSAGE (OUTPATIENT)
Dept: ORTHOPEDICS CLINIC | Facility: CLINIC | Age: 53
End: 2023-09-01

## 2023-09-01 DIAGNOSIS — M17.0 PRIMARY OSTEOARTHRITIS OF BOTH KNEES: Primary | ICD-10-CM

## 2023-09-01 NOTE — TELEPHONE ENCOUNTER
LYB:[birth year]  Pain increased    Hx Synvisc on 7/26/23, zilretta     Is next step an appointment with Dr. Viviane Grimaldo?

## 2023-09-01 NOTE — TELEPHONE ENCOUNTER
9/1/23  1:17 PM  Moises Negrete,     At this point, I think the treatment option I would make the most medicines would be radiofrequency ablation of your knees. This is performed through pain management, and if you are interested then we can place a referral to their group. I also would encourage you to continue to work on optimizing your weight, as this will make you a much safer surgical candidate if we need to pursue that option in the future. Please let me know if you have any questions about the radiofrequency ablation or if you would like to move forward with that option.      Best wishes,      Randolph Hall    Last read by Óscar Whelan at  1:35 PM on 9/1/2023

## 2023-09-01 NOTE — TELEPHONE ENCOUNTER
9/1/23  1:36 PM  Yes for the referral. At least I could see what they have to say and discuss.    Thanks   [employer]

## 2023-09-01 NOTE — TELEPHONE ENCOUNTER
From: Rimma Marinelli  To: Corrinne Flow, PA-C  Sent: 9/1/2023 5:32 AM CDT  Subject: Knee injections     My last injection was in July and was asked to wait to see if the injections would work. Sadly we are a month after and the pain is so intense. I know I have multiple issues with my back issues. What is a next step.

## 2023-09-01 NOTE — TELEPHONE ENCOUNTER
Patient would like to move forward with the radiofrequency ablation option and is requesting a referral.  TY

## 2023-09-05 NOTE — TELEPHONE ENCOUNTER
Pain management referral to Dr. Monsalve Cea placed. Please provide patient with contact information for his office.  Thank you

## 2023-09-11 RX ORDER — LEVOTHYROXINE SODIUM 137 MCG
137 TABLET ORAL DAILY
Qty: 90 TABLET | Refills: 0 | Status: SHIPPED | OUTPATIENT
Start: 2023-09-11

## 2023-09-11 NOTE — TELEPHONE ENCOUNTER
Thyroid Supplements Protocol Passed09/09/2023 04:25 PM   Protocol Details TSH test in past 12 months   Last refill 3/21/23 90 1 refill

## 2023-09-18 NOTE — TELEPHONE ENCOUNTER
Last OV 5/17/23  Last refilled on 7/8/23 for # 90 with 0 refills  Future Appointments   Date Time Provider Jovani Haque   10/17/2023  7:30 AM LILIA Garcia EMGDIABCTRLOLY [facility] [address] [place]   11/13/2023  9:00 AM LILIA Sandoval EMGWEI EMG [facility] 75[facility]   11/27/2023 10:00 AM ALISON Mccord [facility] [id number]        Thank you.

## 2023-09-19 RX ORDER — OMEPRAZOLE 40 MG/1
40 CAPSULE, DELAYED RELEASE ORAL DAILY
Qty: 90 CAPSULE | Refills: 0 | Status: SHIPPED | OUTPATIENT
Start: 2023-09-19

## 2023-09-22 NOTE — TELEPHONE ENCOUNTER
Cholesterol Medication Protocol Passed      Last refill 6/24/23 90 0 refill      Very High  Drug-Drug: dilTIAZem HCl ER Beads and simvastatinPlasma concentrations and pharmacologic effects of simvastatin may be increased by diltiazem. Toxicity, characterized by muscle injury, may occur. The simvastatin dose should be limited to 10 mg daily and the diltiazem dose should be limited to 240 mg daily.

## 2023-09-27 ENCOUNTER — PATIENT MESSAGE (OUTPATIENT)
Dept: ORTHOPEDICS CLINIC | Facility: CLINIC | Age: 53
End: 2023-09-27

## 2023-09-27 NOTE — TELEPHONE ENCOUNTER
[facility] Clinical Pool (supporting Sofía Ott MD)4 minutes ago (3:41 PM)       Sounds good

## 2023-09-27 NOTE — TELEPHONE ENCOUNTER
ALISON Anderson   to [employer]   EK      9/27/23  3:05 PM  Hi! At this point we are booked up for surgery until the end of the year. But if we have to delay it, we will do our best to try and fit you in.      Harvey Quiñones

## 2023-09-27 NOTE — TELEPHONE ENCOUNTER
ANDREIATCJABIER  Does she see cardiologist?  Concerned about interaction between medications -simvastatin and diltiazem. I would like to talk to her before refilling medications. What is a good time?

## 2023-09-28 ENCOUNTER — PATIENT MESSAGE (OUTPATIENT)
Dept: FAMILY MEDICINE CLINIC | Facility: CLINIC | Age: 53
End: 2023-09-28

## 2023-09-28 ENCOUNTER — PATIENT MESSAGE (OUTPATIENT)
Dept: INTERNAL MEDICINE CLINIC | Facility: CLINIC | Age: 53
End: 2023-09-28

## 2023-09-28 ENCOUNTER — TELEPHONE (OUTPATIENT)
Dept: FAMILY MEDICINE CLINIC | Facility: CLINIC | Age: 53
End: 2023-09-28

## 2023-09-28 RX ORDER — PAROXETINE HYDROCHLORIDE 40 MG/1
40 TABLET, FILM COATED ORAL EVERY MORNING
Qty: 90 TABLET | Refills: 1 | Status: SHIPPED | OUTPATIENT
Start: 2023-09-28

## 2023-09-28 RX ORDER — TIRZEPATIDE 12.5 MG/.5ML
12.5 INJECTION, SOLUTION SUBCUTANEOUS WEEKLY
Qty: 2 ML | Refills: 1 | Status: SHIPPED | OUTPATIENT
Start: 2023-09-28

## 2023-09-28 NOTE — TELEPHONE ENCOUNTER
Last visit 05/17/2023   Last refill 04/21/2023   Your appointments       Date & Time Appointment Department [facility])    Oct 04, 2023  8:20 AM CDT Presurgical Visit with [facility], [address] [place] [address], [place] [address], Haja Mitchell ([address])

## 2023-09-28 NOTE — TELEPHONE ENCOUNTER
Does dr. Tawni Goodell to fit her in gave her the only opening Dr. Yasmin Tovar had or ok to see Angel Louise

## 2023-09-28 NOTE — TELEPHONE ENCOUNTER
Called [facility] at # listed below  Medical clearance needed from Dr Nir Paige  They also need labs/cardiology clearance    Future Appointments   Date Time Provider Jovani Haque   10/4/2023 11:00 AM Reji Hastings MD EMGOSW [facility] [place]   10/17/2023  7:30 AM LILIA Westbrook EMGDIABCTRNA [facility] [address] [place]   11/13/2023  9:00 AM LILIA Cantu EMGWEI EM[facility]   11/27/2023 10:00 AM ALISON Najera EMG Sona Pritchett [id number]

## 2023-09-28 NOTE — TELEPHONE ENCOUNTER
Future Appointments   Date Time Provider Jovani Shabnam   10/4/2023 11:00 AM Leila Benz MD EMGOSW EMG [place]   10/17/2023  7:30 AM LILIA Lewis EMGDIABCTRLOLY [facility] [address] [place]   11/13/2023  9:00 AM LILIA Luna EMGWEI EM[facility]   11/27/2023 10:00 AM ALISON Neely EMG [place] Castro [id number]

## 2023-09-28 NOTE — TELEPHONE ENCOUNTER
Requesting Mounjaro 12.5  LOV: 8/28/23  RTC: 3 months  Last Relevant Labs: 5/11/23  Filled: 8/28/23 #2ml with 0 refills    11/13/2023  9:00 AM LILIA Connelly EMGWEI EMG [address]   11/27/2023 10:00 AM ALISON Anderson EMG [facility] [id number]

## 2023-09-28 NOTE — TELEPHONE ENCOUNTER
From: Emily Polk  To:  Jesus Candelario  Sent: 9/28/2023 8:41 AM CDT  Subject: Ehsan Alvarez     Good morning     Needing a refill on the 12.5 [facility] said they can order it     [employer]

## 2023-09-28 NOTE — TELEPHONE ENCOUNTER
Pt scheduled pre-op is having back surgery on 10/18/2023 with Dr. Adam Hayward  at the [place] [id number] pain clinic ph. [phone]  Fax [phone]

## 2023-09-29 RX ORDER — SIMVASTATIN 40 MG
TABLET ORAL
Qty: 90 TABLET | Refills: 0 | OUTPATIENT
Start: 2023-09-29

## 2023-10-02 NOTE — TELEPHONE ENCOUNTER
Fax received. Put in [facility]'s pre-op folder.     Future Appointments   Date Time Provider Jovani Haque   10/4/2023  8:20 AM LILIA Fagan EMGOSW EMG [place]   10/17/2023  7:30 AM LILIA Rae EMGDIABCTRLOLY EMG [address] [place]   11/13/2023  9:00 AM LILIA Rios EMGWEI EMG [facility]   11/27/2023 10:00 AM ALISON Lizarraga EMG Nick L[place] [id number]

## 2023-10-03 ENCOUNTER — PATIENT MESSAGE (OUTPATIENT)
Dept: ENDOCRINOLOGY CLINIC | Facility: CLINIC | Age: 53
End: 2023-10-03

## 2023-10-03 DIAGNOSIS — Z79.4 TYPE 2 DIABETES MELLITUS WITHOUT COMPLICATION, WITH LONG-TERM CURRENT USE OF INSULIN (HCC): ICD-10-CM

## 2023-10-03 DIAGNOSIS — E11.9 TYPE 2 DIABETES MELLITUS WITHOUT COMPLICATION, WITH LONG-TERM CURRENT USE OF INSULIN (HCC): ICD-10-CM

## 2023-10-03 DIAGNOSIS — Z96.41 INSULIN PUMP IN PLACE: ICD-10-CM

## 2023-10-04 ENCOUNTER — LAB ENCOUNTER (OUTPATIENT)
Dept: LAB | Age: 53
End: 2023-10-04
Attending: NURSE PRACTITIONER
Payer: COMMERCIAL

## 2023-10-04 ENCOUNTER — OFFICE VISIT (OUTPATIENT)
Dept: FAMILY MEDICINE CLINIC | Facility: CLINIC | Age: 53
End: 2023-10-04
Payer: COMMERCIAL

## 2023-10-04 ENCOUNTER — TELEPHONE (OUTPATIENT)
Dept: FAMILY MEDICINE CLINIC | Facility: CLINIC | Age: 53
End: 2023-10-04

## 2023-10-04 VITALS
SYSTOLIC BLOOD PRESSURE: 126 MMHG | HEART RATE: 76 BPM | WEIGHT: 260.63 LBS | OXYGEN SATURATION: 95 % | TEMPERATURE: 98 F | HEIGHT: 62 IN | BODY MASS INDEX: 47.96 KG/M2 | DIASTOLIC BLOOD PRESSURE: 68 MMHG

## 2023-10-04 DIAGNOSIS — I10 PRIMARY HYPERTENSION: ICD-10-CM

## 2023-10-04 DIAGNOSIS — G47.33 OSA ON CPAP: ICD-10-CM

## 2023-10-04 DIAGNOSIS — I48.91 ATRIAL FIBRILLATION WITH RVR (HCC): ICD-10-CM

## 2023-10-04 DIAGNOSIS — E89.0 POSTOPERATIVE HYPOTHYROIDISM: ICD-10-CM

## 2023-10-04 DIAGNOSIS — M54.16 LUMBAR RADICULOPATHY: ICD-10-CM

## 2023-10-04 DIAGNOSIS — Z79.4 TYPE 2 DIABETES MELLITUS WITHOUT COMPLICATION, WITH LONG-TERM CURRENT USE OF INSULIN (HCC): ICD-10-CM

## 2023-10-04 DIAGNOSIS — Z23 NEED FOR VACCINATION: ICD-10-CM

## 2023-10-04 DIAGNOSIS — E89.0 S/P THYROIDECTOMY: ICD-10-CM

## 2023-10-04 DIAGNOSIS — E78.2 MIXED HYPERLIPIDEMIA: ICD-10-CM

## 2023-10-04 DIAGNOSIS — Z96.41 INSULIN PUMP IN PLACE: ICD-10-CM

## 2023-10-04 DIAGNOSIS — Z01.818 PREOP EXAMINATION: ICD-10-CM

## 2023-10-04 DIAGNOSIS — E11.9 TYPE 2 DIABETES MELLITUS WITHOUT COMPLICATION, WITH LONG-TERM CURRENT USE OF INSULIN (HCC): ICD-10-CM

## 2023-10-04 DIAGNOSIS — J45.20 MILD INTERMITTENT ASTHMA WITHOUT COMPLICATION: ICD-10-CM

## 2023-10-04 DIAGNOSIS — E04.2 NONTOXIC MULTINODULAR GOITER: ICD-10-CM

## 2023-10-04 DIAGNOSIS — Z01.818 PREOP EXAMINATION: Primary | ICD-10-CM

## 2023-10-04 LAB
ALBUMIN SERPL-MCNC: 3.7 G/DL (ref 3.4–5)
ALBUMIN/GLOB SERPL: 1.1 {RATIO} (ref 1–2)
ALP LIVER SERPL-CCNC: 91 U/L
ALT SERPL-CCNC: 20 U/L
ANION GAP SERPL CALC-SCNC: 4 MMOL/L (ref 0–18)
AST SERPL-CCNC: 7 U/L (ref 15–37)
BASOPHILS # BLD AUTO: 0.05 X10(3) UL (ref 0–0.2)
BASOPHILS NFR BLD AUTO: 0.6 %
BILIRUB SERPL-MCNC: 0.3 MG/DL (ref 0.1–2)
BUN BLD-MCNC: 10 MG/DL (ref 7–18)
CALCIUM BLD-MCNC: 9.3 MG/DL (ref 8.5–10.1)
CHLORIDE SERPL-SCNC: 109 MMOL/L (ref 98–112)
CHOLEST SERPL-MCNC: 150 MG/DL (ref ?–200)
CO2 SERPL-SCNC: 26 MMOL/L (ref 21–32)
CREAT BLD-MCNC: 1.22 MG/DL
EGFRCR SERPLBLD CKD-EPI 2021: 54 ML/MIN/1.73M2 (ref 60–?)
EOSINOPHIL # BLD AUTO: 0.1 X10(3) UL (ref 0–0.7)
EOSINOPHIL NFR BLD AUTO: 1.1 %
ERYTHROCYTE [DISTWIDTH] IN BLOOD BY AUTOMATED COUNT: 12.4 %
FASTING PATIENT LIPID ANSWER: YES
FASTING STATUS PATIENT QL REPORTED: YES
GLOBULIN PLAS-MCNC: 3.4 G/DL (ref 2.8–4.4)
GLUCOSE BLD-MCNC: 103 MG/DL (ref 70–99)
HCT VFR BLD AUTO: 41.4 %
HDLC SERPL-MCNC: 52 MG/DL (ref 40–59)
HGB BLD-MCNC: 13.9 G/DL
IMM GRANULOCYTES # BLD AUTO: 0.03 X10(3) UL (ref 0–1)
IMM GRANULOCYTES NFR BLD: 0.3 %
LDLC SERPL CALC-MCNC: 89 MG/DL (ref ?–100)
LYMPHOCYTES # BLD AUTO: 2.37 X10(3) UL (ref 1–4)
LYMPHOCYTES NFR BLD AUTO: 26.4 %
MCH RBC QN AUTO: 28.8 PG (ref 26–34)
MCHC RBC AUTO-ENTMCNC: 33.6 G/DL (ref 31–37)
MCV RBC AUTO: 85.9 FL
MONOCYTES # BLD AUTO: 0.63 X10(3) UL (ref 0.1–1)
MONOCYTES NFR BLD AUTO: 7 %
NEUTROPHILS # BLD AUTO: 5.81 X10 (3) UL (ref 1.5–7.7)
NEUTROPHILS # BLD AUTO: 5.81 X10(3) UL (ref 1.5–7.7)
NEUTROPHILS NFR BLD AUTO: 64.6 %
NONHDLC SERPL-MCNC: 98 MG/DL (ref ?–130)
OSMOLALITY SERPL CALC.SUM OF ELEC: 287 MOSM/KG (ref 275–295)
PLATELET # BLD AUTO: 290 10(3)UL (ref 150–450)
POTASSIUM SERPL-SCNC: 3.8 MMOL/L (ref 3.5–5.1)
PROT SERPL-MCNC: 7.1 G/DL (ref 6.4–8.2)
RBC # BLD AUTO: 4.82 X10(6)UL
SODIUM SERPL-SCNC: 139 MMOL/L (ref 136–145)
T4 FREE SERPL-MCNC: 1.2 NG/DL (ref 0.8–1.7)
TRIGL SERPL-MCNC: 39 MG/DL (ref 30–149)
TSI SER-ACNC: 1.07 MIU/ML (ref 0.36–3.74)
VLDLC SERPL CALC-MCNC: 6 MG/DL (ref 0–30)
WBC # BLD AUTO: 9 X10(3) UL (ref 4–11)

## 2023-10-04 PROCEDURE — 3074F SYST BP LT 130 MM HG: CPT | Performed by: NURSE PRACTITIONER

## 2023-10-04 PROCEDURE — 3008F BODY MASS INDEX DOCD: CPT | Performed by: NURSE PRACTITIONER

## 2023-10-04 PROCEDURE — 85025 COMPLETE CBC W/AUTO DIFF WBC: CPT

## 2023-10-04 PROCEDURE — 36415 COLL VENOUS BLD VENIPUNCTURE: CPT

## 2023-10-04 PROCEDURE — 3078F DIAST BP <80 MM HG: CPT | Performed by: NURSE PRACTITIONER

## 2023-10-04 PROCEDURE — 80061 LIPID PANEL: CPT

## 2023-10-04 PROCEDURE — 99214 OFFICE O/P EST MOD 30 MIN: CPT | Performed by: NURSE PRACTITIONER

## 2023-10-04 PROCEDURE — 84439 ASSAY OF FREE THYROXINE: CPT

## 2023-10-04 PROCEDURE — 90686 IIV4 VACC NO PRSV 0.5 ML IM: CPT | Performed by: NURSE PRACTITIONER

## 2023-10-04 PROCEDURE — 80053 COMPREHEN METABOLIC PANEL: CPT

## 2023-10-04 PROCEDURE — 90471 IMMUNIZATION ADMIN: CPT | Performed by: NURSE PRACTITIONER

## 2023-10-04 PROCEDURE — 84443 ASSAY THYROID STIM HORMONE: CPT

## 2023-10-04 RX ORDER — CYCLOBENZAPRINE HCL 10 MG
10 TABLET ORAL 3 TIMES DAILY
COMMUNITY
Start: 2023-10-04

## 2023-10-04 RX ORDER — INSULIN ASPART 100 [IU]/ML
INJECTION, SOLUTION INTRAVENOUS; SUBCUTANEOUS
Qty: 150 ML | Refills: 0 | Status: SHIPPED | OUTPATIENT
Start: 2023-10-04

## 2023-10-04 RX ORDER — GLUCAGON INJECTION, SOLUTION 1 MG/.2ML
1 INJECTION, SOLUTION SUBCUTANEOUS AS NEEDED
Qty: 0.4 ML | Refills: 1 | Status: SHIPPED | OUTPATIENT
Start: 2023-10-04

## 2023-10-04 RX ORDER — PREGABALIN 150 MG/1
150 CAPSULE ORAL 2 TIMES DAILY
COMMUNITY
Start: 2023-10-04 | End: 2023-10-04

## 2023-10-04 NOTE — PROGRESS NOTES
Preoperative labs reviewed  Patient is a good surgical candidate. Patient's medical conditions are being optimally managed  Cardiac clearance also done 10/2/23  Patient is cleared with moderate risk for upcoming proposed surgery. This consult was sent back the referring physician.      LILIA Oneal

## 2023-10-04 NOTE — TELEPHONE ENCOUNTER
----- Message from Marino Mcdermott sent at 10/4/2023 12:17 PM CDT -----  Pt returned nurse call-  ph. [phone]

## 2023-10-04 NOTE — TELEPHONE ENCOUNTER
----- Message from LILIA Oneal sent at 10/4/2023 12:00 PM CDT -----  Mildly increased glucose and creatinine. Make sure to drink plenty of fluids  Thyroid normal, continue same dose of Levothyroxine  Cholesterol at goal  No anemia or signs of infection    Cleared for surgery. Note addendum done.

## 2023-10-04 NOTE — TELEPHONE ENCOUNTER
Pended refills for pt  Requested Prescriptions     Pending Prescriptions Disp Refills    Glucagon, rDNA, 1 MG Injection Kit 2 kit 0     Sig: INJECT ONE SYRINGE UNDER THE SKIN ONCE AS NEEDED FOR HYPOGLYCEMIA    insulin aspart (NOVOLOG) 100 Units/mL Injection Solution 150 mL 1     Sig: INJECT UP  UNITS VIA INSULIN PUMP DAILY AS DIRECTED     Your appointments       Date & Time Appointment Department [facility])    Oct 04, 2023  8:20 AM CDT [facility], [facility], Lewis Velasco ([address])        Oct 17, 2023  7:30 AM CDT Follow Up Visit with LILIA Brice [facility], [facility] StreetJoselo ([facility])    Contact your primary care provider if your insurance requires a referral.    Please arrive 15 minutes prior to your scheduled appointment. Be sure to bring your current Insurance card, Photo ID, and medication bottles or a list of your current medications. A 24 hour notice is required to cancel any appointment or you may be charged a $40 No Show Fee. Important: 24 hour notice is required to cancel any appointment or you may be charged a $40 No Show Fee. Please notify your physician office.        Nov 13, 2023  9:00 AM CST Follow Up [facility], [address] (EMG [address])        Nov 27, 2023 10:00 AM CST [facility] [address] ([facility])              [facility], 75[address], [place]  EMG [facility]  [address] [place] [id number] [place] [address]  [address] [facility]  EMG [address] DIABETES [place]  [address] [facility] [address]  [place] [address], [facility], [facility] [address] Arvindcrystal [facility] [address] [address]  [phone]          Last A1c value was 5.5% done 5/11/2023.     Refill 7/20/20, 8/31/23  LOV 5/11/23

## 2023-10-04 NOTE — TELEPHONE ENCOUNTER
From: Lorin Maher  To: Jagdeep Canales  Sent: 10/3/2023 6:01 PM CDT  Subject: Medication     Hi there   I know I have an appointment on the , I need novolog before then. Could I get a refill called in and refill for a glucagon pen?  Mine are both      Thanks   [facility] [employer]

## 2023-10-05 NOTE — TELEPHONE ENCOUNTER
I am not in the office. Can someone please see if a fax was received and if a PA has been completed? Pt said she'll be out by the weekend.

## 2023-10-05 NOTE — TELEPHONE ENCOUNTER
Called pharmacy - they said the insurance preferes novolog as brand name. Pharmacist ran under brand name and it went through, should be good to go.  Sent pt MCM to update

## 2023-10-09 ENCOUNTER — OFFICE VISIT (OUTPATIENT)
Dept: FAMILY MEDICINE CLINIC | Facility: CLINIC | Age: 53
End: 2023-10-09

## 2023-10-09 VITALS
TEMPERATURE: 98 F | HEART RATE: 85 BPM | WEIGHT: 265 LBS | SYSTOLIC BLOOD PRESSURE: 120 MMHG | OXYGEN SATURATION: 98 % | BODY MASS INDEX: 48.76 KG/M2 | DIASTOLIC BLOOD PRESSURE: 76 MMHG | HEIGHT: 62 IN | RESPIRATION RATE: 18 BRPM

## 2023-10-09 DIAGNOSIS — J01.00 ACUTE NON-RECURRENT MAXILLARY SINUSITIS: Primary | ICD-10-CM

## 2023-10-09 PROCEDURE — 3078F DIAST BP <80 MM HG: CPT | Performed by: FAMILY MEDICINE

## 2023-10-09 PROCEDURE — 3074F SYST BP LT 130 MM HG: CPT | Performed by: FAMILY MEDICINE

## 2023-10-09 PROCEDURE — 99214 OFFICE O/P EST MOD 30 MIN: CPT | Performed by: FAMILY MEDICINE

## 2023-10-09 PROCEDURE — 3008F BODY MASS INDEX DOCD: CPT | Performed by: FAMILY MEDICINE

## 2023-10-09 RX ORDER — AZITHROMYCIN 250 MG/1
TABLET, FILM COATED ORAL
Qty: 6 TABLET | Refills: 0 | Status: SHIPPED | OUTPATIENT
Start: 2023-10-09 | End: 2023-10-26 | Stop reason: ALTCHOICE

## 2023-10-10 ENCOUNTER — TELEPHONE (OUTPATIENT)
Dept: FAMILY MEDICINE CLINIC | Facility: CLINIC | Age: 53
End: 2023-10-10

## 2023-10-10 NOTE — TELEPHONE ENCOUNTER
Kriss @ [facility] needs Pre-Op Clearance and OV notes  Fax # [phone] Leatha Rater  Procedure is tomorrow  Ph. [phone]

## 2023-10-17 ENCOUNTER — OFFICE VISIT (OUTPATIENT)
Dept: ENDOCRINOLOGY CLINIC | Facility: CLINIC | Age: 53
End: 2023-10-17
Payer: COMMERCIAL

## 2023-10-17 VITALS
WEIGHT: 257.63 LBS | BODY MASS INDEX: 47 KG/M2 | OXYGEN SATURATION: 96 % | SYSTOLIC BLOOD PRESSURE: 134 MMHG | HEART RATE: 69 BPM | DIASTOLIC BLOOD PRESSURE: 78 MMHG

## 2023-10-17 DIAGNOSIS — Z79.4 TYPE 2 DIABETES MELLITUS WITHOUT COMPLICATION, WITH LONG-TERM CURRENT USE OF INSULIN (HCC): Primary | ICD-10-CM

## 2023-10-17 DIAGNOSIS — E11.9 TYPE 2 DIABETES MELLITUS WITHOUT COMPLICATION, WITH LONG-TERM CURRENT USE OF INSULIN (HCC): Primary | ICD-10-CM

## 2023-10-17 LAB
CARTRIDGE LOT#: ABNORMAL NUMERIC
HEMOGLOBIN A1C: 5.7 % (ref 4.3–5.6)

## 2023-10-17 PROCEDURE — 95251 CONT GLUC MNTR ANALYSIS I&R: CPT | Performed by: NURSE PRACTITIONER

## 2023-10-17 PROCEDURE — 3075F SYST BP GE 130 - 139MM HG: CPT | Performed by: NURSE PRACTITIONER

## 2023-10-17 PROCEDURE — 83036 HEMOGLOBIN GLYCOSYLATED A1C: CPT | Performed by: NURSE PRACTITIONER

## 2023-10-17 PROCEDURE — 99214 OFFICE O/P EST MOD 30 MIN: CPT | Performed by: NURSE PRACTITIONER

## 2023-10-17 PROCEDURE — 3078F DIAST BP <80 MM HG: CPT | Performed by: NURSE PRACTITIONER

## 2023-10-17 PROCEDURE — 3044F HG A1C LEVEL LT 7.0%: CPT | Performed by: FAMILY MEDICINE

## 2023-10-19 ENCOUNTER — PATIENT MESSAGE (OUTPATIENT)
Dept: ORTHOPEDICS CLINIC | Facility: CLINIC | Age: 53
End: 2023-10-19

## 2023-10-19 DIAGNOSIS — M17.0 PRIMARY OSTEOARTHRITIS OF BOTH KNEES: Primary | ICD-10-CM

## 2023-10-19 NOTE — TELEPHONE ENCOUNTER
LOV 7/26/23, Synvisc one, biljohnna knees. Pt requesting this be re-ordered and provided mychart update. Order pended.

## 2023-10-26 RX ORDER — PROCHLORPERAZINE 25 MG/1
SUPPOSITORY RECTAL
Qty: 1 EACH | Refills: 0 | Status: SHIPPED | OUTPATIENT
Start: 2023-10-26

## 2023-10-26 RX ORDER — PROCHLORPERAZINE 25 MG/1
SUPPOSITORY RECTAL
Qty: 3 EACH | Refills: 2 | Status: SHIPPED | OUTPATIENT
Start: 2023-10-26

## 2023-10-26 NOTE — TELEPHONE ENCOUNTER
Requested Prescriptions     Pending Prescriptions Disp Refills    DEXCOM G6 SENSOR Does not apply [address] [Pharmacy Med Name: Mariah Huia MIS SENSOR]  0     Sig: USE 1 SENSOR EVERY 10 DAYS    DEXCOM G6 TRANSMITTER Does not apply [address] [Pharmacy Med Name: Mariah Huia MIS TRANSMIT]  0     Sig: USE 1 TRANSMITTER EVERY 90 DAYS     Your appointments       Date & Time Appointment Department [facility])    Nov 13, 2023  9:00 AM CST Follow Up Visit with Manuela Carpio, [address] [place] [address])        Nov 27, 2023 10:00 AM CST Post Op Visit with Bethel Callahan, [address] ([address])        Feb 06, 2024  7:30 AM CST Diabetes Pump [facility] [address] ([facility] [place])              [address] [facility], [address], [place]  [facility]  [facility]  [phone] [address] [facility], [address], [place]  [facility] [place]  [address] [address] [place] [id number]  [address]  [facility] [address]  [phone]          Last A1c value was 5.7% done 10/17/2023.     Refill 7/31/23  LOV 10/17/23

## 2023-10-27 ENCOUNTER — PATIENT MESSAGE (OUTPATIENT)
Dept: FAMILY MEDICINE CLINIC | Facility: CLINIC | Age: 53
End: 2023-10-27

## 2023-10-27 RX ORDER — SIMVASTATIN 40 MG
40 TABLET ORAL EVERY EVENING
Qty: 90 TABLET | Refills: 0 | Status: SHIPPED | OUTPATIENT
Start: 2023-10-27

## 2023-10-27 NOTE — TELEPHONE ENCOUNTER
LOV: 10/9/23 for sinus problem        SIMVASTATIN 40 MG Oral Tab  TAKE 1 TABLET(40 MG) BY MOUTH EVERY EVENING Dispense: 90 tablet, Refills: 0 ordered       06/24/2023     Future Appointments   Date Time Provider Jovani Haque   11/13/2023  9:00 AM LILIA Connelly EMGWEI [facility]   11/27/2023 10:00 AM ALISON Anderson [facility] [id number]   2/6/2024  7:30 AM LILIA Whitney EMGDIABCTRNA [facility] [address] [place]

## 2023-10-31 ENCOUNTER — HOSPITAL ENCOUNTER (OUTPATIENT)
Age: 53
Discharge: HOME | End: 2023-10-31
Payer: COMMERCIAL

## 2023-10-31 VITALS
BODY MASS INDEX: 45.82 KG/M2 | RESPIRATION RATE: 16 BRPM | HEART RATE: 70 BPM | HEIGHT: 62 IN | WEIGHT: 249 LBS | DIASTOLIC BLOOD PRESSURE: 84 MMHG | TEMPERATURE: 97 F | SYSTOLIC BLOOD PRESSURE: 158 MMHG | OXYGEN SATURATION: 98 %

## 2023-10-31 DIAGNOSIS — U07.1 COVID: Primary | ICD-10-CM

## 2023-10-31 LAB — SARS-COV-2 RNA RESP QL NAA+PROBE: DETECTED

## 2023-10-31 PROCEDURE — 99213 OFFICE O/P EST LOW 20 MIN: CPT | Performed by: NURSE PRACTITIONER

## 2023-10-31 PROCEDURE — U0002 COVID-19 LAB TEST NON-CDC: HCPCS | Performed by: NURSE PRACTITIONER

## 2023-10-31 NOTE — DISCHARGE INSTRUCTIONS
Your COVID test was positive. Please add Flonase to your regimen. Tylenol for pain. Mask wearing until the end of day 10. Follow closely with your primary.

## 2023-10-31 NOTE — ED INITIAL ASSESSMENT (HPI)
URI since last Wednesday. Taking muccinex with no relief. Now head is full, ears are painful with pressure and frontal sinus pressure. No cough.

## 2023-11-02 ENCOUNTER — PATIENT MESSAGE (OUTPATIENT)
Dept: ORTHOPEDICS CLINIC | Facility: CLINIC | Age: 53
End: 2023-11-02

## 2023-11-03 NOTE — TELEPHONE ENCOUNTER
Spoke with Shahana Grimes in regards to her upcoming surgery. I did explain to her that she will need to remove her acrylic nails prior to surgery. She states understanding with no further questions or concerns.

## 2023-11-06 ENCOUNTER — PATIENT MESSAGE (OUTPATIENT)
Dept: INTERNAL MEDICINE CLINIC | Facility: CLINIC | Age: 53
End: 2023-11-06

## 2023-11-07 NOTE — TELEPHONE ENCOUNTER
Last seen in person 8/8/23  Future Appointments   Date Time Provider Jovani Haque   11/13/2023  9:00 AM Ollie Primrose, APRN EMGWEI EM[facility] [address]       Asking to change to video visit?

## 2023-11-13 ENCOUNTER — TELEMEDICINE (OUTPATIENT)
Dept: INTERNAL MEDICINE CLINIC | Facility: CLINIC | Age: 53
End: 2023-11-13
Payer: COMMERCIAL

## 2023-11-13 DIAGNOSIS — G47.33 OSA ON CPAP: ICD-10-CM

## 2023-11-13 DIAGNOSIS — E11.9 TYPE 2 DIABETES MELLITUS WITHOUT COMPLICATION, WITH LONG-TERM CURRENT USE OF INSULIN (HCC): ICD-10-CM

## 2023-11-13 DIAGNOSIS — E66.01 OBESITY, MORBID, BMI 50 OR HIGHER (HCC): ICD-10-CM

## 2023-11-13 DIAGNOSIS — E03.9 HYPOTHYROIDISM, UNSPECIFIED TYPE: ICD-10-CM

## 2023-11-13 DIAGNOSIS — Z79.4 TYPE 2 DIABETES MELLITUS WITHOUT COMPLICATION, WITH LONG-TERM CURRENT USE OF INSULIN (HCC): ICD-10-CM

## 2023-11-13 DIAGNOSIS — E78.5 DYSLIPIDEMIA: ICD-10-CM

## 2023-11-13 DIAGNOSIS — F43.9 STRESS: ICD-10-CM

## 2023-11-13 DIAGNOSIS — F41.9 ANXIETY: ICD-10-CM

## 2023-11-13 DIAGNOSIS — Z51.81 THERAPEUTIC DRUG MONITORING: Primary | ICD-10-CM

## 2023-11-13 PROCEDURE — 99213 OFFICE O/P EST LOW 20 MIN: CPT | Performed by: NURSE PRACTITIONER

## 2023-11-13 RX ORDER — TIRZEPATIDE 15 MG/.5ML
15 INJECTION, SOLUTION SUBCUTANEOUS WEEKLY
Qty: 2 ML | Refills: 3 | Status: SHIPPED | OUTPATIENT
Start: 2023-11-13

## 2023-11-13 NOTE — PATIENT INSTRUCTIONS
Next steps:  1. Fill your prescribed medication and take as discussed and prescribed: mounjaro 15mg weekly   2. Schedule a personal nutrition consultation with one of our registered dieticians     Please try to work on the following dietary changes:    1. Drink water with meals and throughout the day, cut down on soda and/or juice if consumed. Consider flavored water options like Bubbly, Spindrift, Hint and Pilar. 2.  Eat breakfast daily and focus on having protein with each meal, examples include: greek yogurt, cottage cheese, hard boiled egg, whole grain toast with peanut butter. 3.  Reduce refined carbohydrates and sugars which includes items such as sweets, as well as rice, pasta, and bread and make sure to choose whole grain options when having them with just 1 serving per meal about the size of your inner palm. 4.  Consume non starchy veggies daily working towards making them a good 50% of your daily food intake. Add them to lunch and dinner consistently. 5.  Start a daily probiotic: VSL#3 is recommended, (order on line at www.vsl3. com). Take 1 capsule daily with water for 30 days, then reduce to 1 every other day (this will reduce the cost). Capsules can be left out for 2 weeks, but then must be refrigerated. Please download uzma My Fitness Rosaura Reynaga! Or Net Diary to monitor daily dietary intake and you will be able to see if you are eating the right amount of calories or too much or too little which would hinder weight loss. Additionally this will help to see your daily carbohydrate and protein intake. When you set the uzma up choose 1-2 lbs/week as a goal.  Keeping a paper food journal is an option as well to remain accountable for your choices- this is the start to mindful eating! A low calorie diet has been consistently shown to support weight loss. Continue or start exercising to help establish a routine.  If not already exercising begin with 1 day and progress as able with long-term goal of 30 minutes 5 days a week at a minimum. Meditation daily can help manage and control stress. Chronic stress can make weight loss difficult. Exercising is one way to help with stress, but meditation using the CALM Shaniqua or another comparable alternative can be done in your home or place of work with little time commitment. This Shaniqua can also help work on behavior change and improve sleep. Check out the segment under Calm Masterclass and listen to The 4 Pillars of Health. A great way to begin learning about the foundation of lifestyle with practical tips to use in your every day. Check out www.yourweightmatters. org blog for continued daily support and education along this weight loss journey! Patient Resources:     Personal Training/Fitness Classes/Health Coaching     Abhijit De Los Santos and Leos Sophiaside @ [email or web address]/ Full fitness center with group fitness and personal training. Discount available as client of [facility]. Health Coaching and Personal Training with Cheryl Rock at our [facility]- individual weekly coaching with option to add personal training and small group fitness classes targeted at weight loss- [phone] and/or email @ [facility]. [email or web address]  360FIT [place] [email or web address] Group Fitness [phone] and/or email Mary Black at [email or web address]  [address] [facility] with multiple locations: Aetna (www.Staff Ranker), Eat The Blinkiverse Fitness (www.Ezra Innovations. GROU.PS), Fit Body Bootcamp (www.Pathfulbodybootcamp.GROU.PS), Playroom (www.ImmusanT. GROU.PS), The Exercise  (www.exercisecoach.GROU.PS)     Online Fitness  Fitness  on [employer] in 10 DVD series- www. ilwxk40HSD. GROU.PS  Sit and Be Fit - Chair exercise series Www.sitandbefit. org  Hip Hop Fit with Siva Loya at www.hiphopfit. net     Apps for on the [employer] 7 Minute Workout (orange box with white 7) - free on the go HIIT training shaniqua  Peloton Shaniqua @ www. Xylitol [place]     Nutrition Trackers and Tools  LoseIT! And My Fitness Pal apps and on line for tracking nutrition  NOOM - virtual health coaching  FitFoundation (healthy meals on the go) in [facility] Valley[facility]-SCI @ www. tmstfgofiynur4w. Kelsi Martínez MD @ www.Promachos HoldingdRewarding Return and Derek Beauchamp (keto and low carb plans recommended) @ www. ECOIYE83.WEX, Metabolic Meals @ www. PadlocMetabolicMeals. com - individual prepared meals to go  Water Innovate, Lybrate, [employer] Business Machines, Every Plate, eASIC- on line meal delivery programs for preparation at home  AK Aarki in [place] for homemade meals to go @ wwwVBI Vaccines  Diet Doctor @ www. dietdoctor. FathomDB - low carb swaps  YuWindtronics - meal prep and planning shaniqua (www.yummly. com)     Stress Management/Behavior/Mindful Eating  CALM meditation shaniqua (www.Moni Technologies)  Headspace  Am I Hungry? Mindful eating virtual  shaniqua  Www.yourweightmatters. org - Obesity Action Coalition sponsored Blog posts daily  Motivation shaniqua (black box with white \")- daily supportive messages sent to your phone     Books/Video Education/Podcasts  Mindless Eating by Natalia Hutchins  Why We Get Sick by Varsha Mcmahan (a book about insulin resistance)  Atomic Habits by Caesar Standard (a book about taking small steps to promote greater behavior change)   Can't Hurt Me by Rock Beck (a book exploring the power of discipline in achieving your goals)  The End of Dieting: How to Live for Life by Dr. Antonina Eugene M.D. or listen to The 1995 [employer] Episode 61: Understanding \"Nutritarian\" Eating w/Dr. Antonina Eugene  Your Body in Balance: The World [employer] Corporation of Food, Hormones, and Health by Dr. Mariana Gonzales  The Menopause Diet Plan by Brianna Roldan and [facility] AT [facility]  The Complete Guide to fasting by Dr. Marian Roman, [address] [facility] by Kathy Shoemaker, Ph.D, R.D.   Weight Loss Surgery Will Not Treat Food Addiction by Kanika Hoffman Ph.D  The Game Changers- Danielflsandy Documentary on plant based nutrition  Fed Up - documentary about obesity (Free on Utube)  The Truth About Sugar - documentary on sugar (Free on Utube, https://youtu. be/6Z3cfmaAL0e)  The Dr. Daniela Singletary by Dr. Marsha St MD  Fitlosophy Fitspiration - journal to better health (found at [employer] in fitness aisle)  What Happened to You?- a look at the impact trauma has on behavior written by Tyesha Mitchell and Dr. Reji Lehman Again by Belem Kaplan - discovering your true self after trauma  Luis Armando Altamirano talk on HealthSpot, The Call to TradeHero  Podcasts: The Exam Room by the Physician's Committee, Nutrition Facts by Dr. Helen Tirado    We are here to support you with weight loss, but please remember that you still need your primary care provider for your routine health maintenance.

## 2023-11-13 NOTE — PROGRESS NOTES
[facility] WEIGHT MANAGEMENT VIRTUAL ENCOUNTER     Jaclyn Ravi verbally consents to a Virtual/Telephone Check-In service on 11/13/23   Patient understands and accepts financial responsibility for any deductible, co-insurance and/or co-pays associated with this service. HISTORY OF PRESENT ILLNESS  Chief Complaint   Patient presents with    Other     F/u on weight management      Jaclyn Ravi is a 48year old female is being evaluated as a video visit using Telemedicine with live, interactive video and audio    Weight gain/loss since LOV based on home monitoring:   Home scale:  #252   Has lost  #10 lbs since LOV 2.5 months ago     Compliance with medication: mounjaro 12.5 mg weekly  Tolerating well, helping with decreasing appetite and no side effects     Admits that she has been on and off medication for a couple of different surgeries  Is getting a neuro stimulator 12/23   Fasting blood sugars 100   Stress is high   Still eating out about 14 + hours   Exercise/Activity: not doing anything routine as far as exercise- due to chronic pain  Nutrition: eating regular meals, +protein, minimal veggies. not tracking reports  Stress is high, manageable   Sleep: 7-8 hours/night, waking up feeling rested    Denies chest pain, shortness of breath, dizziness, blurred vision, headache, paresthesia, nausea/vomiting.      Wt Readings from Last 6 Encounters:   10/31/23 249 lb (112.9 kg)   10/17/23 257 lb 9.6 oz (116.8 kg)   10/09/23 265 lb (120.2 kg)   10/04/23 260 lb 9.6 oz (118.2 kg)   08/28/23 265 lb (120.2 kg)   10/26/23 256 lb (116.1 kg)          Subjective  REVIEW OF SYSTEMS  GENERAL HEALTH: feels well otherwise, denied any fevers chills or night sweats   RESPIRATORY: denies shortness of breath   CARDIOVASCULAR: denies chest pain  GI: denies abdominal pain  PSYCH: denies any mood changes    Objective  EXAM  Reviewed most recent set of vitals   Physical Exam:  GENERAL: well developed, well nourished, in no apparent distress, speaking in full sentences comfortably   SKIN: warm, pink, dry without rashes to exposed area   EYES: conjunctiva pink  HEENT: atraumatic, normocephalic  LUNGS: normal work of breathing, non labored  CARDIO: normal work, no exertion  EXTREMITIES: no cyanosis, no clubbing, no edema  NEURO: Oriented times three  PSYCH: pleasant, cooperative, normal mood and affect    Lab Results   Component Value Date    WBC 9.0 10/04/2023    RBC 4.82 10/04/2023    HGB 13.9 10/04/2023    HCT 41.4 10/04/2023    MCV 85.9 10/04/2023    MCH 28.8 10/04/2023    MCHC 33.6 10/04/2023    RDW 12.4 10/04/2023    .0 10/04/2023    MPV 10.4 02/01/2013     Lab Results   Component Value Date     (H) 10/04/2023    BUN 10 10/04/2023    BUNCREA NOT APPLICABLE 01/94/3026    CREATSERUM 1.22 (H) 10/04/2023    ANIONGAP 4 10/04/2023    GFR 72 07/01/2017    GFRNAA 74 03/23/2022    GFRAA 85 03/23/2022    CA 9.3 10/04/2023    OSMOCALC 287 10/04/2023    ALKPHO 91 10/04/2023    AST 7 (L) 10/04/2023    ALT 20 10/04/2023    BILT 0.3 10/04/2023    TP 7.1 10/04/2023    ALB 3.7 10/04/2023    GLOBULIN 3.4 10/04/2023    AGRATIO 1.9 03/23/2022     10/04/2023    K 3.8 10/04/2023     10/04/2023    CO2 26.0 10/04/2023     Lab Results   Component Value Date     11/01/2022    A1C 5.7 (A) 10/17/2023     Lab Results   Component Value Date    CHOLEST 150 10/04/2023    TRIG 39 10/04/2023    HDL 52 10/04/2023    LDL 89 10/04/2023    VLDL 6 10/04/2023    TCHDLRATIO 2.8 03/23/2022    NONHDLC 98 10/04/2023     Lab Results   Component Value Date    T4F 1.2 10/04/2023    TSH 1.070 10/04/2023    TSHT4 2.62 03/23/2022     No results found for: \"B12\", \"VITB12\"  Lab Results   Component Value Date    VITD 22.6 (L) 12/26/2015       Current Outpatient Medications on File Prior to Visit   Medication Sig Dispense Refill    simvastatin 40 MG Oral Tab Take 1 tablet (40 mg total) by mouth every evening.  90 tablet 0    Continuous Blood Gluc Sensor (DEXCOM G6 SENSOR) Does not apply Misc USE 1 SENSOR EVERY 10 DAYS 3 each 2    Continuous Blood Gluc Transmit (DEXCOM G6 TRANSMITTER) Does not apply Misc USE 1 TRANSMITTER EVERY 90 DAYS 1 each 0    Insulin Lispro (HUMALOG SC) Inject into the skin. INSULIN PUMP  Basal rate  12am 3.6Units  5:30 2.27Units  1630  2. 84Units  1830  2.4Units  2130 2. 87Units    Insulin to Carb Ratio is 1:9    Correction factor is 1:30    Target Glucose is 110      glucagon (GVOKE HYPOPEN 2-PACK) 1 MG/0.2ML Subcutaneous SUBQ injection Inject 0.2 mL (1 mg total) into the skin as needed. 0.4 mL 1    Tirzepatide (MOUNJARO) 12.5 MG/0.5ML Subcutaneous Solution Pen-injector Inject 12.5 mg into the skin once a week. 2 mL 1    PARoxetine HCl 40 MG Oral Tab Take 1 tablet (40 mg total) by mouth every morning. 90 tablet 1    Omeprazole 40 MG Oral Capsule Delayed Release Take 1 capsule (40 mg total) by mouth daily. 90 capsule 0    levothyroxine (SYNTHROID) 137 MCG Oral Tab TAKE 1 TABLET DAILY 90 tablet 0    pregabalin 150 MG Oral Cap Take 1 capsule (150 mg total) by mouth 2 (two) times daily. BUPROPION  MG Oral Tablet 24 Hr TAKE 1 TABLET(300 MG) BY MOUTH DAILY 90 tablet 1    traMADol 50 MG Oral Tab Take 1 tablet (50 mg total) by mouth every 8 (eight) hours as needed for Pain. (Patient taking differently: Take 1 tablet (50 mg total) by mouth every 6 (six) hours as needed for Pain.) 30 tablet 0    Multiple Vitamins-Minerals (MULTI-VITAMIN/MINERALS) Oral Tab Take 1 tablet by mouth daily. apixaban 5 MG Oral Tab Take 1 tablet (5 mg total) by mouth in the morning and 1 tablet (5 mg total) before bedtime. 60 tablet 3    Insulin Glargine, 2 Unit Dial, (TOUJEO MAX SOLOSTAR) 300 UNIT/ML Subcutaneous Solution Pen-injector As directed up to TDD 75 units daily in the event of insulin pump failure 6 mL 1    estradiol 0.05 MG/24HR Transdermal Patch Weekly APPLY 1 PATCH EXTERNALLY TO THE SKIN EVERY TUESDAY.  DO NOT CUT PATCH       No current facility-administered medications on file prior to visit. ASSESSMENT  Analyzed weight data:       Diagnoses and all orders for this visit:    Therapeutic drug monitoring    Obesity, morbid, BMI 50 or higher ([facility] [address]    Type 2 diabetes mellitus without complication, with long-term current use of insulin (HCC)    Hypothyroidism, unspecified type    Dyslipidemia    CORINNE on CPAP    Stress    Anxiety        PLAN  Will increase medications: mounjaro 15mg weekly   --advised of side effects and adverse effects of this medication  Contradictions: ozempic, avoid stimulant medication due to cardiac hx    Reviewed labs  HLD  stable, follows with PCP  Hx of DM type 2, reviewed last a1c 5.7% on 10/37719 was previously 5.5% on 5/2023- has dexacom monitor- see hpi  CORINNE- continue with nightly CPAP machine  Anxiety/depression, stable  Stress is high, but stable  Chronic pain, is awaiting neuro stimulator (scheduled for 12/2023)   Hypothyroid- stable, continue with current medication regimen, managed by PCP  Advised to monitor blood pressure and pulse at home/ given parameters to review and contact provider. Nutrition: low carb diet/ recommended to eat breakfast daily/ regular protein intake  Follow up with dietitian and psychologist as recommended. Discussed the role of sleep and stress in weight management. Counseled on comprehensive weight loss plan including attention to nutrition, exercise and behavior/stress management for success. See patient instruction below for more details. Discussed strategies to overcome barriers to successful weight loss and weight maintenance  FITTE: ACSM recommendations (150-300 minutes/ week in active weight loss)       There are no Patient Instructions on file for this visit. No follow-ups on file. Patient verbalizes understanding. Total time spent on chart review, pre-charting, obtaining history, counseling, and educating, reviewing labs was 27 minutes.        Pt understands phone/video evaluation is not a substitute for face to face examination or emergency care. Pt advised to go to the ER or call 911 for worsening symptoms or acute distress. Please note that the following visit was completed using two-way, real-time interactive audio and/or video communication. This has been done in good lonnie to provide continuity of care in the best interest of the provider-patient relationship, due to the ongoing public health crisis/national emergency and because of restrictions of visitation. There are limitations of this visit as no physical exam could be performed. Every conscious effort was taken to allow for sufficient and adequate time. This billing was spent on reviewing labs, medications, radiology tests and decision making. Appropriate medical decision-making and tests are ordered as detailed in the plan of care above. NOTE TO PATIENT: The 21st Century Cures Act makes clinical notes like these available to patients in the interest of transparency. Clinical notes are medical documents used by physicians and care providers to communicate with each other. These documents include medical language and terminology, abbreviations, and treatment information that may sound technical and at times possibly unfamiliar. In addition, at times, the verbiage may appear blunt or direct. These documents are one tool providers use to communicate relevant information and clinical opinions of the care providers in a way that allows common understanding of the clinical context.      Pamela Hazel, APRN  11/13/2023

## 2023-11-15 ENCOUNTER — PATIENT MESSAGE (OUTPATIENT)
Dept: ORTHOPEDICS CLINIC | Facility: CLINIC | Age: 53
End: 2023-11-15

## 2023-11-15 DIAGNOSIS — E66.01 MORBID OBESITY (HCC): Primary | ICD-10-CM

## 2023-11-16 ENCOUNTER — ANESTHESIA EVENT (OUTPATIENT)
Dept: SURGERY | Facility: HOSPITAL | Age: 53
End: 2023-11-16
Payer: COMMERCIAL

## 2023-11-16 NOTE — TELEPHONE ENCOUNTER
LOV 8/3/23  DOS 11/17/23 UPCOMING    Pt requesting letter allowing her to return to work day after surgery (this Saturday.) Is  at food [employer]. Letter pended, please add restrictions.

## 2023-11-17 ENCOUNTER — ANESTHESIA (OUTPATIENT)
Dept: SURGERY | Facility: HOSPITAL | Age: 53
End: 2023-11-17
Payer: COMMERCIAL

## 2023-11-17 ENCOUNTER — APPOINTMENT (OUTPATIENT)
Dept: GENERAL RADIOLOGY | Facility: HOSPITAL | Age: 53
End: 2023-11-17
Attending: ORTHOPAEDIC SURGERY
Payer: COMMERCIAL

## 2023-11-17 ENCOUNTER — HOSPITAL ENCOUNTER (OUTPATIENT)
Facility: HOSPITAL | Age: 53
Setting detail: HOSPITAL OUTPATIENT SURGERY
Discharge: HOME | End: 2023-11-17
Attending: ORTHOPAEDIC SURGERY | Admitting: ORTHOPAEDIC SURGERY
Payer: COMMERCIAL

## 2023-11-17 ENCOUNTER — TELEPHONE (OUTPATIENT)
Dept: PHYSICAL THERAPY | Facility: HOSPITAL | Age: 53
End: 2023-11-17

## 2023-11-17 ENCOUNTER — TELEPHONE (OUTPATIENT)
Dept: ORTHOPEDICS CLINIC | Facility: CLINIC | Age: 53
End: 2023-11-17

## 2023-11-17 ENCOUNTER — TELEPHONE (OUTPATIENT)
Dept: PHYSICAL THERAPY | Age: 53
End: 2023-11-17

## 2023-11-17 VITALS
SYSTOLIC BLOOD PRESSURE: 177 MMHG | BODY MASS INDEX: 48.76 KG/M2 | TEMPERATURE: 97 F | OXYGEN SATURATION: 96 % | HEART RATE: 60 BPM | DIASTOLIC BLOOD PRESSURE: 70 MMHG | RESPIRATION RATE: 18 BRPM | WEIGHT: 265 LBS | HEIGHT: 62 IN

## 2023-11-17 DIAGNOSIS — M15.1 DEGENERATIVE ARTHRITIS OF DISTAL INTERPHALANGEAL JOINT OF MIDDLE FINGER OF LEFT HAND: Primary | ICD-10-CM

## 2023-11-17 LAB — GLUCOSE BLD-MCNC: 112 MG/DL (ref 70–99)

## 2023-11-17 PROCEDURE — 76000 FLUOROSCOPY <1 HR PHYS/QHP: CPT | Performed by: ORTHOPAEDIC SURGERY

## 2023-11-17 PROCEDURE — 0RGX04Z FUSION OF LEFT FINGER PHALANGEAL JOINT WITH INTERNAL FIXATION DEVICE, OPEN APPROACH: ICD-10-PCS | Performed by: ORTHOPAEDIC SURGERY

## 2023-11-17 PROCEDURE — 82962 GLUCOSE BLOOD TEST: CPT

## 2023-11-17 DEVICE — IMPLANTABLE DEVICE: Type: IMPLANTABLE DEVICE | Site: FINGER | Status: FUNCTIONAL

## 2023-11-17 RX ORDER — HYDROCODONE BITARTRATE AND ACETAMINOPHEN 5; 325 MG/1; MG/1
2 TABLET ORAL ONCE AS NEEDED
Status: DISCONTINUED | OUTPATIENT
Start: 2023-11-17 | End: 2023-11-17

## 2023-11-17 RX ORDER — HYDROCODONE BITARTRATE AND ACETAMINOPHEN 5; 325 MG/1; MG/1
1 TABLET ORAL EVERY 6 HOURS PRN
Qty: 20 TABLET | Refills: 0 | Status: SHIPPED | OUTPATIENT
Start: 2023-11-17 | End: 2023-11-21

## 2023-11-17 RX ORDER — MIDAZOLAM HYDROCHLORIDE 1 MG/ML
INJECTION INTRAMUSCULAR; INTRAVENOUS AS NEEDED
Status: DISCONTINUED | OUTPATIENT
Start: 2023-11-17 | End: 2023-11-17 | Stop reason: SURG

## 2023-11-17 RX ORDER — CEFAZOLIN SODIUM/WATER 2 G/20 ML
2 SYRINGE (ML) INTRAVENOUS ONCE
Status: COMPLETED | OUTPATIENT
Start: 2023-11-17 | End: 2023-11-17

## 2023-11-17 RX ORDER — SODIUM CHLORIDE, SODIUM LACTATE, POTASSIUM CHLORIDE, CALCIUM CHLORIDE 600; 310; 30; 20 MG/100ML; MG/100ML; MG/100ML; MG/100ML
INJECTION, SOLUTION INTRAVENOUS CONTINUOUS
Status: DISCONTINUED | OUTPATIENT
Start: 2023-11-17 | End: 2023-11-17

## 2023-11-17 RX ORDER — BUPIVACAINE HYDROCHLORIDE 5 MG/ML
INJECTION, SOLUTION EPIDURAL; INTRACAUDAL AS NEEDED
Status: DISCONTINUED | OUTPATIENT
Start: 2023-11-17 | End: 2023-11-17 | Stop reason: HOSPADM

## 2023-11-17 RX ORDER — HYDROMORPHONE HYDROCHLORIDE 1 MG/ML
0.2 INJECTION, SOLUTION INTRAMUSCULAR; INTRAVENOUS; SUBCUTANEOUS EVERY 5 MIN PRN
Status: DISCONTINUED | OUTPATIENT
Start: 2023-11-17 | End: 2023-11-17

## 2023-11-17 RX ORDER — ONDANSETRON 2 MG/ML
4 INJECTION INTRAMUSCULAR; INTRAVENOUS EVERY 6 HOURS PRN
Status: DISCONTINUED | OUTPATIENT
Start: 2023-11-17 | End: 2023-11-17

## 2023-11-17 RX ORDER — LABETALOL HYDROCHLORIDE 5 MG/ML
5 INJECTION, SOLUTION INTRAVENOUS EVERY 5 MIN PRN
Status: DISCONTINUED | OUTPATIENT
Start: 2023-11-17 | End: 2023-11-17

## 2023-11-17 RX ORDER — LIDOCAINE HYDROCHLORIDE 10 MG/ML
INJECTION, SOLUTION EPIDURAL; INFILTRATION; INTRACAUDAL; PERINEURAL AS NEEDED
Status: DISCONTINUED | OUTPATIENT
Start: 2023-11-17 | End: 2023-11-17 | Stop reason: SURG

## 2023-11-17 RX ORDER — DEXTROSE MONOHYDRATE 25 G/50ML
50 INJECTION, SOLUTION INTRAVENOUS
Status: DISCONTINUED | OUTPATIENT
Start: 2023-11-17 | End: 2023-11-17 | Stop reason: HOSPADM

## 2023-11-17 RX ORDER — HYDROMORPHONE HYDROCHLORIDE 1 MG/ML
0.4 INJECTION, SOLUTION INTRAMUSCULAR; INTRAVENOUS; SUBCUTANEOUS EVERY 5 MIN PRN
Status: DISCONTINUED | OUTPATIENT
Start: 2023-11-17 | End: 2023-11-17

## 2023-11-17 RX ORDER — LIDOCAINE HYDROCHLORIDE 10 MG/ML
INJECTION, SOLUTION INFILTRATION; PERINEURAL AS NEEDED
Status: DISCONTINUED | OUTPATIENT
Start: 2023-11-17 | End: 2023-11-17 | Stop reason: HOSPADM

## 2023-11-17 RX ORDER — HYDROMORPHONE HYDROCHLORIDE 1 MG/ML
0.6 INJECTION, SOLUTION INTRAMUSCULAR; INTRAVENOUS; SUBCUTANEOUS EVERY 5 MIN PRN
Status: DISCONTINUED | OUTPATIENT
Start: 2023-11-17 | End: 2023-11-17

## 2023-11-17 RX ORDER — NALOXONE HYDROCHLORIDE 0.4 MG/ML
0.08 INJECTION, SOLUTION INTRAMUSCULAR; INTRAVENOUS; SUBCUTANEOUS AS NEEDED
Status: DISCONTINUED | OUTPATIENT
Start: 2023-11-17 | End: 2023-11-17

## 2023-11-17 RX ORDER — NICOTINE POLACRILEX 4 MG
15 LOZENGE BUCCAL
Status: DISCONTINUED | OUTPATIENT
Start: 2023-11-17 | End: 2023-11-17 | Stop reason: HOSPADM

## 2023-11-17 RX ORDER — ACETAMINOPHEN 500 MG
1000 TABLET ORAL ONCE
Status: DISCONTINUED | OUTPATIENT
Start: 2023-11-17 | End: 2023-11-17 | Stop reason: HOSPADM

## 2023-11-17 RX ORDER — HYDROCODONE BITARTRATE AND ACETAMINOPHEN 5; 325 MG/1; MG/1
1 TABLET ORAL ONCE AS NEEDED
Status: DISCONTINUED | OUTPATIENT
Start: 2023-11-17 | End: 2023-11-17

## 2023-11-17 RX ORDER — NICOTINE POLACRILEX 4 MG
30 LOZENGE BUCCAL
Status: DISCONTINUED | OUTPATIENT
Start: 2023-11-17 | End: 2023-11-17 | Stop reason: HOSPADM

## 2023-11-17 RX ORDER — PROCHLORPERAZINE EDISYLATE 5 MG/ML
5 INJECTION INTRAMUSCULAR; INTRAVENOUS EVERY 8 HOURS PRN
Status: DISCONTINUED | OUTPATIENT
Start: 2023-11-17 | End: 2023-11-17

## 2023-11-17 RX ORDER — ACETAMINOPHEN 500 MG
1000 TABLET ORAL ONCE AS NEEDED
Status: DISCONTINUED | OUTPATIENT
Start: 2023-11-17 | End: 2023-11-17

## 2023-11-17 RX ADMIN — LIDOCAINE HYDROCHLORIDE 30 MG: 10 INJECTION, SOLUTION EPIDURAL; INFILTRATION; INTRACAUDAL; PERINEURAL at 07:10:00

## 2023-11-17 RX ADMIN — MIDAZOLAM HYDROCHLORIDE 2 MG: 1 INJECTION INTRAMUSCULAR; INTRAVENOUS at 07:05:00

## 2023-11-17 RX ADMIN — SODIUM CHLORIDE, SODIUM LACTATE, POTASSIUM CHLORIDE, CALCIUM CHLORIDE: 600; 310; 30; 20 INJECTION, SOLUTION INTRAVENOUS at 07:53:00

## 2023-11-17 RX ADMIN — SODIUM CHLORIDE, SODIUM LACTATE, POTASSIUM CHLORIDE, CALCIUM CHLORIDE: 600; 310; 30; 20 INJECTION, SOLUTION INTRAVENOUS at 07:05:00

## 2023-11-17 RX ADMIN — CEFAZOLIN SODIUM/WATER 2 G: 2 G/20 ML SYRINGE (ML) INTRAVENOUS at 07:11:00

## 2023-11-17 NOTE — OPERATIVE REPORT
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 She was essential for the proper positioning of patient, manipulation of instruments, proper exposure, manipulation for fracture/holding reduction, and wound closure. Procedures:     Left ring finger distal interphalangeal joint arthrodesis (CPT 61041)    Antibiotics: Ancef 2g    Surgical Findings: degenerative changes of DIP joint    Anesthesia: MAC + Local    Complications: None    Implants: S[employer] 2.0 mm x 30 mm arthrodesis screw    Specimen: None    Condition: Stable    Estimated Blood Loss: 1 mL    Indications:  48year old female with left ring finger DIP osteoarthritis that failed nonsurgical management. We discussed risks associate surgery, expected outcomes, time to recovery, therapy. Patient consented to the operation after having understood all the above. Procedure:  Patient was met in the preoperative holding area where consent was verified, laterality was marked with the surgeon's initials, and the H&P was updated. Patient was brought to the operating room on a transport cart. Patient was transferred from the transport cart onto the operating room table and placed in a supine position. An upper arm tourniquet was placed. The arm was prepped and draped in the usual sterile fashion. A surgical timeout was performed. Antibiotics were fully infused. The limb was elevated and exsanguinated using Esmarch bandage. Tourniquet was raised to 250 mmHg.     A 15 blade was used to make an S shaped incision centered over the distal phalanx. The incision was carried through the dermis. The terminal tendon was identified and released off of the distal phalanx. The capsule was incised allowing visualization of the DIP joint. The radial and ulnar collateral ligament of the DIP joint was released. Rongeurs were used to remove osteophytes. A guidewire was placed in a retrograde fashion. Fluoroscopy was used to confirm guidewire placement. Manual concave reamers were used over the middle phalanx head until the articular cartilage was removed. The guidewire was then removed and subsequently placed in the distal phalanx in an anterograde fashion. Fluoroscopy was used to confirm guidewire placement. The manual convex reamer was then used to remove articular cartilage. The joint was then provisionally reduced and the anterograde guidewire was then passed into the middle phalanx. Fluoroscopy confirmed the correct trajectory. A small stab incision over the tip of the digit was made and a cannulated drill bit was used to drill over the guidewire. The depth gauge was then used over the guidewire to determine appropriate screw length. The guidewire was removed and a headless screw was inserted until the smooth portion of the screw was at the joint space. Compression of the joint was noted on fluoroscopy. The correct trajectory and placement of the screw was confirmed. The terminal tendon was placed back over the joint onto its insertion site. The tourniquet was let down. The wound was irrigated with sterile saline. Closure:  4-0 nylon suture was used reapproximate the skin edges. Dressing/Splint:  Bacitracin, Adaptic, gauze was held in place with a AlumaFoam splint and 1 inch Coban. Post Operative:  Patient was woken up from anesthesia and taken to PACU for further recovery and discharge home.         Dash Lema MD  Hand, Wrist, & Elbow Surgery  [facility]  [facility] [address], [email or web address]. org  [email or web address]  t: [phone]  f: [phone]

## 2023-11-17 NOTE — DISCHARGE INSTRUCTIONS
Dressing: Keep splint on until follow up visit. Keep splint clean/dry/intact. Okay to shower with splint covered/protected from getting wet. Weight Bearing: No lifting greater than 1lbs with hand. Activity: You can discontinue sling once block wears off. Work on opening and closing your fingers to prevent stiffness. Ice and elevate to help minimize swelling. Pain: Take acetaminophen and ibuprofen for pain. Take Norco 325-5mg for breakthrough pain. Ok to take anti-inflammatories (advil, motrin, ibuprofen, aleve) for 7 days after surgery and then discontinue use beyond 7 days. Call for follow up appointment if you don't have one.   Therapy will call you for appt

## 2023-11-17 NOTE — ANESTHESIA POSTPROCEDURE EVALUATION
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      (Degenerative arthritis of distal interphalangeal joint of ring finger of left hand [M15.1])    Surgeons: Simone Aguilar MD Anesthesiologist: Jacqueline House MD    Anesthesia Type: MAC ASA Status: 3            Anesthesia Type: MAC    Vitals Value Taken Time   /74 11/17/23 0754   Temp 96.8 11/17/23 0754   Pulse 82 11/17/23 0754   Resp 16 11/17/23 0754   SpO2 92 11/17/23 0754       Patient Location: Same Day Surgery    Anesthesia Type: MAC    Airway Patency: patent    Postop Pain Control: adequate    Mental Status: mildly sedated but able to meaningfully participate in the post-anesthesia evaluation    Nausea/Vomiting: none    Cardiopulmonary/Hydration status: stable euvolemic    Complications: no apparent anesthesia related complications    Postop vital signs: stable    Dental Exam: Unchanged from Postop

## 2023-11-20 ENCOUNTER — PATIENT MESSAGE (OUTPATIENT)
Dept: ORTHOPEDICS CLINIC | Facility: CLINIC | Age: 53
End: 2023-11-20

## 2023-11-20 DIAGNOSIS — M15.1 DEGENERATIVE ARTHRITIS OF DISTAL INTERPHALANGEAL JOINT OF RING FINGER OF LEFT HAND: Primary | ICD-10-CM

## 2023-11-20 RX ORDER — TRAMADOL HYDROCHLORIDE 50 MG/1
50 TABLET ORAL EVERY 6 HOURS PRN
Qty: 30 TABLET | Refills: 0 | Status: SHIPPED | OUTPATIENT
Start: 2023-11-20

## 2023-11-21 ENCOUNTER — TELEPHONE (OUTPATIENT)
Dept: PHYSICAL THERAPY | Facility: HOSPITAL | Age: 53
End: 2023-11-21

## 2023-11-22 ENCOUNTER — OFFICE VISIT (OUTPATIENT)
Dept: FAMILY MEDICINE CLINIC | Facility: CLINIC | Age: 53
End: 2023-11-22
Payer: COMMERCIAL

## 2023-11-22 ENCOUNTER — LAB ENCOUNTER (OUTPATIENT)
Dept: LAB | Age: 53
End: 2023-11-22
Attending: NURSE PRACTITIONER
Payer: COMMERCIAL

## 2023-11-22 VITALS
OXYGEN SATURATION: 96 % | BODY MASS INDEX: 49 KG/M2 | SYSTOLIC BLOOD PRESSURE: 132 MMHG | WEIGHT: 269 LBS | TEMPERATURE: 98 F | DIASTOLIC BLOOD PRESSURE: 70 MMHG | HEART RATE: 67 BPM

## 2023-11-22 DIAGNOSIS — Z79.4 TYPE 2 DIABETES MELLITUS WITHOUT COMPLICATION, WITH LONG-TERM CURRENT USE OF INSULIN (HCC): ICD-10-CM

## 2023-11-22 DIAGNOSIS — E11.9 TYPE 2 DIABETES MELLITUS WITHOUT COMPLICATION, WITH LONG-TERM CURRENT USE OF INSULIN (HCC): ICD-10-CM

## 2023-11-22 DIAGNOSIS — F41.9 ANXIETY: ICD-10-CM

## 2023-11-22 DIAGNOSIS — G47.33 OSA ON CPAP: ICD-10-CM

## 2023-11-22 DIAGNOSIS — E78.2 MIXED HYPERLIPIDEMIA: ICD-10-CM

## 2023-11-22 DIAGNOSIS — J45.20 MILD INTERMITTENT ASTHMA WITHOUT COMPLICATION: ICD-10-CM

## 2023-11-22 DIAGNOSIS — K21.9 GASTROESOPHAGEAL REFLUX DISEASE, UNSPECIFIED WHETHER ESOPHAGITIS PRESENT: ICD-10-CM

## 2023-11-22 DIAGNOSIS — I48.0 PAROXYSMAL ATRIAL FIBRILLATION (HCC): ICD-10-CM

## 2023-11-22 DIAGNOSIS — M54.16 LUMBAR RADICULOPATHY: ICD-10-CM

## 2023-11-22 DIAGNOSIS — Z86.16 HISTORY OF COVID-19: ICD-10-CM

## 2023-11-22 DIAGNOSIS — Z01.818 PREOP EXAMINATION: Primary | ICD-10-CM

## 2023-11-22 DIAGNOSIS — E89.0 POSTOPERATIVE HYPOTHYROIDISM: ICD-10-CM

## 2023-11-22 DIAGNOSIS — I10 PRIMARY HYPERTENSION: ICD-10-CM

## 2023-11-22 LAB
ANION GAP SERPL CALC-SCNC: 6 MMOL/L (ref 0–18)
BUN BLD-MCNC: 13 MG/DL (ref 9–23)
CALCIUM BLD-MCNC: 9.1 MG/DL (ref 8.5–10.1)
CHLORIDE SERPL-SCNC: 109 MMOL/L (ref 98–112)
CO2 SERPL-SCNC: 26 MMOL/L (ref 21–32)
CREAT BLD-MCNC: 0.9 MG/DL
EGFRCR SERPLBLD CKD-EPI 2021: 77 ML/MIN/1.73M2 (ref 60–?)
FASTING STATUS PATIENT QL REPORTED: NO
GLUCOSE BLD-MCNC: 90 MG/DL (ref 70–99)
OSMOLALITY SERPL CALC.SUM OF ELEC: 292 MOSM/KG (ref 275–295)
POTASSIUM SERPL-SCNC: 3.6 MMOL/L (ref 3.5–5.1)
SODIUM SERPL-SCNC: 141 MMOL/L (ref 136–145)

## 2023-11-22 PROCEDURE — 3078F DIAST BP <80 MM HG: CPT | Performed by: NURSE PRACTITIONER

## 2023-11-22 PROCEDURE — 80048 BASIC METABOLIC PNL TOTAL CA: CPT

## 2023-11-22 PROCEDURE — 36415 COLL VENOUS BLD VENIPUNCTURE: CPT

## 2023-11-22 PROCEDURE — 3075F SYST BP GE 130 - 139MM HG: CPT | Performed by: NURSE PRACTITIONER

## 2023-11-22 PROCEDURE — 99214 OFFICE O/P EST MOD 30 MIN: CPT | Performed by: NURSE PRACTITIONER

## 2023-11-22 RX ORDER — INSULIN ASPART 100 [IU]/ML
INJECTION, SOLUTION INTRAVENOUS; SUBCUTANEOUS
COMMUNITY
Start: 2023-11-06

## 2023-11-22 RX ORDER — OMEPRAZOLE 40 MG/1
40 CAPSULE, DELAYED RELEASE ORAL DAILY
Qty: 90 CAPSULE | Refills: 0 | Status: SHIPPED | OUTPATIENT
Start: 2023-11-22

## 2023-11-22 RX ORDER — LEVOTHYROXINE SODIUM 137 UG/1
137 TABLET ORAL DAILY
Qty: 90 TABLET | Refills: 1 | Status: SHIPPED | OUTPATIENT
Start: 2023-11-22

## 2023-11-22 RX ORDER — BUPROPION HYDROCHLORIDE 300 MG/1
300 TABLET ORAL DAILY
Qty: 90 TABLET | Refills: 1 | Status: SHIPPED | OUTPATIENT
Start: 2023-11-22

## 2023-11-22 RX ORDER — SIMVASTATIN 40 MG
40 TABLET ORAL EVERY EVENING
Qty: 90 TABLET | Refills: 0 | Status: SHIPPED | OUTPATIENT
Start: 2023-11-22

## 2023-11-24 ENCOUNTER — OFFICE VISIT (OUTPATIENT)
Dept: OCCUPATIONAL MEDICINE | Facility: HOSPITAL | Age: 53
End: 2023-11-24
Attending: PHYSICIAN ASSISTANT
Payer: COMMERCIAL

## 2023-11-24 DIAGNOSIS — M15.1 DEGENERATIVE ARTHRITIS OF DISTAL INTERPHALANGEAL JOINT OF MIDDLE FINGER OF LEFT HAND: Primary | ICD-10-CM

## 2023-11-24 PROCEDURE — 97760 ORTHOTIC MGMT&TRAING 1ST ENC: CPT

## 2023-11-24 NOTE — PROGRESS NOTES
ORTHOSIS EVALUATION:     Diagnosis:         Degenerative arthritis of distal interphalangeal joint of ring finger of left hand (M15.1)  Referring Provider: Kendy Reich  Date of Service/orthosis Evaluation:    11/24/2023    Precautions:   sutures Next MD visit:     DOI: n/s  Date of Surgery: 11/17/2023   Orders: Please fabricate a volar gutter DIPJ extension splint  Encourage PIP motion      PATIENT SUMMARY   Madhu Donaldson is a 48year old female who presents to therapy today with complaints of left hand ring  finger pain  HPI: S/P fusion of DIP of left RF    Pt describes pain level: current 1/10, best 1/10, worst 8/10. Current functional limitations include difficulty grasping objects, pinching, typing, writing, lifting . Hand dominance: Right. Christy Barney describes prior level of function Independent . Past medical history was reviewed with Christy Barney . Significant findings include      has a past medical history of Anxiety, Anxiety state, unspecified, Arrhythmia, Asthma, Back pain, Back problem, Bad breath, Belching, Bloating, Decorative tattoo, Depression, Disorder of thyroid, Esophageal reflux (05/07/2012), GERD (gastroesophageal reflux disease), Headache(784.0) (04/24/2012), Heart palpitations (11/1/2022), Heartburn, High cholesterol, History of depression, Hypothyroidism, Irregular bowel habits, Leaking of urine, Lipoma of unspecified site (11/22/2011), Multiple thyroid nodules, Nontoxic multinodular goiter, Obesity, Other and unspecified hyperlipidemia (07/11/2011), Pain in joints, Pain with bowel movements, Painful swallowing, Plantar fasciitis, Sleep apnea, Stress, Thyroiditis, unspecified (06/25/2011), Type 1 diabetes mellitus ([address] Uncomfortable fullness after meals, Visual impairment, Wears glasses, and Weight loss.     She has no past medical history of Anesthesia complication, Diabetes ([address] Difficult intubation, Family history of malignant hyperthermia, Family history of pseudocholinesterase deficiency, History of adverse reaction to anesthesia, motion sickness, Malignant hyperthermia, Myocardial infarction [facility]), Other specified diseases of blood and blood-forming organs(289.89), or PONV (postoperative nausea and vomiting). ASSESSMENT  Tacy Rinne presents to occupational therapy evaluation with primary c/o left ring  finger pain. . Patient and OT discussed evaluation findings. Patient reports orthosis fits comfortably, and reports understanding for wearing schedule. Skilled Occupational Therapy is medically necessary to address the above impairments and reach functional goals. OBJECTIVE   Observation: Came to therapy with a postoperative dressing and alumifoam splint on her DIP joint     Extremity: left middle finger     Today's Treatment:   Orthosis Fabricated: digital volar orthoses with the PIP joint free   *Wearing Schedule/Instructions:     at all times (including night), but off for bathing /sink side hygiene and dressing changes   other: _change dressing 1-2 x per day _____________  [place] should be secure but not tight. *When removing orthosis, remove one side of each straps. *Clean with cool water and mild soap on cloth; wipe soap from splint and let it air dry. *Keep away from heat source: sunlight, oven/stove, grill, fire pit (even hot car in the warm weather). Patient instructed on Precautions as follows:  *Do not add, subtract, or modify/adjust orthosis  *If you notice any redness or pressure areas when you remove the splint, contact your therapist and he/she will make necessary adjustments. *If you develop any numbness, discoloration and/or temperature changes in your hand, you may have applied the straps too tight.  If adjusting the straps does not help, contact your therapist.   Purpose of Orthosis: protection     Charges: Orthotic Mgnt and Train x 2  Total Timed Treatment: 35 minutes     Total Treatment Time: 30 minutes  PLAN OF CARE:    Goals: (to be met in 4 visits)  Pt will demonstrate independence with don/doff orthosis. Pt will verbalize understanding of orthosis precautions and instructions for its use/wear/care. Pt. Will return to therapy for orthotic adjustments as necessary due to decrease in swelling  Pt. Will achieve functional PIP ROM  Frequency / Duration: 1 x per week or a total of 4 visits over a 90 day period     Education or treatment limitation: None  Rehab Potential:good    Patient/Family/Caregiver was advised of these findings, precautions, and treatment options and has agreed to actively participate in planning and for this course of care. Thank you for your referral. Please co-sign or sign and return this letter via fax as soon as possible to [phone]. If you have any questions, please contact me at Dept: [phone]    Sincerely,  Electronically signed by therapist: ABHISHEK Tan/L, CHT, CLT  [de-identified] certification required: Yes  I certify the need for these services furnished under this plan of treatment and while under my care.      X___________________________________________________ Date____________________    Certification From: 86/74/4592  To:2/22/2024

## 2023-11-27 ENCOUNTER — OFFICE VISIT (OUTPATIENT)
Dept: ORTHOPEDICS CLINIC | Facility: CLINIC | Age: 53
End: 2023-11-27
Payer: COMMERCIAL

## 2023-11-27 ENCOUNTER — HOSPITAL ENCOUNTER (OUTPATIENT)
Dept: GENERAL RADIOLOGY | Age: 53
Discharge: HOME | End: 2023-11-27
Attending: PHYSICIAN ASSISTANT
Payer: COMMERCIAL

## 2023-11-27 ENCOUNTER — OFFICE VISIT (OUTPATIENT)
Dept: OCCUPATIONAL MEDICINE | Age: 53
End: 2023-11-27
Attending: PHYSICIAN ASSISTANT
Payer: COMMERCIAL

## 2023-11-27 VITALS — BODY MASS INDEX: 49.5 KG/M2 | HEIGHT: 62 IN | WEIGHT: 269 LBS

## 2023-11-27 DIAGNOSIS — M15.1 DEGENERATIVE ARTHRITIS OF DISTAL INTERPHALANGEAL JOINT OF RING FINGER OF LEFT HAND: Primary | ICD-10-CM

## 2023-11-27 DIAGNOSIS — M15.1 DEGENERATIVE ARTHRITIS OF DISTAL INTERPHALANGEAL JOINT OF RING FINGER OF LEFT HAND: ICD-10-CM

## 2023-11-27 PROCEDURE — 73140 X-RAY EXAM OF FINGER(S): CPT | Performed by: PHYSICIAN ASSISTANT

## 2023-11-27 PROCEDURE — 97760 ORTHOTIC MGMT&TRAING 1ST ENC: CPT

## 2023-11-27 PROCEDURE — 3008F BODY MASS INDEX DOCD: CPT | Performed by: PHYSICIAN ASSISTANT

## 2023-11-27 PROCEDURE — 99024 POSTOP FOLLOW-UP VISIT: CPT | Performed by: PHYSICIAN ASSISTANT

## 2023-11-27 RX ORDER — PROCHLORPERAZINE 25 MG/1
SUPPOSITORY RECTAL
Qty: 9 EACH | Refills: 0 | Status: SHIPPED | OUTPATIENT
Start: 2023-11-27

## 2023-11-27 NOTE — TELEPHONE ENCOUNTER
Requested Prescriptions     Pending Prescriptions Disp Refills    Continuous Blood Gluc Sensor (DEXCOM G6 SENSOR) Does not apply Misc [Pharmacy Med Name: Dago Lawrence (3 PACK)] 9 each 0     Sig: FOLLOW PACKAGE DIRECTIONS AND REPLACE EVERY 10 DAYS     Your appointments       Date & Time Appointment Department [facility])    Nov 29, 2023  7:45 AM CST OT Ortho Treatment with Aiyana Lopez OT [facility] Occupational Therapy [facility])        Jan 04, 2024  9:00 AM CST Post Op Visit with [facility] [address], [place] [facility] [place])        Feb 06, 2024  7:30 AM CST Diabetes Pump [facility], [place] ( [address])              [address] [address] Group, [address] DIABETES [address] [address]  [facility], [address] [place] [address] Group [phone]          Last A1c value was 5.7% done 10/17/2023.     Refill 10/26/23  LOV 10/17/23

## 2023-11-27 NOTE — PROGRESS NOTES
ORTHOSIS NOTE:     Diagnosis:         Degenerative arthritis of distal interphalangeal joint of ring finger of left hand (M15.1)  Referring Provider: Jose Joseph  Date of Service/orthosis Evaluation:    11/24/2023    Precautions:   sutures Next MD visit:     DOI: n/s  Date of Surgery: 11/17/2023   Orders: Please fabricate a volar gutter DIPJ extension splint  Encourage PIP motion      PATIENT SUMMARY   Cecil Sewell is a 48year old female who presents to therapy today with complaints of left hand ring  finger pain, having lost orthosis. HPI: S/P fusion of DIP of left RF    Pt describes pain level: 1/10. Current functional limitations include difficulty grasping objects, pinching, typing, writing, lifting . Hand dominance: Right. ASSESSMENT  Jesse Barrera presents to occupational therapy after PA appt having lost orthosis. Patient and OT discussed options and splint needs. OBJECTIVE   Observation: Came to therapy with newly-applied steri strips on dorsum of DIP and alumifoam splint on the digit. Extremity: left middle finger     Today's Treatment:   Orthosis Fabricated: digital volar orthosis with dorsal piece with the PIP joint free   Due to new steri strips and h/o losing orthosis off of digit, opted for clamshell finger orthosis, affixing proximal end of the volar piece with her paper tape, then securing the dorsal piece to the volar with tape and coban to the entire distal to proximal orthosis/finger. Anticipate she will not need to dorsal piece once the steri strips are off, but her primary OT will assess this    Charges: Orthotic Mgnt and Train x 1  Total Timed Treatment:  15  minutes     Total Treatment Time: 15 minutes  PLAN OF CARE:    Goals: (to be met in 4 visits)  Pt will demonstrate independence with don/doff orthosis. Pt will verbalize understanding of orthosis precautions and instructions for its use/wear/care.   Pt. Will return to therapy for orthotic adjustments as necessary due to decrease in swelling  Pt. Will achieve functional PIP ROM  Frequency / Duration: 1 x per week or a total of 4 visits over a 90 day period     Education or treatment limitation: None  Rehab Potential:good    Patient/Family/Caregiver was advised of these findings, precautions, and treatment options and has agreed to actively participate in planning and for this course of care.     Rhiannon Bailey, JENNIFERS, OTR/L, CHT

## 2023-11-29 ENCOUNTER — APPOINTMENT (OUTPATIENT)
Dept: OCCUPATIONAL MEDICINE | Facility: HOSPITAL | Age: 53
End: 2023-11-29
Attending: PHYSICIAN ASSISTANT
Payer: COMMERCIAL

## 2023-12-01 DIAGNOSIS — E89.0 POSTOPERATIVE HYPOTHYROIDISM: ICD-10-CM

## 2023-12-01 RX ORDER — LEVOTHYROXINE SODIUM 137 MCG
137 TABLET ORAL DAILY
Qty: 90 TABLET | Refills: 0 | OUTPATIENT
Start: 2023-12-01

## 2023-12-01 NOTE — TELEPHONE ENCOUNTER
Last refill 11/22/23 90 1 refill  This request refused                                        Levothyroxine Sodium     Dispensed Written Strength Quantity Refills Days Supply Provider Pharmacy   LEVOTHYROXIN 137MCG TAB 11/22/2023  137 90 tablet  90 [facility]

## 2023-12-13 DIAGNOSIS — K21.9 GASTROESOPHAGEAL REFLUX DISEASE, UNSPECIFIED WHETHER ESOPHAGITIS PRESENT: ICD-10-CM

## 2023-12-13 RX ORDER — OMEPRAZOLE 40 MG/1
40 CAPSULE, DELAYED RELEASE ORAL DAILY
Qty: 90 CAPSULE | Refills: 0 | Status: SHIPPED | OUTPATIENT
Start: 2023-12-13

## 2023-12-13 NOTE — TELEPHONE ENCOUNTER
LOV 11/22/23 for a pre-op with Myesha. Medication Quantity Refills Start End   Omeprazole 40 MG Oral Capsule Delayed Release 90 capsule 0 11/22/2023 --   Sig:   Take 1 capsule (40 mg total) by mouth daily.      Route:   Oral       Future Appointments   Date Time Provider Jovani Haque   1/4/2024  9:00 AM Daniel Petersen [facility]   2/6/2024  7:30 AM LILIA Brice [facility] [place]

## 2024-01-04 ENCOUNTER — HOSPITAL ENCOUNTER (OUTPATIENT)
Dept: GENERAL RADIOLOGY | Age: 54
Discharge: HOME | End: 2024-01-04
Attending: PHYSICIAN ASSISTANT
Payer: COMMERCIAL

## 2024-01-04 ENCOUNTER — OFFICE VISIT (OUTPATIENT)
Dept: ORTHOPEDICS CLINIC | Facility: CLINIC | Age: 54
End: 2024-01-04
Payer: COMMERCIAL

## 2024-01-04 VITALS — WEIGHT: 269 LBS | BODY MASS INDEX: 49.5 KG/M2 | HEIGHT: 62 IN

## 2024-01-04 DIAGNOSIS — M15.1 DEGENERATIVE ARTHRITIS OF DISTAL INTERPHALANGEAL JOINT OF RING FINGER OF LEFT HAND: ICD-10-CM

## 2024-01-04 DIAGNOSIS — M15.1 DEGENERATIVE ARTHRITIS OF DISTAL INTERPHALANGEAL JOINT OF RING FINGER OF LEFT HAND: Primary | ICD-10-CM

## 2024-01-04 PROCEDURE — 73140 X-RAY EXAM OF FINGER(S): CPT | Performed by: PHYSICIAN ASSISTANT

## 2024-01-04 PROCEDURE — 99024 POSTOP FOLLOW-UP VISIT: CPT | Performed by: PHYSICIAN ASSISTANT

## 2024-01-04 PROCEDURE — 3008F BODY MASS INDEX DOCD: CPT | Performed by: PHYSICIAN ASSISTANT

## 2024-01-04 NOTE — PROGRESS NOTES
Clinic Note [facility]     Assessment/Plan:  53 year old female    s/p left middle finger DIP joint fusion on 9/15/2021 -functionally doing well.  No issues  Left ring finger DIP joint fusion 11/17/2023-I would still like her to protect the finger and not do anything heavy with this hand.  I would like to see more signs of healing at the fusion site before clearing her for heavy lifting and weightbearing.  All of her questions were answered.  Will see her back in 4 weeks to monitor her recovery.    Follow Up: 4 to 5 weeks with repeat x-rays    Diagnostic Studies:     XR Left ring finger 3 views: Evidence of middle and ring finger DIP joint fusion with retained screw.  Some signs of healing       Physical Exam:     Ht 5' 2\" (1.575 m)   Wt 269 lb (122 kg)   LMP 09/24/2019 (Approximate)   BMI 49.20 kg/m²     Constitutional: NAD. AOx3. Well-developed and Well-nourished.   Psychiatric: Normal mood/ affect/ behavior. Judgment and thought content normal.     Left Upper Extremity:     Inspection    Incisions healing well with no signs of infection. Palpation    Tenderness to palpation of the DIP joint      ROM    Full composite fist., Normal symmetric wrist motion., and Normal symmetric elbow motion.    No motion at the left middle finger DIP joint    No motion of the left ring finger DIP joint, PIP joint flexion to 70 degrees.     Neurovascular    Normal sensation in the median, ulnar, and radial nerve distribution. Normal motor function of muscles innervated by median/AIN, ulnar, and radial/PIN nerves.    Normally perfused hand(s).     Special    None            CC: Left ring finger pain    HPI: This 53 year old RHD female presents with left ring finger DIP joint pain.  It is constant throbbing and aching.  She does have pain with use.  It has progressively worsened.  She overall is happy with the middle finger DIP joint fusion.  She is interested in proceeding with work considering surgery for the ring finger  DIP joint    Interval history: Patient underwent left ring finger DIP joint fusion.  Her postop pain is improving.    History/Other:   Past Medical History:   Diagnosis Date    Anxiety     Anxiety state, unspecified     Arrhythmia     a fib    Asthma     Back pain     Back problem     Bad breath     Belching     Bloating     Decorative tattoo     Depression     Disorder of thyroid     Esophageal reflux 05/07/2012    GERD (gastroesophageal reflux disease)     Headache(784.0) 04/24/2012    Heart palpitations 11/1/2022    Afib    Heartburn     I have taken medication for over 10years    High cholesterol     History of depression     Hypothyroidism     Irregular bowel habits     Leaking of urine     Lipoma of unspecified site 11/22/2011    Multiple thyroid nodules     on rt,2011, lt.-2008    Nontoxic multinodular goiter     Obesity     Other and unspecified hyperlipidemia 07/11/2011    Pain in joints     Pain with bowel movements     Painful swallowing     Not consistent but not due to illness    Plantar fasciitis     Sleep apnea      CPAP     Stress     Thyroiditis, unspecified 06/25/2011    Type 1 diabetes mellitus (HCC)     Uncomfortable fullness after meals     Visual impairment     Wears glasses     Weight loss     Working with weight loss clinic     Past Surgical History:   Procedure Laterality Date    ARTHROSCOPY OF JOINT UNLISTED Bilateral     knee    CARPAL TUNNEL RELEASE Bilateral     CHOLECYSTECTOMY      COLONOSCOPY N/A 10/21/2016    Procedure: COLONOSCOPY;  Surgeon: Brandt Ramirez DO;  Location:  ENDOSCOPY    COLONOSCOPY N/A 12/27/2022    Procedure: COLONOSCOPY;  Surgeon: Aiden Pollard MD;  Location:  ENDOSCOPY    CYST ASPIRATION LEFT      CYST ASPIRATION RIGHT      HYSTERECTOMY      7/21    LAPAROSCOPIC CHOLECYSTECTOMY  11/14/2011    LIPOMA REMOVAL  04/29/2022    left posterior    MESH (IMPLANTABLE)      bladder    NEUROMUSCULAR STIMULATOR      OOPHORECTOMY Right 02/19/2020    OTHER      wisdom     OTHER      lt hand middle finger sx    OTHER SURGICAL HISTORY  x2    total thyroidectomy    OTHER SURGICAL HISTORY  x1    sinus    OTHER SURGICAL HISTORY  10/11/2023    trial for neurostimulator    REMOVAL GALLBLADDER      SINUS SURGERY        SLING OPER STRES INCONTINENCE  01/09/2014    Procedure: SLING UROLOGY;  Surgeon: Isac Sutherland MD;  [facility]: [facility]    THYROIDECTOMY      TOTAL ABDOM HYSTERECTOMY  07/2020     Current Outpatient Medications   Medication Sig Dispense Refill    Tirzepatide (MOUNJARO) 2.5 MG/0.5ML Subcutaneous Solution Pen-injector Inject 2.5 mg into the skin every 7 days. 2 mL 0    [START ON 1/24/2024] Tirzepatide (MOUNJARO) 5 MG/0.5ML Subcutaneous Solution Pen-injector Inject 5 mg into the skin once a week. Start after completing 4 weeks on 2.5 mg dose 2 mL 0    Omeprazole 40 MG Oral Capsule Delayed Release Take 1 capsule (40 mg total) by mouth daily. 90 capsule 0    Continuous Blood Gluc Sensor (DEXCOM G6 SENSOR) Does not apply Misc FOLLOW PACKAGE DIRECTIONS AND REPLACE EVERY 10 DAYS 9 each 0    HYDROcodone-acetaminophen (NORCO) 5-325 MG Oral Tab Take 1 tablet by mouth every 6 (six) hours as needed for Pain. 20 tablet 0    NOVOLOG 100 UNIT/ML Injection Solution INJECT UP  UNITS VIA INSULIN PUMP DAILY AS DIRECTED      buPROPion  MG Oral Tablet 24 Hr Take 1 tablet (300 mg total) by mouth daily. 90 tablet 1    levothyroxine (SYNTHROID) 137 MCG Oral Tab Take 137 mcg by mouth daily. 90 tablet 1    simvastatin 40 MG Oral Tab Take 1 tablet (40 mg total) by mouth every evening. 90 tablet 0    traMADol 50 MG Oral Tab Take 1 tablet (50 mg total) by mouth every 6 (six) hours as needed for Pain. 30 tablet 0    Continuous Blood Gluc Transmit (DEXCOM G6 TRANSMITTER) Does not apply Misc USE 1 TRANSMITTER EVERY 90 DAYS 1 each 0    glucagon (GVOKE HYPOPEN 2-PACK) 1 MG/0.2ML Subcutaneous SUBQ injection Inject 0.2 mL (1 mg total) into the skin as needed. 0.4 mL 1    PARoxetine HCl  40 MG Oral Tab Take 1 tablet (40 mg total) by mouth every morning. 90 tablet 1    pregabalin 150 MG Oral Cap Take 1 capsule (150 mg total) by mouth 2 (two) times daily.      apixaban 5 MG Oral Tab Take 1 tablet (5 mg total) by mouth in the morning and 1 tablet (5 mg total) before bedtime. 60 tablet 3    Insulin Glargine, 2 Unit Dial, (TOUJEO MAX SOLOSTAR) 300 UNIT/ML Subcutaneous Solution Pen-injector As directed up to TDD 75 units daily in the event of insulin pump failure 6 mL 1    estradiol 0.05 MG/24HR Transdermal Patch Weekly APPLY 1 PATCH EXTERNALLY TO THE SKIN EVERY TUESDAY. DO NOT CUT PATCH       Allergies   Allergen Reactions    Adhesive Tape HIVES     Paper tape is ok, clear tape=hives      Ibuprofen OTHER (SEE COMMENTS)     Asthma attack    Sulfa Antibiotics      Unknown; happened before adolescent period    Erythromycin Base NAUSEA ONLY     Family History   Problem Relation Age of Onset    Heart Disease Father     Hypertension Father     Asthma Father     Diabetes Father     Other (Other) Father     Heart Attack Father     Diabetes Maternal Grandmother     Other (Other) Maternal Grandmother         lymphoma    Skin cancer Mother     Other (Other) Mother     Other (Other) Sister         Crohn's disease    Crohn's Disease Sister     Ulcerative Colitis Sister     Asthma Other         3 children all have asthma.     Other (Other) Sister         Celiac Disease     Social History     Occupational History    Occupation:      Comment: Convenience    Tobacco Use    Smoking status: Former     Packs/day: 0.50     Years: 0.00     Additional pack years: 0.00     Total pack years: 0.00     Types: Cigarettes     Quit date: 3/1/2017     Years since quittin.8     Passive exposure: Never    Smokeless tobacco: Never    Tobacco comments:     non-smoker   Vaping Use    Vaping Use: Never used   Substance and Sexual Activity    Alcohol use: Not Currently     Comment: rare    Drug use: Yes      Comment: CBD,CBG  Doctors are fully aware    Sexual activity: Yes      Assessment       Review of Systems (negative unless bolded):  General: fevers, chills, fatigue  CV:  chest pain, palpitations, leg swelling  Msk: bodyaches, neck pain, neck stiffness  Skin: rashes, open wounds, nonhealing ulcers  Hem: bleeds easily, bruise easily, immunocompromised  Neuro: dizziness, light headedness, headaches  Psych: anxious, depressed, anger issues      Omayra Lyn PA-C  Hand, Wrist, & Elbow Surgery  Physician Assistant to Dr. Curtis Daly  [facility]  [address] [place] [address], [address], [email or web address]  [email or web address]  t: [phone]  f: [phone]

## 2024-01-19 ENCOUNTER — TELEPHONE (OUTPATIENT)
Dept: OCCUPATIONAL MEDICINE | Facility: HOSPITAL | Age: 54
End: 2024-01-19

## 2024-01-23 ENCOUNTER — OFFICE VISIT (OUTPATIENT)
Dept: OCCUPATIONAL MEDICINE | Facility: HOSPITAL | Age: 54
End: 2024-01-23
Attending: FAMILY MEDICINE
Payer: COMMERCIAL

## 2024-01-23 ENCOUNTER — TELEPHONE (OUTPATIENT)
Dept: PHYSICAL THERAPY | Facility: HOSPITAL | Age: 54
End: 2024-01-23

## 2024-01-23 ENCOUNTER — APPOINTMENT (OUTPATIENT)
Dept: OCCUPATIONAL MEDICINE | Facility: HOSPITAL | Age: 54
End: 2024-01-23
Attending: PHYSICIAN ASSISTANT
Payer: COMMERCIAL

## 2024-01-23 PROCEDURE — 97760 ORTHOTIC MGMT&TRAING 1ST ENC: CPT

## 2024-01-23 NOTE — PROGRESS NOTES
ORTHOSIS NOTE:     Diagnosis:         Degenerative arthritis of distal interphalangeal joint of ring finger of left hand (M15.1)  Referring Provider: Riki  Date of Service/orthosis Evaluation:    11/24/2023    Precautions:   sutures Next MD visit:     DOI: n/s  Date of Surgery: 11/17/2023   Orders: Please fabricate a volar gutter DIPJ extension splint  Encourage PIP motion      PATIENT SUMMARY   Catina Rivera is a 53 year old female who presents to therapy today with request for a new orthoses because she lost her last one  HPI: S/P fusion of DIP of left RF    Pt describes pain level: 1/10.  Current functional limitations include difficulty grasping objects, pinching, typing, writing, lifting .   Hand dominance: Right.      ASSESSMENT  Catina reports increase comfort with her left ring finger after new orthoses was fabricated today.     OBJECTIVE   Observation: Came to therapy with slight swelling at the DIP of her left ring finger. There is no tenderness upon palpation    Extremity: left middle finger     Today's Treatment:   Orthosis Fabricated: digital volar orthosis with dorsal piece with the PIP joint free. Applied coban around the orthoses to secure it in place.      Charges: Orthotic Mgnt and Train x 1  Total Timed Treatment:  15  minutes     Total Treatment Time: 15 minutes  PLAN OF CARE:    Goals: (to be met in 4 visits)  Pt will demonstrate independence with don/doff orthosis.  Pt will verbalize understanding of orthosis precautions and instructions for its use/wear/care.  Pt. Will return to therapy for orthotic adjustments as necessary due to decrease in swelling  Pt. Will achieve functional PIP ROM  Frequency / Duration: 1 x per week or a total of 4 visits over a 90 day period     Education or treatment limitation: None  Rehab Potential:good    Patient/Family/Caregiver was advised of these findings, precautions, and treatment options and has agreed to actively participate in planning and for  this course of care.    Toya Lafleur OTR/L, CHT,, CLT

## 2024-01-24 RX ORDER — PROCHLORPERAZINE 25 MG/1
SUPPOSITORY RECTAL
Qty: 1 EACH | Refills: 1 | Status: SHIPPED | OUTPATIENT
Start: 2024-01-24

## 2024-01-24 NOTE — TELEPHONE ENCOUNTER
Requested Prescriptions     Pending Prescriptions Disp Refills    DEXCOM G6 TRANSMITTER Does not apply Misc [Pharmacy Med Name: DEXCOM G6 MIS TRANSMIT]  0     Sig: USE 1 TRANSMITTER EVERY 90 DAYS     Future Appointments   Date Time Provider Department Center   2/1/2024 10:20 AM Omayra Lyn PA [facility]   2/6/2024  7:30 AM Amy Fernandez APRN [facility] [place]   2/9/2024  9:40 AM Gisselle Humphrey APRN [facility] [place]     Last A1c value was 5.7% done 10/17/2023.  Refill 10/25/23  LOV 10/17/2023

## 2024-01-27 ENCOUNTER — TELEPHONE (OUTPATIENT)
Dept: FAMILY MEDICINE CLINIC | Facility: CLINIC | Age: 54
End: 2024-01-27

## 2024-01-27 RX ORDER — INSULIN GLARGINE 300 U/ML
INJECTION, SOLUTION SUBCUTANEOUS
Qty: 6 ML | Refills: 0 | Status: SHIPPED | OUTPATIENT
Start: 2024-01-27

## 2024-01-27 NOTE — TELEPHONE ENCOUNTER
Patient states her insulin pump broke   Needs back up pen sent to pharmacy  Diabetic clinic not open today  Current blood sugar 320 but has fast acting insulin at home.  Per verbal Myesha acevedo send    Insulin Glargine, 2 Unit Dial, (TOUJEO MAX SOLOSTAR) 300 UNIT/ML Subcutaneous Solution Pen-injector  As directed up to TDD 75 units daily in the event of insulin pump failure Dispense: 6 mL, Refills: 1 ordered       07/05/2022

## 2024-01-27 NOTE — TELEPHONE ENCOUNTER
Pt called said she broke her insulin pump. Said her diabetic Dr. Office is closed today. Wants to know if DR. Can fill this RX. Pt aware no DRJaycee In office today. Per pt her blood sugar is 500.  Per pt someone has to fill Rx or pay for her hospital bill.

## 2024-02-01 ENCOUNTER — HOSPITAL ENCOUNTER (OUTPATIENT)
Dept: GENERAL RADIOLOGY | Age: 54
Discharge: HOME | End: 2024-02-01
Attending: PHYSICIAN ASSISTANT
Payer: COMMERCIAL

## 2024-02-01 ENCOUNTER — PATIENT MESSAGE (OUTPATIENT)
Dept: ORTHOPEDICS CLINIC | Facility: CLINIC | Age: 54
End: 2024-02-01

## 2024-02-01 ENCOUNTER — OFFICE VISIT (OUTPATIENT)
Dept: ORTHOPEDICS CLINIC | Facility: CLINIC | Age: 54
End: 2024-02-01
Payer: COMMERCIAL

## 2024-02-01 VITALS — HEIGHT: 62 IN | WEIGHT: 269 LBS | BODY MASS INDEX: 49.5 KG/M2

## 2024-02-01 DIAGNOSIS — M15.1 DEGENERATIVE ARTHRITIS OF DISTAL INTERPHALANGEAL JOINT OF RING FINGER OF LEFT HAND: ICD-10-CM

## 2024-02-01 DIAGNOSIS — M17.0 PRIMARY OSTEOARTHRITIS OF BOTH KNEES: Primary | ICD-10-CM

## 2024-02-01 DIAGNOSIS — M15.1 DEGENERATIVE ARTHRITIS OF DISTAL INTERPHALANGEAL JOINT OF RING FINGER OF LEFT HAND: Primary | ICD-10-CM

## 2024-02-01 PROCEDURE — 3008F BODY MASS INDEX DOCD: CPT | Performed by: PHYSICIAN ASSISTANT

## 2024-02-01 PROCEDURE — 99024 POSTOP FOLLOW-UP VISIT: CPT | Performed by: PHYSICIAN ASSISTANT

## 2024-02-01 PROCEDURE — 73140 X-RAY EXAM OF FINGER(S): CPT | Performed by: PHYSICIAN ASSISTANT

## 2024-02-01 RX ORDER — PEN NEEDLE, DIABETIC 32GX 5/32"
1 NEEDLE, DISPOSABLE MISCELLANEOUS 3 TIMES DAILY
Qty: 300 EACH | Refills: 0 | Status: SHIPPED | OUTPATIENT
Start: 2024-02-01 | End: 2025-01-31

## 2024-02-01 NOTE — TELEPHONE ENCOUNTER
From: Catina Rivera  To: Mik Lock  Sent: 2/1/2024 10:39 AM CST  Subject: Knee Injections     I believe I read that the knee injection is every 6 months and January is six months. Do I call to find out if insurance will approve and the coverage? I have new insurance so I’m not sure the procedure.    Please advise because I would like to receive the injections.     Thank you   Penelope

## 2024-02-01 NOTE — TELEPHONE ENCOUNTER
LOV: 11/22/23 for pre-op      Diabetic Supplies Protocol [id number]/01/2024 09:19 AM    Appointment in the past 12 or next 3 months        Future Appointments   Date Time Provider Department Center   2/1/2024 10:05 AM NAP XR RM1 [facility] [place]   2/1/2024 10:20 AM Omayra Lyn PA [facility]   2/6/2024  7:30 AM Amy Fernandez APRN [facility] [place]   2/9/2024  9:40 AM Gisselle Humphrey APRN [facility] [place]

## 2024-02-01 NOTE — TELEPHONE ENCOUNTER
Pt called would like to lance the pen needles sent to pharmacy. Per pt it wasn't sent and it would be $50 w/o ins     [facility][id number] [address] AT [facility] [address], [phone], [phone]

## 2024-02-01 NOTE — TELEPHONE ENCOUNTER
LOV 7/26/23 Synvisc One x2  Requesting new order.    Note: new insurance - will need to be sure we have current insurance on file.

## 2024-02-05 NOTE — PROGRESS NOTES
Clinic Note [facility]     Assessment/Plan:  53 year old female    s/p left middle finger DIP joint fusion on 9/15/2021 -functionally doing well.  No issues  Left ring finger DIP joint fusion 11/17/2023-Dr. Daly is in agreement that the fusion is fully healed.  Patient can progress to heavy activities as tolerated.  She can wean out of the splint.  All of her questions were answered.    Follow Up: 4 to 5 weeks with repeat x-rays    Diagnostic Studies:     XR Left ring finger 3 views: Evidence of middle and ring finger DIP joint fusion with retained screw.  Some signs of healing       Physical Exam:     Ht 5' 2\" (1.575 m)   Wt 269 lb (122 kg)   LMP 09/24/2019 (Approximate)   BMI 49.20 kg/m²     Constitutional: NAD. AOx3. Well-developed and Well-nourished.   Psychiatric: Normal mood/ affect/ behavior. Judgment and thought content normal.     Left Upper Extremity:     Inspection    Incisions healing well with no signs of infection. Palpation    Tenderness to palpation of the DIP joint      ROM    Full composite fist., Normal symmetric wrist motion., and Normal symmetric elbow motion.    No motion at the left middle finger DIP joint    No motion of the left ring finger DIP joint, PIP joint flexion to 70 degrees.     Neurovascular    Normal sensation in the median, ulnar, and radial nerve distribution. Normal motor function of muscles innervated by median/AIN, ulnar, and radial/PIN nerves.    Normally perfused hand(s).     Special    None            CC: Left ring finger pain    HPI: This 53 year old RHD female presents with left ring finger DIP joint pain.  It is constant throbbing and aching.  She does have pain with use.  It has progressively worsened.  She overall is happy with the middle finger DIP joint fusion.  She is interested in proceeding with work considering surgery for the ring finger DIP joint    Interval history: Patient underwent left ring finger DIP joint fusion.  Her postop pain is  improving.    History/Other:   Past Medical History:   Diagnosis Date    Anxiety     Anxiety state, unspecified     Arrhythmia     a fib    Asthma     Back pain     Back problem     Bad breath     Belching     Bloating     Decorative tattoo     Depression     Disorder of thyroid     Esophageal reflux 05/07/2012    GERD (gastroesophageal reflux disease)     Headache(784.0) 04/24/2012    Heart palpitations 11/1/2022    Afib    Heartburn     I have taken medication for over 10years    High cholesterol     History of depression     Hypothyroidism     Irregular bowel habits     Leaking of urine     Lipoma of unspecified site 11/22/2011    Multiple thyroid nodules     on rt,2011, lt.-2008    Nontoxic multinodular goiter     Obesity     Other and unspecified hyperlipidemia 07/11/2011    Pain in joints     Pain with bowel movements     Painful swallowing     Not consistent but not due to illness    Plantar fasciitis     Sleep apnea      CPAP     Stress     Thyroiditis, unspecified 06/25/2011    Type 1 diabetes mellitus (HCC)     Uncomfortable fullness after meals     Visual impairment     Wears glasses     Weight loss     Working with weight loss clinic     Past Surgical History:   Procedure Laterality Date    ARTHROSCOPY OF JOINT UNLISTED Bilateral     knee    CARPAL TUNNEL RELEASE Bilateral     CHOLECYSTECTOMY      COLONOSCOPY N/A 10/21/2016    Procedure: COLONOSCOPY;  Surgeon: Brandt Ramirez DO;  Location:  ENDOSCOPY    COLONOSCOPY N/A 12/27/2022    Procedure: COLONOSCOPY;  Surgeon: Aiden Pollard MD;  Location:  ENDOSCOPY    CYST ASPIRATION LEFT      CYST ASPIRATION RIGHT      HYSTERECTOMY      7/21    LAPAROSCOPIC CHOLECYSTECTOMY  11/14/2011    LIPOMA REMOVAL  04/29/2022    left posterior    MESH (IMPLANTABLE)      bladder    NEUROMUSCULAR STIMULATOR      OOPHORECTOMY Right 02/19/2020    OTHER      wisdom    OTHER      lt hand middle finger sx    OTHER SURGICAL HISTORY  x2    total thyroidectomy    OTHER  SURGICAL HISTORY  x1    sinus    OTHER SURGICAL HISTORY  10/11/2023    trial for neurostimulator    REMOVAL GALLBLADDER      SINUS SURGERY        SLING OPER STRES INCONTINENCE  01/09/2014    Procedure: SLING UROLOGY;  Surgeon: Isac Sutherland MD;  Location: [facility]    THYROIDECTOMY      TOTAL ABDOM HYSTERECTOMY  07/2020     Current Outpatient Medications   Medication Sig Dispense Refill    Insulin Pen Needle (BD PEN NEEDLE NA U/F) 32G X 4 MM Does not apply Misc Inject 1 Pen into the skin in the morning and 1 Pen at noon and 1 Pen in the evening. 300 each 0    Insulin Glargine, 2 Unit Dial, (TOUJEO MAX SOLOSTAR) 300 UNIT/ML Subcutaneous Solution Pen-injector As directed up to TDD 75 units daily in the event of insulin pump failure 6 mL 0    Continuous Blood Gluc Transmit (DEXCOM G6 TRANSMITTER) Does not apply Misc USE 1 TRANSMITTER EVERY 90 DAYS 1 each 1    Tirzepatide (MOUNJARO) 2.5 MG/0.5ML Subcutaneous Solution Pen-injector Inject 2.5 mg into the skin every 7 days. 2 mL 0    Tirzepatide (MOUNJARO) 5 MG/0.5ML Subcutaneous Solution Pen-injector Inject 5 mg into the skin once a week. Start after completing 4 weeks on 2.5 mg dose 2 mL 0    Omeprazole 40 MG Oral Capsule Delayed Release Take 1 capsule (40 mg total) by mouth daily. 90 capsule 0    Continuous Blood Gluc Sensor (DEXCOM G6 SENSOR) Does not apply Misc FOLLOW PACKAGE DIRECTIONS AND REPLACE EVERY 10 DAYS 9 each 0    NOVOLOG 100 UNIT/ML Injection Solution INJECT UP  UNITS VIA INSULIN PUMP DAILY AS DIRECTED      buPROPion  MG Oral Tablet 24 Hr Take 1 tablet (300 mg total) by mouth daily. 90 tablet 1    levothyroxine (SYNTHROID) 137 MCG Oral Tab Take 137 mcg by mouth daily. 90 tablet 1    simvastatin 40 MG Oral Tab Take 1 tablet (40 mg total) by mouth every evening. 90 tablet 0    glucagon (GVOKE HYPOPEN 2-PACK) 1 MG/0.2ML Subcutaneous SUBQ injection Inject 0.2 mL (1 mg total) into the skin as needed. 0.4 mL 1    PARoxetine HCl 40 MG  Oral Tab Take 1 tablet (40 mg total) by mouth every morning. 90 tablet 1    pregabalin 150 MG Oral Cap Take 1 capsule (150 mg total) by mouth 2 (two) times daily.      apixaban 5 MG Oral Tab Take 1 tablet (5 mg total) by mouth in the morning and 1 tablet (5 mg total) before bedtime. 60 tablet 3    estradiol 0.05 MG/24HR Transdermal Patch Weekly APPLY 1 PATCH EXTERNALLY TO THE SKIN EVERY TUESDAY. DO NOT CUT PATCH      HYDROcodone-acetaminophen (NORCO) 5-325 MG Oral Tab Take 1 tablet by mouth every 6 (six) hours as needed for Pain. (Patient not taking: Reported on 2024) 20 tablet 0    traMADol 50 MG Oral Tab Take 1 tablet (50 mg total) by mouth every 6 (six) hours as needed for Pain. (Patient not taking: Reported on 2024) 30 tablet 0     Allergies   Allergen Reactions    Adhesive Tape HIVES     Paper tape is ok, clear tape=hives      Ibuprofen OTHER (SEE COMMENTS)     Asthma attack    Sulfa Antibiotics      Unknown; happened before adolescent period    Erythromycin Base NAUSEA ONLY     Family History   Problem Relation Age of Onset    Heart Disease Father     Hypertension Father     Asthma Father     Diabetes Father     Other (Other) Father     Heart Attack Father     Diabetes Maternal Grandmother     Other (Other) Maternal Grandmother         lymphoma    Skin cancer Mother     Other (Other) Mother     Other (Other) Sister         Crohn's disease    Crohn's Disease Sister     Ulcerative Colitis Sister     Asthma Other         3 children all have asthma.     Other (Other) Sister         Celiac Disease     Social History     Occupational History    Occupation:      Comment: Convenience    Tobacco Use    Smoking status: Former     Packs/day: 0.50     Years: 0.00     Additional pack years: 0.00     Total pack years: 0.00     Types: Cigarettes     Quit date: 3/1/2017     Years since quittin.9     Passive exposure: Never    Smokeless tobacco: Never    Tobacco comments:     non-smoker    Vaping Use    Vaping Use: Never used   Substance and Sexual Activity    Alcohol use: Not Currently     Comment: rare    Drug use: Yes     Comment: CBD,CBG  Doctors are fully aware    Sexual activity: Yes      Assessment       Review of Systems (negative unless bolded):  General: fevers, chills, fatigue  CV:  chest pain, palpitations, leg swelling  Msk: bodyaches, neck pain, neck stiffness  Skin: rashes, open wounds, nonhealing ulcers  Hem: bleeds easily, bruise easily, immunocompromised  Neuro: dizziness, light headedness, headaches  Psych: anxious, depressed, anger issues      Omayra Lyn PA-C  Hand, Wrist, & Elbow Surgery  Physician Assistant to Dr. Curtis Daly  [facility]  [address] [place] [address], [address], [email or web address]  [email or web address]  t: [phone]  f: [phone]

## 2024-02-06 ENCOUNTER — TELEPHONE (OUTPATIENT)
Dept: ENDOCRINOLOGY CLINIC | Facility: CLINIC | Age: 54
End: 2024-02-06

## 2024-02-06 ENCOUNTER — OFFICE VISIT (OUTPATIENT)
Dept: ENDOCRINOLOGY CLINIC | Facility: CLINIC | Age: 54
End: 2024-02-06
Payer: COMMERCIAL

## 2024-02-06 ENCOUNTER — PATIENT MESSAGE (OUTPATIENT)
Dept: ENDOCRINOLOGY CLINIC | Facility: CLINIC | Age: 54
End: 2024-02-06

## 2024-02-06 VITALS
DIASTOLIC BLOOD PRESSURE: 63 MMHG | WEIGHT: 258.63 LBS | HEART RATE: 81 BPM | BODY MASS INDEX: 47 KG/M2 | SYSTOLIC BLOOD PRESSURE: 123 MMHG | OXYGEN SATURATION: 99 %

## 2024-02-06 DIAGNOSIS — Z79.4 TYPE 2 DIABETES MELLITUS WITHOUT COMPLICATION, WITH LONG-TERM CURRENT USE OF INSULIN (HCC): Primary | ICD-10-CM

## 2024-02-06 DIAGNOSIS — E11.9 TYPE 2 DIABETES MELLITUS WITHOUT COMPLICATION, WITH LONG-TERM CURRENT USE OF INSULIN (HCC): Primary | ICD-10-CM

## 2024-02-06 LAB
CARTRIDGE LOT#: ABNORMAL NUMERIC
HEMOGLOBIN A1C: 6 % (ref 4.3–5.6)

## 2024-02-06 PROCEDURE — 3074F SYST BP LT 130 MM HG: CPT | Performed by: NURSE PRACTITIONER

## 2024-02-06 PROCEDURE — 83036 HEMOGLOBIN GLYCOSYLATED A1C: CPT | Performed by: NURSE PRACTITIONER

## 2024-02-06 PROCEDURE — 3044F HG A1C LEVEL LT 7.0%: CPT | Performed by: NURSE PRACTITIONER

## 2024-02-06 PROCEDURE — 99214 OFFICE O/P EST MOD 30 MIN: CPT | Performed by: NURSE PRACTITIONER

## 2024-02-06 PROCEDURE — 3078F DIAST BP <80 MM HG: CPT | Performed by: NURSE PRACTITIONER

## 2024-02-06 PROCEDURE — 95251 CONT GLUC MNTR ANALYSIS I&R: CPT | Performed by: NURSE PRACTITIONER

## 2024-02-06 RX ORDER — TIRZEPATIDE 10 MG/.5ML
10 INJECTION, SOLUTION SUBCUTANEOUS WEEKLY
Qty: 2 ML | Refills: 0 | Status: SHIPPED | OUTPATIENT
Start: 2024-02-28 | End: 2024-02-09 | Stop reason: ALTCHOICE

## 2024-02-06 RX ORDER — TIRZEPATIDE 7.5 MG/.5ML
7.5 INJECTION, SOLUTION SUBCUTANEOUS WEEKLY
Qty: 2 ML | Refills: 0 | Status: SHIPPED | OUTPATIENT
Start: 2024-02-06 | End: 2024-02-27

## 2024-02-06 NOTE — TELEPHONE ENCOUNTER
From: Catina Rivera  To: Amy Fernandez  Sent: 2/6/2024 10:44 AM CST  Subject: Monjauro    I have   5mg  12.5mg  15mg    I would be taking 5mg tonight. I think you said there is 7.5mg and 10mg.    Just let me know if I should go to 12.5mg or be picking something up

## 2024-02-06 NOTE — PATIENT INSTRUCTIONS
Summary from visit:   Last A1c value was 6% done 2/6/2024.    Diabetes Medications:     Change:  Mounjaro 5 --> increase to 10 mg weekly x 4 weeks then increase to 12.5 mg weekly    Novolog via T-slim   12MN: 2.000 --> 1.800 u/hr  0530: 1.300 --> 1.170 u/hr  1300: 1.800 --> 1.620 u/hr  1630: 2.050 --> 1.850 u/hr  1830: 1.800 --> 1.620 u/hr  2130:  2.200 --> 2.000 u/hr     Bolus settings:   I:C   12MN: 11  1300: 9  1830: 11          ISF:  1:30                                Target goals: 110  Active insulin time: 5 hrs    Back up insulin plan: Toujeo 50 units daily, continue novolog IC 1:11, ISF 1:30 with target glucose 110 mg/dl.     In order for me to determine any patterns in your blood sugars, you will need to test your blood sugar 3-4 times daily if not on dexcom.   Please schedule diabetic eye exam prior to your next visit.   Return in about 3 months (around 5/6/2024).     Remember to bring your glucose meter or blood sugar logbook to every appointment here at the diabetes center. This allows me to safely make adjustments to your diabetes plan.   It is important to take all of your medications as prescribed. Please call me if you cannot get the prescriptions filled or are having issues with refills.   Also, please call me if you have any issues with medication questions, side effects, dosing questions or problems with your blood sugar trends BEFORE CHANGING OR STOPPING ANY MEDICATIONS.  ---------------------------------------------------------------------------------------------------------------------------------------------------    Reminders:  The A1C:  The A1C test provides us with your average blood sugar for the past 3 months. Keeping an A1C less than 7% helps reduce or delay health problems that are related to diabetes.   The main goal of diabetes treatment is to keep your sugar from going too high to prevent or delay complications from the disease as well as maintain your quality of life.   For most  people the target for A1C is less than 7.0% but sometimes we make exceptions based on age, health history and other factors.     Blood sugar targets:  It would be best to change up the times of day that you are testing your sugar.   Recommended times to test: Before breakfast (fasting) and then alternate testing blood sugar 2 hours after your meals.   Before breakfast:   (preferably less than 110)  2 hours After meals: less than 180 (preferably less than 150)   Call for persistent blood sugars less than  75 or more than  200.   Blood sugars greater than 200 are not acceptable to reach your goal of improving diabetes      Hypoglycemia:    Watch for low blood sugars: (less than 70)  Symptoms of low blood sugar:   Shakiness or dizziness  Cold, clammy skin or sweating  Feeling hungry  Headache  Nervousness  A hard, fast heartbeat  Weakness  Confusion or irritability  Blurred eyesight  Having nightmares or waking up confused or sweating  Numbness or tingling in the lips or tongue    Treatment of Low Blood sugar Action Plan  1. Check blood glucose to be sure that it is low. You can’t always go by symptoms. If in doubt, treat your low blood glucose anyway.  2. Take 15 grams of carbohydrate (carb). Here are some choices:  4 oz. regular fruit juice  3-4 glucose tablets  6 oz. regular soda   7-8 jelly beans  3. Recheck blood glucose after 10-15 minutes. If blood glucose is still low (less than 70 mg/dl) repeat the treatment (step 2).  4. If your next meal is more than one hour away, eat a small snack.  5. If you’re not sure what caused your low blood glucose, call your healthcare provider.  6. Always check your blood glucose before you drive       Diabetes Testin. LABS: It is important to monitor your kidney function (blood and urine protein levels) , liver function tests and cholesterol levels when you have diabetes. If your primary care provider has not ordered these tests, I will order them for you.   2. FOOT  exams:  daily foot inspections for foot wounds or skin changes are important for foot care. Any unusual changes should be reported immediately.  3. EYE exams: Checking your eyes for diabetes changes is important and you should have a dilated eye exam done by an eye doctor EVERY year since these changes occur in the BACK of the eye and not visible by you.         Diabetes Center Refill policy:     Allow 2-3 business days for refills  Contact your pharmacy at least 5 days prior to running out of medication and have them send an electronic request or submit request through the “request refill” option in your [facility] account.  Refills are not addressed after hours or on weekends; covering providers  do not authorize routine medications on weekends.  If your prescription is due for a refill, you may be due for a follow up appointment. This may impact the ability for you to get a 90 supply if requested.   To best provide you care, patients receiving routine medications need to be seen at least twice per year however if the A1C is above 8% you will be need to be seen more frequently.   Yearly blood work may also required for many medications to insure safe prescribing. If you are due for labs, you will have 30 days to complete the  requested labs before future refills are authorized.   In the event that your preferred pharmacy does not have the requested medication in stock (e.g. Backordered), it is your responsibility to find another pharmacy that has the requested medication available.  We will gladly send a new prescription to that pharmacy at your request.       More and more people are living long and healthy lives with diabetes. We are here to help you manage your diabetes. Let’s work together to make a plan that you can balance in your daily life. Please continue with your primary care physician/provider for your routine health care maintenance.   Thank you,   Amy Fernandez [facility]

## 2024-02-06 NOTE — PROGRESS NOTES
Chief Complaint   Patient presents with    Diabetes     Follow up       HPI:   Catina Rivera is a 53 year old female who presents for a follow up visit for management of diabetes. Last A1c value was 6% done 2/6/2024. Since last visit diabetes management has remained very well controlled. A1C slightly higher as her insulin pump had broke and was off until it was replaced. Pt had also been off Mounjaro for several weeks due to surgery and has since resumed but remains at a lower dose. Pt states she is ready to increase further to help with weight loss. Pt has been having pain in knees and if needs to proceed with replacement will need to lose weight prior.     Patient is using continuous glucose monitoring or would be testing BGs at least 4 times per day.  Patient is on insulin injections.   Patient is making self-adjustments in insulin according to blood sugars.    Diabetes history:  DM 2000 - on insulin since shortly after dx (antibodies negative 2022)  GDM 2000   Patient has not had hospitalizations for blood sugar issues  denies any history of pancreatitis    Current DM regimen:  Mounjaro 5 mg weekly  Novolog via T-slim  12MN: 2.000 u/hr  0530: 1.300 u/hr  1300: 1.800 u/hr  1630: 2.050 u/hr  1830: 1.800 u/hr  2130:  2.200 u/hr     Bolus settings:   I:C   12MN: 11  1300: 9  1830: 11          ISF:  1:30                                Target goals: 110  Active insulin time: 5 hrs    Back up insulin plan: Toujeo 50 units daily, continue novolog IC 1:11, ISF 1:30 with target glucose 110 mg/dl.     Pump download:   Control IQ active: 100%  Avg sleep: 6 hrs 7 days a week    TDD insulin: 95.16 units  Average daily basal: 56.75 Units   Average daily food bolus: 8.39 units  Average daily correction bolus: 0.77 units  Average daily control IQ Bolus: 2.76 Units  Avg daily carbs: 92 g  Changing site every : 2.5 days  Previous DM therapies:  Metformin : severe GI     Complications/Co-morbidities:   None       Modifying  factors:  Medication adherence: yes  Recent illness/steroids: no    Overall glucose control:   HGBA1C:    Lab Results   Component Value Date    A1C 6.0 (A) 02/06/2024    A1C 5.7 (A) 10/17/2023    A1C 5.5 05/11/2023     11/01/2022       Personal  Dexcom G6 CGM  Analysis of data: 1/23/24-2/5/24  Active wear time: 100% (Goal 70%)  Sensor download: full report  in media  Average glucose : 133 mg/dl   GMI: 6.6%     CV (coefficient of variation) : 19.5%      7% (last visit 0%) time above 180mg /dl (Goal < 25%)  <1% (last visit 4%) time above 250 mg/dl (Goal < 5%)  92% (last visit 95%) time in target range:  mg/dl (Goal > 70%)  0% (last visit <1%) time below target range: 70mg/dl (Goal < 4%)     Evaluation   1. Baseline glucose in target range  2. minimal postprandial elevation with return to baseline  3. low likelihood of hypoglycemia         Wt Readings from Last 3 Encounters:   02/06/24 258 lb 9.6 oz (117.3 kg)   02/01/24 269 lb (122 kg)   01/04/24 269 lb (122 kg)     BP Readings from Last 3 Encounters:   02/06/24 123/63   11/22/23 132/70   11/17/23 (!) 177/70          Past History:   She  has a past medical history of Anxiety, Anxiety state, unspecified, Arrhythmia, Asthma, Back pain, Back problem, Bad breath, Belching, Bloating, Decorative tattoo, Depression, Disorder of thyroid, Esophageal reflux (05/07/2012), GERD (gastroesophageal reflux disease), Headache(784.0) (04/24/2012), Heart palpitations (11/1/2022), Heartburn, High cholesterol, History of depression, Hypothyroidism, Irregular bowel habits, Leaking of urine, Lipoma of unspecified site (11/22/2011), Multiple thyroid nodules, Nontoxic multinodular goiter, Obesity, Other and unspecified hyperlipidemia (07/11/2011), Pain in joints, Pain with bowel movements, Painful swallowing, Plantar fasciitis, Sleep apnea, Stress, Thyroiditis, unspecified (06/25/2011), Type 1 diabetes mellitus (HCC), Uncomfortable fullness after meals, Visual impairment, Wears  glasses, and Weight loss.   Her family history includes Asthma in her father and another family member; Crohn's Disease in her sister; Diabetes in her father and maternal grandmother; Heart Attack in her father; Heart Disease in her father; Hypertension in her father; Other in her father, maternal grandmother, mother, sister, and sister; Skin cancer in her mother; Ulcerative Colitis in her sister.   She  reports that she quit smoking about 6 years ago. Her smoking use included cigarettes. She smoked an average of 0.5 packs per day. She has never been exposed to tobacco smoke. She has never used smokeless tobacco. She reports that she does not currently use alcohol. She reports current drug use.     She is allergic to adhesive tape, ibuprofen, sulfa antibiotics, and erythromycin base.     Current Outpatient Medications on File Prior to Visit   Medication Sig    Insulin Pen Needle (BD PEN NEEDLE AN U/F) 32G X 4 MM Does not apply Misc Inject 1 Pen into the skin in the morning and 1 Pen at noon and 1 Pen in the evening.    Insulin Glargine, 2 Unit Dial, (TOUJEO MAX SOLOSTAR) 300 UNIT/ML Subcutaneous Solution Pen-injector As directed up to TDD 75 units daily in the event of insulin pump failure    Continuous Blood Gluc Transmit (DEXCOM G6 TRANSMITTER) Does not apply Misc USE 1 TRANSMITTER EVERY 90 DAYS    Tirzepatide (MOUNJARO) 5 MG/0.5ML Subcutaneous Solution Pen-injector Inject 5 mg into the skin once a week. Start after completing 4 weeks on 2.5 mg dose    Omeprazole 40 MG Oral Capsule Delayed Release Take 1 capsule (40 mg total) by mouth daily.    Continuous Blood Gluc Sensor (DEXCOM G6 SENSOR) Does not apply Misc FOLLOW PACKAGE DIRECTIONS AND REPLACE EVERY 10 DAYS    HYDROcodone-acetaminophen (NORCO) 5-325 MG Oral Tab Take 1 tablet by mouth every 6 (six) hours as needed for Pain. (Patient not taking: Reported on 2/1/2024)    NOVOLOG 100 UNIT/ML Injection Solution INJECT UP  UNITS VIA INSULIN PUMP DAILY AS  DIRECTED    buPROPion  MG Oral Tablet 24 Hr Take 1 tablet (300 mg total) by mouth daily.    levothyroxine (SYNTHROID) 137 MCG Oral Tab Take 137 mcg by mouth daily.    simvastatin 40 MG Oral Tab Take 1 tablet (40 mg total) by mouth every evening.    traMADol 50 MG Oral Tab Take 1 tablet (50 mg total) by mouth every 6 (six) hours as needed for Pain. (Patient not taking: Reported on 2/1/2024)    glucagon (GVOKE HYPOPEN 2-PACK) 1 MG/0.2ML Subcutaneous SUBQ injection Inject 0.2 mL (1 mg total) into the skin as needed.    PARoxetine HCl 40 MG Oral Tab Take 1 tablet (40 mg total) by mouth every morning.    pregabalin 150 MG Oral Cap Take 1 capsule (150 mg total) by mouth 2 (two) times daily.    apixaban 5 MG Oral Tab Take 1 tablet (5 mg total) by mouth in the morning and 1 tablet (5 mg total) before bedtime.    estradiol 0.05 MG/24HR Transdermal Patch Weekly APPLY 1 PATCH EXTERNALLY TO THE SKIN EVERY TUESDAY. DO NOT CUT PATCH     No current facility-administered medications on file prior to visit.     CMP  (most recent labs)   Lab Results   Component Value Date/Time    GLU 90 11/22/2023 10:39 AM    BUN 13 11/22/2023 10:39 AM    CREATSERUM 0.90 11/22/2023 10:39 AM    GFRNAA 74 03/23/2022 07:54 AM    GFRAA 85 03/23/2022 07:54 AM    EGFRCR 77 11/22/2023 10:39 AM    CA 9.1 11/22/2023 10:39 AM    ALKPHO 91 10/04/2023 09:09 AM    AST 7 (L) 10/04/2023 09:09 AM    ALT 20 10/04/2023 09:09 AM    BILT 0.3 10/04/2023 09:09 AM    TP 7.1 10/04/2023 09:09 AM    ALB 3.7 10/04/2023 09:09 AM     11/22/2023 10:39 AM    K 3.6 11/22/2023 10:39 AM     11/22/2023 10:39 AM    CO2 26.0 11/22/2023 10:39 AM           Lipids  (most recent labs)   Lab Results   Component Value Date/Time    CHOLEST 150 10/04/2023 09:09 AM    TRIG 39 10/04/2023 09:09 AM    HDL 52 10/04/2023 09:09 AM    LDL 89 10/04/2023 09:09 AM    NONHDLC 98 10/04/2023 09:09 AM          Diabetes  (most recent labs)   Lab Results   Component Value Date/Time    A1C 6.0  (A) 02/06/2024 07:44 AM          Microalb (most recent labs)   Lab Results   Component Value Date/Time    MICROALBCREA  04/24/2023 04:27 PM      Comment:      Unable to calculate due to Urine Microalbumin <0.5 mg/dL          Lab results reviewed with patient.    REVIEW OF SYSTEMS:   Review of Systems  GENERAL HEALTH: feels well otherwise  SKIN:dry and intact, now gets mild erythema from mounjaro, fades quickly  EYES: denies vision changes  RESPIRATORY: denies shortness of breath with exertion  CARDIOVASCULAR: denies chest pain on exertion  GI: denies abdominal pain, N/V/D  NEURO: denies headaches and denies numbness and tingling to extremities    EXAM:   /63   Pulse 81   Wt 258 lb 9.6 oz (117.3 kg)   LMP 09/24/2019 (Approximate)   SpO2 99%   BMI 47.30 kg/m²  Estimated body mass index is 47.3 kg/m² as calculated from the following:    Height as of 2/1/24: 5' 2\" (1.575 m).    Weight as of this encounter: 258 lb 9.6 oz (117.3 kg).   Physical Exam  Vitals reviewed  Constitutional: Normal appearance, no acute distress  Pulmonary: Pulmonary effort is normal  Neurologic: Alert and oriented  Psychiatric: Normal mood and affect  Musculoskeletal:  Diabetes foot exam:   Good foot hygiene.   Bilateral barefoot skin diabetic exam: normal.  Visualized feet and the appearance : normal.  Bilateral monofilament/sensation of both feet is normal other than no sensation to heels. Vibration to dorsum to the first toe perceived.   Bilateral 2+ pedal pulse on exam     ASSESSMENT AND PLAN:   As for her Diabetes, it is well controlled. Discussed increasing Mounjaro further as pt has tolerated well in the past. Pt can reduce pump basal settings 10% with each dose increase. Pt also could schedule with weight loss clinic for further pharmacologic therapies if not losing significant weight with mounjaro increase.       Medications:  Change:  Mounjaro 5 --> increase to 10 mg weekly x 4 weeks then increase to 12.5 mg weekly    Novolog  via T-slim   12MN: 2.000 --> 1.800 u/hr  0530: 1.300 --> 1.170 u/hr  1300: 1.800 --> 1.620 u/hr  1630: 2.050 --> 1.850 u/hr  1830: 1.800 --> 1.620 u/hr  2130:  2.200 --> 2.000 u/hr     Bolus settings:   I:C   12MN: 11  1300: 9  1830: 11          ISF:  1:30                                Target goals: 110  Active insulin time: 5 hrs    Back up insulin plan: Toujeo 50 units daily, continue novolog IC 1:11, ISF 1:30 with target glucose 110 mg/dl.     Recommendations are:   Continue dexcom CGM and T-slim  Reminded SMBG 4 x daily if not wearing CGM. Reviewed s/s and treatment of hypoglycemia (on AVS)   lose weight by increased dietary compliance and exercise   Schedule diabetic eye exam ([facility] - has upcoming appt)  check feet daily    Cardiovascular:  The 10-year ASCVD risk score (Medardo BAUTISTA, et al., 2019) is: 1.8%    Values used to calculate the score:      Age: 53 years      Sex: Female      Is Non- : No      Diabetic: Yes      Tobacco smoker: No      Systolic Blood Pressure: 123 mmHg      Is BP treated: No      HDL Cholesterol: 52 mg/dL      Total Cholesterol: 150 mg/dL     As for her hypertension, Blood Pressure is well controlled. Pt is not on ace/arb.   PLAN: reviewed diet, exercise and weight control     As for her cholesterol, Lipids are well controlled. Pt is on simvastatin.   PLAN: will continue present medications, reviewed diet, exercise and weight control, and labs as ordered     Patient is not on aspirin therapy but is on other anticoagulant.     DM Health Maintenance  Nephropathy screening: No proteinuria, GFR low x1 - continue to monitor.   Last dilated eye exam: No data recorded   Exam shows retinopathy? No data recorded  Last diabetic foot exam: Last Foot Exam: 02/06/24      Date of last PHQ-2 depression screen: No data recorded    Patient  reports that she quit smoking about 6 years ago. Her smoking use included cigarettes. She smoked an average of 0.5 packs per day. She  has never been exposed to tobacco smoke. She has never used smokeless tobacco.  When is flu vaccine due? No recommendations at this time  When is pneumonia vaccine due? No recommendations at this time  Dentist : recommend every 6m     The patient indicates understanding of these issues and agrees to the plan.  Refills sent at time of office visit.    Diagnoses and all orders for this visit:    Type 2 diabetes mellitus without complication, with long-term current use of insulin (HCC)  -     POC Hgb A1C           Return in about 3 months (around 5/6/2024).    Spent 30 min obtaining patient history, evaluating patient, reviewing blood glucose trends, discussing treatment options, lifestyle modifications and completing documentation -this time does not including sensor interpretation time if applicable.   The risks and benefits of my recommendations, as well as other treatment options were discussed with the patient today. Questions were also answered to the best of my knowledge.    Amy RODRIGUES

## 2024-02-07 NOTE — TELEPHONE ENCOUNTER
Received eye exam notes from 4/28/23 - no retinopathy. Abstracted into epic. Gave to KR to sign before sent to scan.

## 2024-02-09 ENCOUNTER — OFFICE VISIT (OUTPATIENT)
Dept: FAMILY MEDICINE CLINIC | Facility: CLINIC | Age: 54
End: 2024-02-09
Payer: COMMERCIAL

## 2024-02-09 VITALS
TEMPERATURE: 98 F | HEIGHT: 62 IN | WEIGHT: 258 LBS | OXYGEN SATURATION: 98 % | BODY MASS INDEX: 47.48 KG/M2 | RESPIRATION RATE: 18 BRPM | HEART RATE: 64 BPM | DIASTOLIC BLOOD PRESSURE: 76 MMHG | SYSTOLIC BLOOD PRESSURE: 118 MMHG

## 2024-02-09 DIAGNOSIS — N95.1 SYMPTOMATIC MENOPAUSAL OR FEMALE CLIMACTERIC STATES: ICD-10-CM

## 2024-02-09 DIAGNOSIS — Z90.721 S/P HYSTERECTOMY WITH OOPHORECTOMY: Primary | ICD-10-CM

## 2024-02-09 DIAGNOSIS — Z90.710 S/P HYSTERECTOMY WITH OOPHORECTOMY: Primary | ICD-10-CM

## 2024-02-09 PROCEDURE — 3074F SYST BP LT 130 MM HG: CPT | Performed by: NURSE PRACTITIONER

## 2024-02-09 PROCEDURE — 3008F BODY MASS INDEX DOCD: CPT | Performed by: NURSE PRACTITIONER

## 2024-02-09 PROCEDURE — 3078F DIAST BP <80 MM HG: CPT | Performed by: NURSE PRACTITIONER

## 2024-02-09 PROCEDURE — 99213 OFFICE O/P EST LOW 20 MIN: CPT | Performed by: NURSE PRACTITIONER

## 2024-02-09 NOTE — PROGRESS NOTES
HPI:     Patient is here to discuss HRT. Patient has history of GARRISON and Bilateral Oophrectomy in 2020. On hormonal patch. Previously managed by OB. Will need refill soon.    Doing well after neuro stimulator. No longer needs cane to walk most of the time    Current Outpatient Medications   Medication Sig Dispense Refill    Tirzepatide (MOUNJARO) 7.5 MG/0.5ML Subcutaneous Solution Pen-injector Inject 7.5 mg into the skin once a week for 21 days. 2 mL 0    Insulin Pen Needle (BD PEN NEEDLE AN U/F) 32G X 4 MM Does not apply Misc Inject 1 Pen into the skin in the morning and 1 Pen at noon and 1 Pen in the evening. 300 each 0    Continuous Blood Gluc Transmit (DEXCOM G6 TRANSMITTER) Does not apply Misc USE 1 TRANSMITTER EVERY 90 DAYS 1 each 1    Omeprazole 40 MG Oral Capsule Delayed Release Take 1 capsule (40 mg total) by mouth daily. 90 capsule 0    Continuous Blood Gluc Sensor (DEXCOM G6 SENSOR) Does not apply Misc FOLLOW PACKAGE DIRECTIONS AND REPLACE EVERY 10 DAYS 9 each 0    NOVOLOG 100 UNIT/ML Injection Solution INJECT UP  UNITS VIA INSULIN PUMP DAILY AS DIRECTED      buPROPion  MG Oral Tablet 24 Hr Take 1 tablet (300 mg total) by mouth daily. 90 tablet 1    levothyroxine (SYNTHROID) 137 MCG Oral Tab Take 137 mcg by mouth daily. 90 tablet 1    simvastatin 40 MG Oral Tab Take 1 tablet (40 mg total) by mouth every evening. 90 tablet 0    glucagon (GVOKE HYPOPEN 2-PACK) 1 MG/0.2ML Subcutaneous SUBQ injection Inject 0.2 mL (1 mg total) into the skin as needed. 0.4 mL 1    PARoxetine HCl 40 MG Oral Tab Take 1 tablet (40 mg total) by mouth every morning. 90 tablet 1    pregabalin 150 MG Oral Cap Take 1 capsule (150 mg total) by mouth 2 (two) times daily.      apixaban 5 MG Oral Tab Take 1 tablet (5 mg total) by mouth in the morning and 1 tablet (5 mg total) before bedtime. 60 tablet 3    estradiol 0.05 MG/24HR Transdermal Patch Weekly APPLY 1 PATCH EXTERNALLY TO THE SKIN EVERY TUESDAY. DO NOT CUT PATCH       Insulin Glargine, 2 Unit Dial, (TOUJEO MAX SOLOSTAR) 300 UNIT/ML Subcutaneous Solution Pen-injector As directed up to TDD 75 units daily in the event of insulin pump failure (Patient not taking: Reported on 2/9/2024) 6 mL 0      Past Medical History:   Diagnosis Date    Anxiety     Anxiety state, unspecified     Arrhythmia     a fib    Asthma     Back pain     Back problem     Bad breath     Belching     Bloating     Decorative tattoo     Depression     Disorder of thyroid     Esophageal reflux 05/07/2012    GERD (gastroesophageal reflux disease)     Headache(784.0) 04/24/2012    Heart palpitations 11/1/2022    Afib    Heartburn     I have taken medication for over 10years    High cholesterol     History of depression     Hypothyroidism     Irregular bowel habits     Leaking of urine     Lipoma of unspecified site 11/22/2011    Multiple thyroid nodules     on rt,2011, lt.-2008    Nontoxic multinodular goiter     Obesity     Other and unspecified hyperlipidemia 07/11/2011    Pain in joints     Pain with bowel movements     Painful swallowing     Not consistent but not due to illness    Plantar fasciitis     Sleep apnea      CPAP     Stress     Thyroiditis, unspecified 06/25/2011    Type 1 diabetes mellitus (HCC)     Uncomfortable fullness after meals     Visual impairment     Wears glasses     Weight loss     Working with weight loss clinic      Past Surgical History:   Procedure Laterality Date    ARTHROSCOPY OF JOINT UNLISTED Bilateral     knee    CARPAL TUNNEL RELEASE Bilateral     CHOLECYSTECTOMY      COLONOSCOPY N/A 10/21/2016    Procedure: COLONOSCOPY;  Surgeon: Brandt Ramirez DO;  Location:  ENDOSCOPY    COLONOSCOPY N/A 12/27/2022    Procedure: COLONOSCOPY;  Surgeon: Aiden Pollard MD;  Location:  ENDOSCOPY    CYST ASPIRATION LEFT      CYST ASPIRATION RIGHT      HYSTERECTOMY      7/21    LAPAROSCOPIC CHOLECYSTECTOMY  11/14/2011    LIPOMA REMOVAL  04/29/2022    left posterior    MESH (IMPLANTABLE)       bladder    NEUROMUSCULAR STIMULATOR      OOPHORECTOMY Bilateral     OTHER      wisdom    OTHER      lt hand middle finger sx    OTHER SURGICAL HISTORY  x2    total thyroidectomy    OTHER SURGICAL HISTORY  x1    sinus    OTHER SURGICAL HISTORY  10/11/2023    trial for neurostimulator    OTHER SURGICAL HISTORY      Neuro Nerve Stimulator    REMOVAL GALLBLADDER      SINUS SURGERY        SLING OPER STRES INCONTINENCE  2014    Procedure: SLING UROLOGY;  Surgeon: Isac Sutherland MD;  Location: [facility], [facility]    THYROIDECTOMY      TOTAL ABDOM HYSTERECTOMY  2020      Family History   Problem Relation Age of Onset    Heart Disease Father     Hypertension Father     Asthma Father     Diabetes Father     Other (Other) Father     Heart Attack Father     Diabetes Maternal Grandmother     Other (Other) Maternal Grandmother         lymphoma    Skin cancer Mother     Other (Other) Mother     Other (Other) Sister         Crohn's disease    Crohn's Disease Sister     Ulcerative Colitis Sister     Asthma Other         3 children all have asthma.     Other (Other) Sister         Celiac Disease      Social History     Socioeconomic History    Marital status:     Number of children: 3   Occupational History    Occupation:      Comment: Convenience    Tobacco Use    Smoking status: Former     Packs/day: 0.50     Years: 0.00     Additional pack years: 0.00     Total pack years: 0.00     Types: Cigarettes     Quit date: 3/1/2017     Years since quittin.9     Passive exposure: Never    Smokeless tobacco: Never    Tobacco comments:     non-smoker   Vaping Use    Vaping Use: Never used   Substance and Sexual Activity    Alcohol use: Not Currently     Comment: rare    Drug use: Yes     Comment: CBD,CBG  Doctors are fully aware    Sexual activity: Yes           EXAM:   /76   Pulse 64   Temp 97.7 °F (36.5 °C) (Temporal)   Resp 18   Ht 5' 2\" (1.575 m)   Wt 258 lb (117 kg)   LMP  09/24/2019 (Approximate)   SpO2 98%   BMI 47.19 kg/m²   Physical Exam  Constitutional:       General: She is not in acute distress.     Appearance: Normal appearance.   Cardiovascular:      Rate and Rhythm: Normal rate and regular rhythm.      Heart sounds: Normal heart sounds.   Pulmonary:      Effort: Pulmonary effort is normal.      Breath sounds: Normal breath sounds.   Neurological:      Mental Status: She is alert.         ASSESSMENT AND PLAN:   Diagnoses and all orders for this visit:    S/P hysterectomy with oophorectomy  -     OBG Referral - External    Symptomatic menopausal or female climacteric states  -     OBG Referral - External    Information given to follow up with gyne for Rx

## 2024-02-13 NOTE — TELEPHONE ENCOUNTER
Email received from referral specialist  Synvisc-one injection denied by insurance for being listed as not medically necessary      Please advise,

## 2024-02-22 ENCOUNTER — OFFICE VISIT (OUTPATIENT)
Dept: PAIN CLINIC | Facility: CLINIC | Age: 54
End: 2024-02-22
Payer: COMMERCIAL

## 2024-02-22 ENCOUNTER — TELEPHONE (OUTPATIENT)
Dept: PAIN CLINIC | Facility: CLINIC | Age: 54
End: 2024-02-22

## 2024-02-22 VITALS
SYSTOLIC BLOOD PRESSURE: 124 MMHG | WEIGHT: 252 LBS | OXYGEN SATURATION: 98 % | DIASTOLIC BLOOD PRESSURE: 78 MMHG | BODY MASS INDEX: 46 KG/M2 | HEART RATE: 70 BPM

## 2024-02-22 DIAGNOSIS — M25.562 CHRONIC PAIN OF BOTH KNEES: Primary | ICD-10-CM

## 2024-02-22 DIAGNOSIS — M25.561 CHRONIC PAIN OF BOTH KNEES: Primary | ICD-10-CM

## 2024-02-22 DIAGNOSIS — G89.29 CHRONIC PAIN OF BOTH KNEES: Primary | ICD-10-CM

## 2024-02-22 PROCEDURE — 99214 OFFICE O/P EST MOD 30 MIN: CPT | Performed by: PHYSICIAN ASSISTANT

## 2024-02-22 PROCEDURE — 3074F SYST BP LT 130 MM HG: CPT | Performed by: PHYSICIAN ASSISTANT

## 2024-02-22 PROCEDURE — 3078F DIAST BP <80 MM HG: CPT | Performed by: PHYSICIAN ASSISTANT

## 2024-02-22 NOTE — TELEPHONE ENCOUNTER
Prior Authorization for bilateral genicular NB   initiated with [phone]  CPT codes: 67327   pending reference # [id number]  Clinical notes submitted via fax [phone]

## 2024-02-22 NOTE — PATIENT INSTRUCTIONS
Refill policies:    Allow 2-3 business days for refills; controlled substances may take longer.  Contact your pharmacy at least 5 days prior to running out of medication and have them send an electronic request or submit request through the “request refill” option in your [employer] account.  Refills are not addressed on weekends; covering physicians do not authorize routine medications on weekends.  No narcotics or controlled substances are refilled after noon on Fridays or by on call physicians.  By law, narcotics must be electronically prescribed.  A 30 day supply with no refills is the maximum allowed.  If your prescription is due for a refill, you may be due for a follow up appointment.  To best provide you care, patients receiving routine medications need to be seen at least once a year.  Patients receiving narcotic/controlled substance medications need to be seen at least once every 3 months.  In the event that your preferred pharmacy does not have the requested medication in stock (e.g. Backordered), it is your responsibility to find another pharmacy that has the requested medication available.  We will gladly send a new prescription to that pharmacy at your request.    Scheduling Tests:    If your physician has ordered radiology tests such as MRI or CT scans, please contact Central Scheduling at [phone] right away to schedule the test.  Once scheduled, the [facility] Centralized Referral Team will work with your insurance carrier to obtain pre-certification or prior authorization.  Depending on your insurance carrier, approval may take 3-10 days.  It is highly recommended patients assure they have received an authorization before having a test performed.  If test is done without insurance authorization, patient may be responsible for the entire amount billed.      Precertification and Prior Authorizations:  If your physician has recommended that you have a procedure or additional testing performed the [facility]  Centralized Referral Team will contact your insurance carrier to obtain pre-certification or prior authorization.    You are strongly encouraged to contact your insurance carrier to verify that your procedure/test has been approved and is a COVERED benefit.  Although the [facility] Centralized Referral Team does its due diligence, the insurance carrier gives the disclaimer that \"Although the procedure is authorized, this does not guarantee payment.\"    Ultimately the patient is responsible for payment.   Thank you for your understanding in this matter.  Paperwork Completion:  If you require FMLA or disability paperwork for your recovery, please make sure to either drop it off or have it faxed to our office at [phone]. Be sure the form has your name and date of birth on it.  The form will be faxed to our Forms Department and they will complete it for you.  There is a 25$ fee for all forms that need to be filled out.  Please be aware there is a 10-14 day turnaround time.  You will need to sign a release of information (SOBEIDA) form if your paperwork does not come with one.  You may call the Forms Department with any questions at [phone].  Their fax number is [phone].

## 2024-02-22 NOTE — PROGRESS NOTES
Patient presents in office today with reported pain in bilateral knee pain    Current pain level reported = 6-7/10    Last reported dose of nothing      Narcotic Contract renewal none    Urine Drug screen none

## 2024-02-22 NOTE — PROGRESS NOTES
HPI:   Catina Rivear presents with complaints of bilateral knee pain.    The pain is described as moderate aching that is constant .  The patient’s activity level has remained the same since last visit.  The pain is worst unrelated to time of day.    Changes in condition/history since last visit: Patient is here today for extended follow-up, having been seen once previously on 12/7/2022 for lumbar complaints.  Today, states that her dominant concern is knee pain, which has been present for years.  Has been to PT with HEP, to no sustained relief, and has had steroid injections, to partial relief.  She had been offered viscosupplementation, thought his was denied.  Sent for consideration of genicular nerve RF.      Of note, she is followed by pain clinic in [place], Dr. Vallejo, and had SCS implanted in 12/2023.  States that this has been very effective for her lumbar complaints.  Has not had lumbar surgery, outside of the SCS implant.       Last procedure: N/A    date: N/A    Percentage of relief experienced from the procedure: N/A %    Duration of the relief: N/A    The following activities will increase the patient’s pain: walking, standing    The following activities decrease the patient’s pain: limiting activity level    Functional Assessment: Patient reports that they are able to complete all of their ADL's such as eating, bathing, using the toilet, dressing and getting up from a bed or a chair independently.    Current Medications:  Current Outpatient Medications   Medication Sig Dispense Refill    Tirzepatide (MOUNJARO) 7.5 MG/0.5ML Subcutaneous Solution Pen-injector Inject 7.5 mg into the skin once a week for 21 days. 2 mL 0    Insulin Pen Needle (BD PEN NEEDLE AN U/F) 32G X 4 MM Does not apply Misc Inject 1 Pen into the skin in the morning and 1 Pen at noon and 1 Pen in the evening. 300 each 0    Insulin Glargine, 2 Unit Dial, (TOUJEO MAX SOLOSTAR) 300 UNIT/ML Subcutaneous Solution Pen-injector As  directed up to TDD 75 units daily in the event of insulin pump failure 6 mL 0    Continuous Blood Gluc Transmit (DEXCOM G6 TRANSMITTER) Does not apply Misc USE 1 TRANSMITTER EVERY 90 DAYS 1 each 1    Omeprazole 40 MG Oral Capsule Delayed Release Take 1 capsule (40 mg total) by mouth daily. 90 capsule 0    Continuous Blood Gluc Sensor (DEXCOM G6 SENSOR) Does not apply Misc FOLLOW PACKAGE DIRECTIONS AND REPLACE EVERY 10 DAYS 9 each 0    NOVOLOG 100 UNIT/ML Injection Solution INJECT UP  UNITS VIA INSULIN PUMP DAILY AS DIRECTED      buPROPion  MG Oral Tablet 24 Hr Take 1 tablet (300 mg total) by mouth daily. 90 tablet 1    levothyroxine (SYNTHROID) 137 MCG Oral Tab Take 137 mcg by mouth daily. 90 tablet 1    simvastatin 40 MG Oral Tab Take 1 tablet (40 mg total) by mouth every evening. 90 tablet 0    glucagon (GVOKE HYPOPEN 2-PACK) 1 MG/0.2ML Subcutaneous SUBQ injection Inject 0.2 mL (1 mg total) into the skin as needed. 0.4 mL 1    PARoxetine HCl 40 MG Oral Tab Take 1 tablet (40 mg total) by mouth every morning. 90 tablet 1    pregabalin 150 MG Oral Cap Take 1 capsule (150 mg total) by mouth 2 (two) times daily.      apixaban 5 MG Oral Tab Take 1 tablet (5 mg total) by mouth in the morning and 1 tablet (5 mg total) before bedtime. 60 tablet 3    estradiol 0.05 MG/24HR Transdermal Patch Weekly APPLY 1 PATCH EXTERNALLY TO THE SKIN EVERY TUESDAY. DO NOT CUT PATCH        Patient requires assistance with: No assistance required    Reviewed Patient History Dated: 12/7/22 the following changes are noted: as above     Physical Exam:   [facility] 09/24/2019 (Approximate)   VAS Pain Score:  0-10/10  General Appearance: Well developed, well nourished, normal build, independent body habitus, no apparent physical disabilities, well groomed    Neurological Exam: WNL-Orientation to time, place and person, normal mood & effect, normal concentration & attention span  Inspection: antalgic, no acute distress  Pain with ROM B knees  without reduction in ROM  No focal sensory/motor deficits   Radiology/Lab Test Reviewed: XR 2021:  Left:  CONCLUSION:    1. Mild to moderate tricompartmental osteoarthritis, most pronounced in the medial compartment where there is joint space loss.   2. No acute process.   Right:  CONCLUSION:    1. No acute process.   2. Mild tricompartmental osteoarthritis.   Lab Results   Component Value Date    WBC 9.0 10/04/2023    WBC 9.2 04/27/2023    WBC 12.3 (H) 02/10/2023     Lab Results   Component Value Date    HEMOGLOBIN 13.6 07/27/2006     Lab Results   Component Value Date    .0 10/04/2023    .0 04/27/2023    .0 02/10/2023     Do you have any known blood/bleeding disorders?  No  Does patient currently take blood thinners?   Other: eliquis  - last taken on c  Does patient currently take any antibiotics?   No  Patient educated and verbalized understanding.  Medical Decision Making:   Diagnosis:    Encounter Diagnosis   Name Primary?    Chronic pain of both knees Yes     Impression: Chronic bilateral knee pain with x-ray evidence from 2 years ago of mild to moderate degenerative changes, more so in the medial compartment.  No significant sustained relief with physical therapy or HEP, has had temporary improvement with steroid injections, though mild.  Had discussed viscosupplementation, though this was denied by her insurance.  Prior to consideration of more aggressive options, was sent for consideration of genicular nerve radiofrequency ablation.  On exam, does have reproducible pain with range of motion of the knees, with preserved range of motion bilaterally.  Crepitus noted on the right, none on the left at this time.  Prior to consideration of radiofrequency ablation, we will proceed with genicular nerve block.  If this produces good but only diagnostic improvement, we will proceed with radiofrequency ablation.    Plan: Patient to schedule the following injection: Bilateral genicular nerve block  Levels: N/A, Procedure and risks were discussed with pt. including headache, bleeding, infection and potential nerve damage.  Follow-up 2 to 3 weeks.  If she has good but only brief improvement, consider RFA.  No orders of the defined types were placed in this encounter.      Meds & Refills for this Visit:  Requested Prescriptions      No prescriptions requested or ordered in this encounter       Imaging & Consults:  None    The patient indicates understanding of these issues and agrees to the plan.    ALISON Kauffman

## 2024-02-23 NOTE — TELEPHONE ENCOUNTER
Procedure Name: bilateral genicular NB  CPT Code(s): 78885    The procedure has been DENIED.    Plan/3rd party: BAYLEE Linares  Case/Reference #: [id number]  P2P Phone #: [phone]  Reason for denial: 51046    Our review showed that the care you requested is investigational and not medically necessary. We can not approve your request because your plan does not cover care that is investigational.  This type of treatment is not approvable under the plain clinical criteria because there is either no proof or nor enough prove that it improve health and function.    If you like a P2P please call [phone]     Appeal information  [facility] [place]  [id number]  [facility]. [id number] [phone] fax     Routed to [facility]

## 2024-02-23 NOTE — TELEPHONE ENCOUNTER
Sonu Dennis PA  You13 minutes ago (12:19 PM)       This is apparently not a covered benefit for her plan.  She would need to return to Ortho as this is not a viable option for her.   Attempted to contact pt at this ,pt did not  the call.Left detailed message to her identified vm. Advised to call if she has further questions.

## 2024-02-27 ENCOUNTER — TELEPHONE (OUTPATIENT)
Dept: ORTHOPEDICS CLINIC | Facility: CLINIC | Age: 54
End: 2024-02-27

## 2024-02-27 NOTE — TELEPHONE ENCOUNTER
Pt called in stating that the RFA was denied so pt wondering what next steps are in regards to treatment and if an appt was needed    Pt can be reached at [phone]

## 2024-02-27 NOTE — TELEPHONE ENCOUNTER
Do you want the patient to follow up in office?      Genicular nerve block/ genicular radiofrequency ablation denied by insurance.

## 2024-03-13 DIAGNOSIS — K21.9 GASTROESOPHAGEAL REFLUX DISEASE, UNSPECIFIED WHETHER ESOPHAGITIS PRESENT: ICD-10-CM

## 2024-03-13 DIAGNOSIS — E78.2 MIXED HYPERLIPIDEMIA: ICD-10-CM

## 2024-03-13 DIAGNOSIS — F41.9 ANXIETY: ICD-10-CM

## 2024-03-13 RX ORDER — SIMVASTATIN 40 MG
40 TABLET ORAL EVERY EVENING
Qty: 90 TABLET | Refills: 0 | Status: SHIPPED | OUTPATIENT
Start: 2024-03-13

## 2024-03-13 RX ORDER — PAROXETINE HYDROCHLORIDE 40 MG/1
40 TABLET, FILM COATED ORAL EVERY MORNING
Qty: 90 TABLET | Refills: 0 | Status: SHIPPED | OUTPATIENT
Start: 2024-03-13

## 2024-03-13 RX ORDER — OMEPRAZOLE 40 MG/1
40 CAPSULE, DELAYED RELEASE ORAL DAILY
Qty: 90 CAPSULE | Refills: 0 | Status: SHIPPED | OUTPATIENT
Start: 2024-03-13

## 2024-03-13 RX ORDER — BUPROPION HYDROCHLORIDE 300 MG/1
300 TABLET ORAL DAILY
Qty: 90 TABLET | Refills: 1 | Status: SHIPPED | OUTPATIENT
Start: 2024-03-13

## 2024-03-13 NOTE — TELEPHONE ENCOUNTER
LOV 2/9/24 for menopause.      Psychiatric Non-Scheduled (Anti-Anxiety) Vlskmr1203/13/2024 08:10 AM   Protocol Details Depression Screening completed within the past 12 months    In person appointment or virtual visit in the past 6 mos or appointment in next 3 mos        Future Appointments   Date Time Provider Department Center   4/9/2024  7:30 AM Amy Fernandez, APRN [facility]

## 2024-03-13 NOTE — TELEPHONE ENCOUNTER
Gastrointestional Medication Protocol Passed03/13/2024 03:58 PM   Protocol Details In person appointment or virtual visit in the past 12 mos or appointment in next 3 mos

## 2024-03-13 NOTE — TELEPHONE ENCOUNTER
LOV 2/9/24 for menopause.    Cholesterol Medication Protocol [id number]/13/2024 02:46 PM   Protocol Details ALT < 80    ALT resulted within past year    Lipid panel within past 12 months    In person appointment or virtual visit in the past 12 mos or appointment in next 3 mos     Statin sent.      Psychiatric Non-Scheduled (Anti-Anxiety) [id number]/13/2024 02:46 PM   Protocol Details Depression Screening completed within the past 12 months    In person appointment or virtual visit in the past 6 mos or appointment in next 3 mos        Future Appointments   Date Time Provider Department Center   4/9/2024  7:30 AM Amy Fernandez, APRN [facility]

## 2024-03-14 RX ORDER — PROCHLORPERAZINE 25 MG/1
SUPPOSITORY RECTAL
Qty: 3 EACH | Refills: 2 | Status: SHIPPED | OUTPATIENT
Start: 2024-03-14

## 2024-03-14 NOTE — TELEPHONE ENCOUNTER
Requested Prescriptions     Pending Prescriptions Disp Refills    Continuous Blood Gluc Sensor (DEXCOM G6 SENSOR) Does not apply Misc [Pharmacy Med Name: DEXCOM G6 MIS SENSOR]  2     Sig: USE 1 SENSOR EVERY 10 DAYS     Your appointments       Date & Time Appointment Department ([place])    Apr 09, 2024  7:30 AM CDT Diabetes Pump follow up with Amy Fernandez APRN [address] ([facility] [place])              [address] [facility] [place]  [address] [place] [address] [id number] [phone]          Last A1c value was 6% done 2/6/2024.    Refill 11/27/23  LOV 2/6/24

## 2024-03-18 ENCOUNTER — TELEPHONE (OUTPATIENT)
Dept: ORTHOPEDICS CLINIC | Facility: CLINIC | Age: 54
End: 2024-03-18

## 2024-03-18 NOTE — TELEPHONE ENCOUNTER
----- Message from Mik Lock PA-C sent at 3/18/2024  7:53 AM CDT -----  Regarding: RE: Zilretta Denial  Since Zilretta was denied, can offer patient appointment with me for regular (Kenalog) steroid injections.    Thank you!    Mik  ----- Message -----  From: Rashmi Zeng MA  Sent: 3/15/2024  12:27 PM CDT  To: Mik Lock PA-C; #  Subject: Ricklrlanie Bustamante                                  Hi!    [employer] denied Zilretta due to pt only being able to have it once in her lifetime and pt has had Zilretta in the past.    Pls advise how you'd like to proceed.    Thanks!

## 2024-03-18 NOTE — TELEPHONE ENCOUNTER
Message left to call the office back to get scheduled/offer regular steroid injection.  Per previous message.

## 2024-03-19 NOTE — TELEPHONE ENCOUNTER
Patient has questions about why zilretta was denied.  It was explained that it is a once in a lifetime injection per her insurance.     Future Appointments   Date Time Provider Department Center   3/25/2024  2:40 PM Mik Lock PA-C [facility]   4/9/2024  7:30 AM Amy Fernandez APRN [facility]

## 2024-03-25 ENCOUNTER — OFFICE VISIT (OUTPATIENT)
Dept: ORTHOPEDICS CLINIC | Facility: CLINIC | Age: 54
End: 2024-03-25
Payer: COMMERCIAL

## 2024-03-25 VITALS — BODY MASS INDEX: 46.38 KG/M2 | WEIGHT: 252 LBS | HEIGHT: 62 IN

## 2024-03-25 DIAGNOSIS — M17.0 PRIMARY OSTEOARTHRITIS OF BOTH KNEES: Primary | ICD-10-CM

## 2024-03-25 RX ORDER — DILTIAZEM HYDROCHLORIDE 180 MG/1
180 CAPSULE, EXTENDED RELEASE ORAL 2 TIMES DAILY
COMMUNITY
Start: 2024-03-13

## 2024-03-25 RX ORDER — TRIAMCINOLONE ACETONIDE 40 MG/ML
80 INJECTION, SUSPENSION INTRA-ARTICULAR; INTRAMUSCULAR ONCE
Status: COMPLETED | OUTPATIENT
Start: 2024-03-25 | End: 2024-03-25

## 2024-03-25 RX ADMIN — TRIAMCINOLONE ACETONIDE 80 MG: 40 INJECTION, SUSPENSION INTRA-ARTICULAR; INTRAMUSCULAR at 15:33:00

## 2024-03-25 NOTE — PROCEDURES
After informed consent, the patient's right and left knees were marked, locally anesthetized with skin refrigerant, prepped with topical antiseptic, and injected with a mixture of 1mL 40mg/mL Kenalog, 2mL 1% lidocaine and 2mL 0.5% marcaine through the inferolateral portal.  A band-aid was applied.  The patient tolerated the procedure well.    Mik Lock PA-C  [facility]

## 2024-04-09 ENCOUNTER — TELEPHONE (OUTPATIENT)
Dept: ENDOCRINOLOGY CLINIC | Facility: CLINIC | Age: 54
End: 2024-04-09

## 2024-04-09 ENCOUNTER — OFFICE VISIT (OUTPATIENT)
Dept: ENDOCRINOLOGY CLINIC | Facility: CLINIC | Age: 54
End: 2024-04-09
Payer: COMMERCIAL

## 2024-04-09 VITALS
HEART RATE: 97 BPM | BODY MASS INDEX: 44.75 KG/M2 | DIASTOLIC BLOOD PRESSURE: 72 MMHG | SYSTOLIC BLOOD PRESSURE: 122 MMHG | RESPIRATION RATE: 18 BRPM | HEIGHT: 62 IN | OXYGEN SATURATION: 99 % | WEIGHT: 243.19 LBS

## 2024-04-09 DIAGNOSIS — Z96.41 INSULIN PUMP IN PLACE: ICD-10-CM

## 2024-04-09 DIAGNOSIS — E11.9 TYPE 2 DIABETES MELLITUS WITHOUT COMPLICATION, WITH LONG-TERM CURRENT USE OF INSULIN (HCC): Primary | ICD-10-CM

## 2024-04-09 DIAGNOSIS — Z79.4 TYPE 2 DIABETES MELLITUS WITHOUT COMPLICATION, WITH LONG-TERM CURRENT USE OF INSULIN (HCC): Primary | ICD-10-CM

## 2024-04-09 PROCEDURE — 95251 CONT GLUC MNTR ANALYSIS I&R: CPT | Performed by: NURSE PRACTITIONER

## 2024-04-09 PROCEDURE — 99214 OFFICE O/P EST MOD 30 MIN: CPT | Performed by: NURSE PRACTITIONER

## 2024-04-09 PROCEDURE — 3078F DIAST BP <80 MM HG: CPT | Performed by: NURSE PRACTITIONER

## 2024-04-09 PROCEDURE — 3008F BODY MASS INDEX DOCD: CPT | Performed by: NURSE PRACTITIONER

## 2024-04-09 PROCEDURE — 3074F SYST BP LT 130 MM HG: CPT | Performed by: NURSE PRACTITIONER

## 2024-04-09 RX ORDER — INSULIN ASPART 100 [IU]/ML
INJECTION, SOLUTION INTRAVENOUS; SUBCUTANEOUS
Qty: 150 ML | Refills: 1 | Status: SHIPPED | OUTPATIENT
Start: 2024-04-09

## 2024-04-09 RX ORDER — INSULIN PMP CART,AUT,G6/7,CNTR
1 EACH SUBCUTANEOUS
Qty: 1 KIT | Refills: 0 | Status: SHIPPED | OUTPATIENT
Start: 2024-04-09

## 2024-04-09 RX ORDER — INSULIN PMP CART,AUT,G6/7,CNTR
1 EACH SUBCUTANEOUS
Qty: 30 EACH | Refills: 1 | Status: SHIPPED | OUTPATIENT
Start: 2024-04-09

## 2024-04-09 RX ORDER — TIRZEPATIDE 15 MG/.5ML
15 INJECTION, SOLUTION SUBCUTANEOUS WEEKLY
Qty: 6 ML | Refills: 1 | Status: SHIPPED | OUTPATIENT
Start: 2024-04-09

## 2024-04-09 RX ORDER — TIRZEPATIDE 12.5 MG/.5ML
12.5 INJECTION, SOLUTION SUBCUTANEOUS WEEKLY
COMMUNITY
Start: 2024-04-09

## 2024-04-09 NOTE — PROGRESS NOTES
Chief Complaint   Patient presents with    Diabetes     Pt here to f/u on diabetes     HPI:   Catina Rivera is a 54 year old female who presents for a follow up visit for management of diabetes. Last A1c value was 6% done 2/6/2024. Since last visit diabetes management has remained very well controlled. Pt has been working hard to reduce weight. Pt will need a knee replacement surgery but plans on scheduling in December.     Pt would also like to change to Omnipod. States she swims a lot recently and also the cost of omnipod is much lower on insurance.     Patient is using continuous glucose monitoring or would be testing BGs at least 4 times per day.  Patient is on insulin injections.   Patient is making self-adjustments in insulin according to blood sugars.    Diabetes history:  DM 2000 - on insulin since shortly after dx (antibodies negative 2022)  GDM 2000   Patient has not had hospitalizations for blood sugar issues  denies any history of pancreatitis    Current DM regimen:  Mounjaro 12.5 mg weekly  Novolog via T-slim  12MN: 2.000 u/hr  0530: 1.300 u/hr  1300: 1.800 u/hr  1630: 2.050 u/hr  1830: 1.800 u/hr  2130:  2.200 u/hr     Bolus settings:   I:C   12MN: 11  1300: 9  1830: 11          ISF:  1:30                                Target goals: 110  Active insulin time: 5 hrs    Back up insulin plan: Toujeo 50 units daily, continue novolog IC 1:11, ISF 1:30 with target glucose 110 mg/dl.     Pump download:   Control IQ active: 96%  Avg sleep: 8 hrs 7 days a week    TDD insulin: 77.89 units  Average daily basal: 21.43 Units   Average daily food bolus: 8.39 units  Average daily correction bolus: 1.44 units  Average daily control IQ Bolus: 2.89 Units  Avg daily carbs: 221 g  Changing site every : 2.5 days  Previous DM therapies:  Metformin : severe GI     Complications/Co-morbidities:   None       Modifying factors:  Medication adherence: yes  Recent illness/steroids: no    Overall glucose control:   HGBA1C:     Lab Results   Component Value Date    A1C 6.0 (A) 02/06/2024    A1C 5.7 (A) 10/17/2023    A1C 5.5 05/11/2023     11/01/2022       Personal  Dexcom G6 CGM  Analysis of data: 3/26/24-4/8/24  Active wear time: 96% (Goal 70%)  Sensor download: full report  in media  Average glucose : 135 mg/dl   GMI: 6.5%     CV (coefficient of variation) : 23%      8% (last visit 7%) time above 180mg /dl (Goal < 25%)  <1% (last visit <1%) time above 250 mg/dl (Goal < 5%)  91% (last visit 92%) time in target range:  mg/dl (Goal > 70%)  <1% (last visit 0%) time below target range: 70mg/dl (Goal < 4%)     Evaluation   1. Baseline glucose in target range  2. minimal postprandial elevation with return to baseline  3. low likelihood of hypoglycemia         Wt Readings from Last 3 Encounters:   04/09/24 243 lb 3.2 oz (110.3 kg)   03/25/24 252 lb (114.3 kg)   02/22/24 252 lb (114.3 kg)     BP Readings from Last 3 Encounters:   04/09/24 122/72   02/22/24 124/78   02/09/24 118/76          Past History:   She  has a past medical history of Anxiety, Anxiety state, unspecified, Arrhythmia, Asthma (HCC), Back pain, Back problem, Bad breath, Belching, Bloating, Decorative tattoo, Depression, Disorder of thyroid, Esophageal reflux (05/07/2012), GERD (gastroesophageal reflux disease), Headache(784.0) (04/24/2012), Heart palpitations (11/1/2022), Heartburn, High cholesterol, History of depression, Hypothyroidism, Irregular bowel habits, Leaking of urine, Lipoma of unspecified site (11/22/2011), Multiple thyroid nodules, Nontoxic multinodular goiter, Obesity, Other and unspecified hyperlipidemia (07/11/2011), Pain in joints, Pain with bowel movements, Painful swallowing, Plantar fasciitis, Sleep apnea, Stress, Thyroiditis, unspecified (06/25/2011), Type 1 diabetes mellitus (HCC), Uncomfortable fullness after meals, Visual impairment, Wears glasses, and Weight loss.   Her family history includes Asthma in her father and another family  member; Crohn's Disease in her sister; Diabetes in her father and maternal grandmother; Heart Attack in her father; Heart Disease in her father; Hypertension in her father; Other in her father, maternal grandmother, mother, sister, and sister; Skin cancer in her mother; Ulcerative Colitis in her sister.   She  reports that she quit smoking about 7 years ago. Her smoking use included cigarettes. She smoked an average of 0.5 packs per day. She has never been exposed to tobacco smoke. She has never used smokeless tobacco. She reports that she does not currently use alcohol. She reports current drug use.     She is allergic to adhesive tape, ibuprofen, sulfa antibiotics, erythromycin base, and oxycodone.     Current Outpatient Medications on File Prior to Visit   Medication Sig    Tirzepatide (MOUNJARO) 12.5 MG/0.5ML Subcutaneous Solution Pen-injector Inject 12.5 mg into the skin once a week.    dilTIAZem HCl ER Beads 180 MG Oral Capsule SR 24 Hr Take 1 capsule (180 mg total) by mouth 2 (two) times daily.    Continuous Blood Gluc Sensor (DEXCOM G6 SENSOR) Does not apply Misc USE 1 SENSOR EVERY 10 DAYS    PARoxetine HCl 40 MG Oral Tab Take 1 tablet (40 mg total) by mouth every morning.    buPROPion  MG Oral Tablet 24 Hr Take 1 tablet (300 mg total) by mouth daily.    SIMVASTATIN 40 MG Oral Tab TAKE 1 TABLET(40 MG) BY MOUTH EVERY EVENING    Omeprazole 40 MG Oral Capsule Delayed Release Take 1 capsule (40 mg total) by mouth daily.    Insulin Pen Needle (BD PEN NEEDLE AN U/F) 32G X 4 MM Does not apply Misc Inject 1 Pen into the skin in the morning and 1 Pen at noon and 1 Pen in the evening.    Insulin Glargine, 2 Unit Dial, (TOUJEO MAX SOLOSTAR) 300 UNIT/ML Subcutaneous Solution Pen-injector As directed up to TDD 75 units daily in the event of insulin pump failure    Continuous Blood Gluc Transmit (DEXCOM G6 TRANSMITTER) Does not apply Misc USE 1 TRANSMITTER EVERY 90 DAYS    levothyroxine (SYNTHROID) 137 MCG Oral  Tab Take 137 mcg by mouth daily.    glucagon (GVOKE HYPOPEN 2-PACK) 1 MG/0.2ML Subcutaneous SUBQ injection Inject 0.2 mL (1 mg total) into the skin as needed.    apixaban 5 MG Oral Tab Take 1 tablet (5 mg total) by mouth in the morning and 1 tablet (5 mg total) before bedtime.    estradiol 0.05 MG/24HR Transdermal Patch Weekly APPLY 1 PATCH EXTERNALLY TO THE SKIN EVERY TUESDAY. DO NOT CUT PATCH     No current facility-administered medications on file prior to visit.     CMP  (most recent labs)   Lab Results   Component Value Date/Time    GLU 90 11/22/2023 10:39 AM    BUN 13 11/22/2023 10:39 AM    CREATSERUM 0.90 11/22/2023 10:39 AM    GFRNAA 74 03/23/2022 07:54 AM    GFRAA 85 03/23/2022 07:54 AM    EGFRCR 77 11/22/2023 10:39 AM    CA 9.1 11/22/2023 10:39 AM    ALKPHO 91 10/04/2023 09:09 AM    AST 7 (L) 10/04/2023 09:09 AM    ALT 20 10/04/2023 09:09 AM    BILT 0.3 10/04/2023 09:09 AM    TP 7.1 10/04/2023 09:09 AM    ALB 3.7 10/04/2023 09:09 AM     11/22/2023 10:39 AM    K 3.6 11/22/2023 10:39 AM     11/22/2023 10:39 AM    CO2 26.0 11/22/2023 10:39 AM           Lipids  (most recent labs)   Lab Results   Component Value Date/Time    CHOLEST 150 10/04/2023 09:09 AM    TRIG 39 10/04/2023 09:09 AM    HDL 52 10/04/2023 09:09 AM    LDL 89 10/04/2023 09:09 AM    NONHDLC 98 10/04/2023 09:09 AM          Diabetes  (most recent labs)   Lab Results   Component Value Date/Time    A1C 6.0 (A) 02/06/2024 07:44 AM          Microalb (most recent labs)   Lab Results   Component Value Date/Time    MICROALBCREA  04/24/2023 04:27 PM      Comment:      Unable to calculate due to Urine Microalbumin <0.5 mg/dL          Lab results reviewed with patient.    REVIEW OF SYSTEMS:   Review of Systems  GENERAL HEALTH: feels well otherwise  SKIN:dry and intact, now gets mild erythema from mounjaro, fades quickly  EYES: denies vision changes  RESPIRATORY: denies shortness of breath with exertion  CARDIOVASCULAR: denies chest pain on  exertion  GI: denies abdominal pain, N/V/D  NEURO: denies headaches and denies numbness and tingling to extremities    EXAM:   /72   Pulse 97   Resp 18   Ht 5' 2\" (1.575 m)   Wt 243 lb 3.2 oz (110.3 kg)   LMP 09/24/2019 (Approximate)   SpO2 99%   BMI 44.48 kg/m²  Estimated body mass index is 44.48 kg/m² as calculated from the following:    Height as of this encounter: 5' 2\" (1.575 m).    Weight as of this encounter: 243 lb 3.2 oz (110.3 kg).   Physical Exam  Vitals reviewed  Constitutional: Normal appearance, no acute distress  Pulmonary: Pulmonary effort is normal  Neurologic: Alert and oriented  Psychiatric: Normal mood and affect      ASSESSMENT AND PLAN:   As for her Diabetes, it is well controlled. Discussed will send order for omnipod to pharmacy. Once approved notify office and we can help to arrange pump start training.       Medications:  Change:  Mounjaro 12.5 mg weekly --> Increase to 15 mg weekly in 2 weeks    Continue:   Novolog via T-slim  12MN: 2.000 u/hr  0530: 1.300 u/hr  1300: 1.800 u/hr  1630: 2.050 u/hr  1830: 1.800 u/hr  2130:  2.200 u/hr     Bolus settings:   I:C   12MN: 11  1300: 9  1830: 11          ISF:  1:30                                Target goals: 110  Active insulin time: 5 hrs    Back up insulin plan: Toujeo 50 units daily, continue novolog IC 1:11, ISF 1:30 with target glucose 110 mg/dl.     Recommendations are:   Continue dexcom CGM and change to Omnipod for cost  Reminded SMBG 4 x daily if not wearing CGM. Reviewed s/s and treatment of hypoglycemia (on AVS)   lose weight by increased dietary compliance and exercise   Schedule diabetic eye exam ([facility] - has upcoming appt end of the month)  check feet daily    Cardiovascular:  The 10-year ASCVD risk score (Medardo BAUTISTA, et al., 2019) is: 2.6%    Values used to calculate the score:      Age: 54 years      Sex: Female      Is Non- : No      Diabetic: Yes      Tobacco smoker: No      Systolic  Blood Pressure: 122 mmHg      Is BP treated: Yes      HDL Cholesterol: 52 mg/dL      Total Cholesterol: 150 mg/dL     As for her hypertension, Blood Pressure is well controlled. Pt is not on ace/arb.   PLAN: reviewed diet, exercise and weight control     As for her cholesterol, Lipids are well controlled. Pt is on simvastatin.   PLAN: will continue present medications, reviewed diet, exercise and weight control, and labs as ordered     Patient is not on aspirin therapy but is on other anticoagulant.     DM Health Maintenance  Nephropathy screening: No proteinuria, GFR low x1 - continue to monitor.   Last dilated eye exam: Last Dilated Eye Exam: 04/28/23     Exam shows retinopathy? Eye Exam shows Diabetic Retinopathy?: No    Last diabetic foot exam: Last Foot Exam: 02/06/24      Date of last PHQ-2 depression screen: No data recorded    Patient  reports that she quit smoking about 7 years ago. Her smoking use included cigarettes. She smoked an average of 0.5 packs per day. She has never been exposed to tobacco smoke. She has never used smokeless tobacco.  When is flu vaccine due? No recommendations at this time  When is pneumonia vaccine due? No recommendations at this time  Dentist : recommend every 6m     The patient indicates understanding of these issues and agrees to the plan.  Refills sent at time of office visit.    Diagnoses and all orders for this visit:    Type 2 diabetes mellitus without complication, with long-term current use of insulin (HCC)  -     Tirzepatide (MOUNJARO) 15 MG/0.5ML Subcutaneous Solution Pen-injector; Inject 15 mg into the skin once a week.  -     NOVOLOG 100 UNIT/ML Injection Solution; INJECT UP  UNITS VIA INSULIN PUMP DAILY AS DIRECTED  -     Insulin Disposable Pump (OMNIPOD 5 G6 INTRO, GEN 5,) Does not apply Kit; 1 each every 3 (three) days.  -     Insulin Disposable Pump (OMNIPOD 5 G6 PODS, GEN 5,) Does not apply Misc; 1 each every 3 (three) days.    Insulin pump in place  -      Insulin Disposable Pump (OMNIPOD 5 G6 INTRO, GEN 5,) Does not apply Kit; 1 each every 3 (three) days.  -     Insulin Disposable Pump (OMNIPOD 5 G6 PODS, GEN 5,) Does not apply Misc; 1 each every 3 (three) days.             Return in about 3 months (around 7/9/2024).    Spent 30 min obtaining patient history, evaluating patient, reviewing blood glucose trends, discussing treatment options, lifestyle modifications and completing documentation -this time does not including sensor interpretation time if applicable.   The risks and benefits of my recommendations, as well as other treatment options were discussed with the patient today. Questions were also answered to the best of my knowledge.    Amy RODRIGUES

## 2024-04-09 NOTE — TELEPHONE ENCOUNTER
Pt would like to change from T-slim to Omnipod. Should be covered as pharmacy benefit. [facility] If covered pt will contact us and please arrange pump training. If our educator is months out can meet with abbott team.

## 2024-04-09 NOTE — PATIENT INSTRUCTIONS
Summary from visit:   Last A1c value was 6% done 2/6/2024.  Once you receive approval for omnipod pumps let us know and we will help arrange pump training.     Diabetes Medications:   Change:  Mounjaro 12.5 mg weekly --> Increase to 15 mg weekly in 2 weeks    Continue:   Novolog via T-slim  12MN: 2.000 u/hr  0530: 1.300 u/hr  1300: 1.800 u/hr  1630: 2.050 u/hr  1830: 1.800 u/hr  2130:  2.200 u/hr     Bolus settings:   I:C   12MN: 11  1300: 9  1830: 11          ISF:  1:30                                Target goals: 110  Active insulin time: 5 hrs    Back up insulin plan: Toujeo 50 units daily, continue novolog IC 1:11, ISF 1:30 with target glucose 110 mg/dl.       It is important to take all of your medications as prescribed. Please call me if you cannot get the prescriptions filled or are having issues with refills.   Also, please call me if you have any issues with medication questions, side effects, dosing questions or problems with your blood sugar trends BEFORE CHANGING OR STOPPING ANY MEDICATIONS.    Remember to bring your glucose meter or blood sugar logbook to every appointment here at the diabetes center.   In order for me to determine any patterns in your blood sugars, you will need to test your blood sugar 3 times daily.   Please call with any concerns and Call if 2 glucose readings <80 mg/dl in 1 week so medication adjustments can be made.  and Schedule your annual diabetic eye exam prior to your next visit.   Return in about 3 months (around 7/9/2024).  ---------------------------------------------------------------------------------------------------------------------------------------------------    Reminders:  The A1C:  The A1C test provides us with your average blood sugar for the past 3 months. Keeping an A1C less than 7% for most people helps reduce or delay health problems that are related to diabetes. We sometimes make exceptions based on age, health history and other factors.     Blood sugar  targets:  Before breakfast:   (preferably less than 110)  2 hours After meals: less than 180 (preferably less than 150)   Call for persistent blood sugars less than  75 or more than  200.   Blood sugars greater than 200 are not acceptable to reach your goal of improving diabetes      Hypoglycemia:    Watch for low blood sugars: (less than 70)  Symptoms of low blood sugar:   Shakiness or dizziness  Cold, clammy skin or sweating  Feeling hungry  Headache  Nervousness  A hard, fast heartbeat  Weakness  Confusion or irritability  Blurred eyesight  Having nightmares or waking up confused or sweating  Numbness or tingling in the lips or tongue    Treatment of Low Blood sugar Action Plan  1. Check blood glucose to be sure that it is low. You can’t always go by symptoms. If in doubt, treat your low blood glucose anyway.  2. Take 15 grams of carbohydrate (carb). Here are some choices:  4 oz. regular fruit juice  3-4 glucose tablets  6 oz. regular soda   7-8 jelly beans  3. Recheck blood glucose after 10-15 minutes. If blood glucose is still low (less than 70 mg/dl) repeat the treatment (step 2).  4. If your next meal is more than one hour away, eat a small snack.  5. If you’re not sure what caused your low blood glucose, call your healthcare provider.  6. Always check your blood glucose before you drive           Diabetes Center Refill policy:     Allow 2-3 business days for refills  Contact your pharmacy at least 5 days prior to running out of medication and have them send an electronic request or submit request through the “request refill” option in your [employer] account.  Refills are not addressed after hours or on weekends; covering providers  do not authorize routine medications on weekends.  If your prescription is due for a refill, you may be due for a follow up appointment. This may impact the ability for you to get a 90 supply if requested.   To best provide you care, patients receiving routine medications need  to be seen at least twice per year however if the A1C is above 8% you will be need to be seen more frequently.   Yearly blood work may also required for many medications to insure safe prescribing. If you are due for labs, you will have 30 days to complete the  requested labs before future refills are authorized.   In the event that your preferred pharmacy does not have the requested medication in stock (e.g. Backordered), it is your responsibility to find another pharmacy that has the requested medication available.  We will gladly send a new prescription to that pharmacy at your request.       More and more people are living long and healthy lives with diabetes. We are here to help you manage your diabetes. Let’s work together to make a plan that you can balance in your daily life. Please continue with your primary care physician/provider for your routine health care maintenance.   Thank you,   Amy Fernandez [facility]

## 2024-04-19 ENCOUNTER — TELEPHONE (OUTPATIENT)
Dept: ENDOCRINOLOGY CLINIC | Facility: CLINIC | Age: 54
End: 2024-04-19

## 2024-04-19 NOTE — TELEPHONE ENCOUNTER
[facility] orders for patient- upgrading from OP dash to OP5    \"Catina is upgrading from Omnipod Dash and looking for a virtual upgrade training.  I need signed orders and I can schedule her.  Please fax signed orders to [phone].\"    Printed and prepped order sheet with fax sheet for KR and placed on desk for review and signature when back in office on Tuesday- updated Essence

## 2024-04-23 NOTE — TELEPHONE ENCOUNTER
Kept copy for our reference, copy sent to kat and one copy for Liegh    Also faxed copy to Essence- emailed to inform that I faxed orders over and that she is scheduled with Leigh for pump start.

## 2024-04-29 ENCOUNTER — DIABETIC EDUCATION (OUTPATIENT)
Dept: ENDOCRINOLOGY CLINIC | Facility: CLINIC | Age: 54
End: 2024-04-29
Payer: COMMERCIAL

## 2024-04-29 DIAGNOSIS — E11.9 TYPE 2 DIABETES MELLITUS WITHOUT COMPLICATION, WITH LONG TERM CURRENT USE OF INSULIN PUMP (HCC): Primary | ICD-10-CM

## 2024-04-29 DIAGNOSIS — Z96.41 TYPE 2 DIABETES MELLITUS WITHOUT COMPLICATION, WITH LONG TERM CURRENT USE OF INSULIN PUMP (HCC): Primary | ICD-10-CM

## 2024-04-29 NOTE — PATIENT INSTRUCTIONS
Recommendations:  Enter grams of carbs and use \"Use Sensor\" feature to bolus before a meal. Okay to use \"Use Sensor\" feature (without entering carbs) if need to correct elevated blood sugar between meals.    Follow up with the diabetes educator in 1 week.

## 2024-04-29 NOTE — PROGRESS NOTES
Catina SHELBI Rivera   3/12/1970 was seen for Initial Individual Pump Start Education:  Date: 4/29/2024 Referring Provider: LILIA Parker    Start time: 1:00 pm End time: 3:00 pm    Assessment:    Pt here for training on Omnipod 5 insulin pump, is transferring from [facility].  Pt had been on older model of Omnipod pump in the past.     Insulin Pump Brand: Omnipod 5  Basic programming of all settings reviewed and entered into pump.     Basal rate (s): Midnight 0.9 u/hr    Max Basal: 3 u/hr    I:C:   Midnight 11 g  1:00 pm    9 g  6:30 pm  11 g    Target Glucose: 110 mg/dL  Correct Above:   120 mg/dL    Max Bolus: 20 u    CF: Midnight 30 mg/dL    Active Insulin Time: 3 hrs  Reverse Correction: OFF  Infusion Sets:    Infusion Set/Pod location placed pod on right arm on 4/29/2024  Was wearing Dexcom G6 sensor on right arm.       Patient skills:    Taught concept of insulin pump therapy   Reviewed utilization of insulin to carbohydrate ratio and correction factor   Reviewed difference between basal and bolus insulin  Verbalized understanding of prevention/treatment for Hyperglycemia, Hypoglycemia, sick day, and DKA with insulin pump therapy  Discussed when to call the 1-800 number for pump problems, diabetes educator, or  physician.   Practiced filling reservoir / inserting infusion POD    Comments:     Patient verbalized understanding and has no further questions at this time.    Patient Recommendations:   Enter grams of carbs and use \"Use Sensor\" feature to bolus before a meal. Okay to use \"Use Sensor\" feature (without entering carbs) if need to correct elevated blood sugar between meals.  Follow up with diabetes educator in 1 week      Plan:   Advised Catina SHELBI Rivera to use vendor training resources for reinforcement. Follow up appointment set for 7/23/2024 Amy Fernandez APRN. Patient verbalized understanding and has no further questions at this time.       Bessy Cuellar, BETTINAN, JUDDN, [facility]

## 2024-04-30 ENCOUNTER — TELEPHONE (OUTPATIENT)
Dept: FAMILY MEDICINE CLINIC | Facility: CLINIC | Age: 54
End: 2024-04-30

## 2024-05-02 ENCOUNTER — HOSPITAL ENCOUNTER (OUTPATIENT)
Age: 54
Discharge: STILL ADMITTED OR ADMINISTRATIVE | End: 2024-05-02
Payer: COMMERCIAL

## 2024-05-06 ENCOUNTER — TELEMEDICINE (OUTPATIENT)
Dept: ENDOCRINOLOGY CLINIC | Facility: CLINIC | Age: 54
End: 2024-05-06
Payer: COMMERCIAL

## 2024-05-06 DIAGNOSIS — E11.9 TYPE 2 DIABETES MELLITUS WITHOUT COMPLICATION, WITH LONG TERM CURRENT USE OF INSULIN PUMP (HCC): Primary | ICD-10-CM

## 2024-05-06 DIAGNOSIS — Z96.41 TYPE 2 DIABETES MELLITUS WITHOUT COMPLICATION, WITH LONG TERM CURRENT USE OF INSULIN PUMP (HCC): Primary | ICD-10-CM

## 2024-05-06 NOTE — PROGRESS NOTES
Catina Rivera  : 3/12/1970 was seen for Individual Insulin Pump Follow up:    Patient verbally consents to a telemedicine service with live, interactive video and audio on 24. Patient understands and accepts financial responsibility for any deductible, co-insurance and/or co-pays associated with this service.        Date: 2024   Start time: 11:00 am End time: 11:20 am    Follow up after transferring from using Tslim w/CIQ insulin pump to Omnipod 5 insulin pump on 24.     Notes she has not been consistently entering grams of carbs into pump before meals. Also notes it can be difficult sometimes when at work, may not always be able to eat full meal before being pulled away to help.  States her numbers have been higher after changing from Tslim pump (BG's - 170-180's), but has been continuing to work through it.      Reviewed pump settings:   Basal rate(s):    Midnight 2.0 u/hr  5:30 am 1.3 u/hr  1:00 pm 1.8 u/hr  4:30 pm 2.05 u/hr  6:30 pm 2.2 u/hr  9:30 pm 2.2 u/hr     I:C    Midnight 11g   1:00 pm   9g   6:30 pm 11g     CF: 30 mg/dL     BG Target Range: 110 mg/dL  BG Correction Threshold:  120 mg/dL      Active insulin time: 3 hrs      Evaluated pump download:  Average Blood Glucose (mg/dL): 137 mg/dL  Standard Deviation: +/- 22 mg/dL      3% Time above range   97% Time in range    0% Time below range      TDD: 44.9 u  %TDD Basal: 62%  %TDD Bolus: 38%    Overriding pump? yes - 5% (1 bolus)  Pump/CGM data printout sent to scan    RECOMMENDATIONS:   - Aim to enter your grams of carbs into the pump 10-15 minutes before, or right when you are about to eat, when you bolus for meals.     - Contact our office between visits if you are experiencing blood sugars frequently <70 or >200.    Pump setting changes:  No setting adjustments were made at this time, to focus on timing of entering grams of carbs into pump either 10-15 minutes before meal, or right when eating meal.      Plan: Follow up appt set  for: 7/23/2024 Amy Fernandez, LILIA      Patient verbalized understanding and has no further questions at this time.      Bessy Cuellar, BETTINAN, LDN, [facility]

## 2024-05-06 NOTE — PATIENT INSTRUCTIONS
Recommendations:     - Aim to enter your grams of carbs into the pump 10-15 minutes before, or right when you are about to eat, when you bolus for meals.     - Contact our office between visits if you are experiencing blood sugars frequently <70 or >200.

## 2024-05-20 ENCOUNTER — OFFICE VISIT (OUTPATIENT)
Dept: ORTHOPEDICS CLINIC | Facility: CLINIC | Age: 54
End: 2024-05-20

## 2024-05-20 VITALS — HEIGHT: 61.5 IN | BODY MASS INDEX: 43.68 KG/M2 | WEIGHT: 234.38 LBS

## 2024-05-20 DIAGNOSIS — M17.0 PRIMARY OSTEOARTHRITIS OF BOTH KNEES: Primary | ICD-10-CM

## 2024-05-20 DIAGNOSIS — E66.01 MORBID OBESITY (HCC): ICD-10-CM

## 2024-05-20 PROCEDURE — 99213 OFFICE O/P EST LOW 20 MIN: CPT | Performed by: ORTHOPAEDIC SURGERY

## 2024-05-20 PROCEDURE — 3008F BODY MASS INDEX DOCD: CPT | Performed by: ORTHOPAEDIC SURGERY

## 2024-05-20 NOTE — PROGRESS NOTES
[facility] Progress Note    Subjective: 54-year-old female returns for discussion of her knees.  She states that steroid injection provides about 3 weeks of relief, Zilretta did not help that much more but is now not being approved by insurance.  She was referred for radiofrequency ablation, states this was denied by insurance as well.  She expresses frustration with the insurance company at this point.  She does note she has had a number of surgeries, including recently, and has had to stop semaglutide for those procedures.  Notes that she has had difficulty with maintaining weight loss due to this.    Objective: Body mass index 43.57  Tearful, upset      Medication list reviewed and includes Mounjaro, insulin and apixaban.      Labs: Hemoglobin A1c 6.0 in February 2024      Assessment/Plan: 54-year-old female with radiographically mild/mild to moderate osteoarthritis (based on images from 3 years ago).  Reviewed discussion of treatment options again.  From an arthritis management standpoint, discussed nonsurgical and surgical treatments.  Surgically, mild osteoarthritis would be contraindication to proceeding with surgery.  It may have progressed since 3 years ago, however repeating x-rays today would not  going forward as she still has a combination of comorbidities that would put her at elevated risk of complications, including at least 1 suboptimally controlled comorbidity with body mass index.  Discussed the importance of maintaining good diabetes control as well, which she has been doing.  Additionally she is on chronic blood thinner.  She has a number of frustrations right now as well with work and dealing with medical insurance.  In combination these comorbidities with body mass index over 40 would put her into a risk category more recommendation would be for optimization prior to surgery.  Counseled her on the significance and severity of complications associated with suboptimally  controlled comorbidities, and resulting outcomes with treatments for complications such as infection.  She expressed understanding, she is going to continue to follow-up for injections for the time being.  She will continue to work with the [facility] clinic on body mass index optimization.  She will maintain good diabetes control as she has been doing.    Sonu Soria MD, FAAOS  [facility] Orthopedic Surgery  Phone [phone]  Fax [phone]

## 2024-05-21 ENCOUNTER — TELEPHONE (OUTPATIENT)
Dept: ENDOCRINOLOGY CLINIC | Facility: CLINIC | Age: 54
End: 2024-05-21

## 2024-05-21 DIAGNOSIS — E11.9 TYPE 2 DIABETES MELLITUS WITHOUT COMPLICATION, WITH LONG-TERM CURRENT USE OF INSULIN (HCC): Primary | ICD-10-CM

## 2024-05-21 DIAGNOSIS — Z79.4 TYPE 2 DIABETES MELLITUS WITHOUT COMPLICATION, WITH LONG-TERM CURRENT USE OF INSULIN (HCC): Primary | ICD-10-CM

## 2024-05-21 RX ORDER — BLOOD-GLUCOSE METER
EACH MISCELLANEOUS
Qty: 1 KIT | Refills: 0 | Status: SHIPPED | OUTPATIENT
Start: 2024-05-21

## 2024-05-21 NOTE — TELEPHONE ENCOUNTER
Received fax back about rx for onetouch strips - pt also requesting new one touch verio meter. Sent to [facility]

## 2024-05-22 RX ORDER — PROCHLORPERAZINE 25 MG/1
SUPPOSITORY RECTAL
Qty: 3 EACH | Refills: 2 | Status: SHIPPED | OUTPATIENT
Start: 2024-05-22

## 2024-05-22 NOTE — TELEPHONE ENCOUNTER
Requested Prescriptions     Pending Prescriptions Disp Refills    DEXCOM G6 SENSOR Does not apply Misc [Pharmacy Med Name: DEXCOM G6 MIS SENSOR]  2     Sig: USE 1 SENSOR EVERY 10 DAYS     Your appointments       Date & Time Appointment Department ([place])    May 23, 2024 8:00 AM CDT Physical - Established with Gisselle Humphrey APRN [facility], [address] ([facility])        Jun 24, 2024 8:00 AM CDT Follow Up Visit with Mik Lock PA-C [facility], [address] [place] [address] ([address])        Jul 23, 2024 9:00 AM CDT Diabetes Pump follow up with Amy Fernandez APRN [address] ([facility] [place])              [address]  [address] [facility]  [facility] [address] [phone] [address]  [facility] [place]  [address] [facility] [address]  [facility] [id number] [phone] [address] [address] [phone]          Last A1c value was 6% done 2/6/2024.    Refill 3/14/24  LOV 5/6/24

## 2024-05-22 NOTE — LETTER
[facility] encounter  HEMATOLOGY/ONCOLOGY [address] OFFICE VISIT      Ms. Lilia Dixon was seen at [facility] clinic, she is a 72 year old female with:    Portions of this note are brought forward from Dr. Herrera's note; reviewed and edited by me as appropriate.     ONCOLOGY PROBLEMS:  Patient Active Problem List   Diagnosis    Dyslipidemia    Hypothyroidism    Simple or unspecified chronic serous otitis media    Essential hypertension    S/P total thyroidectomy    IgA deficiency  (CMD)    Osteopenia    Fatty infiltration of liver    Asymptomatic varicose veins    Varicose vein of leg    Hepatic hemangioma    Infiltrating lobular carcinoma  (CMD)    Rosacea    S/P mastectomy    B12 deficiency    Perianal pain    Hypercalcemia    Hypokalemia    Generalized weakness    Iron deficiency anemia due to chronic blood loss    Acute UTI    Hydronephrosis    Status post placement of ureteral stent    Chronic kidney disease, stage 3a  (CMD)    Chronic diarrhea    GERD (gastroesophageal reflux disease)    History of breast cancer    Debility    UTI (urinary tract infection)    Malignant neoplasm metastatic to bladder  (CMD)    Acute kidney injury (CMD)    Hyperglycemia    Acute pancreatitis, unspecified complication status, unspecified pancreatitis type (CMD)    Pancreatic insufficiency (CMD)    Peritoneal carcinomatosis  (CMD)    Intestinal obstruction  (CMD)    Chemotherapy-induced neutropenia (CMD)    Abdominal pain    SBO (small bowel obstruction)  (CMD)    Other specified diabetes mellitus with hyperglycemia, with long-term current use of insulin  (CMD)    Other pancytopenia  (CMD)    Mild protein-calorie malnutrition  (CMD)    Major depressive disorder with single episode, in full remission (CMD)    Atherosclerosis of aorta (CMD)    Other chronic pancreatitis  (CMD)    Other specified disorders of adrenal gland  (CMD)    Type 2 diabetes mellitus with stage 3a chronic kidney disease, with long-term current use  OUTSIDE TESTING RESULT REQUEST     IMPORTANT: FOR YOUR IMMEDIATE ATTENTION  Please FAX all test results listed below to: [phone]     Testing already done on or about: 2025     * * * * If testing is NOT complete, arrange with patient A.S.A.P. * * * *      Patient Name: Catina Rivera  Surgery Date: 2025  Medical Record: [id number]  CSN: [id number]  : 3/12/1970 - A: 54 y     Sex: female  Surgeon(s):  Ruslan Connelly MD  Procedure: RIGHT TOTAL KNEE ARTHROPLASTY  Anesthesia Type: Spinal     Surgeon: Ruslan Connelly MD     The following Testing and Time Line are REQUIRED PER ANESTHESIA     CBC [with Differential & Platelets] within  90 days  CMP (requires 4 hour fast) within  90 days  PT/INR within  30 days  PTT within  30 days  Type and Screen for Pre-Admission Testing (must be within 28 days of surgery)  MSSA/MRSA Nasal screening within 30 days  HbA1c      Thank You,   Sent by: Natalie STEWART     of insulin  (CMD)    Gastrostomy status  (CMD)    Metastatic malignant neoplasm  (CMD)    Metastatic malignant neoplasm, unspecified site  (CMD)    Dehydration    Hypomagnesemia        No chief complaint on file.        INTERVAL HISTORY:  Ms. Lilia Dixon is here for a follow up appointment. Ongoing acute vs chronic diarrhea. Seen by GI (5/2/24), prescribed 10 day course of Dificid for recurrent c diff.   Last dose of [place] given on 3/22/24. Held since then due to recurrent c diff infection, dehydration, hospitalizations. Also awaiting repeat cystoscopy to further evaluate lesion in bladder and assess if disease progression is noted.   Reports feeling much better today. Took her last dose of Dificid yesterday. Still with 3-4 loose watery stools a day as well as anytime she eats. Reports symptoms have much improved but not yet back to her baseline. Ongoing intermittent fatigue. Ongoing abdominal/pelvic pain, controlled with current pain regimen. Appetite is fair. Ongoing intermittent hematuria, small clots seen in urine. Symptoms not worsening, denies dysuria. Intermittent numbness to right fingers, not worse. Denies fever, chills, shortness of breath, cough, chest pain, nausea, constipation, rash/skin changes. Please see review of systems below and the positive findings are highlighted.    PAST MEDICAL HISTORY:    Reviewed in patient's Epic chart.    FAMILY HISTORY:  Reviewed in patient's Epic chart.    REVIEW OF SYSTEMS:  Loida Wellington, [facility]  5/7/2024  2:02 PM  Sign when Signing Visit  Lilia Dixon is a 72 year old female here for  Chief Complaint   Patient presents with    Follow-up     Malignant neoplasm of axillary tail of right breast in female, estrogen receptor positive     Denies latex allergy or sensitivity.    Medication verified and med list updated.  PCP and Pharmacy verified.    Social History     Tobacco Use   Smoking Status Never    Passive exposure: Never   Smokeless Tobacco Never     Advance  Directives Filed: Yes    ECOG:   ECOG [24 1347]   ECOG Performance Status 1       Vitals:    Visit Vitals  /70 (BP Location: RUE - Right upper extremity, Patient Position: Standing, Cuff Size: Small Adult)   Pulse 80   Temp 97.9 °F (36.6 °C) (Oral)   Resp 18   Wt 57.5 kg (126 lb 12.9 oz)   LMP 2005 (LMP Unknown)   SpO2 96%   BMI 21.77 kg/m²       These vital signs are:  Within defined parameters (Per Reference \"Defined Limits Hospital Outpatient Department (HOD)\")    Height: No.  Ht Readings from Last 1 Encounters:   24 5' 4\" (1.626 m)     Weight:Yes, shoes off.  Wt Readings from Last 3 Encounters:   24 56.7 kg (125 lb)   24 56.7 kg (125 lb)   04/15/24 56.8 kg (125 lb 3.5 oz)       BMI: Body mass index is 21.77 kg/m².    REVIEW OF SYSTEMS  GENERAL:  Patient denies headache, fevers, chills, night sweats, weight loss, dizziness, but complains of: excessive fatigue and change in appetite  ALLERGIC/IMMUNOLOGIC: Verified allergies: Yes  EYES:  Patient denies significant visual difficulties, double vision, blurred vision  ENT/MOUTH: Patient denies problems with hearing, sore throat, mouth sores, but complains of: sinus drainage  ENDOCRINE:  Patient denies hormone replacement, hot flashes, but complains of: diabetes and thyroid disease  HEMATOLOGIC/LYMPHATIC: Patient denies easy bruising, bleeding, tender lymph nodes, swollen lymph nodes  BREASTS: Patient denies abnormal masses of breast, nipple discharge, pain  RESPIRATORY:  Patient denies lung pain with breathing, coughing up blood, shortness of breath, but complains of: cough  CARDIOVASCULAR:  Patient denies anginal chest pain, palpitations, shortness of breath when lying flat, peripheral edema  GASTROINTESTINAL: Patient denies abdominal pain , nausea, vomiting, GI bleeding, constipation, change in bowel habits, heartburn, sensation of feeling full, difficulty swallowing, but complains of: abdominal pain, pain ratin, location:  lower abdomen  , diarrhea, and abdominal bloating   : Patient denies abnormal genital masses, blood in the urine, frequency, urgency, burning with urination, hesitancy, incontinence, vaginal bleeding, discharge  MUSCULOSKELETAL:  Patient denies joint pain, bone pain, redness, decreased range of motion, but complains of: joint swelling legs  SKIN:  Patient denies chronic rashes, inflammation, ulcerations, skin changes, itching  NEUROLOGIC:  Patient denies loss of balance, areas of focal weakness, sensory problems, but complains of: abnormal gait, numbness right hands fingers, and tingling right hand fingers  PSYCHIATRIC: Patient denies but complains of: insomnia, depression, and anxiety    This patient reported abnormal symptoms that needed immediate verbal communication: Yes, these symptoms included in ROS and were reported to provider.         PHYSICAL EXAMINATION:    Lilia Dixon is a 72 year old female who is alert, oriented times x 3, in no apparent distress.   Visit Vitals  /70 (BP Location: RUE - Right upper extremity, Patient Position: Standing, Cuff Size: Small Adult)   Pulse 80   Temp 97.9 °F (36.6 °C) (Oral)   Resp 18   Wt 57.5 kg (126 lb 12.9 oz)   LMP 2005 (LMP Unknown)   SpO2 96%   BMI 21.77 kg/m²      ECO - No physically strenuous activity, but ambulatory and able to carry out light or sedentary work.  Skin:  warm, dry, intact, no lesions  HEENT:  normocephalic, atraumatic; oral mucous membranes moist  Neck:  supple with no cervical or supraclavicular adenopathy; trachea midline  Cardiovascular:  Regular  Respiratory:  anterior/posterior lung sounds clear to auscultation bilaterally, no adventitious lung sounds  Breasts: RIGHT breast: s/p total mastectomy, no palpable masses of chest wall, no axillary lymphadenopathy. LEFT breast: Skin normal, no palpable masses, no axillary lymphadenopathy.   Abdomen:  hyperactive bowel sounds; abdomen soft, mild tenderness with deep palpation, non  distended.   Musculoskeletal/Extremities:  Trace peripheral edema, palpable pulses  Neurological:  no focal neurological deficits; speech normal; sensation grossly intact  Lymphatic System: No cervical, supraclavicular or axillary lymphadenopathy      LABS:  Recent Labs   Lab 05/15/24  1442 05/07/24  1348 04/29/24  0848 04/26/24  0849 04/23/24  0840   WBC 6.1 6.6 5.6 4.7 6.5   RBC 3.94* 3.57* 3.62* 3.58* 3.50*   HGB 9.2* 8.7* 8.7* 8.7* 8.7*   HCT 30.9* 28.3* 29.1* 28.7* 28.1*   MCV 78.4 79.3 80.4 80.2 80.3    287 305 270 251   Absolute Neutrophils 4.6 4.8 3.9 3.0 5.1   Absolute Lymphocytes 0.9* 1.1 0.8* 0.9* 0.7*   Absolute Monocytes 0.4 0.5 0.5 0.5 0.5   Absolute Eosinophils  0.1 0.2 0.3 0.3 0.2   Absolute Basophils 0.1 0.0 0.1 0.0 0.0       Recent Labs   Lab 05/15/24  1442 05/09/24  0834 05/07/24  1348 04/29/24  0848 04/26/24  0849 04/23/24  0840   Glucose 195* 161* 185* 133* 181* 189*   Sodium 137 139 138 140 139 137   Potassium 4.5 3.9 4.4 3.8 4.2 4.3   Chloride 103 105 104 111* 107 104   BUN 34* 23* 27* 24* 24* 33*   Creatinine 0.89 0.73 0.91 0.81 0.88 0.78   Calcium 9.0 8.5 8.6 9.4 8.7 8.5   Protein, Total 7.6  --  7.1 7.1 6.6 6.9   Albumin 2.9*  --  2.7* 3.0* 2.8* 2.9*   GOT/AST 21  --  15 20 16 17   Alkaline Phosphatase 62  --  66 66 59 55   GPT 18  --  17 14 17 16   Globulin 4.7*  --  4.4* 4.1* 3.8 4.0     Magnesium Results  (Last result in the past 24 hours)          Magnesium      05/07/24 1348 1.2          GENETICS:  BRCA 1/2 (2005):  No variants identified.  Sparkroom myRisk (09/24/2019):  No variants identified.     PRECISION MEDICINE:  [facility] CDX performed on specimen from 08/07/2019:  Immutnotherapy markers:   MSI by NGS: Stable  MMR by IHC: Not performed  PDL-1: Not performed  TMB: 25 mutations/mb      Genes of interest:   PIK3CA: Mutated, E545K, E542K   ESR1: Wildtype     Additional alterations:   JOAN *  BAP1 Q36*  CDH1 spice site mutation  DNMT3 rearrangement  NOTCH1  C1570C  PTEN Q245*  SPEN *  TBX3 frameshift     PATHOLOGICAL DATA:  Microsatellite Instability Report, Addendum   Immunohistochemistry (IHC) Testing for Mismatch Repair (MMR) Proteins   Accession:  ,  Block A1        MLH1        Intact nuclear expression (NORMAL)   MSH2        Intact nuclear expression (NORMAL)   MSH6        Intact nuclear expression (NORMAL)   PMS2        Intact nuclear expression (NORMAL)      Interpretation   No loss of nuclear expression of MMR proteins: LOW PROBABILITY of microsatellite instability         Addendum: IHC Prognostic Marker Result   PD-L1 22C3 (KEYTRUDA) Immunohistochemical Staining Results:   Block: A1     Combined Positive Score (CPS): 1     Reference Ranges: The reference ranges for determining expression levels in this class of malignancy (ER positive breast carcinoma) have not been determined by the FDA. Please correlate with any studies available in relevant journal articles.     All controls were reviewed and showed appropriate positive and negative immunoreactivity.     Pathologic Diagnosis   Bladder trigone, biopsy:   -Metastatic carcinoma, compatible with breast primary origin.     IHC Prognostic Marker Result    BREAST BIOMARKERS  Block: A1     Estrogen Receptor: POSITIVE         Maged Score 8 (5 + 3); 90% of tumor nuclei, strong staining     Progesterone Receptor: NEGATIVE         Maged Score 2 (1 + 1); <1% of tumor nuclei, weak staining  HER2 by IHC: EQUIVOCAL (2+); FISH Studies ordered      Electronically signed by Irina French MD on 4/29/2023 at 0925       RADIOLOGICAL DATA:   CT HEAD WO CONTRAST  Result Date: 4/17/24  FINDINGS:    No acute intracranial hemorrhage, mass effect or abnormal extra-axial fluid  collection. Brain morphology and volume are normal for age. No  significant white matter abnormality. No CT evidence of an acute  territorial vascular insult, however if there is concern for acute ischemia  MRI follow-up could be considered.  The imaged paranasal sinuses are clear.  The mastoid air cells are clear. The osseous structures are unremarkable.  IMPRESSION:  No acute intracranial abnormality.    CT ABD PELVIS W CONTRAST  Result Date: 4/5/24  Impression:  1.  No acute findings in the abdomen or pelvis.  2.  The colon is mildly prominent and fluid-filled, without evidence of  obstruction.  3.  Moderate bilateral hydronephrosis, similar to prior. Ureteral stents in  place.  4.  Mild bladder wall thickening, similar to prior. This can be seen in the  setting of cystitis.  5.  Findings consistent with peritoneal carcinomatosis. Unchanged soft  tissue density within the pelvis. Findings are consistent with metastatic  disease. Continued follow-up is recommended.  6.  Mild upper abdominal ascites. Mild mesenteric edema.      ASSESSMENT:  Ms. Lilia Dixon is a 72 year old female with the following hematology/oncology problems:  Metastatic breast cancer:  Stage I A infiltrating lobular carcinoma of the right breast, G2  pT2(m) 4.4 and 1.2 cm pN0cM0 ER 95% positive, VT 95% positive HER2 1+ status post right mastectomy with sentinel lymph node biopsy 03/31/2005 followed by adjuvant chemotherapy with Adriamycin and cyclophosphamide followed by paclitaxel.  This was followed by postmastectomy radiation therapy 5040 cGy with 1000 cGy boost completed 01/11/2006.  The patient took adjuvant letrozole for 5 years completing 2011  Patient developed abdominal pain 2 years prior to her diagnosis and colonoscopy performed 08/06/2019 showed stricture at 60 cm positive for recurrent metastatic lobular carcinoma status post exploratory laparotomy with colectomy and ileosigmoid anastomosis.  ER positive, VT negative HER2 negative Ki-67 20%  Fulvestrant and palbociclib started 09/14/2019 with concerns regarding progression  Pembrolizumab from 02/03/2021 to 09/02/2021 with disease progression in bladder.  Bladder biopsy performed 08/19/2021 showed metastatic lobular  carcinoma ER 80% positive MT 1-2% low HER2 2+ with fish non amplified with ratio 1.67.  Abraxane started 09/2021 and was briefly change to paclitaxel because of drug shortage and changed back to Abraxane and was last given 07/2022 because of clinical suspicion regarding disease progression  Fam trastuzumab dxd started 08/30/2022 and was very poorly tolerated.  Cycle 5 was dose reduced to 4.4 mg/kg.  Plan was to add ribociclib with concern was baseline magnesium and potassium abnormalities and risk of QT prolongation in a patient with chronic diarrhea.  There was concern about abemaciclib and the diarrhea side effect in a patient who has significant GI motility and absorption problems  NGS panel  Biomarker Findings  Tumor Mutational Lee - 25 Muts/Mb  Microsatellite status -MS-Stable  Genomic Findings  For a complete list of the genes assayed, please refer to the Appendix.  PIK3CA E545K  KRAS G12D  CDH1 splice site 48+1G>A  DNMT3A rearrangement intron 18, rearrangement  intron 20  TBX3 S662fs*227  3 Disease relevant genes with no reportable  alterations: BRCA1, BRCA2, ERBB2  Bladder mass biopsy performed 04/27/2023 showed breast carcinoma to be ER 90% strong positive, MT negative HER2 2+ with FISH nonamplified with ratio 1.4.  Letrozole started 05/09/2023     CT chest, abdomen and pelvis performed 08/23/2023 in the ER showed findings concerning for increased peritoneal carcinomatosis with increased serosal soft tissue along loops of small bowel and increased masslike soft tissue in the dependent pelvis.  No high-grade bowel obstruction.  Mild small bowel distention may reflect ileus or intermittent partial bowel obstruction.  Increased small volume ascites likely malignant.  New trace left pleural effusion.  Resolved hydro ureteral nephrosis.  Eribulin started 09/06/2023. CT chest, abdomen and pelvis performed 09/08/2023 showed worsening peritoneal carcinomatosis with drop metastasis into the pelvis and multiple  serosal deposit distal stomach small bowel loops as well as diffuse involvement of mid rectum with circumferential thickening of the mid rectum.  Small bowel was distended after 3.9 cm with increased amount of intra-abdominal and pelvic ascites.  Patient was admitted to [facility] and with hospice but patient opted to come off hospice and wish to continue treatment.  Readmitted to hospital 10/25/2023 and a venting G tube was placed 10/30/2023.  Patient was readmitted 11/12/2023 because of the gastrostomy tube dysfunction  CT chest abdomen pelvis performed 12/29/23 showed large amount of soft tissue within the pelvis measuring up to 5.7 cm which was stable when compared to the CT performed 10/2023.  Most recent cystoscopy stable.  CT chest, abdomen and pelvis performed 03/29/2024 showed unchanged 5.5 cm mass near the vaginal cuff likely a peritoneal implant in unchanged other peritoneal implants consistent with known metastatic disease.  Unchanged malignant ascites.  Unchanged placement of stents with mild hydronephrosis.  Mild increase in the thickness of rectal wall and sigmoid thickening.  Patient tested positive for C diff     Patient recently completed 10 day course of Dificid (5/6/24). No definite evidence of disease progression, but her disease has been difficult to evaluate on CT scan. Plan to continue eribulin when medically stable. Needs to be recheduled for cystoscopy with Dr. Geovani Woods, plan to further evaluate the malignant lesion in the bladder.     In case of disease progression, option for treatment would be capecitabine which has not been tried because of her significant problems with the GI tract and chronic severe diarrhea or could try fulvestrant again, with capivasertib with the understanding that side effect of diarrhea and hyperglycemia could be problematic. Both of these drugs would need an intact GI tract for absorption. Could consider tamoxifen 20 mg p.o. daily if no other  contraindication. She recently received aromatase inhibitors and has been on fulvestrant in the past but there is no documentation that she has ever received tamoxifen before.     It has previously been discussed extensively that stopping systemic therapy and proceeding with hospice is a viable option, patient stated she was not agreeable. She is tolerating eribulin well and she feels that it has helped her quality of life. Unfortunately her cancer is very difficult to measure, CT scans have been stable.     Other options targeting PIK3 but there would be concern about her hyperglycemia    Chronic diarrhea   In the past had chemotherapy induced colitis / chemotherapy induced diarrhea. More recently having recurrent c. Difficile infections   Managed by GI  10 day course of Dificid completed (5/6/24)    Ongoing hematuria and blood clots  F/u with urology, reschedule cysto    Pain  Managed by Palliative care    Malnutrition:   Dietitian has been seeing patient.     Hypotension:   On midodrine.            PLAN/RECOMMENDATIONS:   - Labs reviewed  - NO treatment today   - 1 liter NS with 3 gm IV Mg over 90 min  - Await cystoscopy with Urology   If the upcoming cystoscopy showed that her cancer is stable, continue eribulin with caution.    If there is evidence of disease progression, hospice is appropriate.    If she request treatment, would need to discuss further with oncologist.  - RTC in 1 for MD f/u, CBC, CMP, possible fluids, possible treatment      Patient is encouraged to call for any interval symptoms or concerns. Patient verbalizes and is in agreement with recommended plan of care.   I spent a total of 20 minutes on the day of the visit.  This includes pre-charting, chart review, and documenting.      .me

## 2024-05-23 ENCOUNTER — TELEPHONE (OUTPATIENT)
Dept: FAMILY MEDICINE CLINIC | Facility: CLINIC | Age: 54
End: 2024-05-23

## 2024-05-23 ENCOUNTER — OFFICE VISIT (OUTPATIENT)
Dept: FAMILY MEDICINE CLINIC | Facility: CLINIC | Age: 54
End: 2024-05-23

## 2024-05-23 ENCOUNTER — LAB ENCOUNTER (OUTPATIENT)
Dept: LAB | Age: 54
End: 2024-05-23
Attending: NURSE PRACTITIONER

## 2024-05-23 VITALS
HEIGHT: 60.5 IN | WEIGHT: 233 LBS | HEART RATE: 73 BPM | OXYGEN SATURATION: 96 % | DIASTOLIC BLOOD PRESSURE: 80 MMHG | TEMPERATURE: 97 F | BODY MASS INDEX: 44.56 KG/M2 | SYSTOLIC BLOOD PRESSURE: 136 MMHG

## 2024-05-23 DIAGNOSIS — G47.33 OSA ON CPAP: ICD-10-CM

## 2024-05-23 DIAGNOSIS — E11.9 TYPE 2 DIABETES MELLITUS WITHOUT COMPLICATION, WITH LONG-TERM CURRENT USE OF INSULIN (HCC): ICD-10-CM

## 2024-05-23 DIAGNOSIS — E78.2 MIXED HYPERLIPIDEMIA: ICD-10-CM

## 2024-05-23 DIAGNOSIS — E89.0 POSTOPERATIVE HYPOTHYROIDISM: ICD-10-CM

## 2024-05-23 DIAGNOSIS — I48.91 ATRIAL FIBRILLATION WITH RVR (HCC): ICD-10-CM

## 2024-05-23 DIAGNOSIS — J45.20 MILD INTERMITTENT ASTHMA WITHOUT COMPLICATION (HCC): ICD-10-CM

## 2024-05-23 DIAGNOSIS — Z79.4 TYPE 2 DIABETES MELLITUS WITHOUT COMPLICATION, WITH LONG-TERM CURRENT USE OF INSULIN (HCC): ICD-10-CM

## 2024-05-23 DIAGNOSIS — Z96.41 INSULIN PUMP IN PLACE: ICD-10-CM

## 2024-05-23 DIAGNOSIS — Z00.00 GENERAL MEDICAL EXAM: Primary | ICD-10-CM

## 2024-05-23 DIAGNOSIS — E66.01 CLASS 3 SEVERE OBESITY WITH SERIOUS COMORBIDITY AND BODY MASS INDEX (BMI) OF 40.0 TO 44.9 IN ADULT, UNSPECIFIED OBESITY TYPE (HCC): ICD-10-CM

## 2024-05-23 DIAGNOSIS — E89.0 S/P THYROIDECTOMY: ICD-10-CM

## 2024-05-23 DIAGNOSIS — I10 PRIMARY HYPERTENSION: ICD-10-CM

## 2024-05-23 DIAGNOSIS — K21.9 GASTROESOPHAGEAL REFLUX DISEASE, UNSPECIFIED WHETHER ESOPHAGITIS PRESENT: ICD-10-CM

## 2024-05-23 DIAGNOSIS — Z97.8 USES SELF-APPLIED CONTINUOUS GLUCOSE MONITORING DEVICE: ICD-10-CM

## 2024-05-23 DIAGNOSIS — Z79.890 HORMONE REPLACEMENT THERAPY (HRT): ICD-10-CM

## 2024-05-23 DIAGNOSIS — Z12.31 ENCOUNTER FOR SCREENING MAMMOGRAM FOR MALIGNANT NEOPLASM OF BREAST: ICD-10-CM

## 2024-05-23 DIAGNOSIS — H33.322 RETINAL HOLE OF LEFT EYE: ICD-10-CM

## 2024-05-23 DIAGNOSIS — F41.9 ANXIETY: ICD-10-CM

## 2024-05-23 DIAGNOSIS — M54.16 LUMBAR RADICULOPATHY: ICD-10-CM

## 2024-05-23 DIAGNOSIS — E04.2 NONTOXIC MULTINODULAR GOITER: ICD-10-CM

## 2024-05-23 PROBLEM — M79.661 PAIN IN RIGHT SHIN: Status: RESOLVED | Noted: 2022-11-13 | Resolved: 2024-05-23

## 2024-05-23 LAB
ALBUMIN SERPL-MCNC: 3.8 G/DL (ref 3.4–5)
ALBUMIN/GLOB SERPL: 1.2 {RATIO} (ref 1–2)
ALP LIVER SERPL-CCNC: 105 U/L
ALT SERPL-CCNC: 23 U/L
ANION GAP SERPL CALC-SCNC: 5 MMOL/L (ref 0–18)
AST SERPL-CCNC: 12 U/L (ref 15–37)
BASOPHILS # BLD AUTO: 0.05 X10(3) UL (ref 0–0.2)
BASOPHILS NFR BLD AUTO: 0.6 %
BILIRUB SERPL-MCNC: 0.4 MG/DL (ref 0.1–2)
BUN BLD-MCNC: 10 MG/DL (ref 9–23)
CALCIUM BLD-MCNC: 9.3 MG/DL (ref 8.5–10.1)
CHLORIDE SERPL-SCNC: 108 MMOL/L (ref 98–112)
CHOLEST SERPL-MCNC: 135 MG/DL (ref ?–200)
CO2 SERPL-SCNC: 27 MMOL/L (ref 21–32)
CREAT BLD-MCNC: 0.84 MG/DL
CREAT UR-SCNC: 83.4 MG/DL
EGFRCR SERPLBLD CKD-EPI 2021: 84 ML/MIN/1.73M2 (ref 60–?)
EOSINOPHIL # BLD AUTO: 0.17 X10(3) UL (ref 0–0.7)
EOSINOPHIL NFR BLD AUTO: 1.9 %
ERYTHROCYTE [DISTWIDTH] IN BLOOD BY AUTOMATED COUNT: 13.4 %
FASTING PATIENT LIPID ANSWER: YES
FASTING STATUS PATIENT QL REPORTED: YES
GLOBULIN PLAS-MCNC: 3.2 G/DL (ref 2.8–4.4)
GLUCOSE BLD-MCNC: 129 MG/DL (ref 70–99)
HCT VFR BLD AUTO: 39.9 %
HDLC SERPL-MCNC: 56 MG/DL (ref 40–59)
HGB BLD-MCNC: 13.6 G/DL
IMM GRANULOCYTES # BLD AUTO: 0.02 X10(3) UL (ref 0–1)
IMM GRANULOCYTES NFR BLD: 0.2 %
LDLC SERPL CALC-MCNC: 67 MG/DL (ref ?–100)
LYMPHOCYTES # BLD AUTO: 2.6 X10(3) UL (ref 1–4)
LYMPHOCYTES NFR BLD AUTO: 29.5 %
MCH RBC QN AUTO: 29.2 PG (ref 26–34)
MCHC RBC AUTO-ENTMCNC: 34.1 G/DL (ref 31–37)
MCV RBC AUTO: 85.6 FL
MICROALBUMIN UR-MCNC: <0.5 MG/DL
MONOCYTES # BLD AUTO: 0.53 X10(3) UL (ref 0.1–1)
MONOCYTES NFR BLD AUTO: 6 %
NEUTROPHILS # BLD AUTO: 5.45 X10 (3) UL (ref 1.5–7.7)
NEUTROPHILS # BLD AUTO: 5.45 X10(3) UL (ref 1.5–7.7)
NEUTROPHILS NFR BLD AUTO: 61.8 %
NONHDLC SERPL-MCNC: 79 MG/DL (ref ?–130)
OSMOLALITY SERPL CALC.SUM OF ELEC: 291 MOSM/KG (ref 275–295)
PLATELET # BLD AUTO: 325 10(3)UL (ref 150–450)
POTASSIUM SERPL-SCNC: 4 MMOL/L (ref 3.5–5.1)
PROT SERPL-MCNC: 7 G/DL (ref 6.4–8.2)
RBC # BLD AUTO: 4.66 X10(6)UL
SODIUM SERPL-SCNC: 140 MMOL/L (ref 136–145)
T4 FREE SERPL-MCNC: 1.4 NG/DL (ref 0.8–1.7)
TRIGL SERPL-MCNC: 54 MG/DL (ref 30–149)
TSI SER-ACNC: 0.61 MIU/ML (ref 0.36–3.74)
VLDLC SERPL CALC-MCNC: 8 MG/DL (ref 0–30)
WBC # BLD AUTO: 8.8 X10(3) UL (ref 4–11)

## 2024-05-23 PROCEDURE — 84439 ASSAY OF FREE THYROXINE: CPT | Performed by: NURSE PRACTITIONER

## 2024-05-23 PROCEDURE — 82570 ASSAY OF URINE CREATININE: CPT | Performed by: NURSE PRACTITIONER

## 2024-05-23 PROCEDURE — 3008F BODY MASS INDEX DOCD: CPT | Performed by: NURSE PRACTITIONER

## 2024-05-23 PROCEDURE — 80050 GENERAL HEALTH PANEL: CPT | Performed by: NURSE PRACTITIONER

## 2024-05-23 PROCEDURE — 3075F SYST BP GE 130 - 139MM HG: CPT | Performed by: NURSE PRACTITIONER

## 2024-05-23 PROCEDURE — 82043 UR ALBUMIN QUANTITATIVE: CPT | Performed by: NURSE PRACTITIONER

## 2024-05-23 PROCEDURE — 99396 PREV VISIT EST AGE 40-64: CPT | Performed by: NURSE PRACTITIONER

## 2024-05-23 PROCEDURE — 3079F DIAST BP 80-89 MM HG: CPT | Performed by: NURSE PRACTITIONER

## 2024-05-23 PROCEDURE — 80061 LIPID PANEL: CPT | Performed by: NURSE PRACTITIONER

## 2024-05-23 NOTE — TELEPHONE ENCOUNTER
----- Message from Gisselle Humphrey sent at 5/23/2024  1:35 PM CDT -----  Chemistries stable  Urine micro normal, repeat in 1 year  Thyroid function normal  Cholesterol excellent  No anemia

## 2024-05-23 NOTE — PROGRESS NOTES
HPI:   Patient is here for physical.    Concerns:   Still struggling with knee pain. Working on weight loss. Insurance does not cover non-surgical options. Also reports only getting 3 week relief of pain with steroid injection. May get 2nd opinion at [facility].   Seeing retinal specialist, will be getting another procedure soon     Last Mammogram: scheduled.   Last Colon Cancer Screen: 12/2022, next needed in 2022    Wt Readings from Last 6 Encounters:   05/23/24 233 lb (105.7 kg)   05/20/24 234 lb 6.4 oz (106.3 kg)   04/09/24 243 lb 3.2 oz (110.3 kg)   03/25/24 252 lb (114.3 kg)   02/22/24 252 lb (114.3 kg)   02/09/24 258 lb (117 kg)     Body mass index is 44.76 kg/m².     Cholesterol, Total (mg/dL)   Date Value   10/04/2023 150   04/27/2023 155   07/20/2020 167     CHOLESTEROL, TOTAL (mg/dL)   Date Value   03/23/2022 185   01/07/2021 170     CHOLESTEROL (mg/dL)   Date Value   04/26/2014 171   10/26/2013 179   07/27/2013 197     HDL Cholesterol (mg/dL)   Date Value   10/04/2023 52   04/27/2023 78 (H)   07/20/2020 46     HDL CHOLESTEROL (mg/dL)   Date Value   03/23/2022 66   01/07/2021 64     HDL CHOL (mg/dL)   Date Value   04/26/2014 50   10/26/2013 62   07/27/2013 61     LDL Cholesterol (mg/dL)   Date Value   10/04/2023 89   04/27/2023 66   07/20/2020 102 (H)     LDL-CHOLESTEROL (mg/dL (calc))   Date Value   03/23/2022 102 (H)   01/07/2021 92     LDL CHOLESTROL (mg/dL)   Date Value   04/26/2014 112   10/26/2013 108   07/27/2013 130     AST (U/L)   Date Value   10/04/2023 7 (L)   02/10/2023 10 (L)   11/01/2022 17   03/23/2022 16   04/26/2014 9 (L)   04/26/2014 10 (L)   10/26/2013 7 (L)   07/14/2012 12     ALT (U/L)   Date Value   10/04/2023 20   02/10/2023 28   11/01/2022 27   03/23/2022 27   04/26/2014 19   04/26/2014 20   10/26/2013 20   07/14/2012 17        Current Outpatient Medications   Medication Sig Dispense Refill    Continuous Glucose Sensor (DEXCOM G6 SENSOR) Does not apply Misc USE 1 SENSOR EVERY 10  DAYS 3 each 2    Blood Glucose Monitoring Suppl (ONETOUCH VERIO FLEX SYSTEM) w/Device Does not apply Kit Use to test blood sugar 1x daily as needed to calibrate dexcom 1 kit 0    NOVOLOG 100 UNIT/ML Injection Solution INJECT UP  UNITS VIA INSULIN PUMP DAILY AS DIRECTED 150 mL 1    Glucose Blood (ONETOUCH VERIO) In Vitro Strip Test 1x daily/as needed to calibrate dexcom readings 100 strip 1    Tirzepatide (MOUNJARO) 15 MG/0.5ML Subcutaneous Solution Pen-injector Inject 15 mg into the skin once a week. 6 mL 1    Insulin Disposable Pump (OMNIPOD 5 G6 INTRO, GEN 5,) Does not apply Kit 1 each every 3 (three) days. 1 kit 0    Insulin Disposable Pump (OMNIPOD 5 G6 PODS, GEN 5,) Does not apply Misc 1 each every 3 (three) days. 30 each 1    dilTIAZem HCl ER Beads 180 MG Oral Capsule SR 24 Hr Take 1 capsule (180 mg total) by mouth 2 (two) times daily.      PARoxetine HCl 40 MG Oral Tab Take 1 tablet (40 mg total) by mouth every morning. 90 tablet 0    buPROPion  MG Oral Tablet 24 Hr Take 1 tablet (300 mg total) by mouth daily. 90 tablet 1    SIMVASTATIN 40 MG Oral Tab TAKE 1 TABLET(40 MG) BY MOUTH EVERY EVENING 90 tablet 0    Omeprazole 40 MG Oral Capsule Delayed Release Take 1 capsule (40 mg total) by mouth daily. 90 capsule 0    Insulin Pen Needle (BD PEN NEEDLE AN U/F) 32G X 4 MM Does not apply Misc Inject 1 Pen into the skin in the morning and 1 Pen at noon and 1 Pen in the evening. 300 each 0    Insulin Glargine, 2 Unit Dial, (TOUJEO MAX SOLOSTAR) 300 UNIT/ML Subcutaneous Solution Pen-injector As directed up to TDD 75 units daily in the event of insulin pump failure 6 mL 0    Continuous Blood Gluc Transmit (DEXCOM G6 TRANSMITTER) Does not apply Misc USE 1 TRANSMITTER EVERY 90 DAYS 1 each 1    levothyroxine (SYNTHROID) 137 MCG Oral Tab Take 137 mcg by mouth daily. 90 tablet 1    glucagon (GVOKE HYPOPEN 2-PACK) 1 MG/0.2ML Subcutaneous SUBQ injection Inject 0.2 mL (1 mg total) into the skin as needed. 0.4 mL 1     apixaban 5 MG Oral Tab Take 1 tablet (5 mg total) by mouth in the morning and 1 tablet (5 mg total) before bedtime. 60 tablet 3    estradiol 0.05 MG/24HR Transdermal Patch Weekly APPLY 1 PATCH EXTERNALLY TO THE SKIN EVERY TUESDAY. DO NOT CUT PATCH        Past Medical History:    Anxiety    Anxiety state, unspecified    Arrhythmia    a fib    Asthma (HCC)    Back pain    Back problem    Bad breath    Belching    Bloating    Decorative tattoo    Depression    Disorder of thyroid    Esophageal reflux    GERD (gastroesophageal reflux disease)    Headache(784.0)    Heart palpitations    Afib    Heartburn    I have taken medication for over 10years    High cholesterol    History of depression    Hypothyroidism    Irregular bowel habits    Leaking of urine    Lipoma of unspecified site    Multiple thyroid nodules    on rt,2011, lt.-2008    Nontoxic multinodular goiter    Obesity    Other and unspecified hyperlipidemia    Pain in joints    Pain in right shin    Pain with bowel movements    Painful swallowing    Not consistent but not due to illness    Plantar fasciitis    Sleep apnea     CPAP     Stress    Thyroiditis, unspecified    Type 1 diabetes mellitus (HCC)    Uncomfortable fullness after meals    Visual impairment    Wears glasses    Weight loss    Working with weight loss clinic      Past Surgical History:   Procedure Laterality Date    Arthroscopy of joint unlisted Bilateral     knee    Carpal tunnel release Bilateral     Cholecystectomy      Colonoscopy N/A 10/21/2016    Procedure: COLONOSCOPY;  Surgeon: Brandt Ramirez DO;  Location:  ENDOSCOPY    Colonoscopy N/A 12/27/2022    Procedure: COLONOSCOPY;  Surgeon: Aiden Pollard MD;  Location:  ENDOSCOPY    Cyst aspiration left      Cyst aspiration right      Hysterectomy      7/21    Laparoscopic cholecystectomy  11/14/2011    Lipoma removal  04/29/2022    left posterior    Mesh (implantable)      bladder    Neuromuscular stimulator      Oophorectomy  Bilateral     Other      wisdom    Other      lt hand middle finger sx    Other surgical history  x2    total thyroidectomy    Other surgical history  x1    sinus    Other surgical history  10/11/2023    trial for neurostimulator    Other surgical history      Neuro Nerve Stimulator    Removal gallbladder      Sinus surgery        Sling oper stres incontinence  2014    Procedure: SLING UROLOGY;  Surgeon: Isac Sutherland MD;  Location: [facility]    Thyroidectomy      Total abdom hysterectomy  2020      Family History   Problem Relation Age of Onset    Heart Disease Father     Hypertension Father     Asthma Father     Diabetes Father     Other (Other) Father     Heart Attack Father     Diabetes Maternal Grandmother     Other (Other) Maternal Grandmother         lymphoma    Skin cancer Mother     Other (Other) Mother     Other (Other) Sister         Crohn's disease    Crohn's Disease Sister     Ulcerative Colitis Sister     Asthma Other         3 children all have asthma.     Other (Other) Sister         Celiac Disease      Social History:   Social History     Socioeconomic History    Marital status:     Number of children: 3   Occupational History    Occupation:      Comment: Convenience    Tobacco Use    Smoking status: Former     Current packs/day: 0.00     Types: Cigarettes     Start date: 3/1/2017     Quit date: 3/1/2017     Years since quittin.2     Passive exposure: Never    Smokeless tobacco: Never    Tobacco comments:     non-smoker     Updated 24   Vaping Use    Vaping status: Never Used   Substance and Sexual Activity    Alcohol use: Not Currently     Comment: rare    Drug use: Yes     Comment: CBD,CBG  Doctors are fully aware    Sexual activity: Yes     Social Determinants of Health      [facility]    Social Connections    Received from [facility], [facility]    Housing Stability        REVIEW OF SYSTEMS:   Review of Systems   Constitutional:  Negative for chills, fatigue, fever and unexpected weight change.   HENT:  Negative for congestion, ear pain, postnasal drip, rhinorrhea, sore throat and trouble swallowing.    Eyes:  Negative for visual disturbance.   Respiratory:  Negative for cough and shortness of breath.    Cardiovascular:  Negative for chest pain and palpitations.   Gastrointestinal:  Negative for abdominal pain, blood in stool, constipation, diarrhea, nausea and vomiting.   Endocrine: Negative for cold intolerance, heat intolerance, polydipsia, polyphagia and polyuria.   Genitourinary:  Negative for dysuria, frequency, hematuria and pelvic pain.   Musculoskeletal:  Negative for back pain.   Skin:  Negative for rash.   Neurological:  Negative for headaches.   Hematological:  Does not bruise/bleed easily.   Psychiatric/Behavioral:  Negative for dysphoric mood and sleep disturbance. The patient is not nervous/anxious.        EXAM:   /80   Pulse 73   Temp 97.3 °F (36.3 °C)   Ht 5' 0.5\" (1.537 m)   Wt 233 lb (105.7 kg)   LMP 09/24/2019 (Approximate)   SpO2 96%   BMI 44.76 kg/m²   Ideal body weight: 46.6 kg (102 lb 13.5 oz)  Adjusted ideal body weight: 70.3 kg (154 lb 14.5 oz)   Physical Exam  Constitutional:       General: She is not in acute distress.     Appearance: Normal appearance. She is well-developed and well-groomed. She is obese. She is not ill-appearing.   HENT:      Right Ear: Tympanic membrane, ear canal and external ear normal.      Left Ear: Tympanic membrane, ear canal and external ear normal.      Nose: Nose normal.      Mouth/Throat:      Mouth: Mucous membranes are moist.      Pharynx: Oropharynx is clear.   Eyes:      Conjunctiva/sclera: Conjunctivae normal.      Pupils: Pupils are equal, round, and reactive to light.   Neck:      Thyroid: No thyromegaly.   Cardiovascular:      Rate and Rhythm: Normal  rate and regular rhythm.      Heart sounds: Normal heart sounds.   Pulmonary:      Effort: Pulmonary effort is normal.      Breath sounds: Normal breath sounds.   Abdominal:      General: Bowel sounds are normal.      Palpations: Abdomen is soft.      Tenderness: There is no abdominal tenderness.   Musculoskeletal:      Cervical back: Normal range of motion.   Skin:     General: Skin is warm and dry.   Neurological:      General: No focal deficit present.      Mental Status: She is alert and oriented to person, place, and time.   Psychiatric:         Mood and Affect: Mood normal.         ASSESSMENT AND PLAN:   Diagnoses and all orders for this visit:    General medical exam    Encounter for screening mammogram for malignant neoplasm of breast  -     [facility] PAOLO 2D+3D SCREENING BILAT (CPT=77067/31552); Future    Type 2 diabetes mellitus without complication, with long-term current use of insulin ([facility])  -     Microalb/Creat Ratio, Random Urine [E]; Future  -     Comp Metabolic Panel (14) [E]; Future    Mixed hyperlipidemia  -     Comp Metabolic Panel (14) [E]; Future  -     Lipid Panel [E]; Future    Primary hypertension  -     Comp Metabolic Panel (14) [E]; Future    Mild intermittent asthma without complication ([facility])    Atrial fibrillation with RVR ([facility])  -     CBC W Differential W Platelet [E]; Future    Postoperative hypothyroidism  -     TSH and Free T4 [E]; Future    Insulin pump in place    Nontoxic multinodular goiter    Class 3 severe obesity with serious comorbidity and body mass index (BMI) of 40.0 to 44.9 in adult, unspecified obesity type ([facility])    S/P thyroidectomy  -     TSH and Free T4 [E]; Future    Lumbar radiculopathy    Anxiety    Gastroesophageal reflux disease, unspecified whether esophagitis present    CORINNE on CPAP    Uses self-applied continuous glucose monitoring device    Hormone replacement therapy (HRT)    Retinal hole of left eye

## 2024-05-24 NOTE — TELEPHONE ENCOUNTER
Please advise on recommendation for headache relief.  Patient reports headache for 8 days  Behind eyes- does have a lot of stress  No congestion  Patient states she briefly brought this up at apt yesterday.

## 2024-06-11 NOTE — TELEPHONE ENCOUNTER
Last OV 5/23/24  Last refilled on 3/13/24 for # 90 with 0 refills  Future Appointments   Date Time Provider Department Center   6/24/2024  8:00 AM Mik Lock PA-C [facility] [address]   6/24/2024  9:[facility]   7/23/2024  9:00 AM Amy Fernandez, APRN [facility] [address]        Thank you.

## 2024-06-12 RX ORDER — PAROXETINE HYDROCHLORIDE 40 MG/1
40 TABLET, FILM COATED ORAL EVERY MORNING
Qty: 90 TABLET | Refills: 0 | Status: SHIPPED | OUTPATIENT
Start: 2024-06-12

## 2024-06-20 DIAGNOSIS — E89.0 POSTOPERATIVE HYPOTHYROIDISM: ICD-10-CM

## 2024-06-20 RX ORDER — LEVOTHYROXINE SODIUM 137 UG/1
137 TABLET ORAL DAILY
Qty: 90 TABLET | Refills: 1 | Status: SHIPPED | OUTPATIENT
Start: 2024-06-20

## 2024-06-20 NOTE — TELEPHONE ENCOUNTER
LOV: 5/23/24 for general medical      levothyroxine (SYNTHROID) 137 MCG Oral Tab  Take 137 mcg by mouth daily. Dispense: 90 tablet, Refills: 1 ordered        11/22/2023 --         Thyroid Medication Protocol [id number]/20/2024 02:59 PM   Protocol Details TSH in past 12 months    Last TSH value is normal    In person appointment or virtual visit in the past 12 mos or appointment in next 3 mos        Future Appointments   Date Time Provider Department Center   6/24/2024  8:00 AM Mik Lock PA-C [facility] [address]   6/24/2024  9:00 AM YK RAJAN RM1 [facility] [place]   7/23/2024  9:00 AM Amy Fernandez, APRN [facility] [address]

## 2024-06-24 ENCOUNTER — OFFICE VISIT (OUTPATIENT)
Facility: CLINIC | Age: 54
End: 2024-06-24

## 2024-06-24 VITALS — BODY MASS INDEX: 38.25 KG/M2 | WEIGHT: 200 LBS | HEIGHT: 60.5 IN

## 2024-06-24 DIAGNOSIS — M17.0 PRIMARY OSTEOARTHRITIS OF BOTH KNEES: Primary | ICD-10-CM

## 2024-06-24 RX ORDER — TRIAMCINOLONE ACETONIDE 40 MG/ML
80 INJECTION, SUSPENSION INTRA-ARTICULAR; INTRAMUSCULAR ONCE
Status: COMPLETED | OUTPATIENT
Start: 2024-06-24 | End: 2024-06-24

## 2024-06-24 RX ADMIN — TRIAMCINOLONE ACETONIDE 80 MG: 40 INJECTION, SUSPENSION INTRA-ARTICULAR; INTRAMUSCULAR at 08:43:00

## 2024-06-24 NOTE — PROCEDURES
Risks and benefits of knee injection discussed with the patient, with risks including but not limited to pain and swelling at the injection site and/or within the knee joint, infection, elevation in blood pressure and/or glucose levels, facial flushing. After informed consent, the patient's right and left knees were marked, locally anesthetized with skin refrigerant, prepped with topical antiseptic, and injected with a mixture of 1mL 40mg/mL Kenalog, 2mL 1% lidocaine and 2mL 0.5% marcaine through the inferolateral portal.  A band-aid was applied.  The patient tolerated the procedure well.    Mik Lock PA-C  [facility] Orthopedic Surgery

## 2024-07-01 ENCOUNTER — MED REC SCAN ONLY (OUTPATIENT)
Facility: CLINIC | Age: 54
End: 2024-07-01

## 2024-07-08 RX ORDER — PROCHLORPERAZINE 25 MG/1
SUPPOSITORY RECTAL
Qty: 1 EACH | Refills: 1 | Status: SHIPPED | OUTPATIENT
Start: 2024-07-08

## 2024-07-08 NOTE — TELEPHONE ENCOUNTER
Requested Prescriptions     Pending Prescriptions Disp Refills    DEXCOM G6 TRANSMITTER Does not apply Misc [Pharmacy Med Name: DEXCOM G6 MIS TRANSMIT]  1     Sig: USE 1 TRANSMITTER EVERY 90 DAYS     Future Appointments   Date Time Provider Department Center   7/17/2024  9:00 AM [facility]   7/23/2024  9:00 AM Amy Fernandez, APRN [facility] [address]     Last A1c value was 6% done 2/6/2024.  Refill 1/24/24   LOV 5/6/24

## 2024-07-11 RX ORDER — INSULIN GLARGINE 300 U/ML
INJECTION, SOLUTION SUBCUTANEOUS
Qty: 6 ML | Refills: 0 | Status: SHIPPED | OUTPATIENT
Start: 2024-07-11

## 2024-07-11 NOTE — TELEPHONE ENCOUNTER
Diabetes Medication Protocol Passed     Last office visit 5/23/24  Last refilled on 1/27/24 for # 6ml with 0 refills  Future Appointments   Date Time Provider Department Center   7/17/2024  9:00 AM CARMENZA TOLENTINO 1 [facility]   7/23/2024  9:00 AM Amy Fernandez, LILIA [facility] [address]        Thank you.

## 2024-07-22 ENCOUNTER — PATIENT MESSAGE (OUTPATIENT)
Dept: ENDOCRINOLOGY CLINIC | Facility: CLINIC | Age: 54
End: 2024-07-22

## 2024-07-22 NOTE — TELEPHONE ENCOUNTER
From: Catina Rivera  To: Amy Fernandez  Sent: 7/22/2024 2:48 PM CDT  Subject: Canceled Appointment     I’m sorry I have to cancel the appointment tomorrow, my daughter Kathrin has to get into a neuro optometrist tomorrow.     I’ll look to see when I can get in off of availability     Penelope

## 2024-07-25 ENCOUNTER — PATIENT MESSAGE (OUTPATIENT)
Dept: FAMILY MEDICINE CLINIC | Facility: CLINIC | Age: 54
End: 2024-07-25

## 2024-07-25 ENCOUNTER — OFFICE VISIT (OUTPATIENT)
Facility: CLINIC | Age: 54
End: 2024-07-25
Payer: COMMERCIAL

## 2024-07-25 VITALS
HEIGHT: 60.5 IN | BODY MASS INDEX: 43.8 KG/M2 | HEART RATE: 81 BPM | RESPIRATION RATE: 18 BRPM | DIASTOLIC BLOOD PRESSURE: 80 MMHG | OXYGEN SATURATION: 98 % | SYSTOLIC BLOOD PRESSURE: 132 MMHG | WEIGHT: 229 LBS

## 2024-07-25 DIAGNOSIS — I48.20 CHRONIC ATRIAL FIBRILLATION (HCC): ICD-10-CM

## 2024-07-25 DIAGNOSIS — Z96.41 TYPE 2 DIABETES MELLITUS WITH COMPLICATION, WITH LONG TERM CURRENT USE OF INSULIN PUMP (HCC): Primary | ICD-10-CM

## 2024-07-25 DIAGNOSIS — E89.0 POSTOPERATIVE HYPOTHYROIDISM: ICD-10-CM

## 2024-07-25 DIAGNOSIS — E78.2 MIXED HYPERLIPIDEMIA: ICD-10-CM

## 2024-07-25 DIAGNOSIS — E55.9 VITAMIN D DEFICIENCY: ICD-10-CM

## 2024-07-25 DIAGNOSIS — Z96.41 INSULIN PUMP IN PLACE: ICD-10-CM

## 2024-07-25 DIAGNOSIS — E11.8 TYPE 2 DIABETES MELLITUS WITH COMPLICATION, WITH LONG TERM CURRENT USE OF INSULIN PUMP (HCC): Primary | ICD-10-CM

## 2024-07-25 LAB — HEMOGLOBIN A1C: 5.6 % (ref 4.3–5.6)

## 2024-07-25 PROCEDURE — 95251 CONT GLUC MNTR ANALYSIS I&R: CPT | Performed by: NURSE PRACTITIONER

## 2024-07-25 PROCEDURE — 3061F NEG MICROALBUMINURIA REV: CPT | Performed by: NURSE PRACTITIONER

## 2024-07-25 PROCEDURE — 3008F BODY MASS INDEX DOCD: CPT | Performed by: NURSE PRACTITIONER

## 2024-07-25 PROCEDURE — 99215 OFFICE O/P EST HI 40 MIN: CPT | Performed by: NURSE PRACTITIONER

## 2024-07-25 PROCEDURE — 3044F HG A1C LEVEL LT 7.0%: CPT | Performed by: NURSE PRACTITIONER

## 2024-07-25 PROCEDURE — 83036 HEMOGLOBIN GLYCOSYLATED A1C: CPT | Performed by: NURSE PRACTITIONER

## 2024-07-25 PROCEDURE — 3075F SYST BP GE 130 - 139MM HG: CPT | Performed by: NURSE PRACTITIONER

## 2024-07-25 PROCEDURE — 3079F DIAST BP 80-89 MM HG: CPT | Performed by: NURSE PRACTITIONER

## 2024-07-25 RX ORDER — PHENOL 1.4 %
600 AEROSOL, SPRAY (ML) MUCOUS MEMBRANE DAILY
COMMUNITY

## 2024-07-25 RX ORDER — LEVOTHYROXINE SODIUM 0.12 MG/1
125 TABLET ORAL
Qty: 90 TABLET | Refills: 0 | Status: SHIPPED | OUTPATIENT
Start: 2024-07-25 | End: 2024-10-23

## 2024-07-25 RX ORDER — TIRZEPATIDE 12.5 MG/.5ML
12.5 INJECTION, SOLUTION SUBCUTANEOUS WEEKLY
Qty: 2 ML | Refills: 1 | Status: SHIPPED | OUTPATIENT
Start: 2024-07-25 | End: 2024-07-25

## 2024-07-25 RX ORDER — TIRZEPATIDE 15 MG/.5ML
15 INJECTION, SOLUTION SUBCUTANEOUS WEEKLY
Qty: 6 ML | Refills: 0 | Status: SHIPPED | OUTPATIENT
Start: 2024-07-25 | End: 2024-10-23

## 2024-07-25 NOTE — PROGRESS NOTES
[facility] Note    Name: Catina Rivera    Date: 7/25/2024  Penelope - amirah Rivera is a 54 year old female who presents for evaluation of T2DM management.     Chief complaint: New Patient (NP, Pt here for diabetes,Dexcom was connected to office, downloaded and sent to provider for review, pump omnipod/Last A1C was done on 02/06/204 6.0/A1c was done today with 5.6//Last foot exam 04/29/24/Last eye exam 02/06/2024/)   No DR    Subjective:   Initial HPI consult in July 2024  Last office visit for DM mgmt of the pt: 4/9/24   Mgmt by: Amy Fernandez NP from [facility]    States she get knee injection steroid bilaterally every 3 month through ortho   Day 1 or 2 after injection.   Last Thursday saw dentist due to papiloma to [facility]  Dentist last 3 months, has taken out.     DM hx:  -Diagnosed with diabetes in 2000 gestational DM - on insulin since shortly after dx (antibodies negative 2022) , c peptide detectable -7/5/22  -Family history- yes, mother and MGF     -Re: potential DM medication contraindication (if positive, checkbox selected):  [] History of pancreatitis- denies  [] Personal/fam hx of medullary thyroid cancer/MEN2- denies   [] History of recent/frequent UTI/yeast infxn- denies   [] Previous amputation related to diabetes- denies    -Presence of associated DM complications (if positive, checkbox selected):  [x] Macrovascular complications (CAD/CVA/PAD) afib  [] Neuropathy  [x] Retinopathy  [x] Nephropathy  [] HTN  [x] Hyperlipidemia  [] Stroke/TIA- denies   [] Gastroparesis- denies    -Lifestyle: eat 2 -3 x a day  - Modifying factors:    DM meds at first office visit:  Mounjaro 15 mg weekly in 2 weeks last dose was this past Sunday      Omnipod 5 in Automode + Dexcom G6  Time Basal ICR ISF Active Insulin Time Target   12:00 AM (24 hr) 0.9 unit/hr 11 30 3 hrs 110   1P  9        630P  11                  TDD 42.1         Back up insulin plan: Toujeo 20 units daily, continue  novolog IC 1:11, ISF 1:30 with target glucose 110 mg/dl.     Previously trialed/failed DM meds: Metformin : severe GI      episodes of hypoglycemia: Yes, lowest 59 in the last 3 months, get symptoms at 80s.     Continuous Glucose Monitoring Interpretation  Catina Rivera has undergone continuous glucose monitoring with the Omnipod 5 with phone (connected to clinic profile).  W/ Dexcom G6. The blood glucose tracings were evaluated for two weeks prior to office visit. Blood glucose tracings demonstrated no significant hyperglycemia. There were occasional hypoglycemia, particularly 1-2 pm and 6-7 am  during the weeks of evaluation.              ###Also Hx of Hypothyroidism  15 years ago had left thyroidectomy and then 2 years after had right thyroidectomy, due to MNG all were benign. No Radiation was done.  10/2023: TSH 1.07, fT4 1.2,   05/2024: TSH 0.611 , fT4 1.4,   Hx of Atrial fib developed 2 years ago , no current fx, last fx was when she was in HS.   Pt noted that she was having a lot of diarrhea lately. She thought it was because of her IBS  Thyroid meds: Currently taking LT4 137  mcg PO qAM, taking it appropriately.         History/Other:    Allergies, PMH, SocHx and FHx reviewed and updated as appropriate in Epic on    Calcium Carbonate 600 MG Oral Tab Take 1 tablet (600 mg total) by mouth daily.      Magnesium Aspartate 65 MG Oral Tab Take 1 tablet (65 mg total) by mouth daily.      levothyroxine 125 MCG Oral Tab Take 1 tablet (125 mcg total) by mouth before breakfast. 90 tablet 0    Tirzepatide (MOUNJARO) 15 MG/0.5ML Subcutaneous Solution Pen-injector Inject 15 mg into the skin once a week. 6 mL 0    Insulin Glargine, 2 Unit Dial, (TOUJEO MAX SOLOSTAR) 300 UNIT/ML Subcutaneous Solution Pen-injector USE AS DIRECTED UP TO 75 UNITS DAILY IN THE EVENT OF INSULIN PUMP FAILURE 6 mL 0    Continuous Glucose Transmitter (DEXCOM G6 TRANSMITTER) Does not apply Misc USE 1 TRANSMITTER EVERY 90 DAYS 1 each 1     LEVOTHYROXINE 137 MCG Oral Tab TAKE 1 TABLET BY MOUTH DAILY 90 tablet 1    PAROXETINE HCL 40 MG Oral Tab TAKE 1 TABLET EVERY MORNING 90 tablet 0    Continuous Glucose Sensor (DEXCOM G6 SENSOR) Does not apply Misc USE 1 SENSOR EVERY 10 DAYS 3 each 2    Blood Glucose Monitoring Suppl (ONETOUCH VERIO FLEX SYSTEM) w/Device Does not apply Kit Use to test blood sugar 1x daily as needed to calibrate dexcom 1 kit 0    NOVOLOG 100 UNIT/ML Injection Solution INJECT UP  UNITS VIA INSULIN PUMP DAILY AS DIRECTED 150 mL 1    Glucose Blood (ONETOUCH VERIO) In Vitro Strip Test 1x daily/as needed to calibrate dexcom readings 100 strip 1    Insulin Disposable Pump (OMNIPOD 5 G6 INTRO, GEN 5,) Does not apply Kit 1 each every 3 (three) days. 1 kit 0    Insulin Disposable Pump (OMNIPOD 5 G6 PODS, GEN 5,) Does not apply Misc 1 each every 3 (three) days. 30 each 1    dilTIAZem HCl ER Beads 180 MG Oral Capsule SR 24 Hr Take 1 capsule (180 mg total) by mouth 2 (two) times daily.      buPROPion  MG Oral Tablet 24 Hr Take 1 tablet (300 mg total) by mouth daily. 90 tablet 1    SIMVASTATIN 40 MG Oral Tab TAKE 1 TABLET(40 MG) BY MOUTH EVERY EVENING 90 tablet 0    Omeprazole 40 MG Oral Capsule Delayed Release Take 1 capsule (40 mg total) by mouth daily. 90 capsule 0    Insulin Pen Needle (BD PEN NEEDLE AN U/F) 32G X 4 MM Does not apply Misc Inject 1 Pen into the skin in the morning and 1 Pen at noon and 1 Pen in the evening. 300 each 0    glucagon (GVOKE HYPOPEN 2-PACK) 1 MG/0.2ML Subcutaneous SUBQ injection Inject 0.2 mL (1 mg total) into the skin as needed. 0.4 mL 1    apixaban 5 MG Oral Tab Take 1 tablet (5 mg total) by mouth in the morning and 1 tablet (5 mg total) before bedtime. 60 tablet 3    estradiol 0.05 MG/24HR Transdermal Patch Weekly APPLY 1 PATCH EXTERNALLY TO THE SKIN EVERY TUESDAY. DO NOT CUT PATCH       Allergies   Allergen Reactions    Adhesive Tape HIVES     Paper tape is ok, clear tape=hives      Ibuprofen OTHER  (SEE COMMENTS)     Asthma attack    Sulfa Antibiotics      Unknown; happened before adolescent period    Erythromycin Base NAUSEA ONLY    Oxycodone CONFUSION     Memory issues     Current Outpatient Medications   Medication Sig Dispense Refill    Calcium Carbonate 600 MG Oral Tab Take 1 tablet (600 mg total) by mouth daily.      Magnesium Aspartate 65 MG Oral Tab Take 1 tablet (65 mg total) by mouth daily.      levothyroxine 125 MCG Oral Tab Take 1 tablet (125 mcg total) by mouth before breakfast. 90 tablet 0    Tirzepatide (MOUNJARO) 15 MG/0.5ML Subcutaneous Solution Pen-injector Inject 15 mg into the skin once a week. 6 mL 0    Insulin Glargine, 2 Unit Dial, (TOUJEO MAX SOLOSTAR) 300 UNIT/ML Subcutaneous Solution Pen-injector USE AS DIRECTED UP TO 75 UNITS DAILY IN THE EVENT OF INSULIN PUMP FAILURE 6 mL 0    Continuous Glucose Transmitter (DEXCOM G6 TRANSMITTER) Does not apply Misc USE 1 TRANSMITTER EVERY 90 DAYS 1 each 1    LEVOTHYROXINE 137 MCG Oral Tab TAKE 1 TABLET BY MOUTH DAILY 90 tablet 1    PAROXETINE HCL 40 MG Oral Tab TAKE 1 TABLET EVERY MORNING 90 tablet 0    Continuous Glucose Sensor (DEXCOM G6 SENSOR) Does not apply Misc USE 1 SENSOR EVERY 10 DAYS 3 each 2    Blood Glucose Monitoring Suppl (ONETOUCH VERIO FLEX SYSTEM) w/Device Does not apply Kit Use to test blood sugar 1x daily as needed to calibrate dexcom 1 kit 0    NOVOLOG 100 UNIT/ML Injection Solution INJECT UP  UNITS VIA INSULIN PUMP DAILY AS DIRECTED 150 mL 1    Glucose Blood (ONETOUCH VERIO) In Vitro Strip Test 1x daily/as needed to calibrate dexcom readings 100 strip 1    Insulin Disposable Pump (OMNIPOD 5 G6 INTRO, GEN 5,) Does not apply Kit 1 each every 3 (three) days. 1 kit 0    Insulin Disposable Pump (OMNIPOD 5 G6 PODS, GEN 5,) Does not apply Misc 1 each every 3 (three) days. 30 each 1    dilTIAZem HCl ER Beads 180 MG Oral Capsule SR 24 Hr Take 1 capsule (180 mg total) by mouth 2 (two) times daily.      buPROPion  MG Oral  Tablet 24 Hr Take 1 tablet (300 mg total) by mouth daily. 90 tablet 1    SIMVASTATIN 40 MG Oral Tab TAKE 1 TABLET(40 MG) BY MOUTH EVERY EVENING 90 tablet 0    Omeprazole 40 MG Oral Capsule Delayed Release Take 1 capsule (40 mg total) by mouth daily. 90 capsule 0    Insulin Pen Needle (BD PEN NEEDLE AN U/F) 32G X 4 MM Does not apply Misc Inject 1 Pen into the skin in the morning and 1 Pen at noon and 1 Pen in the evening. 300 each 0    glucagon (GVOKE HYPOPEN 2-PACK) 1 MG/0.2ML Subcutaneous SUBQ injection Inject 0.2 mL (1 mg total) into the skin as needed. 0.4 mL 1    apixaban 5 MG Oral Tab Take 1 tablet (5 mg total) by mouth in the morning and 1 tablet (5 mg total) before bedtime. 60 tablet 3    estradiol 0.05 MG/24HR Transdermal Patch Weekly APPLY 1 PATCH EXTERNALLY TO THE SKIN EVERY TUESDAY. DO NOT CUT PATCH       Past Medical History:    Anxiety    Anxiety state, unspecified    Arrhythmia    a fib    Asthma (HCC)    Back pain    Back problem    Bad breath    Belching    Bloating    Decorative tattoo    Depression    Disorder of thyroid    Esophageal reflux    GERD (gastroesophageal reflux disease)    Headache(784.0)    Heart palpitations    Afib    Heartburn    I have taken medication for over 10years    High cholesterol    History of depression    Hypothyroidism    Irregular bowel habits    Leaking of urine    Lipoma of unspecified site    Multiple thyroid nodules    on rt,2011, lt.-2008    Nontoxic multinodular goiter    Obesity    Other and unspecified hyperlipidemia    Pain in joints    Pain in right shin    Pain with bowel movements    Painful swallowing    Not consistent but not due to illness    Plantar fasciitis    Sleep apnea     CPAP     Stress    Thyroiditis, unspecified    Type 1 diabetes mellitus (HCC)    Uncomfortable fullness after meals    Visual impairment    Wears glasses    Weight loss    Working with weight loss clinic     Family History   Problem Relation Age of Onset    Skin cancer Mother      Other (Other) Mother     Heart Disease Father     Hypertension Father     Asthma Father     Diabetes Father     Other (Other) Father     Heart Attack Father         x2    Other (Other) Sister         Crohn's disease    Crohn's Disease Sister     Ulcerative Colitis Sister     Other (Other) Sister         Celiac Disease    Diabetes Maternal Grandmother     Other (Other) Maternal Grandmother         lymphoma    Asthma Other         3 children all have asthma.      Social history: Reviewed.      ROS/Exam    REVIEW OF SYSTEMS: Ten point review of systems has been performed and is otherwise negative and/or non-contributory, except as described above.     VITALS  Vitals:    07/25/24 0851   BP: 132/80   Pulse: 81   Resp: 18   SpO2: 98%   Weight: 229 lb (103.9 kg)   Height: 5' 0.5\" (1.537 m)       Wt Readings from Last 6 Encounters:   07/25/24 229 lb (103.9 kg)   06/24/24 200 lb (90.7 kg)   05/23/24 233 lb (105.7 kg)   05/20/24 234 lb 6.4 oz (106.3 kg)   04/09/24 243 lb 3.2 oz (110.3 kg)   03/25/24 252 lb (114.3 kg)       PHYSICAL EXAM  CONSTITUTIONAL:  awake, alert, cooperative, no apparent distress, and appears stated age Vss stable   Eyes: no lid lag, no stare, no eye discharge  PSYCH: normal affect  LUNGS: breathing comfortably  CARDIOVASCULAR:  regular rate, no pedal edema  NECK:  no palpable thyroid nodules, small scar noted,   Neuro: No tremors noted.    Labs/Imaging:   c peptide 1.8-7/5/22    CT ABDOMEN+PELVIS KIDNEYSTONE 2D RNDR(NO IV,NO ORAL -2/10/23  KIDNEYS:  No mass, obstruction, or calcification.    ADRENALS:  No mass or enlargement.    LIVER:  No enlargement, atrophy, abnormal density, or significant focal lesion.    BILIARY:  Cholecystectomy.  No visible dilatation or calcification.    PANCREAS:  No lesion, fluid collection, ductal dilatation, or atrophy.    SPLEEN:  No enlargement or focal lesion.     Lab Results   Component Value Date    CHOLEST 135 05/23/2024    CHOLEST 150 10/04/2023    TRIG 54  05/23/2024    TRIG 39 10/04/2023    HDL 56 05/23/2024    HDL 52 10/04/2023    LDL 67 05/23/2024    LDL 89 10/04/2023     Lab Results   Component Value Date    MICROALBCREA  05/23/2024      Comment:      Unable to calculate due to Urine Microalbumin <0.5 mg/dL      MICROALBCREA  04/24/2023      Comment:      Unable to calculate due to Urine Microalbumin <0.5 mg/dL        Lab Results   Component Value Date    CREATSERUM 0.84 05/23/2024    CREATSERUM 0.90 11/22/2023    EGFRCR 84 05/23/2024    EGFRCR 77 11/22/2023     Lab Results   Component Value Date    AST 12 (L) 05/23/2024    AST 7 (L) 10/04/2023    ALT 23 05/23/2024    ALT 20 10/04/2023       Lab Results   Component Value Date    TSH 0.611 05/23/2024    TSH 1.070 10/04/2023    T4F 1.4 05/23/2024       Overall glucose control:  Lab Results   Component Value Date    A1C 5.6 07/25/2024    A1C 6.0 (A) 02/06/2024    A1C 5.7 (A) 10/17/2023    A1C 5.5 05/11/2023    A1C 5.6 11/01/2022       Supplementary Documentation:   -Surveillance for Diabetes Complications & Risks  Foot exam/neuropathy: Last Foot Exam: 02/06/24    Retinopathy screening: Last Dilated Eye Exam: 04/29/24  Eye Exam shows Diabetic Retinopathy?: No      Assessment & Plan:     ICD-10-CM    1. Type 2 diabetes mellitus with complication, with long term current use of insulin pump ([facility])  E11.8 HEMOGLOBIN A1C    Z96.41 Tirzepatide (MOUNJARO) 15 MG/0.5ML Subcutaneous Solution Pen-injector     DISCONTINUED: Tirzepatide (MOUNJARO) 12.5 MG/0.5ML Subcutaneous Solution Pen-injector      2. Insulin pump in place  Z96.41       3. Postoperative hypothyroidism  E89.0 levothyroxine 125 MCG Oral Tab     TSH and Free T4      4. Chronic atrial fibrillation ([facility])  I48.20       5. Mixed hyperlipidemia  E78.2       6. Vitamin D deficiency  E55.9 Vitamin D      7. BMI 40.0-44.9, adult ([facility])  Z68.41           Catina Rivera is a pleasant 54 year old female here for evaluation of:    #Diabetes w/ insulin pump - PMHx of Type 2  diabetes mellitus diagnosed in 2000 gestational DM - on insulin since shortly after dx (antibodies negative 2022) .  Last A1c value was 5.6% done 7/25/2024. Reviewed GMI on dexcom 6.5% in the last 2 weeks, reviewed omnipod5 through glooko no change in the settings discussed.   Goal <7%. Importance of better glucose control in preventing onset/progression of end-organ damage discussed, as well as goals of therapy and clinical significance of A1C.     - med changes:  - No changes, continue   Omnipod 5 in Automode + Dexcom G6  Time Basal ICR ISF Active Insulin Time Target   12:00 AM (24 hr) 0.9 unit/hr 11 30 3 hrs 110   1P  9        630P  11        TDD 42.1  - continue Mounjaro 15 mg weekly.    - continue to check BG 3x/day using glucometer or CGM  - pump backup plan: Toujeo 20 units daily, continue novolog IC 1:11, ISF 1:30 with target glucose 110 mg/dl.     -See above header \"Supplementary Documentation\" for surveillance for diabetes complications & risks    #Postoperative Hypothyroidism  - Hx of MNG underwent left thyroidectomy then 2 years later had right thyroidectom  15-17 years ago. She stated no DIALLO done and nodules were benign. Discussed goal of tsh<2 but since her tsh at the moment is in the lower end of normal and she mentioned she lost wt, will decrease levothyroxine and discussed to her rationale.   -  stop levothyroxine 137, start 125 mcg daily, likely the fluctuation of the tsh level is because of losing weight at the moment from mounjaro 15 mg  - repeat labs in 6 weeks,     #Vitamin D deficiency  - Vit d 22.6- 12/26/15, Ca 9.3-5/23/24, not currently taking Vit D or MVI.   - take Vit D otc at least 2000 international unit daily, check levels, repeating labs in 6 weeks    #Nephropathy screening:   Lab Results   Component Value Date    EGFRCR 84 05/23/2024    MICROALBCREA  05/23/2024      Comment:      Unable to calculate due to Urine Microalbumin <0.5 mg/dL        #Hx of Atrial Fibrillation, Blood  pressure control: SBP is to goal <130   BP Readings from Last 1 Encounters:   07/25/24 132/80   BP Meds: dilTIAZem HCl ER Beads Cp24 - 180 MG     #Hyperlipidemia/Lipids: LDL is to goal   Lab Results   Component Value Date    LDL 67 05/23/2024    TRIG 54 05/23/2024   Cholesterol Meds: simvastatin Tabs - 40 MG , LFTs nl       #BMI 40   - in mounjaro , wt 229 lbs  - Balanced diet: carbohydrates, protein, healthy fats and fiber in each meal. Exercise of at least 150 mins each week. Decrease your simple carbohydrates intake such as sweets: cookies, soda, candy, white rice, white pasta, syrups     Return in about 3 months (around 10/25/2024).    7/25/2024  LILIA Zheng    Total time spent  50 minutes today on obtaining history, reviewing pertinent labs, evaluating patient, providing multiple treatment options, reinforcing diet/exercise and compliance, and completing documentation, of which greater than 50% was spent in face to face discussion with the patient       Case discussed with patient  who demonstrated understanding and agreement with plan.     Thank you for allowing me to participate in the care of this patient.  Please feel free to contact me with any questions.    LILIA Palafox, [facility]  Endocrinology, Diabetes & Metabolism   7/12/2024     Note to patient: The 21 Century Cures Act makes medical notes like these available to patients in the interest of transparency. However, be advised this is a medical document. It is intended as peer to peer communication. It is written in medical language and may contain abbreviations or verbiage that are unfamiliar. It may appear blunt or direct. Medical documents are intended to carry relevant information, facts as evident, and the clinical opinion of the practitioner.

## 2024-07-25 NOTE — TELEPHONE ENCOUNTER
From: Catina Rivera  To: Gisselle SHELBI Humphrey  Sent: 7/25/2024 9:46 AM CDT  Subject: Endo Help    Good Morning Ladies!    I’m hoping you can help me be a . Amy Fernandez is leaving as of 8/2 and she the LNP I see at the diabetes center. They are moving that office here to the endocrinologist office. I am not a fan of Jessica Dennis at all and feel like I’m not being heard at all during this visit. Upset enough to write this as she is sitting here.     Could you see if there is a way to find where Amy is going because she is going to a private practice.     I appreciate it, with my health history I don’t like starting over with someone who is sitting her judging me and not looking at my health history.     I appreciate it and all of you!   Penelope

## 2024-07-25 NOTE — PATIENT INSTRUCTIONS
Follow up plan:  - With DONNA Zheng: Return in about 3 months (around 10/25/2024).  [ x ] In person only  [ x ] After visit summary   [  x] Placed labs. Labs are to be drawn   [ x ] Directions to 1st floor lab      - Discharge Instruction:  - stop levothyroxine 137, start 125 mcg daily, likely the fluctuation of the tsh level is because of losing weight at the moment from mounjaro 15 mg  - repeat labs in 6 weeks, take Vit D otc at least 2000 international unit daily  - continue current omnipod pump setting  Omnipod 5 in Automode + Dexcom G6  Time Basal ICR ISF Active Insulin Time Target   12:00 AM (24 hr) 0.9 unit/hr 11 30 3 hrs 110   1P  9        630P  11        TDD 42.1  - continue Mounjaro 15 mg weekly.    - continue to check BG 3x/day using glucometer or CGM  - pump backup plan: Toujeo 20 units daily, continue novolog IC 1:11, ISF 1:30 with target glucose 110 mg/dl.    - will adjust any levothyroxine dose via pt portal messaging   - if you persistently getting any diarrhea, palpitations, let me know and we can recheck tsh if needed       - Blood sugar targets:  Before breakfast: , 2 hours after meals: <180 (preferably <150), A1C goal: <7.0%  Time in Range goal is higher than 80% if using a continuous glucose monitor (Dexcom or Radha).  Time in Range can be found within the Dexcom G7 uzma, on Clarity if using Dexcom G6, or on LibreView if using Radha.    - Follow Balanced diet: carbohydrates, protein, healthy fats and fiber in each meal. Exercise of at least 150 mins each week. Decrease your simple carbohydrates intake such as sweets: cookies, soda, candy, white rice, white pasta, syrups    - Decrease saturated fat, trans fat, and cholesterol intake  example: Butter, lard, shortening, coconut, coconut oil, palm oil, fried food, bologna,pepperoni, salami, egg yolks, Poultry skin, visible meat fat.      HOW TO TREAT LOW BLOOD SUGAR (Hypoglycemia)  Low blood sugar= Less than 70, or if you start to have  symptoms (below)  Symptoms: Shaking or trembling, fast heart rate, extreme hunger, sweating, confusion/difficulty concentrating, dizziness.    How to treat a low blood sugar if you are able to eat/drink: The Rule of 15  If you are using continuous glucose monitor that says you are low, but you do not have any symptoms, verify on fingerstick that your blood sugar is actually low before treating.   Eat 15 grams of carbs (see examples below)  Check your blood sugar after 15 minutes. If it’s still below your target range, have another serving.   Repeat these steps until it’s in your target range. Once it’s in range, if you're nervous about your sugar going low again, have a protein source (ie, a spoonful of peanut butter).   If you have a CGM you want to look for how your arrow has changed. If you arrow is pointed up or sideways after 15 min, give your CGM more time OR check with a finger stick. Try not to eat more food until at least 15 min after the first BG check - otherwise you risk having a rebound high.  If you are experiencing symptoms and you are unable to check your blood glucose for any reason, treat the hypoglycemia.  If someone has a low blood sugar and is unconscious: Don’t hesitate to call 911. If someone is unconscious and glucagon is not available or someone does not know how to use it, call 911 immediately.    To treat a low, I recommend you carry with you easy, pre-portioned treatment for low blood sugars that are 15G of carbs:   - Children sized squeeze pouch applesauce (high fiber + carbs help prevent too high of a spike)  - Small children's sized juicebox- 15g carb --> 4oz juice box  - Glucose tablets from [employer], you can find them near diabetes supplies --> Note, you will need to eat 3-4 tablets to get to 15g of carbs  - Children sized fruit snack pack- look for one with 15 grams of total carbohydrate  - Choice of how to treat your low is important. Complex carbs, or foods that contain fats  along with carbs (like chocolate) can slow the absorption of glucose and should NOT be used to treat an emergency low    Thank you for visiting our office. We look forward to working with you to reach your health goals. As a reminder, if you need refills, please request early so there is enough time to process the request. We ask that you provide at least 5 days' notice before a refill is due, so there is time to send a request to pharmacy, process the refill, and ensure there are no other problems with obtaining the medication (backorders, prior authorization paperwork, etc). Routine refills will not be addressed on weekends, so please submit these requests during the week.

## 2024-07-30 DIAGNOSIS — Z96.41 INSULIN PUMP IN PLACE: Primary | ICD-10-CM

## 2024-07-30 DIAGNOSIS — E11.8 TYPE 2 DIABETES WITH COMPLICATION (HCC): ICD-10-CM

## 2024-07-30 DIAGNOSIS — Z79.4 TYPE 2 DIABETES MELLITUS WITHOUT COMPLICATION, WITH LONG-TERM CURRENT USE OF INSULIN (HCC): ICD-10-CM

## 2024-07-30 DIAGNOSIS — E11.9 TYPE 2 DIABETES MELLITUS WITHOUT COMPLICATION, WITH LONG-TERM CURRENT USE OF INSULIN (HCC): ICD-10-CM

## 2024-07-30 RX ORDER — PROCHLORPERAZINE 25 MG/1
SUPPOSITORY RECTAL
Qty: 3 EACH | Refills: 2 | Status: SHIPPED | OUTPATIENT
Start: 2024-07-30

## 2024-08-10 DIAGNOSIS — E78.2 MIXED HYPERLIPIDEMIA: ICD-10-CM

## 2024-08-10 RX ORDER — SIMVASTATIN 40 MG
40 TABLET ORAL EVERY EVENING
Qty: 90 TABLET | Refills: 0 | Status: SHIPPED | OUTPATIENT
Start: 2024-08-10

## 2024-08-10 NOTE — TELEPHONE ENCOUNTER
Simvastatin last refilled 3/13/24  No future appointment  LOV with Garima 5/23/24  Last labs 5/23/24      Cholesterol Medication Protocol [id number]/10/2024 03:31 AM   Protocol Details ALT < 80    ALT resulted within past year    Lipid panel within past 12 months    In person appointment or virtual visit in the past 12 mos or appointment in next 3 mos

## 2024-08-13 ENCOUNTER — HOSPITAL ENCOUNTER (OUTPATIENT)
Dept: MAMMOGRAPHY | Age: 54
Discharge: HOME | End: 2024-08-13
Attending: NURSE PRACTITIONER
Payer: COMMERCIAL

## 2024-08-13 DIAGNOSIS — Z12.31 ENCOUNTER FOR SCREENING MAMMOGRAM FOR MALIGNANT NEOPLASM OF BREAST: ICD-10-CM

## 2024-08-13 PROCEDURE — 77063 BREAST TOMOSYNTHESIS BI: CPT | Performed by: NURSE PRACTITIONER

## 2024-08-13 PROCEDURE — 77067 SCR MAMMO BI INCL CAD: CPT | Performed by: NURSE PRACTITIONER

## 2024-08-16 DIAGNOSIS — K21.9 GASTROESOPHAGEAL REFLUX DISEASE, UNSPECIFIED WHETHER ESOPHAGITIS PRESENT: ICD-10-CM

## 2024-08-16 RX ORDER — OMEPRAZOLE 40 MG/1
40 CAPSULE, DELAYED RELEASE ORAL DAILY
Qty: 90 CAPSULE | Refills: 0 | Status: SHIPPED | OUTPATIENT
Start: 2024-08-16

## 2024-08-16 NOTE — TELEPHONE ENCOUNTER
Last office visit 5/23/24  Last refilled on 3/13/24 for # 90 with 0 refills  No future appointments.     Thank you.

## 2024-08-23 ENCOUNTER — PATIENT MESSAGE (OUTPATIENT)
Dept: FAMILY MEDICINE CLINIC | Facility: CLINIC | Age: 54
End: 2024-08-23

## 2024-08-26 ENCOUNTER — MED REC SCAN ONLY (OUTPATIENT)
Dept: ORTHOPEDICS CLINIC | Facility: CLINIC | Age: 54
End: 2024-08-26

## 2024-08-26 NOTE — TELEPHONE ENCOUNTER
From: Catina Rivera  To: Gisselle Humphrey  Sent: 8/23/2024 8:21 PM CDT  Subject: Pneumonia vaccine    Is this a vaccine you would recommend me getting? I am scheduling my flu vaccine at [facility]. Any thoughts on the newest Covid vaccine

## 2024-08-26 NOTE — TELEPHONE ENCOUNTER
Would recommend Prevnar 20 yes  Could also consider getting Tdap, it was done 10 years ago today. Due anytime

## 2024-09-04 ENCOUNTER — PATIENT MESSAGE (OUTPATIENT)
Facility: CLINIC | Age: 54
End: 2024-09-04

## 2024-09-06 RX ORDER — PAROXETINE 40 MG/1
40 TABLET, FILM COATED ORAL EVERY MORNING
Qty: 90 TABLET | Refills: 3 | Status: SHIPPED | OUTPATIENT
Start: 2024-09-06

## 2024-09-06 NOTE — TELEPHONE ENCOUNTER
LOV: 5/23/24 for general medical    PAROXETINE HCL 40 MG Oral Tab  TAKE 1 TABLET EVERY MORNING Dispense: 90 tablet, Refills: 0 ordered        06/12/2024     Future Appointments   Date Time Provider Department Center   1/7/2025  9:40 AM Keily Nino, KEN [facility] [address]

## 2024-09-08 DIAGNOSIS — K21.9 GASTROESOPHAGEAL REFLUX DISEASE, UNSPECIFIED WHETHER ESOPHAGITIS PRESENT: ICD-10-CM

## 2024-09-08 DIAGNOSIS — E78.2 MIXED HYPERLIPIDEMIA: ICD-10-CM

## 2024-09-09 ENCOUNTER — PATIENT MESSAGE (OUTPATIENT)
Dept: FAMILY MEDICINE CLINIC | Facility: CLINIC | Age: 54
End: 2024-09-09

## 2024-09-09 RX ORDER — SIMVASTATIN 40 MG
40 TABLET ORAL EVERY EVENING
Qty: 90 TABLET | Refills: 0 | OUTPATIENT
Start: 2024-09-09

## 2024-09-09 RX ORDER — OMEPRAZOLE 40 MG/1
40 CAPSULE, DELAYED RELEASE ORAL DAILY
Qty: 90 CAPSULE | Refills: 0 | OUTPATIENT
Start: 2024-09-09

## 2024-09-09 NOTE — TELEPHONE ENCOUNTER
Cholesterol Medication Protocol Passed      Last refill simvastatin 8/10/24 90 0 refill  Last refill omeprazole 8/16/24 90 0 refill      SIMVASTATIN 40MG TABLETS 08/10/2024 08/10/2024  90 each  90     OMEPRAZOLE 40MG CAPSULES 08/16/2024 08/16/2024  90 each  90     This request refused

## 2024-09-21 DIAGNOSIS — E89.0 POSTOPERATIVE HYPOTHYROIDISM: ICD-10-CM

## 2024-09-23 NOTE — TELEPHONE ENCOUNTER
Per last office visit in July- patient decreased dose to 125 mcg, with repeat labs in 6 weeks.    Mychart sent to patient asking if she can have repeat labs done before refill.     Will await response.

## 2024-09-24 ENCOUNTER — OFFICE VISIT (OUTPATIENT)
Facility: CLINIC | Age: 54
End: 2024-09-24
Payer: COMMERCIAL

## 2024-09-24 DIAGNOSIS — M17.0 PRIMARY OSTEOARTHRITIS OF BOTH KNEES: Primary | ICD-10-CM

## 2024-09-24 PROCEDURE — 20610 DRAIN/INJ JOINT/BURSA W/O US: CPT | Performed by: PHYSICIAN ASSISTANT

## 2024-09-24 RX ORDER — CYCLOBENZAPRINE HCL 10 MG
TABLET ORAL
COMMUNITY
Start: 2024-08-22

## 2024-09-24 RX ORDER — TRIAMCINOLONE ACETONIDE 40 MG/ML
80 INJECTION, SUSPENSION INTRA-ARTICULAR; INTRAMUSCULAR ONCE
Status: COMPLETED | OUTPATIENT
Start: 2024-09-24 | End: 2024-09-24

## 2024-09-24 RX ADMIN — TRIAMCINOLONE ACETONIDE 80 MG: 40 INJECTION, SUSPENSION INTRA-ARTICULAR; INTRAMUSCULAR at 10:09:00

## 2024-09-24 NOTE — PROCEDURES
Patient felt a pull in the right buttock area while descending the stairs to our clinic. tender to palpation along the proximal hamstring tendon.  External physical therapy referral written for both her knees as well as the right hamstring.    Risks and benefits of knee injection discussed with the patient, with risks including but not limited to pain and swelling at the injection site and/or within the knee joint, infection, elevation in blood pressure and/or glucose levels, facial flushing. After informed consent, the patient's right and left knees were marked, locally anesthetized with skin refrigerant, prepped with topical antiseptic, and injected with a mixture of 1mL 40mg/mL Kenalog, 2mL 1% lidocaine and 2mL 0.5% marcaine through the inferolateral portal.  A band-aid was applied.  The patient tolerated the procedure well.    Mik Lock PA-C  [facility]

## 2024-10-02 RX ORDER — LEVOTHYROXINE SODIUM 125 UG/1
125 TABLET ORAL
Qty: 90 TABLET | Refills: 0 | Status: SHIPPED | OUTPATIENT
Start: 2024-10-02

## 2024-10-02 NOTE — TELEPHONE ENCOUNTER
Endocrine refill protocol for medications for hypothyroidism and hyperthyroidism    Protocol Criteria:  PASSED Reason: N/A    If all below requirements are met, send a 90-day supply with 1 refill per provider protocol.    Verify appointment with Endocrinology completed in the last 12 months or scheduled in the next 6 months.    Normal TSH result in the past 12 months   Review recent telephone encounters and mychart communications with patient to ensure a dose change has not occurred since last office visit that was not updated in the medication history list     Last completed office visit:7/25/2024 Jessica Dennis APRN   Next scheduled Follow up:   Future Appointments   Date Time Provider Department Center   1/7/2025  9:40 AM Keily Nino PA-C [facility] [address]      Last TSH result:   TSH   Date Value Ref Range Status   05/23/2024 0.611 0.358 - 3.740 mIU/mL Final     Comment:     This test may exhibit interference when a sample is collected from a person who is consuming high dose of biotin (a.k.a., vitamin B7, vitamin H, coenzyme R) supplements resulting in serum concentrations >100 ng/mL.  Intake of the recommended daily allowance (RDA) for biotin (0.03 mg) has not been shown to typically cause significant interference; however, high dose daily dietary supplements may contain biotin concentrations greater than 150 times (5-10 mg) the RDA.  It is recommended that physicians ask all patients who may be on biotin supplementation to stop biotin consumption at least 72 hours prior to collection of a new sample.     10/26/2013 2.920 0.350 - 5.500 mIU/mL Final     Comment:     [facility], [address] [place] [id number]   08/10/2006 1.000 0.350 - 5.500 uIU/mL Final       Per 7/25/24 OV note,  \"- stop levothyroxine 137, start 125 mcg daily, likely the fluctuation of the tsh level is because of losing weight at the moment from mounjaro 15 mg  - repeat labs in 6 weeks\"    Refill protocol  passed, 90 day refill sent to pharmacy.

## 2024-10-07 ENCOUNTER — PATIENT MESSAGE (OUTPATIENT)
Dept: FAMILY MEDICINE CLINIC | Facility: CLINIC | Age: 54
End: 2024-10-07

## 2024-10-07 ENCOUNTER — TELEPHONE (OUTPATIENT)
Dept: ORTHOPEDICS CLINIC | Facility: CLINIC | Age: 54
End: 2024-10-07

## 2024-10-07 DIAGNOSIS — Z79.4 TYPE 2 DIABETES MELLITUS WITHOUT COMPLICATION, WITH LONG-TERM CURRENT USE OF INSULIN (HCC): Primary | ICD-10-CM

## 2024-10-07 DIAGNOSIS — M25.561 RIGHT KNEE PAIN, UNSPECIFIED CHRONICITY: Primary | ICD-10-CM

## 2024-10-07 DIAGNOSIS — S89.90XA KNEE INJURY, INITIAL ENCOUNTER: ICD-10-CM

## 2024-10-07 DIAGNOSIS — H91.90 HEARING LOSS, UNSPECIFIED HEARING LOSS TYPE, UNSPECIFIED LATERALITY: ICD-10-CM

## 2024-10-07 DIAGNOSIS — E11.9 TYPE 2 DIABETES MELLITUS WITHOUT COMPLICATION, WITH LONG-TERM CURRENT USE OF INSULIN (HCC): Primary | ICD-10-CM

## 2024-10-07 NOTE — TELEPHONE ENCOUNTER
X-Ray ordered and scheduled. [facility] message sent.    Future Appointments   Date Time Provider Department Center   10/8/2024 10:30 AM [facility] 1 [facility] XRAY [place]   10/8/2024 11:00 AM Mik Lock PA-C [facility] [address]   1/7/2025  9:40 AM Keily Nino PA-C [facility] [address]

## 2024-10-07 NOTE — TELEPHONE ENCOUNTER
From: Catina Rivera  To: Gisselle Humphrey  Sent: 10/7/2024 10:05 AM CDT  Subject: New Insurance    Good morning,  My girls and I have new insurance and we now need referrals for all the doctors and wondering what we needs to be done?     Thanks,  Penelope

## 2024-10-07 NOTE — TELEPHONE ENCOUNTER
Regarding: New Insurance  Contact: [phone]  ----- Message from Glenda Vizcaino RN sent at 10/7/2024 10:30 AM CDT -----       ----- Message from Penelope Rivera to Gisselle Humphrey APRN sent at 10/7/2024 10:28 AM -----   Sorry if it’s blurry I don’t have a card yet so this is just a screen shot for them online       ----- Message -----       From:Amna MARADIAGA       Sent:10/7/2024 10:24 AM CDT         To:Catina Rivera    Subject:New Insurance    Moises Negrete     Can you send a picture of your insurance card. You can add it as an attachment to this message.     Thank you  Amna ALCOCER       ----- Message -----       From:Catina Rivera       Sent:10/7/2024 10:05 AM CDT         To:Gisselle Humphrey    Subject:New Insurance    Good morning,  My girls and I have new insurance and we now need referrals for all the doctors and wondering what we needs to be done?     Thanks,  Penelope

## 2024-10-07 NOTE — TELEPHONE ENCOUNTER
Patient sees Mik and she injured her right knee last week and would like to get it looked at. She has not got it looked at by anyone yet. Please advise for imaging     Future Appointments   Date Time Provider Department Center   10/8/2024 11:00 AM Mik Lock PA-C [facility] [address]   1/7/2025  9:40 AM Keily Nino PA-C [facility] [address]

## 2024-10-08 ENCOUNTER — HOSPITAL ENCOUNTER (OUTPATIENT)
Dept: GENERAL RADIOLOGY | Age: 54
Discharge: HOME | End: 2024-10-08
Attending: PHYSICIAN ASSISTANT
Payer: COMMERCIAL

## 2024-10-08 ENCOUNTER — OFFICE VISIT (OUTPATIENT)
Facility: CLINIC | Age: 54
End: 2024-10-08
Payer: COMMERCIAL

## 2024-10-08 DIAGNOSIS — M17.0 PRIMARY OSTEOARTHRITIS OF BOTH KNEES: Primary | ICD-10-CM

## 2024-10-08 DIAGNOSIS — M25.561 RIGHT KNEE PAIN, UNSPECIFIED CHRONICITY: ICD-10-CM

## 2024-10-08 PROCEDURE — 73564 X-RAY EXAM KNEE 4 OR MORE: CPT | Performed by: PHYSICIAN ASSISTANT

## 2024-10-08 PROCEDURE — 99213 OFFICE O/P EST LOW 20 MIN: CPT | Performed by: PHYSICIAN ASSISTANT

## 2024-10-08 NOTE — PROGRESS NOTES
[facility] Progress Note    Subjective: Patient returns to clinic after last seeing me on 9/24/2024 for bilateral knee steroid injections.  She reports recently while on the bleachers at a sports event that she felt a sudden sharp pain in the right knee.  She also recently acquired new health insurance and wanted to explore her options with this change.    Objective: Patient ambulating with a cane in clinic today.  Right knee without any redness, warmth, effusion noted.  Demonstrates about 5 degree flexion contracture of the right knee and flexion to 130 degrees.    Imaging: Radiographs of the right knee obtained today personally viewed, independently interpreted and radiology report read.  Radiographs demonstrate significant progression of medial compartmental osteoarthritis, now at a severe degree and with medial translation of the femur on top of the tibia.    Assessment/Plan: We thoroughly discussed options with regards to her knee at this point in time given the severity of her arthritis.  She in the past has undergone both Synvisc 1 and Zilretta injections with only temporary mild improvement of symptoms.  I also discussed if we were to pursue any Zilretta injections at this would be administered as early as 12/24/2024.  Given the severity of arthritis demonstrated on radiographs today, we discussed knee replacement surgery.  In the past, her body mass index had been a risk factor in undergoing surgical intervention.  She has been working diligently on weight loss and her most recent hemoglobin A1c is 5.6.  I did recommend consulting with Dr. Ruslan Connelly for his opinion on knee replacement surgery given the severity of arthritis.  She plans on scheduling an appointment with him in the near future.      Mik Lock PA-C  [facility]    This note was dictated using Dragon software.  While it was briefly proofread prior to completion, some grammatical, spelling, and word choice  errors due to dictation may still occur.

## 2024-10-10 ENCOUNTER — OFFICE VISIT (OUTPATIENT)
Facility: CLINIC | Age: 54
End: 2024-10-10
Payer: COMMERCIAL

## 2024-10-10 VITALS — HEIGHT: 65 IN | BODY MASS INDEX: 35.82 KG/M2 | WEIGHT: 215 LBS

## 2024-10-10 DIAGNOSIS — M17.11 PRIMARY OSTEOARTHRITIS OF RIGHT KNEE: Primary | ICD-10-CM

## 2024-10-10 PROCEDURE — 3008F BODY MASS INDEX DOCD: CPT | Performed by: STUDENT IN AN ORGANIZED HEALTH CARE EDUCATION/TRAINING PROGRAM

## 2024-10-10 PROCEDURE — 99214 OFFICE O/P EST MOD 30 MIN: CPT | Performed by: STUDENT IN AN ORGANIZED HEALTH CARE EDUCATION/TRAINING PROGRAM

## 2024-10-10 NOTE — PROGRESS NOTES
Orthopaedic Surgery  [address] [address] [address]   [phone]        Smoker: former  Diabetes: Yes, last A1c 5.8  History of blood clots: No, on Eliquis 5 mg BID for a-fib  History of cancer: No      Chief Complaint:  Right Knee Pain    History of Present Illness:   Catina Rivera is a 54 year old female seen in clinic today as new to me but established to the practice for evaluation of their right knee. Pain is located over the medial and anterior aspect of the knee and described as aching and limiting. Their joint pain interferes with their activities of daily life such as walking, going up or down stairs, getting in or out from their car, navigating uneven surfaces, grocery shopping, and exercising. Their condition is worsening.  Associated with their joint pain, they report instability at times and popping. Their symptoms are worsened by weightbearing activity and as the day progresses. They have difficulty with walking prolonged distances on flat surfaces as well as stairs. Their symptoms are made better by rest. They report no lower back or hip pain. Prior knee arthroscopy to the involved knee for meniscus repair, she is unable to recall if this was 10 or 15 years ago.    Prior imaging studies have included XRs. Treatment so far has consisted of conservative management including NSAIDs, combination of rest, ice, or elevation, physical therapy, and corticosteroid injections. Corticosteroid injections have provided partial benefit, but this was limited and short lasting. Last injection was in the end of September.    Activities of Daily Life:  They use stairs: avoid completely or unable to do stairs  Assistive devices used: cane  Able to rise from a chair: Yes but with difficulty        PMH/PSH/Family History/Social History/Meds/Allergies:   Past Medical History:    Anxiety    Anxiety state, unspecified    Arrhythmia    a fib    Asthma (HCC)    Back pain    Back problem    Bad breath     Belching    Bloating    Decorative tattoo    Depression    Disorder of thyroid    Esophageal reflux    GERD (gastroesophageal reflux disease)    Headache(784.0)    Heart palpitations    Afib    Heartburn    I have taken medication for over 10years    High cholesterol    History of depression    Hypothyroidism    Irregular bowel habits    Leaking of urine    Lipoma of unspecified site    Multiple thyroid nodules    on rt,2011, lt.-2008    Nontoxic multinodular goiter    Obesity    Other and unspecified hyperlipidemia    Pain in joints    Pain in right shin    Pain with bowel movements    Painful swallowing    Not consistent but not due to illness    Plantar fasciitis    Sleep apnea     CPAP     Stress    Thyroiditis, unspecified    Type 1 diabetes mellitus (HCC)    Uncomfortable fullness after meals    Visual impairment    Wears glasses    Weight loss    Working with weight loss clinic        Past Surgical History:   Procedure Laterality Date    Arthroscopy of joint unlisted Bilateral     knee    Carpal tunnel release Bilateral     Cholecystectomy      Colonoscopy N/A 10/21/2016    Procedure: COLONOSCOPY;  Surgeon: Brandt Ramirez DO;  Location:  ENDOSCOPY    Colonoscopy N/A 12/27/2022    Procedure: COLONOSCOPY;  Surgeon: Aiden Pollard MD;  Location:  ENDOSCOPY    Cyst aspiration left      Cyst aspiration right      Hysterectomy      7/21    Laparoscopic cholecystectomy  11/14/2011    Lipoma removal  04/29/2022    left posterior    Mesh (implantable)      bladder    Neuromuscular stimulator      Oophorectomy Bilateral     Other      wisdom    Other      lt hand middle finger sx    Other surgical history  x2    total thyroidectomy    Other surgical history  x1    sinus    Other surgical history  10/11/2023    trial for neurostimulator    Other surgical history      Neuro Nerve Stimulator    Removal gallbladder      Sinus surgery        Sling oper stres incontinence  01/09/2014    Procedure: SLING UROLOGY;   Surgeon: Isac Sutherland MD;  Location: [facility], [facility]    Thyroidectomy      Total abdom hysterectomy  2020        Family History   Problem Relation Age of Onset    Skin cancer Mother     Other (Other) Mother     Heart Disease Father     Hypertension Father     Asthma Father     Diabetes Father     Other (Other) Father     Heart Attack Father         x2    Other (Other) Sister         Crohn's disease    Crohn's Disease Sister     Ulcerative Colitis Sister     Other (Other) Sister         Celiac Disease    Diabetes Maternal Grandmother     Other (Other) Maternal Grandmother         lymphoma    Asthma Other         3 children all have asthma.         Social History     Socioeconomic History    Marital status:      Spouse name: Not on file    Number of children: 3    Years of education: Not on file    Highest education level: Not on file   Occupational History    Occupation:      Comment: [employer]venience    Tobacco Use    Smoking status: Former     Current packs/day: 0.00     Types: Cigarettes     Start date: 3/1/2017     Quit date: 3/1/2017     Years since quittin.6     Passive exposure: Never    Smokeless tobacco: Never    Tobacco comments:     non-smoker     Updated 24   Vaping Use    Vaping status: Never Used   Substance and Sexual Activity    Alcohol use: Yes     Comment: rare    Drug use: Yes     Comment: CBD,CBG  Doctors are fully aware    Sexual activity: Yes   Other Topics Concern    Caffeine Concern Not Asked    Exercise Not Asked    Seat Belt Not Asked    Special Diet Not Asked    Stress Concern Not Asked    Weight Concern Not Asked   Social History Narrative    Not on file     Social Drivers of Health     Financial Resource Strain: Not on file   Food Insecurity: Not on file   Transportation Needs: Not on file   Physical Activity: Not on file   Stress: Not on file   Social Connections: Unknown (3/12/2021)    Received from [facility]    Social Connections     Conversations with friends/family/neighbors per week: Not on file   Housing Stability: Low Risk  (7/9/2021)    Received [facility], [facility]    Housing Stability     Mortgage Payment Concerns?: Not on file     Number of Places Lived in the Last Year: Not on file     Unstable Housing?: Not on file        Current Outpatient Medications   Medication Instructions    apixaban (ELIQUIS) 5 mg, Oral, 2 times daily    Blood Glucose Monitoring Suppl (ONETOUCH VERIO FLEX SYSTEM) w/Device Does not apply Kit Use to test blood sugar 1x daily as needed to calibrate dexcom    buPROPion ER (WELLBUTRIN XL) 300 mg, Oral, Daily    Calcium Carbonate 600 mg, Daily    Continuous Glucose Sensor (DEXCOM G6 SENSOR) Does not apply Misc USE 1 SENSOR EVERY 10 DAYS    Continuous Glucose Transmitter (DEXCOM G6 TRANSMITTER) Does not apply Misc USE 1 TRANSMITTER EVERY 90 DAYS    cyclobenzaprine 10 MG Oral Tab TAKE 1 TO 2 TABLETS BY MOUTH AT BEDTIME FOR 30 DAYS    dilTIAZem HCl ER Beads (TIAZAC) 180 mg, 2 times daily    estradiol 0.05 MG/24HR Transdermal Patch Weekly APPLY 1 PATCH EXTERNALLY TO THE SKIN EVERY TUESDAY. DO NOT CUT PATCH    Glucose Blood (ONETOUCH VERIO) In Vitro Strip Test 1x daily/as needed to calibrate dexcom readings    Gvoke HypoPen 2-Pack 1 mg, Subcutaneous, As needed    Insulin Disposable Pump (OMNIPOD 5 G6 INTRO, GEN 5,) Does not apply Kit 1 each, Does not apply, Every 3 days    Insulin Disposable Pump (OMNIPOD 5 G6 PODS, GEN 5,) Does not apply Misc 1 each, Does not apply, Every 3 days    Insulin Glargine, 2 Unit Dial, (TOUJEO MAX SOLOSTAR) 300 UNIT/ML Subcutaneous Solution Pen-injector USE AS DIRECTED UP TO 75 UNITS DAILY IN THE EVENT OF INSULIN PUMP FAILURE    Insulin Pen Needle (BD PEN NEEDLE AN U/F) 32G X 4 MM Does not apply Misc 1 Pen, Subcutaneous, 3 times daily    levothyroxine (SYNTHROID) 137 mcg, Oral, Daily     levothyroxine (SYNTHROID) 125 mcg, Oral, Before breakfast    Magnesium Aspartate 65 MG Oral Tab 1 tablet, Daily    Mounjaro 15 mg, Subcutaneous, Weekly    NOVOLOG 100 UNIT/ML Injection Solution INJECT UP  UNITS VIA INSULIN PUMP DAILY AS DIRECTED    Omeprazole 40 mg, Oral, Daily    PARoxetine HCl (PAXIL) 40 mg, Oral, Every morning    simvastatin (ZOCOR) 40 mg, Oral, Every evening        Allergies[1]       Physical Exam:   Vitals:    10/10/24 0810   Weight: 215 lb (97.5 kg)   Height: 5' 5\" (1.651 m)     Estimated body mass index is 35.78 kg/m² as calculated from the following:    Height as of this encounter: 5' 5\" (1.651 m).    Weight as of this encounter: 215 lb (97.5 kg).    Constitutional: No acute distress, well nourished.  Eyes: Anicteric sclera, pink conjunctiva  Ears, Nose, Mouth and Throat: Normocephalic, atraumatic, moist mucous membranes  Cardiovascular: No pitting edema or varicosities in the lower extremities. Maneuvers demonstrate a negative Anastasia's sign. No palpable cords in calf noted.   Respiratory: No respiratory distress, normal respiratory rhythm and effort   Neurological:  Oriented to person, place, and time.  Psychological:  Appropriate mood and affect.    Comprehensive right Knee Exam:      Inspection: No erythema, ecchymoses, or wounds. No rash. No previous incisions noted. Moderate effusion. No quad atrophy  Alignment: significant varus and partially correctable  ROM: 0 - 115 degrees, flexion contracture: 0 degrees, quad lag: no  Stability: A/P stress: stable, firm endpoint, M/L stress: opens laterally with varus force but firm endpoint  Pain or crepitus with ROM?: Yes. Pain with patellar grind  Tenderness to palpation at: medial joint line  Strength: Intact 5/5 strength SLR and TA/GS/FHL/EHL  Sensation: Grossly intact to light touch over SPN/DPN/Saph/Sural/Tibial nerve distributions  Vasc: Warm perfused extremity        Imaging:   Weightbearing XRs of the right knee were obtained with  AP, PA Flex, sunrise, and lateral views    They show severe degenerative changes of the knee, particularly involving the medial compartment as well as lateral facet of the patellofemoral joint with subchondral sclerosis and marginal osteophytes. No fracture or dislocation seen    I personally reviewed and interpreted the radiographs.      Assessment:     ICD-10-CM    1. Primary osteoarthritis of right knee  M17.11        Severe      Plan:   Today, we discussed the patient's clinical and radiographic findings.  They do have advanced arthritis of the left, right knee.  This is now causing them significant pain and discomfort.  They feel as though their knee is decreasing their quality of life and keeping them from doing usual activities as well as leisure activities.  We discussed treatment options going forward. The patient has had an appropriate course of conservative treatment with NSAID pain medication, activity modification, physical therapy, home exercise program, assistive devices, and intraarticular injections.  We discussed continuing this versus potential surgical intervention in the form of a total knee replacement.  We discussed what the surgery would entail as well as expected postoperative course and expected outcomes. With an artificial knee, there may be mechanical clicking. Additionally, we discussed the risks inherent with surgery. In particular, we discussed the following categories of risks, among others:     Infection - with the implantation of foreign bodies or implants, there is a significant risk of infection, which can occur either around the time of surgery or any time thereafter. If infection arises, this will likely require at least one other surgery, including possibly removal of the existing components with placement of a temporary antibiotic spacer made of cement. In more extreme cases, infection can require amputation or permanent removal of components without a functioning prosthetic  joint in place.     Blood clot - the surgery itself and subsequent lack of normal mobility increases the risk of blood clot which in more severe cases can cause extended morbidity and even death. If a blood clot occurs, it may require extended medical treatment. We will prescribe a blood thinner after surgery to mitigate this risk, but this does not guarantee that it cannot happen. Blood thinners themselves can cause side effects, such as increased risk of bleeding, hematoma, and/or wound problems.     Damage to blood vessels (vascular injury) - although rare, if this occurs, the results can be devastating and usually require further treatment, such as another surgery to repair the blood vessel and reestablish blood flow.     Damage to major nerves (neurologic injury) - also rare, if this occurs, the results can include decreased function, chronic pain, or disability. However, it is very common to have numbness on the lateral side of the knee from sacrifice of the infrapatellar branch of the saphenous nerve.     Instability - with the implantation of a prosthetic joint, instability, or the joint being loose can occur. This can result in dislocation or pain if it occurs, and may require further treatment in the form of revision surgery.     Blood loss - a concern with any surgery, it can require the possibility of a transfusion in more significant cases.     Fracture - this can occur during the surgery, shortly after surgery, or at any point in the future. A fracture around a prosthetic joint can oftentimes be more difficult to treat than fractures that might occur if the prosthetic joint were not present. It can often require surgery to fix, even including revision of the existing parts     Failure to alleviate pain - while the goal of surgery is to improve pain, there is no way to guarantee this outcome in all cases, and so failure to alleviate pain is an inherent risk of surgery. After knee replacement, it may be  difficult to kneel due to sensitivity at the incision site.     Decreased range of motion - with the implantation of a prosthetic joint, the possibility exists of decreased range of motion or stiffness. This can require increased therapy to combat, can be associated with pain and decreased function (including navigating stairs or getting in and out of a car or chair), and can even require additional surgery if more severe. Most commonly this is in the form of a manipulation under anesthesia, where you will be sedated and the knee taken into flexion to break up scar tissue and restore range of motion. This is followed by daily physical therapy to maintain motion.     Wound complications - may occur following surgery including delayed wound healing, drainage, or dehiscence. If prolonged, this may require surgery to wash out the wound and joint and re-close the incision. Counseled on the importance of nutritional optimization in the perioperative period    Medical risks - due to the stresses associated with surgery, there are medical risks that can occur, including, more notably, heart attack, stroke, pneumonia, and kidney and other organ failure. While efforts are made to minimize these risks, they still exist.     These were discussed with the patient in a shared decision-making process in understandable terms where the relative pros and cons were considered. After an informed discussion of risks, benefits, and alternatives, and understanding the risks involved, the patient decided to proceed with surgery. We will therefore proceed with total knee replacement.      Thank you very much for allowing me to participate in the care of this patient. If you have any questions, please do not hesitate to contact me.      Ruslan Connelly MD  Adult Reconstruction    Department of Orthopaedic Surgery  [facility]     [address] [place] [id number]  [address] [place] [id number]     t: [phone]  f:  [phone]       [email or web address]         [1]   Allergies  Allergen Reactions    Adhesive Tape HIVES     Paper tape is ok, clear tape=hives      Ibuprofen OTHER (SEE COMMENTS)     Asthma attack    Sulfa Antibiotics      Unknown; happened before adolescent period    Erythromycin Base NAUSEA ONLY    Oxycodone CONFUSION     Memory issues

## 2024-10-13 DIAGNOSIS — E11.8 TYPE 2 DIABETES WITH COMPLICATION (HCC): ICD-10-CM

## 2024-10-13 DIAGNOSIS — Z96.41 INSULIN PUMP IN PLACE: ICD-10-CM

## 2024-10-14 RX ORDER — PROCHLORPERAZINE 25 MG/1
SUPPOSITORY RECTAL
Qty: 3 EACH | Refills: 2 | Status: SHIPPED | OUTPATIENT
Start: 2024-10-14

## 2024-10-14 NOTE — TELEPHONE ENCOUNTER
Endocrine Refill protocol for Glucose testing supplies     Protocol Criteria: PASSED Reason: N/A    If below requirement is met, send a 90-day supply with 1 refill per provider protocol.    Verify appointment with Endocrinology completed in the last 6 months or scheduled in the next 3 months.    Last completed office visit: Jessica THOMPSON 7/25  Next scheduled Follow up:   Future Appointments   Date Time Provider Department Center   1/7/2025  9:40 AM Keily Nino PA-C [facility] [address]       Passed and ordered per protocol.

## 2024-10-15 ENCOUNTER — PATIENT MESSAGE (OUTPATIENT)
Dept: FAMILY MEDICINE CLINIC | Facility: CLINIC | Age: 54
End: 2024-10-15

## 2024-10-15 ENCOUNTER — PATIENT MESSAGE (OUTPATIENT)
Facility: CLINIC | Age: 54
End: 2024-10-15

## 2024-10-15 DIAGNOSIS — E11.9 TYPE 2 DIABETES MELLITUS WITHOUT COMPLICATION, WITH LONG-TERM CURRENT USE OF INSULIN (HCC): Primary | ICD-10-CM

## 2024-10-15 DIAGNOSIS — Z96.41 INSULIN PUMP IN PLACE: ICD-10-CM

## 2024-10-15 DIAGNOSIS — Z79.4 TYPE 2 DIABETES MELLITUS WITHOUT COMPLICATION, WITH LONG-TERM CURRENT USE OF INSULIN (HCC): Primary | ICD-10-CM

## 2024-10-15 DIAGNOSIS — E11.9 TYPE 2 DIABETES MELLITUS WITHOUT COMPLICATION, WITH LONG-TERM CURRENT USE OF INSULIN (HCC): ICD-10-CM

## 2024-10-15 DIAGNOSIS — Z79.4 TYPE 2 DIABETES MELLITUS WITHOUT COMPLICATION, WITH LONG-TERM CURRENT USE OF INSULIN (HCC): ICD-10-CM

## 2024-10-15 RX ORDER — INSULIN PMP CART,AUT,G6/7,CNTR
1 EACH SUBCUTANEOUS
Qty: 30 EACH | Refills: 1 | Status: SHIPPED | OUTPATIENT
Start: 2024-10-15

## 2024-10-15 NOTE — TELEPHONE ENCOUNTER
Omnipod refill sent per protocol. MyChart response sent to pt re: referral.    Closing this encounter.

## 2024-10-15 NOTE — TELEPHONE ENCOUNTER
Endocrine Refill protocol for Omnipod insulin pumps and supplies    Protocol Criteria:  PASSED  Reason: N/A    If below requirement is met, send a 90-day supply with 1 refill per provider protocol.    Verify appointment with Endocrinology completed in the last 6 months or scheduled in the next 3 months.    Last completed office visit:7/25/2024 Jessica Dennis APRN   Next scheduled Follow up:   Future Appointments   Date Time Provider Department Center   1/7/2025  9:40 AM Keily Nino, KEN [facility] [address]      Refill protocol passed, 90 day supply with 1 refill sent to pharmacy.

## 2024-10-16 ENCOUNTER — TELEPHONE (OUTPATIENT)
Facility: CLINIC | Age: 54
End: 2024-10-16

## 2024-10-16 NOTE — TELEPHONE ENCOUNTER
Forms received from [employer].  PA forms for OMNIPOD 5 G6 PODS, GEN 5 filled out and faxed to [phone].    Confirmation received    Will await PA determination, will place PA in RN's in basket

## 2024-10-16 NOTE — TELEPHONE ENCOUNTER
Initiated PA for OMNIPOD 5 G6 PODS, GEN 5, through [employer].  Spoke to Jared and rep stated that she will fax over forms to complete PA.    Will await PA forms via fax.

## 2024-10-16 NOTE — TELEPHONE ENCOUNTER
10/16/24-Received via fax from [facility] a PA for Omnipod 5 G6 refill Pods 5pk (Gen 5).  Placed in PA in basket.

## 2024-10-17 ENCOUNTER — MED REC SCAN ONLY (OUTPATIENT)
Facility: CLINIC | Age: 54
End: 2024-10-17

## 2024-10-17 ENCOUNTER — TELEPHONE (OUTPATIENT)
Dept: ORTHOPEDICS CLINIC | Facility: CLINIC | Age: 54
End: 2024-10-17

## 2024-10-17 DIAGNOSIS — M17.11 PRIMARY OSTEOARTHRITIS OF RIGHT KNEE: Primary | ICD-10-CM

## 2024-10-17 NOTE — TELEPHONE ENCOUNTER
10/17/24-Received via fax from [employer] approval for Omnipod 5 [id number] Pods Gen 5 effective 9/17/24-10/17/25.  Sent to scan.  Patient called into clinic to ask about PA for Omnipod and I let her know that we had just received the approval and for her to call her pharmacy.

## 2024-10-17 NOTE — TELEPHONE ENCOUNTER
Called and spoke with Penelope in regards to getting her scheduled for surgery. I did discuss optional surgical dates with her as she has decided to proceed with surgery on 1/29/25.      I confirmed that surgery will take place at [facility]. I did ask if she currently had her surgical folder available so that we could discuss the surgical protocol. She states that she does not currently have the folder available to her and that she would give a call back into the office on Monday to go over that information.

## 2024-10-17 NOTE — TELEPHONE ENCOUNTER
Date of Surgery: 1/29/25    Post Op Appt: 2/13/25 @ 1130      Case ID: [id number]    Notes: IF POSSIBLE WOULD LIKE TO MOVE SURGICAL DATE UP      SURGERY SCHEDULING SHEET     Catina Rivera  3/12/1970  [id number]     Procedure: Right total knee arthroplasty     CPT: 90217     Diagnosis: Right knee osteoarthritis     Anesthesia: Spinal     Antibiotics: IV Ancef     TXA: IV      Anticoagulation: currently on Eliquis     Length of Surgery: 3 hours     Disposition: Outpatient in a Bed     Instruments: Sapna TKA - Triathlon with TS insert available     Assist: Ask for Joaquin Vásquez ([phone]) first assist. If Joaquin unavailable, then please contact Dameon Cancino for first assist. If Joaquin and Dameon unavailable, then contact [employer] for first assist.     Pre-op Testing: CBC, CMP, PT/INR, PTT, TYPE AND SCREEN, MRSA/MSSA THROUGH EDW PAT, and HEMOGLOBIN A1C THROUGH EDW PAT     Clearance: MEDICAL/PCP and CARDIAC     Post op: 2 weeks with Dr Connelly     Surgery date specifics: Mid to late January earliest given recent injection on 9/28/24     Ruslan Connelly MD  Adult Reconstruction     Department of Orthopaedic Surgery  [facility]     [address] [place] [address]  [address] [place] [id number]     t: [phone]  f: [phone]

## 2024-10-17 NOTE — TELEPHONE ENCOUNTER
Patient aware of approval.    Will call office if anything further is needed.    Closing Encounter.

## 2024-10-22 ENCOUNTER — TELEPHONE (OUTPATIENT)
Dept: ORTHOPEDICS CLINIC | Facility: CLINIC | Age: 54
End: 2024-10-22

## 2024-10-22 ENCOUNTER — TELEPHONE (OUTPATIENT)
Dept: FAMILY MEDICINE CLINIC | Facility: CLINIC | Age: 54
End: 2024-10-22

## 2024-10-22 NOTE — TELEPHONE ENCOUNTER
Called and spoke with Penelope in regards to her upcoming surgery.     I confirmed that surgery will take place at [facility]. I also discussed the surgical protocol and scheduled her post op appt.  She states understanding with no questions or concerns. Advised to call the office back if she has any questions or concerns.

## 2024-10-22 NOTE — TELEPHONE ENCOUNTER
Received pre-op paperwork from Ortho    Placed in Dr. Brito's pre-op folder    No appointment made at this time.  Patient reminder placed for end of December

## 2024-10-22 NOTE — TELEPHONE ENCOUNTER
Spoke with Penelope in regards to her upcoming surgery, however she has some questions and concerns in regards to care after surgery.  She states that she has steep stairs at her home and is considering staying at her parents house after surgery as everything is on 1 level. She is concerned if [facility] will be available to her if she recovers at her parents house in [place], [place]. She would also like to know when she will be able to go back to work. 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 Please advise?

## 2024-10-25 ENCOUNTER — LAB ENCOUNTER (OUTPATIENT)
Dept: LAB | Age: 54
End: 2024-10-25
Attending: NURSE PRACTITIONER
Payer: COMMERCIAL

## 2024-10-25 ENCOUNTER — PATIENT MESSAGE (OUTPATIENT)
Facility: CLINIC | Age: 54
End: 2024-10-25

## 2024-10-25 DIAGNOSIS — E89.0 POSTOPERATIVE HYPOTHYROIDISM: ICD-10-CM

## 2024-10-25 DIAGNOSIS — E55.9 VITAMIN D DEFICIENCY: ICD-10-CM

## 2024-10-25 LAB
T4 FREE SERPL-MCNC: 1.6 NG/DL (ref 0.8–1.7)
TSI SER-ACNC: 1.59 MIU/ML (ref 0.55–4.78)
VIT D+METAB SERPL-MCNC: 44.2 NG/ML (ref 30–100)

## 2024-10-25 PROCEDURE — 84443 ASSAY THYROID STIM HORMONE: CPT

## 2024-10-25 PROCEDURE — 36415 COLL VENOUS BLD VENIPUNCTURE: CPT

## 2024-10-25 PROCEDURE — 84439 ASSAY OF FREE THYROXINE: CPT

## 2024-10-25 PROCEDURE — 82306 VITAMIN D 25 HYDROXY: CPT

## 2024-10-28 RX ORDER — LEVOTHYROXINE SODIUM 125 UG/1
125 TABLET ORAL
Qty: 90 TABLET | Refills: 1 | Status: SHIPPED | OUTPATIENT
Start: 2024-10-28

## 2024-10-28 NOTE — PROGRESS NOTES
[facility] Note    Name: Catina Rivera    Date: 10/29/2024  Penelope Rivera is a 54 year old female who presents for evaluation of T2DM management.     Chief complaint: Follow - Up (Pt here for f/u for diabetes, no concerns /A1C-5.6/Last A1C-07/25/2024/Last Foot exam- 02/6/2024/Last Eye exam- 04/29/2024)   No DR    Subjective:   DM hx:  -Diagnosed with diabetes in 2000 gestational DM - on insulin since shortly after dx (antibodies negative 2022) , c peptide detectable -7/5/22  -Family history- yes, mother and MGF    Initial HPI consult in July 2024  Last office visit for DM mgmt of the pt: 4/9/24   Mgmt by: Amy Fernandez NP from [facility]  States she get knee injection steroid bilaterally every 3 month through ortho   Day 1 or 2 after injection.   Last Thursday saw dentist due to papiloma to [facility]  Dentist last 3 months, has taken out.     DM meds at first office visit:  Mounjaro 15 mg weekly in 2 weeks last dose was this past Sunday   Omnipod 5 in Automode + Dexcom G6  Time Basal ICR ISF Active Insulin Time Target   12:00 AM (24 hr) 0.9 unit/hr 11 30 3 hrs 110   1P  9        630P  11                  TDD 42.1    Continuous Glucose Monitoring Interpretation  Catina Rivera has undergone continuous glucose monitoring with the Omnipod 5 with phone (connected to clinic profile).  W/ Dexcom G6. The blood glucose tracings were evaluated for two weeks prior to office visit. Blood glucose tracings demonstrated no significant hyperglycemia. There were occasional hypoglycemia, particularly 1-2 pm and 6-7 am during the weeks of evaluation.    Date: 7/31-7/25/24: TIR 97%, GMI 6.5% ,  low <1% , very low <1%, SD 24mg/dl, Sensor usage: 93%     episodes of hypoglycemia: Yes, lowest 59 in the last 3 months, get symptoms at 80s.     -Re: potential DM medication contraindication (if positive, checkbox selected):  [] History of pancreatitis- denies  [] Personal/fam hx of medullary  thyroid cancer/MEN2- denies   [] History of recent/frequent UTI/yeast infxn- denies   [] Previous amputation related to diabetes- denies    -Presence of associated DM complications (if positive, checkbox selected):  [x] Macrovascular complications (CAD/CVA/PAD) afib  [] Neuropathy  [x] Retinopathy  [x] Nephropathy  [] HTN  [x] Hyperlipidemia  [] Stroke/TIA- denies   [] Gastroparesis- denies    -Lifestyle: eat 2 -3 x a day  - Modifying factors:  Previously trialed/failed DM meds: Metformin : severe GI     ###Also Hx of Hypothyroidism  15 years ago had left thyroidectomy and then 2 years after had right thyroidectomy, due to MNG all were benign. No Radiation was done.  10/2023: TSH 1.07, fT4 1.2,   05/2024: TSH 0.611 , fT4 1.4,   Hx of Atrial fib developed 2 years ago , no current fx, last fx was when she was in HS.   10/29/24- Thyroid meds: Currently taking LT4 125 mcg PO qAM, taking it appropriately.     INTERIM HX:   10/29/24: patient states she has scheduled knee replacement last week of Jan, 2025  - Current treatment: Mounjaro 15 mg every Sunday, Omnipod 5 in Automode + Dexcom G6: 10/16-10/29/24  Time Basal ICR ISF Active Insulin Time Target   12:00 am 0.9 unit/hr 11 30 3 hrs 110   5:30 am 1.1 11        1:00 pm 1.6 9        4:30 pm 1.9 9      6:30pm 1.6 9      9:30 pm 2 11      TDD insulin: 38.6 units /day   Automated mode 100%  Manual mode 0%  Basal/day: 18.9 units  Bolus/day:19.7 units      Testing: Continuous Glucose Monitoring Interpretation  Catina Rivera  has undergone continuous glucose monitoring with the Dexcom G6 CGM with phone (connected to clinic profile).  The blood glucose tracings were evaluated for two weeks prior to office visit. Blood glucose tracings demonstrated no significant hyperglycemia. There were occasional hypoglycemia, particularly after 3 pm  during the weeks of evaluation.    Date: 10/16-10/29/24: TIR 95%, GMI 6.6% ,  low 0% , very low 0%, SD 26mg/dl, Sensor usage: 90.6%        Hypoglycemia: 47 in  CGM, when she confirmed it with fingerstick glucose was above 70  New complication related to DM:  no hospitalization and no ER visit since last office visit.         History/Other:    Allergies, PMH, SocHx and FHx reviewed and updated as appropriate in Epic on    Calcium Carb-Cholecalciferol (CALCIUM + VITAMIN D3 OR) Take by mouth. Vitamin d 800 international unit and Calcium 600 mg      levothyroxine 125 MCG Oral Tab Take 1 tablet (125 mcg total) by mouth before breakfast. 90 tablet 1    [DISCONTINUED] levothyroxine 125 MCG Oral Tab Take 1 tablet (125 mcg total) by mouth before breakfast. 90 tablet 1    Insulin Disposable Pump (OMNIPOD 5 G6 PODS, GEN 5,) Does not apply Misc 1 each every 3 (three) days. 30 each 1    Continuous Glucose Sensor (DEXCOM G6 SENSOR) Does not apply Misc USE 1 SENSOR EVERY 10 DAYS 3 each 2    cyclobenzaprine 10 MG Oral Tab TAKE 1 TO 2 TABLETS BY MOUTH AT BEDTIME FOR 30 DAYS      PARoxetine HCl 40 MG Oral Tab Take 1 tablet (40 mg total) by mouth every morning. 90 tablet 3    OMEPRAZOLE 40 MG Oral Capsule Delayed Release TAKE 1 CAPSULE(40 MG) BY MOUTH DAILY 90 capsule 0    SIMVASTATIN 40 MG Oral Tab TAKE 1 TABLET(40 MG) BY MOUTH EVERY EVENING 90 tablet 0    Insulin Glargine, 2 Unit Dial, (TOUJEO MAX SOLOSTAR) 300 UNIT/ML Subcutaneous Solution Pen-injector USE AS DIRECTED UP TO 75 UNITS DAILY IN THE EVENT OF INSULIN PUMP FAILURE 6 mL 0    Continuous Glucose Transmitter (DEXCOM G6 TRANSMITTER) Does not apply Misc USE 1 TRANSMITTER EVERY 90 DAYS 1 each 1    Blood Glucose Monitoring Suppl (ONETOUCH VERIO FLEX SYSTEM) w/Device Does not apply Kit Use to test blood sugar 1x daily as needed to calibrate dexcom 1 kit 0    NOVOLOG 100 UNIT/ML Injection Solution INJECT UP  UNITS VIA INSULIN PUMP DAILY AS DIRECTED 150 mL 1    Glucose Blood (ONETOUCH VERIO) In Vitro Strip Test 1x daily/as needed to calibrate dexcom readings 100 strip 1    Insulin Disposable Pump (OMNIPOD 5 G6  INTRO, GEN 5,) Does not apply Kit 1 each every 3 (three) days. 1 kit 0    dilTIAZem HCl ER Beads 180 MG Oral Capsule SR 24 Hr Take 1 capsule (180 mg total) by mouth 2 (two) times daily.      buPROPion  MG Oral Tablet 24 Hr Take 1 tablet (300 mg total) by mouth daily. 90 tablet 1    Insulin Pen Needle (BD PEN NEEDLE AN U/F) 32G X 4 MM Does not apply Misc Inject 1 Pen into the skin in the morning and 1 Pen at noon and 1 Pen in the evening. 300 each 0    glucagon (GVOKE HYPOPEN 2-PACK) 1 MG/0.2ML Subcutaneous SUBQ injection Inject 0.2 mL (1 mg total) into the skin as needed. 0.4 mL 1    apixaban 5 MG Oral Tab Take 1 tablet (5 mg total) by mouth in the morning and 1 tablet (5 mg total) before bedtime. 60 tablet 3    estradiol 0.05 MG/24HR Transdermal Patch Weekly APPLY 1 PATCH EXTERNALLY TO THE SKIN EVERY TUESDAY. DO NOT CUT PATCH       Allergies   Allergen Reactions    Adhesive Tape HIVES     Paper tape is ok, clear tape=hives      Ibuprofen OTHER (SEE COMMENTS)     Asthma attack    Sulfa Antibiotics      Unknown; happened before adolescent period    Erythromycin Base NAUSEA ONLY    Oxycodone CONFUSION     Memory issues     Current Outpatient Medications   Medication Sig Dispense Refill    Calcium Carb-Cholecalciferol (CALCIUM + VITAMIN D3 OR) Take by mouth. Vitamin d 800 international unit and Calcium 600 mg      levothyroxine 125 MCG Oral Tab Take 1 tablet (125 mcg total) by mouth before breakfast. 90 tablet 1    Insulin Disposable Pump (OMNIPOD 5 G6 PODS, GEN 5,) Does not apply Misc 1 each every 3 (three) days. 30 each 1    Continuous Glucose Sensor (DEXCOM G6 SENSOR) Does not apply Misc USE 1 SENSOR EVERY 10 DAYS 3 each 2    cyclobenzaprine 10 MG Oral Tab TAKE 1 TO 2 TABLETS BY MOUTH AT BEDTIME FOR 30 DAYS      PARoxetine HCl 40 MG Oral Tab Take 1 tablet (40 mg total) by mouth every morning. 90 tablet 3    OMEPRAZOLE 40 MG Oral Capsule Delayed Release TAKE 1 CAPSULE(40 MG) BY MOUTH DAILY 90 capsule 0     SIMVASTATIN 40 MG Oral Tab TAKE 1 TABLET(40 MG) BY MOUTH EVERY EVENING 90 tablet 0    Insulin Glargine, 2 Unit Dial, (TOUJEO MAX SOLOSTAR) 300 UNIT/ML Subcutaneous Solution Pen-injector USE AS DIRECTED UP TO 75 UNITS DAILY IN THE EVENT OF INSULIN PUMP FAILURE 6 mL 0    Continuous Glucose Transmitter (DEXCOM G6 TRANSMITTER) Does not apply Misc USE 1 TRANSMITTER EVERY 90 DAYS 1 each 1    Blood Glucose Monitoring Suppl (ONETOUCH VERIO FLEX SYSTEM) w/Device Does not apply Kit Use to test blood sugar 1x daily as needed to calibrate dexcom 1 kit 0    NOVOLOG 100 UNIT/ML Injection Solution INJECT UP  UNITS VIA INSULIN PUMP DAILY AS DIRECTED 150 mL 1    Glucose Blood (ONETOUCH VERIO) In Vitro Strip Test 1x daily/as needed to calibrate dexcom readings 100 strip 1    Insulin Disposable Pump (OMNIPOD 5 G6 INTRO, GEN 5,) Does not apply Kit 1 each every 3 (three) days. 1 kit 0    dilTIAZem HCl ER Beads 180 MG Oral Capsule SR 24 Hr Take 1 capsule (180 mg total) by mouth 2 (two) times daily.      buPROPion  MG Oral Tablet 24 Hr Take 1 tablet (300 mg total) by mouth daily. 90 tablet 1    Insulin Pen Needle (BD PEN NEEDLE AN U/F) 32G X 4 MM Does not apply Misc Inject 1 Pen into the skin in the morning and 1 Pen at noon and 1 Pen in the evening. 300 each 0    glucagon (GVOKE HYPOPEN 2-PACK) 1 MG/0.2ML Subcutaneous SUBQ injection Inject 0.2 mL (1 mg total) into the skin as needed. 0.4 mL 1    apixaban 5 MG Oral Tab Take 1 tablet (5 mg total) by mouth in the morning and 1 tablet (5 mg total) before bedtime. 60 tablet 3    estradiol 0.05 MG/24HR Transdermal Patch Weekly APPLY 1 PATCH EXTERNALLY TO THE SKIN EVERY TUESDAY. DO NOT CUT PATCH       Past Medical History:    Anxiety    Anxiety state, unspecified    Arrhythmia    a fib    Asthma (HCC)    Back pain    Back problem    Bad breath    Belching    Bloating    Decorative tattoo    Depression    Disorder of thyroid    Esophageal reflux    GERD (gastroesophageal reflux  disease)    Headache(784.0)    Heart palpitations    Afib    Heartburn    I have taken medication for over 10years    High cholesterol    History of depression    Hypothyroidism    Irregular bowel habits    Leaking of urine    Lipoma of unspecified site    Multiple thyroid nodules    on rt,2011, lt.-2008    Nontoxic multinodular goiter    Obesity    Other and unspecified hyperlipidemia    Pain in joints    Pain in right shin    Pain with bowel movements    Painful swallowing    Not consistent but not due to illness    Plantar fasciitis    Sleep apnea     CPAP     Stress    Thyroiditis, unspecified    Type 1 diabetes mellitus (HCC)    Uncomfortable fullness after meals    Visual impairment    Wears glasses    Weight loss    Working with weight loss clinic     Family History   Problem Relation Age of Onset    Skin cancer Mother     Other (Other) Mother     Heart Disease Father     Hypertension Father     Asthma Father     Diabetes Father     Other (Other) Father     Heart Attack Father         x2    Other (Other) Sister         Crohn's disease    Crohn's Disease Sister     Ulcerative Colitis Sister     Other (Other) Sister         Celiac Disease    Diabetes Maternal Grandmother     Other (Other) Maternal Grandmother         lymphoma    Asthma Other         3 children all have asthma.      Social history: Reviewed.      ROS/Exam    REVIEW OF SYSTEMS: Ten point review of systems has been performed and is otherwise negative and/or non-contributory, except as described above.     VITALS  Vitals:    10/29/24 0744   BP: 130/80   Pulse: 89   Resp: 18   SpO2: 99%   Weight: 236 lb (107 kg)   Height: 5' 5\" (1.651 m)         Wt Readings from Last 6 Encounters:   10/29/24 236 lb (107 kg)   10/10/24 215 lb (97.5 kg)   07/25/24 229 lb (103.9 kg)   06/24/24 200 lb (90.7 kg)   05/23/24 233 lb (105.7 kg)   05/20/24 234 lb 6.4 oz (106.3 kg)       PHYSICAL EXAM  CONSTITUTIONAL:  awake, alert, cooperative, no apparent distress, and  appears stated age Vss stable   Eyes: no lid lag, no stare, no eye discharge  PSYCH: normal affect  LUNGS: breathing comfortably  CARDIOVASCULAR:  regular rate, no pedal edema  NECK:  no palpable thyroid nodules, small scar noted,   Neuro: No tremors noted.    Labs/Imaging:   c peptide 1.8-7/5/22    CT ABDOMEN+PELVIS KIDNEYSTONE 2D RNDR(NO IV,NO ORAL -2/10/23  KIDNEYS:  No mass, obstruction, or calcification.    ADRENALS:  No mass or enlargement.    LIVER:  No enlargement, atrophy, abnormal density, or significant focal lesion.    BILIARY:  Cholecystectomy.  No visible dilatation or calcification.    PANCREAS:  No lesion, fluid collection, ductal dilatation, or atrophy.    SPLEEN:  No enlargement or focal lesion.     Lab Results   Component Value Date    CHOLEST 135 05/23/2024    CHOLEST 150 10/04/2023    TRIG 54 05/23/2024    TRIG 39 10/04/2023    HDL 56 05/23/2024    HDL 52 10/04/2023    LDL 67 05/23/2024    LDL 89 10/04/2023     Lab Results   Component Value Date    MICROALBCREA  05/23/2024      Comment:      Unable to calculate due to Urine Microalbumin <0.5 mg/dL      MICROALBCREA  04/24/2023      Comment:      Unable to calculate due to Urine Microalbumin <0.5 mg/dL        Lab Results   Component Value Date    CREATSERUM 0.84 05/23/2024    CREATSERUM 0.90 11/22/2023    EGFRCR 84 05/23/2024    EGFRCR 77 11/22/2023     Lab Results   Component Value Date    AST 12 (L) 05/23/2024    AST 7 (L) 10/04/2023    ALT 23 05/23/2024    ALT 20 10/04/2023       Lab Results   Component Value Date    TSH 1.595 10/25/2024    TSH 0.611 05/23/2024    T4F 1.6 10/25/2024       Overall glucose control:  Lab Results   Component Value Date    A1C 5.6 10/29/2024    A1C 5.6 07/25/2024    A1C 6.0 (A) 02/06/2024    A1C 5.7 (A) 10/17/2023    A1C 5.5 05/11/2023       Supplementary Documentation:   -Surveillance for Diabetes Complications & Risks  Foot exam/neuropathy: Last Foot Exam: 02/06/24    Retinopathy screening: Last Dilated Eye Exam:  04/29/24  Eye Exam shows Diabetic Retinopathy?: No      Assessment & Plan:     ICD-10-CM    1. Type 2 diabetes mellitus with complication, with long term current use of insulin pump (HCC)  E11.8 POC Hemoglobin A1C    Z96.41 TSH and Free T4      2. Postoperative hypothyroidism  E89.0 levothyroxine 125 MCG Oral Tab      3. Vitamin D deficiency  E55.9       4. Mixed hyperlipidemia  E78.2       5. BMI 39.0-39.9,adult  Z68.39             Catina Rivera is a pleasant 54 year old female here for evaluation of:    #Diabetes w/ insulin pump - PMHx of Type 2 diabetes mellitus diagnosed in 2000 gestational DM - on insulin since shortly after dx (antibodies negative 2022)  -Last A1c value was 5.6% done 10/29/2024. Reviewed GMI on dexcom 6.6% in the last 2 weeks, reviewed omnipod5 through glooko, due to hypoglycemia noted after 3 pm, increasing carb ratio at 1:30-6:30 pm  - Goal <7%. Importance of better glucose control in preventing onset/progression of end-organ damage discussed, as well as goals of therapy and clinical significance of A1C.     - med changes:  - Current treatment: Omnipod 5 in Automode + Dexcom G6:  with changes made  Time Basal ICR ISF Active Insulin Time Target   12:00 am 0.9 unit/hr 11 30 3 hrs 110   5:30 am 1.1 11        1:00 pm 1.6 9-->10        4:30 pm 1.9 9-->10      6:30pm 1.6 9-->10      9:30 pm 2 11        - Continue  mounjaro 15  mg every Sunday  - On surgery day, activate the temp basal reduction of 20% the night before surgery last week of Jan, 2025, stop mounjaro 1 week before the surgery and resume after surgery.   - If on insulin, please ensure you have glucagon at home for emergencies    - continue Dexcom G6 CGM with phone (connected to clinic profile)  - patient is on insulin, and has suboptimal glycemic control including wide glycemic swings, thus would benefit from CGM  - continue to check BG 3x/day using glucometer or CGM  - pump backup plan: Toujeo 18 units daily, continue novolog IC  1:11, ISF 1:30 with target glucose 110 mg/dl.   - has gvoke/glucagon at home   -See above header \"Supplementary Documentation\" for surveillance for diabetes complications & risks    #Postoperative Hypothyroidism  - Hx of MNG underwent left thyroidectomy then 2 years later had right thyroidectom  15-17 years ago. She stated no DIALLO done and nodules were benign. Discussed goal of tsh<2.5 but since her tsh at the moment is in the lower end of normal and she mentioned she lost wt, will decrease levothyroxine and discussed to her rationale.   -  current tsh 1.59 and FT4 1.6-10/25/24. Continue current  levothyroxine 125 mcg daily.   - Discussed okay to repeat thyroid levels in 6 months and if any hypothyroid symptoms occurs around after surgery, okay to repeat labs sooner than 6 months.     #Vitamin D deficiency  - Vit d 22.6- 12/26/15, Ca 9.3-5/23/24, --> Vit D 44.2-10/25/24  - taking over the counter calcium with vitamin D3 800 international unit per day    #Nephropathy screening:   Lab Results   Component Value Date    EGFRCR 84 05/23/2024    MICROALBCREA  05/23/2024      Comment:      Unable to calculate due to Urine Microalbumin <0.5 mg/dL        #Hx of Atrial Fibrillation, Blood pressure control: SBP is to goal <130   BP Readings from Last 1 Encounters:   10/29/24 130/80   BP Meds: dilTIAZem HCl ER Beads Cp24 - 180 MG     #Hyperlipidemia/Lipids: LDL is to goal   Lab Results   Component Value Date    LDL 67 05/23/2024    TRIG 54 05/23/2024   Cholesterol Meds: simvastatin Tabs - 40 MG , LFTs nl       #BMI 39  - in mounjaro ,gaining weight, patient states due to his knees, she has scheduled appt for knee replacement last week of Jan.   - Balanced diet: carbohydrates, protein, healthy fats and fiber in each meal. Exercise of at least 150 mins each week. Decrease your simple carbohydrates intake such as sweets: cookies, soda, candy, white rice, white pasta, syrups     Return in about 4 months (around 2/28/2025) for  diabetes follow up.    10/29/2024  LILIA Zheng    Total time spent  50 minutes today on obtaining history, reviewing pertinent labs, evaluating patient, providing multiple treatment options, reinforcing diet/exercise and compliance, and completing documentation, of which greater than 50% was spent in face to face discussion with the patient       Case discussed with patient  who demonstrated understanding and agreement with plan.     Thank you for allowing me to participate in the care of this patient.  Please feel free to contact me with any questions.    LILIA Palafox, [facility]  Endocrinology, Diabetes & Metabolism   7/12/2024     Note to patient: The 21 Century Cures Act makes medical notes like these available to patients in the interest of transparency. However, be advised this is a medical document. It is intended as peer to peer communication. It is written in medical language and may contain abbreviations or verbiage that are unfamiliar. It may appear blunt or direct. Medical documents are intended to carry relevant information, facts as evident, and the clinical opinion of the practitioner.

## 2024-10-29 ENCOUNTER — OFFICE VISIT (OUTPATIENT)
Facility: CLINIC | Age: 54
End: 2024-10-29
Payer: COMMERCIAL

## 2024-10-29 ENCOUNTER — VIRTUAL PHONE E/M (OUTPATIENT)
Facility: CLINIC | Age: 54
End: 2024-10-29
Payer: COMMERCIAL

## 2024-10-29 VITALS
WEIGHT: 236 LBS | SYSTOLIC BLOOD PRESSURE: 130 MMHG | OXYGEN SATURATION: 99 % | HEIGHT: 65 IN | BODY MASS INDEX: 39.32 KG/M2 | DIASTOLIC BLOOD PRESSURE: 80 MMHG | HEART RATE: 89 BPM | RESPIRATION RATE: 18 BRPM

## 2024-10-29 DIAGNOSIS — E78.2 MIXED HYPERLIPIDEMIA: ICD-10-CM

## 2024-10-29 DIAGNOSIS — E11.8 TYPE 2 DIABETES MELLITUS WITH COMPLICATION, WITH LONG TERM CURRENT USE OF INSULIN PUMP (HCC): Primary | ICD-10-CM

## 2024-10-29 DIAGNOSIS — E89.0 POSTOPERATIVE HYPOTHYROIDISM: ICD-10-CM

## 2024-10-29 DIAGNOSIS — Z96.41 TYPE 2 DIABETES MELLITUS WITH COMPLICATION, WITH LONG TERM CURRENT USE OF INSULIN PUMP (HCC): Primary | ICD-10-CM

## 2024-10-29 DIAGNOSIS — M17.11 PRIMARY OSTEOARTHRITIS OF RIGHT KNEE: Primary | ICD-10-CM

## 2024-10-29 DIAGNOSIS — E55.9 VITAMIN D DEFICIENCY: ICD-10-CM

## 2024-10-29 LAB — HEMOGLOBIN A1C: 5.6 % (ref 4.3–5.6)

## 2024-10-29 PROCEDURE — G2211 COMPLEX E/M VISIT ADD ON: HCPCS | Performed by: NURSE PRACTITIONER

## 2024-10-29 PROCEDURE — 3075F SYST BP GE 130 - 139MM HG: CPT | Performed by: NURSE PRACTITIONER

## 2024-10-29 PROCEDURE — 95251 CONT GLUC MNTR ANALYSIS I&R: CPT | Performed by: NURSE PRACTITIONER

## 2024-10-29 PROCEDURE — 3044F HG A1C LEVEL LT 7.0%: CPT | Performed by: NURSE PRACTITIONER

## 2024-10-29 PROCEDURE — 3008F BODY MASS INDEX DOCD: CPT | Performed by: NURSE PRACTITIONER

## 2024-10-29 PROCEDURE — 99215 OFFICE O/P EST HI 40 MIN: CPT | Performed by: NURSE PRACTITIONER

## 2024-10-29 PROCEDURE — 3079F DIAST BP 80-89 MM HG: CPT | Performed by: NURSE PRACTITIONER

## 2024-10-29 PROCEDURE — 83036 HEMOGLOBIN GLYCOSYLATED A1C: CPT | Performed by: NURSE PRACTITIONER

## 2024-10-29 RX ORDER — LEVOTHYROXINE SODIUM 125 UG/1
125 TABLET ORAL
Qty: 90 TABLET | Refills: 1 | Status: SHIPPED | OUTPATIENT
Start: 2024-10-29

## 2024-10-29 RX ORDER — LEVOTHYROXINE SODIUM 137 UG/1
137 TABLET ORAL DAILY
Qty: 90 TABLET | Refills: 1 | Status: CANCELLED | OUTPATIENT
Start: 2024-10-29

## 2024-10-29 NOTE — PATIENT INSTRUCTIONS
Return Visit:  APN: Return in about 4 months (around 2/28/2025) for diabetes follow up.  [x] Video visit  []  In person only     []  Directions to 1st floor lab    []  Give blood sugar log  []  PAP paperwork for Ozempic/Jardiance (english/Turkmen)   []  Diabetes Education:    []  Carb Aware T2DM (60)   []  Carb count refresh T1DM (60)   []  CGM start (30)   []  Pump 101 (60)   []  Schedule with Delgado for  (60)   []  DM burnout counseling (60)    Summary of today's visit:  Medication changes:    - Current treatment: Omnipod 5 in Automode + Dexcom G6:  with changes made  Time Basal ICR ISF Active Insulin Time Target   12:00 am 0.9 unit/hr 11 30 3 hrs 110   5:30 am 1.1 11        1:00 pm 1.6 9-->10        4:30 pm 1.9 9-->10      6:30pm 1.6 9-->10      9:30 pm 2 11      - Continue levothyroxine 125 mcg daily , mounjaro 15  mg every Sunday  - On surgery day, activate the temp basal reduction of 20% the night before surgery last week of Jan, 2025, stop mounjaro 1 week before the surgery and resume after surgery.   - Okay to repeat thyroid levels in 6 months and if any hypothyroid symptoms occurs around after surgery, okay to repeat labs sooner than 6 months.   - If on insulin, please ensure you have glucagon at home for emergencies     For insulin pump users:  -If you experience pump failure, first change your pump site immediately and if no improvement in sugar readings within 60 minutes, please administer your pump back up plan, noted below (including immediately administer long acting insulin as to avoid DKA)  - Pump backup plan:   Long acting insulin:  18 units/day  Mealtime: Take  ICR (Insulin to carb ratio) 1:11 g + ISF(Insulin sensitivity factor) 1:30 >110  - For mechanical failure contact your pump company directly.  . Reach out to your pump provider for troubleshooting or our office if between the hours of 8AM-4PM.   -If a faulty sensor or a sensor needs to be removed early for imaging/procedure, please call the  sensor company and they will often send you a free replacement   -If you require any assistance with pump supplies, warranty, etc please call our office to discuss with our diabetes educator, Sandy      Additional comments:   - If labs were ordered today, please complete prior to your follow up visit   - Please let us know if you require any refills at least 1 week prior to your medication running out   - Please call our office if sugars at home are consistently greater than 250 or less than 70     HOW TO TREAT LOW BLOOD SUGAR (Hypoglycemia)  Low blood sugar= Less than 70, or if you start to have symptoms (below)  Symptoms: Shaking or trembling, fast heart rate, extreme hunger, sweating, confusion/difficulty concentrating, dizziness.    How to treat a low blood sugar if you are able to eat/drink: The Rule of 15/15  If you are using continuous glucose monitor that says you are low, but you do not have any symptoms, verify on fingerstick that your blood sugar is actually low before treating.   Eat 15 grams of carbs (see examples below)  Check your blood sugar after 15 minutes. If it’s still below your target range, have another serving.   Repeat these steps until it’s in your target range. Once it’s in range, if you're nervous about your sugar going low again, have a protein source (ie, a spoonful of peanut butter).   If you have a CGM you want to look for how your arrow has changed. If you arrow is pointed up or sideways after 15 min, give your CGM more time OR check with a finger stick. Try not to eat more food until at least 15 min after the first BG check - otherwise you risk having a rebound high.  If you are experiencing symptoms and you are unable to check your blood glucose for any reason, treat the hypoglycemia.  If someone has a low blood sugar and is unconscious: Don’t hesitate to call 911. If someone is unconscious and glucagon is not available or someone does not know how to use it, call 911 immediately.      To treat a low, I recommend you carry with you easy, pre-portioned treatment for low blood sugars that are 15G of carbs:   - Children sized squeeze pouch applesauce (high fiber + carbs help prevent too high of a spike)  - Small children's sized juicebox- 15g carb --> 4oz juice box  - Glucose tablets from [employer], you can find them near diabetes supplies --> Note, you will need to eat 3-4 tablets to get to 15g of carbs  - Children sized fruit snack pack- look for one with 15 grams of total carbohydrate  - Choice of how to treat your low is important. Complex carbs, or foods that contain fats along with carbs (like chocolate) can slow the absorption of glucose and should NOT be used to treat an emergency low

## 2024-10-29 NOTE — PROGRESS NOTES
I spoke to Ms Rivera via telephone this morning for a presurgical discussion.    She asked about the expected recovery following total knee replacement, which was discussed with her.    Patient has seen her endocrinologist and her most recent A1c was within normal limits. She understands to stop her GLP-1 agonist 1 week prior to the planned procedure given decreased gastric emptying and increased aspiration risk.    An appointment is scheduled with her cardiologist to discuss Eliquis dosing pre-operatively. We will need to balance the risk of post-operative hematoma in the setting of resuming this for her atrial fibrillation.    All questions were answered to her satisfaction. I encouraged her to reach out to me with any further questions prior to surgery.    Ruslan Connelly MD  Adult Hip and Knee Reconstruction    Department of Orthopaedic Surgery  [facility]     [address] [place] [address]  [address] [place] [address]     t: [phone]  f: [phone]       [facility]

## 2024-11-13 DIAGNOSIS — Z79.4 TYPE 2 DIABETES MELLITUS WITHOUT COMPLICATION, WITH LONG-TERM CURRENT USE OF INSULIN (HCC): ICD-10-CM

## 2024-11-13 DIAGNOSIS — E11.9 TYPE 2 DIABETES MELLITUS WITHOUT COMPLICATION, WITH LONG-TERM CURRENT USE OF INSULIN (HCC): ICD-10-CM

## 2024-11-13 DIAGNOSIS — K21.9 GASTROESOPHAGEAL REFLUX DISEASE, UNSPECIFIED WHETHER ESOPHAGITIS PRESENT: ICD-10-CM

## 2024-11-13 RX ORDER — BLOOD SUGAR DIAGNOSTIC
STRIP MISCELLANEOUS
Qty: 100 STRIP | Refills: 1 | Status: SHIPPED | OUTPATIENT
Start: 2024-11-13

## 2024-11-13 RX ORDER — OMEPRAZOLE 40 MG/1
40 CAPSULE, DELAYED RELEASE ORAL DAILY
Qty: 90 CAPSULE | Refills: 1 | Status: SHIPPED | OUTPATIENT
Start: 2024-11-13

## 2024-11-13 NOTE — TELEPHONE ENCOUNTER
Requested Prescriptions     Pending Prescriptions Disp Refills    Glucose Blood (ONETOUCH VERIO) In Vitro Strip [Pharmacy Med Name: ONE TOUCH VERIO TEST ST(NEW)100S] 100 strip 1     Sig: USE TO TEST ONE TIME DAILY AS NEEDED TO CALIBRATE DEXCOM READINGS     Future Appointments   Date Time Provider Department Center   1/6/2025  8:40 AM Gisselle Humphrey APRN [facility]   1/7/2025  9:40 AM Keily Nino PA-C [facility] [address]   2/13/2025 11:30 AM Ruslan Connelly MD [facility] [address]     Last A1c value was 5.6% done 10/29/2024.  LOV 4/9/24 [facility]     Pt seeing Jessica Dennis 10/29/24 LOV

## 2024-11-21 ENCOUNTER — TELEPHONE (OUTPATIENT)
Dept: ORTHOPEDICS CLINIC | Facility: CLINIC | Age: 54
End: 2024-11-21

## 2024-11-22 ENCOUNTER — PATIENT MESSAGE (OUTPATIENT)
Facility: CLINIC | Age: 54
End: 2024-11-22

## 2024-11-22 ENCOUNTER — TELEPHONE (OUTPATIENT)
Facility: CLINIC | Age: 54
End: 2024-11-22

## 2024-11-22 NOTE — TELEPHONE ENCOUNTER
Pt called last night and spoke with . She is having a terrible flare up in pain for right knee.     It started yesterday after sitting on toilet, she felt extreme pain from knee to ankle when standing. It has happened twice now, and the pain is excruciating, and it continues to radiate. She has never felt pain like this before. Asked her what is she taking for the pain? Nothing. She is icing and elevating. Advised her to add compression to keep the knee aligned and help with blood flow, she bought a sleeve/brace last night. Also advised NSAIDs- she cannot take any- she is allergic. Advised Tylenol, even though she states it doesn't help. Advised her when it is the only thing in the arsenal, it needs to be tried. She will try it.     She cannot take oxycodone, however Norco has helped with pain in past.  Advised patient that we are reluctant to use narcotics at this time since it is the only thing that we have for pain with surgery. We will, of course, ask  what he recommends.    Please advise~ thanks!

## 2024-11-22 NOTE — TELEPHONE ENCOUNTER
Telephone page from the patient this evening.  She relates acute increased pain in her right knee where she has been diagnosed with arthritis.  She is scheduled to undergo a total knee in January.  She was getting up off the toilet when she felt the increased pain.  She typically uses a cane for ambulation but stated that she has a rolling walker as well..  She wondered if a visit to the ED would be appropriate.  I told her that it is unlikely that any significant bony abnormality has developed different from her most recent plain films, but if the pain is intolerable, she was advised to get to the ED for further workup and assessment.  If her pain is acceptable, she may benefit from using a rolling walker and preventing any undue flexion or extension of the knee.  All questions were answered and she verbalized understanding and appreciation.  I suggested she contact our office during business hours if she is still struggling tomorrow.

## 2024-11-22 NOTE — TELEPHONE ENCOUNTER
Pt has questions about her options for rt knee. She is unable to walk and is currently using a walker for support when walking or standing. She is unable to drive. Please advise. Pt contact number [phone]  Future Appointments  1/6/2025   8:40 AM    iGsselle Humphrey APRN  [facility]              [facility]  1/7/2025   9:40 AM    Keily Nino PA-C  [facility]           [facility] [address]  2/13/2025  11:30 AM   Ruslan Connelly MD             [facility]        [facility] [address]

## 2024-11-25 ENCOUNTER — OFFICE VISIT (OUTPATIENT)
Facility: CLINIC | Age: 54
End: 2024-11-25
Payer: COMMERCIAL

## 2024-11-25 VITALS — BODY MASS INDEX: 41.77 KG/M2 | HEIGHT: 62 IN | WEIGHT: 227 LBS

## 2024-11-25 DIAGNOSIS — M17.11 PRIMARY OSTEOARTHRITIS OF RIGHT KNEE: Primary | ICD-10-CM

## 2024-11-25 PROCEDURE — 99213 OFFICE O/P EST LOW 20 MIN: CPT | Performed by: STUDENT IN AN ORGANIZED HEALTH CARE EDUCATION/TRAINING PROGRAM

## 2024-11-25 PROCEDURE — 3008F BODY MASS INDEX DOCD: CPT | Performed by: STUDENT IN AN ORGANIZED HEALTH CARE EDUCATION/TRAINING PROGRAM

## 2024-11-25 RX ORDER — TIRZEPATIDE 15 MG/.5ML
INJECTION, SOLUTION SUBCUTANEOUS
COMMUNITY
Start: 2024-11-16

## 2024-11-25 NOTE — PROGRESS NOTES
Orthopaedic Surgery  [address] [address] [address]   [phone]        Smoker: former  Diabetes: Yes, last A1c 5.8  History of blood clots: No, on Eliquis 5 mg BID for a-fib  History of cancer: No      Chief Complaint:  Right Knee Pain    History of Present Illness:   Catina Rivera is a 54 year old female seen in clinic today as new to me but established to the practice for evaluation of their right knee. Pain is located over the medial and anterior aspect of the knee and described as aching and limiting. Their joint pain interferes with their activities of daily life such as walking, going up or down stairs, getting in or out from their car, navigating uneven surfaces, grocery shopping, and exercising. Their condition is worsening.  Associated with their joint pain, they report instability at times and popping. Their symptoms are worsened by weightbearing activity and as the day progresses. They have difficulty with walking prolonged distances on flat surfaces as well as stairs. Their symptoms are made better by rest. They report no lower back or hip pain. Prior knee arthroscopy to the involved knee for meniscus repair, she is unable to recall if this was 10 or 15 years ago.    Prior imaging studies have included XRs. Treatment so far has consisted of conservative management including NSAIDs, combination of rest, ice, or elevation, physical therapy, and corticosteroid injections. Corticosteroid injections have provided partial benefit, but this was limited and short lasting. Last injection was in the end of September.    Activities of Daily Life:  They use stairs: avoid completely or unable to do stairs  Assistive devices used: cane  Able to rise from a chair: Yes but with difficulty      Interval History:  Reports worsening of knee pain with radiation down the lower leg with weightbearing  Has been treated this with a walker and rigid knee brace, vs her usual cane  Swelling appears to  have gone down some  Taking Tylenol once a day for pain      PMH/PSH/Family History/Social History/Meds/Allergies:   Past Medical History:    Anxiety    Anxiety state, unspecified    Arrhythmia    a fib    Asthma (HCC)    Back pain    Back problem    Bad breath    Belching    Bloating    Decorative tattoo    Depression    Disorder of thyroid    Esophageal reflux    GERD (gastroesophageal reflux disease)    Headache(784.0)    Heart palpitations    Afib    Heartburn    I have taken medication for over 10years    High cholesterol    History of depression    Hypothyroidism    Irregular bowel habits    Leaking of urine    Lipoma of unspecified site    Multiple thyroid nodules    on rt,2011, lt.-2008    Nontoxic multinodular goiter    Obesity    Other and unspecified hyperlipidemia    Pain in joints    Pain in right shin    Pain with bowel movements    Painful swallowing    Not consistent but not due to illness    Plantar fasciitis    Sleep apnea     CPAP     Stress    Thyroiditis, unspecified    Type 1 diabetes mellitus (HCC)    Uncomfortable fullness after meals    Visual impairment    Wears glasses    Weight loss    Working with weight loss clinic        Past Surgical History:   Procedure Laterality Date    Arthroscopy of joint unlisted Bilateral     knee    Carpal tunnel release Bilateral     Cholecystectomy      Colonoscopy N/A 10/21/2016    Procedure: COLONOSCOPY;  Surgeon: Brandt Ramirez DO;  Location:  ENDOSCOPY    Colonoscopy N/A 12/27/2022    Procedure: COLONOSCOPY;  Surgeon: Aiden Pollard MD;  Location:  ENDOSCOPY    Cyst aspiration left      Cyst aspiration right      Hysterectomy      7/21    Laparoscopic cholecystectomy  11/14/2011    Lipoma removal  04/29/2022    left posterior    Mesh (implantable)      bladder    Neuromuscular stimulator      Oophorectomy Bilateral     Other      wisdom    Other      lt hand middle finger sx    Other surgical history  x2    total thyroidectomy    Other surgical  history  x1    sinus    Other surgical history  10/11/2023    trial for neurostimulator    Other surgical history      Neuro Nerve Stimulator    Removal gallbladder      Sinus surgery        Sling oper stres incontinence  2014    Procedure: SLING UROLOGY;  Surgeon: Isac Sutherland MD;  [facility]    Thyroidectomy      Total abdom hysterectomy  2020        Family History   Problem Relation Age of Onset    Skin cancer Mother     Other (Other) Mother     Heart Disease Father     Hypertension Father     Asthma Father     Diabetes Father     Other (Other) Father     Heart Attack Father         x2    Other (Other) Sister         Crohn's disease    Crohn's Disease Sister     Ulcerative Colitis Sister     Other (Other) Sister         Celiac Disease    Diabetes Maternal Grandmother     Other (Other) Maternal Grandmother         lymphoma    Asthma Other         3 children all have asthma.         Social History     Socioeconomic History    Marital status:      Spouse name: Not on file    Number of children: 3    Years of education: Not on file    Highest education level: Not on file   Occupational History    Occupation:      Comment: Convenience    Tobacco Use    Smoking status: Former     Current packs/day: 0.00     Types: Cigarettes     Start date: 3/1/2017     Quit date: 3/1/2017     Years since quittin.7     Passive exposure: Never    Smokeless tobacco: Never    Tobacco comments:     non-smoker     Updated 24   Vaping Use    Vaping status: Never Used   Substance and Sexual Activity    Alcohol use: Yes     Comment: rare    Drug use: Yes     Comment: CBD,CBG  Doctors are fully aware    Sexual activity: Yes   Other Topics Concern    Caffeine Concern Not Asked    Exercise Not Asked    Seat Belt Not Asked    Special Diet Not Asked    Stress Concern Not Asked    Weight Concern Not Asked   Social History Narrative    Not on file     Social Drivers of Health      Financial Resource Strain: Not on file   Food Insecurity: Not on file   Transportation Needs: Not on file   Physical Activity: Not on file   Stress: Not on file   Social Connections: Unknown (3/12/2021)    [facility]    Social Connections     Conversations with friends/family/neighbors per week: Not on file   Housing Stability: Low Risk  (7/9/2021)    Received from [facility], [facility]    Housing Stability     Mortgage Payment Concerns?: Not on file     Number of Places Lived in the Last Year: Not on file     Unstable Housing?: Not on file        Current Outpatient Medications   Medication Instructions    apixaban (ELIQUIS) 5 mg, Oral, 2 times daily    Blood Glucose Monitoring Suppl (ONETOUCH VERIO FLEX SYSTEM) w/Device Does not apply Kit Use to test blood sugar 1x daily as needed to calibrate dexcom    buPROPion ER (WELLBUTRIN XL) 300 mg, Oral, Daily    Calcium Carbonate 600 mg, Daily    Continuous Glucose Sensor (DEXCOM G6 SENSOR) Does not apply Misc USE 1 SENSOR EVERY 10 DAYS    Continuous Glucose Transmitter (DEXCOM G6 TRANSMITTER) Does not apply Misc USE 1 TRANSMITTER EVERY 90 DAYS    cyclobenzaprine 10 MG Oral Tab TAKE 1 TO 2 TABLETS BY MOUTH AT BEDTIME FOR 30 DAYS    dilTIAZem HCl ER Beads (TIAZAC) 180 mg, 2 times daily    estradiol 0.05 MG/24HR Transdermal Patch Weekly APPLY 1 PATCH EXTERNALLY TO THE SKIN EVERY TUESDAY. DO NOT CUT PATCH    Glucose Blood (ONETOUCH VERIO) In Vitro Strip Test 1x daily/as needed to calibrate dexcom readings    Gvoke HypoPen 2-Pack 1 mg, Subcutaneous, As needed    Insulin Disposable Pump (OMNIPOD 5 G6 INTRO, GEN 5,) Does not apply Kit 1 each, Does not apply, Every 3 days    Insulin Disposable Pump (OMNIPOD 5 G6 PODS, GEN 5,) Does not apply Misc 1 each, Does not apply, Every 3 days    Insulin Glargine, 2 Unit Dial, (TOUJEO MAX SOLOSTAR) 300 UNIT/ML Subcutaneous Solution  Pen-injector USE AS DIRECTED UP TO 75 UNITS DAILY IN THE EVENT OF INSULIN PUMP FAILURE    Insulin Pen Needle (BD PEN NEEDLE AN U/F) 32G X 4 MM Does not apply Misc 1 Pen, Subcutaneous, 3 times daily    levothyroxine (SYNTHROID) 137 mcg, Oral, Daily    levothyroxine (SYNTHROID) 125 mcg, Oral, Before breakfast    Magnesium Aspartate 65 MG Oral Tab 1 tablet, Daily    Mounjaro 15 mg, Subcutaneous, Weekly    NOVOLOG 100 UNIT/ML Injection Solution INJECT UP  UNITS VIA INSULIN PUMP DAILY AS DIRECTED    Omeprazole 40 mg, Oral, Daily    PARoxetine HCl (PAXIL) 40 mg, Oral, Every morning    simvastatin (ZOCOR) 40 mg, Oral, Every evening        Allergies[1]       Physical Exam:   Vitals:    11/25/24 1335   Weight: 227 lb (103 kg)   Height: 5' 2\" (1.575 m)     Estimated body mass index is 41.52 kg/m² as calculated from the following:    Height as of this encounter: 5' 2\" (1.575 m).    Weight as of this encounter: 227 lb (103 kg).    Constitutional: No acute distress, well nourished.  Eyes: Anicteric sclera, pink conjunctiva  Ears, Nose, Mouth and Throat: Normocephalic, atraumatic, moist mucous membranes  Cardiovascular: No pitting edema or varicosities in the lower extremities. Maneuvers demonstrate a negative Anastasia's sign. No palpable cords in calf noted.   Respiratory: No respiratory distress, normal respiratory rhythm and effort   Neurological:  Oriented to person, place, and time.  Psychological:  Appropriate mood and affect.    Comprehensive right Knee Exam:      Inspection: No erythema, ecchymoses, or wounds. No rash. No previous incisions noted. Moderate effusion. No quad atrophy  Alignment: significant varus and partially correctable  ROM: 0 - 115 degrees, flexion contracture: 0 degrees, quad lag: no  Stability: A/P stress: stable, firm endpoint, M/L stress: opens laterally with varus force but firm endpoint  Pain or crepitus with ROM?: Yes. Pain with patellar grind  Tenderness to palpation at: medial joint  line  Strength: Intact 5/5 strength SLR and TA/GS/FHL/EHL  Sensation: Grossly intact to light touch over SPN/DPN/Saph/Sural/Tibial nerve distributions  Vasc: Warm perfused extremity        Imaging:   Weightbearing XRs of the right knee were obtained with AP, PA Flex, sunrise, and lateral views    They show severe degenerative changes of the knee, particularly involving the medial compartment as well as lateral facet of the patellofemoral joint with subchondral sclerosis and marginal osteophytes. No fracture or dislocation seen    I personally reviewed and interpreted the radiographs.      Assessment:     ICD-10-CM    1. Primary osteoarthritis of right knee  M17.11        Severe      Plan:   Stable clinical exam  Likely acute on chronic exacerbation of pain  Discussed OTC Tylenol dosing  Will have her check with her cardiologist if she is able to take low dose NSAIDs  May otherwise consider Tramadol for breakthrough pain    Will plan for total knee arthroplasty as previously scheduled      Ruslan Connelly MD  Adult Reconstruction    Department of Orthopaedic Surgery  [facility]     [address] [place] [address]  [address] [place] [address]     t: [phone]  f: [phone]       [email or web address]         [1]   Allergies  Allergen Reactions    Adhesive Tape HIVES     Paper tape is ok, clear tape=hives      Ibuprofen OTHER (SEE COMMENTS)     Asthma attack    Sulfa Antibiotics      Unknown; happened before adolescent period    Erythromycin Base NAUSEA ONLY    Oxycodone CONFUSION     Memory issues

## 2024-11-26 DIAGNOSIS — E78.2 MIXED HYPERLIPIDEMIA: ICD-10-CM

## 2024-11-26 RX ORDER — SIMVASTATIN 40 MG
40 TABLET ORAL EVERY EVENING
Qty: 90 TABLET | Refills: 0 | Status: SHIPPED | OUTPATIENT
Start: 2024-11-26

## 2024-11-26 NOTE — TELEPHONE ENCOUNTER
Patient Comment: The refills I think are with mail order and I cant use the mail order with my new insurance. Could I please have it sent to [facility] that is on file.   Pharmacy updated    Simvastatin last refilled 8/10/24  Future appointment with Garima 1/6/25  LOV with Garima 5/23/24  Last ALT & Lipid 5/23/24    Cholesterol Medication Protocol [id number]/26/2024 07:42 AM   Protocol Details ALT < 80    ALT resulted within past year    Lipid panel within past 12 months    In person appointment or virtual visit in the past 12 mos or appointment in next 3 mos

## 2024-12-01 ENCOUNTER — PATIENT MESSAGE (OUTPATIENT)
Facility: CLINIC | Age: 54
End: 2024-12-01

## 2024-12-01 DIAGNOSIS — F41.9 ANXIETY: ICD-10-CM

## 2024-12-02 RX ORDER — BUPROPION HYDROCHLORIDE 300 MG/1
300 TABLET ORAL DAILY
Qty: 90 TABLET | Refills: 1 | Status: SHIPPED | OUTPATIENT
Start: 2024-12-02

## 2024-12-02 RX ORDER — CYCLOBENZAPRINE HCL 10 MG
10 TABLET ORAL NIGHTLY PRN
Qty: 30 TABLET | Refills: 0 | Status: SHIPPED | OUTPATIENT
Start: 2024-12-02

## 2024-12-02 NOTE — TELEPHONE ENCOUNTER
Cyclobenzaprine 10 mg   (originally written by different MD, patient states that you are aware)    DOS: upcoming 1/29/25  Last OV: 11/25/24  Last refill date: patient reported as historical med 9/24/24       Upcoming appt:   Future Appointments   Date Time Provider Department Center   1/6/2025  8:40 AM Gisselle Humphrey APRN [facility] [place]   1/7/2025  9:40 AM Keily Nino PA-C [facility] [address]   2/13/2025 11:30 AM Ruslan Connelly MD [facility] [address]     Per LOV: \"Likely acute on chronic exacerbation of pain  Discussed OTC Tylenol dosing  Will have her check with her cardiologist if she is able to take low dose NSAIDs  May otherwise consider Tramadol for breakthrough pain\"

## 2024-12-17 ENCOUNTER — PATIENT MESSAGE (OUTPATIENT)
Facility: CLINIC | Age: 54
End: 2024-12-17

## 2024-12-17 ENCOUNTER — PATIENT MESSAGE (OUTPATIENT)
Dept: FAMILY MEDICINE CLINIC | Facility: CLINIC | Age: 54
End: 2024-12-17

## 2024-12-17 DIAGNOSIS — E66.813 CLASS 3 SEVERE OBESITY WITH SERIOUS COMORBIDITY AND BODY MASS INDEX (BMI) OF 40.0 TO 44.9 IN ADULT, UNSPECIFIED OBESITY TYPE (HCC): ICD-10-CM

## 2024-12-17 DIAGNOSIS — Z79.4 TYPE 2 DIABETES MELLITUS WITHOUT COMPLICATION, WITH LONG-TERM CURRENT USE OF INSULIN (HCC): Primary | ICD-10-CM

## 2024-12-17 DIAGNOSIS — E11.9 TYPE 2 DIABETES MELLITUS WITHOUT COMPLICATION, WITH LONG-TERM CURRENT USE OF INSULIN (HCC): Primary | ICD-10-CM

## 2024-12-17 DIAGNOSIS — E66.01 CLASS 3 SEVERE OBESITY WITH SERIOUS COMORBIDITY AND BODY MASS INDEX (BMI) OF 40.0 TO 44.9 IN ADULT, UNSPECIFIED OBESITY TYPE (HCC): ICD-10-CM

## 2024-12-17 RX ORDER — TIRZEPATIDE 15 MG/.5ML
15 INJECTION, SOLUTION SUBCUTANEOUS WEEKLY
Qty: 6 ML | Refills: 1 | Status: SHIPPED | OUTPATIENT
Start: 2024-12-17 | End: 2025-03-17

## 2024-12-17 NOTE — TELEPHONE ENCOUNTER
Future Appointments   Date Time Provider Department Center   1/6/2025  8:40 AM Gisselle Humphrey APRN [facility] [place]   1/7/2025  9:40 AM Keily Nino PA-C [facility] [address]   2/13/2025 11:30 AM Ruslan Connelly MD [facility] [address]     Last visit in this office 5/23/24 with LILIA Joyce

## 2024-12-17 NOTE — TELEPHONE ENCOUNTER
Diabetes Medication Protocol [id number]/17/2024 12:02 PM   Protocol Details Last A1C < 7.5 and within past 6 months    In person appointment or virtual visit in the past 6 mos or appointment in next 3 mos    Microalbumin procedure in past 12 months or taking ACE/ARB    EGFRCR or GFRNAA > 50    GFR in the past 12 months        Request for  MOUNJARO 15 MG/0.5ML Subcutaneous Solution Auto-injector     LOV 5/23/24 with Gisselle Humphrey APRN   Last refill 7/25/24 by Jessica Dennis APRN   Future Appointments   Date Time Provider Department Center   1/6/2025  8:40 AM Gisselle Humphrey APRN [facility] [place]   1/7/2025  9:40 AM Keily Nino PA-C [facility] [address]   2/13/2025 11:30 AM Ruslan Connelly MD [facility] [address]   Labs 5/23/24    Patient comment: The insurance is now requiring a PA  for the medication. I just spoke with them and they advised me that there was a glitch in the system that didn’t require it previously.

## 2024-12-17 NOTE — TELEPHONE ENCOUNTER
Cyclobenzaprine 10 mg    DOS: upcoming 1/29/25  Last OV: 11/25/24  Last refill date: 12/2/24     #/refills: 30/0  Upcoming appt:   Future Appointments   Date Time Provider Department Center   1/6/2025  8:40 AM Gisselle Humphrey APRN [facility] [place]   1/7/2025  9:40 AM Keily Nino PA-C [facility] [address]   2/13/2025 11:30 AM Ruslan Connelly MD [facility] [address]

## 2024-12-19 RX ORDER — CYCLOBENZAPRINE HCL 10 MG
10 TABLET ORAL NIGHTLY PRN
Qty: 30 TABLET | Refills: 0 | Status: SHIPPED | OUTPATIENT
Start: 2024-12-19

## 2024-12-20 ENCOUNTER — TELEPHONE (OUTPATIENT)
Dept: ORTHOPEDICS CLINIC | Facility: CLINIC | Age: 54
End: 2024-12-20

## 2024-12-20 DIAGNOSIS — M25.562 LEFT KNEE PAIN, UNSPECIFIED CHRONICITY: Primary | ICD-10-CM

## 2024-12-20 NOTE — TELEPHONE ENCOUNTER
Patient call and stated that she fell last night and hurt her left knee. Patient declined to schedule an appointment with Dr. Connelly just yet, asked to speak with a nurse first. Please advise.

## 2024-12-20 NOTE — TELEPHONE ENCOUNTER
DOS is 01/25/2024 RTKA    Surgical leg is fine- she hurt the left knee when she fell. She states it hurts and it is swollen. Advised that RICE is the best thing to start with - she is elevating and icing and is able to work from home. She is wondering if there is anything else that she can do to set it strong enough to support her by the end of January.     Advised that we will send the information to . She may be able to do a kenalog injection if needed?    Please advise?

## 2024-12-24 ENCOUNTER — TELEPHONE (OUTPATIENT)
Facility: CLINIC | Age: 54
End: 2024-12-24

## 2024-12-24 DIAGNOSIS — M25.562 LEFT KNEE PAIN, UNSPECIFIED CHRONICITY: Primary | ICD-10-CM

## 2024-12-24 NOTE — TELEPHONE ENCOUNTER
XR ordered and scheduled per Ortho protocol.   Sent patient message via [employer] to inform them and ask them to arrive 15-20 minutes prior to appointment with Dr. Connelly

## 2024-12-24 NOTE — TELEPHONE ENCOUNTER
Ruslan Connelly MD to [facility] Clinical Pool       12/23/24 12:31 PM  We can potentially inject the non-operative knee if she is interested.    Would need new XRs

## 2024-12-26 ENCOUNTER — PATIENT MESSAGE (OUTPATIENT)
Facility: CLINIC | Age: 54
End: 2024-12-26

## 2024-12-26 ENCOUNTER — HOSPITAL ENCOUNTER (OUTPATIENT)
Dept: GENERAL RADIOLOGY | Age: 54
Discharge: HOME | End: 2024-12-26
Attending: STUDENT IN AN ORGANIZED HEALTH CARE EDUCATION/TRAINING PROGRAM

## 2024-12-26 ENCOUNTER — PATIENT MESSAGE (OUTPATIENT)
Dept: FAMILY MEDICINE CLINIC | Facility: CLINIC | Age: 54
End: 2024-12-26

## 2024-12-26 ENCOUNTER — OFFICE VISIT (OUTPATIENT)
Facility: CLINIC | Age: 54
End: 2024-12-26
Payer: COMMERCIAL

## 2024-12-26 VITALS — BODY MASS INDEX: 42.33 KG/M2 | WEIGHT: 230 LBS | HEIGHT: 62 IN

## 2024-12-26 DIAGNOSIS — M17.12 PRIMARY OSTEOARTHRITIS OF LEFT KNEE: Primary | ICD-10-CM

## 2024-12-26 DIAGNOSIS — Z96.41 INSULIN PUMP IN PLACE: ICD-10-CM

## 2024-12-26 DIAGNOSIS — M17.11 PRIMARY OSTEOARTHRITIS OF RIGHT KNEE: ICD-10-CM

## 2024-12-26 DIAGNOSIS — Z96.41 TYPE 2 DIABETES MELLITUS WITH COMPLICATION, WITH LONG TERM CURRENT USE OF INSULIN PUMP (HCC): Primary | ICD-10-CM

## 2024-12-26 DIAGNOSIS — E11.8 TYPE 2 DIABETES MELLITUS WITH COMPLICATION, WITH LONG TERM CURRENT USE OF INSULIN PUMP (HCC): Primary | ICD-10-CM

## 2024-12-26 DIAGNOSIS — E11.8 TYPE 2 DIABETES WITH COMPLICATION (HCC): ICD-10-CM

## 2024-12-26 DIAGNOSIS — M25.562 LEFT KNEE PAIN, UNSPECIFIED CHRONICITY: ICD-10-CM

## 2024-12-26 PROCEDURE — 73564 X-RAY EXAM KNEE 4 OR MORE: CPT | Performed by: STUDENT IN AN ORGANIZED HEALTH CARE EDUCATION/TRAINING PROGRAM

## 2024-12-26 PROCEDURE — 3008F BODY MASS INDEX DOCD: CPT | Performed by: STUDENT IN AN ORGANIZED HEALTH CARE EDUCATION/TRAINING PROGRAM

## 2024-12-26 PROCEDURE — 99213 OFFICE O/P EST LOW 20 MIN: CPT | Performed by: STUDENT IN AN ORGANIZED HEALTH CARE EDUCATION/TRAINING PROGRAM

## 2024-12-26 PROCEDURE — 20610 DRAIN/INJ JOINT/BURSA W/O US: CPT | Performed by: STUDENT IN AN ORGANIZED HEALTH CARE EDUCATION/TRAINING PROGRAM

## 2024-12-26 RX ORDER — TRIAMCINOLONE ACETONIDE 40 MG/ML
40 INJECTION, SUSPENSION INTRA-ARTICULAR; INTRAMUSCULAR ONCE
Status: COMPLETED | OUTPATIENT
Start: 2024-12-26 | End: 2024-12-26

## 2024-12-26 RX ADMIN — TRIAMCINOLONE ACETONIDE 40 MG: 40 INJECTION, SUSPENSION INTRA-ARTICULAR; INTRAMUSCULAR at 09:13:00

## 2024-12-26 NOTE — PROGRESS NOTES
Orthopaedic Surgery  [address] [address] [address]   [phone]        Smoker: former  Diabetes: Yes, last A1c 5.8  History of blood clots: No, on Eliquis 5 mg BID for a-fib  History of cancer: No      Chief Complaint:  Bilateral Knee Pain    Interval History 12/26/24:  Patient currently scheduled for RIGHT total knee arthroplasty with me in about 1 month. She presents today with contralateral LEFT knee pain  She has been following with Mik Lock PA-C for her knee  Pain is located over the anterior knee  Worse with weightbearing and as the day progresses, somewhat improved with rest  Associated stiffness and at times sensation of instability  Treatment has included CSI, last 3 months ago  Prior knee arthroscopy to the involved knee for meniscus repair, she is unable to recall if this was 10 or 15 years ago    History of Present Illness:   Catina Rivera is a 54 year old female seen in clinic today as new to me but established to the practice for evaluation of their right knee. Pain is located over the medial and anterior aspect of the knee and described as aching and limiting. Their joint pain interferes with their activities of daily life such as walking, going up or down stairs, getting in or out from their car, navigating uneven surfaces, grocery shopping, and exercising. Their condition is worsening.  Associated with their joint pain, they report instability at times and popping. Their symptoms are worsened by weightbearing activity and as the day progresses. They have difficulty with walking prolonged distances on flat surfaces as well as stairs. Their symptoms are made better by rest. They report no lower back or hip pain.     Prior imaging studies have included XRs. Treatment so far has consisted of conservative management including NSAIDs, combination of rest, ice, or elevation, physical therapy, and corticosteroid injections. Corticosteroid injections have provided partial  benefit, but this was limited and short lasting. Last injection was in the end of September.    Activities of Daily Life:  They use stairs: avoid completely or unable to do stairs  Assistive devices used: cane  Able to rise from a chair: Yes but with difficulty      Interval History:  Reports worsening of knee pain with radiation down the lower leg with weightbearing  Has been treated this with a walker and rigid knee brace, vs her usual cane  Swelling appears to have gone down some  Taking Tylenol once a day for pain      PMH/PSH/Family History/Social History/Meds/Allergies:   Past Medical History:    Anxiety    Anxiety state, unspecified    Arrhythmia    a fib    Asthma (HCC)    Back pain    Back problem    Bad breath    Belching    Bloating    Decorative tattoo    Depression    Disorder of thyroid    Esophageal reflux    GERD (gastroesophageal reflux disease)    Headache(784.0)    Heart palpitations    Afib    Heartburn    I have taken medication for over 10years    High cholesterol    History of depression    Hypothyroidism    Irregular bowel habits    Leaking of urine    Lipoma of unspecified site    Multiple thyroid nodules    on rt,2011, lt.-2008    Nontoxic multinodular goiter    Obesity    Other and unspecified hyperlipidemia    Pain in joints    Pain in right shin    Pain with bowel movements    Painful swallowing    Not consistent but not due to illness    Plantar fasciitis    Sleep apnea     CPAP     Stress    Thyroiditis, unspecified    Type 1 diabetes mellitus (HCC)    Uncomfortable fullness after meals    Visual impairment    Wears glasses    Weight loss    Working with weight loss clinic        Past Surgical History:   Procedure Laterality Date    Arthroscopy of joint unlisted Bilateral     knee    Carpal tunnel release Bilateral     Cholecystectomy      Colonoscopy N/A 10/21/2016    Procedure: COLONOSCOPY;  Surgeon: Brandt Ramirez DO;  Location:  ENDOSCOPY    Colonoscopy N/A 12/27/2022     Procedure: COLONOSCOPY;  Surgeon: Aiden Pollard MD;  Location:  ENDOSCOPY    Cyst aspiration left      Cyst aspiration right      Hysterectomy          Laparoscopic cholecystectomy  2011    Lipoma removal  2022    left posterior    Mesh (implantable)      bladder    Neuromuscular stimulator      Oophorectomy Bilateral     Other      wisdom    Other      lt hand middle finger sx    Other surgical history  x2    total thyroidectomy    Other surgical history  x1    sinus    Other surgical history  10/11/2023    trial for neurostimulator    Other surgical history      Neuro Nerve Stimulator    Removal gallbladder      Sinus surgery        Sling oper stres incontinence  2014    Procedure: SLING UROLOGY;  Surgeon: Isac Sutherland MD;  Location: [facility]    Thyroidectomy      Total abdom hysterectomy  2020        Family History   Problem Relation Age of Onset    Skin cancer Mother     Other (Other) Mother     Heart Disease Father     Hypertension Father     Asthma Father     Diabetes Father     Other (Other) Father     Heart Attack Father         x2    Other (Other) Sister         Crohn's disease    Crohn's Disease Sister     Ulcerative Colitis Sister     Other (Other) Sister         Celiac Disease    Diabetes Maternal Grandmother     Other (Other) Maternal Grandmother         lymphoma    Asthma Other         3 children all have asthma.         Social History     Socioeconomic History    Marital status:      Spouse name: Not on file    Number of children: 3    Years of education: Not on file    Highest education level: Not on file   Occupational History    Occupation:      Comment: [employer]venience    Tobacco Use    Smoking status: Former     Current packs/day: 0.00     Types: Cigarettes     Start date: 3/1/2017     Quit date: 3/1/2017     Years since quittin.8     Passive exposure: Never    Smokeless tobacco: Never    Tobacco comments:      non-smoker     Updated 5/20/24   Vaping Use    Vaping status: Never Used   Substance and Sexual Activity    Alcohol use: Yes     Comment: rare    Drug use: Yes     Comment: CBD,CBG  Doctors are fully aware    Sexual activity: Yes   Other Topics Concern    Caffeine Concern Not Asked    Exercise Not Asked    Seat Belt Not Asked    Special Diet Not Asked    Stress Concern Not Asked    Weight Concern Not Asked   Social History Narrative    Not on file     Social Drivers of Health     Financial Resource Strain: Not on file   Food Insecurity: Not on file   Transportation Needs: Not on file   Physical Activity: Not on file   Stress: Not on file   Social Connections: Unknown (3/12/2021)    [facility]    Social Connections     Conversations with friends/family/neighbors per week: Not on file   Housing Stability: Low Risk  (7/9/2021)    [facility]    Housing Stability     Mortgage Payment Concerns?: Not on file     Number of Places Lived in the Last Year: Not on file     Unstable Housing?: Not on file        Current Outpatient Medications   Medication Instructions    apixaban (ELIQUIS) 5 mg, Oral, 2 times daily    Blood Glucose Monitoring Suppl (ONETOUCH VERIO FLEX SYSTEM) w/Device Does not apply Kit Use to test blood sugar 1x daily as needed to calibrate dexcom    buPROPion ER (WELLBUTRIN XL) 300 mg, Oral, Daily    Calcium Carbonate 600 mg, Daily    Continuous Glucose Sensor (DEXCOM G6 SENSOR) Does not apply Misc USE 1 SENSOR EVERY 10 DAYS    Continuous Glucose Transmitter (DEXCOM G6 TRANSMITTER) Does not apply Misc USE 1 TRANSMITTER EVERY 90 DAYS    cyclobenzaprine 10 MG Oral Tab TAKE 1 TO 2 TABLETS BY MOUTH AT BEDTIME FOR 30 DAYS    dilTIAZem HCl ER Beads (TIAZAC) 180 mg, 2 times daily    estradiol 0.05 MG/24HR Transdermal Patch Weekly APPLY 1 PATCH EXTERNALLY TO THE SKIN EVERY TUESDAY. DO NOT  CUT PATCH    Glucose Blood (ONETOUCH VERIO) In Vitro Strip Test 1x daily/as needed to calibrate dexcom readings    Gvoke HypoPen 2-Pack 1 mg, Subcutaneous, As needed    Insulin Disposable Pump (OMNIPOD 5 G6 INTRO, GEN 5,) Does not apply Kit 1 each, Does not apply, Every 3 days    Insulin Disposable Pump (OMNIPOD 5 G6 PODS, GEN 5,) Does not apply Misc 1 each, Does not apply, Every 3 days    Insulin Glargine, 2 Unit Dial, (TOUJEO MAX SOLOSTAR) 300 UNIT/ML Subcutaneous Solution Pen-injector USE AS DIRECTED UP TO 75 UNITS DAILY IN THE EVENT OF INSULIN PUMP FAILURE    Insulin Pen Needle (BD PEN NEEDLE AN U/F) 32G X 4 MM Does not apply Misc 1 Pen, Subcutaneous, 3 times daily    levothyroxine (SYNTHROID) 137 mcg, Oral, Daily    levothyroxine (SYNTHROID) 125 mcg, Oral, Before breakfast    Magnesium Aspartate 65 MG Oral Tab 1 tablet, Daily    Mounjaro 15 mg, Subcutaneous, Weekly    NOVOLOG 100 UNIT/ML Injection Solution INJECT UP  UNITS VIA INSULIN PUMP DAILY AS DIRECTED    Omeprazole 40 mg, Oral, Daily    PARoxetine HCl (PAXIL) 40 mg, Oral, Every morning    simvastatin (ZOCOR) 40 mg, Oral, Every evening        Allergies[1]       Physical Exam:   Vitals:    12/26/24 0903   Weight: 230 lb (104.3 kg)   Height: 5' 2\" (1.575 m)     Estimated body mass index is 42.07 kg/m² as calculated from the following:    Height as of this encounter: 5' 2\" (1.575 m).    Weight as of this encounter: 230 lb (104.3 kg).    Constitutional: No acute distress, well nourished.  Eyes: Anicteric sclera, pink conjunctiva  Ears, Nose, Mouth and Throat: Normocephalic, atraumatic, moist mucous membranes  Cardiovascular: No pitting edema or varicosities in the lower extremities. Maneuvers demonstrate a negative Anastasia's sign. No palpable cords in calf noted.   Respiratory: No respiratory distress, normal respiratory rhythm and effort   Neurological:  Oriented to person, place, and time.  Psychological:  Appropriate mood and affect.    Bilateral Knee  Exam:      Inspection: No erythema, ecchymoses, or wounds. No rash. Left knee with well healed arthroscopic incisions. Mild effusion. No quad atrophy  Alignment: significant varus and almost fully correctable  ROM: 0 - 95 degrees, flexion contracture: 0 degrees, quad lag: no  Stability: A/P stress: stable, firm endpoint, M/L stress: opens laterally with varus force but firm endpoint  Pain or crepitus with ROM?: Yes. Pain with patellar grind  Tenderness to palpation at: medial joint line  Strength: Intact 5/5 strength SLR and TA/GS/FHL/EHL  Sensation: Grossly intact to light touch over SPN/DPN/Saph/Sural/Tibial nerve distributions  Vasc: Warm perfused extremity        Imaging:   Weightbearing XRs of the left knee were obtained with AP, PA Flex, sunrise, and lateral views    They show severe degenerative changes of the knee, particularly involving the medial compartment, with significant varus deformity and coronal subluxation, subchondral sclerosis and marginal osteophytes. No fracture or dislocation seen    I personally reviewed and interpreted the radiographs.      Assessment:     ICD-10-CM    1. Primary osteoarthritis of left knee  M17.12 Large joint - left aspiration/injection     triamcinolone acetonide (Kenalog-40) 40 MG/ML injection 40 mg      2. Primary osteoarthritis of right knee  M17.11       Severe varus      Plan:   At today's visit I discussed with the patient their diagnosis and the factors considered to form this diagnosis. Their history and physical exam and radiographic findings are consistent with arthritis of the LEFT knee. As we discussed their diagnosis, information was provided regarding the underlying pathology and the natural course of this progressive condition.    During our discussion, we detailed various treatment options available. We counseled the patient on treatment options. Major joint arthritis is usually a chronic condition that can be effectively managed with conservative  modalities. Once these conservative measures fail to provide reasonable therapeutic benefit, surgical procedures may be indicated.    Conservative measures discussed include activity modification, home exercise programs, physical therapy, oral anti-inflammatories and acetaminophen, braces, assistive devices, and intraarticular injections.     In consideration of their prior treatments, today, I have recommended continued conservative treatment and recommend combination of rest, ice, or elevation, physical therapy, and corticosteroid injections. Over the counter dosing of Tylenol was discussed.       Procedure Note - Left Knee CSI    Risks and benefits of knee injection discussed with the patient, with risks including but not limited to pain and swelling at the injection site and/or within the knee joint, infection, elevation in blood pressure and/or glucose levels, facial flushing.  After informed consent, the patient's left knee was marked, locally anesthetized with skin refrigerant, prepped with topical antiseptic, and injected with a mixture of 1mL 40mg/mL Kenalog, 2mL 1% lidocaine and 2mL 0.5% marcaine through the inferolateral portal. Hemostasis was achieved and a band-aid was applied.  The patient tolerated the procedure well.    --------    We will proceed with RIGHT total knee arthroplasty in January as planned.  I reviewed the procedure as well as risks and complications with her again and provided an opportunity for questions. Discussed that she has some pre-operative stiffness with current flexion to 95 degrees. She is at risk of returning to that degree of motion post-operatively and 5% risk of needing an BRAYDON within the first 8 weeks after surgery. She is also at increased risk for bleeding and hematoma due to Eliquis and history of post-op bleeding following her previous spine surgery. She is seeing her cardiologist later today. I asked her to discuss her anticoagulation plan with him, as well as if she  is able to take any form of NSAIDs post-operatively. Questions were answered to the best of my ability and to her satisfaction.      Ruslan Connelly MD  Adult Reconstruction    Department of Orthopaedic Surgery  [facility]     [address] [place] [address]  [address] [place] [address]     t: [phone]  f: [phone]       [email or web address]         [1]   Allergies  Allergen Reactions    Adhesive Tape HIVES     Paper tape is ok, clear tape=hives      Ibuprofen OTHER (SEE COMMENTS)     Asthma attack    Sulfa Antibiotics      Unknown; happened before adolescent period    Erythromycin Base NAUSEA ONLY    Oxycodone CONFUSION     Memory issues

## 2024-12-27 ENCOUNTER — TELEPHONE (OUTPATIENT)
Dept: FAMILY MEDICINE CLINIC | Facility: CLINIC | Age: 54
End: 2024-12-27

## 2024-12-27 DIAGNOSIS — G47.33 OSA ON CPAP: Primary | ICD-10-CM

## 2024-12-27 NOTE — TELEPHONE ENCOUNTER
Patient notified and verbalized understanding.    For Medication Mounjaro:  After careful consideration, [facility] has overturned the denial of the appeal for this medication and is now approved.  We will receive a letter in the mail in ten days of the approval.      Reference number: [id number].

## 2024-12-27 NOTE — TELEPHONE ENCOUNTER
Received fax from [employer] requesting order for CPAP supplies  Patient is IHP so order needs to come from PCP  DME order placed  Watch for authorization  Form in Dr Essence Brito MD bin to sign  Once signed wait for DME order to authorize then fax

## 2024-12-27 NOTE — TELEPHONE ENCOUNTER
Pls tell patient that I noted glucose started to rise on Dec 26, Thursday after lunch  When was the last time you she had taken the Mounjaro- how long had you been out of the medication?  - if she was only out for a week, then let the pump do the adjustment, correct glucose when the pump says correct it      - if the pump is malfunctioning:   monitoring your blood sugars (either with a glucose sensor or your meter) If you see an increase in blood sugar despite being on the pump or delivering insulin with bolus action then you will need to pay close attention to your pump and blood sugars.   Ok to Bolus again. But YOU MUST recheck your sugar within 2 hours.   If the sugar is coming down, then the insulin is being absorbed. Continue to monitor every 2 hours to be sure it normalizes back to your baseline.   IF the sugar is HIGHER or has not decreased, you may NOT be absorbing the insulin or have a bad infusion site.   At this time, you need to take insulin with a syringe and inject the dose recommended on pump.   PLEASE change your insulin pump site immediately.  If changing your site still leads to having high blood sugars, please call the pump company to see if the pump is bad.   It is important to have a back- up plan in case your pump stops working or the infusion set comes out.   You can call us phone no [phone] but below are your insulin pump back up plan doses:  DOSES:   Long acting insulin (basal insulin):       26 units /day   Short acting insulin: please use the following doses for carbs that you eat :    1:10g    And correction dose scale for when your blood sugar is above recommended target of :    1:30 >110    Follow these doses until you get back on pump       Follow these doses until you get back on pump       If your blood sugar remains uncontrolled and you are symptomatic of hyperglycemia (nausea, vomiting and abdominal pain, very fatigue) - please  check your urine with ketone strips and if +  with protein go to the ER.       Assessment:  Date: 12/14-12/27/24: TIR 87%, GMI 6.9% ,  low 0% , very low 0%, SD 38mg/dl, Sensor usage: 95%

## 2024-12-27 NOTE — TELEPHONE ENCOUNTER
For Medication Mounjaro:  After careful consideration, [facility] has overturned the denial of the appeal for this medication and is now approved.  We will receive a letter in the mail in ten days of the approval.     Reference number: [id number].

## 2024-12-31 RX ORDER — PROCHLORPERAZINE 25 MG/1
SUPPOSITORY RECTAL
Qty: 9 EACH | Refills: 1 | Status: SHIPPED | OUTPATIENT
Start: 2024-12-31

## 2024-12-31 NOTE — TELEPHONE ENCOUNTER
Endocrine Refill protocol for CGM supplies     Protocol Criteria:  PASSED Reason: N/A    If below requirement is met, send a 90-day supply with 1 refill per provider protocol.     Verify appointment with Endocrinology completed in the last 12 months or scheduled in the next 6 months     Last completed office visit:10/29/2024 Jessica Dennis APRN   Next scheduled Follow up:   Future Appointments   Date Time Provider Department Center   1/6/2025  8:40 AM Gisselle Humphrey APRN [facility]   1/7/2025  9:40 AM Keily Nino PA-C [id number] [address]   2/13/2025 11:30 AM Ruslan Connelly MD [facility] [address]      Passed and ordered per protocol

## 2025-01-06 ENCOUNTER — OFFICE VISIT (OUTPATIENT)
Dept: FAMILY MEDICINE CLINIC | Facility: CLINIC | Age: 55
End: 2025-01-06
Payer: COMMERCIAL

## 2025-01-06 VITALS
DIASTOLIC BLOOD PRESSURE: 54 MMHG | WEIGHT: 248.19 LBS | SYSTOLIC BLOOD PRESSURE: 112 MMHG | BODY MASS INDEX: 45 KG/M2 | HEART RATE: 82 BPM | TEMPERATURE: 98 F | OXYGEN SATURATION: 97 %

## 2025-01-06 DIAGNOSIS — K21.9 GASTROESOPHAGEAL REFLUX DISEASE, UNSPECIFIED WHETHER ESOPHAGITIS PRESENT: ICD-10-CM

## 2025-01-06 DIAGNOSIS — M17.11 PRIMARY OSTEOARTHRITIS OF RIGHT KNEE: ICD-10-CM

## 2025-01-06 DIAGNOSIS — E89.0 S/P THYROIDECTOMY: ICD-10-CM

## 2025-01-06 DIAGNOSIS — J45.20 MILD INTERMITTENT ASTHMA WITHOUT COMPLICATION (HCC): ICD-10-CM

## 2025-01-06 DIAGNOSIS — I48.0 PAROXYSMAL ATRIAL FIBRILLATION (HCC): ICD-10-CM

## 2025-01-06 DIAGNOSIS — F41.9 ANXIETY: ICD-10-CM

## 2025-01-06 DIAGNOSIS — E78.2 MIXED HYPERLIPIDEMIA: ICD-10-CM

## 2025-01-06 DIAGNOSIS — Z96.41 INSULIN PUMP IN PLACE: ICD-10-CM

## 2025-01-06 DIAGNOSIS — Z01.818 PREOP EXAMINATION: Primary | ICD-10-CM

## 2025-01-06 DIAGNOSIS — E66.01 CLASS 3 SEVERE OBESITY WITH SERIOUS COMORBIDITY AND BODY MASS INDEX (BMI) OF 45.0 TO 49.9 IN ADULT, UNSPECIFIED OBESITY TYPE (HCC): ICD-10-CM

## 2025-01-06 DIAGNOSIS — Z97.8 USES SELF-APPLIED CONTINUOUS GLUCOSE MONITORING DEVICE: ICD-10-CM

## 2025-01-06 DIAGNOSIS — I10 PRIMARY HYPERTENSION: ICD-10-CM

## 2025-01-06 DIAGNOSIS — F33.0 MILD EPISODE OF RECURRENT MAJOR DEPRESSIVE DISORDER (HCC): ICD-10-CM

## 2025-01-06 DIAGNOSIS — Z79.4 TYPE 2 DIABETES MELLITUS WITHOUT COMPLICATION, WITH LONG-TERM CURRENT USE OF INSULIN (HCC): ICD-10-CM

## 2025-01-06 DIAGNOSIS — E11.9 TYPE 2 DIABETES MELLITUS WITHOUT COMPLICATION, WITH LONG-TERM CURRENT USE OF INSULIN (HCC): ICD-10-CM

## 2025-01-06 DIAGNOSIS — E89.0 POSTOPERATIVE HYPOTHYROIDISM: ICD-10-CM

## 2025-01-06 DIAGNOSIS — G47.33 OSA ON CPAP: ICD-10-CM

## 2025-01-06 DIAGNOSIS — E66.813 CLASS 3 SEVERE OBESITY WITH SERIOUS COMORBIDITY AND BODY MASS INDEX (BMI) OF 45.0 TO 49.9 IN ADULT, UNSPECIFIED OBESITY TYPE (HCC): ICD-10-CM

## 2025-01-06 DIAGNOSIS — Z79.01 CURRENT USE OF LONG TERM ANTICOAGULATION: ICD-10-CM

## 2025-01-06 PROBLEM — I48.91 ATRIAL FIBRILLATION WITH RVR (HCC): Status: RESOLVED | Noted: 2022-11-01 | Resolved: 2025-01-06

## 2025-01-06 PROCEDURE — 99214 OFFICE O/P EST MOD 30 MIN: CPT | Performed by: NURSE PRACTITIONER

## 2025-01-06 PROCEDURE — 3078F DIAST BP <80 MM HG: CPT | Performed by: NURSE PRACTITIONER

## 2025-01-06 PROCEDURE — 3074F SYST BP LT 130 MM HG: CPT | Performed by: NURSE PRACTITIONER

## 2025-01-06 NOTE — H&P (VIEW-ONLY)
Catina Rivera is a 54 year old female who presents for a pre-operative physical exam.     Patient is scheduled for a Right Total Knee Arthroplasty procedure at [facility] on 2025 performed by Dr Connelly. .   Indication: Primary Osteoarthritis Right Knee     She has had previous anesthesia:  Yes.    Previous complications:  No  Personal or Family History Malignant Hyperthermia: No    Respiratory Disease: Yes. intermittent Asthma  Cardiac Disease: Yes. PAF, hypertension, hyperlipidemia   Endocrine Disease: Yes. DM, hypothyroidism  Sleep Apnea: Yes    Social History     Socioeconomic History    Marital status:     Number of children: 3   Occupational History    Occupation:      Comment: Convenience    Tobacco Use    Smoking status: Former     Current packs/day: 0.00     Types: Cigarettes     Start date: 3/1/2017     Quit date: 3/1/2017     Years since quittin.8     Passive exposure: Never    Smokeless tobacco: Never    Tobacco comments:     non-smoker     Updated 24   Vaping Use    Vaping status: Never Used   Substance and Sexual Activity    Alcohol use: Yes     Comment: rare    Drug use: Yes     Comment: CBD,CBG  Doctors are fully aware    Sexual activity: Yes     Social Drivers of Health      Received from [facility], [facility]    Social Connections    Received from [facility], [facility]    Housing Stability      Past Medical History:    Anxiety    Anxiety state, unspecified    Arrhythmia    a fib    Asthma (HCC)    Atrial fibrillation with RVR (HCC)    Back pain    Back problem    Bad breath    Belching    Bloating    Decorative tattoo    Depression    Diabetes (HCC)    per pt    Disorder of thyroid    Esophageal reflux    GERD (gastroesophageal reflux disease)    Headache(784.0)    Heart palpitations    Afib    Heartburn    I have taken medication for over 10years    High cholesterol     History of depression    Hypothyroidism    Irregular bowel habits    Leaking of urine    Lipoma of unspecified site    Multiple thyroid nodules    on rt,2011, lt.-2008    Nontoxic multinodular goiter    Nontoxic multinodular goiter    Obesity    Other and unspecified hyperlipidemia    Pain in joints    Pain in right shin    Pain with bowel movements    Painful swallowing    Not consistent but not due to illness    Plantar fasciitis    Sleep apnea     CPAP     Stress    Thyroiditis, unspecified    Type 1 diabetes mellitus (HCC)    Uncomfortable fullness after meals    Visual impairment    Wears glasses    Weight loss    Working with weight loss clinic     Past Surgical History:   Procedure Laterality Date    Arthroscopy of joint unlisted Bilateral     knee    Carpal tunnel release Bilateral     Cholecystectomy      Colonoscopy N/A 10/21/2016    Procedure: COLONOSCOPY;  Surgeon: Brandt Ramirez DO;  Location:  ENDOSCOPY    Colonoscopy N/A 12/27/2022    Procedure: COLONOSCOPY;  Surgeon: Aiden Pollard MD;  Location:  ENDOSCOPY    Cyst aspiration left      Cyst aspiration right      Hysterectomy      7/21    Laparoscopic cholecystectomy  11/14/2011    Lipoma removal  04/29/2022    left posterior    Mesh (implantable)      bladder    Neuromuscular stimulator      Oophorectomy Bilateral     Other      wisdom    Other      lt hand middle finger sx    Other surgical history  x2    total thyroidectomy    Other surgical history  x1    sinus    Other surgical history  10/11/2023    trial for neurostimulator    Other surgical history      Neuro Nerve Stimulator    Removal gallbladder      Sinus surgery        Sling oper stres incontinence  01/09/2014    Procedure: SLING UROLOGY;  Surgeon: Isac Sutherland MD;  [facility]    Thyroidectomy      Total abdom hysterectomy  07/2020     Allergies: Allergies[1]  Current Outpatient Medications   Medication Sig Dispense Refill    Continuous Glucose Sensor  (DEXCOM G6 SENSOR) Does not apply Misc USE 1 SENSOR EVERY 10 DAYS 9 each 1    cyclobenzaprine 10 MG Oral Tab Take 1 tablet (10 mg total) by mouth nightly as needed for Muscle spasms. Take 1 tab at bedtime as needed 30 tablet 0    MOUNJARO 15 MG/0.5ML Subcutaneous Solution Auto-injector Inject 15 mg as directed once a week. 6 mL 1    BUPROPION  MG Oral Tablet 24 Hr TAKE 1 TABLET(300 MG) BY MOUTH DAILY 90 tablet 1    simvastatin 40 MG Oral Tab Take 1 tablet (40 mg total) by mouth every evening. 90 tablet 0    Glucose Blood (ONETOUCH VERIO) In Vitro Strip USE TO TEST ONE TIME DAILY AS NEEDED TO CALIBRATE DEXCOM READINGS 100 strip 1    Omeprazole 40 MG Oral Capsule Delayed Release Take 1 capsule (40 mg total) by mouth daily. 90 capsule 1    Calcium Carb-Cholecalciferol (CALCIUM + VITAMIN D3 OR) Take by mouth. Vitamin d 800 international unit and Calcium 600 mg      levothyroxine 125 MCG Oral Tab Take 1 tablet (125 mcg total) by mouth before breakfast. 90 tablet 1    Insulin Disposable Pump (OMNIPOD 5 G6 PODS, GEN 5,) Does not apply Misc 1 each every 3 (three) days. 30 each 1    PARoxetine HCl 40 MG Oral Tab Take 1 tablet (40 mg total) by mouth every morning. 90 tablet 3    Insulin Glargine, 2 Unit Dial, (TOUJEO MAX SOLOSTAR) 300 UNIT/ML Subcutaneous Solution Pen-injector USE AS DIRECTED UP TO 75 UNITS DAILY IN THE EVENT OF INSULIN PUMP FAILURE 6 mL 0    Continuous Glucose Transmitter (DEXCOM G6 TRANSMITTER) Does not apply Misc USE 1 TRANSMITTER EVERY 90 DAYS 1 each 1    Blood Glucose Monitoring Suppl (ONETOUCH VERIO FLEX SYSTEM) w/Device Does not apply Kit Use to test blood sugar 1x daily as needed to calibrate dexcom 1 kit 0    NOVOLOG 100 UNIT/ML Injection Solution INJECT UP  UNITS VIA INSULIN PUMP DAILY AS DIRECTED 150 mL 1    Insulin Disposable Pump (OMNIPOD 5 G6 INTRO, GEN 5,) Does not apply Kit 1 each every 3 (three) days. 1 kit 0    dilTIAZem HCl ER Beads 180 MG Oral Capsule SR 24 Hr Take 1 capsule  (180 mg total) by mouth 2 (two) times daily.      Insulin Pen Needle (BD PEN NEEDLE AN U/F) 32G X 4 MM Does not apply Misc Inject 1 Pen into the skin in the morning and 1 Pen at noon and 1 Pen in the evening. 300 each 0    glucagon (GVOKE HYPOPEN 2-PACK) 1 MG/0.2ML Subcutaneous SUBQ injection Inject 0.2 mL (1 mg total) into the skin as needed. 0.4 mL 1    apixaban 5 MG Oral Tab Take 1 tablet (5 mg total) by mouth in the morning and 1 tablet (5 mg total) before bedtime. 60 tablet 3    estradiol 0.05 MG/24HR Transdermal Patch Weekly APPLY 1 PATCH EXTERNALLY TO THE SKIN EVERY TUESDAY. DO NOT CUT PATCH          LABORATORY DATA:   CBC:  Lab Results   Component Value Date    WBC 8.8 05/23/2024    WBC 9.0 10/04/2023    WBC 9.2 04/27/2023     Lab Results   Component Value Date    HEMOGLOBIN 13.6 07/27/2006    HGB 13.6 05/23/2024    HGB 13.9 10/04/2023    HGB 14.3 04/27/2023      Lab Results   Component Value Date    .0 05/23/2024    .0 10/04/2023    .0 04/27/2023      BMP:     Lab Results   Component Value Date    GLUCOSE 143 (H) 07/27/2006     Lab Results   Component Value Date    K 4.0 05/23/2024    K 3.6 11/22/2023    K 3.8 10/04/2023     Lab Results   Component Value Date    BUN 10 05/23/2024    BUN 13 11/22/2023    BUN 10 10/04/2023     Lab Results   Component Value Date    CREATSERUM 0.84 05/23/2024    CREATSERUM 0.90 11/22/2023    CREATSERUM 1.22 (H) 10/04/2023      PT/INR:   Lab Results   Component Value Date    INR 0.88 (L) 12/08/2020    INR 0.96 07/20/2020        REVIEW OF SYSTEMS:   Review of Systems   Constitutional:  Negative for chills, fever and malaise/fatigue.   HENT:  Negative for congestion, ear pain, sinus pain and sore throat.    Respiratory:  Negative for cough and shortness of breath.    Cardiovascular:  Negative for chest pain, palpitations and orthopnea.   Gastrointestinal:  Negative for abdominal pain, constipation, diarrhea, nausea and vomiting.   Genitourinary:  Negative  for dysuria, frequency and hematuria.   Musculoskeletal:  Negative for joint pain.   Skin:  Negative for itching and rash.   Neurological:  Negative for headaches.   Endo/Heme/Allergies:  Negative for polydipsia. Bruises/bleeds easily (from blood thinners).        PHYSICAL EXAM:   /54 (BP Location: Right arm, Patient Position: Sitting, Cuff Size: large)   Pulse 82   Temp 97.6 °F (36.4 °C) (Temporal)   Wt 248 lb 3.2 oz (112.6 kg)   LMP 09/24/2019 (Approximate)   SpO2 97%   BMI 45.40 kg/m²    Physical Exam  Constitutional:       General: She is not in acute distress.     Appearance: She is obese. She is not ill-appearing.   HENT:      Right Ear: Tympanic membrane, ear canal and external ear normal.      Left Ear: Tympanic membrane, ear canal and external ear normal.      Nose: Nose normal.      Mouth/Throat:      Mouth: Mucous membranes are moist.      Pharynx: Oropharynx is clear.   Eyes:      Conjunctiva/sclera: Conjunctivae normal.   Cardiovascular:      Rate and Rhythm: Normal rate and regular rhythm.      Heart sounds: Normal heart sounds.   Pulmonary:      Effort: Pulmonary effort is normal.      Breath sounds: Normal breath sounds.   Abdominal:      General: Bowel sounds are normal.      Palpations: Abdomen is soft.   Skin:     General: Skin is warm and dry.      Findings: No rash.   Neurological:      Mental Status: She is alert.   Psychiatric:         Mood and Affect: Mood normal.         Behavior: Behavior normal.          ASSESSMENT AND PLAN:   Penelope was seen today for pre-op exam.    Diagnoses and all orders for this visit:    Preop examination  -     Cancel: EKG with interpretation and Report -IN OFFICE [[id number]]  -     CBC W Differential W Platelet [E]; Future  -     Comp Metabolic Panel (14) [E]; Future  -     Prothrombin Time (PT) [E]; Future  -     PTT, Activated [E]; Future  -     Type and screen; Future  -     MSSA and MRSA Culture Screen; Future    Primary osteoarthritis of right knee  -      CBC W Differential W Platelet [E]; Future  -     Comp Metabolic Panel (14) [E]; Future  -     Prothrombin Time (PT) [E]; Future  -     PTT, Activated [E]; Future  -     Type and screen; Future  -     MSSA and MRSA Culture Screen; Future    Type 2 diabetes mellitus without complication, with long-term current use of insulin (HCC)  -     Microalb/Creat Ratio, Random Urine [E]; Future    Uses self-applied continuous glucose monitoring device    Insulin pump in place    Paroxysmal atrial fibrillation (HCC)    Current use of long term anticoagulation    Mixed hyperlipidemia  -     Lipid Panel [E]; Future    Primary hypertension    Mild intermittent asthma without complication ([facility])    Postoperative hypothyroidism    S/P thyroidectomy    Class 3 severe obesity with serious comorbidity and body mass index (BMI) of 45.0 to 49.9 in adult, unspecified obesity type ([facility])    Mild episode of recurrent major depressive disorder ([facility])    Anxiety    Gastroesophageal reflux disease, unspecified whether esophagitis present    CORINNE on CPAP    Cleared by cardiology, they are also managing her anticoagulants  Will stop GLP-1 1 week prior to surgery.   Will return tomorrow for pre-op labs           [1]   Allergies  Allergen Reactions    Adhesive Tape HIVES     Paper tape is ok, clear tape=hives      Ibuprofen OTHER (SEE COMMENTS)     Asthma attack    Sulfa Antibiotics      Unknown; happened before adolescent period    Erythromycin Base NAUSEA ONLY    Oxycodone CONFUSION     Memory issues

## 2025-01-06 NOTE — PROGRESS NOTES
Catina Rivera is a 54 year old female who presents for a pre-operative physical exam.     Patient is scheduled for a Right Total Knee Arthroplasty procedure at [facility] on 2025 performed by Dr Connelly. .   Indication: Primary Osteoarthritis Right Knee     She has had previous anesthesia:  Yes.    Previous complications:  No  Personal or Family History Malignant Hyperthermia: No    Respiratory Disease: Yes. intermittent Asthma  Cardiac Disease: Yes. PAF, hypertension, hyperlipidemia   Endocrine Disease: Yes. DM, hypothyroidism  Sleep Apnea: Yes    Social History     Socioeconomic History    Marital status:     Number of children: 3   Occupational History    Occupation:      Comment: Convenience    Tobacco Use    Smoking status: Former     Current packs/day: 0.00     Types: Cigarettes     Start date: 3/1/2017     Quit date: 3/1/2017     Years since quittin.8     Passive exposure: Never    Smokeless tobacco: Never    Tobacco comments:     non-smoker     Updated 24   Vaping Use    Vaping status: Never Used   Substance and Sexual Activity    Alcohol use: Yes     Comment: rare    Drug use: Yes     Comment: CBD,CBG  Doctors are fully aware    Sexual activity: Yes     Social Drivers of Health      [facility]    Social Connections    Received [facility]    Housing Stability      Past Medical History:    Anxiety    Anxiety state, unspecified    Arrhythmia    a fib    Asthma (HCC)    Atrial fibrillation with RVR (HCC)    Back pain    Back problem    Bad breath    Belching    Bloating    Decorative tattoo    Depression    Diabetes (HCC)    per pt    Disorder of thyroid    Esophageal reflux    GERD (gastroesophageal reflux disease)    Headache(784.0)    Heart palpitations    Afib    Heartburn    I have taken medication for over 10years    High cholesterol     History of depression    Hypothyroidism    Irregular bowel habits    Leaking of urine    Lipoma of unspecified site    Multiple thyroid nodules    on rt,2011, lt.-2008    Nontoxic multinodular goiter    Nontoxic multinodular goiter    Obesity    Other and unspecified hyperlipidemia    Pain in joints    Pain in right shin    Pain with bowel movements    Painful swallowing    Not consistent but not due to illness    Plantar fasciitis    Sleep apnea     CPAP     Stress    Thyroiditis, unspecified    Type 1 diabetes mellitus (HCC)    Uncomfortable fullness after meals    Visual impairment    Wears glasses    Weight loss    Working with weight loss clinic     Past Surgical History:   Procedure Laterality Date    Arthroscopy of joint unlisted Bilateral     knee    Carpal tunnel release Bilateral     Cholecystectomy      Colonoscopy N/A 10/21/2016    Procedure: COLONOSCOPY;  Surgeon: Brandt Ramirez DO;  Location:  ENDOSCOPY    Colonoscopy N/A 12/27/2022    Procedure: COLONOSCOPY;  Surgeon: Aiden Pollard MD;  Location:  ENDOSCOPY    Cyst aspiration left      Cyst aspiration right      Hysterectomy      7/21    Laparoscopic cholecystectomy  11/14/2011    Lipoma removal  04/29/2022    left posterior    Mesh (implantable)      bladder    Neuromuscular stimulator      Oophorectomy Bilateral     Other      wisdom    Other      lt hand middle finger sx    Other surgical history  x2    total thyroidectomy    Other surgical history  x1    sinus    Other surgical history  10/11/2023    trial for neurostimulator    Other surgical history      Neuro Nerve Stimulator    Removal gallbladder      Sinus surgery        Sling oper stres incontinence  01/09/2014    Procedure: SLING UROLOGY;  Surgeon: Isac Sutherland MD;  [facility]    Thyroidectomy      Total abdom hysterectomy  07/2020     Allergies: Allergies[1]  Current Outpatient Medications   Medication Sig Dispense Refill    Continuous Glucose Sensor  (DEXCOM G6 SENSOR) Does not apply Misc USE 1 SENSOR EVERY 10 DAYS 9 each 1    cyclobenzaprine 10 MG Oral Tab Take 1 tablet (10 mg total) by mouth nightly as needed for Muscle spasms. Take 1 tab at bedtime as needed 30 tablet 0    MOUNJARO 15 MG/0.5ML Subcutaneous Solution Auto-injector Inject 15 mg as directed once a week. 6 mL 1    BUPROPION  MG Oral Tablet 24 Hr TAKE 1 TABLET(300 MG) BY MOUTH DAILY 90 tablet 1    simvastatin 40 MG Oral Tab Take 1 tablet (40 mg total) by mouth every evening. 90 tablet 0    Glucose Blood (ONETOUCH VERIO) In Vitro Strip USE TO TEST ONE TIME DAILY AS NEEDED TO CALIBRATE DEXCOM READINGS 100 strip 1    Omeprazole 40 MG Oral Capsule Delayed Release Take 1 capsule (40 mg total) by mouth daily. 90 capsule 1    Calcium Carb-Cholecalciferol (CALCIUM + VITAMIN D3 OR) Take by mouth. Vitamin d 800 international unit and Calcium 600 mg      levothyroxine 125 MCG Oral Tab Take 1 tablet (125 mcg total) by mouth before breakfast. 90 tablet 1    Insulin Disposable Pump (OMNIPOD 5 G6 PODS, GEN 5,) Does not apply Misc 1 each every 3 (three) days. 30 each 1    PARoxetine HCl 40 MG Oral Tab Take 1 tablet (40 mg total) by mouth every morning. 90 tablet 3    Insulin Glargine, 2 Unit Dial, (TOUJEO MAX SOLOSTAR) 300 UNIT/ML Subcutaneous Solution Pen-injector USE AS DIRECTED UP TO 75 UNITS DAILY IN THE EVENT OF INSULIN PUMP FAILURE 6 mL 0    Continuous Glucose Transmitter (DEXCOM G6 TRANSMITTER) Does not apply Misc USE 1 TRANSMITTER EVERY 90 DAYS 1 each 1    Blood Glucose Monitoring Suppl (ONETOUCH VERIO FLEX SYSTEM) w/Device Does not apply Kit Use to test blood sugar 1x daily as needed to calibrate dexcom 1 kit 0    NOVOLOG 100 UNIT/ML Injection Solution INJECT UP  UNITS VIA INSULIN PUMP DAILY AS DIRECTED 150 mL 1    Insulin Disposable Pump (OMNIPOD 5 G6 INTRO, GEN 5,) Does not apply Kit 1 each every 3 (three) days. 1 kit 0    dilTIAZem HCl ER Beads 180 MG Oral Capsule SR 24 Hr Take 1 capsule  (180 mg total) by mouth 2 (two) times daily.      Insulin Pen Needle (BD PEN NEEDLE AN U/F) 32G X 4 MM Does not apply Misc Inject 1 Pen into the skin in the morning and 1 Pen at noon and 1 Pen in the evening. 300 each 0    glucagon (GVOKE HYPOPEN 2-PACK) 1 MG/0.2ML Subcutaneous SUBQ injection Inject 0.2 mL (1 mg total) into the skin as needed. 0.4 mL 1    apixaban 5 MG Oral Tab Take 1 tablet (5 mg total) by mouth in the morning and 1 tablet (5 mg total) before bedtime. 60 tablet 3    estradiol 0.05 MG/24HR Transdermal Patch Weekly APPLY 1 PATCH EXTERNALLY TO THE SKIN EVERY TUESDAY. DO NOT CUT PATCH          LABORATORY DATA:   CBC:  Lab Results   Component Value Date    WBC 8.8 05/23/2024    WBC 9.0 10/04/2023    WBC 9.2 04/27/2023     Lab Results   Component Value Date    HEMOGLOBIN 13.6 07/27/2006    HGB 13.6 05/23/2024    HGB 13.9 10/04/2023    HGB 14.3 04/27/2023      Lab Results   Component Value Date    .0 05/23/2024    .0 10/04/2023    .0 04/27/2023      BMP:     Lab Results   Component Value Date    GLUCOSE 143 (H) 07/27/2006     Lab Results   Component Value Date    K 4.0 05/23/2024    K 3.6 11/22/2023    K 3.8 10/04/2023     Lab Results   Component Value Date    BUN 10 05/23/2024    BUN 13 11/22/2023    BUN 10 10/04/2023     Lab Results   Component Value Date    CREATSERUM 0.84 05/23/2024    CREATSERUM 0.90 11/22/2023    CREATSERUM 1.22 (H) 10/04/2023      PT/INR:   Lab Results   Component Value Date    INR 0.88 (L) 12/08/2020    INR 0.96 07/20/2020        REVIEW OF SYSTEMS:   Review of Systems   Constitutional:  Negative for chills, fever and malaise/fatigue.   HENT:  Negative for congestion, ear pain, sinus pain and sore throat.    Respiratory:  Negative for cough and shortness of breath.    Cardiovascular:  Negative for chest pain, palpitations and orthopnea.   Gastrointestinal:  Negative for abdominal pain, constipation, diarrhea, nausea and vomiting.   Genitourinary:  Negative  for dysuria, frequency and hematuria.   Musculoskeletal:  Negative for joint pain.   Skin:  Negative for itching and rash.   Neurological:  Negative for headaches.   Endo/Heme/Allergies:  Negative for polydipsia. Bruises/bleeds easily (from blood thinners).        PHYSICAL EXAM:   /54 (BP Location: Right arm, Patient Position: Sitting, Cuff Size: large)   Pulse 82   Temp 97.6 °F (36.4 °C) (Temporal)   Wt 248 lb 3.2 oz (112.6 kg)   LMP 09/24/2019 (Approximate)   SpO2 97%   BMI 45.40 kg/m²    Physical Exam  Constitutional:       General: She is not in acute distress.     Appearance: She is obese. She is not ill-appearing.   HENT:      Right Ear: Tympanic membrane, ear canal and external ear normal.      Left Ear: Tympanic membrane, ear canal and external ear normal.      Nose: Nose normal.      Mouth/Throat:      Mouth: Mucous membranes are moist.      Pharynx: Oropharynx is clear.   Eyes:      Conjunctiva/sclera: Conjunctivae normal.   Cardiovascular:      Rate and Rhythm: Normal rate and regular rhythm.      Heart sounds: Normal heart sounds.   Pulmonary:      Effort: Pulmonary effort is normal.      Breath sounds: Normal breath sounds.   Abdominal:      General: Bowel sounds are normal.      Palpations: Abdomen is soft.   Skin:     General: Skin is warm and dry.      Findings: No rash.   Neurological:      Mental Status: She is alert.   Psychiatric:         Mood and Affect: Mood normal.         Behavior: Behavior normal.          ASSESSMENT AND PLAN:   Penelope was seen today for pre-op exam.    Diagnoses and all orders for this visit:    Preop examination  -     Cancel: EKG with interpretation and Report -IN OFFICE [[id number]]  -     CBC W Differential W Platelet [E]; Future  -     Comp Metabolic Panel (14) [E]; Future  -     Prothrombin Time (PT) [E]; Future  -     PTT, Activated [E]; Future  -     Type and screen; Future  -     MSSA and MRSA Culture Screen; Future    Primary osteoarthritis of right knee  -      CBC W Differential W Platelet [E]; Future  -     Comp Metabolic Panel (14) [E]; Future  -     Prothrombin Time (PT) [E]; Future  -     PTT, Activated [E]; Future  -     Type and screen; Future  -     MSSA and MRSA Culture Screen; Future    Type 2 diabetes mellitus without complication, with long-term current use of insulin (HCC)  -     Microalb/Creat Ratio, Random Urine [E]; Future    Uses self-applied continuous glucose monitoring device    Insulin pump in place    Paroxysmal atrial fibrillation (HCC)    Current use of long term anticoagulation    Mixed hyperlipidemia  -     Lipid Panel [E]; Future    Primary hypertension    Mild intermittent asthma without complication ([facility])    Postoperative hypothyroidism    S/P thyroidectomy    Class 3 severe obesity with serious comorbidity and body mass index (BMI) of 45.0 to 49.9 in adult, unspecified obesity type ([facility])    Mild episode of recurrent major depressive disorder ([facility])    Anxiety    Gastroesophageal reflux disease, unspecified whether esophagitis present    CORINNE on CPAP    Cleared by cardiology, they are also managing her anticoagulants  Will stop GLP-1 1 week prior to surgery.   Will return tomorrow for pre-op labs           [1]   Allergies  Allergen Reactions    Adhesive Tape HIVES     Paper tape is ok, clear tape=hives      Ibuprofen OTHER (SEE COMMENTS)     Asthma attack    Sulfa Antibiotics      Unknown; happened before adolescent period    Erythromycin Base NAUSEA ONLY    Oxycodone CONFUSION     Memory issues

## 2025-01-08 ENCOUNTER — APPOINTMENT (OUTPATIENT)
Dept: GENERAL RADIOLOGY | Age: 55
End: 2025-01-08
Attending: EMERGENCY MEDICINE
Payer: COMMERCIAL

## 2025-01-08 ENCOUNTER — HOSPITAL ENCOUNTER (EMERGENCY)
Age: 55
Discharge: HOME | End: 2025-01-08
Attending: EMERGENCY MEDICINE
Payer: COMMERCIAL

## 2025-01-08 ENCOUNTER — PATIENT MESSAGE (OUTPATIENT)
Dept: FAMILY MEDICINE CLINIC | Facility: CLINIC | Age: 55
End: 2025-01-08

## 2025-01-08 VITALS
WEIGHT: 242 LBS | BODY MASS INDEX: 44.53 KG/M2 | TEMPERATURE: 99 F | HEART RATE: 69 BPM | SYSTOLIC BLOOD PRESSURE: 120 MMHG | OXYGEN SATURATION: 97 % | RESPIRATION RATE: 18 BRPM | HEIGHT: 62 IN | DIASTOLIC BLOOD PRESSURE: 48 MMHG

## 2025-01-08 DIAGNOSIS — M25.551 RIGHT HIP PAIN: Primary | ICD-10-CM

## 2025-01-08 PROCEDURE — 73502 X-RAY EXAM HIP UNI 2-3 VIEWS: CPT | Performed by: EMERGENCY MEDICINE

## 2025-01-08 PROCEDURE — 99283 EMERGENCY DEPT VISIT LOW MDM: CPT

## 2025-01-08 PROCEDURE — 99284 EMERGENCY DEPT VISIT MOD MDM: CPT

## 2025-01-08 RX ORDER — HYDROCODONE BITARTRATE AND ACETAMINOPHEN 5; 325 MG/1; MG/1
1 TABLET ORAL ONCE
Status: COMPLETED | OUTPATIENT
Start: 2025-01-08 | End: 2025-01-08

## 2025-01-08 RX ORDER — METHYLPREDNISOLONE 4 MG/1
TABLET ORAL
Qty: 1 EACH | Refills: 0 | Status: SHIPPED | OUTPATIENT
Start: 2025-01-08

## 2025-01-08 RX ORDER — HYDROCODONE BITARTRATE AND ACETAMINOPHEN 5; 325 MG/1; MG/1
1 TABLET ORAL EVERY 6 HOURS PRN
Qty: 10 TABLET | Refills: 0 | Status: SHIPPED | OUTPATIENT
Start: 2025-01-08 | End: 2025-01-13

## 2025-01-08 NOTE — TELEPHONE ENCOUNTER
Cyclobenzaprine  DOS: Scheduled 01/29/25  Last OV: 12/26/24  Last refill date: 12/19/24     #/refills: 30/0  Upcoming appt:   Future Appointments   Date Time Provider Department Center   1/13/2025 11:30 AM [facility] PREOP PT None   2/13/2025 11:30 AM Ruslan Connelly MD [facility] [address]     Patient Comment: I also left a message on my chart that was requesting a refill. The script should have been for 2 pills daily. I took my last dose last night and was hoping to getbit called in today

## 2025-01-08 NOTE — ED PROVIDER NOTES
Patient Seen in: [facility] In [place]      History     Chief Complaint   Patient presents with    Back Pain     Stated Complaint: Right low back going down leg x1day - tylenol at 0130    Subjective:   HPI      54-year-old female with a past medical history as below including hypertension, diabetes, A-fib on Eliquis, or radiculopathy status post spinal cord stimulator presents with pain in her left hip that radiates into her left groin.  Patient states his pain started yesterday.  She does not recall what any preceding injury.  Patient states she thinks the pain is related to sciatica for which she has a ambulator.  She states pain is not any worse with weightbearing or ambulation.  Denies any bowel or bladder dysfunction.  She has chronic pain in her right knee for which she is supposed to have a replacement later this month.  She has been taking Tylenol without much improvement.    Objective:     Past Medical History:    Anxiety    Anxiety state, unspecified    Arrhythmia    a fib    Asthma (HCC)    Atrial fibrillation with RVR (HCC)    Back pain    Back problem    Bad breath    Belching    Bloating    Decorative tattoo    Depression    Diabetes (HCC)    per pt    Disorder of thyroid    Esophageal reflux    GERD (gastroesophageal reflux disease)    Headache(784.0)    Heart palpitations    Afib    Heartburn    I have taken medication for over 10years    High cholesterol    History of depression    Hypothyroidism    Irregular bowel habits    Leaking of urine    Lipoma of unspecified site    Multiple thyroid nodules    on rt,2011, lt.-2008    Nontoxic multinodular goiter    Nontoxic multinodular goiter    Obesity    Other and unspecified hyperlipidemia    Pain in joints    Pain in right shin    Pain with bowel movements    Painful swallowing    Not consistent but not due to illness    Plantar fasciitis    Sleep apnea     CPAP     Stress    Thyroiditis, unspecified    Type 1 diabetes mellitus  (HCC)    Uncomfortable fullness after meals    Visual impairment    Wears glasses    Weight loss    Working with weight loss clinic              Past Surgical History:   Procedure Laterality Date    Arthroscopy of joint unlisted Bilateral     knee    Carpal tunnel release Bilateral     Cholecystectomy      Colonoscopy N/A 10/21/2016    Procedure: COLONOSCOPY;  Surgeon: Brandt Ramirez DO;  Location:  ENDOSCOPY    Colonoscopy N/A 2022    Procedure: COLONOSCOPY;  Surgeon: Aiden Pollard MD;  Location:  ENDOSCOPY    Cyst aspiration left      Cyst aspiration right      Hysterectomy          Laparoscopic cholecystectomy  2011    Lipoma removal  2022    left posterior    Mesh (implantable)      bladder    Neuromuscular stimulator      Oophorectomy Bilateral     Other      wisdom    Other      lt hand middle finger sx    Other surgical history  x2    total thyroidectomy    Other surgical history  x1    sinus    Other surgical history  10/11/2023    trial for neurostimulator    Other surgical history      Neuro Nerve Stimulator    Removal gallbladder      Sinus surgery        Sling oper stres incontinence  2014    Procedure: SLING UROLOGY;  Surgeon: Isac Sutherland MD;  [facility]    Thyroidectomy      Total abdom hysterectomy  2020                Social History     Socioeconomic History    Marital status:     Number of children: 3   Occupational History    Occupation:      Comment: Convenience    Tobacco Use    Smoking status: Former     Current packs/day: 0.00     Types: Cigarettes     Start date: 3/1/2017     Quit date: 3/1/2017     Years since quittin.8     Passive exposure: Never    Smokeless tobacco: Never    Tobacco comments:     non-smoker     Updated 24   Vaping Use    Vaping status: Never Used   Substance and Sexual Activity    Alcohol use: Yes     Comment: rare    Drug use: Yes     Comment: CBD,CBG  Doctors are  fully aware    Sexual activity: Yes     Social Drivers of Health      [facility]    Social Connections    [facility]    Housing Stability                  Physical Exam     ED Triage Vitals [01/08/25 0624]   BP (!) 162/95   Pulse 99   Resp 21   Temp 98.8 °F (37.1 °C)   Temp src Oral   SpO2 98 %   O2 Device None (Room air)       Current Vitals:   Vital Signs  BP: (!) 162/95  Pulse: 99  Resp: 21  Temp: 98.8 °F (37.1 °C)  Temp src: Oral    Oxygen Therapy  SpO2: 98 %  O2 Device: None (Room air)        Physical Exam  Vitals and nursing note reviewed.   Constitutional:       Appearance: She is well-developed.   HENT:      Head: Normocephalic and atraumatic.      Mouth/Throat:      Mouth: Mucous membranes are moist.   Eyes:      General: No scleral icterus.  Musculoskeletal:      Comments: Left hip with mild tenderness laterally  Good range of motion  Negative SLR   Skin:     General: Skin is warm and dry.   Neurological:      General: No focal deficit present.      Mental Status: She is alert and oriented to person, place, and time.      Cranial Nerves: No cranial nerve deficit.      Motor: No weakness.   Psychiatric:         Mood and Affect: Mood normal.         Behavior: Behavior normal.             ED Course   Labs Reviewed - No data to display         XR HIP W OR WO PELVIS 2 OR 3 VIEWS, RIGHT (CPT=73502)    Result Date: 1/8/2025  CONCLUSION:  No acute right hip fracture identified.   LOCATION:  [facility]   Dictated by (CST): Guerrero Mendez MD on 1/08/2025 at 7:37 AM     Finalized by (CST): Guerrero Mendez MD on 1/08/2025 at 7:39 AM             MDM      54-year-old female with a past medical history as below including hypertension, diabetes, A-fib on Eliquis, or radiculopathy status post spinal cord stimulator presents with pain in her left hip that radiates into her left groin.       Differential includes but is not limited to radicular pain, hip osteoarthritis, hip strain    Independent interpretation of right hip x-ray showed no evidence of fracture or significant arthritis.  Radiology report reviewed as above.    Patient feels her pain is radicular coming from her back pain which is likely because with it radiating into her groin and upper thigh area.  Will treat with Rx for Medrol Dosepak.  Patient unable to take NSAIDs due to allergy states Tylenol has not been helping.  Will give short course of Norco.  Struck to follow-up with her pain management specialist for further outpatient management.  Return precautions discussed      Medical Decision Making  Amount and/or Complexity of Data Reviewed  Radiology: ordered and independent interpretation performed. Decision-making details documented in ED Course.    Risk  Prescription drug management.        Disposition and Plan     Clinical Impression:  1. Right hip pain         Disposition:  Discharge  1/8/2025  8:22 am    Follow-up:  Sonu Dennis PA  [address]  [facility] [address] [phone]    Follow up      Essence Brito MD  [address] [address] [phone]    Schedule an appointment as soon as possible for a visit            Medications Prescribed:  Current Discharge Medication List        START taking these medications    Details   methylPREDNISolone (MEDROL) 4 MG Oral Tablet Therapy Pack Dosepack: take as directed  Qty: 1 each, Refills: 0      HYDROcodone-acetaminophen 5-325 MG Oral Tab Take 1 tablet by mouth every 6 (six) hours as needed.  Qty: 10 tablet, Refills: 0    Associated Diagnoses: Right hip pain                 Supplementary Documentation:

## 2025-01-08 NOTE — ED INITIAL ASSESSMENT (HPI)
Woke yesterday morning with right hip cramping pain. No injury.. History of chronic pain due to herniated discs in back, has neuro stimulator in back. Scheduled for knee replacement next month. States this is a different type of pain. Unable to sleep. Pain wraps around to front to groin and vaginal area.

## 2025-01-09 RX ORDER — CYCLOBENZAPRINE HCL 10 MG
10 TABLET ORAL NIGHTLY PRN
Qty: 30 TABLET | Refills: 0 | Status: SHIPPED | OUTPATIENT
Start: 2025-01-09

## 2025-01-10 ENCOUNTER — PATIENT OUTREACH (OUTPATIENT)
Dept: CASE MANAGEMENT | Age: 55
End: 2025-01-10

## 2025-01-10 NOTE — PROGRESS NOTES
Patient had recent Emergency Room visit, calling to offer Primary Care Physician follow-up appointment (discharged 01/08)    Dr Essence Brito  Family Medicine  [address] [address] [phone]  Patient advised she has already had her ED follow up appointment  Closing encounter

## 2025-01-13 ENCOUNTER — HOSPITAL ENCOUNTER (OUTPATIENT)
Dept: PHYSICAL THERAPY | Facility: HOSPITAL | Age: 55
Discharge: HOME | End: 2025-01-13
Attending: STUDENT IN AN ORGANIZED HEALTH CARE EDUCATION/TRAINING PROGRAM
Payer: COMMERCIAL

## 2025-01-13 DIAGNOSIS — Z01.818 PRE-OP TESTING: ICD-10-CM

## 2025-01-14 ENCOUNTER — LAB ENCOUNTER (OUTPATIENT)
Dept: LAB | Age: 55
End: 2025-01-14
Attending: NURSE PRACTITIONER
Payer: COMMERCIAL

## 2025-01-14 DIAGNOSIS — Z79.4 TYPE 2 DIABETES MELLITUS WITHOUT COMPLICATION, WITH LONG-TERM CURRENT USE OF INSULIN (HCC): ICD-10-CM

## 2025-01-14 DIAGNOSIS — E78.2 MIXED HYPERLIPIDEMIA: ICD-10-CM

## 2025-01-14 DIAGNOSIS — Z01.818 PREOP EXAMINATION: ICD-10-CM

## 2025-01-14 DIAGNOSIS — M17.11 PRIMARY OSTEOARTHRITIS OF RIGHT KNEE: ICD-10-CM

## 2025-01-14 DIAGNOSIS — E11.9 TYPE 2 DIABETES MELLITUS WITHOUT COMPLICATION, WITH LONG-TERM CURRENT USE OF INSULIN (HCC): ICD-10-CM

## 2025-01-14 LAB
ALBUMIN SERPL-MCNC: 4.3 G/DL (ref 3.2–4.8)
ALBUMIN/GLOB SERPL: 1.9 {RATIO} (ref 1–2)
ALP LIVER SERPL-CCNC: 104 U/L
ALT SERPL-CCNC: 20 U/L
ANION GAP SERPL CALC-SCNC: 7 MMOL/L (ref 0–18)
ANTIBODY SCREEN: NEGATIVE
APTT PPP: 31.3 SECONDS (ref 23–36)
AST SERPL-CCNC: 15 U/L (ref ?–34)
BASOPHILS # BLD AUTO: 0.06 X10(3) UL (ref 0–0.2)
BASOPHILS NFR BLD AUTO: 0.6 %
BILIRUB SERPL-MCNC: 0.7 MG/DL (ref 0.3–1.2)
BUN BLD-MCNC: 14 MG/DL (ref 9–23)
CALCIUM BLD-MCNC: 9.2 MG/DL (ref 8.7–10.6)
CHLORIDE SERPL-SCNC: 104 MMOL/L (ref 98–112)
CHOLEST SERPL-MCNC: 153 MG/DL (ref ?–200)
CO2 SERPL-SCNC: 30 MMOL/L (ref 21–32)
CREAT BLD-MCNC: 0.9 MG/DL
CREAT UR-SCNC: 129.3 MG/DL
EGFRCR SERPLBLD CKD-EPI 2021: 77 ML/MIN/1.73M2 (ref 60–?)
EOSINOPHIL # BLD AUTO: 0.14 X10(3) UL (ref 0–0.7)
EOSINOPHIL NFR BLD AUTO: 1.3 %
ERYTHROCYTE [DISTWIDTH] IN BLOOD BY AUTOMATED COUNT: 13 %
FASTING PATIENT LIPID ANSWER: YES
FASTING STATUS PATIENT QL REPORTED: YES
GLOBULIN PLAS-MCNC: 2.3 G/DL (ref 2–3.5)
GLUCOSE BLD-MCNC: 105 MG/DL (ref 70–99)
HCT VFR BLD AUTO: 43.4 %
HDLC SERPL-MCNC: 60 MG/DL (ref 40–59)
HGB BLD-MCNC: 14.6 G/DL
IMM GRANULOCYTES # BLD AUTO: 0.03 X10(3) UL (ref 0–1)
IMM GRANULOCYTES NFR BLD: 0.3 %
INR BLD: 1.2 (ref 0.8–1.2)
LDLC SERPL CALC-MCNC: 80 MG/DL (ref ?–100)
LYMPHOCYTES # BLD AUTO: 3.29 X10(3) UL (ref 1–4)
LYMPHOCYTES NFR BLD AUTO: 30.3 %
MCH RBC QN AUTO: 29.7 PG (ref 26–34)
MCHC RBC AUTO-ENTMCNC: 33.6 G/DL (ref 31–37)
MCV RBC AUTO: 88.4 FL
MICROALBUMIN UR-MCNC: <0.3 MG/DL
MONOCYTES # BLD AUTO: 0.77 X10(3) UL (ref 0.1–1)
MONOCYTES NFR BLD AUTO: 7.1 %
NEUTROPHILS # BLD AUTO: 6.56 X10 (3) UL (ref 1.5–7.7)
NEUTROPHILS # BLD AUTO: 6.56 X10(3) UL (ref 1.5–7.7)
NEUTROPHILS NFR BLD AUTO: 60.4 %
NONHDLC SERPL-MCNC: 93 MG/DL (ref ?–130)
OSMOLALITY SERPL CALC.SUM OF ELEC: 293 MOSM/KG (ref 275–295)
PLATELET # BLD AUTO: 316 10(3)UL (ref 150–450)
POTASSIUM SERPL-SCNC: 4 MMOL/L (ref 3.5–5.1)
PROT SERPL-MCNC: 6.6 G/DL (ref 5.7–8.2)
PROTHROMBIN TIME: 15.4 SECONDS (ref 11.6–14.8)
RBC # BLD AUTO: 4.91 X10(6)UL
RH BLOOD TYPE: POSITIVE
SODIUM SERPL-SCNC: 141 MMOL/L (ref 136–145)
TRIGL SERPL-MCNC: 62 MG/DL (ref 30–149)
VLDLC SERPL CALC-MCNC: 10 MG/DL (ref 0–30)
WBC # BLD AUTO: 10.9 X10(3) UL (ref 4–11)

## 2025-01-14 PROCEDURE — 86901 BLOOD TYPING SEROLOGIC RH(D): CPT

## 2025-01-14 PROCEDURE — 86900 BLOOD TYPING SEROLOGIC ABO: CPT

## 2025-01-14 PROCEDURE — 85610 PROTHROMBIN TIME: CPT

## 2025-01-14 PROCEDURE — 87081 CULTURE SCREEN ONLY: CPT

## 2025-01-14 PROCEDURE — 82043 UR ALBUMIN QUANTITATIVE: CPT

## 2025-01-14 PROCEDURE — 86850 RBC ANTIBODY SCREEN: CPT

## 2025-01-14 PROCEDURE — 36415 COLL VENOUS BLD VENIPUNCTURE: CPT

## 2025-01-14 PROCEDURE — 85730 THROMBOPLASTIN TIME PARTIAL: CPT

## 2025-01-14 PROCEDURE — 80053 COMPREHEN METABOLIC PANEL: CPT

## 2025-01-14 PROCEDURE — 80061 LIPID PANEL: CPT

## 2025-01-14 PROCEDURE — 85025 COMPLETE CBC W/AUTO DIFF WBC: CPT

## 2025-01-14 PROCEDURE — 82570 ASSAY OF URINE CREATININE: CPT

## 2025-01-15 ENCOUNTER — TELEPHONE (OUTPATIENT)
Dept: FAMILY MEDICINE CLINIC | Facility: CLINIC | Age: 55
End: 2025-01-15

## 2025-01-15 NOTE — TELEPHONE ENCOUNTER
----- Message from Gisselle Humphrey sent at 1/15/2025  1:29 PM CST -----  Results reviewed.   Negative MRSA swab

## 2025-01-15 NOTE — TELEPHONE ENCOUNTER
CLEARED FOR SURGERY BY RASTA RODRIGUES- 1/6/25 IN CHART.     CLEARED FOR SURGERY BY RUCHI OBREGON MD- 12/26/24- IN CHART UNDER MEDIA    RH ENTERED

## 2025-01-15 NOTE — TELEPHONE ENCOUNTER
----- Message from Gisselle Humphrey sent at 1/15/2025  8:20 AM CST -----  Results reviewed.   Cholesterol at goal  Chemistries normal  O+ blood type  No anemia  Urine micro normal  PTT prolonged due to apixaban.     Cleared for surgery. Note addendum done

## 2025-01-16 ENCOUNTER — PATIENT MESSAGE (OUTPATIENT)
Facility: CLINIC | Age: 55
End: 2025-01-16

## 2025-01-22 ENCOUNTER — TELEPHONE (OUTPATIENT)
Facility: CLINIC | Age: 55
End: 2025-01-22

## 2025-01-22 NOTE — TELEPHONE ENCOUNTER
Patient calling to advise that patient sent Family Medical Leave Act forms via [employer] message. Located forms and logged for processing, [employer] message sent to obtain Release of Information, advised patient of our t/a time to complete forms 10-12 business days.     Type of Leave: continuous Family Medical Leave Act   Reason for Leave: Right total knee arthroplasty  Start date of leave: 01/29/25  End date of leave:   How many flare ups per month/length?:  How many appts per month/length?:   Was Fee and Turnaround info Given?:

## 2025-01-25 ENCOUNTER — PATIENT MESSAGE (OUTPATIENT)
Facility: CLINIC | Age: 55
End: 2025-01-25

## 2025-01-27 RX ORDER — PROCHLORPERAZINE 25 MG/1
SUPPOSITORY RECTAL
Qty: 1 EACH | Refills: 0 | Status: SHIPPED | OUTPATIENT
Start: 2025-01-27

## 2025-01-27 NOTE — TELEPHONE ENCOUNTER
Endocrine Refill protocol for CGM supplies     Protocol Criteria:  PASSED Reason: N/A    If below requirement is met, send a 90-day supply with 1 refill per provider protocol.     Verify appointment with Endocrinology completed in the last 12 months or scheduled in the next 6 months     Last completed office visit:10/29/2024 Jessica Dennis APRN   Next scheduled Follow up:   Future Appointments   Date Time Provider Department Center   2/13/2025 11:30 AM Ruslan Connelly MD [facility] [address]

## 2025-01-29 ENCOUNTER — ANESTHESIA EVENT (OUTPATIENT)
Dept: SURGERY | Facility: HOSPITAL | Age: 55
End: 2025-01-29
Payer: COMMERCIAL

## 2025-01-29 ENCOUNTER — HOSPITAL ENCOUNTER (OUTPATIENT)
Facility: HOSPITAL | Age: 55
Discharge: HOME HEALTH | End: 2025-01-30
Attending: STUDENT IN AN ORGANIZED HEALTH CARE EDUCATION/TRAINING PROGRAM | Admitting: STUDENT IN AN ORGANIZED HEALTH CARE EDUCATION/TRAINING PROGRAM
Payer: COMMERCIAL

## 2025-01-29 ENCOUNTER — APPOINTMENT (OUTPATIENT)
Dept: GENERAL RADIOLOGY | Facility: HOSPITAL | Age: 55
End: 2025-01-29
Payer: COMMERCIAL

## 2025-01-29 ENCOUNTER — ANESTHESIA (OUTPATIENT)
Dept: SURGERY | Facility: HOSPITAL | Age: 55
End: 2025-01-29
Payer: COMMERCIAL

## 2025-01-29 DIAGNOSIS — Z01.818 PRE-OP TESTING: Primary | ICD-10-CM

## 2025-01-29 DIAGNOSIS — M17.11 PRIMARY OSTEOARTHRITIS OF RIGHT KNEE: ICD-10-CM

## 2025-01-29 PROBLEM — I48.0 PAF (PAROXYSMAL ATRIAL FIBRILLATION) (HCC): Status: ACTIVE | Noted: 2025-01-29

## 2025-01-29 PROBLEM — G47.33 OSA (OBSTRUCTIVE SLEEP APNEA): Status: ACTIVE | Noted: 2023-10-04

## 2025-01-29 LAB
EST. AVERAGE GLUCOSE BLD GHB EST-MCNC: 128 MG/DL (ref 68–126)
GLUCOSE BLD-MCNC: 128 MG/DL (ref 70–99)
GLUCOSE BLD-MCNC: 141 MG/DL (ref 70–99)
GLUCOSE BLD-MCNC: 175 MG/DL (ref 70–99)
GLUCOSE BLD-MCNC: 191 MG/DL (ref 70–99)
HBA1C MFR BLD: 6.1 % (ref ?–5.7)
INR BLD: 1.01 (ref 0.8–1.2)
PROTHROMBIN TIME: 13.4 SECONDS (ref 11.6–14.8)
RH BLOOD TYPE: POSITIVE

## 2025-01-29 PROCEDURE — 3008F BODY MASS INDEX DOCD: CPT | Performed by: HOSPITALIST

## 2025-01-29 PROCEDURE — 3078F DIAST BP <80 MM HG: CPT | Performed by: HOSPITALIST

## 2025-01-29 PROCEDURE — 3077F SYST BP >= 140 MM HG: CPT | Performed by: HOSPITALIST

## 2025-01-29 PROCEDURE — 99214 OFFICE O/P EST MOD 30 MIN: CPT | Performed by: HOSPITALIST

## 2025-01-29 PROCEDURE — 76942 ECHO GUIDE FOR BIOPSY: CPT | Performed by: ANESTHESIOLOGY

## 2025-01-29 PROCEDURE — 0SRC0J9 REPLACEMENT OF RIGHT KNEE JOINT WITH SYNTHETIC SUBSTITUTE, CEMENTED, OPEN APPROACH: ICD-10-PCS | Performed by: STUDENT IN AN ORGANIZED HEALTH CARE EDUCATION/TRAINING PROGRAM

## 2025-01-29 PROCEDURE — 73560 X-RAY EXAM OF KNEE 1 OR 2: CPT

## 2025-01-29 DEVICE — IMPLANTABLE DEVICE
Type: IMPLANTABLE DEVICE | Site: KNEE | Status: FUNCTIONAL
Brand: PERSONA® THE PERSONALIZED KNEE®

## 2025-01-29 DEVICE — IMPLANTABLE DEVICE
Type: IMPLANTABLE DEVICE | Site: KNEE | Status: FUNCTIONAL
Brand: REFOBACIN® BONE CEMENT R

## 2025-01-29 DEVICE — IMPLANTABLE DEVICE
Type: IMPLANTABLE DEVICE | Site: KNEE | Status: FUNCTIONAL
Brand: PERSONA®

## 2025-01-29 DEVICE — IMPLANTABLE DEVICE
Type: IMPLANTABLE DEVICE | Site: KNEE | Status: FUNCTIONAL
Brand: PERSONA® VIVACIT-E®

## 2025-01-29 RX ORDER — ONDANSETRON 2 MG/ML
INJECTION INTRAMUSCULAR; INTRAVENOUS AS NEEDED
Status: DISCONTINUED | OUTPATIENT
Start: 2025-01-29 | End: 2025-01-29 | Stop reason: SURG

## 2025-01-29 RX ORDER — ACETAMINOPHEN 325 MG/1
TABLET ORAL
Status: COMPLETED
Start: 2025-01-29 | End: 2025-01-29

## 2025-01-29 RX ORDER — SENNOSIDES 8.6 MG
17.2 TABLET ORAL NIGHTLY
Status: DISCONTINUED | OUTPATIENT
Start: 2025-01-29 | End: 2025-01-30

## 2025-01-29 RX ORDER — MIDAZOLAM HYDROCHLORIDE 1 MG/ML
1 INJECTION INTRAMUSCULAR; INTRAVENOUS EVERY 5 MIN PRN
Status: DISCONTINUED | OUTPATIENT
Start: 2025-01-29 | End: 2025-01-29 | Stop reason: HOSPADM

## 2025-01-29 RX ORDER — ROCURONIUM BROMIDE 10 MG/ML
INJECTION, SOLUTION INTRAVENOUS AS NEEDED
Status: DISCONTINUED | OUTPATIENT
Start: 2025-01-29 | End: 2025-01-29 | Stop reason: SURG

## 2025-01-29 RX ORDER — DIPHENHYDRAMINE HCL 25 MG
25 CAPSULE ORAL EVERY 4 HOURS PRN
Status: DISCONTINUED | OUTPATIENT
Start: 2025-01-29 | End: 2025-01-30

## 2025-01-29 RX ORDER — HYDROMORPHONE HYDROCHLORIDE 1 MG/ML
0.4 INJECTION, SOLUTION INTRAMUSCULAR; INTRAVENOUS; SUBCUTANEOUS EVERY 5 MIN PRN
Status: DISCONTINUED | OUTPATIENT
Start: 2025-01-29 | End: 2025-01-29 | Stop reason: HOSPADM

## 2025-01-29 RX ORDER — SODIUM PHOSPHATE, DIBASIC AND SODIUM PHOSPHATE, MONOBASIC 7; 19 G/230ML; G/230ML
1 ENEMA RECTAL ONCE AS NEEDED
Status: DISCONTINUED | OUTPATIENT
Start: 2025-01-29 | End: 2025-01-30

## 2025-01-29 RX ORDER — DILTIAZEM HYDROCHLORIDE 180 MG/1
180 CAPSULE, EXTENDED RELEASE ORAL 2 TIMES DAILY
Status: DISCONTINUED | OUTPATIENT
Start: 2025-01-29 | End: 2025-01-30

## 2025-01-29 RX ORDER — FERROUS SULFATE 325(65) MG
325 TABLET, DELAYED RELEASE (ENTERIC COATED) ORAL
Status: DISCONTINUED | OUTPATIENT
Start: 2025-01-30 | End: 2025-01-30

## 2025-01-29 RX ORDER — ACETAMINOPHEN 500 MG
1000 TABLET ORAL ONCE
Status: DISCONTINUED | OUTPATIENT
Start: 2025-01-29 | End: 2025-01-29 | Stop reason: HOSPADM

## 2025-01-29 RX ORDER — SODIUM CHLORIDE, SODIUM LACTATE, POTASSIUM CHLORIDE, CALCIUM CHLORIDE 600; 310; 30; 20 MG/100ML; MG/100ML; MG/100ML; MG/100ML
INJECTION, SOLUTION INTRAVENOUS CONTINUOUS
Status: DISCONTINUED | OUTPATIENT
Start: 2025-01-29 | End: 2025-01-29

## 2025-01-29 RX ORDER — HYDROMORPHONE HYDROCHLORIDE 1 MG/ML
0.2 INJECTION, SOLUTION INTRAMUSCULAR; INTRAVENOUS; SUBCUTANEOUS EVERY 5 MIN PRN
Status: DISCONTINUED | OUTPATIENT
Start: 2025-01-29 | End: 2025-01-29 | Stop reason: HOSPADM

## 2025-01-29 RX ORDER — ACETAMINOPHEN 325 MG/1
650 TABLET ORAL ONCE
Status: COMPLETED | OUTPATIENT
Start: 2025-01-29 | End: 2025-01-29

## 2025-01-29 RX ORDER — SODIUM CHLORIDE, SODIUM LACTATE, POTASSIUM CHLORIDE, CALCIUM CHLORIDE 600; 310; 30; 20 MG/100ML; MG/100ML; MG/100ML; MG/100ML
INJECTION, SOLUTION INTRAVENOUS CONTINUOUS
Status: DISCONTINUED | OUTPATIENT
Start: 2025-01-29 | End: 2025-01-29 | Stop reason: HOSPADM

## 2025-01-29 RX ORDER — PAROXETINE 20 MG/1
40 TABLET, FILM COATED ORAL EVERY MORNING
Status: DISCONTINUED | OUTPATIENT
Start: 2025-01-30 | End: 2025-01-30

## 2025-01-29 RX ORDER — HYDROMORPHONE HYDROCHLORIDE 1 MG/ML
0.4 INJECTION, SOLUTION INTRAMUSCULAR; INTRAVENOUS; SUBCUTANEOUS EVERY 2 HOUR PRN
Status: DISCONTINUED | OUTPATIENT
Start: 2025-01-29 | End: 2025-01-29

## 2025-01-29 RX ORDER — NICOTINE POLACRILEX 4 MG
15 LOZENGE BUCCAL
Status: DISCONTINUED | OUTPATIENT
Start: 2025-01-29 | End: 2025-01-30

## 2025-01-29 RX ORDER — DOCUSATE SODIUM 100 MG/1
100 CAPSULE, LIQUID FILLED ORAL 2 TIMES DAILY
Status: DISCONTINUED | OUTPATIENT
Start: 2025-01-29 | End: 2025-01-30

## 2025-01-29 RX ORDER — INSULIN ASPART 100 [IU]/ML
INJECTION, SOLUTION INTRAVENOUS; SUBCUTANEOUS ONCE
Status: DISCONTINUED | OUTPATIENT
Start: 2025-01-29 | End: 2025-01-29 | Stop reason: HOSPADM

## 2025-01-29 RX ORDER — NICOTINE POLACRILEX 4 MG
30 LOZENGE BUCCAL
Status: DISCONTINUED | OUTPATIENT
Start: 2025-01-29 | End: 2025-01-29 | Stop reason: HOSPADM

## 2025-01-29 RX ORDER — ACETAMINOPHEN 500 MG
1000 TABLET ORAL ONCE AS NEEDED
Status: DISCONTINUED | OUTPATIENT
Start: 2025-01-29 | End: 2025-01-29 | Stop reason: HOSPADM

## 2025-01-29 RX ORDER — HYDROMORPHONE HYDROCHLORIDE 1 MG/ML
0.8 INJECTION, SOLUTION INTRAMUSCULAR; INTRAVENOUS; SUBCUTANEOUS EVERY 2 HOUR PRN
Status: DISCONTINUED | OUTPATIENT
Start: 2025-01-29 | End: 2025-01-29

## 2025-01-29 RX ORDER — HYDROMORPHONE HYDROCHLORIDE 1 MG/ML
0.8 INJECTION, SOLUTION INTRAMUSCULAR; INTRAVENOUS; SUBCUTANEOUS EVERY 4 HOURS PRN
Status: DISCONTINUED | OUTPATIENT
Start: 2025-01-29 | End: 2025-01-30

## 2025-01-29 RX ORDER — POLYETHYLENE GLYCOL 3350 17 G/17G
17 POWDER, FOR SOLUTION ORAL DAILY PRN
Status: DISCONTINUED | OUTPATIENT
Start: 2025-01-29 | End: 2025-01-30

## 2025-01-29 RX ORDER — DIPHENHYDRAMINE HYDROCHLORIDE 50 MG/ML
12.5 INJECTION INTRAMUSCULAR; INTRAVENOUS EVERY 4 HOURS PRN
Status: DISCONTINUED | OUTPATIENT
Start: 2025-01-29 | End: 2025-01-30

## 2025-01-29 RX ORDER — TIZANIDINE 2 MG/1
2 TABLET ORAL EVERY 6 HOURS PRN
Status: DISCONTINUED | OUTPATIENT
Start: 2025-01-29 | End: 2025-01-30

## 2025-01-29 RX ORDER — KETAMINE HYDROCHLORIDE 50 MG/ML
INJECTION, SOLUTION INTRAMUSCULAR; INTRAVENOUS AS NEEDED
Status: DISCONTINUED | OUTPATIENT
Start: 2025-01-29 | End: 2025-01-29 | Stop reason: SURG

## 2025-01-29 RX ORDER — ACETAMINOPHEN 325 MG/1
650 TABLET ORAL EVERY 8 HOURS
Status: DISCONTINUED | OUTPATIENT
Start: 2025-01-29 | End: 2025-01-30

## 2025-01-29 RX ORDER — BUPROPION HYDROCHLORIDE 300 MG/1
300 TABLET ORAL DAILY
Status: DISCONTINUED | OUTPATIENT
Start: 2025-01-30 | End: 2025-01-30

## 2025-01-29 RX ORDER — HYDROMORPHONE HYDROCHLORIDE 1 MG/ML
0.4 INJECTION, SOLUTION INTRAMUSCULAR; INTRAVENOUS; SUBCUTANEOUS EVERY 4 HOURS PRN
Status: DISCONTINUED | OUTPATIENT
Start: 2025-01-29 | End: 2025-01-30

## 2025-01-29 RX ORDER — DEXTROSE MONOHYDRATE 25 G/50ML
50 INJECTION, SOLUTION INTRAVENOUS
Status: DISCONTINUED | OUTPATIENT
Start: 2025-01-29 | End: 2025-01-29 | Stop reason: HOSPADM

## 2025-01-29 RX ORDER — NALOXONE HYDROCHLORIDE 0.4 MG/ML
0.08 INJECTION, SOLUTION INTRAMUSCULAR; INTRAVENOUS; SUBCUTANEOUS AS NEEDED
Status: DISCONTINUED | OUTPATIENT
Start: 2025-01-29 | End: 2025-01-29 | Stop reason: HOSPADM

## 2025-01-29 RX ORDER — TRAMADOL HYDROCHLORIDE 50 MG/1
50 TABLET ORAL EVERY 6 HOURS PRN
Status: DISCONTINUED | OUTPATIENT
Start: 2025-01-29 | End: 2025-01-30

## 2025-01-29 RX ORDER — HYDROCODONE BITARTRATE AND ACETAMINOPHEN 5; 325 MG/1; MG/1
1 TABLET ORAL EVERY 4 HOURS PRN
Status: DISCONTINUED | OUTPATIENT
Start: 2025-01-29 | End: 2025-01-30

## 2025-01-29 RX ORDER — DEXAMETHASONE SODIUM PHOSPHATE 10 MG/ML
INJECTION, SOLUTION INTRAMUSCULAR; INTRAVENOUS AS NEEDED
Status: DISCONTINUED | OUTPATIENT
Start: 2025-01-29 | End: 2025-01-29 | Stop reason: SURG

## 2025-01-29 RX ORDER — HYDROCODONE BITARTRATE AND ACETAMINOPHEN 5; 325 MG/1; MG/1
2 TABLET ORAL ONCE AS NEEDED
Status: DISCONTINUED | OUTPATIENT
Start: 2025-01-29 | End: 2025-01-29 | Stop reason: HOSPADM

## 2025-01-29 RX ORDER — DIPHENHYDRAMINE HYDROCHLORIDE 50 MG/ML
25 INJECTION INTRAMUSCULAR; INTRAVENOUS ONCE AS NEEDED
Status: ACTIVE | OUTPATIENT
Start: 2025-01-29 | End: 2025-01-29

## 2025-01-29 RX ORDER — ACETAMINOPHEN 500 MG
1000 TABLET ORAL EVERY 8 HOURS SCHEDULED
Status: DISCONTINUED | OUTPATIENT
Start: 2025-01-29 | End: 2025-01-29

## 2025-01-29 RX ORDER — BISACODYL 10 MG
10 SUPPOSITORY, RECTAL RECTAL
Status: DISCONTINUED | OUTPATIENT
Start: 2025-01-29 | End: 2025-01-30

## 2025-01-29 RX ORDER — PROCHLORPERAZINE EDISYLATE 5 MG/ML
5 INJECTION INTRAMUSCULAR; INTRAVENOUS EVERY 8 HOURS PRN
Status: DISCONTINUED | OUTPATIENT
Start: 2025-01-29 | End: 2025-01-30

## 2025-01-29 RX ORDER — ATORVASTATIN CALCIUM 20 MG/1
20 TABLET, FILM COATED ORAL NIGHTLY
Status: DISCONTINUED | OUTPATIENT
Start: 2025-01-29 | End: 2025-01-30

## 2025-01-29 RX ORDER — TRANEXAMIC ACID 10 MG/ML
1000 INJECTION, SOLUTION INTRAVENOUS ONCE
Status: COMPLETED | OUTPATIENT
Start: 2025-01-29 | End: 2025-01-29

## 2025-01-29 RX ORDER — ONDANSETRON 2 MG/ML
4 INJECTION INTRAMUSCULAR; INTRAVENOUS EVERY 6 HOURS PRN
Status: DISCONTINUED | OUTPATIENT
Start: 2025-01-29 | End: 2025-01-30

## 2025-01-29 RX ORDER — NICOTINE POLACRILEX 4 MG
15 LOZENGE BUCCAL
Status: DISCONTINUED | OUTPATIENT
Start: 2025-01-29 | End: 2025-01-29 | Stop reason: HOSPADM

## 2025-01-29 RX ORDER — TRAMADOL HYDROCHLORIDE 50 MG/1
50 TABLET ORAL EVERY 6 HOURS SCHEDULED
Status: DISCONTINUED | OUTPATIENT
Start: 2025-01-29 | End: 2025-01-29

## 2025-01-29 RX ORDER — LEVOTHYROXINE SODIUM 125 UG/1
125 TABLET ORAL
Status: DISCONTINUED | OUTPATIENT
Start: 2025-01-30 | End: 2025-01-30

## 2025-01-29 RX ORDER — HYDROMORPHONE HYDROCHLORIDE 1 MG/ML
0.6 INJECTION, SOLUTION INTRAMUSCULAR; INTRAVENOUS; SUBCUTANEOUS EVERY 5 MIN PRN
Status: DISCONTINUED | OUTPATIENT
Start: 2025-01-29 | End: 2025-01-29 | Stop reason: HOSPADM

## 2025-01-29 RX ORDER — DEXTROSE MONOHYDRATE 25 G/50ML
50 INJECTION, SOLUTION INTRAVENOUS
Status: DISCONTINUED | OUTPATIENT
Start: 2025-01-29 | End: 2025-01-30

## 2025-01-29 RX ORDER — HYDROMORPHONE HYDROCHLORIDE 1 MG/ML
INJECTION, SOLUTION INTRAMUSCULAR; INTRAVENOUS; SUBCUTANEOUS
Status: COMPLETED
Start: 2025-01-29 | End: 2025-01-29

## 2025-01-29 RX ORDER — PANTOPRAZOLE SODIUM 20 MG/1
20 TABLET, DELAYED RELEASE ORAL
Status: DISCONTINUED | OUTPATIENT
Start: 2025-01-30 | End: 2025-01-30

## 2025-01-29 RX ORDER — NICOTINE POLACRILEX 4 MG
30 LOZENGE BUCCAL
Status: DISCONTINUED | OUTPATIENT
Start: 2025-01-29 | End: 2025-01-30

## 2025-01-29 RX ORDER — PREGABALIN 50 MG/1
50 CAPSULE ORAL 2 TIMES DAILY
Status: DISCONTINUED | OUTPATIENT
Start: 2025-01-29 | End: 2025-01-30

## 2025-01-29 RX ORDER — METOCLOPRAMIDE HYDROCHLORIDE 5 MG/ML
INJECTION INTRAMUSCULAR; INTRAVENOUS AS NEEDED
Status: DISCONTINUED | OUTPATIENT
Start: 2025-01-29 | End: 2025-01-29 | Stop reason: SURG

## 2025-01-29 RX ORDER — DEXAMETHASONE SODIUM PHOSPHATE 10 MG/ML
8 INJECTION, SOLUTION INTRAMUSCULAR; INTRAVENOUS ONCE
Status: COMPLETED | OUTPATIENT
Start: 2025-01-30 | End: 2025-01-30

## 2025-01-29 RX ORDER — BUPIVACAINE HYDROCHLORIDE 5 MG/ML
INJECTION, SOLUTION EPIDURAL; INTRACAUDAL AS NEEDED
Status: DISCONTINUED | OUTPATIENT
Start: 2025-01-29 | End: 2025-01-29 | Stop reason: SURG

## 2025-01-29 RX ORDER — ESTRADIOL 0.05 MG/D
1 PATCH TRANSDERMAL WEEKLY
Status: DISCONTINUED | OUTPATIENT
Start: 2025-02-01 | End: 2025-01-30

## 2025-01-29 RX ORDER — MIDAZOLAM HYDROCHLORIDE 1 MG/ML
INJECTION INTRAMUSCULAR; INTRAVENOUS AS NEEDED
Status: DISCONTINUED | OUTPATIENT
Start: 2025-01-29 | End: 2025-01-29 | Stop reason: SURG

## 2025-01-29 RX ORDER — ONDANSETRON 2 MG/ML
4 INJECTION INTRAMUSCULAR; INTRAVENOUS EVERY 6 HOURS PRN
Status: DISCONTINUED | OUTPATIENT
Start: 2025-01-29 | End: 2025-01-29 | Stop reason: HOSPADM

## 2025-01-29 RX ORDER — HYDROCODONE BITARTRATE AND ACETAMINOPHEN 10; 325 MG/1; MG/1
1 TABLET ORAL EVERY 4 HOURS PRN
Status: DISCONTINUED | OUTPATIENT
Start: 2025-01-29 | End: 2025-01-30

## 2025-01-29 RX ORDER — VANCOMYCIN HYDROCHLORIDE 1 G/20ML
INJECTION, POWDER, LYOPHILIZED, FOR SOLUTION INTRAVENOUS AS NEEDED
Status: DISCONTINUED | OUTPATIENT
Start: 2025-01-29 | End: 2025-01-29 | Stop reason: HOSPADM

## 2025-01-29 RX ORDER — HYDROCODONE BITARTRATE AND ACETAMINOPHEN 5; 325 MG/1; MG/1
1 TABLET ORAL ONCE AS NEEDED
Status: DISCONTINUED | OUTPATIENT
Start: 2025-01-29 | End: 2025-01-29 | Stop reason: HOSPADM

## 2025-01-29 RX ORDER — LABETALOL HYDROCHLORIDE 5 MG/ML
5 INJECTION, SOLUTION INTRAVENOUS EVERY 5 MIN PRN
Status: DISCONTINUED | OUTPATIENT
Start: 2025-01-29 | End: 2025-01-29 | Stop reason: HOSPADM

## 2025-01-29 RX ADMIN — TRANEXAMIC ACID 1000 MG: 10 INJECTION, SOLUTION INTRAVENOUS at 12:51:00

## 2025-01-29 RX ADMIN — METOCLOPRAMIDE HYDROCHLORIDE 10 MG: 5 INJECTION INTRAMUSCULAR; INTRAVENOUS at 11:14:00

## 2025-01-29 RX ADMIN — BUPIVACAINE HYDROCHLORIDE 15 ML: 5 INJECTION, SOLUTION EPIDURAL; INTRACAUDAL at 11:10:00

## 2025-01-29 RX ADMIN — SODIUM CHLORIDE, SODIUM LACTATE, POTASSIUM CHLORIDE, CALCIUM CHLORIDE: 600; 310; 30; 20 INJECTION, SOLUTION INTRAVENOUS at 13:34:00

## 2025-01-29 RX ADMIN — MIDAZOLAM HYDROCHLORIDE 2 MG: 1 INJECTION INTRAMUSCULAR; INTRAVENOUS at 11:00:00

## 2025-01-29 RX ADMIN — KETAMINE HYDROCHLORIDE 25 MG: 50 INJECTION, SOLUTION INTRAMUSCULAR; INTRAVENOUS at 11:43:00

## 2025-01-29 RX ADMIN — ROCURONIUM BROMIDE 50 MG: 10 INJECTION, SOLUTION INTRAVENOUS at 11:05:00

## 2025-01-29 RX ADMIN — ROCURONIUM BROMIDE 20 MG: 10 INJECTION, SOLUTION INTRAVENOUS at 11:28:00

## 2025-01-29 RX ADMIN — DEXAMETHASONE SODIUM PHOSPHATE 2 MG: 10 INJECTION, SOLUTION INTRAMUSCULAR; INTRAVENOUS at 11:10:00

## 2025-01-29 RX ADMIN — TRANEXAMIC ACID 1000 MG: 10 INJECTION, SOLUTION INTRAVENOUS at 11:21:00

## 2025-01-29 RX ADMIN — SODIUM CHLORIDE, SODIUM LACTATE, POTASSIUM CHLORIDE, CALCIUM CHLORIDE: 600; 310; 30; 20 INJECTION, SOLUTION INTRAVENOUS at 11:00:00

## 2025-01-29 RX ADMIN — ONDANSETRON 4 MG: 2 INJECTION INTRAMUSCULAR; INTRAVENOUS at 12:38:00

## 2025-01-29 NOTE — PHYSICAL THERAPY NOTE
PHYSICAL THERAPY JOINT EVALUATION - INPATIENT     Room Number: 379/379-A  Evaluation Date: 1/29/2025  Type of Evaluation: Initial  Physician Order: PT Eval and Treat    Presenting Problem: s/p R TKA on 1/29/25  Co-Morbidities : anxiety, afib, DM, depression, thyroidectomy  Reason for Therapy: Mobility Dysfunction and Discharge Planning    PHYSICAL THERAPY ASSESSMENT   Patient is a 54 year old female admitted 1/29/2025 for R TKA.  Prior to admission, patient's baseline is mod ind with cane.  Patient is currently functioning below baseline with bed mobility, transfers, gait, and stair negotiation.  Patient is requiring contact guard assist as a result of the following impairments: decreased functional strength, pain, impaired   balance, and limited   ROM.  Physical Therapy will continue to follow for duration of hospitalization.    Patient will benefit from continued skilled PT Services at discharge to promote prior level of function.  Anticipate patient will return home with home health PT.    PLAN DURING HOSPITALIZATION  Nursing Mobility Recommendation : 1 Assist  PT Device Recommendation: Rolling walker  PT Treatment Plan: Bed mobility;Endurance;Energy conservation;Patient education;Family education;Gait training;Strengthening;Range of motion;Stoop training;Stair training;Transfer training;Balance training  Rehab Potential : Good  Frequency (Obs): Daily     CURRENT GOALS  Goal #1  Patient is able to demonstrate supine - sit EOB @ level: supervision     Goal #2  Patient is able to demonstrate transfers Sit to/from Stand at assistance level: supervision   Goal #3    Patient is able to ambulate 150 feet with assistive device at assistance level: supervision   Goal #4    Patient will negotiate 4 stairs/one curb w/ assistive device and supervision   Goal #5    Patient verbalizes and/or demonstrates all precautions and safety concerns independently    Goal #6      Goal Comments: Goals established on 1/29/2025    PHYSICAL  THERAPY MEDICAL/SOCIAL HISTORY  History related to current admission: Patient is a 54 year old female admitted on 1/29/2025 from home for R TKA.    HOME SITUATION  Type of Home: House   Home Layout: Two level;Able to live on main level  Stairs to Enter : 2           Lives With: Daughter  Drives: Yes  Patient Regularly Uses: Cane (has RW)    Prior Level of [place]: Pt is typically independent with ADLs and ambulates with a cane as needed.     SUBJECTIVE  \"Well I think that's it for tonight\"     OBJECTIVE  Precautions: Bed/chair alarm  Fall Risk: High fall risk    WEIGHT BEARING RESTRICTION  R Lower Extremity: Weight Bearing as Tolerated    PAIN ASSESSMENT  Rating: Unable to rate  Location: R knee  Management Techniques: Activity promotion;Relaxation;Repositioning    COGNITION  Overall Cognitive Status:  WFL - within functional limits    RANGE OF MOTION AND STRENGTH ASSESSMENT  Upper extremity ROM and strength are within functional limits     Lower extremity ROM is within functional limits, except RLE s/p TKA    Lower extremity strength is within functional limits, except RLE s/p TKA      BALANCE  Static Sitting: Good  Dynamic Sitting: Good  Static Standing: Fair -  Dynamic Standing: Poor +    ADDITIONAL TESTS                                    ACTIVITY TOLERANCE                         O2 WALK       NEUROLOGICAL FINDINGS                        AM-PAC '6-Clicks' INPATIENT SHORT FORM - BASIC MOBILITY  How much difficulty does the patient currently have...  Patient Difficulty: Turning over in bed (including adjusting bedclothes, sheets and blankets)?: None   Patient Difficulty: Sitting down on and standing up from a chair with arms (e.g., wheelchair, bedside commode, etc.): A Little   Patient Difficulty: Moving from lying on back to sitting on the side of the bed?: A Little   How much help from another person does the patient currently need...   Help from Another: Moving to and from a bed to a chair (including a  wheelchair)?: A Little   Help from Another: Need to walk in hospital room?: A Little   Help from Another: Climbing 3-5 steps with a railing?: A Little       AM-PAC Score:  Raw Score: 19   Approx Degree of Impairment: 41.77%   Standardized Score (AM-PAC Scale): 45.44   CMS Modifier (G-Code): CK    FUNCTIONAL ABILITY STATUS  Gait Assessment   Functional Mobility/Gait Assessment  Gait Assistance: Contact guard assist  Distance (ft): 15, 15  Assistive Device: Rolling walker  Pattern: R Decreased stance time    Skilled Therapy Provided: Per RN okay to work with pt. Pt received in supine and was agreeable to PT session.     Bed Mobility:  Rolling: NT  Supine to sit: supervision   Sit to supine: min A to RLE     Transfer Mobility:  Sit to stand: CGA   Stand to sit: CGA  Gait = pt ambulated to/from the bathroom with RW and CGA    Therapist's Comments: Pt educated on role of therapy, goals for session, safety, fall prevention, WBAT, and activity recommendations.     Exercise/Education Provided:  Bed mobility  Energy conservation  Functional activity tolerated  Gait training  Posture  Strengthening  Transfer training    Patient End of Session: In bed;Needs met;Call light within reach;RN aware of session/findings;All patient questions and concerns addressed;Hospital anti-slip socks;SCDs in place;Alarm set;Family present    Patient Evaluation Complexity Level:  History Moderate - 1 or 2 personal factors and/or co-morbidities   Examination of body systems Low -  addressing 1-2 elements   Clinical Presentation Low- Stable   Clinical Decision Making Low Complexity       PT Session Time: 30 minutes  Therapeutic Activity: 10 minutes

## 2025-01-29 NOTE — CONSULTS
[facility] HOSPITALIST  CONSULT     Catina Rivera Patient Status:  Outpatient in a Bed    3/12/1970 MRN [id number]   Location [facility] 3SW-A Attending Ruslan Connelly MD   Hosp Day # 0 PCP Essence Brito MD     Reason for consult: medical management    Requested by: Dr. Connelly    Subjective:   History of Present Illness:     Catina Rivera is a 54 year old female with medical history of depression, hypothyroidism GERD, A.fib, CORINNE, DM type II on an insulin pump who was admitted after she had a right total knee arthroplasty for primary osteoarthritis of her right knee.  She was having some post op pain which is now improving.  She denies chest pain, shortness of breath, nausea, headaches, new tingling or numbness.  She is on eliquis for PAF and her last dose was     History/Other:    Past Medical History:  Past Medical History:    Anxiety    Anxiety state, unspecified    Arrhythmia    a fib    Asthma (HCC)    Atrial fibrillation with RVR (HCC)    Back pain    Back problem    Bad breath    Belching    Bloating    Decorative tattoo    Depression    Diabetes (HCC)    per pt    Disorder of thyroid    Esophageal reflux    GERD (gastroesophageal reflux disease)    Headache(784.0)    Heart palpitations    Afib    Heartburn    I have taken medication for over 10years    High cholesterol    History of depression    Hypothyroidism    Irregular bowel habits    Leaking of urine    Lipoma of unspecified site    Multiple thyroid nodules    on rt,, lt.-    Nontoxic multinodular goiter    Nontoxic multinodular goiter    Obesity    Other and unspecified hyperlipidemia    Pain in joints    Pain in right shin    Pain with bowel movements    Painful swallowing    Not consistent but not due to illness    Plantar fasciitis    Sleep apnea     CPAP     Stress    Thyroiditis, unspecified    Type 1 diabetes mellitus (HCC)    Uncomfortable fullness after meals    Visual impairment    Wears glasses    Weight loss    Working  with weight loss clinic     Past Surgical History:   Past Surgical History:   Procedure Laterality Date    Arthroscopy of joint unlisted Bilateral     knee    Carpal tunnel release Bilateral     Cholecystectomy      Colonoscopy N/A 10/21/2016    Procedure: COLONOSCOPY;  Surgeon: Brandt Ramirez DO;  Location:  ENDOSCOPY    Colonoscopy N/A 12/27/2022    Procedure: COLONOSCOPY;  Surgeon: Aiden Pollard MD;  Location:  ENDOSCOPY    Cyst aspiration left      Cyst aspiration right      Hysterectomy      7/21    Laparoscopic cholecystectomy  11/14/2011    Lipoma removal  04/29/2022    left posterior    Mesh (implantable)      bladder    Neuromuscular stimulator      Oophorectomy Bilateral     Other      wisdom    Other      lt hand middle finger sx    Other surgical history  x2    total thyroidectomy    Other surgical history  x1    sinus    Other surgical history  10/11/2023    trial for neurostimulator    Other surgical history      Neuro Nerve Stimulator    Removal gallbladder      Sinus surgery        Sling oper stres incontinence  01/09/2014    Procedure: SLING UROLOGY;  Surgeon: Isac Sutherland MD;  [facility]    Thyroidectomy      Total abdom hysterectomy  07/2020      Family History:   Family History   Problem Relation Age of Onset    Skin cancer Mother     Other (Other) Mother     Heart Disease Father     Hypertension Father     Asthma Father     Diabetes Father     Other (Other) Father     Heart Attack Father         x2    Other (Other) Sister         Crohn's disease    Crohn's Disease Sister     Ulcerative Colitis Sister     Other (Other) Sister         Celiac Disease    Diabetes Maternal Grandmother     Other (Other) Maternal Grandmother         lymphoma    Asthma Other         3 children all have asthma.      Social History:    reports that she quit smoking about 7 years ago. Her smoking use included cigarettes. She started smoking about 7 years ago. She has never been exposed to  tobacco smoke. She has never used smokeless tobacco. She reports current alcohol use. She reports current drug use.     Allergies: Allergies[1]    Medications:  Medications Ordered Prior to Encounter[2]    Review of Systems:   A comprehensive review of systems was completed.    Pertinent positives and negatives noted in the HPI.    Objective:   Physical Exam:    /64 (BP Location: Left arm)   Pulse 79   Temp 97.5 °F (36.4 °C) (Oral)   Resp 16   Ht 5' 2\" (1.575 m)   Wt 242 lb (109.8 kg)   LMP 09/24/2019 (Approximate)   SpO2 90%   BMI 44.26 kg/m²   General: No acute distress, Alert  Respiratory: No rhonchi, no wheezes  Cardiovascular: S1, S2. Regular rate and rhythm  Abdomen: Soft, NT/ND, +BS  Neuro: No new focal deficits  Extremities: left leg dressing, ACE wrap      Results:    Labs:      Labs Last 24 Hours:  Recent Labs   Lab 01/29/25  0900   INR 1.01       No results for input(s): \"GLU\", \"BUN\", \"CREATSERUM\", \"GFRAA\", \"GFRNAA\", \"CA\", \"ALB\", \"NA\", \"K\", \"CL\", \"CO2\", \"ALKPHO\", \"AST\", \"ALT\", \"BILT\", \"TP\" in the last 168 hours.    Recent Labs   Lab 01/29/25  0900   PTP 13.4   INR 1.01       No results for input(s): \"TROP\", \"CK\" in the last 168 hours.      Imaging: Imaging data reviewed in Epic.    Assessment & Plan:      #OA right knee  -s/p right TKA  -pain control  -PT/OT  -IS    #PAF  -on eliquis -last dose Sunday- resume when cleared with Ortho  -Diltiazem  -Tele monitoring    #DM type II  -insulin pump- well managed with pump and CGM - will continue  -accuchecks    #CORINNE  -CORINNE protocol    #Obesity  -BMI 44.26    #Hypothyroidism  -levothyroxine    #GERD  -PPI      Plan of care discussed with patient    Karolina Hurd MD  1/29/2025    The 21st Century Cures Act makes medical notes like these available to patients in the interest of transparency. Please be advised this is a medical document. Medical documents are intended to carry relevant information, facts as evident, and the clinical opinion of the  practitioner. The medical note is intended as peer to peer communication and may appear blunt or direct. It is written in medical language and may contain abbreviations or verbiage that are unfamiliar.            [1]   Allergies  Allergen Reactions    Adhesive Tape HIVES     Paper tape is ok, clear tape=hives      Ibuprofen OTHER (SEE COMMENTS)     Asthma attack    Sulfa Antibiotics      Unknown; happened before adolescent period    Erythromycin Base NAUSEA ONLY    Oxycodone CONFUSION     Memory issues   [2]   No current facility-administered medications on file prior to encounter.     Current Outpatient Medications on File Prior to Encounter   Medication Sig Dispense Refill    Insulin Disposable Pump (OMNIPOD 5 G6 PODS, GEN 5,) Does not apply Misc 1 each every 3 (three) days. 30 each 1    PARoxetine HCl 40 MG Oral Tab Take 1 tablet (40 mg total) by mouth every morning. 90 tablet 3    Insulin Glargine, 2 Unit Dial, (TOUJEO MAX SOLOSTAR) 300 UNIT/ML Subcutaneous Solution Pen-injector USE AS DIRECTED UP TO 75 UNITS DAILY IN THE EVENT OF INSULIN PUMP FAILURE 6 mL 0    NOVOLOG 100 UNIT/ML Injection Solution INJECT UP  UNITS VIA INSULIN PUMP DAILY AS DIRECTED (Patient taking differently: INJECT UP  UNITS VIA INSULIN PUMP DAILY AS DIRECTED.    Basal Rate:   12 am- 0.9 unit/hr  5:30 am - 1.1   1 pm - 1.6  4:30 pm - 1.9  6:30 pm - 1.6  09:30 pm - 2) 150 mL 1    Insulin Disposable Pump (OMNIPOD 5 G6 INTRO, GEN 5,) Does not apply Kit 1 each every 3 (three) days. 1 kit 0    dilTIAZem HCl ER Beads 180 MG Oral Capsule SR 24 Hr Take 1 capsule (180 mg total) by mouth 2 (two) times daily.      glucagon (GVOKE HYPOPEN 2-PACK) 1 MG/0.2ML Subcutaneous SUBQ injection Inject 0.2 mL (1 mg total) into the skin as needed. 0.4 mL 1    apixaban 5 MG Oral Tab Take 1 tablet (5 mg total) by mouth in the morning and 1 tablet (5 mg total) before bedtime. 60 tablet 3    estradiol 0.05 MG/24HR Transdermal Patch Weekly APPLY 1 PATCH  EXTERNALLY TO THE SKIN EVERY TUESDAY. DO NOT CUT PATCH      Blood Glucose Monitoring Suppl (ONETOUCH VERIO FLEX SYSTEM) w/Device Does not apply Kit Use to test blood sugar 1x daily as needed to calibrate dexcom 1 kit 0    Insulin Pen Needle (BD PEN NEEDLE AN U/F) 32G X 4 MM Does not apply Misc Inject 1 Pen into the skin in the morning and 1 Pen at noon and 1 Pen in the evening. 300 each 0

## 2025-01-29 NOTE — DISCHARGE INSTRUCTIONS
Sometimes managing your health at home requires assistance.  The [facility] team has recognized your preference to use [facility].  They can be reached by phone at [phone].  The fax number for your reference is [phone].. A representative from the home health agency will contact you or your family to schedule your first visit.          Ruslan Connelly MD  TOTAL KNEE REPLACEMENT INSTRUCTIONS      POST OPERATIVE MEDICATIONS  The medications below will be sent to your pharmacy (unless contraindicated due to allergies, test results or other medical conditions).       THESE MEDICATIONS ARE TO BE TAKEN AFTER SURGERY      PAIN MEDICATIONS: Listed in order of importance    Acetaminophen (Tylenol)  Mild, moderate, and severe pain Tylenol should be taken as directed until all your pain is gone. This is the last medication you should wean from. Do not exceed 3000 mg (or 3 grams) per day with the Norco as discussed     Tramadol (Ultram)  Moderate and Severe Pain If Tylenol by itself is not sufficient for pain control, then take Tramadol. This is the second medication you should wean from     Norco 5 or  mg  Severe and Breakthrough Pain If Tylenol and Tramadol together are not adequately controlling your pain, then add Norco as your rescue pain medication. This is a narcotic and the first medication you should wean from     Note: If you take a blood thinner other than aspirin - you will not receive an anti-inflammatory.    BLOOD CLOT PREVENTION: Eliquis 2.5 mg twice daily for 3 days. If the dressing appears clean on Saturday, you may resume your normal dose of Eliquis 5 mg BID on Sunday.  Note: If you take blood thinners prescribed by a cardiologist or hematologist then you will resume that same blood thinner the day after surgery.    NERVE PAIN: Pregabalin (Lyrica) 50 mg (one tab by mouth three times daily #90)    CONSTIPATION: Polyethylene Glycol (Miralax) 17.9 g (one packet or dose  daily as needed for constipation). Narcotic pain medication can make you constipated. If you are not constipated, you do not need this. If you are still constipated despite this, you may take Senna 8.6 mg (one tablet daily).        Opioid Safety Strategy for Joint Replacement Patients    Our department policy is designed for the safe and effective management of post-operative pain. Additionally, this prescribing policy will help reduce the number of pills that can be diverted, abused and/or misused.     The following guidelines are part of a strategy to keep patients as safe and as comfortable as possible:   1. Only one doctor prescribes opioids.   2. Patients on buprenorphine, methadone, or daily opioids get opioids from their PCP or pain medicine specialist.   3. New office patients with long-standing conditions will not be prescribed opioids.   4. Orthopaedic surgeons do not use long-acting opioids.   5. Orthopaedic surgeons do not treat persistent pain with opioids.   6. Statewide databases are checked prior to prescribing opioids.       The following recommendations for the management of post-operative pain after total joint replacement are adapted from the American Academy of Orthopaedic Surgeons and American Society of Anesthesiologists. A patient will receive no more than 50 opioid pills for their initial prescription. If this is received prior to surgery, it is not to be used until after your surgery.  There will be a maximum of 10 mg of oxycodone/hydrocodone (or equivalent) per pill.   A single refill of no more than 40 pills.   The goal is to discontinue opioids within 1 month of surgery.   In the first two weeks after surgery the routine use of ice can decrease pain and reduce swelling.   We encourage use of acetaminophen or ibuprofen or both instead of opioids. Please be sure not to exceed the daily recommended doses of over the counter medications.  We encourage the use of a pill cutter to decrease the  dose to half or quarter pill as comfort improves in the days and weeks following your surgery  If a refill is anticipated please call 48 hours before running out of pain medicine. Call Monday- Thursday during business hours to avoid delays in any refills. Do not wait until Friday or the weekend to request a refill.   [facility] pharmacies routinely will not provide patients with a full prescription which has created many problems for our patients. When you schedule surgery, we request that you designate another pharmacy to ensure the quality of your care.  Patients with more pain than expected will be evaluated in the office.         PREPARING FOR YOUR JOINT REPLACEMENT    Most patients will be able to go home the day of surgery or the day after. Your  (family member or friend) will bring you home after surgery.  Someone should stay with you the first night or two after discharge.   Allow yourself adequate time for your recovery. It is important that you set aside the time to ensure the best outcome possible.  For home safety remove things that could present a fall risk (i.e. area rugs and small, low height pieces of furniture).  If you are a pet owner, have someone available to care for them for the early phases of your recovery.  Optimize your diet by increasing fiber, protein and hydration.   Complete all dental work at least 2 weeks prior to surgery (i.e. cleanings, root canals, extractions).  Smoking cessation is mandatory to prevent complications. Stop smoking 4 weeks prior to surgery and do not smoke for 4 weeks after surgery. If you are a smoker we can direct you to a program to help you quit before surgery.  You cannot drive while taking narcotic pain medication (i.e. hydrocodone, codeine, etc.). You may need to arrange for transportation to your initial follow up visit.  Your first follow up will be approximately 2 weeks after surgery. If you live out of town, this visit will be customized to  your needs. Regular follow-up appointments are at 6 weeks, 3 months, (6 months as needed), and 1 year.       EXERCISE BEFORE SURGERY  If you exercise regularly, please continue your regular program as long as it is not worsening your symptoms.  If you are not exercising, we would like you to begin doing the exercises as described in our guidelines. If the exercises worsen your symptoms either modify or stop doing the exercises and notify our office.      THERAPY IN THE HOSPITAL  Your mobility will be checked by physical therapy or the nursing staff before discharge. The focus will be making sure you are safe to go home.  Your  is encouraged to attend your physical therapy session with you.  Most patients will use a walker or cane for assistance.      PHYSICAL THERAPY & HOME EXERCISE PROGRAMS  Many patients have successful outcomes following the exercises given in the guidelines making formal physical therapy unnecessary. Our emphasis is on self-directed home exercise programs to promote long term wellness.  For patients that require additional help we will prescribe formal outpatient physical therapy. This will require the patient to go to a facility for formal exercise 2 to 3 times a week. It is absolutely necessary to continue your daily exercises at home to get the best outcome possible.  In rare circumstances home therapy may be set up until you are ready to progress to outpatient therapy. We recommend transitioning to outpatient physical therapy as soon as possible.      FAQs AFTER KNEE REPLACEMENT SURGERY    WHAT IF MY LEG SWELLS AFTER SURGERY?   Swelling is expected and may increase during the first few weeks. After 2-3 weeks, swelling can continue to occur for 6-9 months or more with activity; however, it will gradually decrease with time. You can expect swelling in your entire leg including your foot. Everyone is different, sometimes the swelling will take several days to develop. Remember that your  body is healing from surgery and swelling is normal. Follow the guidelines below to reduce swelling. You should call our office if you have swelling that is accompanied by redness and heat, or if the swelling does not improve after elevating properly.     REST: We want you to be up and moving but do this in moderation. Exercise is important but don’t overdo it! Increased activity means increased swelling. By decreasing the swelling early, you will recover quicker.  ICE: Ice as much as possible the first week or two. Ice is a great anti-inflammatory and helps minimize swelling. Apply ice to the covered surgical site every 1-2 hours for 15-20 minutes at a time.   ELEVATE: To reduce significant amounts of swelling elevate your leg 3-4 times a day for 30 minutes each time. To be effective your foot must be above the level of your head.      WILL I HAVE BRUISING AFTER SURGERY?  Yes, most people will have bruising after surgery. It may only be around the incision, but it may also involve the entire leg. Both are considered normal and will resolve over 10-14 days with proper care.      HOW DO I TAKE CARE OF MY INCISION?  Your incision will be covered by a bulky dressing after surgery. This may be removed after 3 days. Underneath you will find a water-resistant dressing. This dressing may be removed after 7 days and should be replaced by a dry (Island) dressing every 2 to 3 days until your first follow up visit. Please do not remove your original dressing if you do not have a replacement dressing in hand.    Most of the time, your stitches will be under the skin and will dissolve on their own. If you have staples they can be removed 10-14 days after surgery as long as there is no drainage. If the wound is draining, the dressing should be changed once it becomes saturated. The wound should be dry and without drainage by about 7 days postoperative. If there is persistent drainage from the wound after this time period, you  should call our office. If there is worsening redness around the incision, you should also call the office.      WHEN CAN I SHOWER OR BATHE?  You can shower the day after your bulky dressing is removed. Your water-resistant dressing and incision will need to be covered with a waterproof plastic such as Glad Press n’ Seal or a cast cover which can be obtained at most medical supply stores or pharmacies. Most patients can take a bath or go swimming after 6 weeks.      WHAT POSITIONS CAN I SLEEP IN?  You may sleep your back or side but avoid sleeping on your stomach for the first 2 weeks after surgery. Many patients feel most comfortable sleeping on their side with a pillow between their legs during the first month of recovery.       DO I NEED TO AVOID CERTAIN POSITIONS WITH MY LEG?  No, in fact I recommend working on range of motion through self-directed exercises throughout the day. Alternating extension and flexion exercises with elevation and icing will make the recovery process proceed smoothly. A goal to set is to be able to bend your knee at a 90-degree angle at your first postoperative appointment.       HOW MUCH WEIGHT CAN I PUT ON MY LEG AFTER SURGERY?  You may put as much weight as feels comfortable on your surgical leg after surgery unless told otherwise. Most patients will use an assist device (i.e. a walker, crutches or cane) for the first few weeks after surgery. Becoming independent from assist devices is usually directly related to how debilitated and deconditioned you were prior to surgery. If you have used a walker, crutches or cane for a long time before surgery be patient and take your time to make sure you are strong enough and stable enough to walk independently. Typically, we recommend that you continue them until we are certain you are safe.      WHAT SHOULD I EXPECT MY ACTIVITY LEVEL BE?  All patients are provided with a joint replacement handbook with very specific instructions on activity  level and exercise that we expect you to rely on. That being said, everyone is different. You should slowly increase your activity level every day, but let your discomfort and swelling be your guide. It is not uncommon to experience setbacks if you do too much too soon. When this occurs do not be discouraged, adjust your level of activity and make sure you rely on rest, ice, and elevation to decrease the swelling.       WHEN CAN I DRIVE?  Literature suggests reaction time for driving returns approximately 4 weeks following a knee replacement. Patients must be off narcotic medication at this time (otherwise you can get a DUI) and walking without an assistive device (cane/walker) prior to attempting driving.      WHAT IF I AM HAVING PROBLEMS SLEEPING?  Most patients have some trouble sleeping after joint replacement surgery. Make sure that your discomfort is being controlled by following the guidelines for managing pain and swelling. Sleep during the day may make it harder to sleep throughout the night. A small percentage of patients will continue to have some difficulty sleeping that may be treated with medicine on a temporary basis.      WHAT SHOULD I EAT AFTER SURGERY?  Prior to surgery you should be adjusting your diet to help achieve the best outcome possible. These recommendations can be found in the guidebook and joint class. After surgery, continue to maintain good protein intake to help with the healing process. You should also drink plenty of fluids and eat foods high in fiber to avoid constipation. Avoid processed foods, soda, and other foods / drinks with added sugar. Having stable / controlled blood sugar levels will decrease your risk of complications following surgery.      WHAT SHOULD I DO TO RELIEVE CONSTIPATION?  All narcotic pain medications have constipation as a side effect, so it is important to continue these recommendations until they are no longer needed. Stool softeners should be started a  few days before surgery and continued until your bowel habits normalize after surgery. The recommended medications can be bought over the counter at a pharmacy.      WHEN CAN I RESTART THE MEDS I WAS TOLD TO STOP PRIOR TO SURGERY?  We recommend resuming all of your regular prescriptions, vitamins and supplements after discharge from the hospital except for biologic medications often prescribed by rheumatologists. You may resume those types of medications 2 weeks after surgery.       NOW THAT I AM NO LONGER REQUIRING NARCOTIC PAIN MEDICATION, WHAT CAN I TAKE IF I SHOULD EXPERIENCE DISCOMFORT?  You may take Tylenol in addition to the NSAID that you were prescribed (NSAIDs are not prescribed for patients taking a blood thinner other than aspirin).       WHAT SHOULD I DO IF I THINK MY JOINT IS INFECTED?  Bruising and swelling after surgery is normal. Small amounts of clear yellow, pink or bloody drainage may be normal too. Contact the office if you have a large amount of drainage, if the drainage looks like pus, if you have a temperature of 100.4, or if you have a sudden increase in pain that is not controlled by your pain medications and printed guidelines.      IS IT NORMAL TO HEAR CLICKING IN MY KNEE AFTER SURGERY?  Knee replacements are made of metal and plastic and it is normal for clicking to occur after surgery. It is often more noticeable in the early phases of recovery after surgery. As the swelling decreases and the healing process matures, the clicking may become less noticeable.      WILL I BE ABLE TO KNEEL AFTER SURGERY?  Kneeling will not damage the prosthesis; however, it is common for kneeling to be uncomfortable in the beginning. Patients should wait until they feel nearly recovered before kneeling. This usually requires 3 months. I recommend placing a cushion or folding a blanket under your knee to help minimize the pressure on the knee.        WHEN TO CALL DR. HENDERSON’S OFFICE:    Fever above 100.4°  consistently  Increased drainage or drainage that has saturated the dressing  Drainage that looks like pus  Pain that is not controlled by medications, ice and elevation  Inability to bear weight on your operative leg  Swelling in foot or calf that is accompanied by coolness or decreased sensation in foot  Severe insomnia  Difficulty breathing   Confusion / disorientation        INSTRUCTIONS TO REDUCING SWELLING     As the body is healing after joint replacement, swelling is normal and expected. Following the simple instructions below will help you manage swelling, which will help you manage your pain as well. Do this every 2-3 hours, especially for the first week you are home.     Elevate leg on 2-3 pillows lengthwise so foot is higher than level of the heart. DO NOT place a pillow under and across the knee as that may help form blood clots. Keep the leg in a straight line.   Add ice packs to the surgical area.   Do ankle/foot pumps with the elevated foot.  Do the above steps every 2-3 hours for 20 minutes to reduce swelling.        BENDING AND STRAIGHTENING YOUR KNEE AFTER SURGERY      After your surgery, your knee will be swollen and feeling tight.  However, you need to bend and straighten the knee as you were instructed while in the surgeon’s office and at the hospital. The picture above shows the areas where you should be bending and straightening the leg.     The green zone is where you want to be (bent at 90° and up to 135° with full extension to 0°). Placing a pillow under the ankle while lying down in bed will help your knee to reach 0°, which will further increase your range of motion.    The early arc of the orange zone is NOT where you want to have your leg (10° - 45°). This will cause “locking of the knee” which will prevent you from having full range of motion, and cause pain and limit regular activity such as stairs or getting in and out of a car.    Full range of motion (0° to about 110°-130°) is the  desired result after total and partial knee replacement.      ANTIBIOTIC PROTOCOL    Bacteria in the mouth can travel directly into the bloodstream and infect your new joint. After surgery, it is recommended to wait at least 2 months before any dental work. Below are our recommendations for pre-medicating with antibiotics prior to dental visits and procedures for the following 2 years from surgery date. After 2 years the patient is not required to pre-medicate.     Routine Cleaning / Dental Procedures / Dental Surgeries:   Take 4 tablets of Amoxicillin 500mg (2 grams) one hour prior to dental appointment.  If allergic to penicillin - take Clindamycin 600mg one hour prior.      PREMEDICATE WITH ANTIBIOTIC PRIOR TO ANY OF THE FOLLOWING PROCEDURES    Sigmoidoscopy/colonoscopy, tonsillectomy, bronchoscopy, liver biopsy, genitourinary instrumentation, prostate or bladder surgery, kidney surgery, GYN surgery, and barium enema

## 2025-01-29 NOTE — ANESTHESIA PROCEDURE NOTES
Airway  Date/Time: 1/29/2025 11:06 AM  Urgency: elective    Airway not difficult    General Information and Staff    Patient location during procedure: OR  Anesthesiologist: Ruslan Schwartz MD  Performed: anesthesiologist   Performed by: Ruslan Schwartz MD  Authorized by: Ruslan Schwartz MD      Indications and Patient Condition  Indications for airway management: anesthesia  Sedation level: deep  Preoxygenated: yes  Patient position: sniffing  Mask difficulty assessment: 1 - vent by mask    Final Airway Details  Final airway type: endotracheal airway      Successful airway: ETT  Cuffed: yes   Successful intubation technique: direct laryngoscopy  Endotracheal tube insertion site: oral  Blade: Pool  Blade size: #3  ETT size (mm): 7.5    Cormack-Lehane Classification: grade IIA - partial view of glottis  Placement verified by: capnometry   Measured from: teeth  ETT to teeth (cm): 20  Number of attempts at approach: 1

## 2025-01-29 NOTE — ANESTHESIA PREPROCEDURE EVALUATION
PRE-OP EVALUATION    Patient Name: Catina Rivera    Admit Diagnosis: Degenerative arthritis of distal interphalangeal joint of ring finger of left hand [M15.1]    Pre-op Diagnosis: Degenerative arthritis of distal interphalangeal joint of ring finger of left hand [M15.1]    RIGHT TOTAL KNEE ARTHROPLASTY    Anesthesia Procedure: RIGHT TOTAL KNEE ARTHROPLASTY (Right)    Surgeon(s) and Role:     * Curtis Daly MD - Primary    Pre-op vitals reviewed.        Body mass index is 41.15 kg/m².    Current medications reviewed.  Hospital Medications:   clonidine-EPINEPHrine-ropivacaine pain cocktail syringe (OR)   Injection Once (Intra-Op)    povidone-iodine (Betadine) 10 % 17.5 mL in sodium chloride 0.9 % 500 mL irrigation   Irrigation Once (Intra-Op)       Outpatient Medications:     Facility-Administered Medications Prior to Admission   Medication Dose Route Frequency Provider Last Rate Last Admin    [COMPLETED] triamcinolone acetonide (Kenalog-40) 40 MG/ML injection 40 mg  40 mg Intra-articular Once Ruslan Connelly MD   40 mg at 12/26/24 0913     Medications Prior to Admission   Medication Sig Dispense Refill Last Dose/Taking    MOUNJARO 15 MG/0.5ML Subcutaneous Solution Auto-injector Inject 15 mg as directed once a week. 6 mL 1 Taking    BUPROPION  MG Oral Tablet 24 Hr TAKE 1 TABLET(300 MG) BY MOUTH DAILY 90 tablet 1 Taking    simvastatin 40 MG Oral Tab Take 1 tablet (40 mg total) by mouth every evening. 90 tablet 0 Taking    Omeprazole 40 MG Oral Capsule Delayed Release Take 1 capsule (40 mg total) by mouth daily. 90 capsule 1 Taking    Calcium Carb-Cholecalciferol (CALCIUM + VITAMIN D3 OR) Take by mouth. Vitamin d 800 international unit and Calcium 600 mg   Taking    levothyroxine 125 MCG Oral Tab Take 1 tablet (125 mcg total) by mouth before breakfast. 90 tablet 1 Taking    Insulin Disposable Pump (OMNIPOD 5 G6 PODS, GEN 5,) Does not apply Misc 1 each every 3 (three) days. 30 each 1 Taking    PARoxetine HCl 40  MG Oral Tab Take 1 tablet (40 mg total) by mouth every morning. 90 tablet 3 Taking    Insulin Glargine, 2 Unit Dial, (TOUJEO MAX SOLOSTAR) 300 UNIT/ML Subcutaneous Solution Pen-injector USE AS DIRECTED UP TO 75 UNITS DAILY IN THE EVENT OF INSULIN PUMP FAILURE 6 mL 0 Taking    NOVOLOG 100 UNIT/ML Injection Solution INJECT UP  UNITS VIA INSULIN PUMP DAILY AS DIRECTED 150 mL 1 Taking    Insulin Disposable Pump (OMNIPOD 5 G6 INTRO, GEN 5,) Does not apply Kit 1 each every 3 (three) days. 1 kit 0 Taking    dilTIAZem HCl ER Beads 180 MG Oral Capsule SR 24 Hr Take 1 capsule (180 mg total) by mouth 2 (two) times daily.   Taking    glucagon (GVOKE HYPOPEN 2-PACK) 1 MG/0.2ML Subcutaneous SUBQ injection Inject 0.2 mL (1 mg total) into the skin as needed. 0.4 mL 1 Taking As Needed    apixaban 5 MG Oral Tab Take 1 tablet (5 mg total) by mouth in the morning and 1 tablet (5 mg total) before bedtime. 60 tablet 3 Taking    estradiol 0.05 MG/24HR Transdermal Patch Weekly APPLY 1 PATCH EXTERNALLY TO THE SKIN EVERY TUESDAY. DO NOT CUT PATCH   Taking    Continuous Glucose Transmitter (DEXCOM G6 TRANSMITTER) Does not apply Misc USE 1 TRANSMITTER EVERY 90 DAYS 1 each 0     cyclobenzaprine 10 MG Oral Tab Take 1 tablet (10 mg total) by mouth nightly as needed for Muscle spasms. Take 1 tab at bedtime as needed 30 tablet 0     methylPREDNISolone (MEDROL) 4 MG Oral Tablet Therapy Pack Dosepack: take as directed 1 each 0     [] HYDROcodone-acetaminophen 5-325 MG Oral Tab Take 1 tablet by mouth every 6 (six) hours as needed. 10 tablet 0     Continuous Glucose Sensor (DEXCOM G6 SENSOR) Does not apply Misc USE 1 SENSOR EVERY 10 DAYS 9 each 1     Glucose Blood (ONETOUCH VERIO) In Vitro Strip USE TO TEST ONE TIME DAILY AS NEEDED TO CALIBRATE DEXCOM READINGS 100 strip 1     Blood Glucose Monitoring Suppl (ONETOUCH VERIO FLEX SYSTEM) w/Device Does not apply Kit Use to test blood sugar 1x daily as needed to calibrate dexcom 1 kit 0      Insulin Pen Needle (BD PEN NEEDLE AN U/F) 32G X 4 MM Does not apply Misc Inject 1 Pen into the skin in the morning and 1 Pen at noon and 1 Pen in the evening. 300 each 0        Allergies: Adhesive tape, Ibuprofen, Sulfa antibiotics, Erythromycin base, and Oxycodone      Anesthesia Evaluation    Patient summary reviewed.    Anesthetic Complications  (-) history of anesthetic complications         GI/Hepatic/Renal      (+) GERD                           Cardiovascular      ECG reviewed.  Exercise tolerance: good     MET: >4    (+) obesity  (+) hypertension   (+) hyperlipidemia               (+) dysrhythmias and atrial fibrillation                  Endo/Other      (+) diabetes     (+) hypothyroidism                       Pulmonary      (+) asthma              (+) sleep apnea       Neuro/Psych      (+) depression  (+) anxiety           (+) headaches           Patient Active Problem List:     Mild intermittent asthma without complication (HCC)     Esophageal reflux     Hyperlipidemia     Hypothyroidism     Mild episode of recurrent major depressive disorder     Neoplasm of uncertain behavior of skin     Female stress incontinence     Anxiety     Obese     Plantar fasciitis     Type 2 diabetes mellitus without complication, with long-term current use of insulin (HCC)     Cyst of right ovary     Insulin pump in place     Degenerative arthritis of distal interphalangeal joint of middle finger of left hand     S/P thyroidectomy     Primary hypertension     Uses self-applied continuous glucose monitoring device     Lumbar radiculopathy     CORINNE on CPAP     Retinal hole of left eye    Anesthesia History     Thyroiditis, unspecified  Other and unspecified hyperlipidemia  Lipoma of unspecified site  Headache(784.0)  Plantar fasciitis  GERD (gastroesophageal reflux disease)  Multiple thyroid nodules  Anxiety  Esophageal reflux  Depression  Anxiety state, unspecified  Asthma (HCC)  Obesity  Hypothyroidism  Stress  Back  pain  Wears glasses  Nontoxic multinodular goiter  Sleep apnea  Disorder of thyroid  High cholesterol  Heartburn  Painful swallowing  Uncomfortable fullness after meals  Bloating  Pain with bowel movements  Belching  Irregular bowel habits  Weight loss  Heart palpitations  Leaking of urine  History of depression  Decorative tattoo  Pain in joints  Bad breath  Arrhythmia  Type 1 diabetes mellitus (HCC)  Visual impairment  Back problem  Pain in right shin  Diabetes (HCC)  Atrial fibrillation with RVR (HCC)  Nontoxic multinodular goiter              Past Surgical History:   Procedure Laterality Date    Arthroscopy of joint unlisted Bilateral     knee    Carpal tunnel release Bilateral     Cholecystectomy      Colonoscopy N/A 10/21/2016    Procedure: COLONOSCOPY;  Surgeon: Brandt Ramirez DO;  Location:  ENDOSCOPY    Colonoscopy N/A 12/27/2022    Procedure: COLONOSCOPY;  Surgeon: Aiden Pollard MD;  Location:  ENDOSCOPY    Cyst aspiration left      Cyst aspiration right      Hysterectomy      7/21    Laparoscopic cholecystectomy  11/14/2011    Lipoma removal  04/29/2022    left posterior    Mesh (implantable)      bladder    Neuromuscular stimulator      Oophorectomy Bilateral     Other      wisdom    Other      lt hand middle finger sx    Other surgical history  x2    total thyroidectomy    Other surgical history  x1    sinus    Other surgical history  10/11/2023    trial for neurostimulator    Other surgical history      Neuro Nerve Stimulator    Removal gallbladder      Sinus surgery        Sling oper stres incontinence  01/09/2014    Procedure: SLING UROLOGY;  Surgeon: Isac Sutherland MD;  [facility]    Thyroidectomy      Total abdom hysterectomy  07/2020     Social History     Socioeconomic History    Marital status:     Number of children: 3   Occupational History    Occupation:      Comment: Convenience    Tobacco Use    Smoking status: Former      Current packs/day: 0.00     Types: Cigarettes     Start date: 3/1/2017     Quit date: 3/1/2017     Years since quittin.9     Passive exposure: Never    Smokeless tobacco: Never    Tobacco comments:     non-smoker     Updated 24   Vaping Use    Vaping status: Never Used   Substance and Sexual Activity    Alcohol use: Yes     Comment: rare    Drug use: Yes     Comment: CBD,CBG  Doctors are fully aware    Sexual activity: Yes     History   Drug Use     Comment: CBD,CBG  Doctors are fully aware     Available pre-op labs reviewed.  Lab Results   Component Value Date    WBC 10.9 2025    RBC 4.91 2025    HGB 14.6 2025    HCT 43.4 2025    MCV 88.4 2025    MCH 29.7 2025    MCHC 33.6 2025    RDW 13.0 2025    .0 2025     Lab Results   Component Value Date     2025    K 4.0 2025     2025    CO2 30.0 2025    BUN 14 2025    CREATSERUM 0.90 2025     (H) 2025    CA 9.2 2025     Lab Results   Component Value Date    INR 1.20 2025         Airway      Mallampati: II  Mouth opening: 3 FB  TM distance: 4 - 6 cm  Neck ROM: full Cardiovascular      Rhythm: regular  Rate: normal     Dental    Dentition appears grossly intact         Pulmonary      Breath sounds clear to auscultation bilaterally.               Other findings              ASA: 3   Plan: MAC  NPO status verified and patient meets guidelines.    Post-procedure pain management plan discussed with surgeon and patient.    Comment: SCS turned off by pt    Insulin pump settings programmed per pt's endocrinologist recommendations  Plan/risks discussed with: patient and child/children  Use of blood product(s) discussed with: patient and child/children              Present on Admission:  **None**

## 2025-01-29 NOTE — OPERATIVE REPORT
Operative Report    Patient Name: Catina Rivera  YOB: 1970  MRN: [id number]    Date of Procedure: 1/29/2025    Procedure:   Right total knee arthroplasty (CPT 95254)    PREOPERATIVE DIAGNOSIS: Right knee osteoarthritis  POSTOPERATIVE DIAGNOSIS: Same    SURGEON: Ruslan Connelly M.D.     ASSISTANT: Joaquin Samayoa PA-C  No qualified resident or fellow was available to assist.    ANESTHESIA: Ruslan Schwartz M.D.    ANESTHESIA TYPE: General with adductor canal due to lumbar spine pathology    ANTIBIOTICS: IV Ancef 2g    EBL: 150 ml    FINDINGS: End stage degenerative joint disease with articular cartilage wear and osteophyte formation involving all compartments    COMPLICATIONS: None apparent    IMPLANTS:   1.  Germán Persona Size 8 Narrow CR Femoral Component   2.  Germán Persona Size D Tibial Baseplate   3.  Persona 10 mm MC articular insert   4.  Germán 3 Peg 32 mm x 8.5 mm Patellar Component    INDICATIONS:    The patient is a 54 year old female with a longstanding history of right knee degenerative joint disease. Despite non-operative treatment, it has progressed and become severely debilitating to the patient. They are limited in their activities of daily living including walking, getting up and down from seated positions, and exercising. Radiographs demonstrated severe degenerative changes involving the medial compartment with significant varus deformity, osteophyte formation, and subluxation. The patient was indicated for total knee arthroplasty to alleviate their pain and function. The procedure, as well the risks, benefits, and alternatives were discussed at length. Please see my preoperative history and physical for complete details. An opportunity was provided today for any additional questions, and these were answered to their satisfaction. The patient wished to proceed with a total knee replacement.      Description of procedure:  Catina Rivera was identified in the preoperative  holding area, and the correct operative extremity was marked. The patient was brought to the operating room, where the anesthesia was induced.   Prophylactic antibiotics were administered intravenously within 30 minutes prior to incision. The patient was positioned in a supine position with all nonoperative extremities protected and padded. A SCD was applied to the non-operative lower leg. The operative lower extremity was then prepped and draped in standard sterile fashion. Time out was performed. Tranexamic acid was administered IV. Tourniquet was inflated to 220 mmHg.     A longitudinal incision was made 3 fingerbreaths superior to the patella to the medial aspect of the tibial tubercle. Soft tissues were dissected sharply down to the extensor mechanism. A medial arthrotomy was carried out to the knee joint. An appropriate medial release was continued around the posteromedial aspect of the tibia and the deep insertion of the medial collateral ligament. The infra-patellar fat pad was excised. The patella was partially everted and the undersurface of the patella resected. The patella was subluxed but not everted and retracted laterally and the knee flexed to 90 degrees. Retractors were placed in the joint giving us exposure to the joint. The ACL was taken down as well as the anterior horns of the medial and lateral menisci.     An intramedullary drill hole was made and an intramedullary alignment guide was passed into the distal femur.  Distal femoral resection was then performed at 6 degrees of anatomic valgus.    The extramedullary tibial resection guide was fixed to the tibia with pins and the appropriate resection of the proximal portion was made. The cut alignment was checked with an alignment richie. The extension space was checked with a block and was found to be symmetric and stable.    The distal femur was sized to a 8 implant. The appropriate 4-in-1 cutting block was placed on the distal femur in a degree of  external rotation that balanced the flexion space and prevented anterior notching, and was perpendicular to [place]’s line. The epicondylar axis was also verified. The bony cuts were made and the cutting block and resected bone removed. Care was taken to protect the medial collateral ligament as well as popliteus tendon.    At this point the knee was flexed, and laminar spreaders were placed medially and laterally sequentially to expose and assess the flexion space. The medial and lateral menisci, and posterior condylar osteophytes were excised. Flexion/extension gaps were assessed and found to be symmetric and stable.    The femoral trial was placed in appropriate medial/lateral position. Trial implants were in place at this point.  The patella cut was made freehand and lug holes drilled.    The knee was taken through range of motion and stability and tracking was excellent. All trial components were removed. Prior to removing the tibial tray, the punch was inserted to the appropriate position after checking rotational alignment with the medial third of the tubercle and the condyles of the femoral component.    The knee was pulse lavaged and the bony surfaces dried thoroughly. Mateo-articular soft tissues were injected with the mateo-articular cocktail injection. The distal femoral drill hole was grafted with autologous bone taken from previously resected bone. Care was taken to make the cancellous bone surfaces dry and free of bone debris. Using vacuum-mixed cement, the implants were cemented into place, the tibial tray followed by the femoral component.  After the removal of excess cement the polyethylene insert trial was seated and locked into the tibial tray.  The knee was reduced and brought into full extension further pressurizing the cement.    At this point, the patellar implant was cemented into place and held with a patellar clamp.  The wound was irrigated with dilute betadine. This was followed by 1  liter of pulse lavage. After the cement was allowed to polymerize, the tourniquet was deflated. Hemostasis was verified. Excess cement was removed. The final polyethylene insert was seated and locked into the tibial tray.     The medial parapatellar arthrotomy was repaired with interrupted 1 Vicryl sutures and reinforced with Stratafix barbed suture. The subcutaneous tissue was repaired with interrupted 2-0 Vicryl suture. The skin was closed with staples.  A sterile compressive dressing was applied.  All sponge, needle counts were correct x 2. The patient was transferred to the recovery room in stable condition.        Post-operative Plan:  PACU XRs will be obtained. The patient be allowed to weight-bear as tolerated. They will receive perioperative antibiotics and be started on DVT prophylaxis in the form of Eliquis 2.5 mg BID for 3 days, followed by resuming home Eliquis 5 mg BID POD#3 if no evidence of bleeding and compression devices. They will be discharged home from the hospital when they are comfortable and have met all PT goals.

## 2025-01-29 NOTE — ANESTHESIA POSTPROCEDURE EVALUATION
[facility]    Catina Rivera Patient Status:  Outpatient in a Bed   Age/Gender 54 year old female MRN [id number]   Location [facility] POST ANESTHESIA CARE UNIT Attending Ruslan Connelly MD   Hosp Day # 0 PCP Essence Brito MD       Anesthesia Post-op Note    RIGHT TOTAL KNEE ARTHROPLASTY    Procedure Summary       Date: 01/29/25 Room / Location:  MAIN OR 12 /  MAIN OR    Anesthesia Start: 1100 Anesthesia Stop: 1334    Procedure: RIGHT TOTAL KNEE ARTHROPLASTY (Right: Knee) Diagnosis:       Primary osteoarthritis of right knee      (Primary osteoarthritis of right knee [M17.11])    Surgeons: Ruslan Connelly MD Anesthesiologist: Ruslan Schwartz MD    Anesthesia Type: general ASA Status: 3            Anesthesia Type: general    Vitals Value Taken Time   /72 01/29/25 1339   Temp 99.2 01/29/25 1341   Pulse 91 01/29/25 1340   Resp 18 01/29/25 1340   SpO2 92 % 01/29/25 1337   Vitals shown include unfiled device data.        Patient Location: PACU    Anesthesia Type: general    Airway Patency: extubated    Postop Pain Control: adequate    Mental Status: mildly sedated but able to meaningfully participate in the post-anesthesia evaluation    Nausea/Vomiting: none    Cardiopulmonary/Hydration status: stable euvolemic    Complications: no apparent anesthesia related complications    Postop vital signs: stable    Dental Exam: Unchanged from Preop

## 2025-01-29 NOTE — INTERVAL H&P NOTE
Pre-op Diagnosis: Primary osteoarthritis of right knee [M17.11]    The above referenced H&P was reviewed by Ruslan Connelly MD on 1/29/2025, the patient was examined and no significant changes have occurred in the patient's condition since the H&P was performed.  I discussed with the patient and/or legal representative the potential benefits, risks and side effects of this procedure; the likelihood of the patient achieving goals; and potential problems that might occur during recuperation.  I discussed reasonable alternatives to the procedure, including risks, benefits and side effects related to the alternatives and risks related to not receiving this procedure.  We will proceed with procedure as planned.

## 2025-01-29 NOTE — ANESTHESIA PROCEDURE NOTES
Regional Block    Date/Time: 1/29/2025 11:07 AM    Performed by: Ruslan Schwartz MD  Authorized by: Ruslan Schwartz MD      General Information and Staff    Start Time:  1/29/2025 11:07 AM  End Time:  1/29/2025 11:10 AM  Anesthesiologist:  Ruslan Schwartz MD  Performed by:  Anesthesiologist  Patient Location:  OR    Block Placement: Post Induction  Site Identification: real time ultrasound guided and image stored and retrievable    Block site/laterality marked before start: site marked  Reason for Block: at surgeon's request and post-op pain management    Preanesthetic Checklist: 2 patient identifers, IV checked, site marked, risks and benefits discussed, monitors and equipment checked, pre-op evaluation, timeout performed, anesthesia consent, sterile technique used, no prohibitive neurological deficits and no local skin infection at insertion site      Procedure Details    Patient Position:  Supine  Prep: ChloraPrep    Monitoring:  Cardiac monitor, continuous pulse ox, blood pressure cuff and heart rate  Block Type:  Adductor canal  Laterality:  Right  Injection Technique:  Single-shot    Needle    Needle Type:  Short-bevel and echogenic  Needle Gauge:  21 G  Needle Localization:  Ultrasound guidance  Reason for Ultrasound Use: appropriate spread of the medication was noted in real time and no ultrasound evidence of intravascular and/or intraneural injection            Assessment    Injection Assessment:  Good spread noted, negative resistance, negative aspiration for heme, incremental injection and low pressure  Heart Rate Change: No    - Patient tolerated block procedure well without evidence of immediate block related complications.     Medications  1/29/2025 11:07 AM      Additional Comments    Medication:  Bupivacaine 0.375% 20mL + Decadron PF 2mg

## 2025-01-29 NOTE — CM/SW NOTE
01/29/25 1400   CM/SW Referral Data   Referral Source Social Work (self-referral)   Reason for Referral Discharge planning   Informant EMR;Clinical Staff Member   Discharge Needs   Anticipated D/C needs Home health care       Patient is a 53 y/o woman being admitted s/p TKR.  Pt with pre-operative plan for [facility] ([facility]).  PT eval pending.  [facility]  Await confirmation of acceptance and therapy evals for further DC Planning.  / to remain available for support and/or discharge planning.     Gail Anand, [facility]  Discharge Planner  [phone]

## 2025-01-30 VITALS
HEART RATE: 67 BPM | BODY MASS INDEX: 44.53 KG/M2 | OXYGEN SATURATION: 100 % | SYSTOLIC BLOOD PRESSURE: 157 MMHG | WEIGHT: 242 LBS | HEIGHT: 62 IN | DIASTOLIC BLOOD PRESSURE: 66 MMHG | TEMPERATURE: 99 F | RESPIRATION RATE: 16 BRPM

## 2025-01-30 LAB — GLUCOSE BLD-MCNC: 139 MG/DL (ref 70–99)

## 2025-01-30 PROCEDURE — 99214 OFFICE O/P EST MOD 30 MIN: CPT | Performed by: HOSPITALIST

## 2025-01-30 RX ORDER — HYDROCODONE BITARTRATE AND ACETAMINOPHEN 10; 325 MG/1; MG/1
1 TABLET ORAL EVERY 6 HOURS PRN
Qty: 40 TABLET | Refills: 0 | Status: SHIPPED | OUTPATIENT
Start: 2025-01-30 | End: 2025-02-05

## 2025-01-30 RX ORDER — PREGABALIN 50 MG/1
50 CAPSULE ORAL 3 TIMES DAILY
Qty: 90 CAPSULE | Refills: 0 | Status: SHIPPED | OUTPATIENT
Start: 2025-01-30 | End: 2025-03-01

## 2025-01-30 RX ORDER — CYCLOBENZAPRINE HCL 10 MG
10 TABLET ORAL NIGHTLY
Qty: 30 TABLET | Refills: 0 | Status: SHIPPED | OUTPATIENT
Start: 2025-01-30 | End: 2025-03-01

## 2025-01-30 RX ORDER — SENNOSIDES 8.6 MG
650 CAPSULE ORAL EVERY 8 HOURS
Qty: 90 TABLET | Refills: 0 | Status: SHIPPED | COMMUNITY
Start: 2025-01-30 | End: 2025-03-01

## 2025-01-30 RX ORDER — TRAMADOL HYDROCHLORIDE 50 MG/1
50 TABLET ORAL EVERY 8 HOURS PRN
Qty: 40 TABLET | Refills: 0 | Status: SHIPPED | OUTPATIENT
Start: 2025-01-30 | End: 2025-02-05

## 2025-01-30 NOTE — PLAN OF CARE
Post-op day 0. Dressing is clean, dry, and intact. Alert and oriented x4. Bilateral SCD's in place. Two person skin check with DAYAN Izaguirre. Skin is clean, dry, and intact. Room air. Continuous pulse oximeter. CORINNE with CPAP. Telemetry monitoring. Eliquis for anticoagulation. Voiding in bathroom. Last bowel movement 1/29. Regular diet with accuchecks. CGM/CGI in place. Forms placed on chart documenting the use of CGM/CGI. Physical and occupational therapy per protocol. Plan is home with [facility] when medically stable.

## 2025-01-30 NOTE — OCCUPATIONAL THERAPY NOTE
OCCUPATIONAL THERAPY EVALUATION - INPATIENT    Room Number: 379/379-A  Evaluation Date: 1/30/2025     Type of Evaluation: Initial  Presenting Problem: 1/29 R TKA    Physician Order: IP Consult to Occupational Therapy  Reason for Therapy:  ADL/IADL Dysfunction and Discharge Planning    OCCUPATIONAL THERAPY ASSESSMENT   Patient is a 54 year old female admitted on 1/29/2025 with Presenting Problem: 1/29 R TKA. Co-Morbidities : anxiety, afib, DM, depression, thyroidectomy  Patient is currently functioning at baseline with toileting, upper body dressing, lower body dressing, grooming, bed mobility, transfers, static sitting balance, dynamic sitting balance, static standing balance, dynamic standing balance, and maintaining seated position.  Prior to admission, patient's baseline is Mod I.  Patient met all OT goals at Sup level.  Patient reports no further questions/concerns at this time.       Recommendations for nursing staff:   Transfers: Sup  Toileting location: Toilet    EVALUATION SESSION:  Patient at start of session: supine in bed for session    FUNCTIONAL TRANSFER ASSESSMENT  Sit to Stand: Edge of Bed; Chair  Edge of Bed: Supervision  Chair: Supervision  Toilet Transfer: Supervision    BED MOBILITY  Rolling: Supervision  Supine to Sit : Supervision  Scooting: Sup to eOB    BALANCE ASSESSMENT  Static Sitting: Supervision  Static Standing: Supervision    FUNCTIONAL ADL ASSESSMENT  Grooming Standing: Supervision (at sink to wash up)  UB Dressing Seated: Supervision (for shirt)  LB Dressing Seated: Supervision (for pants over feet and socks with sock aide with eductaion)  LB Dressing Standing: Supervision (for shoes in standing and pulling pants up)  Toileting Seated: Supervision (at std toilet sup)    ACTIVITY TOLERANCE: vitals stable                         O2 SATURATIONS       COGNITION  Overall Cognitive Status:  WFL - within functional limits    COGNITION ASSESSMENTS     Upper Extremity:   ROM: within functional  limits   Strength: is within functional limits   Coordination:  Gross motor: WNl  Fine motor: WNL  Sensation: Light touch:  intact    EDUCATION PROVIDED  Patient Education : Role of Occupational Therapy; Plan of Care  Patient's Response to Education: Verbalized Understanding; Returned Demonstration    Equipment used: RW, sock aide, reacher, shoe horn  Demonstrates functional use      Patient End of Session: Up in chair;Needs met;Call light within reach;RN aware of session/findings;All patient questions and concerns addressed;Family present    OCCUPATIONAL PROFILE    HOME SITUATION  Type of Home: House  Home Layout: Two level;Able to live on main level  Lives With: Daughter    Toilet and Equipment: Standard height toilet  Shower/Tub and Equipment: Walk-in shower  Other Equipment: None    Occupation/Status: works at homeless shelter     Drives: Yes  Patient Regularly Uses: Cane (has RW)    Prior Level of Function: Pt typically independent with ADLs and mobility. Pt does not use AD.    SUBJECTIVE  Pt stated, \"I am just hurting badly.\"    PAIN ASSESSMENT  Ratin  Location: surgical  Management Techniques: Activity promotion;Body mechanics;Breathing techniques;Relaxation;Repositioning    OBJECTIVE  Precautions: Bed/chair alarm  Fall Risk: High fall risk    WEIGHT BEARING RESTRICTION  R Lower Extremity: Weight Bearing as Tolerated      AM-PAC ‘6-Clicks’ Inpatient Daily Activity Short Form  -   Putting on and taking off regular lower body clothing?: A Little  -   Bathing (including washing, rinsing, drying)?: A Little  -   Toileting, which includes using toilet, bedpan or urinal? : A Little  -   Putting on and taking off regular upper body clothing?: A Little  -   Taking care of personal grooming such as brushing teeth?: A Little  -   Eating meals?: A Little    AM-PAC Score:  Score: 18  Approx Degree of Impairment: 46.65%  Standardized Score (AM-PAC Scale): 38.66    ADDITIONAL TESTS     NEUROLOGICAL FINDINGS      PLAN    Patient has been evaluated and presents with no skilled Occupational Therapy needs at this time.  Patient discharged from Occupational Therapy services.  Please re-order if a new functional limitation presents during this admission.         Patient Evaluation Complexity Level:   Occupational Profile/Medical History LOW - Brief history including review of medical or therapy records    Specific performance deficits impacting engagement in ADL/IADL LOW  1 - 3 performance deficits    Client Assessment/Performance Deficits LOW - No comorbidities nor modifications of tasks    Clinical Decision Making LOW - Analysis of occupational profile, problem-focused assessments, limited treatment options    Overall Complexity LOW     OT Session Time: 40 minutes  Self-Care Home Management: 25 minutes  Therapeutic Activity: 0 minutes  Neuromuscular Re-education: 0 minutes  Therapeutic Exercise: 0 minutes  Cognitive Skills: 0 minutes  Sensory Integrative: 0 minutes  Orthotic Management and Trainin minutes  Can add/delete any of these

## 2025-01-30 NOTE — PHYSICAL THERAPY NOTE
PHYSICAL THERAPY TREATMENT NOTE - INPATIENT    Room Number: 379/379-A     Session: 1     Number of Visits to Meet Established Goals: 1    Presenting Problem: s/p R TKA on 1/29/25  Co-Morbidities : anxiety, afib, DM, depression, thyroidectomy    PHYSICAL THERAPY ASSESSMENT   Patient demonstrates good  progress this session, goals  updated to reflect patient performance.      Patient is requiring supervision, contact guard assist, and minimal assist as a result of the following impairments: pain and limited R knee  ROM.     Patient continues to function below baseline with transfers, gait, and stair negotiation.  Next session anticipate patient to progress gait.  Physical Therapy will continue to follow patient for duration of hospitalization.    Patient continues to benefit from continued skilled PT services: at discharge to promote prior level of function.  Anticipate patient will return home with home health PT.    PLAN DURING HOSPITALIZATION  Nursing Mobility Recommendation : 1 Assist  PT Device Recommendation: Rolling walker  PT Treatment Plan: Bed mobility;Endurance;Energy conservation;Patient education;Family education;Gait training;Strengthening;Range of motion;Stoop training;Stair training;Transfer training;Balance training  Frequency (Obs): Daily     CURRENT GOALS       Goal #1  Patient is able to demonstrate supine - sit EOB @ level: supervision      Goal #2  Patient is able to demonstrate transfers Sit to/from Stand at assistance level: supervision   Goal #3     Patient is able to ambulate 150 feet with assistive device at assistance level: supervision   Goal #4     Patient will negotiate 4 stairs/one curb w/ assistive device and supervision   Goal #5     Patient verbalizes and/or demonstrates all precautions and safety concerns independently    Goal #6        Goal Comments: Goals established on 1/29/2025 1/30/2025 all goals adequate for d/c     SUBJECTIVE  \"I promise not to hate you\"      OBJECTIVE  Precautions: Bed/chair alarm    WEIGHT BEARING RESTRICTION  R Lower Extremity: Weight Bearing as Tolerated    PAIN ASSESSMENT   Rating: Unable to rate  Location: R quad  Management Techniques: Activity promotion;Body mechanics;Breathing techniques;Relaxation;Repositioning    BALANCE                                                                                                                       Static Sitting: Good  Dynamic Sitting: Good           Static Standing: Fair -  Dynamic Standing: Fair -    ACTIVITY TOLERANCE                         O2 WALK       AM-PAC '6-Clicks' INPATIENT SHORT FORM - BASIC MOBILITY  How much difficulty does the patient currently have...  Patient Difficulty: Turning over in bed (including adjusting bedclothes, sheets and blankets)?: None   Patient Difficulty: Sitting down on and standing up from a chair with arms (e.g., wheelchair, bedside commode, etc.): None   Patient Difficulty: Moving from lying on back to sitting on the side of the bed?: A Little   How much help from another person does the patient currently need...   Help from Another: Moving to and from a bed to a chair (including a wheelchair)?: None   Help from Another: Need to walk in hospital room?: A Little   Help from Another: Climbing 3-5 steps with a railing?: A Little     AM-PAC Score:  Raw Score: 21   Approx Degree of Impairment: 28.97%   Standardized Score (AM-PAC Scale): 50.25   CMS Modifier (G-Code): CJ    FUNCTIONAL ABILITY STATUS  Gait Assessment   Functional Mobility/Gait Assessment  Gait Assistance: Supervision  Distance (ft): 200  Assistive Device: Rolling walker  Pattern: R Decreased stance time  Stairs: Stairs;Stoop/curb;Car transfer  How Many Stairs: 2  Device: 1 Rail  Assist: Contact guard assist  Pattern: Ascend and Descend  Ascend and Descend : Side step  Stoop/Curb: CGA  Car transfer: min A for RLE    Skilled Therapy Provided  Pt presents in BS chair, supportive daughter at BS .     Significance of achieving good ROM of R  knee in a timely fashion explained. Pt performed seated and standing therex per TKA protocol. Pt gait trained c RW cues for reciprocal gait pattern, WBAT and proper integration of RW with good return demo.   Pt t/f trained as noted above c cues for sequencing.   Pt has  RW for home use. Pt left in chair , needs met. All questions and concerns addressed.        Bed Mobility:  Rolling:    Supine<>Sit:    Sit<>Supine:      Transfer Mobility:  Sit<>Stand: supervision , cues for hand placement   Stand<>Sit: supervision    Gait: supervision     Therapist's Comments: pt states she will sleep in recliner on main level- is able to demo correct sequencing on stairs       THERAPEUTIC EXERCISES  Lower Extremity Ankle pumps  Hip AB/AD  Heel raises  Heel slides  Knee extension  Quad sets  SLR     Upper Extremity      Position Sitting & Standing     Repetitions   10   Sets   1     Patient End of Session: Up in chair;Needs met;Call light within reach;RN aware of session/findings;All patient questions and concerns addressed;Hospital anti-slip socks;Family present    PT Session Time: 25 minutes  Gait Training: 10 minutes  Therapeutic Activity: 15 minutes  Therapeutic Exercise:  minutes   Neuromuscular Re-education:  minutes

## 2025-01-30 NOTE — PLAN OF CARE
Dressing to right knee aquacel clean, dry, intact.  Moves all extremities, denies numbness, tingling.  States pain well controlled on oral pain medication.  Sitting up in chair, states has ambulated in hallway, and tolerating activity well.  Instructed on plan of care, call for all asst needed, discomfort felt, call don't fall protocol.  Verbalized understanding.  Safety precautions maintained.

## 2025-01-30 NOTE — PROGRESS NOTES
[facility] Ortho Progress Note    Subjective: Overall doing well. Pain is controlled on current PO medications. Denies any weakness, numbness or tingling. Has gotten up with PT and to the bathroom overnight  Voided on their own since surgery. Tolerating PO without N/V. Denies any CP or SOB    Objective: s/p right total knee arthroplasty    Physical Exam:      1/30/2025     3:00 AM 1/30/2025     4:55 AM   Vitals History   /59    BP Location Right arm    Pulse 62 75   Resp 16    Temp 97.7 °F (36.5 °C)    SpO2 96 %           Constitutional: No acute distress  Eyes: Anicteric sclera, pink conjunctiva  Ears, Nose, Mouth and Throat: Normocephalic, atraumatic, moist mucous membranes  Cardiovascular: RRR by peripheral pulse  Respiratory: No respiratory distress, normal respiratory rhythm and effort   Neurological:  Oriented to person, place, and time.  Psychological:  Appropriate mood and affect.    Knee Exam:  Right Knee Dressing appears clean, dry, and intact, overlying dressing moved out of the way, Aquacel underneath appears clean  Compartments soft and compressible  No calf pain or swelling  Can weakly SLR  Able to fire TA/GS/EHL  Warm perfused appearing extremity, 2+ DP pulses    Imaging: PACU XRs Right Knee 2v personally viewed, independently interpreted and radiology report read. S/p cemented total knee arthroplasty with resurfaced patella. Components appear in appropriate position      Assessment/Plan: Catina Rivera is a 54 year old female postop day #1 status post total knee arthroplasty  Overall doing well    Activity: as tolerated, WBAT  Physical therapy for gait training and safe discharge  Anticoagulation: Eliquis 2.5 mg BID until POD#3. If Aquacel remains dry on Saturday, may resume baseline Eliquis 5 mg BID on Sunday. SCDs, mobilization  Analgesia: Multimodal, ice and elevation  Antibiotics: Perioperative IV Ancef   Dressing: maintain bulky dressing until POD#3, Aquacel until POD#7 and to be  removed and changed by home health nurse  Diet: baseline, high protein, good hydration  Disposition: home with home health today once cleared by PT    Above plan of care discussed with patient and her daughter. They expressed understanding. All questions answered    Ruslan Connelly MD  Adult Hip and Knee Reconstruction    Department of Orthopaedic Surgery  [facility]     [address] [place] [address]  [address] [place] [id number]     t: [phone]  f: [phone]       [facility]

## 2025-01-30 NOTE — PROGRESS NOTES
AVS reviewed, IV dc'd, discharge video viewed, will dc home w/ [facility]. Will dc when meds are delivered to bedside. Flexeril sent to [facility].

## 2025-01-30 NOTE — PROGRESS NOTES
Called patient, no answer. Will call back   [facility]   part of [facility]     Hospitalist Progress Note     Catina Rivera Patient Status:  Outpatient in a Bed    3/12/1970 MRN [id number]   Location [facility] 3SW-A Attending Ruslan Connelly MD   Hosp Day # 0 PCP Essence Brito MD     Chief Complaint: Medical management    Subjective:     Patient seen and examined. Pain controlled. Denies CP/SOB/N/V.     Objective:    Review of Systems:   A comprehensive review of systems was completed; pertinent positive and negatives stated in subjective.    Vital signs:  Temp:  [97.5 °F (36.4 °C)-99.2 °F (37.3 °C)] 98.5 °F (36.9 °C)  Pulse:  [62-95] 67  Resp:  [12-21] 16  BP: (126-169)/(58-78) 157/66  SpO2:  [90 %-100 %] 100 %    Physical Exam:    General: No acute distress  Respiratory: No wheezes, no rhonchi  Cardiovascular: S1, S2, regular rate and rhythm  Abdomen: Soft, Non-tender, non-distended, positive bowel sounds  Neuro: No new focal deficits.   Extremities: No edema    Diagnostic Data:    Labs:  Recent Labs   Lab 25  0900   INR 1.01       No results for input(s): \"GLU\", \"BUN\", \"CREATSERUM\", \"GFRAA\", \"GFRNAA\", \"CA\", \"ALB\", \"NA\", \"K\", \"CL\", \"CO2\", \"ALKPHO\", \"AST\", \"ALT\", \"BILT\", \"TP\" in the last 168 hours.    eGFR cannot be calculated (Patient's most recent lab result is older than the maximum 7 days allowed.).    No results for input(s): \"TROP\", \"TROPHS\", \"CK\" in the last 168 hours.    Recent Labs   Lab 25  0900   PTP 13.4   INR 1.01                  Microbiology    No results found for this visit on 25.      Imaging: Reviewed in Epic.    Medications:    sennosides  17.2 mg Oral Nightly    docusate sodium  100 mg Oral BID    ferrous sulfate  325 mg Oral Daily with breakfast    apixaban  2.5 mg Oral BID@0600,1800    pregabalin  50 mg Oral BID    acetaminophen  650 mg Oral Q8H    buPROPion ER  300 mg Oral Daily    dilTIAZem ER  180 mg Oral BID    [START ON 2025] estradiol  1 patch Transdermal Weekly    levothyroxine  125 mcg  Oral Before breakfast    pantoprazole  20 mg Oral QAM AC    PARoxetine HCl  40 mg Oral QAM    atorvastatin  20 mg Oral Nightly    insulin   Subcutaneous TID AC and HS       Assessment & Plan:      #OA right knee  -s/p right TKA 1/29  -pain control  -PT/OT  -IS  -Management per ortho     #PAF  -Eliquis resumed at lower dose for now - to resume normal dose on Sunday if without significant bleeding per ortho   -Tele monitoring     #DM type II  -insulin pump- well managed with pump and CGM - will continue  -accuchecks     #CORINNE  -CORINNE protocol     #Obesity  -Body mass index is 44.26 kg/m².     #Hypothyroidism  -levothyroxine     #GERD  -PPI    #Disposition  -Stable for DC home from hospitalist standpoint       Pepe Souza DO    Supplementary Documentation:     Quality:  DVT Mechanical Prophylaxis:   SCDs, Early ambuation  DVT Pharmacologic Prophylaxis   Medication    apixaban (Eliquis) tab 2.5 mg                Code Status: Prior  Koo: External urinary catheter in place  Koo Duration (in days):   Central line:    AMIE: 1/30/2025    Discharge is dependent on: clinical progress  At this point Ms. Rivera is expected to be discharge to: home     The 21st Century Cures Act makes medical notes like these available to patients in the interest of transparency. Please be advised this is a medical document. Medical documents are intended to carry relevant information, facts as evident, and the clinical opinion of the practitioner. The medical note is intended as peer to peer communication and may appear blunt or direct. It is written in medical language and may contain abbreviations or verbiage that are unfamiliar.

## 2025-01-30 NOTE — CM/SW NOTE
Met with patient/family and provided [facility] information for  (aka [facility]).  Pt agreeable with DC plan.  No further DC needs/concerns identified at this time.  SW available should additional discharge needs arise.    Gail Anand, [facility]  Discharge Planner  [phone]

## 2025-01-30 NOTE — PLAN OF CARE
A&Ox4. VSS on RA. Pain managed with PO medication. Dressing to RLE C/D/I. RLE elevated. Voiding freely in the bathroom. Last BM 1/29. Call light in reach. Pt updated on POC. PT/OT to see.

## 2025-01-30 NOTE — PLAN OF CARE
NURSING DISCHARGE NOTE    Discharged Home via Wheelchair.  Accompanied by Family member and Support staff  Belongings Taken by patient/family.  Discharge instructions given by discharge RN.  Verbalized understanding.  States has medication delivered by outpatient pharmacy and one prescription will be sent to her home pharmacy as per request.

## 2025-02-03 ENCOUNTER — PATIENT MESSAGE (OUTPATIENT)
Facility: CLINIC | Age: 55
End: 2025-02-03

## 2025-02-03 DIAGNOSIS — Z96.651 S/P TOTAL KNEE ARTHROPLASTY, RIGHT: ICD-10-CM

## 2025-02-03 DIAGNOSIS — M17.11 PRIMARY OSTEOARTHRITIS OF RIGHT KNEE: Primary | ICD-10-CM

## 2025-02-04 NOTE — TELEPHONE ENCOUNTER
cyclobenzaprine 10 MG Oral Tab - Bedtime    Acetaminophen ER (TYLENOL 8 HOUR) 650 MG Oral Tab CR     HYDROcodone-acetaminophen (NORCO)  MG Oral Tab 40 tablet 0 1/30/2025 --   Sig:   Take 1 tablet by mouth every 6 (six) hours as needed         pregabalin (LYRICA) 50 MG Oral Cap 90 capsule 0 1/30/2025 3/1/2025   Sig:   Take 1 capsule (50 mg total) by mouth 3 (three) times       traMADol 50 MG Oral Tab 40 tablet 0 1/30/2025 --   Sig:   Take 1 tablet (50 mg total) by mouth every 8 (eight) hours as needed for Pain.

## 2025-02-04 NOTE — TELEPHONE ENCOUNTER
DOS- 01/29/2025- RTKA    Patient is complaining of pain and inflammation - offered ice and elevation, however that is not helping too much- she cannot take NSAIDs. She is taking her pain medications as directed, too    Advised that we would check if there was anything that could be offered .      Message below~    I guess my question is there anything else that can be done to help with the inflammation, swelling, and muscle pain? I can’t take any NSAIDS due to an allergy to ibuprofen and being on blood thinners for Afib      When I am at rest my pain is manageable but I’m still very tight, inflamed, and swollen. When I have to get up to use the bathroom, move, do PT, etc. the pain feels like someone is grabbing/squeezing and twisting my leg? (Think a towel being wrung out). The pain radiates up and down my leg. I would rate it a 10/10 when I’m moving. If I had the option not to move at all, not do exercises, etc. I wouldn’t be moving but I know that is all necessary for a successful recovery

## 2025-02-05 ENCOUNTER — PATIENT MESSAGE (OUTPATIENT)
Facility: CLINIC | Age: 55
End: 2025-02-05

## 2025-02-05 RX ORDER — HYDROCODONE BITARTRATE AND ACETAMINOPHEN 10; 325 MG/1; MG/1
1 TABLET ORAL EVERY 6 HOURS PRN
Qty: 40 TABLET | Refills: 0 | Status: SHIPPED | OUTPATIENT
Start: 2025-02-05

## 2025-02-05 RX ORDER — TRAMADOL HYDROCHLORIDE 50 MG/1
50 TABLET ORAL EVERY 8 HOURS PRN
Qty: 40 TABLET | Refills: 0 | Status: SHIPPED | OUTPATIENT
Start: 2025-02-05

## 2025-02-05 NOTE — TELEPHONE ENCOUNTER
DOS 1/29/25    Pt sent follow up [employer] message (separate encounter, copy/pasted here.)    1) needs PT order for outpatient. Pended order    2) States only with 3.5 days worth of pain medication, requesting refill on pain medications.   10 day supply ordered on 1/30/25.            Penelope Rivera to [facility] Clinical Pool (supporting Ruslan Connelly MD)       2/5/25  2:55 PM  Good afternoon,     According to my daughter we only have 17 pills left which is about 3 1/2 days worth. I’m still experiencing horrible pain, would like to be left on the same medicine for the pain      Also I’m in able to schedule PT until there is a referral in for the PT.      Thank you

## 2025-02-06 NOTE — TELEPHONE ENCOUNTER
Received valid Release of Information for [employer] [phone], efaxed. Informed patient via [employer] of form being faxed.

## 2025-02-10 ENCOUNTER — TELEPHONE (OUTPATIENT)
Dept: ORTHOPEDICS CLINIC | Facility: CLINIC | Age: 55
End: 2025-02-10

## 2025-02-10 DIAGNOSIS — M25.561 RIGHT KNEE PAIN, UNSPECIFIED CHRONICITY: Primary | ICD-10-CM

## 2025-02-10 NOTE — TELEPHONE ENCOUNTER
XR ordered and scheduled per Ortho protocol.   Sent patient message via [employer] to inform them and ask them to arrive 15-20 minutes prior to appointment with Dr. Connelly

## 2025-02-13 ENCOUNTER — HOSPITAL ENCOUNTER (OUTPATIENT)
Dept: GENERAL RADIOLOGY | Age: 55
Discharge: HOME | End: 2025-02-13
Attending: STUDENT IN AN ORGANIZED HEALTH CARE EDUCATION/TRAINING PROGRAM
Payer: COMMERCIAL

## 2025-02-13 ENCOUNTER — OFFICE VISIT (OUTPATIENT)
Facility: CLINIC | Age: 55
End: 2025-02-13
Payer: COMMERCIAL

## 2025-02-13 ENCOUNTER — TELEPHONE (OUTPATIENT)
Dept: PHYSICAL THERAPY | Facility: HOSPITAL | Age: 55
End: 2025-02-13

## 2025-02-13 VITALS — WEIGHT: 240 LBS | HEIGHT: 62 IN | BODY MASS INDEX: 44.16 KG/M2

## 2025-02-13 DIAGNOSIS — Z96.651 S/P TOTAL KNEE ARTHROPLASTY, RIGHT: Primary | ICD-10-CM

## 2025-02-13 DIAGNOSIS — M25.561 RIGHT KNEE PAIN, UNSPECIFIED CHRONICITY: ICD-10-CM

## 2025-02-13 PROCEDURE — 3008F BODY MASS INDEX DOCD: CPT | Performed by: STUDENT IN AN ORGANIZED HEALTH CARE EDUCATION/TRAINING PROGRAM

## 2025-02-13 PROCEDURE — 99024 POSTOP FOLLOW-UP VISIT: CPT | Performed by: STUDENT IN AN ORGANIZED HEALTH CARE EDUCATION/TRAINING PROGRAM

## 2025-02-13 PROCEDURE — 73562 X-RAY EXAM OF KNEE 3: CPT | Performed by: STUDENT IN AN ORGANIZED HEALTH CARE EDUCATION/TRAINING PROGRAM

## 2025-02-13 NOTE — PROGRESS NOTES
Orthopedic Surgery - Total Joint Arthroplasty    Post-Op Visit    DOS: 1/29/2025    Subjective:    Chief Complaint: Follow-up after Right total knee arthroplasty    Patient is 2 weeks status post the above procedure, doing well and has had a stable post-operative course thus far. Pain is improving since surgery. Slowly weaning off narcotics. No drainage or increased saturation in their dressing. No redness or swelling about the incision. No fever or chills. They have been working with home physical therapy but mostly home exercises on range of motion. They are using a walker for ambulation.    Objective:    There were no vitals filed for this visit.  Estimated body mass index is 44.26 kg/m² as calculated from the following:    Height as of 1/8/25: 5' 2\" (1.575 m).    Weight as of 1/29/25: 242 lb (109.8 kg).    Physical Examination:   Gen: Well developed, well nourished, resting comfortably in no acute distress  Skin: Warm, dry  Head: Normocephalic, atraumatic  Ears/Nose/Throat: Moist mucous membranes  CV: Regular rate and rhythm by peripheral pulse  Resp: Respirations are non-labored, no wheezing    MSK:   Inspection: Incision appears to be healing appropriately. No active drainage. No erythema or wound dehiscence. Staples in place  Range of motion of the knee is 0 - 80 degrees; flexion contracture of 0  Able to actively straight leg raise without extensor lag  Patient is neurovascularly intact L2-S1 and 5/5 strength with resisted testing of DF/PF/EHL  No calf pain, redness or swelling  Warm perfused extremity      Imaging:   Weightbearing XRs of the right knee were obtained with AP, sunrise, and lateral views. These were personally reviewed and interpreted    Patient is s/p cemented total knee arthroplasty with patellar resurfacing. Tibial component is in 5 degrees of varus  Components appear in stable position when compared to PACU films  No fracture or dislocation seen      Plan:  Staples were removed today after  evaluation of the wound  Skin was cleaned with alcohol and betadine  Half staples were removed to verify appropriate healing of the wound, followed by the remaining staples  Skin was cleaned once again and mastisol + steri-strips were applied  Instructions were provided to keep these dry after showering  They should be left on until they fall off on their own  Curling edges can be trimmed    Prescriptions Given Today: None    Blood Thinner: Back on home Eliquis    Physical Therapy: Scheduled to begin outpatient PT next Monday. Shown home exercises to work on flexion    Weight Bearing Status: As tolerated    Mobility Aid: Walker. Maintain fall precautions. Wean as able under physical therapist supervision.    Discussed: Wound care    Continue GIULIANA and RICE for the postoperative lower extremity to help with swelling    Follow-Up: 4 weeks (for 6 week visit) or sooner as needed for any questions or concerns. XRs at next visit      Ruslan Connelly MD  Adult Hip and Knee Reconstruction    Department of Orthopaedic Surgery  [facility]     [address] [place] [address]  [address] [place] [address]     t: [phone]  f: [phone]       [email or web address]

## 2025-02-18 ENCOUNTER — OFFICE VISIT (OUTPATIENT)
Dept: PHYSICAL THERAPY | Age: 55
End: 2025-02-18
Attending: STUDENT IN AN ORGANIZED HEALTH CARE EDUCATION/TRAINING PROGRAM
Payer: COMMERCIAL

## 2025-02-18 DIAGNOSIS — M17.11 PRIMARY OSTEOARTHRITIS OF RIGHT KNEE: Primary | ICD-10-CM

## 2025-02-18 DIAGNOSIS — Z96.651 S/P TOTAL KNEE ARTHROPLASTY, RIGHT: ICD-10-CM

## 2025-02-18 PROCEDURE — 97110 THERAPEUTIC EXERCISES: CPT

## 2025-02-18 PROCEDURE — 97161 PT EVAL LOW COMPLEX 20 MIN: CPT

## 2025-02-18 PROCEDURE — 97140 MANUAL THERAPY 1/> REGIONS: CPT

## 2025-02-18 NOTE — PATIENT INSTRUCTIONS
Thank you for coming to your physical therapy appt! During your appt today you were issued a home exercise program with a printed handout from [employer]. Your home exercise program can also be accessed using the information below. The selected exercises are meant to supplement the treatment you received and reinforce your progress made in physical therapy. Please complete these exercises as instructed by your physical therapist. It is important to stay consistent with your exercises to maximize your recovery!       Access Code: [email or web address]  URL: [email or web address]  Date: 02/18/2025  Prepared by: Melia Metz    Exercises  - Supine Quad Set  - 1-2 x daily - 7 x weekly - 2 sets - 10 reps - 3\" hold  - Supine Heel Slide with Strap  - 1-2 x daily - 7 x weekly - 1 sets - 10 reps - 3\" hold  - Supine Straight Leg Raises  - 1-2 x daily - 7 x weekly - 2 sets - 10 reps  - Supine Bridge  - 1-2 x daily - 7 x weekly - 2 sets - 10 reps  - Seated Long Arc Quad  - 1-2 x daily - 7 x weekly - 2 sets - 10 reps  - Standing Hip Abduction with Counter Support  - 1-2 x daily - 7 x weekly - 2 sets - 10 reps

## 2025-02-18 NOTE — PROGRESS NOTES
LOWER EXTREMITY EVALUATION:     Diagnosis:   Primary osteoarthritis of right knee (M17.11)  S/P total knee arthroplasty, right (Z96.651) Patient:  Catina Rivera (54 year old, female)        Referring Provider: Ruslan Connelly  Today's Date   2/18/2025    Precautions:  None   Date of Evaluation: 02/18/25  Next MD visit: 3/13/25  Date of Surgery: 1/29/25     PATIENT SUMMARY   Summary of chief complaints: post-surgical deficits in mobility, strength, and function after knee replacement surgery  History of current condition: The patient reports that before surgery she was using a knee brace and \"not having a lift because of pain and not being able to be mobile.\" She reports functional deficits for three years prior to the surgery. She was using a cane prior to the surgery and did have to use a walker during an arthritis flare-up around [facility]. She reports in Decemeber she reports that she fell and hurt her opposite side. She reports that she antiicpates the need for a left knee surgery. The patient reports that since the surgery she is feeling \"good, better than I anticipated.\" She reports that she was told that she needed to call her doctor for pain medication. She reports that she wasn't doing much with her home health PT. She reports that she can get out of the house with a walker, but cannot use a cane. She reports that she is navigating stairs with step-to gait pattern. She doesn't have the mobility to lift her leg. She reports that she goes down the stairs on my butt. The patient reports that she never had a measurement taken of her knee flexion. She did PT prior to her surgery. The patient does not use a AD at home.   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  Current limitations: walking long distances, prolonged standing, navigating stairs, and squatting  Pt goals: to get flexibility and strength back  Past medical history was reviewed with Catina.  Significant findings include: extensive surgical history (thyroid, hysterectomy, gallbladder), she has had a neurostimulator put in her spine due to radiculr symptoms  Imaging/Tests: x-ray 2/13/25   Catina  has a past medical history of Anxiety, Anxiety state, unspecified, Arrhythmia, Asthma ([facility]), Atrial fibrillation with RVR ([facility]) (11/01/2022), Back pain, Back problem, Bad breath, Belching, Bloating, Decorative tattoo, Depression, Diabetes ([facility]), Disorder of thyroid, Esophageal reflux (05/07/2012), GERD (gastroesophageal reflux disease), Headache(784.0) (04/24/2012), Heart palpitations (11/1/2022), Heartburn, High cholesterol, History of depression, Hypothyroidism, Irregular bowel habits, Leaking of urine, Lipoma of unspecified site (11/22/2011), Multiple thyroid nodules, Nontoxic multinodular goiter, Nontoxic multinodular goiter (07/05/2011), Obesity, Other and unspecified hyperlipidemia (07/11/2011), Pain in joints, Pain in right shin (11/13/2022), Pain with bowel movements, Painful swallowing, Plantar fasciitis, Sleep apnea, Stress, Thyroiditis, unspecified (06/25/2011), Type 1 diabetes mellitus ([facility]), Uncomfortable fullness after meals, Visual impairment, Wears glasses, and Weight loss.  She  has a past surgical history that includes laparoscopic cholecystectomy (11/14/2011); cyst aspiration left; cyst aspiration right; other surgical history (x2); other surgical history (x1); sling oper stres incontinence (01/09/2014); mesh (implantable); thyroidectomy; cholecystectomy; other; colonoscopy (N/A, 10/21/2016); removal gallbladder; total abdom hysterectomy (07/2020); carpal  tunnel release (Bilateral); arthroscopy of joint unlisted (Bilateral); other; lipoma removal (04/29/2022); oophorectomy (Bilateral); hysterectomy; colonoscopy (N/A, 12/27/2022); neuromuscular stimulator; sinus surgery  ; other surgical history (10/11/2023); and other surgical history.    ASSESSMENT  Catina presents to physical therapy evaluation with primary c/o post-surgical deficits in mobility, strength, and function after knee replacement surgery. The results of the objective tests and measures show anticipated post-surgical deficits in knee flexion mobility, function, endurance, stamina, strength, and balance/stability after TKR surgery 1/29/25. The patient is currently walking with a SP cane but would like to resume walking without a AD. She admits that she has a difficult time navigating stairs, navigating stairs on her butt. She also admits to limited care and progression with her home health PT. Functional deficits include but are not limited to walking long distances, prolonged standing, navigating stairs, and squatting. Signs and symptoms are consistent with diagnosis of Primary osteoarthritis of right knee (M17.11)  S/P total knee arthroplasty, right (Z96.651). Pt and PT discussed evaluation findings, pathology, POC and HEP.  Pt voiced understanding and performs HEP correctly without reported pain. Skilled Physical Therapy is medically necessary to address the above impairments and reach functional goals.    OBJECTIVE:   Musculoskeletal  Palpation: The patient demonstrates some guarding and stiffness to knee flexion PROM.     ROM and Strength/MMT:  (* denotes performed with pain)  Knee   ROM MMT (-/5)    R L R L     Flex 85 115 4/5 4-/5     Ext (L3) 0 0 4/5 3+/5     Today's Treatment and Response:   Pt education was provided on exam findings, treatment diagnosis, treatment plan, expectations, and prognosis.  Today's Treatment       2/18/2025   PT Treatment   Therapeutic Exercise Quad set: 2 x 10 x 3\" (R)    Heel slide with strap: 1 x 10 (R)  SLR: 2 x 10 (R)   DL bridging: 2 x 10   LAQ: x 20 (R)   Standing hip ABD: 2 x 10 (R) only   Education on movements and activities to avoid to limit the exacerbation of the patient's current condition.  Educated the patient on the need for consistency in treatment to achieve the mutually established goals.  Education regarding the importance of observing the post-surgical precautions.¬     Manual Therapy Soft tissue mobilization: (R) quad, ITB/VL    Therapeutic Activity Education on anatomy and pathophysiology of current condition, rationale for physical therapy, anticipated treatment interventions, prognosis, timeline for recovery, and expected functional outcomes based on evaluative findings.    Therapeutic Exercise Min 12   Manual Therapy Min 10   Therapeutic Activity Min 3   Evaluation Min 20   Total of Timed Procedures 25   Total of Service Based 20   Total Treatment Time 45         Patient was instructed in and issued a HEP for: Access Code: [email or web address]  URL: [email or web address]  Date: 02/18/2025  Prepared by: eMlia Metz    Exercises  - Supine Quad Set  - 1-2 x daily - 7 x weekly - 2 sets - 10 reps - 3\" hold  - Supine Heel Slide with Strap  - 1-2 x daily - 7 x weekly - 1 sets - 10 reps - 3\" hold  - Supine Straight Leg Raises  - 1-2 x daily - 7 x weekly - 2 sets - 10 reps  - Supine Bridge  - 1-2 x daily - 7 x weekly - 2 sets - 10 reps  - Seated Long Arc Quad  - 1-2 x daily - 7 x weekly - 2 sets - 10 reps  - Standing Hip Abduction with Counter Support  - 1-2 x daily - 7 x weekly - 2 sets - 10 reps    Charges:  PT EVAL: Low Complexity, MT x 1, Ther Ex  In agreement with evaluation findings and clinical rationale, this evaluation involved LOW COMPLEXITY decision making due to no personal factors/comorbidities, 1-2 body structures involved/activity limitations, and stable symptoms as documented in the evaluation.                                                                                                                 PLAN OF CARE:    Goals: (to be met in 16 visits)   Goals       Therapy Goals     Short-Term Goals:  Patient will demonstrates knee flexion AROM to 90deg pain-free to facilitate sitting comfortably for 2 hours daily for community integration. Timeframe: 4 visits.  Patient will improve knee flexion AROM to 120deg to allow for reciprocal stair navigation pattern for community integration. Timeframe: 8 visits.  The patient will improve quad strength and single-leg stance stability to demonstrate non-antalgic gait pattern with walking short-length distances for household ambulation. Timeframe: 8 visits.  Patient will improve knee strength and stability to facilitate discontinued use of AD for daily ambulation. Timeframe: 8 visits.   Patient will improve LE strength and stability to facilitate navigating down the stairs reciprocally with SP cane. Timeframe: 8 visits.   Long-Term Goals:  The patient will improve LE strength and endurance to facilitate standing for extended periods of time for 3-4 hours daily for community integration and occupational demands. Timeframe: 12 visits.  The patient will improve LE strength and endurance to facilitate walking distances of 1-2 mile(s) daily for community integration and occupational demands. Timeframe: 12 visits.  Patient will improve lower motor control to perform double-leg squat with symmetrical loading, without asymmetries, and with proper posterior chain loading to facilitate squatting and lifting ADL's. Timeframe: 12 visits.  Patient will improve LE mobility, strength, and single-leg stabilization to meet strength requirements for reciprocal stair navigation pattern with no asymmetries for ascending and descending 3 flights of stairs daily for community integration. Timeframe: 16 visits.  Patient will improve LEFS score by at least 9 points to indicate a true change in improved function for ADL's and  restoring PLF. Timeframe: 16 visits.               Frequency / Duration: Patient will be seen 2x/week or a total of 12  visits over a 90 day period. Treatment will include: Gait training; Manual Therapy; Neuromuscular Re-education; Self-Care Home Management; Therapeutic Activities; Therapeutic Exercise; Home Exercise Program instruction; Electrical stimulation (unattended)    Education or treatment limitation: None   Rehab Potential: good     LEFS Score  LEFS Score: (Patient-Rptd) 33.75 % (2/14/2025  1:43 PM)    Patient/Family/Caregiver was advised of these findings, precautions, and treatment options and has agreed to actively participate in planning and for this course of care.    Thank you for your referral. Please co-sign or sign and return this letter via fax as soon as possible to [phone]. If you have any questions, please contact me at Dept: [phone]    Sincerely,  Electronically signed by therapist: Melia Metz PT  Physician's certification required: Yes  I certify the need for these services furnished under this plan of treatment and while under my care.    X___________________________________________________ Date____________________    Certification From: 2/18/2025  To:5/19/2025

## 2025-02-21 ENCOUNTER — OFFICE VISIT (OUTPATIENT)
Dept: PHYSICAL THERAPY | Age: 55
End: 2025-02-21
Attending: STUDENT IN AN ORGANIZED HEALTH CARE EDUCATION/TRAINING PROGRAM
Payer: COMMERCIAL

## 2025-02-21 PROCEDURE — 97110 THERAPEUTIC EXERCISES: CPT

## 2025-02-21 PROCEDURE — 97140 MANUAL THERAPY 1/> REGIONS: CPT

## 2025-02-21 NOTE — PROGRESS NOTES
Patient: Catina Rivera (54 year old, female) Referring Provider:  Insurance:   Diagnosis: Primary osteoarthritis of right knee (M17.11)  S/P total knee arthroplasty, right (Z96.651) Ruslan Connelly  [facility]   Date of Surgery: 1/29/25 Next MD visit:  N/A   Precautions:  None 3/13/25 Referral Information:    Date of Evaluation: Req: 12, Auth: 12, Exp: 4/30/2025 02/18/25 POC Auth Visits:          Today's Date   2/21/2025    Subjective  The patient reports that her knee is sore. She reports that she hasn't been ablet to ice and rest is as much as she normally would. She reports that she forgot to take her Norco before she came. The patient reports that her exercises aren't \"too bad.\"       Pain: 4/10     Objective  Knee       2/18/2025 2/21/2025   Knee ROM/MMT   Rt Knee Flexion 85 90   Lt Knee Flexion 115    Rt Knee Flexion MMT 4/5    Lt Knee Flexion MMT 4-/5    Rt Knee Extension (L3) 0    0 0   Lt Knee Extension (L3) 0    0    Rt Knee Extension MMT (L3) 4/5    Lt Knee Extension MMT (L3) 3+/5        Multiple values from one day are sorted in reverse-chronological order      Assessment  Penelope was able to reach 90deg knee flexion PROM this date. She reports less anterior knee pain since her last appt. She demonstrates good compliance with her HEP. We progressed LE strengthening exercises this date, which the patient tolerated well. We will continue to progress the patient to facilitate improved walking gait pattern.    Goals (to be met in 16 visits)   Goals       Therapy Goals     Short-Term Goals:  Patient will demonstrates knee flexion AROM to 90deg pain-free to facilitate sitting comfortably for 2 hours daily for community integration. Timeframe: 4 visits.  Patient will improve knee flexion AROM to 120deg to allow for reciprocal stair navigation pattern for community integration. Timeframe: 8 visits.  The patient will improve quad strength and single-leg stance stability to demonstrate non-antalgic gait pattern  with walking short-length distances for household ambulation. Timeframe: 8 visits.  Patient will improve knee strength and stability to facilitate discontinued use of AD for daily ambulation. Timeframe: 8 visits.   Patient will improve LE strength and stability to facilitate navigating down the stairs reciprocally with SP cane. Timeframe: 8 visits.   Long-Term Goals:  The patient will improve LE strength and endurance to facilitate standing for extended periods of time for 3-4 hours daily for community integration and occupational demands. Timeframe: 12 visits.  The patient will improve LE strength and endurance to facilitate walking distances of 1-2 mile(s) daily for community integration and occupational demands. Timeframe: 12 visits.  Patient will improve lower motor control to perform double-leg squat with symmetrical loading, without asymmetries, and with proper posterior chain loading to facilitate squatting and lifting ADL's. Timeframe: 12 visits.  Patient will improve LE mobility, strength, and single-leg stabilization to meet strength requirements for reciprocal stair navigation pattern with no asymmetries for ascending and descending 3 flights of stairs daily for community integration. Timeframe: 16 visits.  Patient will improve LEFS score by at least 9 points to indicate a true change in improved function for ADL's and restoring PLF. Timeframe: 16 visits.               Plan  Continue to progress knee mobility and LE strengthening.    Treatment Last 4 Visits       2/18/2025 2/21/2025   PT Treatment   Treatment Day  2   Therapeutic Exercise Quad set: 2 x 10 x 3\" (R)   Heel slide with strap: 1 x 10 (R)  SLR: 2 x 10 (R)   DL bridging: 2 x 10   LAQ: x 20 (R)   Standing hip ABD: 2 x 10 (R) only   Education on movements and activities to avoid to limit the exacerbation of the patient's current condition.  Educated the patient on the need for consistency in treatment to achieve the mutually established  goals.  Education regarding the importance of observing the post-surgical precautions.¬   PROM - knee flexion x 10 (R)   Quad set: 2 x 10 x 3\" (R)   Heel slide with strap: 1 x 10 (R)  SLR: 2 x 10 (R)   DL bridging: unable to complete due to calf cramping  S/L hip ABD: 2 x 10 (R)   LAQ: x 20 (R)   Standing hip ABD and ext: 2 x 10 (B) only    Manual Therapy Soft tissue mobilization: (R) quad, ITB/VL  Soft tissue mobilization: (R) quad, ITB/VL  Edema massage x 5min (R)    Therapeutic Activity Education on anatomy and pathophysiology of current condition, rationale for physical therapy, anticipated treatment interventions, prognosis, timeline for recovery, and expected functional outcomes based on evaluative findings.     Therapeutic Exercise Min 12 20   Manual Therapy Min 10 20   Therapeutic Activity Min 3    Evaluation Min 20    Total of Timed Procedures 25 40   Total of Service Based 20 0   Total Treatment Time 45 40         HEP  Access Code: [email or web address]  URL: [email or web address]  Date: 02/18/2025  Prepared by: Melia Metz    Exercises  - Supine Quad Set  - 1-2 x daily - 7 x weekly - 2 sets - 10 reps - 3\" hold  - Supine Heel Slide with Strap  - 1-2 x daily - 7 x weekly - 1 sets - 10 reps - 3\" hold  - Supine Straight Leg Raises  - 1-2 x daily - 7 x weekly - 2 sets - 10 reps  - Supine Bridge  - 1-2 x daily - 7 x weekly - 2 sets - 10 reps  - Seated Long Arc Quad  - 1-2 x daily - 7 x weekly - 2 sets - 10 reps  - Standing Hip Abduction with Counter Support  - 1-2 x daily - 7 x weekly - 2 sets - 10 reps    Charges     MT x 1, Ther Ex x 2

## 2025-02-22 ENCOUNTER — TELEPHONE (OUTPATIENT)
Dept: FAMILY MEDICINE CLINIC | Facility: CLINIC | Age: 55
End: 2025-02-22

## 2025-02-22 DIAGNOSIS — E78.2 MIXED HYPERLIPIDEMIA: ICD-10-CM

## 2025-02-24 NOTE — TELEPHONE ENCOUNTER
Cholesterol Medication Protocol Passed   Last refill 11/26/24 90 0 refill      Very High  Drug-Drug: dilTIAZem HCl ER Beads and simvastatinPlasma concentrations and pharmacologic effects of simvastatin may be increased by diltiazem. Toxicity, characterized by muscle injury, may occur. The simvastatin dose should be limited to 10 mg daily and the diltiazem dose should be limited to 240 mg daily.  Details  Override reason        SIMVASTATIN 40 MG Oral Tab [Pharmacy Med Name: SIMVASTATIN 40MG TABLETS]  Prescription. Reordered.  dilTIAZem HCl ER Beads 180 MG Oral Capsule SR 24 HrPatient reported medication. Active.

## 2025-02-25 ENCOUNTER — OFFICE VISIT (OUTPATIENT)
Dept: PHYSICAL THERAPY | Age: 55
End: 2025-02-25
Attending: STUDENT IN AN ORGANIZED HEALTH CARE EDUCATION/TRAINING PROGRAM
Payer: COMMERCIAL

## 2025-02-25 PROCEDURE — 97110 THERAPEUTIC EXERCISES: CPT

## 2025-02-25 PROCEDURE — 97140 MANUAL THERAPY 1/> REGIONS: CPT

## 2025-02-25 NOTE — PROGRESS NOTES
Patient: Catina Rivera (54 year old, female) Referring Provider:  Insurance:   Diagnosis: Primary osteoarthritis of right knee (M17.11)  S/P total knee arthroplasty, right (Z96.651) Ruslan Connelly  [facility]   Date of Surgery: 1/29/25 Next MD visit:  N/A   Precautions:  None 3/13/25 Referral Information:    Date of Evaluation: Req: 12, Auth: 12, Exp: 4/30/2025 02/18/25 POC Auth Visits:          Today's Date   2/25/2025    Subjective  Patient reports any exercises that require her to put weight through left leg she is avoiding, hip abduction has to be done laying on her side due to knee pain. Patient reports pain is still worst in her right, but left has been increasingly bothersome lately now too. Patient reports pain worsening in posterior knee on right,       Pain: 3/10     Objective  Worst Pain: 5-6/10 with putting shoes on or trying to sleep on her left side        Assessment  Patient returns with all steristrips fallen off at this time, reporting anterior knee pain still present but pain that has worsened in the back of knee. Addressed this with STM and strengthening in prone with just AROM knee flexion. Patient was then able to better tolerate heel slides and bridges after and reported less pain ending session.    Goals (to be met in 16 visits)   Goals       Therapy Goals     Short-Term Goals:  Patient will demonstrates knee flexion AROM to 90deg pain-free to facilitate sitting comfortably for 2 hours daily for community integration. Timeframe: 4 visits.  Patient will improve knee flexion AROM to 120deg to allow for reciprocal stair navigation pattern for community integration. Timeframe: 8 visits.  The patient will improve quad strength and single-leg stance stability to demonstrate non-antalgic gait pattern with walking short-length distances for household ambulation. Timeframe: 8 visits.  Patient will improve knee strength and stability to facilitate discontinued use of AD for daily ambulation.  Timeframe: 8 visits.   Patient will improve LE strength and stability to facilitate navigating down the stairs reciprocally with SP cane. Timeframe: 8 visits.   Long-Term Goals:  The patient will improve LE strength and endurance to facilitate standing for extended periods of time for 3-4 hours daily for community integration and occupational demands. Timeframe: 12 visits.  The patient will improve LE strength and endurance to facilitate walking distances of 1-2 mile(s) daily for community integration and occupational demands. Timeframe: 12 visits.  Patient will improve lower motor control to perform double-leg squat with symmetrical loading, without asymmetries, and with proper posterior chain loading to facilitate squatting and lifting ADL's. Timeframe: 12 visits.  Patient will improve LE mobility, strength, and single-leg stabilization to meet strength requirements for reciprocal stair navigation pattern with no asymmetries for ascending and descending 3 flights of stairs daily for community integration. Timeframe: 16 visits.  Patient will improve LEFS score by at least 9 points to indicate a true change in improved function for ADL's and restoring PLF. Timeframe: 16 visits.               Plan  Resume PROM knee flexion to end ranges, reassess ROM    Treatment Last 4 Visits       2/18/2025 2/21/2025 2/25/2025   PT Treatment   Treatment Day  2 3   Therapeutic Exercise Quad set: 2 x 10 x 3\" (R)   Heel slide with strap: 1 x 10 (R)  SLR: 2 x 10 (R)   DL bridging: 2 x 10   LAQ: x 20 (R)   Standing hip ABD: 2 x 10 (R) only   Education on movements and activities to avoid to limit the exacerbation of the patient's current condition.  Educated the patient on the need for consistency in treatment to achieve the mutually established goals.  Education regarding the importance of observing the post-surgical precautions.¬   PROM - knee flexion x 10 (R)   Quad set: 2 x 10 x 3\" (R)   Heel slide with strap: 1 x 10 (R)  SLR: 2 x  10 (R)   DL bridging: unable to complete due to calf cramping  S/L hip ABD: 2 x 10 (R)   LAQ: x 20 (R)   Standing hip ABD and ext: 2 x 10 (B) only  Prone HS Curls x 10 (R)   S/L Hip Abduction 2 x 10 ea R/L  Supine QS x 20 5\" H   Supine SLR with QS x 10 ea R/L  Bridges with Heel Dig x 7    Heel Slides with Strap x 10 (R)   Manual Therapy Soft tissue mobilization: (R) quad, ITB/VL  Soft tissue mobilization: (R) quad, ITB/VL  Edema massage x 5min (R)  STM (Supine): Posterior Knee, HS  STM (Supine): Surrounding Incision, Superior to Patella      Therapeutic Activity Education on anatomy and pathophysiology of current condition, rationale for physical therapy, anticipated treatment interventions, prognosis, timeline for recovery, and expected functional outcomes based on evaluative findings.      Therapeutic Exercise Min 12 20 25   Manual Therapy Min 10 20 15   Therapeutic Activity Min 3     Evaluation Min 20     Total of Timed Procedures 25 40 40   Total of Service Based 20 0 0   Total Treatment Time 45 40 40         HEP  Access Code: [email or web address]  URL: [email or web address]  Date: 02/18/2025  Prepared by: Melia Metz     Exercises  - Supine Quad Set  - 1-2 x daily - 7 x weekly - 2 sets - 10 reps - 3\" hold  - Supine Heel Slide with Strap  - 1-2 x daily - 7 x weekly - 1 sets - 10 reps - 3\" hold  - Supine Straight Leg Raises  - 1-2 x daily - 7 x weekly - 2 sets - 10 reps  - Supine Bridge  - 1-2 x daily - 7 x weekly - 2 sets - 10 reps  - Seated Long Arc Quad  - 1-2 x daily - 7 x weekly - 2 sets - 10 reps  - Standing Hip Abduction with Counter Support  - 1-2 x daily - 7 x weekly - 2 sets - 10 reps    Charges     Ther Ex x 2, Manual x 1

## 2025-02-26 ENCOUNTER — APPOINTMENT (OUTPATIENT)
Dept: PHYSICAL THERAPY | Age: 55
End: 2025-02-26
Attending: STUDENT IN AN ORGANIZED HEALTH CARE EDUCATION/TRAINING PROGRAM
Payer: COMMERCIAL

## 2025-02-26 RX ORDER — SIMVASTATIN 40 MG
40 TABLET ORAL EVERY EVENING
Qty: 90 TABLET | Refills: 0 | Status: SHIPPED | OUTPATIENT
Start: 2025-02-26

## 2025-02-26 RX ORDER — SIMVASTATIN 40 MG
40 TABLET ORAL EVERY EVENING
Qty: 90 TABLET | Refills: 0 | OUTPATIENT
Start: 2025-02-26

## 2025-02-26 NOTE — TELEPHONE ENCOUNTER
Delivery Read From Message Type Attachments Subject   2/26/2025  8:29 AM Y Glenda Vizcaino RN Patient Medical Advice Request  drug refill

## 2025-02-27 ENCOUNTER — TELEPHONE (OUTPATIENT)
Dept: FAMILY MEDICINE CLINIC | Facility: CLINIC | Age: 55
End: 2025-02-27

## 2025-02-27 RX ORDER — ALBUTEROL SULFATE 90 UG/1
2 INHALANT RESPIRATORY (INHALATION) EVERY 4 HOURS PRN
Qty: 1 EACH | Refills: 0 | Status: SHIPPED | OUTPATIENT
Start: 2025-02-27

## 2025-02-27 NOTE — TELEPHONE ENCOUNTER
Patient is calling to ask for an inhaler. She states that she has had one in the past, and wonders if  can refill it. She says she has asthma, and she's had a cold.     Please advise.

## 2025-02-27 NOTE — TELEPHONE ENCOUNTER
Patient states she is fighting off a cold  Runny nose  Cough  Sore throat - has resolved  Patient states she's used proair in the past - requesting a refill  Patient states she is not short of breath  Patient's O2 at home is 98%  Please advise if okay to fill

## 2025-02-28 ENCOUNTER — APPOINTMENT (OUTPATIENT)
Dept: PHYSICAL THERAPY | Age: 55
End: 2025-02-28
Attending: STUDENT IN AN ORGANIZED HEALTH CARE EDUCATION/TRAINING PROGRAM
Payer: COMMERCIAL

## 2025-02-28 ENCOUNTER — TELEPHONE (OUTPATIENT)
Dept: PHYSICAL THERAPY | Age: 55
End: 2025-02-28

## 2025-03-02 ENCOUNTER — PATIENT MESSAGE (OUTPATIENT)
Facility: CLINIC | Age: 55
End: 2025-03-02

## 2025-03-03 RX ORDER — CYCLOBENZAPRINE HCL 10 MG
10 TABLET ORAL 2 TIMES DAILY
Qty: 30 TABLET | Refills: 0 | Status: SHIPPED | OUTPATIENT
Start: 2025-03-03

## 2025-03-03 NOTE — TELEPHONE ENCOUNTER
Cyclobenzaprine  10mg    DOS: 1/29/25  total knee arthroplasty   Last OV: 2/13/25  Last refill date: 2/1/25 #/refills: 30/0  Upcoming appt:   Future Appointments   Date Time Provider Department Center   3/13/2025 11:00 AM Ruslan Connelly MD [facility] [address]

## 2025-03-04 ENCOUNTER — OFFICE VISIT (OUTPATIENT)
Dept: PHYSICAL THERAPY | Age: 55
End: 2025-03-04
Attending: STUDENT IN AN ORGANIZED HEALTH CARE EDUCATION/TRAINING PROGRAM
Payer: COMMERCIAL

## 2025-03-04 ENCOUNTER — MED REC SCAN ONLY (OUTPATIENT)
Facility: CLINIC | Age: 55
End: 2025-03-04

## 2025-03-04 PROCEDURE — 97140 MANUAL THERAPY 1/> REGIONS: CPT

## 2025-03-04 PROCEDURE — 97110 THERAPEUTIC EXERCISES: CPT

## 2025-03-05 ENCOUNTER — OFFICE VISIT (OUTPATIENT)
Dept: FAMILY MEDICINE CLINIC | Facility: CLINIC | Age: 55
End: 2025-03-05
Payer: COMMERCIAL

## 2025-03-05 ENCOUNTER — PATIENT MESSAGE (OUTPATIENT)
Facility: CLINIC | Age: 55
End: 2025-03-05

## 2025-03-05 ENCOUNTER — APPOINTMENT (OUTPATIENT)
Dept: PHYSICAL THERAPY | Age: 55
End: 2025-03-05
Attending: STUDENT IN AN ORGANIZED HEALTH CARE EDUCATION/TRAINING PROGRAM
Payer: COMMERCIAL

## 2025-03-05 VITALS
OXYGEN SATURATION: 96 % | BODY MASS INDEX: 45.27 KG/M2 | SYSTOLIC BLOOD PRESSURE: 134 MMHG | HEART RATE: 84 BPM | DIASTOLIC BLOOD PRESSURE: 68 MMHG | WEIGHT: 246 LBS | TEMPERATURE: 97 F | HEIGHT: 62 IN

## 2025-03-05 DIAGNOSIS — J01.00 ACUTE NON-RECURRENT MAXILLARY SINUSITIS: Primary | ICD-10-CM

## 2025-03-05 PROCEDURE — 3078F DIAST BP <80 MM HG: CPT | Performed by: NURSE PRACTITIONER

## 2025-03-05 PROCEDURE — 3061F NEG MICROALBUMINURIA REV: CPT | Performed by: NURSE PRACTITIONER

## 2025-03-05 PROCEDURE — 99213 OFFICE O/P EST LOW 20 MIN: CPT | Performed by: NURSE PRACTITIONER

## 2025-03-05 PROCEDURE — 3044F HG A1C LEVEL LT 7.0%: CPT | Performed by: NURSE PRACTITIONER

## 2025-03-05 PROCEDURE — 3008F BODY MASS INDEX DOCD: CPT | Performed by: NURSE PRACTITIONER

## 2025-03-05 PROCEDURE — 3075F SYST BP GE 130 - 139MM HG: CPT | Performed by: NURSE PRACTITIONER

## 2025-03-05 RX ORDER — AZITHROMYCIN 250 MG/1
TABLET, FILM COATED ORAL
Qty: 6 TABLET | Refills: 0 | Status: SHIPPED | OUTPATIENT
Start: 2025-03-05 | End: 2025-03-10

## 2025-03-05 NOTE — PROGRESS NOTES
HPI:   Sinus Problem  This is a new problem. Episode onset: over a week ago. Maximum temperature: in the beginning. Associated symptoms include congestion, headaches and sinus pressure. Pertinent negatives include no chills, coughing, ear pain, hoarse voice or sore throat. Past treatments include oral decongestants. The treatment provided mild relief.        Current Outpatient Medications   Medication Sig Dispense Refill    azithromycin 250 MG Oral Tab Take 2 tablets (500 mg total) by mouth daily for 1 day, THEN 1 tablet (250 mg total) daily for 4 days. 6 tablet 0    CYCLOBENZAPRINE 10 MG Oral Tab TAKE 1 TABLET BY MOUTH TWICE DAILY 30 tablet 0    albuterol (PROAIR HFA) 108 (90 Base) MCG/ACT Inhalation Aero Soln Inhale 2 puffs into the lungs every 4 (four) hours as needed for Wheezing. 1 each 0    simvastatin 40 MG Oral Tab Take 1 tablet (40 mg total) by mouth every evening. 90 tablet 0    HYDROcodone-acetaminophen (NORCO)  MG Oral Tab Take 1 tablet by mouth every 6 (six) hours as needed for Pain. 40 tablet 0    traMADol 50 MG Oral Tab Take 1 tablet (50 mg total) by mouth every 8 (eight) hours as needed for Pain. 40 tablet 0    apixaban 5 MG Oral Tab Take 1 tablet (5 mg total) by mouth 2 (two) times daily.      Continuous Glucose Transmitter (DEXCOM G6 TRANSMITTER) Does not apply Misc USE 1 TRANSMITTER EVERY 90 DAYS 1 each 0    Continuous Glucose Sensor (DEXCOM G6 SENSOR) Does not apply Misc USE 1 SENSOR EVERY 10 DAYS 9 each 1    MOUNJARO 15 MG/0.5ML Subcutaneous Solution Auto-injector Inject 15 mg as directed once a week. 6 mL 1    BUPROPION  MG Oral Tablet 24 Hr TAKE 1 TABLET(300 MG) BY MOUTH DAILY 90 tablet 1    Glucose Blood (ONETOUCH VERIO) In Vitro Strip USE TO TEST ONE TIME DAILY AS NEEDED TO CALIBRATE DEXCOM READINGS 100 strip 1    Omeprazole 40 MG Oral Capsule Delayed Release Take 1 capsule (40 mg total) by mouth daily. 90 capsule 1    Calcium Carb-Cholecalciferol (CALCIUM + VITAMIN D3 OR) Take 1  tablet by mouth daily. Vitamin d 800 international unit and Calcium 600 mg      levothyroxine 125 MCG Oral Tab Take 1 tablet (125 mcg total) by mouth before breakfast. 90 tablet 1    Insulin Disposable Pump (OMNIPOD 5 G6 PODS, GEN 5,) Does not apply Misc 1 each every 3 (three) days. 30 each 1    PARoxetine HCl 40 MG Oral Tab Take 1 tablet (40 mg total) by mouth every morning. 90 tablet 3    Insulin Glargine, 2 Unit Dial, (TOUJEO MAX SOLOSTAR) 300 UNIT/ML Subcutaneous Solution Pen-injector USE AS DIRECTED UP TO 75 UNITS DAILY IN THE EVENT OF INSULIN PUMP FAILURE 6 mL 0    Blood Glucose Monitoring Suppl (ONETOUCH VERIO FLEX SYSTEM) w/Device Does not apply Kit Use to test blood sugar 1x daily as needed to calibrate dexcom 1 kit 0    NOVOLOG 100 UNIT/ML Injection Solution INJECT UP  UNITS VIA INSULIN PUMP DAILY AS DIRECTED (Patient taking differently: INJECT UP  UNITS VIA INSULIN PUMP DAILY AS DIRECTED.    Basal Rate:   12 am- 0.9 unit/hr  5:30 am - 1.1   1 pm - 1.6  4:30 pm - 1.9  6:30 pm - 1.6  09:30 pm - 2) 150 mL 1    Insulin Disposable Pump (OMNIPOD 5 G6 INTRO, GEN 5,) Does not apply Kit 1 each every 3 (three) days. 1 kit 0    dilTIAZem HCl ER Beads 180 MG Oral Capsule SR 24 Hr Take 1 capsule (180 mg total) by mouth 2 (two) times daily.      glucagon (GVOKE HYPOPEN 2-PACK) 1 MG/0.2ML Subcutaneous SUBQ injection Inject 0.2 mL (1 mg total) into the skin as needed. 0.4 mL 1    estradiol 0.05 MG/24HR Transdermal Patch Weekly APPLY 1 PATCH EXTERNALLY TO THE SKIN EVERY TUESDAY. DO NOT CUT PATCH        Past Medical History:    Anxiety    Anxiety state, unspecified    Arrhythmia    a fib    Asthma (HCC)    Atrial fibrillation with RVR (HCC)    Back pain    Back problem    Bad breath    Belching    Bloating    Decorative tattoo    Depression    Diabetes (HCC)    per pt    Disorder of thyroid    Esophageal reflux    GERD (gastroesophageal reflux disease)    Headache(784.0)    Heart palpitations    Afib     Heartburn    I have taken medication for over 10years    High cholesterol    History of depression    Hypothyroidism    Irregular bowel habits    Leaking of urine    Lipoma of unspecified site    Multiple thyroid nodules    on rt,2011, lt.-2008    Nontoxic multinodular goiter    Nontoxic multinodular goiter    Obesity    Other and unspecified hyperlipidemia    Pain in joints    Pain in right shin    Pain with bowel movements    Painful swallowing    Not consistent but not due to illness    Plantar fasciitis    Sleep apnea     CPAP     Stress    Thyroiditis, unspecified    Type 1 diabetes mellitus (HCC)    Uncomfortable fullness after meals    Visual impairment    Wears glasses    Weight loss    Working with weight loss clinic      Past Surgical History:   Procedure Laterality Date    Arthroscopy of joint unlisted Bilateral     knee    Carpal tunnel release Bilateral     Cholecystectomy      Colonoscopy N/A 10/21/2016    Procedure: COLONOSCOPY;  Surgeon: Brandt Ramirez DO;  Location:  ENDOSCOPY    Colonoscopy N/A 12/27/2022    Procedure: COLONOSCOPY;  Surgeon: Aiden Pollard MD;  Location:  ENDOSCOPY    Cyst aspiration left      Cyst aspiration right      Hysterectomy      7/21    Laparoscopic cholecystectomy  11/14/2011    Lipoma removal  04/29/2022    left posterior    Mesh (implantable)      bladder    Neuromuscular stimulator      Oophorectomy Bilateral     Other      wisdom    Other      lt hand middle finger sx    Other surgical history  x2    total thyroidectomy    Other surgical history  x1    sinus    Other surgical history  10/11/2023    trial for neurostimulator    Other surgical history      Neuro Nerve Stimulator    Removal gallbladder      Sinus surgery        Sling oper stres incontinence  01/09/2014    Procedure: SLING UROLOGY;  Surgeon: Isac Sutherland MD;  [facility]    Thyroidectomy      Total abdom hysterectomy  07/2020      Family History   Problem Relation Age of  Onset    Skin cancer Mother     Other (Other) Mother     Heart Disease Father     Hypertension Father     Asthma Father     Diabetes Father     Other (Other) Father     Heart Attack Father         x2    Other (Other) Sister         Crohn's disease    Crohn's Disease Sister     Ulcerative Colitis Sister     Other (Other) Sister         Celiac Disease    Diabetes Maternal Grandmother     Other (Other) Maternal Grandmother         lymphoma    Asthma Other         3 children all have asthma.       Social History     Socioeconomic History    Marital status:     Number of children: 3   Occupational History    Occupation:      Comment: Convenience    Tobacco Use    Smoking status: Former     Current packs/day: 0.00     Types: Cigarettes     Start date: 3/1/2017     Quit date: 3/1/2017     Years since quittin.0     Passive exposure: Never    Smokeless tobacco: Never    Tobacco comments:     non-smoker     Updated 24   Vaping Use    Vaping status: Never Used   Substance and Sexual Activity    Alcohol use: Yes     Comment: rare    Drug use: Yes     Comment: CBD,CBG  Doctors are fully aware    Sexual activity: Yes     Social Drivers of Health     Food Insecurity: No Food Insecurity (2025)    Food Insecurity     Food Insecurity: Never true   Transportation Needs: No Transportation Needs (2025)    Transportation Needs     Lack of Transportation: No   Housing Stability: Low Risk  (2025)    Housing Stability     Housing Instability: No         REVIEW OF SYSTEMS:   Review of Systems   Constitutional:  Positive for fatigue. Negative for chills.   HENT:  Positive for congestion, rhinorrhea and sinus pressure. Negative for ear pain, hoarse voice, postnasal drip and sore throat.    Respiratory:  Negative for cough.    Cardiovascular:  Negative for chest pain.   Neurological:  Positive for headaches.       EXAM:   /68   Pulse 84   Temp 97.2 °F (36.2 °C)   Ht 5' 2\" (1.575  m)   Wt 246 lb (111.6 kg)   LMP 09/24/2019 (Approximate)   SpO2 96%   BMI 44.99 kg/m²   Physical Exam  Constitutional:       General: She is not in acute distress.     Appearance: She is ill-appearing.   HENT:      Right Ear: Tympanic membrane is retracted.      Left Ear: Tympanic membrane is retracted.      Nose: Mucosal edema present.      Right Turbinates: Swollen.      Left Turbinates: Swollen.      Mouth/Throat:      Lips: Pink.      Mouth: Mucous membranes are moist.      Pharynx: Oropharynx is clear.   Cardiovascular:      Rate and Rhythm: Normal rate and regular rhythm.      Heart sounds: Normal heart sounds.   Pulmonary:      Effort: Pulmonary effort is normal.      Breath sounds: Normal breath sounds.   Neurological:      Mental Status: She is alert.         ASSESSMENT AND PLAN:   Diagnoses and all orders for this visit:    Acute non-recurrent maxillary sinusitis  -     azithromycin 250 MG Oral Tab; Take 2 tablets (500 mg total) by mouth daily for 1 day, THEN 1 tablet (250 mg total) daily for 4 days.    Start antibiotic  Continue supportive care

## 2025-03-06 ENCOUNTER — TELEPHONE (OUTPATIENT)
Dept: ORTHOPEDICS CLINIC | Facility: CLINIC | Age: 55
End: 2025-03-06

## 2025-03-06 DIAGNOSIS — M25.561 RIGHT KNEE PAIN, UNSPECIFIED CHRONICITY: Primary | ICD-10-CM

## 2025-03-06 NOTE — TELEPHONE ENCOUNTER
XR ordered and scheduled per Ortho protocol.   Spoe with Patient and asked her to arrive 15-20 minutes prior to appointment with Dr. Connelly

## 2025-03-06 NOTE — PROGRESS NOTES
Patient: Catina Rivera (54 year old, female) Referring Provider:  Insurance:   Diagnosis: Primary osteoarthritis of right knee (M17.11)  S/P total knee arthroplasty, right (Z96.651) [facility]   Date of Surgery: 1/29/25 Next MD visit:  N/A   Precautions:  None 3/13/25 Referral Information:    Date of Evaluation: Req: 12, Auth: 12, Exp: 4/30/2025 02/18/25 POC Auth Visits:  12       Today's Date   3/4/2025    Subjective  The patient reports that she \"put in a call to Dr. Connelly because the only way I don't hve pain is by taking Norco.\" The patient describes her pain as a \"knife is coming out from my knee.\" She reports this increase in pain started Friday, but admits that she has been more active. She denies any specific ROBERT or inciting incident. She reports that she is worried about infection of her knee, but admits no apparent signs of infection. She admits to TTP and sensitivity over the anterior incision. \"My biggest concern is how do I get through the pain. There are times that's all I can focus on.\" Next MD appt 3/13/25. She reports improved pain management in the back of her knee after her last session.       Pain: 0/10     Objective  Worst pain: 10/10 when touching the incision        Assessment  The patient was with some improved pain management after PFJ mobilization performed this session. The patient is with localized pain over the PFJ that she plans to contact her doctor about. Upon appearance, there doesn't seem to be any apparent signs of infection. Her incision in closed and intact, without abnormal drainage. There is some anticpated mild erythema over the lateral edges of the incision, but not that seems to be cause for concern. The patient is limited in tolerance to WB on her left side, limiting exercise selection. We continue to attempt to progress the patient as tolerated, though her progress has been a bit slower than anticiapted due to left knee pain. We will continue to progress  the patient as tolerated and as able.    Goals (to be met in 16 visits)   Short-Term Goals:  Patient will demonstrates knee flexion AROM to 90deg pain-free to facilitate sitting comfortably for 2 hours daily for community integration. Timeframe: 4 visits.  Patient will improve knee flexion AROM to 120deg to allow for reciprocal stair navigation pattern for community integration. Timeframe: 8 visits.  The patient will improve quad strength and single-leg stance stability to demonstrate non-antalgic gait pattern with walking short-length distances for household ambulation. Timeframe: 8 visits.  Patient will improve knee strength and stability to facilitate discontinued use of AD for daily ambulation. Timeframe: 8 visits.   Patient will improve LE strength and stability to facilitate navigating down the stairs reciprocally with SP cane. Timeframe: 8 visits.   Long-Term Goals:  The patient will improve LE strength and endurance to facilitate standing for extended periods of time for 3-4 hours daily for community integration and occupational demands. Timeframe: 12 visits.  The patient will improve LE strength and endurance to facilitate walking distances of 1-2 mile(s) daily for community integration and occupational demands. Timeframe: 12 visits.  Patient will improve lower motor control to perform double-leg squat with symmetrical loading, without asymmetries, and with proper posterior chain loading to facilitate squatting and lifting ADL's. Timeframe: 12 visits.  Patient will improve LE mobility, strength, and single-leg stabilization to meet strength requirements for reciprocal stair navigation pattern with no asymmetries for ascending and descending 3 flights of stairs daily for community integration. Timeframe: 16 visits.  Patient will improve LEFS score by at least 9 points to indicate a true change in improved function for ADL's and restoring PLF. Timeframe: 16 visits.        Plan  Follow-up on pain and symptom  management and contact with MD regarding PFJ pain. Continue to progress LE strengthening as tolerated.    Treatment Last 4 Visits       2/18/2025 2/21/2025 2/25/2025 3/4/2025   PT Treatment   Treatment Day  2 3 4   Therapeutic Exercise Quad set: 2 x 10 x 3\" (R)   Heel slide with strap: 1 x 10 (R)  SLR: 2 x 10 (R)   DL bridging: 2 x 10   LAQ: x 20 (R)   Standing hip ABD: 2 x 10 (R) only   Education on movements and activities to avoid to limit the exacerbation of the patient's current condition.  Educated the patient on the need for consistency in treatment to achieve the mutually established goals.  Education regarding the importance of observing the post-surgical precautions.¬   PROM - knee flexion x 10 (R)   Quad set: 2 x 10 x 3\" (R)   Heel slide with strap: 1 x 10 (R)  SLR: 2 x 10 (R)   DL bridging: unable to complete due to calf cramping  S/L hip ABD: 2 x 10 (R)   LAQ: x 20 (R)   Standing hip ABD and ext: 2 x 10 (B) only  Prone HS Curls x 10 (R)   S/L Hip Abduction 2 x 10 ea R/L  Supine QS x 20 5\" H   Supine SLR with QS x 10 ea R/L  Bridges with Heel Dig x 7    Heel Slides with Strap x 10 (R)    Manual Therapy Soft tissue mobilization: (R) quad, ITB/VL  Soft tissue mobilization: (R) quad, ITB/VL  Edema massage x 5min (R)  STM (Supine): Posterior Knee, HS  STM (Supine): Surrounding Incision, Superior to Patella       Therapeutic Activity Education on anatomy and pathophysiology of current condition, rationale for physical therapy, anticipated treatment interventions, prognosis, timeline for recovery, and expected functional outcomes based on evaluative findings.       Therapeutic Exercise Min 12 20 25    Manual Therapy Min 10 20 15    Therapeutic Activity Min 3      Evaluation Min 20      Total of Timed Procedures 25 40 40    Total of Service Based 20 0 0    Total Treatment Time 45 40 40           2/21/2025 2/25/2025 3/4/2025   LE Treatment   Treatment Day 2 3 4   Therapeutic Exercise   PROM - knee flexion x 5  (R)   Manual hamstring stretch: x 30\" (L)   SLR: 2 x 10 (R)   S/L hip ABD: 2 x 10 (R)   LAQ: x 20 (R)   DLHR: x 20  Standing hip ABD: x 20 (R) only  Standing hip ext: x 15, limited in doing more reps due to left knee pain  Standing HSC: x 20, some hamstring cramping      Neuro Re-Education   Tandem stance: 2 x 30\" (B)    Manual Therapy   Soft tissue mobilization: (R) quad, ITB/VL   Edema massage x 5min (R)   Med/lat PFJ mobs (in knee ext): Grade 3, 4 x 10\" (R)   Sup/inf PFJ mobs (in knee flex0: Grade 3, 4  x 10\" (R)    Therapeutic Activity   DL mini squat: 1 x 10, cues for posterior chain loading.   Therapeutic Exercise Minutes   21   Neuro Re-Educ Minutes   2   Manual Therapy Minutes   15   Therapeutic Activity Minutes   2   Total Time Of Timed Procedures   40   Total Time Of Service-Based Procedures   0   Total Treatment Time   40        HEP  Access Code: [email or web address]  URL: [email or web address]  Date: 02/18/2025  Prepared by: Melia Metz     Exercises  - Supine Quad Set  - 1-2 x daily - 7 x weekly - 2 sets - 10 reps - 3\" hold  - Supine Heel Slide with Strap  - 1-2 x daily - 7 x weekly - 1 sets - 10 reps - 3\" hold  - Supine Straight Leg Raises  - 1-2 x daily - 7 x weekly - 2 sets - 10 reps  - Supine Bridge  - 1-2 x daily - 7 x weekly - 2 sets - 10 reps  - Seated Long Arc Quad  - 1-2 x daily - 7 x weekly - 2 sets - 10 reps  - Standing Hip Abduction with Counter Support  - 1-2 x daily - 7 x weekly - 2 sets - 10 reps    Charges     MT x 1, TE x 2

## 2025-03-06 NOTE — TELEPHONE ENCOUNTER
LOV 2/13/25  DOS 1/29/25    Pt attached image of incisional area.  Pt reports redness, warmth, and swelling at incision.    Redness noted, especially, at central incisional area, near place of scabbing.    Pt denies scabbing, discharge, fever, or chills.    Started Z-pack antibiotics yesterday due to sinusitis.    Pt had reported pain on 3/3/25, cyclobenzaprine was ordered by provider and conservative measures given by RN.

## 2025-03-07 ENCOUNTER — OFFICE VISIT (OUTPATIENT)
Dept: PHYSICAL THERAPY | Age: 55
End: 2025-03-07
Attending: STUDENT IN AN ORGANIZED HEALTH CARE EDUCATION/TRAINING PROGRAM
Payer: COMMERCIAL

## 2025-03-07 ENCOUNTER — HOSPITAL ENCOUNTER (OUTPATIENT)
Dept: GENERAL RADIOLOGY | Age: 55
Discharge: HOME | End: 2025-03-07
Attending: STUDENT IN AN ORGANIZED HEALTH CARE EDUCATION/TRAINING PROGRAM
Payer: COMMERCIAL

## 2025-03-07 ENCOUNTER — OFFICE VISIT (OUTPATIENT)
Dept: ORTHOPEDICS CLINIC | Facility: CLINIC | Age: 55
End: 2025-03-07
Payer: COMMERCIAL

## 2025-03-07 DIAGNOSIS — T81.41XA POSTOPERATIVE STITCH ABSCESS: ICD-10-CM

## 2025-03-07 DIAGNOSIS — M25.561 RIGHT KNEE PAIN, UNSPECIFIED CHRONICITY: ICD-10-CM

## 2025-03-07 DIAGNOSIS — K21.9 GASTROESOPHAGEAL REFLUX DISEASE, UNSPECIFIED WHETHER ESOPHAGITIS PRESENT: ICD-10-CM

## 2025-03-07 DIAGNOSIS — Z96.651 S/P TOTAL KNEE ARTHROPLASTY, RIGHT: Primary | ICD-10-CM

## 2025-03-07 PROCEDURE — 97110 THERAPEUTIC EXERCISES: CPT

## 2025-03-07 PROCEDURE — 97140 MANUAL THERAPY 1/> REGIONS: CPT

## 2025-03-07 PROCEDURE — 99024 POSTOP FOLLOW-UP VISIT: CPT | Performed by: STUDENT IN AN ORGANIZED HEALTH CARE EDUCATION/TRAINING PROGRAM

## 2025-03-07 PROCEDURE — 73562 X-RAY EXAM OF KNEE 3: CPT | Performed by: STUDENT IN AN ORGANIZED HEALTH CARE EDUCATION/TRAINING PROGRAM

## 2025-03-07 RX ORDER — PREGABALIN 25 MG/1
25 CAPSULE ORAL 2 TIMES DAILY
COMMUNITY

## 2025-03-07 RX ORDER — CEFADROXIL 500 MG/1
500 CAPSULE ORAL 2 TIMES DAILY
Qty: 20 CAPSULE | Refills: 0 | Status: SHIPPED | OUTPATIENT
Start: 2025-03-07 | End: 2025-03-17

## 2025-03-07 RX ORDER — OMEPRAZOLE 40 MG/1
40 CAPSULE, DELAYED RELEASE ORAL DAILY
Qty: 90 CAPSULE | Refills: 0 | Status: SHIPPED | OUTPATIENT
Start: 2025-03-07

## 2025-03-07 NOTE — PROGRESS NOTES
Patient: Catina Rivera (54 year old, female) Referring Provider:  Insurance:   Diagnosis: Primary osteoarthritis of right knee (M17.11)  S/P total knee arthroplasty, right (Z96.651) Ruslan Connelly  [facility]   Date of Surgery: 1/29/25 Next MD visit:  N/A   Precautions:  None 3/13/25 Referral Information:    Date of Evaluation: Req: 12, Auth: 12, Exp: 4/30/2025 02/18/25 POC Auth Visits:  12       Today's Date   3/7/2025    Subjective  Patient reports she saw MD today and he removed an infected stitch, and will be starting an antibiotic later today. Patient reports HS pain is gone, and is now able able to sleep on her involved side now, but incision is still really sensitive. Patient reports \"I feel like a human being today for the first time in a while\"       Pain: 2/10     Objective  Worst Pain: 5/10 \"annoying\"         Assessment  Patient was able to better tolerate balance and standing hip strengthening exercises on both legs this day compared to previous, after seeing MD earlier today and removing infected stitch of incision. Patient is still with another stitch that appears red and risen, continue monitoring for any progressions in symptoms here.    Goals (to be met in 16 visits)   Short-Term Goals:  Patient will demonstrates knee flexion AROM to 90deg pain-free to facilitate sitting comfortably for 2 hours daily for community integration. Timeframe: 4 visits.  Patient will improve knee flexion AROM to 120deg to allow for reciprocal stair navigation pattern for community integration. Timeframe: 8 visits.  The patient will improve quad strength and single-leg stance stability to demonstrate non-antalgic gait pattern with walking short-length distances for household ambulation. Timeframe: 8 visits.  Patient will improve knee strength and stability to facilitate discontinued use of AD for daily ambulation. Timeframe: 8 visits.   Patient will improve LE strength and stability to facilitate navigating down the  stairs reciprocally with SP cane. Timeframe: 8 visits.   Long-Term Goals:  The patient will improve LE strength and endurance to facilitate standing for extended periods of time for 3-4 hours daily for community integration and occupational demands. Timeframe: 12 visits.  The patient will improve LE strength and endurance to facilitate walking distances of 1-2 mile(s) daily for community integration and occupational demands. Timeframe: 12 visits.  Patient will improve lower motor control to perform double-leg squat with symmetrical loading, without asymmetries, and with proper posterior chain loading to facilitate squatting and lifting ADL's. Timeframe: 12 visits.  Patient will improve LE mobility, strength, and single-leg stabilization to meet strength requirements for reciprocal stair navigation pattern with no asymmetries for ascending and descending 3 flights of stairs daily for community integration. Timeframe: 16 visits.  Patient will improve LEFS score by at least 9 points to indicate a true change in improved function for ADL's and restoring PLF. Timeframe: 16 visits.      Plan  Continue monitoring other stitch for possible infection    Treatment Last 4 Visits       2/21/2025 2/25/2025 3/4/2025 3/7/2025   PT Treatment   Treatment Day 2 3 4 5   Therapeutic Exercise PROM - knee flexion x 10 (R)   Quad set: 2 x 10 x 3\" (R)   Heel slide with strap: 1 x 10 (R)  SLR: 2 x 10 (R)   DL bridging: unable to complete due to calf cramping  S/L hip ABD: 2 x 10 (R)   LAQ: x 20 (R)   Standing hip ABD and ext: 2 x 10 (B) only  Prone HS Curls x 10 (R)   S/L Hip Abduction 2 x 10 ea R/L  Supine QS x 20 5\" H   Supine SLR with QS x 10 ea R/L  Bridges with Heel Dig x 7    Heel Slides with Strap x 10 (R)     Manual Therapy Soft tissue mobilization: (R) quad, ITB/VL  Edema massage x 5min (R)  STM (Supine): Posterior Knee, HS  STM (Supine): Surrounding Incision, Superior to Patella        Therapeutic Exercise Min 20 25     Manual  Therapy Min 20 15     Total of Timed Procedures 40 40     Total of Service Based 0 0     Total Treatment Time 40 40            2/21/2025 2/25/2025 3/4/2025 3/7/2025   LE Treatment   Treatment Day 2 3 4 5   Therapeutic Exercise   PROM - knee flexion x 5 (R)   Manual hamstring stretch: x 30\" (L)   SLR: 2 x 10 (R)   S/L hip ABD: 2 x 10 (R)   LAQ: x 20 (R)   DLHR: x 20  Standing hip ABD: x 20 (R) only  Standing hip ext: x 15, limited in doing more reps due to left knee pain  Standing HSC: x 20, some hamstring cramping    PROM - knee flexion x 5 (R)   SLR: 2 x 10 (R)   Manual hamstring stretch: x 30\" (L)    S/L hip ABD: 2 x 10 (R)   LAQ: x 20 (R)  DLHR: x 20  Standing hip ABD: 2 x 10 ea R/L - tolerated standing on right this time   Calf in Cabinet Stretch 30\" ea R/L   Standing Hip Ext 2 x 10 ea R/L    Neuro Re-Education   Tandem stance: 2 x 30\" (B)  Tandem Stance 2 x 30\" ea R/L    Manual Therapy   Soft tissue mobilization: (R) quad, ITB/VL   Edema massage x 5min (R)   Med/lat PFJ mobs (in knee ext): Grade 3, 4 x 10\" (R)   Sup/inf PFJ mobs (in knee flex0: Grade 3, 4  x 10\" (R)  Soft tissue mobilization: (R) quad  Med/lat PFJ mobs (in knee ext): Grade 3, 4 x 10\" (R)   Sup/inf PFJ mobs (in knee flex0: Grade 3, 4  x 10\" (R)      Therapeutic Activity   DL mini squat: 1 x 10, cues for posterior chain loading.    Therapeutic Exercise Minutes   21 24   Neuro Re-Educ Minutes   2 3   Manual Therapy Minutes   15 15   Therapeutic Activity Minutes   2    Total Time Of Timed Procedures   40 42   Total Time Of Service-Based Procedures   0 0   Total Treatment Time   40 42   HEP    Access Code: [email or web address]  URL: [email or web address]  Date: 02/18/2025  Prepared by: Melia Berberich     Exercises  - Supine Quad Set  - 1-2 x daily - 7 x weekly - 2 sets - 10 reps - 3\" hold  - Supine Heel Slide with Strap  - 1-2 x daily - 7 x weekly - 1 sets - 10 reps - 3\" hold  - Supine Straight Leg Raises  - 1-2 x daily - 7 x weekly - 2 sets  - 10 reps  - Supine Bridge  - 1-2 x daily - 7 x weekly - 2 sets - 10 reps  - Seated Long Arc Quad  - 1-2 x daily - 7 x weekly - 2 sets - 10 reps  - Standing Hip Abduction with Counter Support  - 1-2 x daily - 7 x weekly - 2 sets - 10 reps        HEP  Access Code: [email or web address]  URL: [email or web address]  Date: 02/18/2025  Prepared by: Melia Metz     Exercises  - Supine Quad Set  - 1-2 x daily - 7 x weekly - 2 sets - 10 reps - 3\" hold  - Supine Heel Slide with Strap  - 1-2 x daily - 7 x weekly - 1 sets - 10 reps - 3\" hold  - Supine Straight Leg Raises  - 1-2 x daily - 7 x weekly - 2 sets - 10 reps  - Supine Bridge  - 1-2 x daily - 7 x weekly - 2 sets - 10 reps  - Seated Long Arc Quad  - 1-2 x daily - 7 x weekly - 2 sets - 10 reps  - Standing Hip Abduction with Counter Support  - 1-2 x daily - 7 x weekly - 2 sets - 10 reps    Charges     Ther Ex x 2, Manual x 1

## 2025-03-07 NOTE — PROGRESS NOTES
Orthopedic Surgery - Total Joint Arthroplasty    Post-Op Visit    DOS: 1/29/2025    Subjective:    Chief Complaint: Follow-up after Right total knee arthroplasty    Patient is 5.5 weeks status post the above procedure. Pain is improving since surgery. They have been working with outpatient physical therapy on ROM. Feels that over the last week they have turned a corner. They are using no assistive devices for ambulation.    Patient noted some redness over two areas of their incision over the last few days. No drainage. No fever or chills. I asked them to come see me today for a wound check    Recently was diagnosed with a sinus infection and has started a Z-pack. This is improving    Objective:    There were no vitals filed for this visit.  Estimated body mass index is 44.99 kg/m² as calculated from the following:    Height as of 3/5/25: 5' 2\" (1.575 m).    Weight as of 3/5/25: 246 lb (111.6 kg).    Physical Examination:   Gen: Well developed, well nourished, resting comfortably in no acute distress  Skin: Warm, dry  Head: Normocephalic, atraumatic  Ears/Nose/Throat: Moist mucous membranes  CV: Regular rate and rhythm by peripheral pulse  Resp: Respirations are non-labored, no wheezing    MSK:   Inspection: Incision appears to be healing. There is a scab in the middle of the incision with some surrounding erythema. No active drainage. Mild TTP. There is also a smaller area more distal along the incision. Appears consistent with a stitch abscess. No TTP along the incision otherwise  Range of motion of the knee is 0 - 110 degrees without pain (from 80 at 2 week visit); flexion contracture of 0  Able to actively straight leg raise without extensor lag  Patient is neurovascularly intact L2-S1 and 5/5 strength with resisted testing of DF/PF/EHL  No calf pain, redness or swelling  Warm perfused extremity      Imaging:   Weightbearing XRs of the right knee were obtained with AP, sunrise, and lateral views. These were  personally reviewed and interpreted    Patient is s/p cemented total knee arthroplasty with patellar resurfacing. Tibial component is in 5 degrees of varus  Components appear in stable position compared to prior films  No fracture or dislocation seen      Plan:  Overall doing well. Excellent gains in pain and motion and feels that she turned the corner. Appears to have developed two stitch abscesses from the Vicryl. The scab with suture remains was unroofed today. I was able to express blood tinged fluid, but no rick pus from the area of erythema proximally. Once this was decompressed, it was cleaned with betadine and a band-aid applied.    Prescriptions Given Today: Cefadroxil 500 BID x 10 days for stitch abscess    I also ordered ESR/CRP today. Given this is a localized area of tenderness and she is doing well and improving in terms of knee pain and function, I do believe this is a stitch abscess and not a deep infection at this time. Discussed that ESR/CRP will likely be elevated from her resolving sinus infection but we will use it to trend.    Blood Thinner: Back on home Eliquis    Physical Therapy: Currently in outpatient PT    Weight Bearing Status: As tolerated    Mobility Aid: Weaned under PT supervision. Maintain fall precautions    Discussed: Wound care    Continue GIULIANA and RICE for the postoperative lower extremity to help with swelling    Follow-Up: I discussed with the patient and family that they should continue betadine swaps with dressing changes as well as the antibiotics. If there is increased drainage or erythema over the weekend instead of the wound drying up and closing, they will come back at see me on Monday      Ruslan Connelly MD  Adult Hip and Knee Reconstruction    Department of Orthopaedic Surgery  [facility]     [address] [place] [address]  [address] [place] [address]     t: [phone]  f: [phone]       [facility]

## 2025-03-07 NOTE — TELEPHONE ENCOUNTER
LOV: 3/5/25 sinus problem      Omeprazole 40 MG Oral Capsule Delayed Release  Take 1 capsule (40 mg total) by mouth daily. Dispense: 90 capsule, Refills: 1 ordered        11/13/2024     Future Appointments   Date Time Provider Department Center   3/7/2025 10:00 AM Ruslan Connelly MD [facility] ORTHO 75 [facility]   3/7/2025 12:45 PM Cira Elkins PTA [facility] Phys T [place]   3/10/2025 10:10 AM PF XR LL RM1 PF XRAY [place]   3/10/2025 10:30 AM Ruslan Connelly MD [facility] ORTHOPL [facility] [address]   3/10/2025  1:00 PM Cira Elkins, PTA [facility] Phys T [place]   3/10/2025  5:00 PM Yessenia Muñiz, LCPC L[facility] B[facility] YOR [facility] [place]   3/12/2025  9:45 AM Cira Elkins, PTA [facility] T [place]   3/17/2025  2:30 PM Cira Elkins, PTA [facility] Phys T [place]   3/19/2025  9:45 AM Cira Elkins, PTA [facility] Phys T [place]   3/24/2025  2:30 PM Cira Elkins, PTA [facility] Phys T [place]   3/25/2025  9:00 AM Jessica Dennis APRN EMGENDO [facility] [place]   3/26/2025 11:00 AM Melia Metz, PT [facility] Phys T [place]   3/31/2025  2:30 PM Cira Elkins, PTA [facility] Phys T [place]   4/2/2025 11:00 AM Melia Metz, PT [facility] Phys T [place]   4/8/2025  9:45 AM Melia Metz, PT [facility] Phys T [place]     Gastrointestional Medication Protocol [id number]/07/2025 05:47 AM   Protocol Details In person appointment or virtual visit in the past 12 mos or appointment in next 3 mos    Medication is active on med list

## 2025-03-10 ENCOUNTER — APPOINTMENT (OUTPATIENT)
Dept: PHYSICAL THERAPY | Age: 55
End: 2025-03-10
Attending: STUDENT IN AN ORGANIZED HEALTH CARE EDUCATION/TRAINING PROGRAM
Payer: COMMERCIAL

## 2025-03-10 ENCOUNTER — LAB ENCOUNTER (OUTPATIENT)
Dept: LAB | Age: 55
End: 2025-03-10
Attending: STUDENT IN AN ORGANIZED HEALTH CARE EDUCATION/TRAINING PROGRAM
Payer: COMMERCIAL

## 2025-03-10 ENCOUNTER — OFFICE VISIT (OUTPATIENT)
Facility: CLINIC | Age: 55
End: 2025-03-10
Payer: COMMERCIAL

## 2025-03-10 DIAGNOSIS — E11.8 TYPE 2 DIABETES MELLITUS WITH COMPLICATION, WITH LONG TERM CURRENT USE OF INSULIN PUMP (HCC): ICD-10-CM

## 2025-03-10 DIAGNOSIS — Z96.41 TYPE 2 DIABETES MELLITUS WITH COMPLICATION, WITH LONG TERM CURRENT USE OF INSULIN PUMP (HCC): ICD-10-CM

## 2025-03-10 DIAGNOSIS — M17.12 PRIMARY OSTEOARTHRITIS OF LEFT KNEE: ICD-10-CM

## 2025-03-10 DIAGNOSIS — Z96.651 S/P TOTAL KNEE ARTHROPLASTY, RIGHT: Primary | ICD-10-CM

## 2025-03-10 DIAGNOSIS — T81.41XA: ICD-10-CM

## 2025-03-10 DIAGNOSIS — T81.41XA POSTOPERATIVE STITCH ABSCESS: ICD-10-CM

## 2025-03-10 DIAGNOSIS — Z96.651 S/P TOTAL KNEE ARTHROPLASTY, RIGHT: ICD-10-CM

## 2025-03-10 LAB
CRP SERPL-MCNC: 0.8 MG/DL (ref ?–0.5)
ERYTHROCYTE [SEDIMENTATION RATE] IN BLOOD: 20 MM/HR
EST. AVERAGE GLUCOSE BLD GHB EST-MCNC: 123 MG/DL (ref 68–126)
HBA1C MFR BLD: 5.9 % (ref ?–5.7)
T4 FREE SERPL-MCNC: 1.5 NG/DL (ref 0.8–1.7)
TSI SER-ACNC: 2.3 UIU/ML (ref 0.55–4.78)

## 2025-03-10 PROCEDURE — 85652 RBC SED RATE AUTOMATED: CPT

## 2025-03-10 PROCEDURE — 83036 HEMOGLOBIN GLYCOSYLATED A1C: CPT

## 2025-03-10 PROCEDURE — 36415 COLL VENOUS BLD VENIPUNCTURE: CPT

## 2025-03-10 PROCEDURE — 86140 C-REACTIVE PROTEIN: CPT

## 2025-03-10 PROCEDURE — 84443 ASSAY THYROID STIM HORMONE: CPT

## 2025-03-10 PROCEDURE — 99024 POSTOP FOLLOW-UP VISIT: CPT | Performed by: STUDENT IN AN ORGANIZED HEALTH CARE EDUCATION/TRAINING PROGRAM

## 2025-03-10 PROCEDURE — 84439 ASSAY OF FREE THYROXINE: CPT

## 2025-03-10 RX ORDER — TRAMADOL HYDROCHLORIDE 50 MG/1
50 TABLET ORAL EVERY 8 HOURS PRN
Qty: 30 TABLET | Refills: 0 | Status: SHIPPED | OUTPATIENT
Start: 2025-03-10

## 2025-03-10 NOTE — PROGRESS NOTES
Orthopedic Surgery - Total Joint Arthroplasty    Post-Op Visit    DOS: 1/29/2025    Subjective:    Chief Complaint: Follow-up after Right total knee arthroplasty    Patient is nearing 6 weeks status post the above procedure. Pain is improving since surgery. They have been working with outpatient physical therapy on ROM. Feels that over the last week they have turned a corner. They are using no assistive devices for ambulation.    Patient noted some redness over two areas of their incision over the last few days. No drainage. No fever or chills. I asked them to come see me on Friday for a wound check. During that visit, I unroofed a scab to verify it was not an exposed Vicryl stitch near the larger area of redness and a small amount of blood was expressed. I instructed them to do daily betadine dressing changes and keep an eye out for any additional drainage. They are here for repeat wound check. Noted a spot of drainage on Saturday but not on Sunday or today. The area of erythema is decreasing. The tenderness is resolved.    Recently was diagnosed with a sinus infection and has started a Z-pack. Sinuses appears to be improving. I also started them on Cefadroxil on Friday.    Objective:    There were no vitals filed for this visit.  Estimated body mass index is 44.99 kg/m² as calculated from the following:    Height as of 3/5/25: 5' 2\" (1.575 m).    Weight as of 3/5/25: 246 lb (111.6 kg).    Physical Examination:   Gen: Well developed, well nourished, resting comfortably in no acute distress  Skin: Warm, dry  Head: Normocephalic, atraumatic  Ears/Nose/Throat: Moist mucous membranes  CV: Regular rate and rhythm by peripheral pulse  Resp: Respirations are non-labored, no wheezing    MSK:   Inspection: The scab in the middle of the incision appears dry. No drainage. Surrounding erythema is much fainter today. No TTP. There is also a smaller area more distal along the incision which appears to be smaller than Friday and  also improving. Remainder of the incision is well healed  Range of motion of the knee is 0 - 110 degrees without pain; flexion contracture of 0  Able to actively straight leg raise without extensor lag  Patient is neurovascularly intact L2-S1 and 5/5 strength with resisted testing of DF/PF/EHL  No calf pain, redness or swelling  Warm perfused extremity      Imaging:   Weightbearing XRs of the right knee on Friday with AP, sunrise, and lateral views. These were personally reviewed and interpreted    Patient is s/p cemented total knee arthroplasty with patellar resurfacing. Tibial component is in 5 degrees of varus  Components appear in stable position compared to prior films  No fracture or dislocation seen      Plan:  Overall doing well. Two areas of concern along the incision appear consistent with stitch abscess. They both appear to be healing with resolution of erythema and response to antibiotics.     ESR/CRP are pending. Given this is a localized area of tenderness and she is doing well and improving in terms of knee pain and function, I do believe this was a stitch abscess and not a deep infection at this time. Discussed that ESR/CRP will likely be elevated from her resolving sinus infection but we will use it to trend if any further changes in the wound    Blood Thinner: Back on home Eliquis    Physical Therapy: Currently in outpatient PT    Weight Bearing Status: As tolerated    Mobility Aid: Weaned under PT supervision. Maintain fall precautions    Discussed: Wound care    Continue GIULIANA and RICE for the postoperative lower extremity to help with swelling    Follow-Up: They will send me weekly updates on the wound status. If continuing to improve, will plan for follow up on 5/1/25. Sooner if any concerns    She also would like an injection in the LEFT knee which is bothering her more than the right now. We will order this today and provide this for her when she follows up      Ruslan Connelly MD  Adult Hip and  Knee Reconstruction    Department of Orthopaedic Surgery  [facility]     [address] [place] [address]  [address] [place] [address]     t: [phone]  f: [phone]       [facility]

## 2025-03-11 ENCOUNTER — APPOINTMENT (OUTPATIENT)
Dept: PHYSICAL THERAPY | Age: 55
End: 2025-03-11
Attending: STUDENT IN AN ORGANIZED HEALTH CARE EDUCATION/TRAINING PROGRAM
Payer: COMMERCIAL

## 2025-03-11 ENCOUNTER — MED REC SCAN ONLY (OUTPATIENT)
Facility: CLINIC | Age: 55
End: 2025-03-11

## 2025-03-11 DIAGNOSIS — E89.0 POSTOPERATIVE HYPOTHYROIDISM: ICD-10-CM

## 2025-03-11 RX ORDER — LEVOTHYROXINE SODIUM 125 UG/1
125 TABLET ORAL
Qty: 90 TABLET | Refills: 1 | Status: SHIPPED | OUTPATIENT
Start: 2025-03-11

## 2025-03-12 ENCOUNTER — APPOINTMENT (OUTPATIENT)
Dept: PHYSICAL THERAPY | Age: 55
End: 2025-03-12
Attending: STUDENT IN AN ORGANIZED HEALTH CARE EDUCATION/TRAINING PROGRAM
Payer: COMMERCIAL

## 2025-03-13 ENCOUNTER — MED REC SCAN ONLY (OUTPATIENT)
Facility: CLINIC | Age: 55
End: 2025-03-13

## 2025-03-13 ENCOUNTER — PATIENT MESSAGE (OUTPATIENT)
Facility: CLINIC | Age: 55
End: 2025-03-13

## 2025-03-14 ENCOUNTER — TELEPHONE (OUTPATIENT)
Dept: PHYSICAL THERAPY | Facility: HOSPITAL | Age: 55
End: 2025-03-14

## 2025-03-14 ENCOUNTER — APPOINTMENT (OUTPATIENT)
Dept: PHYSICAL THERAPY | Age: 55
End: 2025-03-14
Attending: STUDENT IN AN ORGANIZED HEALTH CARE EDUCATION/TRAINING PROGRAM
Payer: COMMERCIAL

## 2025-03-14 NOTE — TELEPHONE ENCOUNTER
FYI- Responded to patient in my chart with the plan.    Assessment  7 days    14 days      30 days

## 2025-03-16 ENCOUNTER — PATIENT MESSAGE (OUTPATIENT)
Facility: CLINIC | Age: 55
End: 2025-03-16

## 2025-03-17 ENCOUNTER — OFFICE VISIT (OUTPATIENT)
Dept: PHYSICAL THERAPY | Age: 55
End: 2025-03-17
Attending: STUDENT IN AN ORGANIZED HEALTH CARE EDUCATION/TRAINING PROGRAM
Payer: COMMERCIAL

## 2025-03-17 ENCOUNTER — TELEPHONE (OUTPATIENT)
Facility: CLINIC | Age: 55
End: 2025-03-17

## 2025-03-17 DIAGNOSIS — E11.9 TYPE 2 DIABETES MELLITUS WITHOUT COMPLICATION, WITH LONG-TERM CURRENT USE OF INSULIN (HCC): ICD-10-CM

## 2025-03-17 DIAGNOSIS — Z96.41 INSULIN PUMP IN PLACE: ICD-10-CM

## 2025-03-17 DIAGNOSIS — Z79.4 TYPE 2 DIABETES MELLITUS WITHOUT COMPLICATION, WITH LONG-TERM CURRENT USE OF INSULIN (HCC): ICD-10-CM

## 2025-03-17 PROCEDURE — 97112 NEUROMUSCULAR REEDUCATION: CPT

## 2025-03-17 PROCEDURE — 97110 THERAPEUTIC EXERCISES: CPT

## 2025-03-17 RX ORDER — INSULIN PMP CART,AUT,G6/7,CNTR
1 EACH SUBCUTANEOUS
Qty: 30 EACH | Refills: 1 | Status: SHIPPED | OUTPATIENT
Start: 2025-03-17

## 2025-03-17 NOTE — TELEPHONE ENCOUNTER
Endocrine Refill protocol for Omnipod insulin pumps and supplies    Protocol Criteria:  PASSED  Reason: N/A    If below requirement is met, send a 90-day supply with 1 refill per provider protocol.    Verify appointment with Endocrinology completed in the last 6 months or scheduled in the next 3 months.    Last completed office visit:10/29/2024 Jessica Dennis APRN   Next scheduled Follow up: 3/25/2025 Jessica RODRIGUES    Passed and sent per protocol

## 2025-03-17 NOTE — PROGRESS NOTES
Patient: Catina Rivera (55 year old, female) Referring Provider:  Insurance:   Diagnosis: Primary osteoarthritis of right knee (M17.11)  S/P total knee arthroplasty, right (Z96.651) Ruslan Connelly  [facility]   Date of Surgery: 1/29/25 Next MD visit:  N/A   Precautions:  None 3/13/25 Referral Information:    Date of Evaluation: Req: 12, Auth: 12, Exp: 4/30/2025 02/18/25 POC Auth Visits:  12       Today's Date   3/17/2025    Subjective  Patient reports the only time she has pain is when she goes to stand up or attempts stairs, as she feels weak but doesn't have the anterior knee pain she had before. Patient reports she started driving beginning of last week and has felt safe       Pain: 0/10     Objective  Worst Pain: 5/10 but shorter recovery time       Assessment  Patient was progressed with updated HEP this day and with closed chain quad activation/strengthening including TKE with ball on wall, and fwd/lateral step ups. Patient was with some increased tension/discomfort of quads after completing exercises on 4\" step, but recovered more easily with breif manual to distal quads, and quad tendon of RLE. Patient was also able to progress from supine heel slides to seated this day, which will allow more mobility of her quads.    Goals (to be met in 16 visits)   Short-Term Goals:  Patient will demonstrates knee flexion AROM to 90deg pain-free to facilitate sitting comfortably for 2 hours daily for community integration. Timeframe: 4 visits.  Patient will improve knee flexion AROM to 120deg to allow for reciprocal stair navigation pattern for community integration. Timeframe: 8 visits.  The patient will improve quad strength and single-leg stance stability to demonstrate non-antalgic gait pattern with walking short-length distances for household ambulation. Timeframe: 8 visits.  Patient will improve knee strength and stability to facilitate discontinued use of AD for daily ambulation. Timeframe: 8 visits.   Patient  will improve LE strength and stability to facilitate navigating down the stairs reciprocally with SP cane. Timeframe: 8 visits.   Long-Term Goals:  The patient will improve LE strength and endurance to facilitate standing for extended periods of time for 3-4 hours daily for community integration and occupational demands. Timeframe: 12 visits.  The patient will improve LE strength and endurance to facilitate walking distances of 1-2 mile(s) daily for community integration and occupational demands. Timeframe: 12 visits.  Patient will improve lower motor control to perform double-leg squat with symmetrical loading, without asymmetries, and with proper posterior chain loading to facilitate squatting and lifting ADL's. Timeframe: 12 visits.  Patient will improve LE mobility, strength, and single-leg stabilization to meet strength requirements for reciprocal stair navigation pattern with no asymmetries for ascending and descending 3 flights of stairs daily for community integration. Timeframe: 16 visits.  Patient will improve LEFS score by at least 9 points to indicate a true change in improved function for ADL's and restoring PLF. Timeframe: 16 visits.        Plan  Follow up with response to new HEP, continue monitoring lower stitch for any adverse reactions    Treatment Last 4 Visits       2/25/2025 3/4/2025 3/7/2025 3/17/2025   PT Treatment   Treatment Day 3 4 5 6   Therapeutic Exercise Prone HS Curls x 10 (R)   S/L Hip Abduction 2 x 10 ea R/L  Supine QS x 20 5\" H   Supine SLR with QS x 10 ea R/L  Bridges with Heel Dig x 7    Heel Slides with Strap x 10 (R)      Manual Therapy STM (Supine): Posterior Knee, HS  STM (Supine): Surrounding Incision, Superior to Patella         Therapeutic Exercise Min 25      Manual Therapy Min 15      Total of Timed Procedures 40      Total of Service Based 0      Total Treatment Time 40             2/25/2025 3/4/2025 3/7/2025 3/17/2025   LE Treatment   Treatment Day 3 4 5 6   Therapeutic  Exercise  PROM - knee flexion x 5 (R)   Manual hamstring stretch: x 30\" (L)   SLR: 2 x 10 (R)   S/L hip ABD: 2 x 10 (R)   LAQ: x 20 (R)   DLHR: x 20  Standing hip ABD: x 20 (R) only  Standing hip ext: x 15, limited in doing more reps due to left knee pain  Standing HSC: x 20, some hamstring cramping    PROM - knee flexion x 5 (R)   SLR: 2 x 10 (R)   Manual hamstring stretch: x 30\" (L)    S/L hip ABD: 2 x 10 (R)   LAQ: x 20 (R)  DLHR: x 20  Standing hip ABD: 2 x 10 ea R/L - tolerated standing on right this time   Calf in Cabinet Stretch 30\" ea R/L   Standing Hip Ext 2 x 10 ea R/L  SLR: 2 x 10 (R)   S/L hip ABD: 2 x 10 (R) - tolerated standing on right this time   SAQ 2 x 10 (R)   LAQ x 10 (R)   FSU 4\" x 10 (R)  LSU 4\" x 10 (R)  Seated Heel Slides x 10 (R)   Neuro Re-Education  Tandem stance: 2 x 30\" (B)  Tandem Stance 2 x 30\" ea R/L  TKE ball on wall 2 x 10 5\" H  Tandem Stance 2 x 30\" ea R/L    Manual Therapy  Soft tissue mobilization: (R) quad, ITB/VL   Edema massage x 5min (R)   Med/lat PFJ mobs (in knee ext): Grade 3, 4 x 10\" (R)   Sup/inf PFJ mobs (in knee flex0: Grade 3, 4  x 10\" (R)  Soft tissue mobilization: (R) quad  Med/lat PFJ mobs (in knee ext): Grade 3, 4 x 10\" (R)   Sup/inf PFJ mobs (in knee flex0: Grade 3, 4  x 10\" (R)    STM Quad Tendon, Distal Quads x 3 min    Therapeutic Activity  DL mini squat: 1 x 10, cues for posterior chain loading.     Therapeutic Exercise Minutes  21 24 28   Neuro Re-Educ Minutes  2 3 10   Manual Therapy Minutes  15 15 3   Therapeutic Activity Minutes  2     Total Time Of Timed Procedures  40 42 41   Total Time Of Service-Based Procedures  0 0 0   Total Treatment Time  40 42 41   HEP   Access Code: [email or web address]  URL: [email or web address]  Date: 02/18/2025  Prepared by: Melia Metz     Exercises  - Supine Quad Set  - 1-2 x daily - 7 x weekly - 2 sets - 10 reps - 3\" hold  - Supine Heel Slide with Strap  - 1-2 x daily - 7 x weekly - 1 sets - 10 reps - 3\"  hold  - Supine Straight Leg Raises  - 1-2 x daily - 7 x weekly - 2 sets - 10 reps  - Supine Bridge  - 1-2 x daily - 7 x weekly - 2 sets - 10 reps  - Seated Long Arc Quad  - 1-2 x daily - 7 x weekly - 2 sets - 10 reps  - Standing Hip Abduction with Counter Support  - 1-2 x daily - 7 x weekly - 2 sets - 10 reps Access Code: [email or web address]  URL: [email or web address]  Date: 03/17/2025  Prepared by: Cira Hormig[place]os    Exercises  - Supine Straight Leg Raises  - 1-2 x daily - 7 x weekly - 2 sets - 10 reps  - Supine Bridge  - 1-2 x daily - 7 x weekly - 2 sets - 10 reps  - Seated Long Arc Quad  - 1-2 x daily - 7 x weekly - 2 sets - 10 reps  - Standing Hip Abduction with Counter Support  - 1-2 x daily - 7 x weekly - 2 sets - 10 reps  - Standing Terminal Knee Extension at Wall with Ball  - 1 x daily - 7 x weekly - 1 sets - 10 reps - 5s hold  - Seated Heel Slide  - 2-3 x daily - 7 x weekly - 1 sets - 10 reps        HEP  Access Code: [email or web address]  URL: [email or web address]  Date: 03/17/2025  Prepared by: Cira H[place]os    Exercises  - Supine Straight Leg Raises  - 1-2 x daily - 7 x weekly - 2 sets - 10 reps  - Supine Bridge  - 1-2 x daily - 7 x weekly - 2 sets - 10 reps  - Seated Long Arc Quad  - 1-2 x daily - 7 x weekly - 2 sets - 10 reps  - Standing Hip Abduction with Counter Support  - 1-2 x daily - 7 x weekly - 2 sets - 10 reps  - Standing Terminal Knee Extension at Wall with Ball  - 1 x daily - 7 x weekly - 1 sets - 10 reps - 5s hold  - Seated Heel Slide  - 2-3 x daily - 7 x weekly - 1 sets - 10 reps    Charges     Ther Ex x 2, Neuro x 1

## 2025-03-19 ENCOUNTER — OFFICE VISIT (OUTPATIENT)
Dept: PHYSICAL THERAPY | Age: 55
End: 2025-03-19
Attending: STUDENT IN AN ORGANIZED HEALTH CARE EDUCATION/TRAINING PROGRAM
Payer: COMMERCIAL

## 2025-03-19 PROCEDURE — 97110 THERAPEUTIC EXERCISES: CPT

## 2025-03-19 PROCEDURE — 97140 MANUAL THERAPY 1/> REGIONS: CPT

## 2025-03-19 PROCEDURE — 97112 NEUROMUSCULAR REEDUCATION: CPT

## 2025-03-19 NOTE — TELEPHONE ENCOUNTER
Patient authorized visco to be scheduled:    DOS: 5/1  PROVIDER: moon  MEDICATION:zilretta  OFFICE LOCATION:[place]  PULLED:yes  LABELED:yes  PLACED:yes

## 2025-03-19 NOTE — PROGRESS NOTES
Patient: Catina Rivera (55 year old, female) Referring Provider:  Insurance:   Diagnosis: Primary osteoarthritis of right knee (M17.11)  S/P total knee arthroplasty, right (Z96.651) Ruslan Connelly  [facility]   Date of Surgery: 1/29/25 Next MD visit:  N/A   Precautions:  None 3/13/25 Referral Information:    Date of Evaluation: Req: 12, Auth: 12, Exp: 4/30/2025 02/18/25 POC Auth Visits:  12       Today's Date   3/19/2025    Subjective  Patient reports she has been cramping and so sore since last visit and took Tuesday off work due to how severe the soreness was. Patient reports she is still sore now, but does report she was able to use a step stool to get into her work van today without pain in her knee for the first time. Patient does report though she is compliant with HEP, she is aware that she is probably not active enough outside of that to assist in her recovery.       Pain: 3/10     Objective  Worst Pain: 5/10         Assessment  Patient returns to PT this day reporting high levels of discomfort in regards to cramping the night after last visit, and then severe soreness the following 2 days, resulting in taking the day after last visit off from work. Patient care this day focused on regressing some of the advances made in exercises last visit, and modifying progressions to alternating mucles groups to avoid consecutive quad strengthening. Patient would benefit from follow up on this, and added hands on gentle manual applied to right quads, that will hopefully assist in reduction of cramping and muscle soreness complaints.    Goals (to be met in 16 visits)   Short-Term Goals:  Patient will demonstrates knee flexion AROM to 90deg pain-free to facilitate sitting comfortably for 2 hours daily for community integration. Timeframe: 4 visits.  Patient will improve knee flexion AROM to 120deg to allow for reciprocal stair navigation pattern for community integration. Timeframe: 8 visits.  The patient will improve  quad strength and single-leg stance stability to demonstrate non-antalgic gait pattern with walking short-length distances for household ambulation. Timeframe: 8 visits.  Patient will improve knee strength and stability to facilitate discontinued use of AD for daily ambulation. Timeframe: 8 visits.   Patient will improve LE strength and stability to facilitate navigating down the stairs reciprocally with SP cane. Timeframe: 8 visits.   Long-Term Goals:  The patient will improve LE strength and endurance to facilitate standing for extended periods of time for 3-4 hours daily for community integration and occupational demands. Timeframe: 12 visits.  The patient will improve LE strength and endurance to facilitate walking distances of 1-2 mile(s) daily for community integration and occupational demands. Timeframe: 12 visits.  Patient will improve lower motor control to perform double-leg squat with symmetrical loading, without asymmetries, and with proper posterior chain loading to facilitate squatting and lifting ADL's. Timeframe: 12 visits.  Patient will improve LE mobility, strength, and single-leg stabilization to meet strength requirements for reciprocal stair navigation pattern with no asymmetries for ascending and descending 3 flights of stairs daily for community integration. Timeframe: 16 visits.  Patient will improve LEFS score by at least 9 points to indicate a true change in improved function for ADL's and restoring PLF. Timeframe: 16 visits.          Plan  Follow up with response to today's modificaitons    Treatment Last 4 Visits       3/4/2025 3/7/2025 3/17/2025 3/19/2025   PT Treatment   Treatment Day 4 5 6 7          3/4/2025 3/7/2025 3/17/2025 3/19/2025   LE Treatment   Treatment Day 4 5 6 7   Therapeutic Exercise PROM - knee flexion x 5 (R)   Manual hamstring stretch: x 30\" (L)   SLR: 2 x 10 (R)   S/L hip ABD: 2 x 10 (R)   LAQ: x 20 (R)   DLHR: x 20  Standing hip ABD: x 20 (R) only  Standing hip ext:  x 15, limited in doing more reps due to left knee pain  Standing HSC: x 20, some hamstring cramping    PROM - knee flexion x 5 (R)   SLR: 2 x 10 (R)   Manual hamstring stretch: x 30\" (L)    S/L hip ABD: 2 x 10 (R)   LAQ: x 20 (R)  DLHR: x 20  Standing hip ABD: 2 x 10 ea R/L - tolerated standing on right this time   Calf in Cabinet Stretch 30\" ea R/L   Standing Hip Ext 2 x 10 ea R/L  SLR: 2 x 10 (R)   S/L hip ABD: 2 x 10 (R) - tolerated standing on right this time   SAQ 2 x 10 (R)   LAQ x 10 (R)   FSU 4\" x 10 (R)  LSU 4\" x 10 (R)  Seated Heel Slides x 10 (R) QS x 20 5\" (R)  SLR with QS x 12 (R)  Manual Hip Flexor Stretch 2 x 30\"   Supine AROM Heel Slides x 10  S/L hip ABD: 2 x 10 (R)  Seated Heel Slides x 10   LAQ x 10 (R)  Prone HS Curls x 10 (R)  FSU 4\" x 10 (R)  LSU 4\" x 10 (R)   Neuro Re-Education Tandem stance: 2 x 30\" (B)  Tandem Stance 2 x 30\" ea R/L  TKE ball on wall 2 x 10 5\" H  Tandem Stance 2 x 30\" ea R/L  Tandem Stance 30\" ea R/L  Tandem Stance on Airex 30\" ea R/L    Manual Therapy Soft tissue mobilization: (R) quad, ITB/VL   Edema massage x 5min (R)   Med/lat PFJ mobs (in knee ext): Grade 3, 4 x 10\" (R)   Sup/inf PFJ mobs (in knee flex0: Grade 3, 4  x 10\" (R)  Soft tissue mobilization: (R) quad  Med/lat PFJ mobs (in knee ext): Grade 3, 4 x 10\" (R)   Sup/inf PFJ mobs (in knee flex0: Grade 3, 4  x 10\" (R)    STM Quad Tendon, Distal Quads x 3 min  MFR: Quads, TFL  STM: Quads, Calf, Hamstrings    Therapeutic Activity DL mini squat: 1 x 10, cues for posterior chain loading.      Therapeutic Exercise Minutes 21 24 28 23   Neuro Re-Educ Minutes 2 3 10 8   Manual Therapy Minutes 15 15 3 12   Therapeutic Activity Minutes 2      Total Time Of Timed Procedures 40 42 41 43   Total Time Of Service-Based Procedures 0 0 0 0   Total Treatment Time 40 42 41 43   HEP  Access Code: [email or web address]  URL: [email or web address]  Date: 02/18/2025  Prepared by: Melia Metz     Exercises  - Supine Quad Set  - 1-2  x daily - 7 x weekly - 2 sets - 10 reps - 3\" hold  - Supine Heel Slide with Strap  - 1-2 x daily - 7 x weekly - 1 sets - 10 reps - 3\" hold  - Supine Straight Leg Raises  - 1-2 x daily - 7 x weekly - 2 sets - 10 reps  - Supine Bridge  - 1-2 x daily - 7 x weekly - 2 sets - 10 reps  - Seated Long Arc Quad  - 1-2 x daily - 7 x weekly - 2 sets - 10 reps  - Standing Hip Abduction with Counter Support  - 1-2 x daily - 7 x weekly - 2 sets - 10 reps Access Code: [email or web address]  URL: [email or web address]  Date: 03/17/2025  Prepared by: Cira Severo    Exercises  - Supine Straight Leg Raises  - 1-2 x daily - 7 x weekly - 2 sets - 10 reps  - Supine Bridge  - 1-2 x daily - 7 x weekly - 2 sets - 10 reps  - Seated Long Arc Quad  - 1-2 x daily - 7 x weekly - 2 sets - 10 reps  - Standing Hip Abduction with Counter Support  - 1-2 x daily - 7 x weekly - 2 sets - 10 reps  - Standing Terminal Knee Extension at Wall with Ball  - 1 x daily - 7 x weekly - 1 sets - 10 reps - 5s hold  - Seated Heel Slide  - 2-3 x daily - 7 x weekly - 1 sets - 10 reps Access Code: [email or web address]  URL: [email or web address]  Date: 03/17/2025  Prepared by: Cira [place]     Exercises  - Supine Straight Leg Raises  - 1-2 x daily - 7 x weekly - 2 sets - 10 reps  - Supine Bridge  - 1-2 x daily - 7 x weekly - 2 sets - 10 reps  - Seated Long Arc Quad  - 1-2 x daily - 7 x weekly - 2 sets - 10 reps  - Standing Hip Abduction with Counter Support  - 1-2 x daily - 7 x weekly - 2 sets - 10 reps  - Standing Terminal Knee Extension at Wall with Ball  - 1 x daily - 7 x weekly - 1 sets - 10 reps - 5s hold  - Seated Heel Slide  - 2-3 x daily - 7 x weekly - 1 sets - 10 reps        HEP  Access Code: [email or web address]  URL: [email or web address]  Date: 03/17/2025  Prepared by: Cira Elkins     Exercises  - Supine Straight Leg Raises  - 1-2 x daily - 7 x weekly - 2 sets - 10 reps  - Supine Bridge  - 1-2 x daily - 7 x weekly - 2 sets - 10  reps  - Seated Long Arc Quad  - 1-2 x daily - 7 x weekly - 2 sets - 10 reps  - Standing Hip Abduction with Counter Support  - 1-2 x daily - 7 x weekly - 2 sets - 10 reps  - Standing Terminal Knee Extension at Wall with Ball  - 1 x daily - 7 x weekly - 1 sets - 10 reps - 5s hold  - Seated Heel Slide  - 2-3 x daily - 7 x weekly - 1 sets - 10 reps    Charges     Ther Ex x 2, Neuro x 1, Manual x 1

## 2025-03-21 ENCOUNTER — TELEPHONE (OUTPATIENT)
Facility: CLINIC | Age: 55
End: 2025-03-21

## 2025-03-21 NOTE — TELEPHONE ENCOUNTER
Patient called for a refill for Pregabalin 50 mg oral 3 times daily. She does not have anymore and is still having pain.      Please advise.

## 2025-03-21 NOTE — TELEPHONE ENCOUNTER
Pt is requesting refill on Pregabalin     DOS: 01/29/2025- RTKA  Last OV: 03/10/2025  Last refill date: 01/30/2025     #/refills: 90/0  Upcoming appt:   Future Appointments   Date Time Provider Department Center   3/24/2025  2:30 PM Cira Elkins PTA [facility] [place]   3/25/2025  9:00 AM Jessica Dennis APRN [facility] [place]   3/26/2025  7:30 AM Cira Elkins PTA [facility] [place]   3/31/2025  2:30 PM Cira Elkins PTA [facility] [place]   4/2/2025 11:00 AM Melia Metz, PT [facility] [place]   4/8/2025  9:45 AM Melia Metz, PT [facility] [place]   5/1/2025  9:30 AM Ruslan Connelly MD [facility] [address]

## 2025-03-24 ENCOUNTER — TELEPHONE (OUTPATIENT)
Facility: CLINIC | Age: 55
End: 2025-03-24

## 2025-03-24 ENCOUNTER — APPOINTMENT (OUTPATIENT)
Dept: PHYSICAL THERAPY | Age: 55
End: 2025-03-24
Attending: STUDENT IN AN ORGANIZED HEALTH CARE EDUCATION/TRAINING PROGRAM
Payer: COMMERCIAL

## 2025-03-24 DIAGNOSIS — Z96.651 S/P TOTAL KNEE ARTHROPLASTY, RIGHT: Primary | ICD-10-CM

## 2025-03-24 RX ORDER — PREGABALIN 50 MG/1
50 CAPSULE ORAL 3 TIMES DAILY
Qty: 90 CAPSULE | Refills: 0 | Status: SHIPPED | OUTPATIENT
Start: 2025-03-24 | End: 2025-04-23

## 2025-03-24 NOTE — TELEPHONE ENCOUNTER
Patient called in. She has not received the Lyrica that she had requested on Friday.    Advised patient that refills need to be allowed 48 to 72 hours, and to please not wait until completely out to request a refill. We will see if that can be sent today.    Also, pt is complaining of increased knee pain in surgical knee. She states that the incision scab popped open and thick yellow pus came out over the weekend not a huge amount. She thinks that she may need another abx (was put on Cefadroxil 3/7) - she was treated for a stitch abscess on 03/10. She states the oozing has stopped, it now is clear to blood tinged, if any.     Please advise?

## 2025-03-24 NOTE — TELEPHONE ENCOUNTER
Refilled Lyrica.    Thanks for the update. Can you please have her send a picture of her knee/the scab asap? Any redness? Warmth over the knee? Fever, chills, SOB, chest pain? It is reassuring the drainage is clear now, and she was doing the daily betadine wound cleanses and finished the abx. Depending on picture/symptoms, may want to see her in clinic or if she wants to follow up. Please keep me posted. Thanks!

## 2025-03-24 NOTE — TELEPHONE ENCOUNTER
Pt verbalized understanding. Her daughter will send the pictures. Advised once received, will forward.    NO SOB, no warmth, no redness.

## 2025-03-24 NOTE — TELEPHONE ENCOUNTER
New pt appt for rt knee womb check per dr little   Future Appointments   Date Time Provider Department Center   3/25/2025  9:00 AM Jessica Dennis APRN [facility] [place]   3/26/2025  7:30 AM Cira Elkins, PTA [facility] [place]   3/27/2025  8:00 AM Joaquina Samayoa PA-C [facility] [address]   3/31/2025  2:30 PM Cira Elkins PTA [facility] [place]   4/2/2025 11:00 AM Melia Metz, PT [facility] [place]   4/8/2025  9:45 AM Melia Metz, PT [facility] [place]   5/1/2025  9:30 AM Ruslan Little MD [facility] [address]

## 2025-03-25 ENCOUNTER — TELEMEDICINE (OUTPATIENT)
Facility: CLINIC | Age: 55
End: 2025-03-25
Payer: COMMERCIAL

## 2025-03-25 DIAGNOSIS — Z79.4 TYPE 2 DIABETES MELLITUS WITH OTHER SPECIFIED COMPLICATION, WITH LONG-TERM CURRENT USE OF INSULIN (HCC): ICD-10-CM

## 2025-03-25 DIAGNOSIS — Z96.41 TYPE 2 DIABETES MELLITUS WITH COMPLICATION, WITH LONG TERM CURRENT USE OF INSULIN PUMP (HCC): Primary | ICD-10-CM

## 2025-03-25 DIAGNOSIS — E55.9 VITAMIN D DEFICIENCY: ICD-10-CM

## 2025-03-25 DIAGNOSIS — E11.8 TYPE 2 DIABETES MELLITUS WITH COMPLICATION, WITH LONG TERM CURRENT USE OF INSULIN PUMP (HCC): Primary | ICD-10-CM

## 2025-03-25 DIAGNOSIS — E66.813 CLASS 3 SEVERE OBESITY DUE TO EXCESS CALORIES WITHOUT SERIOUS COMORBIDITY WITH BODY MASS INDEX (BMI) OF 40.0 TO 44.9 IN ADULT (HCC): ICD-10-CM

## 2025-03-25 DIAGNOSIS — E66.01 CLASS 3 SEVERE OBESITY DUE TO EXCESS CALORIES WITHOUT SERIOUS COMORBIDITY WITH BODY MASS INDEX (BMI) OF 40.0 TO 44.9 IN ADULT (HCC): ICD-10-CM

## 2025-03-25 DIAGNOSIS — E11.69 HYPERLIPIDEMIA ASSOCIATED WITH TYPE 2 DIABETES MELLITUS (HCC): ICD-10-CM

## 2025-03-25 DIAGNOSIS — E11.59 HYPERTENSION ASSOCIATED WITH TYPE 2 DIABETES MELLITUS (HCC): ICD-10-CM

## 2025-03-25 DIAGNOSIS — E78.5 HYPERLIPIDEMIA ASSOCIATED WITH TYPE 2 DIABETES MELLITUS (HCC): ICD-10-CM

## 2025-03-25 DIAGNOSIS — E11.69 TYPE 2 DIABETES MELLITUS WITH OTHER SPECIFIED COMPLICATION, WITH LONG-TERM CURRENT USE OF INSULIN (HCC): ICD-10-CM

## 2025-03-25 DIAGNOSIS — I15.2 HYPERTENSION ASSOCIATED WITH TYPE 2 DIABETES MELLITUS (HCC): ICD-10-CM

## 2025-03-25 DIAGNOSIS — E89.0 POSTOPERATIVE HYPOTHYROIDISM: ICD-10-CM

## 2025-03-25 PROCEDURE — 95251 CONT GLUC MNTR ANALYSIS I&R: CPT | Performed by: NURSE PRACTITIONER

## 2025-03-25 PROCEDURE — 98006 SYNCH AUDIO-VIDEO EST MOD 30: CPT | Performed by: NURSE PRACTITIONER

## 2025-03-25 RX ORDER — TIRZEPATIDE 15 MG/.5ML
INJECTION, SOLUTION SUBCUTANEOUS
COMMUNITY
Start: 2025-03-22 | End: 2025-03-25

## 2025-03-25 RX ORDER — GLUCOSAMINE HCL/CHONDROITIN SU 500-400 MG
CAPSULE ORAL
Qty: 400 STRIP | Refills: 2 | Status: SHIPPED | OUTPATIENT
Start: 2025-03-25

## 2025-03-25 RX ORDER — LANCETS 33 GAUGE
1 EACH MISCELLANEOUS 4 TIMES DAILY
Qty: 400 EACH | Refills: 3 | Status: SHIPPED | OUTPATIENT
Start: 2025-03-25 | End: 2026-03-25

## 2025-03-25 RX ORDER — CHOLECALCIFEROL (VITAMIN D3) 25 MCG
1000 TABLET ORAL DAILY
COMMUNITY

## 2025-03-25 RX ORDER — ATORVASTATIN CALCIUM 20 MG/1
20 TABLET, FILM COATED ORAL NIGHTLY
Qty: 90 TABLET | Refills: 0 | Status: SHIPPED | OUTPATIENT
Start: 2025-03-25 | End: 2025-06-23

## 2025-03-25 RX ORDER — TIRZEPATIDE 15 MG/.5ML
15 INJECTION, SOLUTION SUBCUTANEOUS WEEKLY
Qty: 6 ML | Refills: 0 | Status: SHIPPED | OUTPATIENT
Start: 2025-03-25 | End: 2025-06-23

## 2025-03-25 RX ORDER — GLUCAGON INJECTION, SOLUTION 1 MG/.2ML
1 INJECTION, SOLUTION SUBCUTANEOUS AS NEEDED
Qty: 0.4 ML | Refills: 1 | Status: SHIPPED | OUTPATIENT
Start: 2025-03-25

## 2025-03-25 NOTE — PROGRESS NOTES
[facility] Note - Telemedicine    Catina Rivera verbally consents to a Telemedicine (Audio + Video) visit on 3/28/2025 as a follow up visit.  The visit was conducted in a private room.   Patient understands and accepts financial responsibility for any deductible, co-insurance and/or co-pays associated with this service.    Name: Catina Rivera    Date: 3/25/25  Penelope - nickname    Catina Rivera is a 55 year old female who presents for evaluation of T2DM management.     Chief complaint: Follow - Up (diabetes)      Subjective:   DM hx:  -Diagnosed with diabetes in 2000 gestational DM - on insulin since shortly after dx (antibodies negative 2022) , c peptide detectable -7/5/22  -Family history- yes, mother and MGF    Initial HPI consult in July 2024  Last office visit for DM mgmt of the pt: 4/9/24   Mgmt by: Amy Fernandez NP from [facility]  States she get knee injection steroid bilaterally every 3 month through ortho   Day 1 or 2 after injection.   Last Thursday saw dentist due to papiloma to [facility]  Dentist last 3 months, has taken out.     DM meds at first office visit:  Mounjaro 15 mg weekly in 2 weeks last dose was this past Sunday   Omnipod 5 in Automode + Dexcom G6  Time Basal ICR ISF Active Insulin Time Target   12:00 AM (24 hr) 0.9 unit/hr 11 30 3 hrs 110   1P  9        630P  11                  TDD 42.1    Continuous Glucose Monitoring Interpretation  Catina Rivera has undergone continuous glucose monitoring with the Omnipod 5 with phone (connected to clinic profile).  W/ Dexcom G6. The blood glucose tracings were evaluated for two weeks prior to office visit. Blood glucose tracings demonstrated no significant hyperglycemia. There were occasional hypoglycemia, particularly 1-2 pm and 6-7 am during the weeks of evaluation.    Date: 7/31-7/25/24: TIR 97%, GMI 6.5% ,  low <1% , very low <1%, SD 24mg/dl, Sensor usage: 93%     episodes of hypoglycemia: Yes, lowest 59 in the last  3 months, get symptoms at 80s.     -Re: potential DM medication contraindication (if positive, checkbox selected):  [] History of pancreatitis- denies  [] Personal/fam hx of medullary thyroid cancer/MEN2- denies   [] History of recent/frequent UTI/yeast infxn- denies   [] Previous amputation related to diabetes- denies    -Presence of associated DM complications (if positive, checkbox selected):  [x] Macrovascular complications (CAD/CVA/PAD) afib  [] Neuropathy  [x] Retinopathy  [x] Nephropathy  [] HTN  [x] Hyperlipidemia  [] Stroke/TIA- denies   [] Gastroparesis- denies    -Lifestyle: eat 2 -3 x a day  - Modifying factors:  Previously trialed/failed DM meds: Metformin : severe GI     ###Also Hx of Hypothyroidism  15 years ago had left thyroidectomy and then 2 years after had right thyroidectomy, due to MNG all were benign. No Radiation was done.  10/2023: TSH 1.07, fT4 1.2,   05/2024: TSH 0.611 , fT4 1.4,   Hx of Atrial fib developed 2 years ago , no current fx, last fx was when she was in HS.   10/29/24- Thyroid meds: Currently taking LT4 125 mcg PO qAM, taking it appropriately.     INTERIM HX with LILIA Lopez   10/29/24: patient states she has scheduled knee replacement last week of Jan, 2025  - Current treatment: Mounjaro 15 mg every Sunday, Omnipod 5 in Automode + Dexcom G6: 10/16-10/29/24  Time Basal ICR ISF Active Insulin Time Target   12:00 am 0.9 unit/hr 11 30 3 hrs 110   5:30 am 1.1 11        1:00 pm 1.6 9        4:30 pm 1.9 9      6:30pm 1.6 9      9:30 pm 2 11      TDD insulin: 38.6 units /day   Automated mode 100%  Manual mode 0%  Basal/day: 18.9 units  Bolus/day:19.7 units  Testing: Continuous Glucose Monitoring Interpretation  Catina Rivera  has undergone continuous glucose monitoring with the Dexcom G6 CGM with phone (connected to clinic profile).  The blood glucose tracings were evaluated for two weeks prior to office visit. Blood glucose tracings demonstrated no significant hyperglycemia. There  were occasional hypoglycemia, particularly after 3 pm  during the weeks of evaluation.    Date: 10/16-10/29/24: TIR 95%, GMI 6.6% ,  low 0% , very low 0%, SD 26mg/dl, Sensor usage: 90.6%  Hypoglycemia: 47 in  CGM, when she confirmed it with fingerstick glucose was above 70  New complication related to DM:  no hospitalization and no ER visit since last office visit.       INTERIM HX with JessicaLILIA 03/25/25 :   She had surgery knee surgery in Jan 29, states she is doing sessions w/ PT  Current treatment:  Omnipod 5 in Automode + Dexcom G6, mounjaro 15 mg,  TDD 33.2  Time Basal ICR ISF Active Insulin Time Target   12:00 am 0.9 unit/hr 11 30 3 hrs 110   5:30 am 1.1 11        1:00 pm 1.6 10        4:30 pm 1.9 10      6:30pm 1.6 10      9:30 pm 2 11          Testing: Continuous Glucose Monitoring Interpretation  Catina Rivera  has undergone continuous glucose monitoring with the Dexcom G7 CGM with phone (connected to clinic profile).  The blood glucose tracings were evaluated for two weeks prior to office visit. Blood glucose tracings demonstrated no significant hyperglycemia. There were occasional hypoglycemia, particularly after 1 am during the weeks of evaluation.    Date: 02/24-03/25/25: TIR 96%, GMI 6.6% ,  low 0% , very low 1%, SD 24mg/dl, Sensor usage: 97%    Hypoglycemia: denies   Complication:  no hospitalization and no ER visit since last office visit.    History/Other:    Allergies, PMH, SocHx and FHx reviewed and updated as appropriate in Epic on    NOVOLOG 100 UNIT/ML Injection Solution INJECT UP TO 83 UNITS VIA INSULIN PUMP DAILY AS DIRECTED 75 mL 1    Continuous Glucose Sensor (DEXCOM G6 SENSOR) Does not apply Misc USE 1 SENSOR EVERY 10 DAYS 9 each 1    Continuous Glucose Transmitter (DEXCOM G6 TRANSMITTER) Does not apply Misc USE 1 TRANSMITTER EVERY 90 DAYS 1 each 2    cholecalciferol 25 MCG (1000 UT) Oral Tab Take 1 tablet (1,000 Units total) by mouth daily.      Multiple Vitamin (MULTIVITAMINS OR)  Take by mouth.      Magnesium Cl-Calcium Carbonate 71.5-119 MG Oral Tab EC Take 2 tablets by mouth daily.      atorvastatin 20 MG Oral Tab Take 1 tablet (20 mg total) by mouth nightly. 90 tablet 0    glucagon (GVOKE HYPOPEN 2-PACK) 1 MG/0.2ML Subcutaneous injection Inject 0.2 mL (1 mg total) into the skin as needed. If glucose less than 70 and cannot swallow anything by mouth. Patient trained on Gvoke. No substitutions 0.4 mL 1    MOUNJARO 15 MG/0.5ML Subcutaneous Solution Auto-injector Inject 15 mg into the skin once a week. 6 mL 0    Glucose Blood (BLOOD GLUCOSE TEST) In Vitro Strip Use to test BG 4x/day 400 strip 2    OneTouch Delica Lancets 33G Does not apply Misc 1 Lancet by Finger stick route in the morning, at noon, in the evening, and at bedtime. 400 each 3     Allergies   Allergen Reactions    Adhesive Tape HIVES     Paper tape is ok, clear tape=hives      Ibuprofen OTHER (SEE COMMENTS)     Asthma attack    Sulfa Antibiotics      Unknown; happened before adolescent period    Erythromycin Base NAUSEA ONLY    Oxycodone CONFUSION     Memory issues     Current Outpatient Medications   Medication Sig Dispense Refill    NOVOLOG 100 UNIT/ML Injection Solution INJECT UP TO 83 UNITS VIA INSULIN PUMP DAILY AS DIRECTED 75 mL 1    Continuous Glucose Sensor (DEXCOM G6 SENSOR) Does not apply Misc USE 1 SENSOR EVERY 10 DAYS 9 each 1    Continuous Glucose Transmitter (DEXCOM G6 TRANSMITTER) Does not apply Misc USE 1 TRANSMITTER EVERY 90 DAYS 1 each 2    cholecalciferol 25 MCG (1000 UT) Oral Tab Take 1 tablet (1,000 Units total) by mouth daily.      Multiple Vitamin (MULTIVITAMINS OR) Take by mouth.      Magnesium Cl-Calcium Carbonate 71.5-119 MG Oral Tab EC Take 2 tablets by mouth daily.      atorvastatin 20 MG Oral Tab Take 1 tablet (20 mg total) by mouth nightly. 90 tablet 0    glucagon (GVOKE HYPOPEN 2-PACK) 1 MG/0.2ML Subcutaneous injection Inject 0.2 mL (1 mg total) into the skin as needed. If glucose less than 70 and  cannot swallow anything by mouth. Patient trained on Gvoke. No substitutions 0.4 mL 1    MOUNJARO 15 MG/0.5ML Subcutaneous Solution Auto-injector Inject 15 mg into the skin once a week. 6 mL 0    Glucose Blood (BLOOD GLUCOSE TEST) In Vitro Strip Use to test BG 4x/day 400 strip 2    OneTouch Delica Lancets 33G Does not apply Misc 1 Lancet by Finger stick route in the morning, at noon, in the evening, and at bedtime. 400 each 3    traMADol 50 MG Oral Tab Take 1 tablet (50 mg total) by mouth every 8 (eight) hours as needed for Pain. 30 tablet 0    pregabalin 50 MG Oral Cap Take 1 capsule (50 mg total) by mouth 3 (three) times daily. 90 capsule 0    Insulin Disposable Pump (OMNIPOD 5 [id number] PODS GEN 5) Does not apply Misc 1 each every 3 (three) days. 30 each 1    levothyroxine 125 MCG Oral Tab Take 1 tablet (125 mcg total) by mouth before breakfast. 90 tablet 1    OMEPRAZOLE 40 MG Oral Capsule Delayed Release TAKE 1 CAPSULE(40 MG) BY MOUTH DAILY 90 capsule 0    CYCLOBENZAPRINE 10 MG Oral Tab TAKE 1 TABLET BY MOUTH TWICE DAILY 30 tablet 0    albuterol (PROAIR HFA) 108 (90 Base) MCG/ACT Inhalation Aero Soln Inhale 2 puffs into the lungs every 4 (four) hours as needed for Wheezing. 1 each 0    apixaban 5 MG Oral Tab Take 1 tablet (5 mg total) by mouth 2 (two) times daily.      BUPROPION  MG Oral Tablet 24 Hr TAKE 1 TABLET(300 MG) BY MOUTH DAILY 90 tablet 1    Glucose Blood (ONETOUCH VERIO) In Vitro Strip USE TO TEST ONE TIME DAILY AS NEEDED TO CALIBRATE DEXCOM READINGS 100 strip 1    Calcium Carb-Cholecalciferol (CALCIUM + VITAMIN D3 OR) Take 1 tablet by mouth daily. Vitamin d 800 international unit and Calcium 600 mg      PARoxetine HCl 40 MG Oral Tab Take 1 tablet (40 mg total) by mouth every morning. 90 tablet 3    Insulin Glargine, 2 Unit Dial, (TOUJEO MAX SOLOSTAR) 300 UNIT/ML Subcutaneous Solution Pen-injector USE AS DIRECTED UP TO 75 UNITS DAILY IN THE EVENT OF INSULIN PUMP FAILURE 6 mL 0    Blood Glucose  Monitoring Suppl (ONETOUCH VERIO FLEX SYSTEM) w/Device Does not apply Kit Use to test blood sugar 1x daily as needed to calibrate dexcom 1 kit 0    Insulin Disposable Pump (OMNIPOD 5 G6 INTRO, GEN 5,) Does not apply Kit 1 each every 3 (three) days. 1 kit 0    dilTIAZem HCl ER Beads 180 MG Oral Capsule SR 24 Hr Take 1 capsule (180 mg total) by mouth 2 (two) times daily.      estradiol 0.05 MG/24HR Transdermal Patch Weekly APPLY 1 PATCH EXTERNALLY TO THE SKIN EVERY TUESDAY. DO NOT CUT PATCH       Past Medical History:    Anxiety    Anxiety state, unspecified    Arrhythmia    a fib    Asthma (HCC)    Atrial fibrillation with RVR (HCC)    Back pain    Back problem    Bad breath    Belching    Bloating    Decorative tattoo    Depression    Diabetes (HCC)    per pt    Disorder of thyroid    Esophageal reflux    GERD (gastroesophageal reflux disease)    Headache(784.0)    Heart palpitations    Afib    Heartburn    I have taken medication for over 10years    High cholesterol    History of depression    Hypothyroidism    Irregular bowel habits    Leaking of urine    Lipoma of unspecified site    Multiple thyroid nodules    on rt,2011, lt.-2008    Nontoxic multinodular goiter    Nontoxic multinodular goiter    Obesity    Other and unspecified hyperlipidemia    Pain in joints    Pain in right shin    Pain with bowel movements    Painful swallowing    Not consistent but not due to illness    Plantar fasciitis    Sleep apnea     CPAP     Stress    Thyroiditis, unspecified    Type 1 diabetes mellitus (HCC)    Uncomfortable fullness after meals    Visual impairment    Wears glasses    Weight loss    Working with weight loss clinic     Family History   Problem Relation Age of Onset    Skin cancer Mother     Other (Other) Mother     Heart Disease Father     Hypertension Father     Asthma Father     Diabetes Father     Other (Other) Father     Heart Attack Father         x2    Other (Other) Sister         Crohn's disease    Crohn's  Disease Sister     Ulcerative Colitis Sister     Other (Other) Sister         Celiac Disease    Diabetes Maternal Grandmother     Other (Other) Maternal Grandmother         lymphoma    Asthma Other         3 children all have asthma.      Social history: Reviewed.      ROS/Exam    REVIEW OF SYSTEMS: Ten point review of systems has been performed and is otherwise negative and/or non-contributory, except as described above.     VITALS  There were no vitals filed for this visit.        Wt Readings from Last 6 Encounters:   03/27/25 246 lb (111.6 kg)   03/05/25 246 lb (111.6 kg)   02/13/25 240 lb (108.9 kg)   01/29/25 242 lb (109.8 kg)   01/08/25 242 lb (109.8 kg)   01/06/25 248 lb 3.2 oz (112.6 kg)       PHYSICAL EXAM  CONSTITUTIONAL:  awake, alert, cooperative, no apparent distress, and appears stated age Vss stable   Eyes: no lid lag, no stare, no eye discharge  PSYCH: normal affect  LUNGS: breathing comfortably  CARDIOVASCULAR:  regular rate, no pedal edema  NECK:  no palpable thyroid nodules, small scar noted,   Neuro: No tremors noted.    Labs/Imaging:   c peptide 1.8-7/5/22    CT ABDOMEN+PELVIS KIDNEYSTONE 2D RNDR(NO IV,NO ORAL -2/10/23  KIDNEYS:  No mass, obstruction, or calcification.    ADRENALS:  No mass or enlargement.    LIVER:  No enlargement, atrophy, abnormal density, or significant focal lesion.    BILIARY:  Cholecystectomy.  No visible dilatation or calcification.    PANCREAS:  No lesion, fluid collection, ductal dilatation, or atrophy.    SPLEEN:  No enlargement or focal lesion.     Lab Results   Component Value Date    CHOLEST 178 03/27/2025    CHOLEST 153 01/14/2025    TRIG 69 03/27/2025    TRIG 62 01/14/2025    HDL 61 (H) 03/27/2025    HDL 60 (H) 01/14/2025     (H) 03/27/2025    LDL 80 01/14/2025     Lab Results   Component Value Date    MICROALBCREA  01/14/2025      Comment:      Unable to calculate due to Urine Microalbumin <0.3 mg/dL        MICROALBCREA  05/23/2024      Comment:       Unable to calculate due to Urine Microalbumin <0.5 mg/dL        Lab Results   Component Value Date    CREATSERUM 0.90 01/14/2025    CREATSERUM 0.84 05/23/2024    EGFRCR 77 01/14/2025    EGFRCR 84 05/23/2024     Lab Results   Component Value Date    AST 15 01/14/2025    AST 12 (L) 05/23/2024    ALT 20 01/14/2025    ALT 23 05/23/2024       Lab Results   Component Value Date    TSH 2.301 03/10/2025    TSH 1.595 10/25/2024    T4F 1.5 03/10/2025       Overall glucose control:  Lab Results   Component Value Date    A1C 5.9 (H) 03/10/2025    A1C 6.1 (H) 01/29/2025    A1C 5.6 10/29/2024    A1C 5.6 07/25/2024    A1C 6.0 (A) 02/06/2024       Supplementary Documentation:   -Surveillance for Diabetes Complications & Risks  Foot exam/neuropathy: No data recorded    Retinopathy screening: Last Dilated Eye Exam: 04/29/24  Eye Exam shows Diabetic Retinopathy?: No  Holes in right retina per patient, she states seeing retina specialist every 6 months.   Has appt on 3/28/25- will obtain notes.   Assessment & Plan:     ICD-10-CM    1. Type 2 diabetes mellitus with complication, with long term current use of insulin pump ([facility])  E11.8 glucagon (GVOKE HYPOPEN 2-PACK) 1 MG/0.2ML Subcutaneous injection    Z96.41 MOUNJARO 15 MG/0.5ML Subcutaneous Solution Auto-injector     Glucose Blood (BLOOD GLUCOSE TEST) In Vitro Strip     OneTouch Delica Lancets 33G Does not apply Misc      2. Hypertension associated with type 2 diabetes mellitus (HCC)  E11.59     I15.2       3. Hyperlipidemia associated with type 2 diabetes mellitus ([facility])  E11.69 atorvastatin 20 MG Oral Tab    E78.5 Lipid Panel      4. Postoperative hypothyroidism  E89.0 Assay, Thyroid Stim Hormone     Free T4, (Free Thyroxine)      5. Vitamin D deficiency  E55.9       6. Type 2 diabetes mellitus with other specified complication, with long-term current use of insulin ([facility])  E11.69     Z79.4       7. Class 3 severe obesity due to excess calories without serious comorbidity with body  mass index (BMI) of 40.0 to 44.9 in adult ([facility])  E66.813     E66.01     Z68.41           Catina Rivera is a pleasant 55 year old female here for evaluation of:    #Diabetes w/ insulin pump - PMHx of Type 2 diabetes mellitus diagnosed in 2000 gestational DM - on insulin since shortly after dx (antibodies negative 2022)  -Last A1c value was 5.9% done 3/10/2025. At goal  - Goal <7%. Importance of better glucose control in preventing onset/progression of end-organ damage discussed, as well as goals of therapy and clinical significance of A1C.  - reviewed dexcom Date: 02/24-03/25/25: TIR 96%, GMI 6.6% ,  low 0% , very low 1%, SD 24mg/dl, Sensor usage: 97% GMI at goal   - Plan: no changes w. Current treatment.   - med changes:  - Current treatment: Omnipod 5 in Automode + Dexcom G6:  with changes made  Time Basal ICR ISF Active Insulin Time Target   12:00 am 0.9 unit/hr 11 30 3 hrs 110   5:30 am 1.1 11        1:00 pm 1.6 10        4:30 pm 1.9 10      6:30pm 1.6 10      9:30 pm 2 11      Continue current settings of your pump.   Continue mounjaro 15 mg weekly  - continue your appointment this Friday with your opthalmology   - If on insulin, please ensure you have glucagon at home for emergencies    - continue Dexcom G6 CGM Omnipod 5 with phone (connected to clinic profile)  - patient is on insulin, and has suboptimal glycemic control including wide glycemic swings, thus would benefit from CGM  - continue to check BG 3x/day using glucometer or CGM  - Discussed adding GLP-1 to current medication regimen, including action, risk vs benefit, dosing, and potential side effects. Patient denies hx of pancreatitis, gastroparesis, or personal or family hx of medullary thyroid CA or MEN 2.    - pump backup plan: Toujeo 17 units daily, continue novolog ICR 1:10, ISF 1:30 with target glucose 110 mg/dl.   - gvoke sent   -See above header \"Supplementary Documentation\" for surveillance for diabetes complications & risks  - discussed  targeted glucose levels, symptoms and mgmt of hypoglycemia.   - will check feet on next visit    #Nephropathy screening:   Lab Results   Component Value Date    EGFRCR 77 01/14/2025    MICROALBCREA  01/14/2025      Comment:      Unable to calculate due to Urine Microalbumin <0.3 mg/dL           #Hx of HTN- Blood pressure control: SBP is not to goal <130   #Also has Hx of Atrial Fibrillation  BP Readings from Last 1 Encounters:   03/05/25 134/68   BP Meds: dilTIAZem HCl ER Beads Cp24 - 180 MG , on apixaban  She states not checking a whole lot of her bp.   - discussed to check bp at home, goal of BP< 130/80, decrease sodium intake. Continue BP meds and if consistently elevated to address to provider titrating her/his BP meds.     #Hyperlipidemia/Lipids: LDL is to goal   Lab Results   Component Value Date     (H) 03/27/2025    TRIG 69 03/27/2025   Cholesterol Meds: atorvastatin Tabs - 20 MG , LFTs taking it daily, no misses   - Stop simvastatin start atorvastatin 20 mg taken at night   - repeat labs in 3-4 months fasting    #Postoperative Hypothyroidism  - Hx of MNG underwent left thyroidectomy then 2 years later had right thyroidectomy  15-17 years ago.  -  She stated no DIALLO done and nodules were benign.   -  current tsh 2.3 and FT4 1.5- 3/10/25.  -  Continue current  levothyroxine 125 mcg daily, Repeat labs in 6 months.     #Vitamin D deficiency  - Vit d 22.6- 12/26/15 --> Vit D 44.2-10/25/24  - taking over the counter calcium with vitamin D3 800 international unit per day      #BMI 44/ class 3 obesity /Type 2 DM with other specified complications.   - noted last 3/5/25,  in mounjaro ,gaining weight, she recently had knee surgery and not moving a whole lot.   - Discussed balanced diet: carbohydrates, protein, healthy fats and fiber in each meal. Exercise of at least 150 mins each week. Decrease your simple carbohydrates intake such as sweets: cookies, soda, candy, white rice, white pasta, syrups    Return in  about 3 months (around 6/25/2025) for diabetes follow up.    Total time spent  45 minutes today on obtaining history, reviewing pertinent labs, evaluating patient, providing multiple treatment options, reinforcing diet/exercise and compliance, and completing documentation, of which greater than 50% was spent in face to face discussion with the patient  who demonstrated understanding and agreement with plan.     Thank you for allowing me to participate in the care of this patient.  Please feel free to contact me with any questions.    LILIA Palafox, [facility]  Endocrinology, Diabetes & Metabolism   03/25/25    In reviewing this note, please be advised that Dragon Voice Recognition software used to dictate the note may have made errors in recognizing some of the words or phrases.     Note to patient: The 21 Century Cures Act makes medical notes like these available to patients in the interest of transparency. However, be advised this is a medical document. It is intended as peer to peer communication. It is written in medical language and may contain abbreviations or verbiage that are unfamiliar. It may appear blunt or direct. Medical documents are intended to carry relevant information, facts as evident, and the clinical opinion of the practitioner.

## 2025-03-25 NOTE — PATIENT INSTRUCTIONS
Return Visit:  APN: Return in about 3 months (around 6/25/2025) for diabetes follow up.  [] Video visit  [x] In person only      []  Directions to 1st floor lab    []  Give blood sugar log  []  PAP paperwork for Ozempic/Jardiance (english/Lithuanian)         Summary of today's visit:  Medication changes:    Continue current settings of your pump. Omnipod 5 in [employer] + Dexcom G6  Time Basal ICR ISF Active Insulin Time Target   12:00 am 0.9 unit/hr 11 30 3 hrs 110   5:30 am 1.1 11        1:00 pm 1.6 10        4:30 pm 1.9 10      6:30pm 1.6 10      9:30 pm 2 11          Continue mounjaro 15 mg weekly  Stop simvastatin start atorvastatin 20 mg taken at night  - repeat labs in 3-4 months fasting  - continue your appointment this Friday with your opthalmology   -check bp at home, goal of BP< 130/80, decrease sodium intake. Continue BP meds and if consistently elevated to address to provider titrating her/his BP meds.   - Continue appt eye exam this Friday   - If on insulin, please ensure you have glucagon at home for emergencies   - check glucose with fingerstick machine if continous glucose monitor (CGM) says below 70.   - Targeted glucose levels  - before breakfast or fasting glucose (at least 8-10 hrs of no food/sweetened beverage intake)< 130   -  2 hrs after lunch or dinner< 180   -  before lunch or dinner <140  -  Follow 15g/15 min rule if you develop any hypoglycemia symptoms w/ glucose <70   -  but if glucose <55 follow 30g/15 min rule. Once glucose above 80, eat a protein or food w protein ie cheese, peanut butter, almond butter, cheese, yogurt.     Medication changes  Start Taking               atorvastatin 20 MG Oral Tab Take 1 tablet (20 mg total) by mouth nightly.    glucagon (GVOKE HYPOPEN 2-PACK) 1 MG/0.2ML Subcutaneous injection Inject 0.2 mL (1 mg total) into the skin as needed. If glucose less than 70 and cannot swallow anything by mouth. Patient trained on Gvoke. No substitutions    MOUNJARO 15 MG/0.5ML  Subcutaneous Solution Auto-injector Inject 15 mg into the skin once a week.    Glucose Blood (BLOOD GLUCOSE TEST) In Vitro Strip Use to test BG 4x/day    OneTouch Delica Lancets 33G Does not apply Misc 1 Lancet by Finger stick route in the morning, at noon, in the evening, and at bedtime.          These Medications Have Changed       Start Taking Instead of    NOVOLOG 100 UNIT/ML Injection Solution NOVOLOG 100 UNIT/ML Injection Solution    Dosage:  INJECT UP TO 83 UNITS VIA INSULIN PUMP DAILY AS DIRECTED Dosage:  INJECT UP  UNITS VIA INSULIN PUMP DAILY AS DIRECTED    Starting on: 3/28/2025           Stop Taking                HYDROcodone-acetaminophen (NORCO)  MG Oral Tab    Take 1 tablet by mouth every 6 (six) hours as needed for Pain.     simvastatin 40 MG Oral Tab    Take 1 tablet (40 mg total) by mouth every evening.            For insulin pump users:  -If you experience pump failure, first change your pump site immediately and if no improvement in sugar readings within 60 minutes, please administer your pump back up plan, noted below (including immediately administer long acting insulin as to avoid DKA)  - Pump backup plan:   Long acting insulin:  17 units/day  Mealtime: Take  ICR (Insulin to carb ratio) 1:10 g + ISF(Insulin sensitivity factor) 1:130 >110  - For mechanical failure contact your pump company directly.    . Reach out to your pump provider for troubleshooting or our office if between the hours of 8AM-4PM.   -If a faulty sensor or a sensor needs to be removed early for imaging/procedure, please call the sensor company and they will often send you a free replacement   -If you require any assistance with pump supplies, warranty, etc please call our office to discuss with our diabetes educatorSandy      Additional comments:   - thyroid labs to be done in 6 months.   - Please let us know if you require any refills at least 1 week prior to your medication running out   - Please call our  office if sugars at home are consistently greater than 250 or less than 70     HOW TO TREAT LOW BLOOD SUGAR (Hypoglycemia)  Low blood sugar= Less than 70, or if you start to have symptoms (below)  Symptoms: Shaking or trembling, fast heart rate, extreme hunger, sweating, confusion/difficulty concentrating, dizziness.    How to treat a low blood sugar if you are able to eat/drink: The Rule of 15/15  If you are using continuous glucose monitor that says you are low, but you do not have any symptoms, verify on fingerstick that your blood sugar is actually low before treating.   Eat 15 grams of carbs (see examples below)  Check your blood sugar after 15 minutes. If it’s still below your target range, have another serving.   Repeat these steps until it’s in your target range. Once it’s in range, if you're nervous about your sugar going low again, have a protein source (ie, a spoonful of peanut butter).   If you have a CGM you want to look for how your arrow has changed. If you arrow is pointed up or sideways after 15 min, give your CGM more time OR check with a finger stick. Try not to eat more food until at least 15 min after the first BG check - otherwise you risk having a rebound high.  If you are experiencing symptoms and you are unable to check your blood glucose for any reason, treat the hypoglycemia.  If someone has a low blood sugar and is unconscious: Don’t hesitate to call 911. If someone is unconscious and glucagon is not available or someone does not know how to use it, call 911 immediately.     To treat a low glucose <70, I recommend you carry with you easy, pre-portioned treatment for low blood sugars that are 15G of carbs:   - Children sized squeeze pouch applesauce (low fiber)  - Small children's sized juicebox- 15g carb --> 4oz juice box  - Glucose tablets from [employer], you can find them near diabetes supplies --> Note, you will need to eat 3-4 tablets to get to 15g of carbs  - Children sized fruit  snack pack- look for one with 15 grams of total carbohydrate  - Choice of how to treat your low is important. Complex carbs, or foods that contain fats along with carbs (like chocolate) can slow the absorption of glucose and should NOT be used to treat an emergency low

## 2025-03-26 ENCOUNTER — OFFICE VISIT (OUTPATIENT)
Dept: PHYSICAL THERAPY | Age: 55
End: 2025-03-26
Attending: STUDENT IN AN ORGANIZED HEALTH CARE EDUCATION/TRAINING PROGRAM
Payer: COMMERCIAL

## 2025-03-26 PROCEDURE — 97110 THERAPEUTIC EXERCISES: CPT

## 2025-03-26 NOTE — PROGRESS NOTES
Patient: Catina Rivera (55 year old, female) Referring Provider:  Insurance:   Diagnosis: Primary osteoarthritis of right knee (M17.11)  S/P total knee arthroplasty, right (Z96.651) Ruslan Connelly  [facility]   Date of Surgery: 1/29/25 Next MD visit:  N/A   Precautions:  None 3/13/25 Referral Information:    Date of Evaluation: Req: 12, Auth: 12, Exp: 4/30/2025 02/18/25 POC Auth Visits:  12       Today's Date   3/26/2025    Subjective  Patient reports since resuming Lyrica Monday night, her knee has been feeling better but had to take off work yesterday due to the dizziness the meds caused. Patient reports two spots along incision where it opened up and was oozing puss all day Monday, but has cleaned with iodine and bandaged since without having issues.       Pain: 2/10     Objective  Worst Pain: 5/10         Assessment  Patient progressed this day with focus on lower leg stretching and strengthening, also began resisted walks and more functional strengthening in closed chain this day. Due to patient demonstrating increased sensitivity with progressions throughout care, the patient would benefit from monitoring response to changes made this day, and update HEP next visit    Goals (to be met in 16 visits)   Short-Term Goals:  Patient will demonstrates knee flexion AROM to 90deg pain-free to facilitate sitting comfortably for 2 hours daily for community integration. Timeframe: 4 visits.  Patient will improve knee flexion AROM to 120deg to allow for reciprocal stair navigation pattern for community integration. Timeframe: 8 visits.  The patient will improve quad strength and single-leg stance stability to demonstrate non-antalgic gait pattern with walking short-length distances for household ambulation. Timeframe: 8 visits.  Patient will improve knee strength and stability to facilitate discontinued use of AD for daily ambulation. Timeframe: 8 visits.   Patient will improve LE strength and stability to facilitate  navigating down the stairs reciprocally with SP cane. Timeframe: 8 visits.   Long-Term Goals:  The patient will improve LE strength and endurance to facilitate standing for extended periods of time for 3-4 hours daily for community integration and occupational demands. Timeframe: 12 visits.  The patient will improve LE strength and endurance to facilitate walking distances of 1-2 mile(s) daily for community integration and occupational demands. Timeframe: 12 visits.  Patient will improve lower motor control to perform double-leg squat with symmetrical loading, without asymmetries, and with proper posterior chain loading to facilitate squatting and lifting ADL's. Timeframe: 12 visits.  Patient will improve LE mobility, strength, and single-leg stabilization to meet strength requirements for reciprocal stair navigation pattern with no asymmetries for ascending and descending 3 flights of stairs daily for community integration. Timeframe: 16 visits.  Patient will improve LEFS score by at least 9 points to indicate a true change in improved function for ADL's and restoring PLF. Timeframe: 16 visits.              Plan  Assess response to progressions made, update HEP next visit    Treatment Last 4 Visits  Treatment Day: 8       3/7/2025 3/17/2025 3/19/2025 3/26/2025   LE Treatment   Therapeutic Exercise PROM - knee flexion x 5 (R)   SLR: 2 x 10 (R)   Manual hamstring stretch: x 30\" (L)    S/L hip ABD: 2 x 10 (R)   LAQ: x 20 (R)  DLHR: x 20  Standing hip ABD: 2 x 10 ea R/L - tolerated standing on right this time   Calf in Cabinet Stretch 30\" ea R/L   Standing Hip Ext 2 x 10 ea R/L  SLR: 2 x 10 (R)   S/L hip ABD: 2 x 10 (R) - tolerated standing on right this time   SAQ 2 x 10 (R)   LAQ x 10 (R)   FSU 4\" x 10 (R)  LSU 4\" x 10 (R)  Seated Heel Slides x 10 (R) QS x 20 5\" (R)  SLR with QS x 12 (R)  Manual Hip Flexor Stretch 2 x 30\"   Supine AROM Heel Slides x 10  S/L hip ABD: 2 x 10 (R)  Seated Heel Slides x 10   LAQ x 10  (R)  Prone HS Curls x 10 (R)  FSU 4\" x 10 (R)  LSU 4\" x 10 (R) S/L hip ABD: 2 x 10 (R)  SLR with QS x 20 (R)  Standing Quad Stretch with Chair 2 x 30\" ea R/L   DLHR 2 x 10   Calf In Cabinet Stretch 3 x 30\" ea R/L   Seated HS Curls x 10 YTB   Sidestepping with YTB x 1 Lap   Monster Walks YTB x 1 Lap   FSU 6\" x 10 (R)   Neuro Re-Education Tandem Stance 2 x 30\" ea R/L  TKE ball on wall 2 x 10 5\" H  Tandem Stance 2 x 30\" ea R/L  Tandem Stance 30\" ea R/L  Tandem Stance on Airex 30\" ea R/L  SLS 2 x 30\" ea R/L    Manual Therapy Soft tissue mobilization: (R) quad  Med/lat PFJ mobs (in knee ext): Grade 3, 4 x 10\" (R)   Sup/inf PFJ mobs (in knee flex0: Grade 3, 4  x 10\" (R)    STM Quad Tendon, Distal Quads x 3 min  MFR: Quads, TFL  STM: Quads, Calf, Hamstrings     Therapeutic Exercise Minutes 24 28 23 38   Neuro Re-Educ Minutes 3 10 8 5   Manual Therapy Minutes 15 3 12    Total Time Of Timed Procedures 42 41 43 43   Total Time Of Service-Based Procedures 0 0 0 0   Total Treatment Time 42 41 43 43   HEP Access Code: [email or web address]  URL: [email or web address]  Date: 02/18/2025  Prepared by: Melia Metz     Exercises  - Supine Quad Set  - 1-2 x daily - 7 x weekly - 2 sets - 10 reps - 3\" hold  - Supine Heel Slide with Strap  - 1-2 x daily - 7 x weekly - 1 sets - 10 reps - 3\" hold  - Supine Straight Leg Raises  - 1-2 x daily - 7 x weekly - 2 sets - 10 reps  - Supine Bridge  - 1-2 x daily - 7 x weekly - 2 sets - 10 reps  - Seated Long Arc Quad  - 1-2 x daily - 7 x weekly - 2 sets - 10 reps  - Standing Hip Abduction with Counter Support  - 1-2 x daily - 7 x weekly - 2 sets - 10 reps Access Code: [email or web address]  URL: [email or web address]  Date: 03/17/2025  Prepared by: Cira Elkins    Exercises  - Supine Straight Leg Raises  - 1-2 x daily - 7 x weekly - 2 sets - 10 reps  - Supine Bridge  - 1-2 x daily - 7 x weekly - 2 sets - 10 reps  - Seated Long Arc Quad  - 1-2 x daily - 7 x weekly - 2 sets - 10  reps  - Standing Hip Abduction with Counter Support  - 1-2 x daily - 7 x weekly - 2 sets - 10 reps  - Standing Terminal Knee Extension at Wall with Ball  - 1 x daily - 7 x weekly - 1 sets - 10 reps - 5s hold  - Seated Heel Slide  - 2-3 x daily - 7 x weekly - 1 sets - 10 reps Access Code: [email or web address]  URL: [email or web address]  Date: 03/17/2025  Prepared by: Cira [place]     Exercises  - Supine Straight Leg Raises  - 1-2 x daily - 7 x weekly - 2 sets - 10 reps  - Supine Bridge  - 1-2 x daily - 7 x weekly - 2 sets - 10 reps  - Seated Long Arc Quad  - 1-2 x daily - 7 x weekly - 2 sets - 10 reps  - Standing Hip Abduction with Counter Support  - 1-2 x daily - 7 x weekly - 2 sets - 10 reps  - Standing Terminal Knee Extension at Wall with Ball  - 1 x daily - 7 x weekly - 1 sets - 10 reps - 5s hold  - Seated Heel Slide  - 2-3 x daily - 7 x weekly - 1 sets - 10 reps Access Code: [email or web address]  URL: [email or web address]  Date: 03/17/2025  Prepared by: Cira [place]     Exercises  - Supine Straight Leg Raises  - 1-2 x daily - 7 x weekly - 2 sets - 10 reps  - Supine Bridge  - 1-2 x daily - 7 x weekly - 2 sets - 10 reps  - Seated Long Arc Quad  - 1-2 x daily - 7 x weekly - 2 sets - 10 reps  - Standing Hip Abduction with Counter Support  - 1-2 x daily - 7 x weekly - 2 sets - 10 reps  - Standing Terminal Knee Extension at Wall with Ball  - 1 x daily - 7 x weekly - 1 sets - 10 reps - 5s hold  - Seated Heel Slide  - 2-3 x daily - 7 x weekly - 1 sets - 10 reps        HEP  Access Code: [email or web address]  URL: [email or web address]  Date: 03/17/2025  Prepared by: Cira [place]     Exercises  - Supine Straight Leg Raises  - 1-2 x daily - 7 x weekly - 2 sets - 10 reps  - Supine Bridge  - 1-2 x daily - 7 x weekly - 2 sets - 10 reps  - Seated Long Arc Quad  - 1-2 x daily - 7 x weekly - 2 sets - 10 reps  - Standing Hip Abduction with Counter Support  - 1-2 x daily - 7 x weekly - 2 sets - 10 reps  -  Standing Terminal Knee Extension at Wall with Ball  - 1 x daily - 7 x weekly - 1 sets - 10 reps - 5s hold  - Seated Heel Slide  - 2-3 x daily - 7 x weekly - 1 sets - 10 reps    Charges     Ther Ex x 3

## 2025-03-27 ENCOUNTER — OFFICE VISIT (OUTPATIENT)
Facility: CLINIC | Age: 55
End: 2025-03-27
Payer: COMMERCIAL

## 2025-03-27 ENCOUNTER — LAB ENCOUNTER (OUTPATIENT)
Dept: LAB | Age: 55
End: 2025-03-27
Attending: NURSE PRACTITIONER
Payer: COMMERCIAL

## 2025-03-27 VITALS — HEIGHT: 62 IN | BODY MASS INDEX: 45.27 KG/M2 | WEIGHT: 246 LBS

## 2025-03-27 DIAGNOSIS — Z96.651 S/P TOTAL KNEE ARTHROPLASTY, RIGHT: ICD-10-CM

## 2025-03-27 DIAGNOSIS — E11.69 HYPERLIPIDEMIA ASSOCIATED WITH TYPE 2 DIABETES MELLITUS (HCC): ICD-10-CM

## 2025-03-27 DIAGNOSIS — T81.41XA POSTOPERATIVE STITCH ABSCESS: ICD-10-CM

## 2025-03-27 DIAGNOSIS — E78.5 HYPERLIPIDEMIA ASSOCIATED WITH TYPE 2 DIABETES MELLITUS (HCC): ICD-10-CM

## 2025-03-27 DIAGNOSIS — Z96.651 S/P TOTAL KNEE ARTHROPLASTY, RIGHT: Primary | ICD-10-CM

## 2025-03-27 LAB
CHOLEST SERPL-MCNC: 178 MG/DL (ref ?–200)
CRP SERPL-MCNC: 0.5 MG/DL (ref ?–0.5)
ERYTHROCYTE [SEDIMENTATION RATE] IN BLOOD: 11 MM/HR
FASTING PATIENT LIPID ANSWER: YES
HDLC SERPL-MCNC: 61 MG/DL (ref 40–59)
LDLC SERPL CALC-MCNC: 104 MG/DL (ref ?–100)
NONHDLC SERPL-MCNC: 117 MG/DL (ref ?–130)
TRIGL SERPL-MCNC: 69 MG/DL (ref 30–149)
VLDLC SERPL CALC-MCNC: 12 MG/DL (ref 0–30)

## 2025-03-27 PROCEDURE — 36415 COLL VENOUS BLD VENIPUNCTURE: CPT

## 2025-03-27 PROCEDURE — 80061 LIPID PANEL: CPT

## 2025-03-27 PROCEDURE — 85652 RBC SED RATE AUTOMATED: CPT

## 2025-03-27 PROCEDURE — 3008F BODY MASS INDEX DOCD: CPT

## 2025-03-27 PROCEDURE — 86140 C-REACTIVE PROTEIN: CPT

## 2025-03-27 PROCEDURE — 99024 POSTOP FOLLOW-UP VISIT: CPT

## 2025-03-27 RX ORDER — TRAMADOL HYDROCHLORIDE 50 MG/1
50 TABLET ORAL EVERY 8 HOURS PRN
Qty: 30 TABLET | Refills: 0 | Status: SHIPPED | OUTPATIENT
Start: 2025-03-27

## 2025-03-27 NOTE — PROGRESS NOTES
Orthopedic Surgery - Total Joint Arthroplasty    Post-Op Visit    Subjective:    Chief Complaint: Follow-up after Right total knee arthroplasty    Patient is  8 and a half weeks  status post the above procedure. Pain has significantly improved since surgery and last office visit. She is currently taking Tylenol, Lyrica, and Tramadol for pain. Feels this is the perfect combination that is helping her. They have been working with outpatient physical therapy on ROM and reports it has been going well, although a few of her appointments had been canceled by PT office. They are not using any assist devices for ambulation.    Patient noted some drainage over two areas of their incision for one day on 3/22/25. This lasted for one day and did not continue after Saturday. She sent a picture through [employer] on Monday 3/24 and I asked her to come see me for a wound check as soon as possible. She reports the two areas scabbed over on Tuesday morning. No associated redness or warmth over the knee. No tenderness to the knee. No fever or chills. No SOB or chest pain. No active drainage. She is here today for follow up.       Objective:    Vitals:    03/27/25 0809   Weight: 246 lb (111.6 kg)   Height: 5' 2\" (1.575 m)     Estimated body mass index is 44.99 kg/m² as calculated from the following:    Height as of this encounter: 5' 2\" (1.575 m).    Weight as of this encounter: 246 lb (111.6 kg).    Physical Examination:   Gen: Well developed, well nourished, resting comfortably in no acute distress  Skin: Warm, dry  Head: Normocephalic, atraumatic  Ears/Nose/Throat: Moist mucous membranes  CV: Regular rate and rhythm by peripheral pulse  Resp: Respirations are non-labored, no wheezing    MSK:   Inspection: The two scabs in the middle of the incision appear dry. No active or expressible drainage. No surrounding erythema. No tenderness to palpation. Remainder of the incision is well healed.  Range of motion of the knee is 0-125 degrees  without pain; flexion contracture of 0  Able to actively straight leg raise without extensor lag  Patient is neurovascularly intact L2-S1 and 5/5 strength with resisted testing of DF/PF/EHL  No calf pain, redness or swelling  Warm perfused extremity  Gait: non-antalgic      Patient is s/p cemented total knee arthroplasty with patellar resurfacing  Tibial component in 5 degrees of varus   Components appear in stable position when compared to previous films  No fracture or dislocation seen      Plan:    Overall doing well. Two areas of concern along the incision appear consistent with resolved stitch abscess. They both appear to be healing and scab formation with resolution of drainage and erythema.     Discussed plan of care with Dr. Ruslan Connelly in great detail. Recommend repeat ESR/CRP to trend with prior labs. This was done today and results are as follows:    CRP: 0.50 (down from 0.80 two weeks ago)  ESR: 11 (down from 20 two weeks ago)    Reassuring that labs returned within normal range and trending downward compared to prior labs two weeks ago. Also reassuring she is doing well and significantly improving in terms of knee pain and function. Patient was called with results and demonstrated understanding. Advised to follow up in 1 week when Dr. Connelly returns from vacation for one more wound check and follow up.     Prescriptions Given Today: Tramadol 50mg PO Q8hrs as needed for moderate/severe pain (quantity #30). Counseled on weaning off the Tramadol as able by next office visit.      Blood Thinner: Back on home Eliquis    Physical Therapy: Currently participating in outpatient physical therapy, going well.     Weight Bearing Status: As tolerated    Mobility Aid: None. Maintain fall precautions.    Discussed: Wound care, Medication management, and Driving status    Continue GIULIANA and RICE for the postoperative lower extremity to help with swelling    Follow-Up: 1 week for wound recheck with Dr. Connelly when he returns.  They will send updates on the wound status as needed and follow up sooner if any concerns.       Joaquina Samayoa PA-C    Department of Orthopaedic Surgery  [facility]     [address] [place] [id number]  [address] [place] [address]     t: [phone]  f: [phone]       [facility]

## 2025-03-28 RX ORDER — PROCHLORPERAZINE 25 MG/1
SUPPOSITORY RECTAL
Qty: 1 EACH | Refills: 2 | Status: SHIPPED | OUTPATIENT
Start: 2025-03-28

## 2025-03-28 RX ORDER — INSULIN ASPART 100 [IU]/ML
INJECTION, SOLUTION INTRAVENOUS; SUBCUTANEOUS
Qty: 75 ML | Refills: 1 | Status: SHIPPED | OUTPATIENT
Start: 2025-03-28

## 2025-03-28 RX ORDER — PROCHLORPERAZINE 25 MG/1
SUPPOSITORY RECTAL
Qty: 9 EACH | Refills: 1 | Status: SHIPPED | OUTPATIENT
Start: 2025-03-28

## 2025-03-31 ENCOUNTER — APPOINTMENT (OUTPATIENT)
Dept: PHYSICAL THERAPY | Age: 55
End: 2025-03-31
Attending: STUDENT IN AN ORGANIZED HEALTH CARE EDUCATION/TRAINING PROGRAM
Payer: COMMERCIAL

## 2025-04-02 ENCOUNTER — OFFICE VISIT (OUTPATIENT)
Dept: PHYSICAL THERAPY | Age: 55
End: 2025-04-02
Attending: STUDENT IN AN ORGANIZED HEALTH CARE EDUCATION/TRAINING PROGRAM
Payer: COMMERCIAL

## 2025-04-02 PROCEDURE — 97140 MANUAL THERAPY 1/> REGIONS: CPT

## 2025-04-02 PROCEDURE — 97110 THERAPEUTIC EXERCISES: CPT

## 2025-04-02 NOTE — PROGRESS NOTES
Progress Summary  Pt has attended 9 visits in Physical Therapy.         Patient: Catina Rivera (55 year old, female) Referring Provider:  Insurance:   Diagnosis: Primary osteoarthritis of right knee (M17.11)  S/P total knee arthroplasty, right (Z96.651) [facility]  [facility]   Date of Surgery: 1/29/25 Next MD visit:  N/A   Precautions:  None 3/13/25 Referral Information:    Date of Evaluation: Req: 12, Auth: 12, Exp: 4/30/2025 02/18/25 POC Auth Visits:  12       Today's Date   4/2/2025    Subjective  Patient reports knee pain is minimal to none, just feels tight. Patient was able to do a lot more around the house, was sore afterwards, but was able to continue with the rest of her daily activities and go to work the next day.       Pain: 0/10     Objective  Worst Pain: 2/10      Post LEFS Score: 63.75 % (4/2/2025  8:04 AM)    30 % improvement    Knee       2/18/2025 2/21/2025 4/2/2025   Knee ROM/MMT   Rt Knee Flexion 85 90 120   Lt Knee Flexion 115  120   Rt Knee Flexion MMT 4/5  5/5   Lt Knee Flexion MMT 4-/5  4+/5*   Rt Knee Extension (L3) 0    0 0 0   Lt Knee Extension (L3) 0    0  0   Rt Knee Extension MMT (L3) 4/5  5/5   Lt Knee Extension MMT (L3) 3+/5  5/5*       Multiple values from one day are sorted in reverse-chronological order        Assessment  Patient demonstrates overall good gains in ROM and strength of right knee since beginning PT, has attended 9 visits at this time. Patient is currently activiely able to flex the surgical knee to 120 degrees despite reports of quad tension/soreness still present, and 4+/5 or higher for knee flexion and extension MMT. Patient would benefit from continued PT to address remaining functional limitations including stair navigation, walking tolerance/endurance, and squatting mechanics.    Goals (to be met in 16 visits)   Short-Term Goals:  Patient will demonstrates knee flexion AROM to 90deg pain-free to facilitate sitting comfortably for 2 hours daily for  community integration. Timeframe: 4 visits.  Patient will improve knee flexion AROM to 120deg to allow for reciprocal stair navigation pattern for community integration. Timeframe: 8 visits.  The patient will improve quad strength and single-leg stance stability to demonstrate non-antalgic gait pattern with walking short-length distances for household ambulation. Timeframe: 8 visits.  Patient will improve knee strength and stability to facilitate discontinued use of AD for daily ambulation. Timeframe: 8 visits.   Patient will improve LE strength and stability to facilitate navigating down the stairs reciprocally with SP cane. Timeframe: 8 visits.   Long-Term Goals:  The patient will improve LE strength and endurance to facilitate standing for extended periods of time for 3-4 hours daily for community integration and occupational demands. Timeframe: 12 visits.  The patient will improve LE strength and endurance to facilitate walking distances of 1-2 mile(s) daily for community integration and occupational demands. Timeframe: 12 visits.  Patient will improve lower motor control to perform double-leg squat with symmetrical loading, without asymmetries, and with proper posterior chain loading to facilitate squatting and lifting ADL's. Timeframe: 12 visits.  Patient will improve LE mobility, strength, and single-leg stabilization to meet strength requirements for reciprocal stair navigation pattern with no asymmetries for ascending and descending 3 flights of stairs daily for community integration. Timeframe: 16 visits.  Patient will improve LEFS score by at least 9 points to indicate a true change in improved function for ADL's and restoring PLF. Timeframe: 16 visits.                  Plan  Update HEP next visit    Treatment Last 4 Visits  Treatment Day: 9       3/17/2025 3/19/2025 3/26/2025 4/2/2025   LE Treatment   Therapeutic Exercise SLR: 2 x 10 (R)   S/L hip ABD: 2 x 10 (R) - tolerated standing on right this time    SAQ 2 x 10 (R)   LAQ x 10 (R)   FSU 4\" x 10 (R)  LSU 4\" x 10 (R)  Seated Heel Slides x 10 (R) QS x 20 5\" (R)  SLR with QS x 12 (R)  Manual Hip Flexor Stretch 2 x 30\"   Supine AROM Heel Slides x 10  S/L hip ABD: 2 x 10 (R)  Seated Heel Slides x 10   LAQ x 10 (R)  Prone HS Curls x 10 (R)  FSU 4\" x 10 (R)  LSU 4\" x 10 (R) S/L hip ABD: 2 x 10 (R)  SLR with QS x 20 (R)  Standing Quad Stretch with Chair 2 x 30\" ea R/L   DLHR 2 x 10   Calf In Cabinet Stretch 3 x 30\" ea R/L   Seated HS Curls x 10 YTB   Sidestepping with YTB x 1 Lap   Monster Walks YTB x 1 Lap   FSU 6\" x 10 (R) Prone Quad Stretch with Strap 3 x 30\" (R)  S/L hip ABD: 2 x 10 (R)  Reassess  SLR with QS x 20 (R)  Sidestepping with YTB x 1 Lap     Neuro Re-Education TKE ball on wall 2 x 10 5\" H  Tandem Stance 2 x 30\" ea R/L  Tandem Stance 30\" ea R/L  Tandem Stance on Airex 30\" ea R/L  SLS 2 x 30\" ea R/L     Manual Therapy STM Quad Tendon, Distal Quads x 3 min  MFR: Quads, TFL  STM: Quads, Calf, Hamstrings   Cross Friction Massage: Incision   Patellar Distraction x 4 min   Patellar Distraction with Inf/Sup Mobs G2-3      Therapeutic Exercise Minutes 28 23 38 28   Neuro Re-Educ Minutes 10 8 5    Manual Therapy Minutes 3 12  15   Total Time Of Timed Procedures 41 43 43 43   Total Time Of Service-Based Procedures 0 0 0 0   Total Treatment Time 41 43 43 43   HEP Access Code: [email or web address]  URL: [email or web address]  Date: 03/17/2025  Prepared by: Cira Elkins    Exercises  - Supine Straight Leg Raises  - 1-2 x daily - 7 x weekly - 2 sets - 10 reps  - Supine Bridge  - 1-2 x daily - 7 x weekly - 2 sets - 10 reps  - Seated Long Arc Quad  - 1-2 x daily - 7 x weekly - 2 sets - 10 reps  - Standing Hip Abduction with Counter Support  - 1-2 x daily - 7 x weekly - 2 sets - 10 reps  - Standing Terminal Knee Extension at Wall with Ball  - 1 x daily - 7 x weekly - 1 sets - 10 reps - 5s hold  - Seated Heel Slide  - 2-3 x daily - 7 x weekly - 1 sets - 10 reps  Access Code: [email or web address]  URL: [email or web address]  Date: 03/17/2025  Prepared by: Cira Ivesdale     Exercises  - Supine Straight Leg Raises  - 1-2 x daily - 7 x weekly - 2 sets - 10 reps  - Supine Bridge  - 1-2 x daily - 7 x weekly - 2 sets - 10 reps  - Seated Long Arc Quad  - 1-2 x daily - 7 x weekly - 2 sets - 10 reps  - Standing Hip Abduction with Counter Support  - 1-2 x daily - 7 x weekly - 2 sets - 10 reps  - Standing Terminal Knee Extension at Wall with Ball  - 1 x daily - 7 x weekly - 1 sets - 10 reps - 5s hold  - Seated Heel Slide  - 2-3 x daily - 7 x weekly - 1 sets - 10 reps Access Code: [email or web address]  URL: [email or web address]  Date: 03/17/2025  Prepared by: Cira Ivesdale     Exercises  - Supine Straight Leg Raises  - 1-2 x daily - 7 x weekly - 2 sets - 10 reps  - Supine Bridge  - 1-2 x daily - 7 x weekly - 2 sets - 10 reps  - Seated Long Arc Quad  - 1-2 x daily - 7 x weekly - 2 sets - 10 reps  - Standing Hip Abduction with Counter Support  - 1-2 x daily - 7 x weekly - 2 sets - 10 reps  - Standing Terminal Knee Extension at Wall with Ball  - 1 x daily - 7 x weekly - 1 sets - 10 reps - 5s hold  - Seated Heel Slide  - 2-3 x daily - 7 x weekly - 1 sets - 10 reps Access Code: [email or web address]  URL: [email or web address]  Date: 03/17/2025  Prepared by: Cira Ivesdale     Exercises  - Supine Straight Leg Raises  - 1-2 x daily - 7 x weekly - 2 sets - 10 reps  - Supine Bridge  - 1-2 x daily - 7 x weekly - 2 sets - 10 reps  - Seated Long Arc Quad  - 1-2 x daily - 7 x weekly - 2 sets - 10 reps  - Standing Hip Abduction with Counter Support  - 1-2 x daily - 7 x weekly - 2 sets - 10 reps  - Standing Terminal Knee Extension at Wall with Ball  - 1 x daily - 7 x weekly - 1 sets - 10 reps - 5s hold  - Seated Heel Slide  - 2-3 x daily - 7 x weekly - 1 sets - 10 reps        HEP  Access Code: [email or web address]  URL: [email or web address]  Date: 03/17/2025  Prepared by:  Cira Elkins     Exercises  - Supine Straight Leg Raises  - 1-2 x daily - 7 x weekly - 2 sets - 10 reps  - Supine Bridge  - 1-2 x daily - 7 x weekly - 2 sets - 10 reps  - Seated Long Arc Quad  - 1-2 x daily - 7 x weekly - 2 sets - 10 reps  - Standing Hip Abduction with Counter Support  - 1-2 x daily - 7 x weekly - 2 sets - 10 reps  - Standing Terminal Knee Extension at Wall with Ball  - 1 x daily - 7 x weekly - 1 sets - 10 reps - 5s hold  - Seated Heel Slide  - 2-3 x daily - 7 x weekly - 1 sets - 10 reps    Charges     Ther Ex x 2, Manual x 1

## 2025-04-03 ENCOUNTER — OFFICE VISIT (OUTPATIENT)
Facility: CLINIC | Age: 55
End: 2025-04-03
Payer: COMMERCIAL

## 2025-04-03 VITALS — WEIGHT: 246 LBS | BODY MASS INDEX: 45.27 KG/M2 | HEIGHT: 62 IN

## 2025-04-03 DIAGNOSIS — Z96.651 S/P TOTAL KNEE ARTHROPLASTY, RIGHT: Primary | ICD-10-CM

## 2025-04-03 PROCEDURE — 99024 POSTOP FOLLOW-UP VISIT: CPT

## 2025-04-03 PROCEDURE — 3008F BODY MASS INDEX DOCD: CPT

## 2025-04-03 NOTE — PROGRESS NOTES
Orthopedic Surgery - Total Joint Arthroplasty    Post-Op Visit    Subjective:    Chief Complaint: Follow-up after Right total knee arthroplasty    Patient is  9 weeks  status post the above procedure, doing well and has had a stable post-operative course thus far. She is currently taking Tylenol, Lyrica, Tramadol, and Flexeril for pain. She feels this is the perfect combination that is helping her, although she feels she could stop the Tramadol. They have been working with outpatient physical therapy on ROM and reports it has been going well. They are not using any assist devices for ambulation.    She is here today for a final wound check after being seen last week for a possible stitch abscess, which has since resolved. Asked patient to come in today as Dr. Connelly is back from vacation and wanted to see him briefly during visit as well. No areas of drainage or bleeding from the incision. No areas of redness or warmth over the knee. No tenderness to the knee. No fever or chills. No SOB or chest pain.     Objective:    Vitals:    04/03/25 0803   Weight: 246 lb (111.6 kg)   Height: 5' 2\" (1.575 m)     Estimated body mass index is 44.99 kg/m² as calculated from the following:    Height as of this encounter: 5' 2\" (1.575 m).    Weight as of this encounter: 246 lb (111.6 kg).    Physical Examination:   Gen: Well developed, well nourished, resting comfortably in no acute distress  Skin: Warm, dry  Head: Normocephalic, atraumatic  Ears/Nose/Throat: Moist mucous membranes  CV: Regular rate and rhythm by peripheral pulse  Resp: Respirations are non-labored, no wheezing    MSK:   Inspection: Incision appears to be healing appropriately. No active drainage. No erythema or wound dehiscence. The two scabs in the middle of the incision that were present last visit have since resolved.  Range of motion of the knee is 0-125 degrees without pain; flexion contracture of 0  Able to actively straight leg raise without extensor  lag  Patient is neurovascularly intact L2-S1 and 5/5 strength with resisted testing of DF/PF/EHL  No calf pain, redness or swelling  Warm perfused extremity  Gait: non-antalgic    Patient is s/p cemented total knee arthroplasty with patellar resurfacing  Components appear in stable position when compared to previous films  No fracture or dislocation seen      Plan:    Prescriptions Given Today:  none. Patient is going to stop Tramadol and Flexeril and see how she does. Plans to continue Tylenol and Lyrica (history of back pain) and see how she does with this plan.    Blood Thinner: Back on home Eliquis.    Physical Therapy: Currently participating in outpatient physical therapy, going well     Weight Bearing Status: As tolerated    Mobility Aid: None. Maintain fall precautions.    Discussed: Wound care, Medication management, and Dental prophylaxis    Continue GIULIANA and RICE for the postoperative lower extremity to help with swelling    Follow-Up: 4 weeks with Dr. Connelly or sooner as needed for any questions or concerns. XRs at next visit      Joaquina Samayoa PA-C    Department of Orthopaedic Surgery  [facility]     [address] [place] [address]  [address] [place] [address]     t: [phone]  f: [phone]       [facility]

## 2025-04-03 NOTE — PROGRESS NOTES
[facility] Progress Note      Chief Complaint:  ***      Subjective: 55 year old female presents today with ***      Objective: ***      Assessment:  55 year old female with ***      Plan:  ***        BELINDA Snider, PA-C  Orthopedic Surgery   [address] [place] [id number]   t: [phone]  f: [phone]         This document was partially prepared using [employer] voice recognition software.  Although every attempt is made to correct errors during dictation, discrepancies may still exist. Please contact me with any questions or clarifications.

## 2025-04-04 ENCOUNTER — OFFICE VISIT (OUTPATIENT)
Dept: PHYSICAL THERAPY | Age: 55
End: 2025-04-04
Attending: STUDENT IN AN ORGANIZED HEALTH CARE EDUCATION/TRAINING PROGRAM
Payer: COMMERCIAL

## 2025-04-04 PROCEDURE — 97140 MANUAL THERAPY 1/> REGIONS: CPT

## 2025-04-04 PROCEDURE — 97110 THERAPEUTIC EXERCISES: CPT

## 2025-04-04 PROCEDURE — 97530 THERAPEUTIC ACTIVITIES: CPT

## 2025-04-04 RX ORDER — CYCLOBENZAPRINE HCL 10 MG
10 TABLET ORAL 2 TIMES DAILY
Qty: 30 TABLET | Refills: 0 | Status: SHIPPED | OUTPATIENT
Start: 2025-04-04

## 2025-04-04 NOTE — PROGRESS NOTES
Patient: Catina Rivera (55 year old, female) Referring Provider:  Insurance:   Diagnosis: Primary osteoarthritis of right knee (M17.11)  S/P total knee arthroplasty, right (Z96.651) Ruslan Connelly  [facility]   Date of Surgery: 1/29/25 Next MD visit:  N/A   Precautions:  None 3/13/25 Referral Information:    Date of Evaluation: Req: 12, Auth: 12, Exp: 4/30/2025 02/18/25 POC Auth Visits:  12       Today's Date   4/4/2025    Subjective  Patient reports more stiffness complaints in her knee rather than pain at this time       Pain: 0/10     Objective  Worst Pain: 4-5/10         Assessment  Began session with cupping and general attention paid to soft tissue of posterior leg, distal hamstrings and ITBand, proximal gastroc to address complaints of stiffness in the morning and at night. Patient was then instructed through more closed chain functional strengthening focus this day, and provided with text link to updated HEP    Goals (to be met in 16 visits)   Short-Term Goals:  Patient will demonstrates knee flexion AROM to 90deg pain-free to facilitate sitting comfortably for 2 hours daily for community integration. Timeframe: 4 visits.  Patient will improve knee flexion AROM to 120deg to allow for reciprocal stair navigation pattern for community integration. Timeframe: 8 visits.  The patient will improve quad strength and single-leg stance stability to demonstrate non-antalgic gait pattern with walking short-length distances for household ambulation. Timeframe: 8 visits.  Patient will improve knee strength and stability to facilitate discontinued use of AD for daily ambulation. Timeframe: 8 visits.   Patient will improve LE strength and stability to facilitate navigating down the stairs reciprocally with SP cane. Timeframe: 8 visits.   Long-Term Goals:  The patient will improve LE strength and endurance to facilitate standing for extended periods of time for 3-4 hours daily for community integration and occupational  demands. Timeframe: 12 visits.  The patient will improve LE strength and endurance to facilitate walking distances of 1-2 mile(s) daily for community integration and occupational demands. Timeframe: 12 visits.  Patient will improve lower motor control to perform double-leg squat with symmetrical loading, without asymmetries, and with proper posterior chain loading to facilitate squatting and lifting ADL's. Timeframe: 12 visits.  Patient will improve LE mobility, strength, and single-leg stabilization to meet strength requirements for reciprocal stair navigation pattern with no asymmetries for ascending and descending 3 flights of stairs daily for community integration. Timeframe: 16 visits.  Patient will improve LEFS score by at least 9 points to indicate a true change in improved function for ADL's and restoring PLF. Timeframe: 16 visits.      Plan  Follow up on response to new HEP    Treatment Last 4 Visits  Treatment Day: 10       3/19/2025 3/26/2025 4/2/2025 4/4/2025   LE Treatment   Therapeutic Exercise QS x 20 5\" (R)  SLR with QS x 12 (R)  Manual Hip Flexor Stretch 2 x 30\"   Supine AROM Heel Slides x 10  S/L hip ABD: 2 x 10 (R)  Seated Heel Slides x 10   LAQ x 10 (R)  Prone HS Curls x 10 (R)  FSU 4\" x 10 (R)  LSU 4\" x 10 (R) S/L hip ABD: 2 x 10 (R)  SLR with QS x 20 (R)  Standing Quad Stretch with Chair 2 x 30\" ea R/L   DLHR 2 x 10   Calf In Cabinet Stretch 3 x 30\" ea R/L   Seated HS Curls x 10 YTB   Sidestepping with YTB x 1 Lap   Monster Walks YTB x 1 Lap   FSU 6\" x 10 (R) Prone Quad Stretch with Strap 3 x 30\" (R)  S/L hip ABD: 2 x 10 (R)  Reassess  SLR with QS x 20 (R)  Sidestepping with YTB x 1 Lap   Prone Quad Stretch 3 x 30\" (R)  S/L hip ABD: 2 x 10 (R)  SLR with QS x 20 (R)  Standing Quad Stretch with Chair 2 x 30\" ea R/L  DLHR 2 x 10   Calf In Cabinet Stretch 3 x 30\" ea R/L   FSU 6\" x 10  (R) Only   Lateral Heel Taps 4\" x 10 (R) only - modified, intermittent WB required on L to offload R       Neuro  Re-Education Tandem Stance 30\" ea R/L  Tandem Stance on Airex 30\" ea R/L  SLS 2 x 30\" ea R/L      Manual Therapy MFR: Quads, TFL  STM: Quads, Calf, Hamstrings   Cross Friction Massage: Incision   Patellar Distraction x 4 min   Patellar Distraction with Inf/Sup Mobs G2-3    Cupping (Prone): Medial, Center, Lateral HS, Proximal Gastroc, Distal ITBand    Therapeutic Activity    Sidestepping with RTB x 1 Lap   Monster Walks RTB x 1 Lap    Therapeutic Exercise Minutes 23 38 28 23   Neuro Re-Educ Minutes 8 5     Manual Therapy Minutes 12  15 10   Therapeutic Activity Minutes    8   Total Time Of Timed Procedures 43 43 43 41   Total Time Of Service-Based Procedures 0 0 0 0   Total Treatment Time 43 43 43 41   HEP Access Code: [email or web address]  URL: [email or web address]  Date: 03/17/2025  Prepared by: Cira Severo     Exercises  - Supine Straight Leg Raises  - 1-2 x daily - 7 x weekly - 2 sets - 10 reps  - Supine Bridge  - 1-2 x daily - 7 x weekly - 2 sets - 10 reps  - Seated Long Arc Quad  - 1-2 x daily - 7 x weekly - 2 sets - 10 reps  - Standing Hip Abduction with Counter Support  - 1-2 x daily - 7 x weekly - 2 sets - 10 reps  - Standing Terminal Knee Extension at Wall with Ball  - 1 x daily - 7 x weekly - 1 sets - 10 reps - 5s hold  - Seated Heel Slide  - 2-3 x daily - 7 x weekly - 1 sets - 10 reps Access Code: [email or web address]  URL: [email or web address]  Date: 03/17/2025  Prepared by: Cira [place]eton     Exercises  - Supine Straight Leg Raises  - 1-2 x daily - 7 x weekly - 2 sets - 10 reps  - Supine Bridge  - 1-2 x daily - 7 x weekly - 2 sets - 10 reps  - Seated Long Arc Quad  - 1-2 x daily - 7 x weekly - 2 sets - 10 reps  - Standing Hip Abduction with Counter Support  - 1-2 x daily - 7 x weekly - 2 sets - 10 reps  - Standing Terminal Knee Extension at Wall with Ball  - 1 x daily - 7 x weekly - 1 sets - 10 reps - 5s hold  - Seated Heel Slide  - 2-3 x daily - 7 x weekly - 1 sets - 10 reps Access  Code: [email or web address]  URL: [email or web address]  Date: 03/17/2025  Prepared by: Cira [place]     Exercises  - Supine Straight Leg Raises  - 1-2 x daily - 7 x weekly - 2 sets - 10 reps  - Supine Bridge  - 1-2 x daily - 7 x weekly - 2 sets - 10 reps  - Seated Long Arc Quad  - 1-2 x daily - 7 x weekly - 2 sets - 10 reps  - Standing Hip Abduction with Counter Support  - 1-2 x daily - 7 x weekly - 2 sets - 10 reps  - Standing Terminal Knee Extension at Wall with Ball  - 1 x daily - 7 x weekly - 1 sets - 10 reps - 5s hold  - Seated Heel Slide  - 2-3 x daily - 7 x weekly - 1 sets - 10 reps Access Code: [email or web address]  URL: [email or web address]  Date: 04/04/2025  Prepared by: Cira Severo    Exercises  - Sidestepping  - 1 x daily - 7 x weekly - 1 sets - 20 reps  - Step Up  - 1 x daily - 7 x weekly - 1 sets - 10 reps  - Quadricep Stretch with Chair and Counter Support  - 1 x daily - 7 x weekly - 3 sets - 30s hold  - Heel Raises with Counter Support  - 1 x daily - 7 x weekly - 2 sets - 10 reps  - Calf in Cabinet Stretch   - 1 x daily - 7 x weekly - 3 sets - 30s hold  - Forward Monster Walks  - 1 x daily - 7 x weekly - 1 sets - 20 reps        HEP  Access Code: [email or web address]  URL: [email or web address]  Date: 04/04/2025  Prepared by: Cira Severo    Exercises  - Sidestepping  - 1 x daily - 7 x weekly - 1 sets - 20 reps  - Step Up  - 1 x daily - 7 x weekly - 1 sets - 10 reps  - Quadricep Stretch with Chair and Counter Support  - 1 x daily - 7 x weekly - 3 sets - 30s hold  - Heel Raises with Counter Support  - 1 x daily - 7 x weekly - 2 sets - 10 reps  - Calf in Cabinet Stretch   - 1 x daily - 7 x weekly - 3 sets - 30s hold  - Forward Monster Walks  - 1 x daily - 7 x weekly - 1 sets - 20 reps    Charges     Ther Ex x 2, Manual x 1, Ther Act x 1

## 2025-04-08 ENCOUNTER — OFFICE VISIT (OUTPATIENT)
Dept: PHYSICAL THERAPY | Age: 55
End: 2025-04-08
Attending: STUDENT IN AN ORGANIZED HEALTH CARE EDUCATION/TRAINING PROGRAM
Payer: COMMERCIAL

## 2025-04-08 PROCEDURE — 97110 THERAPEUTIC EXERCISES: CPT

## 2025-04-08 PROCEDURE — 97140 MANUAL THERAPY 1/> REGIONS: CPT

## 2025-04-08 NOTE — PROGRESS NOTES
Patient: Catina Rivera (55 year old, female) Referring Provider:  Insurance:   Diagnosis: Primary osteoarthritis of right knee (M17.11)  S/P total knee arthroplasty, right (Z96.651) Ruslan Connelly  [facility]   Date of Surgery: 1/29/25 Next MD visit:  N/A   Precautions:  None 3/13/25 Referral Information:    Date of Evaluation: Req: 12, Auth: 12, Exp: 4/30/2025 02/18/25 POC Auth Visits:  12       Today's Date   4/8/2025    Subjective  The patient reports that her knee feels \"fantastic.\" She has a weird pain that started this morning over the outside of the knee. She admits that the biggest problem she is having right now is because of her back. She admits to a long history of back issues. \"I have challenged myself to do the stairs going up with the right leg.\" She is hoping to maximize her right knee strength in preparation and anticipation of the left knee needing surgery. The patient reports that her chiropractor gave her a lift to put in her left shoe due to the difference in length on her right side now that her [id number] has been corrected with surgery.       Pain: 0/10     Objective  Worst pain: 1/10        Assessment  Penelope felt that the cupping performed last session really helped her symptoms and requested continued cupping treatment. The patient's knee overall is doing very well as she increases her activity level around the house. She also plans on increase general health and fitness activities this weekend, anticipating the need for left knee replacement surgery in the future. The patient reported less pain and discomfort navigating the stairs during today's appt compared to last session. We will continue to progress strength to maximize recovery.    Goals (to be met in 16 visits)   Short-Term Goals:  Patient will demonstrates knee flexion AROM to 90deg pain-free to facilitate sitting comfortably for 2 hours daily for community integration. Timeframe: 4 visits.  Patient will improve knee flexion  AROM to 120deg to allow for reciprocal stair navigation pattern for community integration. Timeframe: 8 visits.  The patient will improve quad strength and single-leg stance stability to demonstrate non-antalgic gait pattern with walking short-length distances for household ambulation. Timeframe: 8 visits.  Patient will improve knee strength and stability to facilitate discontinued use of AD for daily ambulation. Timeframe: 8 visits.   Patient will improve LE strength and stability to facilitate navigating down the stairs reciprocally with SP cane. Timeframe: 8 visits.   Long-Term Goals:  The patient will improve LE strength and endurance to facilitate standing for extended periods of time for 3-4 hours daily for community integration and occupational demands. Timeframe: 12 visits.  The patient will improve LE strength and endurance to facilitate walking distances of 1-2 mile(s) daily for community integration and occupational demands. Timeframe: 12 visits.  Patient will improve lower motor control to perform double-leg squat with symmetrical loading, without asymmetries, and with proper posterior chain loading to facilitate squatting and lifting ADL's. Timeframe: 12 visits.  Patient will improve LE mobility, strength, and single-leg stabilization to meet strength requirements for reciprocal stair navigation pattern with no asymmetries for ascending and descending 3 flights of stairs daily for community integration. Timeframe: 16 visits.  Patient will improve LEFS score by at least 9 points to indicate a true change in improved function for ADL's and restoring PLF. Timeframe: 16 visits.        Plan  Progress summary next session. Submit for additional visit authorization.    Treatment Last 4 Visits  Treatment Day: 11       3/26/2025 4/2/2025 4/4/2025 4/8/2025   LE Treatment   Therapeutic Exercise S/L hip ABD: 2 x 10 (R)  SLR with QS x 20 (R)  Standing Quad Stretch with Chair 2 x 30\" ea R/L   DLHR 2 x 10   Calf In  Cabinet Stretch 3 x 30\" ea R/L   Seated HS Curls x 10 YTB   Sidestepping with YTB x 1 Lap   Monster Walks YTB x 1 Lap   FSU 6\" x 10 (R) Prone Quad Stretch with Strap 3 x 30\" (R)  S/L hip ABD: 2 x 10 (R)  Reassess  SLR with QS x 20 (R)  Sidestepping with YTB x 1 Lap   Prone Quad Stretch 3 x 30\" (R)  S/L hip ABD: 2 x 10 (R)  SLR with QS x 20 (R)  Standing Quad Stretch with Chair 2 x 30\" ea R/L  DLHR 2 x 10   Calf In Cabinet Stretch 3 x 30\" ea R/L   FSU 6\" x 10  (R) Only   Lateral Heel Taps 4\" x 10 (R) only - modified, intermittent WB required on L to offload R     SLR: x 20 (R)  S/L hip ABD: x 20 (R)   Standing Quad Stretch with Chair: 2 x 30\" (R)   Calf stretch in cabinet: 2 x 30\" (R)   DLHR: 2 x 10   Elastic band lateral walk: x 1 lap, red theraband - leading only with right leg due to left knee pain   Elastic band base position fwd walk: 1 laps, red theraband     Neuro Re-Education SLS 2 x 30\" ea R/L       Manual Therapy  Cross Friction Massage: Incision   Patellar Distraction x 4 min   Patellar Distraction with Inf/Sup Mobs G2-3    Cupping (Prone): Medial, Center, Lateral HS, Proximal Gastroc, Distal ITBand  STM (supine): (R) lateral knee, ITB/VL, distal quead  Cupping (Prone): distal hamstring, prox calf, distal ITB/VL   Therapeutic Activity   Sidestepping with RTB x 1 Lap   Monster Walks RTB x 1 Lap  FSU 6\" x 10  (R) only - less pain than last session   Therapeutic Exercise Minutes 38 28 23 17   Neuro Re-Educ Minutes 5      Manual Therapy Minutes  15 10 20   Therapeutic Activity Minutes   8 3   Total Time Of Timed Procedures 43 43 41 40   Total Time Of Service-Based Procedures 0 0 0 0   Total Treatment Time 43 43 41 40   HEP Access Code: [email or web address]  URL: [email or web address]  Date: 03/17/2025  Prepared by: Cira Elkins     Exercises  - Supine Straight Leg Raises  - 1-2 x daily - 7 x weekly - 2 sets - 10 reps  - Supine Bridge  - 1-2 x daily - 7 x weekly - 2 sets - 10 reps  - Seated Long Arc  Quad  - 1-2 x daily - 7 x weekly - 2 sets - 10 reps  - Standing Hip Abduction with Counter Support  - 1-2 x daily - 7 x weekly - 2 sets - 10 reps  - Standing Terminal Knee Extension at Wall with Ball  - 1 x daily - 7 x weekly - 1 sets - 10 reps - 5s hold  - Seated Heel Slide  - 2-3 x daily - 7 x weekly - 1 sets - 10 reps Access Code: [email or web address]  URL: [email or web address]  Date: 03/17/2025  Prepared by: Cira Mendezo     Exercises  - Supine Straight Leg Raises  - 1-2 x daily - 7 x weekly - 2 sets - 10 reps  - Supine Bridge  - 1-2 x daily - 7 x weekly - 2 sets - 10 reps  - Seated Long Arc Quad  - 1-2 x daily - 7 x weekly - 2 sets - 10 reps  - Standing Hip Abduction with Counter Support  - 1-2 x daily - 7 x weekly - 2 sets - 10 reps  - Standing Terminal Knee Extension at Wall with Ball  - 1 x daily - 7 x weekly - 1 sets - 10 reps - 5s hold  - Seated Heel Slide  - 2-3 x daily - 7 x weekly - 1 sets - 10 reps Access Code: [email or web address]  URL: [email or web address]  Date: 04/04/2025  Prepared by: Cira Mendezo    Exercises  - Sidestepping  - 1 x daily - 7 x weekly - 1 sets - 20 reps  - Step Up  - 1 x daily - 7 x weekly - 1 sets - 10 reps  - Quadricep Stretch with Chair and Counter Support  - 1 x daily - 7 x weekly - 3 sets - 30s hold  - Heel Raises with Counter Support  - 1 x daily - 7 x weekly - 2 sets - 10 reps  - Calf in Cabinet Stretch   - 1 x daily - 7 x weekly - 3 sets - 30s hold  - Forward Monster Walks  - 1 x daily - 7 x weekly - 1 sets - 20 reps         HEP  Access Code: [email or web address]  URL: [email or web address]  Date: 04/04/2025  Prepared by: Cira Hampton    Exercises  - Sidestepping  - 1 x daily - 7 x weekly - 1 sets - 20 reps  - Step Up  - 1 x daily - 7 x weekly - 1 sets - 10 reps  - Quadricep Stretch with Chair and Counter Support  - 1 x daily - 7 x weekly - 3 sets - 30s hold  - Heel Raises with Counter Support  - 1 x daily - 7 x weekly - 2 sets - 10 reps  - Calf  in Cabinet Stretch   - 1 x daily - 7 x weekly - 3 sets - 30s hold  - Forward Monster Walks  - 1 x daily - 7 x weekly - 1 sets - 20 reps    Charges     MT x 1, TE x 2

## 2025-04-14 ENCOUNTER — TELEPHONE (OUTPATIENT)
Dept: FAMILY MEDICINE CLINIC | Facility: CLINIC | Age: 55
End: 2025-04-14

## 2025-04-14 DIAGNOSIS — Z13.89 SKIN CONDITION SCREENING: ICD-10-CM

## 2025-04-14 DIAGNOSIS — Z30.45 ENCOUNTER FOR SURVEILLANCE OF TRANSDERMAL PATCH HORMONAL CONTRACEPTIVE DEVICE: Primary | ICD-10-CM

## 2025-04-14 NOTE — TELEPHONE ENCOUNTER
Referrals placed for Dr Mari for OB and Dr Horvath for derm  Both in [facility] network  Faxed to their offices

## 2025-04-15 ENCOUNTER — PATIENT MESSAGE (OUTPATIENT)
Facility: CLINIC | Age: 55
End: 2025-04-15

## 2025-04-15 ENCOUNTER — OFFICE VISIT (OUTPATIENT)
Dept: PHYSICAL THERAPY | Age: 55
End: 2025-04-15
Attending: STUDENT IN AN ORGANIZED HEALTH CARE EDUCATION/TRAINING PROGRAM
Payer: COMMERCIAL

## 2025-04-15 DIAGNOSIS — M79.606 LOW BACK PAIN RADIATING TO LOWER EXTREMITY: Primary | ICD-10-CM

## 2025-04-15 DIAGNOSIS — M54.50 LOW BACK PAIN RADIATING TO LOWER EXTREMITY: Primary | ICD-10-CM

## 2025-04-15 PROCEDURE — 97140 MANUAL THERAPY 1/> REGIONS: CPT

## 2025-04-15 PROCEDURE — 97110 THERAPEUTIC EXERCISES: CPT

## 2025-04-15 NOTE — TELEPHONE ENCOUNTER
Pt is c/o pain with her plantar fascitis and lumbar back pain- feeling it is due to her knee replacement in January.     She is asking for recommendations~advised that she should continue to take Tylenol and  conservative measures.     Advised that we will check with MD if he has any other recommendations.

## 2025-04-15 NOTE — PROGRESS NOTES
Patient: Catina Rivera (55 year old, female) Referring Provider:  Insurance:   Diagnosis: Primary osteoarthritis of right knee (M17.11)  S/P total knee arthroplasty, right (Z96.651) Ruslan Connelly  [facility]   Date of Surgery: 1/29/25 Next MD visit:  N/A   Precautions:  None 3/13/25 Referral Information:    Date of Evaluation: Req: 17, Auth: 17, Exp: 5/31/2025 02/18/25 POC Auth Visits:  17       Today's Date   4/15/2025    Subjective  Patient reports she continues to have less pain and better use of right leg, but her left knee continues to cause issues for her. Patient reports her left knee, back, and both feet have been hurting more today and recently       Pain: 0/10     Objective  Worst: 3-4/10 with more activity; AROM Knee Flexion (R): 126 deg           Assessment  Patient continues to report improved symptoms as a result of cupping, continued to address with this method to posterior upper and lower of right leg, but also began to apply to quads. Patient was with significant improvement in tissue mobility of quads, resulting in AROM knee flexion of 126 degrees compared to 120 degrees last visit. Patient would benefit from continued focus of quad and quad tissue mobility, and progress stair training as tolerated.    Goals (to be met in 16 visits)   Short-Term Goals:  Patient will demonstrates knee flexion AROM to 90deg pain-free to facilitate sitting comfortably for 2 hours daily for community integration. Timeframe: 4 visits.  Patient will improve knee flexion AROM to 120deg to allow for reciprocal stair navigation pattern for community integration. Timeframe: 8 visits.  The patient will improve quad strength and single-leg stance stability to demonstrate non-antalgic gait pattern with walking short-length distances for household ambulation. Timeframe: 8 visits.  Patient will improve knee strength and stability to facilitate discontinued use of AD for daily ambulation. Timeframe: 8 visits.   Patient will  improve LE strength and stability to facilitate navigating down the stairs reciprocally with SP cane. Timeframe: 8 visits.   Long-Term Goals:  The patient will improve LE strength and endurance to facilitate standing for extended periods of time for 3-4 hours daily for community integration and occupational demands. Timeframe: 12 visits.  The patient will improve LE strength and endurance to facilitate walking distances of 1-2 mile(s) daily for community integration and occupational demands. Timeframe: 12 visits.  Patient will improve lower motor control to perform double-leg squat with symmetrical loading, without asymmetries, and with proper posterior chain loading to facilitate squatting and lifting ADL's. Timeframe: 12 visits.  Patient will improve LE mobility, strength, and single-leg stabilization to meet strength requirements for reciprocal stair navigation pattern with no asymmetries for ascending and descending 3 flights of stairs daily for community integration. Timeframe: 16 visits.  Patient will improve LEFS score by at least 9 points to indicate a true change in improved function for ADL's and restoring PLF. Timeframe: 16 visits.          Plan  Reassess progress next visit; continue 1x/week for 3-5 more visits    Treatment Last 4 Visits  Treatment Day: 12       4/2/2025 4/4/2025 4/8/2025 4/15/2025   LE Treatment   Therapeutic Exercise Prone Quad Stretch with Strap 3 x 30\" (R)  S/L hip ABD: 2 x 10 (R)  Reassess  SLR with QS x 20 (R)  Sidestepping with YTB x 1 Lap   Prone Quad Stretch 3 x 30\" (R)  S/L hip ABD: 2 x 10 (R)  SLR with QS x 20 (R)  Standing Quad Stretch with Chair 2 x 30\" ea R/L  DLHR 2 x 10   Calf In Cabinet Stretch 3 x 30\" ea R/L   FSU 6\" x 10  (R) Only   Lateral Heel Taps 4\" x 10 (R) only - modified, intermittent WB required on L to offload R     SLR: x 20 (R)  S/L hip ABD: x 20 (R)   Standing Quad Stretch with Chair: 2 x 30\" (R)   Calf stretch in cabinet: 2 x 30\" (R)   DLHR: 2 x 10    Elastic band lateral walk: x 1 lap, red theraband - leading only with right leg due to left knee pain   Elastic band base position fwd walk: 1 laps, red theraband   Prone Quad Mob with Ball 3 x 20 (R)   Standing Quad Stretch with Chair 2 x 30\" ea R/L   Calf in Cabinet Stretch 2 x 30\" ea R/L   HS Stretch with Chair 2 x 30\" ea R/L      Manual Therapy Cross Friction Massage: Incision   Patellar Distraction x 4 min   Patellar Distraction with Inf/Sup Mobs G2-3    Cupping (Prone): Medial, Center, Lateral HS, Proximal Gastroc, Distal ITBand  STM (supine): (R) lateral knee, ITB/VL, distal quead  Cupping (Prone): distal hamstring, prox calf, distal ITB/VL Cupping (Prone): HS, ITBand posteriorly, Gastrocsoleus Complex (R)  Cupping (H/L): Quads, ITBand Anteriorly, Adductors (R)   Therapeutic Activity  Sidestepping with RTB x 1 Lap   Monster Walks RTB x 1 Lap  FSU 6\" x 10  (R) only - less pain than last session    Therapeutic Exercise Minutes 28 23 17 15   Manual Therapy Minutes 15 10 20 25   Therapeutic Activity Minutes  8 3    Total Time Of Timed Procedures 43 41 40 40   Total Time Of Service-Based Procedures 0 0 0 0   Total Treatment Time 43 41 40 40   HEP Access Code: [email or web address]  URL: [email or web address]  Date: 03/17/2025  Prepared by: Cira Elkins     Exercises  - Supine Straight Leg Raises  - 1-2 x daily - 7 x weekly - 2 sets - 10 reps  - Supine Bridge  - 1-2 x daily - 7 x weekly - 2 sets - 10 reps  - Seated Long Arc Quad  - 1-2 x daily - 7 x weekly - 2 sets - 10 reps  - Standing Hip Abduction with Counter Support  - 1-2 x daily - 7 x weekly - 2 sets - 10 reps  - Standing Terminal Knee Extension at Wall with Ball  - 1 x daily - 7 x weekly - 1 sets - 10 reps - 5s hold  - Seated Heel Slide  - 2-3 x daily - 7 x weekly - 1 sets - 10 reps Access Code: [email or web address]  URL: [email or web address]  Date: 04/04/2025  Prepared by: Cira Elkins    Exercises  - Sidestepping  - 1 x daily - 7 x  weekly - 1 sets - 20 reps  - Step Up  - 1 x daily - 7 x weekly - 1 sets - 10 reps  - Quadricep Stretch with Chair and Counter Support  - 1 x daily - 7 x weekly - 3 sets - 30s hold  - Heel Raises with Counter Support  - 1 x daily - 7 x weekly - 2 sets - 10 reps  - Calf in Cabinet Stretch   - 1 x daily - 7 x weekly - 3 sets - 30s hold  - Forward Monster Walks  - 1 x daily - 7 x weekly - 1 sets - 20 reps  Access Code: [email or web address]  URL: [email or web address]  Date: 04/04/2025  Prepared by: Cira Mendezo     Exercises  - Sidestepping  - 1 x daily - 7 x weekly - 1 sets - 20 reps  - Step Up  - 1 x daily - 7 x weekly - 1 sets - 10 reps  - Quadricep Stretch with Chair and Counter Support  - 1 x daily - 7 x weekly - 3 sets - 30s hold  - Heel Raises with Counter Support  - 1 x daily - 7 x weekly - 2 sets - 10 reps  - Calf in Cabinet Stretch   - 1 x daily - 7 x weekly - 3 sets - 30s hold  - Forward Monster Walks  - 1 x daily - 7 x weekly - 1 sets - 20 reps        HEP  Access Code: [email or web address]  URL: [email or web address]  Date: 04/04/2025  Prepared by: Cira Latah     Exercises  - Sidestepping  - 1 x daily - 7 x weekly - 1 sets - 20 reps  - Step Up  - 1 x daily - 7 x weekly - 1 sets - 10 reps  - Quadricep Stretch with Chair and Counter Support  - 1 x daily - 7 x weekly - 3 sets - 30s hold  - Heel Raises with Counter Support  - 1 x daily - 7 x weekly - 2 sets - 10 reps  - Calf in Cabinet Stretch   - 1 x daily - 7 x weekly - 3 sets - 30s hold  - Forward Monster Walks  - 1 x daily - 7 x weekly - 1 sets - 20 reps    Charges     Manual x 2, Ther Ex x 1

## 2025-04-21 ENCOUNTER — OFFICE VISIT (OUTPATIENT)
Dept: PHYSICAL THERAPY | Age: 55
End: 2025-04-21
Attending: STUDENT IN AN ORGANIZED HEALTH CARE EDUCATION/TRAINING PROGRAM
Payer: COMMERCIAL

## 2025-04-21 PROCEDURE — 97140 MANUAL THERAPY 1/> REGIONS: CPT

## 2025-04-21 PROCEDURE — 97110 THERAPEUTIC EXERCISES: CPT

## 2025-04-21 NOTE — PROGRESS NOTES
Progress Summary  Pt has attended 13 visits in Physical Therapy.           Patient: Catina Rivera (55 year old, female) Referring Provider:  Insurance:   Diagnosis: Primary osteoarthritis of right knee (M17.11)  S/P total knee arthroplasty, right (Z96.651) [facility]   Date of Surgery: 1/29/25 Next MD visit:  N/A   Precautions:  None 3/13/25 Referral Information:    Date of Evaluation: Req: 17, Auth: 17, Exp: 5/31/2025 02/18/25 POC Auth Visits:  17       Today's Date   4/21/2025    Subjective  Patient reports in regards to her right knee only, she feels about 90% returned to function at this time, just still unable to navigate her 8\" steps at home without moderate UE assist.       Pain: 0/10     Objective    Post LEFS Score  Post LEFS Score: 68.75 % (4/21/2025  7:47 AM)    35 % improvement    Hip       4/21/2025   Hip ROM/MMT   Rt Hip Flexion MMT (L2) 4/5   Lt Hip Flexion MMT (L2) 4-/5   Rt Hip Extension MMT 4/5   Lt Hip Extension MMT 4-/5   Rt Hip Abduction MMT 4/5   Lt Hip Abduction MMT 4-/5   , Knee       2/21/2025 4/2/2025 4/21/2025   Knee ROM/MMT   Rt Knee Flexion 90 120 125   Lt Knee Flexion  120 120   Rt Knee Flexion MMT  5/5 5/5   Lt Knee Flexion MMT  4+/5* 5/5   Rt Knee Extension (L3) 0 0 0   Lt Knee Extension (L3)  0 0   Rt Knee Extension MMT (L3)  5/5 5/5   Lt Knee Extension MMT (L3)  5/5* 5/5       Worst Pain: 2/10 standing for an hour, more on feet         Assessment  Patient has attended 13 visits following (R) TKA demonstrating consistent gains in ROM, strength, and functional return. However, LLE continues to limit overall function with DL activity, which seems to be reflected most in MMT and LEFS. Patient would benefit from additional 3 visits 1x/week to address remaining deficits and transition to self management with final HEP. Patient still with occasional cramping in HS, and reduced tolerance to prone knee flexion, plan to continue improving mobility of quads, HS, and lower  Patient attended phase 2 Cardiac Rehabilitation today session 3. Session report will be scanned in by medical records under the media tab after discharge.The patient was ready to learn and received written education on blood pressure during today's session, no barriers were apparent at this time.   leg.    Goals (to be met in 16 visits)   Short-Term Goals:  Patient will demonstrates knee flexion AROM to 90deg pain-free to facilitate sitting comfortably for 2 hours daily for community integration. Timeframe: 4 visits.  Patient will improve knee flexion AROM to 120deg to allow for reciprocal stair navigation pattern for community integration. Timeframe: 8 visits.  The patient will improve quad strength and single-leg stance stability to demonstrate non-antalgic gait pattern with walking short-length distances for household ambulation. Timeframe: 8 visits.  Patient will improve knee strength and stability to facilitate discontinued use of AD for daily ambulation. Timeframe: 8 visits.   Patient will improve LE strength and stability to facilitate navigating down the stairs reciprocally with SP cane. Timeframe: 8 visits.   Long-Term Goals:  The patient will improve LE strength and endurance to facilitate standing for extended periods of time for 3-4 hours daily for community integration and occupational demands. Timeframe: 12 visits.  The patient will improve LE strength and endurance to facilitate walking distances of 1-2 mile(s) daily for community integration and occupational demands. Timeframe: 12 visits.  Patient will improve lower motor control to perform double-leg squat with symmetrical loading, without asymmetries, and with proper posterior chain loading to facilitate squatting and lifting ADL's. Timeframe: 12 visits.  Patient will improve LE mobility, strength, and single-leg stabilization to meet strength requirements for reciprocal stair navigation pattern with no asymmetries for ascending and descending 3 flights of stairs daily for community integration. Timeframe: 16 visits.  Patient will improve LEFS score by at least 9 points to indicate a true change in improved function for ADL's and restoring PLF. Timeframe: 16 visits.              Plan  Continue 1x/week for 3 more visits for total 16 visits in  original POC    Treatment Last 4 Visits  Treatment Day: 13       4/4/2025 4/8/2025 4/15/2025 4/21/2025   LE Treatment   Therapeutic Exercise Prone Quad Stretch 3 x 30\" (R)  S/L hip ABD: 2 x 10 (R)  SLR with QS x 20 (R)  Standing Quad Stretch with Chair 2 x 30\" ea R/L  DLHR 2 x 10   Calf In Cabinet Stretch 3 x 30\" ea R/L   FSU 6\" x 10  (R) Only   Lateral Heel Taps 4\" x 10 (R) only - modified, intermittent WB required on L to offload R     SLR: x 20 (R)  S/L hip ABD: x 20 (R)   Standing Quad Stretch with Chair: 2 x 30\" (R)   Calf stretch in cabinet: 2 x 30\" (R)   DLHR: 2 x 10   Elastic band lateral walk: x 1 lap, red theraband - leading only with right leg due to left knee pain   Elastic band base position fwd walk: 1 laps, red theraband   Prone Quad Mob with Ball 3 x 20 (R)   Standing Quad Stretch with Chair 2 x 30\" ea R/L   Calf in Cabinet Stretch 2 x 30\" ea R/L   HS Stretch with Chair 2 x 30\" ea R/L    Reassess  Standing Quad Stretch with Chair 2 x 30\" ea R/L   Calf in Cabinet Stretch 2 x 30\" ea R/L     Manual Therapy Cupping (Prone): Medial, Center, Lateral HS, Proximal Gastroc, Distal ITBand  STM (supine): (R) lateral knee, ITB/VL, distal quead  Cupping (Prone): distal hamstring, prox calf, distal ITB/VL Cupping (Prone): HS, ITBand posteriorly, Gastrocsoleus Complex (R)  Cupping (H/L): Quads, ITBand Anteriorly, Adductors (R) Cupping (H/L): Distal Quads, Distal ITBand,    Therapeutic Activity Sidestepping with RTB x 1 Lap   Monster Walks RTB x 1 Lap  FSU 6\" x 10  (R) only - less pain than last session  FSU 6\" x 10 (R)  Lateral Heel Taps x 10 (R)   Therapeutic Exercise Minutes 23 17 15 23   Manual Therapy Minutes 10 20 25 8   Therapeutic Activity Minutes 8 3  8   Total Time Of Timed Procedures 41 40 40 39   Total Time Of Service-Based Procedures 0 0 0 0   Total Treatment Time 41 40 40 39   HEP Access Code: [email or web address]  URL: [email or web address]  Date: 04/04/2025  Prepared by: Cira  Severo    Exercises  - Sidestepping  - 1 x daily - 7 x weekly - 1 sets - 20 reps  - Step Up  - 1 x daily - 7 x weekly - 1 sets - 10 reps  - Quadricep Stretch with Chair and Counter Support  - 1 x daily - 7 x weekly - 3 sets - 30s hold  - Heel Raises with Counter Support  - 1 x daily - 7 x weekly - 2 sets - 10 reps  - Calf in Cabinet Stretch   - 1 x daily - 7 x weekly - 3 sets - 30s hold  - Forward Monster Walks  - 1 x daily - 7 x weekly - 1 sets - 20 reps  Access Code: [email or web address]  URL: [email or web address]  Date: 04/04/2025  Prepared by: Cira Napa     Exercises  - Sidestepping  - 1 x daily - 7 x weekly - 1 sets - 20 reps  - Step Up  - 1 x daily - 7 x weekly - 1 sets - 10 reps  - Quadricep Stretch with Chair and Counter Support  - 1 x daily - 7 x weekly - 3 sets - 30s hold  - Heel Raises with Counter Support  - 1 x daily - 7 x weekly - 2 sets - 10 reps  - Calf in Cabinet Stretch   - 1 x daily - 7 x weekly - 3 sets - 30s hold  - Forward Monster Walks  - 1 x daily - 7 x weekly - 1 sets - 20 reps         HEP  Access Code: [email or web address]  URL: [email or web address]  Date: 04/04/2025  Prepared by: Cira Napa     Exercises  - Sidestepping  - 1 x daily - 7 x weekly - 1 sets - 20 reps  - Step Up  - 1 x daily - 7 x weekly - 1 sets - 10 reps  - Quadricep Stretch with Chair and Counter Support  - 1 x daily - 7 x weekly - 3 sets - 30s hold  - Heel Raises with Counter Support  - 1 x daily - 7 x weekly - 2 sets - 10 reps  - Calf in Cabinet Stretch   - 1 x daily - 7 x weekly - 3 sets - 30s hold  - Forward Monster Walks  - 1 x daily - 7 x weekly - 1 sets - 20 reps    Charges     Ther Ex x 2, Manual x 1

## 2025-04-25 ENCOUNTER — PATIENT MESSAGE (OUTPATIENT)
Dept: FAMILY MEDICINE CLINIC | Facility: CLINIC | Age: 55
End: 2025-04-25

## 2025-04-25 DIAGNOSIS — Z79.4 TYPE 2 DIABETES MELLITUS WITHOUT COMPLICATION, WITH LONG-TERM CURRENT USE OF INSULIN (HCC): Primary | ICD-10-CM

## 2025-04-25 DIAGNOSIS — E11.9 TYPE 2 DIABETES MELLITUS WITHOUT COMPLICATION, WITH LONG-TERM CURRENT USE OF INSULIN (HCC): Primary | ICD-10-CM

## 2025-04-25 DIAGNOSIS — E66.813 CLASS 3 SEVERE OBESITY WITH SERIOUS COMORBIDITY AND BODY MASS INDEX (BMI) OF 45.0 TO 49.9 IN ADULT, UNSPECIFIED OBESITY TYPE: ICD-10-CM

## 2025-04-28 ENCOUNTER — OFFICE VISIT (OUTPATIENT)
Dept: PHYSICAL THERAPY | Age: 55
End: 2025-04-28
Attending: STUDENT IN AN ORGANIZED HEALTH CARE EDUCATION/TRAINING PROGRAM
Payer: COMMERCIAL

## 2025-04-28 PROCEDURE — 97140 MANUAL THERAPY 1/> REGIONS: CPT

## 2025-04-28 PROCEDURE — 97530 THERAPEUTIC ACTIVITIES: CPT

## 2025-04-28 PROCEDURE — 97110 THERAPEUTIC EXERCISES: CPT

## 2025-04-28 NOTE — PROGRESS NOTES
Patient: Catina Rivera (55 year old, female) Referring Provider:  Insurance:   Diagnosis: Primary osteoarthritis of right knee (M17.11)  S/P total knee arthroplasty, right (Z96.651) Ruslan Connelly  [facility]   Date of Surgery: 1/29/25 Next MD visit:  N/A   Precautions:  None 3/13/25 Referral Information:    Date of Evaluation: Req: 17, Auth: 17, Exp: 5/31/2025 02/18/25 POC Auth Visits:  17       Today's Date   4/28/2025    Subjective  Patient reports she did a lot of walking and stairs over the weekend and was expecting to be more sore and painful after, but was able to tolerate several flights of stairs and lots of walking for an event this last weekend. Patient does report limited tolerance on LLE, but otherwise felt good.       Pain: 0/10     Objective         Assessment  Patient continues to report improved tolerance to activities, specifically stairs and walking but still with some knee flexion ROM limitations/tension reports. Addressed this with various stretching and self mobilizations, also applied cupping to hamstrings, lower leg, and distal ITBand/outter right leg (in prone). Patient is able to perform 6\" FSU without issue, but 8\" step requires moderate to significant UE to complete still. Continue to progress ROM and quad strength in end ranges.    Goals (to be met in 16 visits)   Short-Term Goals:  Patient will demonstrates knee flexion AROM to 90deg pain-free to facilitate sitting comfortably for 2 hours daily for community integration. Timeframe: 4 visits.  Patient will improve knee flexion AROM to 120deg to allow for reciprocal stair navigation pattern for community integration. Timeframe: 8 visits.  The patient will improve quad strength and single-leg stance stability to demonstrate non-antalgic gait pattern with walking short-length distances for household ambulation. Timeframe: 8 visits.  Patient will improve knee strength and stability to facilitate discontinued use of AD for daily ambulation.  Timeframe: 8 visits.   Patient will improve LE strength and stability to facilitate navigating down the stairs reciprocally with SP cane. Timeframe: 8 visits.   Long-Term Goals:  The patient will improve LE strength and endurance to facilitate standing for extended periods of time for 3-4 hours daily for community integration and occupational demands. Timeframe: 12 visits.  The patient will improve LE strength and endurance to facilitate walking distances of 1-2 mile(s) daily for community integration and occupational demands. Timeframe: 12 visits.  Patient will improve lower motor control to perform double-leg squat with symmetrical loading, without asymmetries, and with proper posterior chain loading to facilitate squatting and lifting ADL's. Timeframe: 12 visits.  Patient will improve LE mobility, strength, and single-leg stabilization to meet strength requirements for reciprocal stair navigation pattern with no asymmetries for ascending and descending 3 flights of stairs daily for community integration. Timeframe: 16 visits.  Patient will improve LEFS score by at least 9 points to indicate a true change in improved function for ADL's and restoring PLF. Timeframe: 16 visits.                  Plan  Continue 2 more visits then DC with self management final HEP    Treatment Last 4 Visits  Treatment Day: 14       4/8/2025 4/15/2025 4/21/2025 4/28/2025   LE Treatment   Therapeutic Exercise SLR: x 20 (R)  S/L hip ABD: x 20 (R)   Standing Quad Stretch with Chair: 2 x 30\" (R)   Calf stretch in cabinet: 2 x 30\" (R)   DLHR: 2 x 10   Elastic band lateral walk: x 1 lap, red theraband - leading only with right leg due to left knee pain   Elastic band base position fwd walk: 1 laps, red theraband   Prone Quad Mob with Ball 3 x 20 (R)   Standing Quad Stretch with Chair 2 x 30\" ea R/L   Calf in Cabinet Stretch 2 x 30\" ea R/L   HS Stretch with Chair 2 x 30\" ea R/L    Reassess  Standing Quad Stretch with Chair 2 x 30\" ea R/L    Calf in Cabinet Stretch 2 x 30\" ea R/L   Lunging Calf Stretch (Gastroc) 30\" ea R/L  Lunging Calf Stretch (Soleus) 30\" ea R/L  DLHR 2 x 10  Calf Stretch in Cabinet 2 x 30\" ea R/L   Standing Quad Stretch 2 x 30\" ea R/L   Prone Quad Mob with Ball x 20 (R)  HS Mob with Ball x 20 (R)  Seated HS Stretch 2 x 30\" ea R/L    Manual Therapy STM (supine): (R) lateral knee, ITB/VL, distal quead  Cupping (Prone): distal hamstring, prox calf, distal ITB/VL Cupping (Prone): HS, ITBand posteriorly, Gastrocsoleus Complex (R)  Cupping (H/L): Quads, ITBand Anteriorly, Adductors (R) Cupping (H/L): Distal Quads, Distal ITBand,  Cupping (Prone): HS, Distal ITBand, Gastroc, Peroneals (R)   Therapeutic Activity FSU 6\" x 10  (R) only - less pain than last session  FSU 6\" x 10 (R)  Lateral Heel Taps x 10 (R) FSU 8\" - too difficult, requires HHA to PULL up to step  FSU 6\" - too easy   Staggered STS (R Bias) x 10    Therapeutic Exercise Minutes 17 15 23 13   Manual Therapy Minutes 20 25 8 15   Therapeutic Activity Minutes 3  8 10   Total Time Of Timed Procedures 40 40 39 38   Total Time Of Service-Based Procedures 0 0 0 0   Total Treatment Time 40 40 39 38   HEP  Access Code: [email or web address]  URL: [email or web address]  Date: 04/04/2025  Prepared by: Cira Elkins     Exercises  - Sidestepping  - 1 x daily - 7 x weekly - 1 sets - 20 reps  - Step Up  - 1 x daily - 7 x weekly - 1 sets - 10 reps  - Quadricep Stretch with Chair and Counter Support  - 1 x daily - 7 x weekly - 3 sets - 30s hold  - Heel Raises with Counter Support  - 1 x daily - 7 x weekly - 2 sets - 10 reps  - Calf in Cabinet Stretch   - 1 x daily - 7 x weekly - 3 sets - 30s hold  - Forward Monster Walks  - 1 x daily - 7 x weekly - 1 sets - 20 reps          HEP  Access Code: [email or web address]  URL: [email or web address]  Date: 04/04/2025  Prepared by: Cira Elkins     Exercises  - Sidestepping  - 1 x daily - 7 x weekly - 1 sets - 20 reps  - Step Up  - 1 x  daily - 7 x weekly - 1 sets - 10 reps  - Quadricep Stretch with Chair and Counter Support  - 1 x daily - 7 x weekly - 3 sets - 30s hold  - Heel Raises with Counter Support  - 1 x daily - 7 x weekly - 2 sets - 10 reps  - Calf in Cabinet Stretch   - 1 x daily - 7 x weekly - 3 sets - 30s hold  - Forward Monster Walks  - 1 x daily - 7 x weekly - 1 sets - 20 reps    Charges     Manual x 1, Ther Act x 1, Ther Ex x 1

## 2025-04-29 ENCOUNTER — TELEPHONE (OUTPATIENT)
Facility: CLINIC | Age: 55
End: 2025-04-29

## 2025-04-29 DIAGNOSIS — M25.561 RIGHT KNEE PAIN, UNSPECIFIED CHRONICITY: Primary | ICD-10-CM

## 2025-04-29 NOTE — TELEPHONE ENCOUNTER
Ordered and scheduled XRs    Spoke with patient on phone    She agreed to arrive 15 minutes early for XRs

## 2025-05-01 ENCOUNTER — HOSPITAL ENCOUNTER (OUTPATIENT)
Dept: GENERAL RADIOLOGY | Age: 55
Discharge: HOME | End: 2025-05-01
Attending: STUDENT IN AN ORGANIZED HEALTH CARE EDUCATION/TRAINING PROGRAM
Payer: COMMERCIAL

## 2025-05-01 ENCOUNTER — OFFICE VISIT (OUTPATIENT)
Facility: CLINIC | Age: 55
End: 2025-05-01
Payer: COMMERCIAL

## 2025-05-01 VITALS — BODY MASS INDEX: 45.27 KG/M2 | WEIGHT: 246 LBS | HEIGHT: 62 IN

## 2025-05-01 DIAGNOSIS — Z96.651 S/P TOTAL KNEE ARTHROPLASTY, RIGHT: Primary | ICD-10-CM

## 2025-05-01 DIAGNOSIS — M17.12 PRIMARY OSTEOARTHRITIS OF LEFT KNEE: ICD-10-CM

## 2025-05-01 DIAGNOSIS — M25.561 RIGHT KNEE PAIN, UNSPECIFIED CHRONICITY: ICD-10-CM

## 2025-05-01 PROCEDURE — 20610 DRAIN/INJ JOINT/BURSA W/O US: CPT | Performed by: STUDENT IN AN ORGANIZED HEALTH CARE EDUCATION/TRAINING PROGRAM

## 2025-05-01 PROCEDURE — 99213 OFFICE O/P EST LOW 20 MIN: CPT | Performed by: STUDENT IN AN ORGANIZED HEALTH CARE EDUCATION/TRAINING PROGRAM

## 2025-05-01 PROCEDURE — 3008F BODY MASS INDEX DOCD: CPT | Performed by: STUDENT IN AN ORGANIZED HEALTH CARE EDUCATION/TRAINING PROGRAM

## 2025-05-01 PROCEDURE — 73562 X-RAY EXAM OF KNEE 3: CPT | Performed by: STUDENT IN AN ORGANIZED HEALTH CARE EDUCATION/TRAINING PROGRAM

## 2025-05-01 RX ORDER — FERROUS SULFATE 325(65) MG
325 TABLET, DELAYED RELEASE (ENTERIC COATED) ORAL
COMMUNITY

## 2025-05-01 NOTE — PROGRESS NOTES
Orthopedic Surgery - Total Joint Arthroplasty    Post-Op Visit    DOS: 1/29/2025    Subjective:    Chief Complaint: Follow-up after Right total knee arthroplasty  Left knee pain    Patient is 3 months status post the above procedure. Pain is improved since surgery. They have been working with outpatient physical therapy and making good progress. They are using no assistive devices for ambulation. Just got back from [facility] where she walked around  [facility] and the [facility].    She has a diagnosis of end stage left knee osteoarthritis and is planning for left total knee arthroplasty later this year once her schedule allows. She is thinking of October. Pain is primarily medial about the knee and limiting her ability to ambulate and do stairs more than the right knee, particularly during her recent trip. She is interested in obtaining a CSI to the left knee while awaiting surgery.      Notable Events in Post-Operative Course  Patient noted to have a reaction to Vicryl sutures with a stitch abscess in 2 locations with erythema and point tenderness  Her knee pain however was improving so while deep infection was considered in the differential it appeared less likely  A scab was unroofed with some drainage which was self limited and stopped within 48 hrs  Interval CRP/ESRs were obtained with the second set normal  I did see her after with Joaquina Samayoa PA-C about a week later to confirm resolution of erythema and drainage    Objective:    There were no vitals filed for this visit.  Estimated body mass index is 44.99 kg/m² as calculated from the following:    Height as of 4/3/25: 5' 2\" (1.575 m).    Weight as of 4/3/25: 246 lb (111.6 kg).    Physical Examination:   Gen: Well developed, well nourished, resting comfortably in no acute distress  Skin: Warm, dry  Head: Normocephalic, atraumatic  Ears/Nose/Throat: Moist mucous membranes  CV: Regular rate and rhythm by peripheral pulse  Resp: Respirations are non-labored, no  wheezing    MSK:   Right Knee:  Inspection: Incision appears well healed; some thickening of her incisional scar where she had the prior stitch abscesses. No erythema, swelling, or drainage  Range of motion of the knee is 0 - 120 degrees without pain (got to 127 in last PT session); flexion contracture of 0  Able to actively straight leg raise without extensor lag  Patient is neurovascularly intact L2-S1 and 5/5 strength with resisted testing of DF/PF/EHL  No calf pain, redness or swelling  Warm perfused extremity    Left Knee:  Inspection: Left knee with well healed arthroscopic incisions. No effusion. No quad atrophy  Alignment: significant varus and almost fully correctable  ROM: 0 - 110 degrees, flexion contracture: 0 degrees, quad lag: no  Stability: A/P stress: stable, firm endpoint, M/L stress: opens laterally with varus force but firm endpoint  Pain or crepitus with ROM?: Yes. Pain with patellar grind  Tenderness to palpation at: medial joint line  Strength: Intact 5/5 strength SLR and TA/GS/FHL/EHL  Sensation: Grossly intact to light touch over SPN/DPN/Saph/Sural/Tibial nerve distributions  Vasc: Warm perfused extremity      Imaging:   Weightbearing XRs of the right knee were obtained with AP, sunrise, and lateral views.     Patient is s/p cemented total knee arthroplasty with patellar resurfacing. Tibial component is in 5 degrees of varus  Components appear in stable position compared to prior films  No fracture or dislocation seen    Weightbearing XRs of the left knee were reviewed from December 2024 with AP, PA Flex, sunrise, and lateral views     They show severe degenerative changes of the knee, particularly involving the medial compartment, with significant varus deformity and coronal subluxation, subchondral sclerosis and marginal osteophytes. No fracture or dislocation seen     I personally reviewed and interpreted the radiographs.      Plan:  55-year-old female with bilateral knee osteoarthritis,  now status post right total knee arthroplasty. Overall recovering well from her right knee. For her left knee, she is interested in obtaining an injection today.    Procedure Note - Left Knee Zilretta    Risks and benefits of knee injection discussed with the patient, with risks including but not limited to pain and swelling at the injection site and/or within the knee joint, infection, elevation in blood pressure and/or glucose levels, facial flushing.  After informed consent, the patient's left knee was marked, locally anesthetized with skin refrigerant, prepped with topical antiseptic, and injected with 5mL of 32mg/5mL Zilretta through the inferolateral portal. Hemostasis was achieved and a band-aid was applied.  The patient tolerated the procedure well.    I have asked that they keep track of the extent and duration of relief following this injection.      Blood Thinner: Back on home Eliquis    Physical Therapy: Currently in outpatient PT    Weight Bearing Status: As tolerated    Mobility Aid: Weaned under PT supervision. Maintain fall precautions      Follow-Up: I have asked that she follow-up with me in August if she is interested in scheduling surgery in October for her left knee osteoarthritis      Ruslan Connelly MD  Adult Hip and Knee Reconstruction    Department of Orthopaedic Surgery  [facility]     [address] [place] [address]  [address] [place] [id number]     t: [phone]  f: [phone]       [facility]

## 2025-05-05 DIAGNOSIS — Z96.651 S/P TOTAL KNEE ARTHROPLASTY, RIGHT: ICD-10-CM

## 2025-05-05 RX ORDER — PREGABALIN 50 MG/1
50 CAPSULE ORAL 3 TIMES DAILY
Qty: 90 CAPSULE | Refills: 0 | Status: SHIPPED | OUTPATIENT
Start: 2025-05-05

## 2025-05-05 NOTE — TELEPHONE ENCOUNTER
Pregabalin 50 mg  DOS: 01/29/25  Last OV: 05/1/25  Last refill date: 03/24/25     #/refills: 90/0  Upcoming appt:   Future Appointments   Date Time Provider Department Center   5/7/2025  7:30 AM Cira Elkins PTA [facility] [place]   5/13/2025  8:40 AM Pricila Shi APRN [facility] [address]   5/14/2025  8:15 AM Cira Elkins PTA [facility] [place]   6/5/2025  5:00 PM Yessenia Muñiz LCPC [facility] [place]

## 2025-05-07 ENCOUNTER — OFFICE VISIT (OUTPATIENT)
Dept: PHYSICAL THERAPY | Age: 55
End: 2025-05-07
Attending: STUDENT IN AN ORGANIZED HEALTH CARE EDUCATION/TRAINING PROGRAM
Payer: COMMERCIAL

## 2025-05-07 PROCEDURE — 97140 MANUAL THERAPY 1/> REGIONS: CPT

## 2025-05-07 PROCEDURE — 97530 THERAPEUTIC ACTIVITIES: CPT

## 2025-05-07 PROCEDURE — 97110 THERAPEUTIC EXERCISES: CPT

## 2025-05-07 NOTE — PROGRESS NOTES
Patient: Catina Rivera (55 year old, female) Referring Provider:  Insurance:   Diagnosis: Primary osteoarthritis of right knee (M17.11)  S/P total knee arthroplasty, right (Z96.651) Ruslan Calderonon  [facility]   Date of Surgery: 1/29/25 Next MD visit:  N/A   Precautions:  None 3/13/25 Referral Information:    Date of Evaluation: Req: 17, Auth: 17, Exp: 5/31/2025 02/18/25 POC Auth Visits:  17       Today's Date   5/7/2025    Subjective  Patient reports increased tension/discomfort of lower posterior knee this day, reports attempts to stretch and mob hamstrings on this leg has not provided relief.       Pain: 0/10     Objective         Assessment  Patient continues to report limitations in function related to left knee symptoms, has plans to schedule left knee procedure sometime early left fall of this year. Patient care continues to focus on quad strength, control, and ROM of right knee. Patient is now able to navigate 8\" stairs at home with less reliance of HHA, also able to complete without pain since getting injection on left knee 05/01. Patient is appropriate for reassessment of progress and DC next visit    Goals (to be met in 16 visits)   Short-Term Goals:  Patient will demonstrates knee flexion AROM to 90deg pain-free to facilitate sitting comfortably for 2 hours daily for community integration. Timeframe: 4 visits.  Patient will improve knee flexion AROM to 120deg to allow for reciprocal stair navigation pattern for community integration. Timeframe: 8 visits.  The patient will improve quad strength and single-leg stance stability to demonstrate non-antalgic gait pattern with walking short-length distances for household ambulation. Timeframe: 8 visits.  Patient will improve knee strength and stability to facilitate discontinued use of AD for daily ambulation. Timeframe: 8 visits.   Patient will improve LE strength and stability to facilitate navigating down the stairs reciprocally with SP cane. Timeframe: 8  visits.   Long-Term Goals:  The patient will improve LE strength and endurance to facilitate standing for extended periods of time for 3-4 hours daily for community integration and occupational demands. Timeframe: 12 visits.  The patient will improve LE strength and endurance to facilitate walking distances of 1-2 mile(s) daily for community integration and occupational demands. Timeframe: 12 visits.  Patient will improve lower motor control to perform double-leg squat with symmetrical loading, without asymmetries, and with proper posterior chain loading to facilitate squatting and lifting ADL's. Timeframe: 12 visits.  Patient will improve LE mobility, strength, and single-leg stabilization to meet strength requirements for reciprocal stair navigation pattern with no asymmetries for ascending and descending 3 flights of stairs daily for community integration. Timeframe: 16 visits.  Patient will improve LEFS score by at least 9 points to indicate a true change in improved function for ADL's and restoring PLF. Timeframe: 16 visits.                      Plan  Reassess and DC next visit    Treatment Last 4 Visits  Treatment Day: 15       4/15/2025 4/21/2025 4/28/2025 5/7/2025   LE Treatment   Therapeutic Exercise Prone Quad Mob with Ball 3 x 20 (R)   Standing Quad Stretch with Chair 2 x 30\" ea R/L   Calf in Cabinet Stretch 2 x 30\" ea R/L   HS Stretch with Chair 2 x 30\" ea R/L    Reassess  Standing Quad Stretch with Chair 2 x 30\" ea R/L   Calf in Cabinet Stretch 2 x 30\" ea R/L   Lunging Calf Stretch (Gastroc) 30\" ea R/L  Lunging Calf Stretch (Soleus) 30\" ea R/L  DLHR 2 x 10  Calf Stretch in Cabinet 2 x 30\" ea R/L   Standing Quad Stretch 2 x 30\" ea R/L   Prone Quad Mob with Ball x 20 (R)  HS Mob with Ball x 20 (R)  Seated HS Stretch 2 x 30\" ea R/L  Long Sitting Calf Stretch with Strap 2 x 30\"   Lunging Calf Stretch (Gastroc) 30\" ea R/L  Lunging Calf Stretch (Soleus) 30\" ea R/L  DLHR 2 x 10 - cues for \"big toe\" and avoid  momentum/rocking fwd  Attempted** sidestepping with loop band; not tolerated due to left knee  Calf Mob with Ball (R) x 3 cycles   Calf Stretch in Cabinet 2 x 30\" ea R/L    Manual Therapy Cupping (Prone): HS, ITBand posteriorly, Gastrocsoleus Complex (R)  Cupping (H/L): Quads, ITBand Anteriorly, Adductors (R) Cupping (H/L): Distal Quads, Distal ITBand,  Cupping (Prone): HS, Distal ITBand, Gastroc, Peroneals (R) TrPR (Supine): Hip Flexors, Quads (R)  STM (Supine): Proximal Calf, Lower HS, Adductors    Therapeutic Activity  FSU 6\" x 10 (R)  Lateral Heel Taps x 10 (R) FSU 8\" - too difficult, requires HHA to PULL up to step  FSU 6\" - too easy   Staggered STS (R Bias) x 10  Lateral Heel Taps 4\" 2 x 10 (R)  Staggered STS (R Bias) x 10    Therapeutic Exercise Minutes 15 23 13 23   Manual Therapy Minutes 25 8 15 10   Therapeutic Activity Minutes  8 10 8   Total Time Of Timed Procedures 40 39 38 41   Total Time Of Service-Based Procedures 0 0 0 0   Total Treatment Time 40 39 38 41   HEP Access Code: [email or web address]  URL: [email or web address]  Date: 04/04/2025  Prepared by: Cira Elkins     Exercises  - Sidestepping  - 1 x daily - 7 x weekly - 1 sets - 20 reps  - Step Up  - 1 x daily - 7 x weekly - 1 sets - 10 reps  - Quadricep Stretch with Chair and Counter Support  - 1 x daily - 7 x weekly - 3 sets - 30s hold  - Heel Raises with Counter Support  - 1 x daily - 7 x weekly - 2 sets - 10 reps  - Calf in Cabinet Stretch   - 1 x daily - 7 x weekly - 3 sets - 30s hold  - Forward Monster Walks  - 1 x daily - 7 x weekly - 1 sets - 20 reps   Access Code: [email or web address]  URL: [email or web address]  Date: 04/04/2025  Prepared by: Cira Elkins     Exercises  - Sidestepping  - 1 x daily - 7 x weekly - 1 sets - 20 reps  - Step Up  - 1 x daily - 7 x weekly - 1 sets - 10 reps  - Quadricep Stretch with Chair and Counter Support  - 1 x daily - 7 x weekly - 3 sets - 30s hold  - Heel Raises with Counter Support  - 1  x daily - 7 x weekly - 2 sets - 10 reps  - Calf in Cabinet Stretch   - 1 x daily - 7 x weekly - 3 sets - 30s hold  - Forward Monster Walks  - 1 x daily - 7 x weekly - 1 sets - 20 reps        HEP  Access Code: [email or web address]  URL: [email or web address]  Date: 04/04/2025  Prepared by: Cira Elkins     Exercises  - Sidestepping  - 1 x daily - 7 x weekly - 1 sets - 20 reps  - Step Up  - 1 x daily - 7 x weekly - 1 sets - 10 reps  - Quadricep Stretch with Chair and Counter Support  - 1 x daily - 7 x weekly - 3 sets - 30s hold  - Heel Raises with Counter Support  - 1 x daily - 7 x weekly - 2 sets - 10 reps  - Calf in Cabinet Stretch   - 1 x daily - 7 x weekly - 3 sets - 30s hold  - Forward Monster Walks  - 1 x daily - 7 x weekly - 1 sets - 20 reps    Charges     Ther Ex x 2, Manual x 1, Ther Act x 1

## 2025-05-08 RX ORDER — CYCLOBENZAPRINE HCL 10 MG
10 TABLET ORAL 2 TIMES DAILY
Qty: 30 TABLET | Refills: 0 | Status: SHIPPED | OUTPATIENT
Start: 2025-05-08

## 2025-05-08 NOTE — TELEPHONE ENCOUNTER
Cyclobenzaprine 10 mg  DOS: 1/29/25  Last OV: 05/01/25  Last refill date: 4/4/25     #/refills: 30/0  Upcoming appt:   Future Appointments   Date Time Provider Department Center   5/9/2025  7:40 AM Gisselle Humphrey APRN [facility] [place]   5/13/2025  8:40 AM Pricila Shi APRN [facility] [address]   5/14/2025  8:15 AM Cira Elkins PTA [facility] [place]   6/5/2025  5:00 PM Yessenia Muñiz LCPC [facility] [place]

## 2025-05-09 ENCOUNTER — OFFICE VISIT (OUTPATIENT)
Dept: FAMILY MEDICINE CLINIC | Facility: CLINIC | Age: 55
End: 2025-05-09
Payer: COMMERCIAL

## 2025-05-09 VITALS
TEMPERATURE: 97 F | DIASTOLIC BLOOD PRESSURE: 68 MMHG | HEART RATE: 67 BPM | BODY MASS INDEX: 45.27 KG/M2 | HEIGHT: 62 IN | OXYGEN SATURATION: 96 % | WEIGHT: 246 LBS | SYSTOLIC BLOOD PRESSURE: 132 MMHG

## 2025-05-09 DIAGNOSIS — R51.9 FRONTAL HEADACHE: Primary | ICD-10-CM

## 2025-05-09 PROCEDURE — 3078F DIAST BP <80 MM HG: CPT | Performed by: NURSE PRACTITIONER

## 2025-05-09 PROCEDURE — 3044F HG A1C LEVEL LT 7.0%: CPT | Performed by: NURSE PRACTITIONER

## 2025-05-09 PROCEDURE — 3008F BODY MASS INDEX DOCD: CPT | Performed by: NURSE PRACTITIONER

## 2025-05-09 PROCEDURE — 99213 OFFICE O/P EST LOW 20 MIN: CPT | Performed by: NURSE PRACTITIONER

## 2025-05-09 PROCEDURE — 3075F SYST BP GE 130 - 139MM HG: CPT | Performed by: NURSE PRACTITIONER

## 2025-05-09 RX ORDER — RIZATRIPTAN BENZOATE 10 MG/1
5 TABLET ORAL AS NEEDED
Qty: 10 TABLET | Refills: 0 | Status: SHIPPED | OUTPATIENT
Start: 2025-05-09

## 2025-05-09 NOTE — PROGRESS NOTES
HPI:   Headache   This is a new problem. Episode onset: 3 weeks. The problem has been waxing and waning. The pain is located in the Frontal region. Associated symptoms include photophobia (possibly) and vomiting (once last week but had food poisoning). Pertinent negatives include no coughing, ear pain, fever or nausea. Treatments tried: Fluids, Excredin minimal improvement, Has taken Benadryl at night and does feel like it helped a litle. Her past medical history is significant for sinus disease (but better after having sinus surgery).     Had eyes checked this week and minimal change in prescription    Current Medications[1]   Past Medical History[2]   Past Surgical History[3]   Family History[4]   Short Social Hx on File[5]      REVIEW OF SYSTEMS:   Review of Systems   Constitutional:  Negative for chills, fatigue and fever.   HENT:  Negative for congestion, ear pain, facial swelling, sneezing and trouble swallowing.    Eyes:  Positive for photophobia (possibly).   Respiratory:  Negative for cough and shortness of breath.    Cardiovascular:  Negative for chest pain and palpitations.   Gastrointestinal:  Positive for vomiting (once last week but had food poisoning). Negative for nausea.   Neurological:  Positive for headaches.       EXAM:   /68   Pulse 67   Temp 97.4 °F (36.3 °C)   Ht 5' 2\" (1.575 m)   Wt 246 lb (111.6 kg)   LMP 09/24/2019 (Approximate)   SpO2 96%   BMI 44.99 kg/m²   Physical Exam  Constitutional:       General: She is not in acute distress.     Appearance: She is not ill-appearing.   HENT:      Right Ear: Tympanic membrane, ear canal and external ear normal.      Left Ear: Tympanic membrane, ear canal and external ear normal.      Nose: Nose normal.      Mouth/Throat:      Mouth: Mucous membranes are moist.      Pharynx: Oropharynx is clear.   Eyes:      Extraocular Movements: Extraocular movements intact.      Conjunctiva/sclera: Conjunctivae normal.      Pupils: Pupils are equal,  round, and reactive to light.   Cardiovascular:      Rate and Rhythm: Normal rate and regular rhythm.      Heart sounds: Normal heart sounds.   Neurological:      General: No focal deficit present.      Mental Status: She is alert and oriented to person, place, and time.      Motor: No weakness.      Gait: Gait normal.   Psychiatric:         Mood and Affect: Mood normal.         ASSESSMENT AND PLAN:   Diagnoses and all orders for this visit:    Frontal headache  -     Rizatriptan Benzoate 10 MG Oral Tab; Take 0.5 tablets (5 mg total) by mouth as needed for Migraine (max 20 mg per day).    Continue Benadryl PRN  Add over the counter antihistamine and Flonase  PRN triptan   Avoid NSAIDs due to anticoagulant  Call with update    Note to patient: The 21st Century Cures Act makes medical notes like these available to patients in the interest of transparency. However, be advised this is a medical document. It is intended as peer to peer communication. It is written in medical language and may contain abbreviations or verbiage that are unfamiliar. It may appear blunt or direct. Medical documents are intended to carry relevant information, facts as evident, and the clinical opinion of the practitioner.             [1]   Current Outpatient Medications   Medication Sig Dispense Refill    Rizatriptan Benzoate 10 MG Oral Tab Take 0.5 tablets (5 mg total) by mouth as needed for Migraine (max 20 mg per day). 10 tablet 0    cyclobenzaprine 10 MG Oral Tab Take 1 tablet (10 mg total) by mouth 2 (two) times daily. 30 tablet 0    PREGABALIN 50 MG Oral Cap TAKE 1 CAPSULE(50 MG) BY MOUTH THREE TIMES DAILY 90 capsule 0    ferrous sulfate 325 (65 FE) MG Oral Tab EC Take 1 tablet (325 mg total) by mouth daily with breakfast.      NOVOLOG 100 UNIT/ML Injection Solution INJECT UP TO 83 UNITS VIA INSULIN PUMP DAILY AS DIRECTED 75 mL 1    Continuous Glucose Sensor (DEXCOM G6 SENSOR) Does not apply Misc USE 1 SENSOR EVERY 10 DAYS 9 each 1     Continuous Glucose Transmitter (DEXCOM G6 TRANSMITTER) Does not apply Misc USE 1 TRANSMITTER EVERY 90 DAYS 1 each 2    traMADol 50 MG Oral Tab Take 1 tablet (50 mg total) by mouth every 8 (eight) hours as needed for Pain. 30 tablet 0    cholecalciferol 25 MCG (1000 UT) Oral Tab Take 1 tablet (1,000 Units total) by mouth daily.      Multiple Vitamin (MULTIVITAMINS OR) Take by mouth.      Magnesium Cl-Calcium Carbonate 71.5-119 MG Oral Tab EC Take 2 tablets by mouth daily.      atorvastatin 20 MG Oral Tab Take 1 tablet (20 mg total) by mouth nightly. 90 tablet 0    glucagon (GVOKE HYPOPEN 2-PACK) 1 MG/0.2ML Subcutaneous injection Inject 0.2 mL (1 mg total) into the skin as needed. If glucose less than 70 and cannot swallow anything by mouth. Patient trained on Gvoke. No substitutions 0.4 mL 1    MOUNJARO 15 MG/0.5ML Subcutaneous Solution Auto-injector Inject 15 mg into the skin once a week. 6 mL 0    Glucose Blood (BLOOD GLUCOSE TEST) In Vitro Strip Use to test BG 4x/day 400 strip 2    OneTouch Delica Lancets 33G Does not apply Misc 1 Lancet by Finger stick route in the morning, at noon, in the evening, and at bedtime. 400 each 3    Insulin Disposable Pump (OMNIPOD 5 [id number] PODS GEN 5) Does not apply Misc 1 each every 3 (three) days. 30 each 1    levothyroxine 125 MCG Oral Tab Take 1 tablet (125 mcg total) by mouth before breakfast. 90 tablet 1    OMEPRAZOLE 40 MG Oral Capsule Delayed Release TAKE 1 CAPSULE(40 MG) BY MOUTH DAILY 90 capsule 0    albuterol (PROAIR HFA) 108 (90 Base) MCG/ACT Inhalation Aero Soln Inhale 2 puffs into the lungs every 4 (four) hours as needed for Wheezing. 1 each 0    apixaban 5 MG Oral Tab Take 1 tablet (5 mg total) by mouth 2 (two) times daily.      BUPROPION  MG Oral Tablet 24 Hr TAKE 1 TABLET(300 MG) BY MOUTH DAILY 90 tablet 1    Calcium Carb-Cholecalciferol (CALCIUM + VITAMIN D3 OR) Take 1 tablet by mouth daily. Vitamin d 800 international unit and Calcium 600 mg      PARoxetine  HCl 40 MG Oral Tab Take 1 tablet (40 mg total) by mouth every morning. 90 tablet 3    Insulin Glargine, 2 Unit Dial, (TOUJEO MAX SOLOSTAR) 300 UNIT/ML Subcutaneous Solution Pen-injector USE AS DIRECTED UP TO 75 UNITS DAILY IN THE EVENT OF INSULIN PUMP FAILURE 6 mL 0    Blood Glucose Monitoring Suppl (ONETOUCH VERIO FLEX SYSTEM) w/Device Does not apply Kit Use to test blood sugar 1x daily as needed to calibrate dexcom 1 kit 0    Insulin Disposable Pump (OMNIPOD 5 G6 INTRO, GEN 5,) Does not apply Kit 1 each every 3 (three) days. 1 kit 0    dilTIAZem HCl ER Beads 180 MG Oral Capsule SR 24 Hr Take 1 capsule (180 mg total) by mouth 2 (two) times daily.      estradiol 0.05 MG/24HR Transdermal Patch Weekly APPLY 1 PATCH EXTERNALLY TO THE SKIN EVERY TUESDAY. DO NOT CUT PATCH     [2]   Past Medical History:   Anxiety    Anxiety state, unspecified    Arrhythmia    a fib    Asthma (HCC)    Atrial fibrillation with RVR (HCC)    Back pain    Back problem    Bad breath    Belching    Bloating    Decorative tattoo    Depression    Diabetes (HCC)    per pt    Disorder of thyroid    Esophageal reflux    GERD (gastroesophageal reflux disease)    Headache(784.0)    Heart palpitations    Afib    Heartburn    I have taken medication for over 10years    High cholesterol    History of depression    Hypothyroidism    Irregular bowel habits    Leaking of urine    Lipoma of unspecified site    Multiple thyroid nodules    on rt,2011, lt.-2008    Nontoxic multinodular goiter    Nontoxic multinodular goiter    Obesity    Other and unspecified hyperlipidemia    Pain in joints    Pain in right shin    Pain with bowel movements    Painful swallowing    Not consistent but not due to illness    Plantar fasciitis    Sleep apnea     CPAP     Stress    Thyroiditis, unspecified    Type 1 diabetes mellitus (HCC)    Uncomfortable fullness after meals    Visual impairment    Wears glasses    Weight loss    Working with weight loss clinic   [3]   Past  Surgical History:  Procedure Laterality Date    Arthroscopy of joint unlisted Bilateral     knee    Carpal tunnel release Bilateral     Cholecystectomy      Colonoscopy N/A 10/21/2016    Procedure: COLONOSCOPY;  Surgeon: Brandt Ramirez DO;  Location:  ENDOSCOPY    Colonoscopy N/A 12/27/2022    Procedure: COLONOSCOPY;  Surgeon: Aiden Pollard MD;  Location:  ENDOSCOPY    Cyst aspiration left      Cyst aspiration right      Hysterectomy      7/21    Laparoscopic cholecystectomy  11/14/2011    Lipoma removal  04/29/2022    left posterior    Mesh (implantable)      bladder    Neuromuscular stimulator      Oophorectomy Bilateral     Other      wisdom    Other      lt hand middle finger sx    Other surgical history  x2    total thyroidectomy    Other surgical history  x1    sinus    Other surgical history  10/11/2023    trial for neurostimulator    Other surgical history      Neuro Nerve Stimulator    Removal gallbladder      Sinus surgery        Sling oper stres incontinence  01/09/2014    Procedure: SLING UROLOGY;  Surgeon: Isac Sutherland MD;  [facility]    Thyroidectomy      Total abdom hysterectomy  07/2020   [4]   Family History  Problem Relation Age of Onset    Skin cancer Mother     Other (Other) Mother     Heart Disease Father     Hypertension Father     Asthma Father     Diabetes Father     Other (Other) Father     Heart Attack Father         x2    Other (Other) Sister         Crohn's disease    Crohn's Disease Sister     Ulcerative Colitis Sister     Other (Other) Sister         Celiac Disease    Diabetes Maternal Grandmother     Other (Other) Maternal Grandmother         lymphoma    Asthma Other         3 children all have asthma.    [5]   Social History  Socioeconomic History    Marital status:     Number of children: 3   Occupational History    Occupation:      Comment: [employer]venience    Tobacco Use    Smoking status: Former     Current packs/day:  0.00     Types: Cigarettes     Start date: 3/1/2017     Quit date: 3/1/2017     Years since quittin.1     Passive exposure: Never    Smokeless tobacco: Never    Tobacco comments:     non-smoker     Updated 24   Vaping Use    Vaping status: Never Used   Substance and Sexual Activity    Alcohol use: Yes     Comment: rare    Drug use: Yes     Comment: CBD,CBG  Doctors are fully aware    Sexual activity: Yes     Social Drivers of Health     Food Insecurity: No Food Insecurity (2025)    Food Insecurity     Food Insecurity: Never true   Transportation Needs: No Transportation Needs (2025)    Transportation Needs     Lack of Transportation: No   Housing Stability: Low Risk  (2025)    Housing Stability     Housing Instability: No

## 2025-05-13 ENCOUNTER — OFFICE VISIT (OUTPATIENT)
Dept: INTERNAL MEDICINE CLINIC | Facility: CLINIC | Age: 55
End: 2025-05-13
Payer: COMMERCIAL

## 2025-05-13 VITALS
RESPIRATION RATE: 18 BRPM | BODY MASS INDEX: 45.88 KG/M2 | WEIGHT: 243 LBS | HEIGHT: 61 IN | HEART RATE: 81 BPM | DIASTOLIC BLOOD PRESSURE: 76 MMHG | OXYGEN SATURATION: 93 % | SYSTOLIC BLOOD PRESSURE: 134 MMHG

## 2025-05-13 DIAGNOSIS — E78.5 DYSLIPIDEMIA: ICD-10-CM

## 2025-05-13 DIAGNOSIS — G47.33 OSA ON CPAP: ICD-10-CM

## 2025-05-13 DIAGNOSIS — Z51.81 THERAPEUTIC DRUG MONITORING: Primary | ICD-10-CM

## 2025-05-13 DIAGNOSIS — E11.9 TYPE 2 DIABETES MELLITUS WITHOUT COMPLICATION, WITH LONG-TERM CURRENT USE OF INSULIN (HCC): ICD-10-CM

## 2025-05-13 DIAGNOSIS — E03.9 HYPOTHYROIDISM, UNSPECIFIED TYPE: ICD-10-CM

## 2025-05-13 DIAGNOSIS — F43.9 STRESS: ICD-10-CM

## 2025-05-13 DIAGNOSIS — F41.9 ANXIETY: ICD-10-CM

## 2025-05-13 DIAGNOSIS — E66.01 OBESITY, MORBID, BMI 50 OR HIGHER (HCC): ICD-10-CM

## 2025-05-13 DIAGNOSIS — Z79.4 TYPE 2 DIABETES MELLITUS WITHOUT COMPLICATION, WITH LONG-TERM CURRENT USE OF INSULIN (HCC): ICD-10-CM

## 2025-05-13 PROCEDURE — 3075F SYST BP GE 130 - 139MM HG: CPT | Performed by: NURSE PRACTITIONER

## 2025-05-13 PROCEDURE — 99214 OFFICE O/P EST MOD 30 MIN: CPT | Performed by: NURSE PRACTITIONER

## 2025-05-13 PROCEDURE — 3008F BODY MASS INDEX DOCD: CPT | Performed by: NURSE PRACTITIONER

## 2025-05-13 PROCEDURE — 3078F DIAST BP <80 MM HG: CPT | Performed by: NURSE PRACTITIONER

## 2025-05-13 NOTE — PROGRESS NOTES
HISTORY OF PRESENT ILLNESS  Chief Complaint   Patient presents with    Weight Check     Down 9 lb (11/13/23 tele)     Catina Rivera is a 55 year old female here for follow up with medical weight loss program for the treatment of overweight, obesity, or morbid obesity.     Down 9 lbs follow-up from 11/2023 via telemedicine  Compliant on mounjaro 15 mg weekly (given by DM LILIA)  Tolerating well, helping with decreasing appetite and no side effects     Got R knee replacement in jan. 2025 and will be getting left knee in oct. This has truly changed her and ability to move greatly  Will ideally discharge tomorrow from ortho, will get back to swimming   Has been working with diabetes APRN currently doing Mounjaro 15 mg weekly  Is doing about <50 units per day of insulin -insulin pump  Reduced eating out, doesn't work at [employer] anymore  Exercise/Activity: PT 4-5 days per week   Nutrition: eating regular meals, +protein, minimal veggies. not tracking reports  Meals out per week on average: 1  Stress is manageable   Sleep: 7-8 hours/night, waking up feeling rested    Denies chest pain, shortness of breath, dizziness, blurred vision, headache, paresthesia, nausea/vomiting.     Breakfast Lunch Dinner Snacks Fluids   Reviewed              Wt Readings from Last 6 Encounters:   05/13/25 243 lb (110.2 kg)   05/09/25 246 lb (111.6 kg)   05/01/25 246 lb (111.6 kg)   04/03/25 246 lb (111.6 kg)   03/27/25 246 lb (111.6 kg)   03/05/25 246 lb (111.6 kg)          REVIEW OF SYSTEMS  GENERAL: feels well otherwise, denied any fevers chills or night sweats   LUNGS: denies shortness of breath  CARDIOVASCULAR: denies chest pain  GI: denies abdominal pain  MUSCULOSKELETAL:  +back pain, +multiple joint pains   PSYCH: denies change in behavior or mood, denies feeling sad or depressed    EXAM  /76   Pulse 81   Resp 18   Ht 5' 1\" (1.549 m)   Wt 243 lb (110.2 kg)   LMP 09/24/2019 (Approximate)   SpO2 93%   BMI 45.91 kg/m²        GENERAL: well developed, well nourished, in no apparent distress, A/O x3  SKIN: no rashes, no suspicious lesions  HEENT: atraumatic, normocephalic, OP-clear, PERRLA  NECK: supple, no adenopathy  LUNGS: CTA in all fields, breathing non labored  CARDIO: RRR without murmur  GI: +BS, NT/ND, no masses or HSM  EXTREMITIES: no cyanosis, no clubbing, no edema    Lab Results   Component Value Date     (H) 01/14/2025    BUN 14 01/14/2025    BUNCREA NOT APPLICABLE 03/23/2022    CREATSERUM 0.90 01/14/2025    ANIONGAP 7 01/14/2025    GFR 72 07/01/2017    GFRNAA 74 03/23/2022    GFRAA 85 03/23/2022    CA 9.2 01/14/2025    OSMOCALC 293 01/14/2025    ALKPHO 104 01/14/2025    AST 15 01/14/2025    ALT 20 01/14/2025    BILT 0.7 01/14/2025    TP 6.6 01/14/2025    ALB 4.3 01/14/2025    GLOBULIN 2.3 01/14/2025    AGRATIO 1.9 03/23/2022     01/14/2025    K 4.0 01/14/2025     01/14/2025    CO2 30.0 01/14/2025     Lab Results   Component Value Date     03/10/2025    A1C 5.9 (H) 03/10/2025     Lab Results   Component Value Date    CHOLEST 178 03/27/2025    TRIG 69 03/27/2025    HDL 61 (H) 03/27/2025     (H) 03/27/2025    VLDL 12 03/27/2025    TCHDLRATIO 2.8 03/23/2022    NONHDLC 117 03/27/2025     No results found for: \"B12\", \"VITB12\"  Lab Results   Component Value Date    VITD 44.2 10/25/2024       Medications Ordered Prior to Encounter[1]    ASSESSMENT/PLAN    ICD-10-CM    1. Therapeutic drug monitoring  Z51.81       2. Obesity, morbid, BMI 50 or higher ([facility])  E66.01       3. Type 2 diabetes mellitus without complication, with long-term current use of insulin (HCC)  E11.9     Z79.4       4. CORINNE on CPAP  G47.33       5. Dyslipidemia  E78.5       6. Stress  F43.9       7. Hypothyroidism, unspecified type  E03.9       8. Anxiety  F41.9           PLAN   Initial Weight Data and Goal Weight Loss:  Initial consult: # lbs on 8/2022  Weight Calculations  Initial Weight: 267 lbs  Initial Weight Date:  08/01/22  Today's Weight: 243 lbs  5% Goal: 13.35  10% Goal: 26.7  Total Weight Loss: 24 lbs  Total weight loss: Down 9 lbs total, Net loss 24 lbs  Continue with medications: mounjaro 15mg weekly (from DM APRN)  --advised of side effects and adverse effects of this medication  Contradictions: ozempic, avoid stimulant medication due to cardiac hx    Reviewed labs  Will continue with vitamin d OTC  HLD  stable, follows with PCP  Hx of DM type 2, reviewed last a1c 5.9% on 3/2025- has dexacom monitor- see hpi  CORINNE- continue with nightly CPAP machine  Anxiety/depression, stable  Chronic pain, see HPI  Hypothyroid- stable, continue with current medication regimen, managed by PCP  Wrote out macros and encouraged tracking food  Nutrition: Low carb diet, recommended to eat breakfast daily/ regular protein intake  Follow up with dietitian and psychologist as recommended.  Discussed the role of sleep and stress in weight management.  Counseled on comprehensive weight loss plan including attention to nutrition, exercise and behavior/stress management for success. See patient instruction below for more details.  Discussed strategies to overcome barriers to successful weight loss and weight maintenance  FITTE: ACSM recommendations (150-300 minutes/ week in active weight loss)   Weight Loss Consent to treat reviewed and signed.    Total time spent on chart review, pre-charting, obtaining history, counseling, and educating, reviewing labs was 30 minutes.       NOTE TO PATIENT: The 21st Century Cures Act makes clinical notes like these available to patients in the interest of transparency. Clinical notes are medical documents used by physicians and care providers to communicate with each other. These documents include medical language and terminology, abbreviations, and treatment information that may sound technical and at times possibly unfamiliar. In addition, at times, the verbiage may appear blunt or direct. These documents are  one tool providers use to communicate relevant information and clinical opinions of the care providers in a way that allows common understanding of the clinical context.     There are no Patient Instructions on file for this visit.    No follow-ups on file.    Patient verbalizes understanding.    LILIA Kulkarni             [1]   Current Outpatient Medications on File Prior to Visit   Medication Sig Dispense Refill    Rizatriptan Benzoate 10 MG Oral Tab Take 0.5 tablets (5 mg total) by mouth as needed for Migraine (max 20 mg per day). 10 tablet 0    cyclobenzaprine 10 MG Oral Tab Take 1 tablet (10 mg total) by mouth 2 (two) times daily. 30 tablet 0    PREGABALIN 50 MG Oral Cap TAKE 1 CAPSULE(50 MG) BY MOUTH THREE TIMES DAILY 90 capsule 0    ferrous sulfate 325 (65 FE) MG Oral Tab EC Take 1 tablet (325 mg total) by mouth daily with breakfast.      NOVOLOG 100 UNIT/ML Injection Solution INJECT UP TO 83 UNITS VIA INSULIN PUMP DAILY AS DIRECTED 75 mL 1    Continuous Glucose Sensor (DEXCOM G6 SENSOR) Does not apply Misc USE 1 SENSOR EVERY 10 DAYS 9 each 1    Continuous Glucose Transmitter (DEXCOM G6 TRANSMITTER) Does not apply Misc USE 1 TRANSMITTER EVERY 90 DAYS 1 each 2    traMADol 50 MG Oral Tab Take 1 tablet (50 mg total) by mouth every 8 (eight) hours as needed for Pain. 30 tablet 0    cholecalciferol 25 MCG (1000 UT) Oral Tab Take 1 tablet (1,000 Units total) by mouth daily.      Multiple Vitamin (MULTIVITAMINS OR) Take by mouth.      Magnesium Cl-Calcium Carbonate 71.5-119 MG Oral Tab EC Take 2 tablets by mouth daily.      atorvastatin 20 MG Oral Tab Take 1 tablet (20 mg total) by mouth nightly. 90 tablet 0    glucagon (GVOKE HYPOPEN 2-PACK) 1 MG/0.2ML Subcutaneous injection Inject 0.2 mL (1 mg total) into the skin as needed. If glucose less than 70 and cannot swallow anything by mouth. Patient trained on Gvoke. No substitutions 0.4 mL 1    MOUNJARO 15 MG/0.5ML Subcutaneous Solution Auto-injector Inject 15  mg into the skin once a week. 6 mL 0    Glucose Blood (BLOOD GLUCOSE TEST) In Vitro Strip Use to test BG 4x/day 400 strip 2    OneTouch Delica Lancets 33G Does not apply Misc 1 Lancet by Finger stick route in the morning, at noon, in the evening, and at bedtime. 400 each 3    Insulin Disposable Pump (OMNIPOD 5 [id number] PODS GEN 5) Does not apply Misc 1 each every 3 (three) days. 30 each 1    levothyroxine 125 MCG Oral Tab Take 1 tablet (125 mcg total) by mouth before breakfast. 90 tablet 1    OMEPRAZOLE 40 MG Oral Capsule Delayed Release TAKE 1 CAPSULE(40 MG) BY MOUTH DAILY 90 capsule 0    albuterol (PROAIR HFA) 108 (90 Base) MCG/ACT Inhalation Aero Soln Inhale 2 puffs into the lungs every 4 (four) hours as needed for Wheezing. 1 each 0    apixaban 5 MG Oral Tab Take 1 tablet (5 mg total) by mouth 2 (two) times daily.      BUPROPION  MG Oral Tablet 24 Hr TAKE 1 TABLET(300 MG) BY MOUTH DAILY 90 tablet 1    Calcium Carb-Cholecalciferol (CALCIUM + VITAMIN D3 OR) Take 1 tablet by mouth daily. Vitamin d 800 international unit and Calcium 600 mg      PARoxetine HCl 40 MG Oral Tab Take 1 tablet (40 mg total) by mouth every morning. 90 tablet 3    Insulin Glargine, 2 Unit Dial, (TOUJEO MAX SOLOSTAR) 300 UNIT/ML Subcutaneous Solution Pen-injector USE AS DIRECTED UP TO 75 UNITS DAILY IN THE EVENT OF INSULIN PUMP FAILURE 6 mL 0    Blood Glucose Monitoring Suppl (ONETOUCH VERIO FLEX SYSTEM) w/Device Does not apply Kit Use to test blood sugar 1x daily as needed to calibrate dexcom 1 kit 0    Insulin Disposable Pump (OMNIPOD 5 G6 INTRO, GEN 5,) Does not apply Kit 1 each every 3 (three) days. 1 kit 0    dilTIAZem HCl ER Beads 180 MG Oral Capsule SR 24 Hr Take 1 capsule (180 mg total) by mouth 2 (two) times daily.      estradiol 0.05 MG/24HR Transdermal Patch Weekly APPLY 1 PATCH EXTERNALLY TO THE SKIN EVERY TUESDAY. DO NOT CUT PATCH       No current facility-administered medications on file prior to visit.      given to Security

## 2025-05-13 NOTE — PATIENT INSTRUCTIONS
Next steps:  1.  Fill your prescribed medication and take as discussed and prescribed: mounjaro 15mg weekly   2.  Schedule a personal nutrition consultation with one of our registered dieticians     Please try to work on the following dietary changes:  Daily protein recommendation to start:  grams  Daily carbohydrate: <145g  Daily calories: 1,600-1,700  1.  Drink water with meals and throughout the day, cut down on soda and/or juice if consumed. Consider flavored water options like Bubbly, Spindrift, Hint and Pilar.  2.  Eat breakfast daily and focus on having protein with each meal, examples include: greek yogurt, cottage cheese, hard boiled egg, whole grain toast with peanut butter.   3.  Reduce refined carbohydrates and sugars which includes items such as sweets, as well as rice, pasta, and bread and make sure to choose whole grain options when having them with just 1 serving per meal about the size of your inner palm.  4.  Consume non starchy veggies daily working towards making them a good 50% of your daily food intake. Add them to lunch and dinner consistently.  5.  Start a daily probiotic: VSL#3 is recommended, (order on line at www.vsl3.com). Take 1 capsule daily with water for 30 days, then reduce to 1 every other day (this will reduce the cost). Capsules can be left out for 2 weeks, but then must be refrigerated.      Please download uzma My Fitness Pal, LoseIt! Or Net Diary to monitor daily dietary intake and you will be able to see if you are eating the right amount of calories or too much or too little which would hinder weight loss. Additionally this will help to see your daily carbohydrate and protein intake. When you set the uzma up choose 1-2 lbs/week as a goal.  Keeping a paper food journal is an option as well to remain accountable for your choices- this is the start to mindful eating! A low calorie diet has been consistently shown to support weight loss.     Continue or start exercising to  help establish a routine. If not already exercising begin with 1 day and progress as able with long-term goal of 30 minutes 5 days a week at a minimum.     Meditation daily can help manage and control stress. Chronic stress can make weight loss difficult.  Exercising is one way to help with stress, but meditation using the CALM Shaniqua or another comparable alternative can be done in your home or place of work with little time commitment. This Shaniqua can also help work on behavior change and improve sleep. Check out the segment under Calm Masterclass and listen to The 4 Pillars of Health. A great way to begin learning about the foundation of lifestyle with practical tips to use in your every day.     Check out www.yourweightmatters.org blog for continued daily support and education along this weight loss journey!    Patient Resources:     Personal Training/Fitness Classes/Health Coaching     [facility] @ [email or web address] Full fitness center with group fitness and personal training. Discount available as client of [facility].  Health Coaching and Personal Training with Catina Stevens at our [facility]- individual weekly coaching with option to add personal training and small group fitness classes targeted at weight loss- [phone] and/or email @ [email or web address]  360FIT [employer] [email or web address] Group Fitness [phone] and/or email Brooklynn at [email or web address]  Franchospitalsed Fitness Centers with multiple locations: BBOXX (www.[employer]), Eat The COMARCO Fitness (www.Meliuz.[employer]), Fit Body Bootcamp (www.PharminexbodyboDirectPhotonics Industriesp.[employer]), Mobile Posse (www.Bluefly.[employer]), The Exercise  (www.exercisecoach.[employer])     Online Fitness  Fitness  on Utube  Fit in 10 DVD series- www.dcbte63CQL.com  Sit and Be Fit - Chair exercise series Www.sitandbefit.org  Hip  Hop Fit with Siva Loya at www.hiphopfit.net     Apps for on the Go Fitness  [place] 7 Minute Workout (orange box with white 7) - free on the go HIIT training shaniqua  Peloton Shaniqua @ www.onepeloton.com     Nutrition Trackers and Tools  LoseIT! And My Fitness Pal apps and on line for tracking nutrition  NOOM - virtual health coaching  FitFoundation (healthy meals on the go) in [place] @ www.xstzfhdkmwwku0p.[place] [employer]  Bistro MD @ Medivance.bistromd.com and Jblhdm74 (keto and low carb plans recommended) @ www.gstamk61.com, Metabolic Meals @ www.MyMetabolicMeals.com - individual prepared meals to go  Gobble, Blue Apron, Home , Every Plate, Sunbasket- on line meal delivery programs for preparation at home  Meal Village in [place] for homemade meals to go @ www.mealvillage.[place] [employer]  Diet Doctor @ www.dietdoctor.[place] [employer] - low carb swaps  YuTechpacker - meal prep and planning shaniqua (www.yummly.com)     Stress Management/Behavior/Mindful Eating  CALM meditation shaniqua (www.calm.com)  Headspace  Am I Hungry? Mindful eating virtual  shaniqua  Www.yourweightmatters.org - Obesity Action Coalition sponsored Blog posts daily  Motivation shaniqua (black box with white \")- daily supportive messages sent to your phone     Books/Video Education/Podcasts  Mindless Eating by Beny Watkins  Why We Get Sick by Hermes Winter (a book about insulin resistance)  Atomic Habits by Du Gil (a book about taking small steps to promote greater behavior change)   Can't Hurt Me by Robert Frye (a book exploring the power of discipline in achieving your goals)  The End of Dieting: How to Live for Life by Dr. Navarro Hoffmann M.D. or listen to The [employer] Podcast Episode 63: Understanding \"Nutritarian\" Eating w/Dr. Navarro Hoffmann  Your Body in Balance: The New Science of Food, Hormones, and Health by Dr. Pepe Lawson  The Menopause Diet Plan by Yoselin Britt and Diane Barlow  The Complete Guide to fasting by Dr. Denson  Sugar, Salt & Fat by Ruma Baig, Ph.D, R.D.  Weight  Loss Surgery Will Not Treat Food Addiction by Tawanna Rose Ph.D  The Game Changers- "[employer]we[employer]"ix Documentary on plant based nutrition  Fed Up - documentary about obesity (Free on Utube)  The Truth About Sugar - documentary on sugar (Free on Utube, https://youTuneGOu.be/8Q8yfyrKR2f)  The Dr. Sidhu T5 Wellness Plan by Dr. Husam Sidhu MD  Fitlosophy Fitspiration - journal to better health (found at [employer] in fitness aisle)  What Happened to You?- a look at the impact trauma has on behavior written by Valeria Saleem and Dr. Shankar Maria  Whole Again by Kam Vega - discovering your true self after trauma  Ray Metcalf talk on [employer], The Call to Courage  Podcasts: The Exam Room by the Physician's Committee, Nutrition Facts by Dr. Thapa    We are here to support you with weight loss, but please remember that you still need your primary care provider for your routine health maintenance.

## 2025-05-14 ENCOUNTER — APPOINTMENT (OUTPATIENT)
Dept: PHYSICAL THERAPY | Age: 55
End: 2025-05-14
Attending: STUDENT IN AN ORGANIZED HEALTH CARE EDUCATION/TRAINING PROGRAM
Payer: COMMERCIAL

## 2025-05-21 ENCOUNTER — OFFICE VISIT (OUTPATIENT)
Dept: PHYSICAL THERAPY | Age: 55
End: 2025-05-21
Attending: STUDENT IN AN ORGANIZED HEALTH CARE EDUCATION/TRAINING PROGRAM
Payer: COMMERCIAL

## 2025-05-21 PROCEDURE — 97110 THERAPEUTIC EXERCISES: CPT

## 2025-05-21 PROCEDURE — 97530 THERAPEUTIC ACTIVITIES: CPT

## 2025-05-21 PROCEDURE — 97112 NEUROMUSCULAR REEDUCATION: CPT

## 2025-05-21 NOTE — PROGRESS NOTES
Discharge Summary  Pt has attended 16 visits in Physical Therapy.       Patient: Catina Rivera (55 year old, female) Referring Provider:  Insurance:   Diagnosis: Primary osteoarthritis of right knee (M17.11)  S/P total knee arthroplasty, right (Z96.651) [facility]   Date of Surgery: 1/29/25 Next MD visit:  N/A   Precautions:  None 3/13/25 Referral Information:    Date of Evaluation: Req: 17, Auth: 17, Exp: 5/31/2025 02/18/25 POC Auth Visits:  17       Today's Date   5/21/2025    Subjective  Patient reports she feels that the remaining deficits she has are mostly strength/endurance that will come with time, but is otherwise able to do everything she needs to on a daily basis.       Pain: 0/10     Objective    Hip       4/21/2025 5/21/2025   Hip ROM/MMT   Rt Hip Flexion MMT (L2) 4/5 4/5 -back pain limiting   Lt Hip Flexion MMT (L2) 4-/5 4+/5   Rt Hip Extension MMT 4/5 4+/5   Lt Hip Extension MMT 4-/5 4/5   Rt Hip Abduction MMT 4/5 5/5   Lt Hip Abduction MMT 4-/5 4+/5   , Knee       4/2/2025 4/21/2025 5/21/2025   Knee ROM/MMT   Rt Knee Flexion 120 125 127   Lt Knee Flexion 120 120 124   Rt Knee Flexion MMT 5/5 5/5 5/5   Lt Knee Flexion MMT 4+/5* 5/5 5/5*   Rt Knee Extension (L3) 0 0 0   Lt Knee Extension (L3) 0 0 0   Rt Knee Extension MMT (L3) 5/5 5/5 5/5   Lt Knee Extension MMT (L3) 5/5* 5/5 5/5*     Post LEFS Score  Post LEFS Score: 86.25 % (5/21/2025  9:19 AM)    52.5 % improvement       Assessment  Patient has attended 16 visits following (R)TKR surgery 1/29/25 demonstrating excellent progressions in ROM and strength gains despite left knee/leg limitations, and limited progressions made in home health directly following surgery (2 weeks prior to outpatient care began). Patient would benefit from self management/continued progressions for RLE, and maintenance program for LLE pending TKR fall of 2025. Patient appropriate to DC at this time until then.    Goals (to be met in 16 visits)   Short-Term  Goals:  Patient will demonstrates knee flexion AROM to 90deg pain-free to facilitate sitting comfortably for 2 hours daily for community integration. Timeframe: 4 visits.     Patient will improve knee flexion AROM to 120deg to allow for reciprocal stair navigation pattern for community integration. Timeframe: 8 visits.    The patient will improve quad strength and single-leg stance stability to demonstrate non-antalgic gait pattern with walking short-length distances for household ambulation. Timeframe: 8 visits. Partially Met, SLS still limited but pain free 05/21    Patient will improve knee strength and stability to facilitate discontinued use of AD for daily ambulation. Timeframe: 8 visits. Met    Patient will improve LE strength and stability to facilitate navigating down the stairs reciprocally with SP cane. Timeframe: 8 visits. Met  Long-Term Goals:    The patient will improve LE strength and endurance to facilitate standing for extended periods of time for 3-4 hours daily for community integration and occupational demands. Timeframe: 12 visits. Not Met - LLE limitations    The patient will improve LE strength and endurance to facilitate walking distances of 1-2 mile(s) daily for community integration and occupational demands. Timeframe: 12 visits. Met     Patient will improve lower motor control to perform double-leg squat with symmetrical loading, without asymmetries, and with proper posterior chain loading to facilitate squatting and lifting ADL's. Timeframe: 12 visits. Not Met 05/21    Patient will improve LE mobility, strength, and single-leg stabilization to meet strength requirements for reciprocal stair navigation pattern with no asymmetries for ascending and descending 3 flights of stairs daily for community integration. Timeframe: 16 visits. Not Met 05/21    Patient will improve LEFS score by at least 9 points to indicate a true change in improved function for ADL's and restoring PLF. Timeframe: 16  visits. Met 05/21       Plan  DC to final HEP; maintain L strength leading to replacement in the fall, progress functional strength of RLE independently    Treatment Last 4 Visits  Treatment Day: 16 4/21/2025 4/28/2025 5/7/2025 5/21/2025   LE Treatment   Therapeutic Exercise Reassess  Standing Quad Stretch with Chair 2 x 30\" ea R/L   Calf in Cabinet Stretch 2 x 30\" ea R/L   Lunging Calf Stretch (Gastroc) 30\" ea R/L  Lunging Calf Stretch (Soleus) 30\" ea R/L  DLHR 2 x 10  Calf Stretch in Cabinet 2 x 30\" ea R/L   Standing Quad Stretch 2 x 30\" ea R/L   Prone Quad Mob with Ball x 20 (R)  HS Mob with Ball x 20 (R)  Seated HS Stretch 2 x 30\" ea R/L  Long Sitting Calf Stretch with Strap 2 x 30\"   Lunging Calf Stretch (Gastroc) 30\" ea R/L  Lunging Calf Stretch (Soleus) 30\" ea R/L  DLHR 2 x 10 - cues for \"big toe\" and avoid momentum/rocking fwd  Attempted** sidestepping with loop band; not tolerated due to left knee  Calf Mob with Ball (R) x 3 cycles   Calf Stretch in Cabinet 2 x 30\" ea R/L  Lunging Calf Stretch (Soleus) 30\" ea R/L  DLHR 2 x 10 - cues for \"big toe\" and avoid momentum/rocking fwd  Reassess   LEFS      Neuro Re-Education    SLS 3 x 30\" (R)     Manual Therapy Cupping (H/L): Distal Quads, Distal ITBand,  Cupping (Prone): HS, Distal ITBand, Gastroc, Peroneals (R) TrPR (Supine): Hip Flexors, Quads (R)  STM (Supine): Proximal Calf, Lower HS, Adductors     Therapeutic Activity FSU 6\" x 10 (R)  Lateral Heel Taps x 10 (R) FSU 8\" - too difficult, requires HHA to PULL up to step  FSU 6\" - too easy   Staggered STS (R Bias) x 10  Lateral Heel Taps 4\" 2 x 10 (R)  Staggered STS (R Bias) x 10  FSU 8\" x 10 (R)   Therapeutic Exercise Minutes 23 13 23 25   Neuro Re-Educ Minutes    8   Manual Therapy Minutes 8 15 10    Therapeutic Activity Minutes 8 10 8 8   Total Time Of Timed Procedures 39 38 41 41   Total Time Of Service-Based Procedures 0 0 0 0   Total Treatment Time 39 38 41 41   HEP   Access Code: [email or web address]  URL:  [email or web address]  Date: 04/04/2025  Prepared by: Imaging3     Exercises  - Sidestepping  - 1 x daily - 7 x weekly - 1 sets - 20 reps  - Step Up  - 1 x daily - 7 x weekly - 1 sets - 10 reps  - Quadricep Stretch with Chair and Counter Support  - 1 x daily - 7 x weekly - 3 sets - 30s hold  - Heel Raises with Counter Support  - 1 x daily - 7 x weekly - 2 sets - 10 reps  - Calf in Cabinet Stretch   - 1 x daily - 7 x weekly - 3 sets - 30s hold  - Forward Monster Walks  - 1 x daily - 7 x weekly - 1 sets - 20 reps Access Code: [email or web address]  URL: [email or web address]  Date: 05/21/2025  Prepared by: Imaging3    Exercises  - Step Up  - 1 x daily - 7 x weekly - 2 sets - 10 reps  - Heel Raises with Counter Support  - 1 x daily - 7 x weekly - 2 sets - 10 reps  - Calf in Cabinet Stretch   - 1 x daily - 7 x weekly - 3 sets - 30s hold  - Standing Single Leg Stance with Counter Support  - 1 x daily - 7 x weekly - 3 sets - 30s hold  - Hooklying Clamshell with Resistance  - 1 x daily - 7 x weekly - 2 sets - 10 reps (R) & (L)      - Active Straight Leg Raise with Quad Set  - 1 x daily - 7 x weekly - 2 sets - 10 reps (L)  - Prone Hip Extension  - 1 x daily - 7 x weekly - 2 sets - 10 reps (L)        HEP  Access Code: [email or web address]  URL: [email or web address]  Date: 05/21/2025  Prepared by: PeekYouo    Exercises  - Step Up  - 1 x daily - 7 x weekly - 2 sets - 10 reps  - Heel Raises with Counter Support  - 1 x daily - 7 x weekly - 2 sets - 10 reps  - Calf in Cabinet Stretch   - 1 x daily - 7 x weekly - 3 sets - 30s hold  - Standing Single Leg Stance with Counter Support  - 1 x daily - 7 x weekly - 3 sets - 30s hold  - Hooklying Clamshell with Resistance  - 1 x daily - 7 x weekly - 2 sets - 10 reps (R) & (L)      - Active Straight Leg Raise with Quad Set  - 1 x daily - 7 x weekly - 2 sets - 10 reps (L)  - Prone Hip Extension  - 1 x daily - 7 x weekly - 2 sets - 10 reps  (L)    Charges     Ther Ex x 2, Neuro x 1, Ther Act x 1

## 2025-05-22 ENCOUNTER — PATIENT MESSAGE (OUTPATIENT)
Facility: CLINIC | Age: 55
End: 2025-05-22

## 2025-05-22 RX ORDER — AMOXICILLIN 500 MG/1
CAPSULE ORAL
Qty: 4 CAPSULE | Refills: 3 | Status: SHIPPED | OUTPATIENT
Start: 2025-05-22

## 2025-05-22 NOTE — TELEPHONE ENCOUNTER
DOS 1/29/25, right knee  LOV  5/1/25    Needs letter releasing her from your perspective to have dental work.   Letter pended. On letter's signing, will release to [facility] and fax to dentist (fax#[phone].)    Needs pre-dental abx. Pended as well.     Needs:   -letter  -abx

## 2025-05-27 ENCOUNTER — PATIENT MESSAGE (OUTPATIENT)
Dept: FAMILY MEDICINE CLINIC | Facility: CLINIC | Age: 55
End: 2025-05-27

## 2025-05-27 DIAGNOSIS — M72.2 PLANTAR FASCIITIS: ICD-10-CM

## 2025-05-27 DIAGNOSIS — M17.11 PRIMARY OSTEOARTHRITIS OF RIGHT KNEE: Primary | ICD-10-CM

## 2025-05-27 DIAGNOSIS — M15.1 DEGENERATIVE ARTHRITIS OF DISTAL INTERPHALANGEAL JOINT OF MIDDLE FINGER OF LEFT HAND: ICD-10-CM

## 2025-05-27 DIAGNOSIS — F41.9 ANXIETY: ICD-10-CM

## 2025-05-27 NOTE — TELEPHONE ENCOUNTER
Last refill; 12/02/24  Qty 90  W/ 1 refills  Last ov: 05/09/25    Requested Prescriptions     Pending Prescriptions Disp Refills    BUPROPION  MG Oral Tablet 24 Hr [Pharmacy Med Name: BUPROPION XL 300MG TABLETS] 90 tablet 1     Sig: TAKE 1 TABLET(300 MG) BY MOUTH DAILY     Future Appointments   Date Time Provider Department Center   6/2/2025  3:00 PM Ashish Horvath MD [facility]   6/5/2025  5:00 PM Yessenia Muñiz LCPC [facility]   8/14/2025  8:40 AM YK RAJAN RM1 [facility] [place]   11/4/2025  4:00 PM Pricila Shi APRN [facility] [address]

## 2025-05-28 RX ORDER — BUPROPION HYDROCHLORIDE 300 MG/1
300 TABLET ORAL DAILY
Qty: 90 TABLET | Refills: 1 | Status: SHIPPED | OUTPATIENT
Start: 2025-05-28

## 2025-05-28 NOTE — TELEPHONE ENCOUNTER
New referral placed for  Dr Curtis Daly with [facility]. His phone number is [phone].  And Dr Olivia Altamirano with [facility] podiatry. Her phone number is [phone].

## 2025-05-29 ENCOUNTER — TELEPHONE (OUTPATIENT)
Facility: CLINIC | Age: 55
End: 2025-05-29

## 2025-05-29 ENCOUNTER — TELEPHONE (OUTPATIENT)
Dept: ORTHOPEDICS CLINIC | Facility: CLINIC | Age: 55
End: 2025-05-29

## 2025-05-29 DIAGNOSIS — M79.671 RIGHT FOOT PAIN: Primary | ICD-10-CM

## 2025-05-29 DIAGNOSIS — M79.644 PAIN OF FINGER OF RIGHT HAND: Primary | ICD-10-CM

## 2025-05-29 NOTE — TELEPHONE ENCOUNTER
Patient scheduled on via Mychart for right foot planter fasciitis.    Future Appointments   Date Time Provider Department Center   6/2/2025  3:00 PM Ashish Horvath MD [facility]   6/11/2025  8:20 AM Olivia Altamirano, GIULIA [facility]   7/18/2025 11:45 AM [id number]   8/14/2025  8:40 AM [facility]   11/4/2025  4:00 PM Pricila Shi APRN [facility] [address]     Please advise if imaging is needed.

## 2025-05-29 NOTE — TELEPHONE ENCOUNTER
Established pt appt for Pinky finger on rt hand pain similar to the other 2 fingers. no imaging avail   Future Appointments   Date Time Provider Department Center   6/3/2025  3:15 PM Ashish Horvath MD [facility]   6/11/2025  8:20 AM Olivia Altamirano DPM [facility] ORTHO 75 [facility]   7/18/2025 11:45 AM [id number]   8/14/2025  8:40 AM [facility]   11/4/2025  4:00 PM Pricila Shi APRN [facility] EM[facility] [address]

## 2025-05-30 ENCOUNTER — HOSPITAL ENCOUNTER (OUTPATIENT)
Dept: GENERAL RADIOLOGY | Age: 55
Discharge: HOME | End: 2025-05-30
Attending: ORTHOPAEDIC SURGERY
Payer: COMMERCIAL

## 2025-05-30 DIAGNOSIS — M79.644 PAIN OF FINGER OF RIGHT HAND: ICD-10-CM

## 2025-05-30 PROCEDURE — 73140 X-RAY EXAM OF FINGER(S): CPT | Performed by: ORTHOPAEDIC SURGERY

## 2025-06-01 ENCOUNTER — HOSPITAL ENCOUNTER (EMERGENCY)
Age: 55
Discharge: HOME | End: 2025-06-01
Attending: EMERGENCY MEDICINE
Payer: COMMERCIAL

## 2025-06-01 ENCOUNTER — APPOINTMENT (OUTPATIENT)
Dept: CT IMAGING | Age: 55
End: 2025-06-01
Attending: EMERGENCY MEDICINE
Payer: COMMERCIAL

## 2025-06-01 ENCOUNTER — HOSPITAL ENCOUNTER (OUTPATIENT)
Age: 55
Discharge: ANOTHER HOSPITAL | End: 2025-06-01
Payer: COMMERCIAL

## 2025-06-01 VITALS
SYSTOLIC BLOOD PRESSURE: 131 MMHG | HEART RATE: 72 BPM | BODY MASS INDEX: 45.27 KG/M2 | OXYGEN SATURATION: 99 % | DIASTOLIC BLOOD PRESSURE: 67 MMHG | TEMPERATURE: 98 F | RESPIRATION RATE: 16 BRPM | HEIGHT: 62 IN | WEIGHT: 246 LBS

## 2025-06-01 VITALS
HEART RATE: 80 BPM | WEIGHT: 246 LBS | RESPIRATION RATE: 18 BRPM | BODY MASS INDEX: 46 KG/M2 | DIASTOLIC BLOOD PRESSURE: 70 MMHG | SYSTOLIC BLOOD PRESSURE: 130 MMHG | OXYGEN SATURATION: 96 % | TEMPERATURE: 99 F

## 2025-06-01 DIAGNOSIS — H81.10 BENIGN PAROXYSMAL POSITIONAL VERTIGO, UNSPECIFIED LATERALITY: Primary | ICD-10-CM

## 2025-06-01 DIAGNOSIS — H93.11 CLICKING TINNITUS OF RIGHT EAR: ICD-10-CM

## 2025-06-01 DIAGNOSIS — R42 DIZZINESS: Primary | ICD-10-CM

## 2025-06-01 LAB
ALBUMIN SERPL-MCNC: 4.4 G/DL (ref 3.2–4.8)
ALBUMIN/GLOB SERPL: 2.1 {RATIO} (ref 1–2)
ALP LIVER SERPL-CCNC: 108 U/L (ref 41–108)
ALT SERPL-CCNC: 18 U/L (ref 10–49)
ANION GAP SERPL CALC-SCNC: 8 MMOL/L (ref 0–18)
AST SERPL-CCNC: 13 U/L (ref ?–34)
ATRIAL RATE: 66 BPM
BASOPHILS # BLD AUTO: 0.04 X10(3) UL (ref 0–0.2)
BASOPHILS NFR BLD AUTO: 0.5 %
BILIRUB SERPL-MCNC: 0.3 MG/DL (ref 0.3–1.2)
BUN BLD-MCNC: 13 MG/DL (ref 9–23)
CALCIUM BLD-MCNC: 9.2 MG/DL (ref 8.7–10.6)
CHLORIDE SERPL-SCNC: 106 MMOL/L (ref 98–112)
CO2 SERPL-SCNC: 26 MMOL/L (ref 21–32)
CREAT BLD-MCNC: 0.77 MG/DL (ref 0.55–1.02)
EGFRCR SERPLBLD CKD-EPI 2021: 92 ML/MIN/1.73M2 (ref 60–?)
EOSINOPHIL # BLD AUTO: 0.14 X10(3) UL (ref 0–0.7)
EOSINOPHIL NFR BLD AUTO: 1.6 %
ERYTHROCYTE [DISTWIDTH] IN BLOOD BY AUTOMATED COUNT: 13.2 %
GLOBULIN PLAS-MCNC: 2.1 G/DL (ref 2–3.5)
GLUCOSE BLD-MCNC: 140 MG/DL (ref 70–99)
HCT VFR BLD AUTO: 39 % (ref 35–48)
HGB BLD-MCNC: 13.4 G/DL (ref 12–16)
IMM GRANULOCYTES # BLD AUTO: 0.03 X10(3) UL (ref 0–1)
IMM GRANULOCYTES NFR BLD: 0.3 %
LYMPHOCYTES # BLD AUTO: 2.8 X10(3) UL (ref 1–4)
LYMPHOCYTES NFR BLD AUTO: 31.7 %
MCH RBC QN AUTO: 29.5 PG (ref 26–34)
MCHC RBC AUTO-ENTMCNC: 34.4 G/DL (ref 31–37)
MCV RBC AUTO: 85.7 FL (ref 80–100)
MONOCYTES # BLD AUTO: 0.6 X10(3) UL (ref 0.1–1)
MONOCYTES NFR BLD AUTO: 6.8 %
NEUTROPHILS # BLD AUTO: 5.23 X10 (3) UL (ref 1.5–7.7)
NEUTROPHILS # BLD AUTO: 5.23 X10(3) UL (ref 1.5–7.7)
NEUTROPHILS NFR BLD AUTO: 59.1 %
OSMOLALITY SERPL CALC.SUM OF ELEC: 292 MOSM/KG (ref 275–295)
P AXIS: 65 DEGREES
P-R INTERVAL: 154 MS
PLATELET # BLD AUTO: 285 10(3)UL (ref 150–450)
POTASSIUM SERPL-SCNC: 3.8 MMOL/L (ref 3.5–5.1)
PROT SERPL-MCNC: 6.5 G/DL (ref 5.7–8.2)
Q-T INTERVAL: 392 MS
QRS DURATION: 80 MS
QTC CALCULATION (BEZET): 410 MS
R AXIS: 20 DEGREES
RBC # BLD AUTO: 4.55 X10(6)UL (ref 3.8–5.3)
SODIUM SERPL-SCNC: 140 MMOL/L (ref 136–145)
T AXIS: 43 DEGREES
VENTRICULAR RATE: 66 BPM
WBC # BLD AUTO: 8.8 X10(3) UL (ref 4–11)

## 2025-06-01 PROCEDURE — 99215 OFFICE O/P EST HI 40 MIN: CPT | Performed by: NURSE PRACTITIONER

## 2025-06-01 PROCEDURE — 96360 HYDRATION IV INFUSION INIT: CPT

## 2025-06-01 PROCEDURE — 85025 COMPLETE CBC W/AUTO DIFF WBC: CPT | Performed by: PHYSICIAN ASSISTANT

## 2025-06-01 PROCEDURE — 99284 EMERGENCY DEPT VISIT MOD MDM: CPT

## 2025-06-01 PROCEDURE — 80053 COMPREHEN METABOLIC PANEL: CPT | Performed by: PHYSICIAN ASSISTANT

## 2025-06-01 PROCEDURE — 93000 ELECTROCARDIOGRAM COMPLETE: CPT | Performed by: NURSE PRACTITIONER

## 2025-06-01 PROCEDURE — 70450 CT HEAD/BRAIN W/O DYE: CPT | Performed by: EMERGENCY MEDICINE

## 2025-06-01 RX ORDER — PREDNISONE 20 MG/1
40 TABLET ORAL DAILY
Qty: 6 TABLET | Refills: 0 | Status: SHIPPED | OUTPATIENT
Start: 2025-06-01 | End: 2025-06-04

## 2025-06-01 RX ORDER — MECLIZINE HYDROCHLORIDE 25 MG/1
25 TABLET ORAL 3 TIMES DAILY PRN
Qty: 20 TABLET | Refills: 0 | Status: SHIPPED | OUTPATIENT
Start: 2025-06-01

## 2025-06-01 RX ORDER — MECLIZINE HYDROCHLORIDE 25 MG/1
25 TABLET ORAL ONCE
Status: COMPLETED | OUTPATIENT
Start: 2025-06-01 | End: 2025-06-01

## 2025-06-01 NOTE — ED INITIAL ASSESSMENT (HPI)
Patient reports vertigo since yesterday morning that started around 11am while she was getting a manicure, worse when lying down, took dramamine yesterday with some relief, denies nausea or vomiting, frequent right ear \"popping\" over the past few days, pt taking Zyrtec, Benadryl, and Flonase nightly as recommended by her PCP

## 2025-06-01 NOTE — ED PROVIDER NOTES
Patient Seen in: [facility]        History  Chief Complaint   Patient presents with    Dizziness     Stated Complaint: dizziness    Subjective:   HPI            Patient is a 55-year-old female with A-fib, asthma, depression, diabetes, here today with complaints of a sudden onset of room spinning dizziness that started yesterday.  Patient reports that the symptoms are worse when she is lying down.  She reports that if she looks 1 way or the other the room starts spinning as well.  She reports that this is worse than she has had in the past.  She reports that she had vertigo, diagnosed 20 years ago however this is different than she remembers.  She denies any fevers, chills, nausea, vomiting.      Objective:     Past Medical History:    Anxiety    Anxiety state, unspecified    Arrhythmia    a fib    Asthma (HCC)    Atrial fibrillation with RVR (HCC)    Back pain    Back problem    Bad breath    Belching    Bloating    Decorative tattoo    Depression    Diabetes (HCC)    per pt    Disorder of thyroid    Esophageal reflux    GERD (gastroesophageal reflux disease)    Headache(784.0)    Heart palpitations    Afib    Heartburn    I have taken medication for over 10years    High cholesterol    History of depression    Hypothyroidism    Irregular bowel habits    Leaking of urine    Lipoma of unspecified site    Multiple thyroid nodules    on rt,2011, lt.-2008    Nontoxic multinodular goiter    Nontoxic multinodular goiter    Obesity    Other and unspecified hyperlipidemia    Pain in joints    Pain in right shin    Pain with bowel movements    Painful swallowing    Not consistent but not due to illness    Plantar fasciitis    Sleep apnea     CPAP     Stress    Thyroiditis, unspecified    Type 1 diabetes mellitus (HCC)    Uncomfortable fullness after meals    Visual impairment    Wears glasses    Weight loss    Working with weight loss clinic              Past Surgical History:   Procedure Laterality Date     Arthroscopy of joint unlisted Bilateral     knee    Carpal tunnel release Bilateral     Cholecystectomy      Colonoscopy N/A 10/21/2016    Procedure: COLONOSCOPY;  Surgeon: Brandt Ramirez DO;  Location:  ENDOSCOPY    Colonoscopy N/A 2022    Procedure: COLONOSCOPY;  Surgeon: Aiden Pollard MD;  Location:  ENDOSCOPY    Cyst aspiration left      Cyst aspiration right      Hysterectomy          Laparoscopic cholecystectomy  2011    Lipoma removal  2022    left posterior    Mesh (implantable)      bladder    Neuromuscular stimulator      Oophorectomy Bilateral     Other      wisdom    Other      lt hand middle finger sx    Other surgical history  x2    total thyroidectomy    Other surgical history  x1    sinus    Other surgical history  10/11/2023    trial for neurostimulator    Other surgical history      Neuro Nerve Stimulator    Removal gallbladder      Sinus surgery        Sling oper stres incontinence  2014    Procedure: SLING UROLOGY;  Surgeon: Isac Sutherland MD;  [facility]    Thyroidectomy      Total abdom hysterectomy  2020                Social History     Socioeconomic History    Marital status:     Number of children: 3   Occupational History    Occupation:      Comment: [employer]venience    Tobacco Use    Smoking status: Former     Current packs/day: 0.00     Types: Cigarettes     Start date: 3/1/2017     Quit date: 3/1/2017     Years since quittin.2     Passive exposure: Never    Smokeless tobacco: Never    Tobacco comments:     non-smoker     Updated 24   Vaping Use    Vaping status: Never Used   Substance and Sexual Activity    Alcohol use: Yes     Comment: rare    Drug use: Yes     Comment: CBD,CBG  Doctors are fully aware    Sexual activity: Yes     Social Drivers of Health     Food Insecurity: No Food Insecurity (2025)    Food Insecurity     Food Insecurity: Never true   Transportation Needs: No  Transportation Needs (1/29/2025)    Transportation Needs     Lack of Transportation: No   Housing Stability: Low Risk  (1/29/2025)    Housing Stability     Housing Instability: No              Review of Systems    Positive for stated complaint: dizziness  Other systems are as noted in HPI.  Constitutional and vital signs reviewed.      All other systems reviewed and negative except as noted above.                  Physical Exam    ED Triage Vitals [06/01/25 1104]   /70   Pulse 80   Resp 18   Temp 98.9 °F (37.2 °C)   Temp src Oral   SpO2 96 %   O2 Device None (Room air)       Current Vitals:   Vital Signs  BP: 130/70  Pulse: 80  Resp: 18  Temp: 98.9 °F (37.2 °C)  Temp src: Oral    Oxygen Therapy  SpO2: 96 %  O2 Device: None (Room air)            Physical Exam  VS: Vital signs reviewed. O2 saturation within normal limits for this patient     General: Patient is awake and alert, oriented to person, place and time. Not in acute distress.      HEENT: Head is normocephalic atraumatic. Pupils reactive bilaterally.  EOMs intact.  No facial droop or slurred speech.  No oral pallor. Mucous membranes moist.      Neck: No cervical lymphadenopathy. No stridor. Supple. No meningsmus.      Heart: S1-S2.  Regular rate and rhythm.       Lungs: good inspiratory effort. +air entry bilaterally without wheezes, rhonchi, crackles.  No accessory muscle use or tachypnea.       Abdomen: Soft, nontender, nondistended.  Active bowel sounds present.       Back: No CVA tenderness.     Extremities: No edema.  Pulses 2+ extremities.   Brisk capillary refill noted.      Skin: Normal skin turgor     CNS: Moves all 4 extremities.  Interacts appropriately.  No tremor.  No gait abnormality    Differential diagnosis: Vertigo versus posterior stroke versus dehydration versus A-fib        ED Course  Labs Reviewed - No data to display  EKG    Rate, intervals and axes as noted on EKG Report.  Rate: 66  Rhythm: Sinus Rhythm  Reading: EKG shows a sinus  rhythm at a rate of 66, WY interval 154, QRS of 80, there is no ST elevation or depression.  This EKG appears similar to previous EKG in 2022 she was independently interpreted by myself              I have personally  reviewed available prior medical records for any recent pertinent discharge summaries/testing. Patient/family updated on results and plan, a verbalized understanding and agreement with the plan.  I explained to the patient that emergent conditions may arise and to go to the ER for new, worsening or any persistent conditions. I've explained the importance of taking all medicatons as prescribed, follow up, and return precuations,  All questions answered.    Please note that this report has been produced using speech recognition software and may contain errors related to that system including, but not limited to, errors in grammar, punctuation, and spelling, as well as words and phrases that possibly may have been recognized inappropriately.  If there are any questions or concerns, contact the dictating provider for clarification.                  MDM     Patient is a 55-year-old female with a pretty significant past medical history, on blood thinners, here today with a sudden onset of room spinning dizziness, feels worse when she is lying down.  She denies any changes in her vision.  Patient reports having vertigo approximately 20 years ago however this seems to feel a little bit different.  She did take  Dramamine this morning with some small relief of her symptoms however not enough to make her feel any better.  She reports that if she looks all the way to the left she will have a syncopal episode.  Due to all of the symptoms, and her medical history it is my recommendation that the patient be seen in the emergency department for further evaluation      Medical Decision Making      Disposition and Plan     Clinical Impression:  1. Dizziness         Disposition:  Ic to ed  6/1/2025 11:45  am    Follow-up:  Essence Brito MD  [address] [address]  [phone]                Medications Prescribed:  Current Discharge Medication List                Supplementary Documentation:

## 2025-06-01 NOTE — ED INITIAL ASSESSMENT (HPI)
Has dizziness/vertigo, room spinning since yesterday, more with laying down, taking Dramamine and it helps, sent for CT, denies blurred vision/numbness/tingling

## 2025-06-01 NOTE — ED PROVIDER NOTES
Patient Seen in: [facility] [place]       The following individual(s) verbally consented to be recorded using ambient AI listening technology and understand that they can each withdraw their consent to this listening technology at any point by asking the clinician to turn off or pause the recording:    Patient name: Catina Rivera  Additional names:  -      History  Chief Complaint   Patient presents with    Dizziness     Stated Complaint: dizziness/vertigo    Subjective:   HPI     Penelope Rivera is a 55 year old female who presents with dizziness.    Dizziness began yesterday around 10:30 to 10:45 AM after she colored her hair and took a shower. The dizziness is particularly severe when laying down, described as 'everything went everywhere.' It feels similar to vertigo experienced 20 years ago, but she can now anticipate when it will occur, unlike before. The sensation is described as the room spinning, especially with changes in position, such as laying back or sitting up.    Yesterday, she experienced a significant episode when laying on her daughter's bed, which resolved after about 10 minutes upon sitting up. Another episode occurred last night around 8:30 to 9:30 PM when laying down again.    No numbness, tingling, speech changes, or weakness on one side. She confirms she can walk without difficulty.        Objective:     Past Medical History:    Anxiety    Anxiety state, unspecified    Arrhythmia    a fib    Asthma (HCC)    Atrial fibrillation with RVR ([facility])    Back pain    Back problem    Bad breath    Belching    Bloating    Decorative tattoo    Depression    Diabetes (HCC)    per pt    Disorder of thyroid    Esophageal reflux    GERD (gastroesophageal reflux disease)    Headache(784.0)    Heart palpitations    Afib    Heartburn    I have taken medication for over 10years    High cholesterol    History of depression    Hypothyroidism    Irregular bowel habits    Leaking of urine     Lipoma of unspecified site    Multiple thyroid nodules    on rt,2011, lt.-2008    Nontoxic multinodular goiter    Nontoxic multinodular goiter    Obesity    Other and unspecified hyperlipidemia    Pain in joints    Pain in right shin    Pain with bowel movements    Painful swallowing    Not consistent but not due to illness    Plantar fasciitis    Sleep apnea     CPAP     Stress    Thyroiditis, unspecified    Type 1 diabetes mellitus (HCC)    Uncomfortable fullness after meals    Visual impairment    Wears glasses    Weight loss    Working with weight loss clinic              Past Surgical History:   Procedure Laterality Date    Arthroscopy of joint unlisted Bilateral     knee    Carpal tunnel release Bilateral     Cholecystectomy      Colonoscopy N/A 10/21/2016    Procedure: COLONOSCOPY;  Surgeon: Brandt Ramirez DO;  Location:  ENDOSCOPY    Colonoscopy N/A 12/27/2022    Procedure: COLONOSCOPY;  Surgeon: Aiden Pollard MD;  Location:  ENDOSCOPY    Cyst aspiration left      Cyst aspiration right      Hysterectomy      7/21    Laparoscopic cholecystectomy  11/14/2011    Lipoma removal  04/29/2022    left posterior    Mesh (implantable)      bladder    Neuromuscular stimulator      Oophorectomy Bilateral     Other      wisdom    Other      lt hand middle finger sx    Other surgical history  x2    total thyroidectomy    Other surgical history  x1    sinus    Other surgical history  10/11/2023    trial for neurostimulator    Other surgical history      Neuro Nerve Stimulator    Removal gallbladder      Sinus surgery        Sling oper stres incontinence  01/09/2014    Procedure: SLING UROLOGY;  Surgeon: Isac Sutherland MD;  [facility]    Thyroidectomy      Total abdom hysterectomy  07/2020                Social History     Socioeconomic History    Marital status:     Number of children: 3   Occupational History    Occupation:      Comment: [employer]venience     Tobacco Use    Smoking status: Former     Current packs/day: 0.00     Types: Cigarettes     Start date: 3/1/2017     Quit date: 3/1/2017     Years since quittin.2     Passive exposure: Never    Smokeless tobacco: Never    Tobacco comments:     non-smoker     Updated 24   Vaping Use    Vaping status: Never Used   Substance and Sexual Activity    Alcohol use: Yes     Comment: rare    Drug use: Yes     Comment: CBD,CBG  Doctors are fully aware    Sexual activity: Yes     Social Drivers of Health     Food Insecurity: No Food Insecurity (2025)    Food Insecurity     Food Insecurity: Never true   Transportation Needs: No Transportation Needs (2025)    Transportation Needs     Lack of Transportation: No   Housing Stability: Low Risk  (2025)    Housing Stability     Housing Instability: No                                Physical Exam    ED Triage Vitals [25 1215]   /72   Pulse 76   Resp 16   Temp 98.1 °F (36.7 °C)   Temp src Temporal   SpO2 97 %   O2 Device None (Room air)       Current Vitals:   Vital Signs  BP: 131/67  Pulse: 72  Resp: 16  Temp: 98.1 °F (36.7 °C)  Temp src: Temporal    Oxygen Therapy  SpO2: 99 %  O2 Device: None (Room air)            Physical Exam  Vital signs reviewed.  Nursing note reviewed.  Constitutional: Alert, well-appearing  Head: Normocephalic, atraumatic  Mouth: Moist  Eyes: Extraocular muscles intact, pupils equal  Skin: Warm and dry  Musculoskeletal range of motion grossly normal all extremities  Neuro: Alert, at baseline, no focal neuro deficit.  Face symmetric, speech clear.  Moves all extremities against gravity.  Coordination intact finger-nose.  Gait normal.  Motor 5/5 extremities.  Psych: Mood normal              ED Course  Labs Reviewed   COMP METABOLIC PANEL (14) - Abnormal; Notable for the following components:       Result Value    Glucose 140 (*)     A/G Ratio 2.1 (*)     All other components within normal limits   CBC WITH DIFFERENTIAL WITH  PLATELET   RAINBOW DRAW LAVENDER   RAINBOW DRAW LIGHT GREEN          CT BRAIN OR HEAD (CPT=70450)  Result Date: 6/1/2025  PROCEDURE:  CT BRAIN OR HEAD (89877)  COMPARISON:  None.  INDICATIONS:  dizziness/vertigo  TECHNIQUE:  Noncontrast CT scanning is performed through the brain. Dose reduction techniques were used. Dose information is transmitted to the ACR (American College of Radiology) NRDR (National Radiology Data Registry) which includes the Dose Index Registry.  PATIENT STATED HISTORY: (As transcribed by Technologist)  The patient states vertigo x 1 day.   FINDINGS: Ventricles and sulci are within normal limits.  There is no midline shift or mass-effect.  The basal cisterns are patent.  The gray-white matter differentiation is intact.  There is no acute intracranial hemorrhage or extra-axial fluid collection.  No evidence of acute territorial infarction.  There is no evident fracture.  The visualized paranasal sinuses and mastoid air cells are unremarkable.             CONCLUSION:  No acute intracranial abnormality identified.   LOCATION:  [id number]   Dictated by (CST): Jose Elias Moon MD on 6/01/2025 at 1:54 PM     Finalized by (CST): Jose Elias Moon MD on 6/01/2025 at 1:59 PM       XR FINGER(S) (MIN 2 VIEWS), LEFT 5TH (CPT=73140)  Result Date: 5/31/2025  PROCEDURE:  XR FINGER(S) (MIN 2 VIEWS), LEFT 5TH (CPT=73140)  INDICATIONS:  M79.644 Pain of finger of right hand  COMPARISON:  None.  TECHNIQUE:  Three views of the finger were obtained.  PATIENT STATED HISTORY: (As transcribed by Technologist)  Patient having pain throughout left 5th finger. Feels same as it was on 3rd and 4th fingers that she had surgery on.    FINDINGS:  Orthopedic hardware noted across the DIP joints of the 3rd and 4th digits with bony fusion across the DIP joints noted.  There is marked joint space narrowing and posterior spurring noted involving the DIP joint of the 5th digit.  There is no fracture, subluxation or dislocation.  There is  moderate narrowing of the PIP joints of the 2nd through 5th digits.             CONCLUSION:  1. 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 2. Postoperative changes of fusion across the DIP joints of the middle and ring fingers.    LOCATION:  [facility]   Dictated by (CST): Guerrero Mendez MD on 5/31/2025 at 2:36 AM     Finalized by (CST): Guerrero Mendez MD on 5/31/2025 at 2:38 AM                         [facility]     Medical Decision Making:    Differential diagnosis before testing includes BPPV, stroke potential life threatening diagnosis which is a medical condition that poses a threat to life/function.     Comorbidities that add complexity to management: Med list included Mounjaro    I reviewed prior external ED notes including frontal headache office visit 5/25    I personally reviewed the CT brain and my independent interpretation includes no acute abnormality    Shared decision making:    CT brain negative.  Clinical history and exam consistent with BPPV.  Patient has history of vertigo also.  Given IV fluids, meclizine.  Describes right ear clicking, TM appears normal, slight inflamed middle ear appearance.  Will try 3 days of 40 mg prednisone since she is diabetic and we discussed risk of hyperglycemia.  Try physical therapy vestibular rehab if not improving.  Will give contact information for ENT as well if symptoms do not improve.  Return for any worsening.    Medical Decision Making      Disposition and Plan     Clinical Impression:  1. Benign paroxysmal positional vertigo, unspecified laterality    2. Clicking tinnitus of right ear         Disposition:  Discharge  6/1/2025  2:15 pm    Follow-up:  Essence Brito MD  [address] [id number] [phone]    Follow up in 2 day(s)            Medications Prescribed:  Discharge Medication List as of 6/1/2025  2:28 PM        START taking these medications    Details   meclizine 25 MG Oral Tab Take 1 tablet (25 mg total)  by mouth 3 (three) times daily as needed., Normal, Disp-20 tablet, R-0      predniSONE 20 MG Oral Tab Take 2 tablets (40 mg total) by mouth daily for 3 days., Normal, Disp-6 tablet, R-0                   Supplementary Documentation:

## 2025-06-02 ENCOUNTER — PATIENT MESSAGE (OUTPATIENT)
Dept: FAMILY MEDICINE CLINIC | Facility: CLINIC | Age: 55
End: 2025-06-02

## 2025-06-02 NOTE — TELEPHONE ENCOUNTER
Reviewed notes  Zpack not needed for vertigo. Some sinus pressure for 1 day also doesn't necessarily mean bacterial sinus infection

## 2025-06-03 ENCOUNTER — LAB ENCOUNTER (OUTPATIENT)
Dept: LAB | Age: 55
End: 2025-06-03
Attending: STUDENT IN AN ORGANIZED HEALTH CARE EDUCATION/TRAINING PROGRAM
Payer: COMMERCIAL

## 2025-06-03 PROCEDURE — 88305 TISSUE EXAM BY PATHOLOGIST: CPT | Performed by: STUDENT IN AN ORGANIZED HEALTH CARE EDUCATION/TRAINING PROGRAM

## 2025-06-05 ENCOUNTER — PATIENT OUTREACH (OUTPATIENT)
Dept: CASE MANAGEMENT | Age: 55
End: 2025-06-05

## 2025-06-05 DIAGNOSIS — E78.5 HYPERLIPIDEMIA ASSOCIATED WITH TYPE 2 DIABETES MELLITUS (HCC): ICD-10-CM

## 2025-06-05 DIAGNOSIS — E11.69 HYPERLIPIDEMIA ASSOCIATED WITH TYPE 2 DIABETES MELLITUS (HCC): ICD-10-CM

## 2025-06-05 RX ORDER — ATORVASTATIN CALCIUM 20 MG/1
20 TABLET, FILM COATED ORAL NIGHTLY
Qty: 90 TABLET | Refills: 1 | Status: SHIPPED | OUTPATIENT
Start: 2025-06-05

## 2025-06-05 NOTE — PROGRESS NOTES
Patient had recent Emergency Room visit, calling to offer Primary Care Physician follow-up appointment (discharged 06/01)    Dr Essence Brito  [facility]  [address] [place] [id number] [phone]    Attempt #1:  Left message on voicemail for patient to call transitions specialist back to schedule follow up appointments. Provided Transitions specialist scheduling phone number [phone].

## 2025-06-05 NOTE — TELEPHONE ENCOUNTER
Endocrine refill protocol for lipid lowering medications    Protocol Criteria:  PASSED Reason: N/A    If all below requirements are met, send a 90-day supply with 1 refill per provider protocol.    Verify appointment with Endocrinology completed in the last 6 months or scheduled in the next 3 months.  Lipid panel must have been completed in the last 12 months   ALT result below 80  LDL result below 130    Last completed office visit:10/29/2024 Jessica Dennis APRN   Last completed telemed visit: 3/25/2025 Jessica Dennis APRN  Next scheduled Follow up:   Future Appointments   Date Time Provider Department Center   6/11/2025  8:05 AM NAP XR RM1 NAP XRAY [facility] [place]   6/11/2025  8:20 AM Olivia Altamirano DPM [facility]   7/7/2025  8:30 AM Curtis Daly MD [id number]   8/14/2025  8:40 AM YK RAJAN RM1 [facility] [place]   9/5/2025  3:15 PM Ashish Horvath MD [facility]   11/4/2025  4:00 PM Pricila Shi APRN [facility]      Last Lipid panel date: Cholesterol: 178, done on 3/27/2025.  HDL Cholesterol: 61, done on 3/27/2025.  TriGlycerides 69, done on 3/27/2025.  LDL Cholesterol: 104, done on 3/27/2025.     Last ALT result: Last ALT was 18 done on 6/1/2025.  Last AST was 13 done on 6/1/2025.    Sent to pharmacy per protocol.

## 2025-06-06 NOTE — PROGRESS NOTES
Hospital Follow up for PCP (Discharge 6/1 edw)     PCP  Essence Brito  Family Medicine  [address] [phone]  Pt declined to make follow up appt     Confirmed with pt   Closing encounter

## 2025-06-10 ENCOUNTER — PATIENT MESSAGE (OUTPATIENT)
Facility: CLINIC | Age: 55
End: 2025-06-10

## 2025-06-10 DIAGNOSIS — Z96.41 TYPE 2 DIABETES MELLITUS WITH COMPLICATION, WITH LONG TERM CURRENT USE OF INSULIN PUMP (HCC): Primary | ICD-10-CM

## 2025-06-10 DIAGNOSIS — E11.8 TYPE 2 DIABETES MELLITUS WITH COMPLICATION, WITH LONG TERM CURRENT USE OF INSULIN PUMP (HCC): Primary | ICD-10-CM

## 2025-06-11 ENCOUNTER — HOSPITAL ENCOUNTER (OUTPATIENT)
Dept: GENERAL RADIOLOGY | Age: 55
Discharge: HOME | End: 2025-06-11
Attending: PODIATRIST
Payer: COMMERCIAL

## 2025-06-11 ENCOUNTER — OFFICE VISIT (OUTPATIENT)
Dept: ORTHOPEDICS CLINIC | Facility: CLINIC | Age: 55
End: 2025-06-11
Payer: COMMERCIAL

## 2025-06-11 ENCOUNTER — TELEPHONE (OUTPATIENT)
Dept: ORTHOPEDICS CLINIC | Facility: CLINIC | Age: 55
End: 2025-06-11

## 2025-06-11 VITALS — HEIGHT: 62 IN | WEIGHT: 246 LBS | BODY MASS INDEX: 45.27 KG/M2

## 2025-06-11 DIAGNOSIS — M72.2 PLANTAR FASCIITIS: Primary | ICD-10-CM

## 2025-06-11 DIAGNOSIS — E66.01 MORBID OBESITY (HCC): ICD-10-CM

## 2025-06-11 DIAGNOSIS — E11.69 TYPE 2 DIABETES MELLITUS WITH OTHER SPECIFIED COMPLICATION, WITHOUT LONG-TERM CURRENT USE OF INSULIN (HCC): ICD-10-CM

## 2025-06-11 DIAGNOSIS — M79.672 LEFT FOOT PAIN: ICD-10-CM

## 2025-06-11 DIAGNOSIS — M79.672 LEFT FOOT PAIN: Primary | ICD-10-CM

## 2025-06-11 PROCEDURE — 73630 X-RAY EXAM OF FOOT: CPT | Performed by: PODIATRIST

## 2025-06-11 PROCEDURE — 3008F BODY MASS INDEX DOCD: CPT | Performed by: PODIATRIST

## 2025-06-11 PROCEDURE — 99204 OFFICE O/P NEW MOD 45 MIN: CPT | Performed by: PODIATRIST

## 2025-06-11 NOTE — PROGRESS NOTES
[facility] New Patient Note    CC:   Chief Complaint   Patient presents with    Foot Pain     Bi-Lateral Foot Pain, left is worse  Onset: Many Years, spiked recently       HPI: The patient is a 55 year old female who presents today with complaints of heel pain both feet for some time.  Worse recently    TKA w Dr Connelly in [place]    Had heel pain for a long time  Good days and bad days  Uses hoka  Is barefoot some    Pain worse in am /post static dyskinesia      Past Medical History[1]  Past Surgical History[2]  Current Medications[3]  Allergies[4]  Family History[5]  Social History     Occupational History    Occupation:      Comment: Convenience    Tobacco Use    Smoking status: Former     Current packs/day: 0.00     Types: Cigarettes     Start date: 3/1/2017     Quit date: 3/1/2017     Years since quittin.2     Passive exposure: Never    Smokeless tobacco: Never    Tobacco comments:     non-smoker     Updated 24   Vaping Use    Vaping status: Never Used   Substance and Sexual Activity    Alcohol use: Yes     Comment: rare    Drug use: Yes     Comment: CBD,CBG  Doctors are fully aware    Sexual activity: Yes        ROS:  Complete ROS reviewed by me and non-contributory to the chief complaint except as mentioned above.    Physical Exam:    Ht 5' 2\" (1.575 m)   Wt 246 lb (111.6 kg)   LMP 2019 (Approximate)   BMI 44.99 kg/m²       Neutral to flexible flat foot w stance  Pt has pain at plantar fascial insertion area of heel.  No pain with side to side compression of calcaneus.  No swelling.  Negative tinel's sign at medial ankle.      Sensation is intact sharp versus dull.  She can feel light touch to the tips of the toes  Palpable pedal pulses.  Hair growth is present.  Skin is supple and feet are well perfused and warm.    Strength is 5 out of 5 all muscle groups    Full range of motion and nonpainful motion of the ankle joint, subtalar joint, MP joints, and midfoot  joints.     Imaging:  plantar calcaneal spur  Midfoot arthritis, joint faults   personally viewed, independently interpreted and radiology report read.      Assessment/Diagnoses:  Diagnoses and all orders for this visit:    Plantar fasciitis        Plan:  I reviewed imaging and exam findings with the patient.      Discussed mild midfoot arthritis and foot type  Shoes, no barefoot,  arch supports  Condition of plantar fascitis (etiology, anatomy) and treatment plan were discussed, including stretching, ice, massage, good shoegear, arch support and consideration of a series of 3 steroid injections.  Physical therapy also discussed.  Consider custom orthotics.      Rx PT  LE eval and treat  Modalities, graston and dry needling if available  Gait eval       Discussed steroid injection , PRP  PRP option espec more in arch area  Steroid injection only if starts to hurt in heel area  If both heels still bothering despite treatment, consider lower back origin    Also next step if not improve, soft tissue US w Dr Rodriguez - checking for other soft tissue issues, PF thickness , PF tear    Olivia Altamirano, DPMPodiatric Surgery  FACFAS  [facility]  [address], [address] [address]   [address] [place] [address] [email or web address]  t: [phone]   f: [phone]              This document was partially prepared using Dragon Medical voice recognition software.            [1]   Past Medical History:   Anxiety    Anxiety state, unspecified    Arrhythmia    a fib    Asthma (HCC)    Atrial fibrillation with RVR (HCC)    Back pain    Back problem    Bad breath    Belching    Bloating    Decorative tattoo    Depression    Diabetes (HCC)    per pt    Disorder of thyroid    Esophageal reflux    GERD (gastroesophageal reflux disease)    Headache(784.0)    Heart palpitations    Afib    Heartburn    I have taken medication for over 10years    High cholesterol    History  of depression    Hypothyroidism    Irregular bowel habits    Leaking of urine    Lipoma of unspecified site    Multiple thyroid nodules    on rt,2011, lt.-2008    Nontoxic multinodular goiter    Nontoxic multinodular goiter    Obesity    Other and unspecified hyperlipidemia    Pain in joints    Pain in right shin    Pain with bowel movements    Painful swallowing    Not consistent but not due to illness    Plantar fasciitis    Sleep apnea     CPAP     Stress    Thyroiditis, unspecified    Type 1 diabetes mellitus (HCC)    Uncomfortable fullness after meals    Visual impairment    Wears glasses    Weight loss    Working with weight loss clinic   [2]   Past Surgical History:  Procedure Laterality Date    Arthroscopy of joint unlisted Bilateral     knee    Carpal tunnel release Bilateral     Cholecystectomy      Colonoscopy N/A 10/21/2016    Procedure: COLONOSCOPY;  Surgeon: Brandt Ramirez DO;  Location:  ENDOSCOPY    Colonoscopy N/A 12/27/2022    Procedure: COLONOSCOPY;  Surgeon: Aiden Pollard MD;  Location:  ENDOSCOPY    Cyst aspiration left      Cyst aspiration right      Hysterectomy      7/21    Laparoscopic cholecystectomy  11/14/2011    Lipoma removal  04/29/2022    left posterior    Mesh (implantable)      bladder    Neuromuscular stimulator      Oophorectomy Bilateral     Other      wisdom    Other      lt hand middle finger sx    Other surgical history  x2    total thyroidectomy    Other surgical history  x1    sinus    Other surgical history  10/11/2023    trial for neurostimulator    Other surgical history      Neuro Nerve Stimulator    Removal gallbladder      Sinus surgery        Sling oper stres incontinence  01/09/2014    Procedure: SLING UROLOGY;  Surgeon: Isac Sutherland MD;  [facility]    Thyroidectomy      Total abdom hysterectomy  07/2020   [3]   Current Outpatient Medications   Medication Sig Dispense Refill    atorvastatin 20 MG Oral Tab TAKE 1 TABLET(20 MG) BY  MOUTH EVERY NIGHT 90 tablet 1    meclizine 25 MG Oral Tab Take 1 tablet (25 mg total) by mouth 3 (three) times daily as needed. 20 tablet 0    BUPROPION  MG Oral Tablet 24 Hr TAKE 1 TABLET(300 MG) BY MOUTH DAILY 90 tablet 1    amoxicillin 500 MG Oral Cap Take 4 capsules (2000 mg) one time, one hour prior to dental procedure. 4 capsule 3    Rizatriptan Benzoate 10 MG Oral Tab Take 0.5 tablets (5 mg total) by mouth as needed for Migraine (max 20 mg per day). 10 tablet 0    cyclobenzaprine 10 MG Oral Tab Take 1 tablet (10 mg total) by mouth 2 (two) times daily. 30 tablet 0    PREGABALIN 50 MG Oral Cap TAKE 1 CAPSULE(50 MG) BY MOUTH THREE TIMES DAILY 90 capsule 0    ferrous sulfate 325 (65 FE) MG Oral Tab EC Take 1 tablet (325 mg total) by mouth daily with breakfast.      NOVOLOG 100 UNIT/ML Injection Solution INJECT UP TO 83 UNITS VIA INSULIN PUMP DAILY AS DIRECTED 75 mL 1    Continuous Glucose Sensor (DEXCOM G6 SENSOR) Does not apply Misc USE 1 SENSOR EVERY 10 DAYS 9 each 1    Continuous Glucose Transmitter (DEXCOM G6 TRANSMITTER) Does not apply Misc USE 1 TRANSMITTER EVERY 90 DAYS 1 each 2    cholecalciferol 25 MCG (1000 UT) Oral Tab Take 1 tablet (1,000 Units total) by mouth daily.      Multiple Vitamin (MULTIVITAMINS OR) Take by mouth.      Magnesium Cl-Calcium Carbonate 71.5-119 MG Oral Tab EC Take 2 tablets by mouth daily.      glucagon (GVOKE HYPOPEN 2-PACK) 1 MG/0.2ML Subcutaneous injection Inject 0.2 mL (1 mg total) into the skin as needed. If glucose less than 70 and cannot swallow anything by mouth. Patient trained on Gvoke. No substitutions 0.4 mL 1    MOUNJARO 15 MG/0.5ML Subcutaneous Solution Auto-injector Inject 15 mg into the skin once a week. 6 mL 0    Glucose Blood (BLOOD GLUCOSE TEST) In Vitro Strip Use to test BG 4x/day 400 strip 2    OneTouch Delica Lancets 33G Does not apply Misc 1 Lancet by Finger stick route in the morning, at noon, in the evening, and at bedtime. 400 each 3    Insulin  Disposable Pump (OMNIPOD 5 [id number] PODS GEN 5) Does not apply Misc 1 each every 3 (three) days. 30 each 1    levothyroxine 125 MCG Oral Tab Take 1 tablet (125 mcg total) by mouth before breakfast. 90 tablet 1    OMEPRAZOLE 40 MG Oral Capsule Delayed Release TAKE 1 CAPSULE(40 MG) BY MOUTH DAILY 90 capsule 0    albuterol (PROAIR HFA) 108 (90 Base) MCG/ACT Inhalation Aero Soln Inhale 2 puffs into the lungs every 4 (four) hours as needed for Wheezing. 1 each 0    apixaban 5 MG Oral Tab Take 1 tablet (5 mg total) by mouth 2 (two) times daily.      Calcium Carb-Cholecalciferol (CALCIUM + VITAMIN D3 OR) Take 1 tablet by mouth daily. Vitamin d 800 international unit and Calcium 600 mg      PARoxetine HCl 40 MG Oral Tab Take 1 tablet (40 mg total) by mouth every morning. 90 tablet 3    Insulin Glargine, 2 Unit Dial, (TOUJEO MAX SOLOSTAR) 300 UNIT/ML Subcutaneous Solution Pen-injector USE AS DIRECTED UP TO 75 UNITS DAILY IN THE EVENT OF INSULIN PUMP FAILURE 6 mL 0    Blood Glucose Monitoring Suppl (ONETOUCH VERIO FLEX SYSTEM) w/Device Does not apply Kit Use to test blood sugar 1x daily as needed to calibrate dexcom 1 kit 0    Insulin Disposable Pump (OMNIPOD 5 G6 INTRO, GEN 5,) Does not apply Kit 1 each every 3 (three) days. 1 kit 0    dilTIAZem HCl ER Beads 180 MG Oral Capsule SR 24 Hr Take 1 capsule (180 mg total) by mouth 2 (two) times daily.      estradiol 0.05 MG/24HR Transdermal Patch Weekly APPLY 1 PATCH EXTERNALLY TO THE SKIN EVERY TUESDAY. DO NOT CUT PATCH      traMADol 50 MG Oral Tab Take 1 tablet (50 mg total) by mouth every 8 (eight) hours as needed for Pain. (Patient not taking: Reported on 6/11/2025) 30 tablet 0   [4]   Allergies  Allergen Reactions    Adhesive Tape HIVES     Paper tape is ok, clear tape=hives      Ibuprofen OTHER (SEE COMMENTS)     Asthma attack    Sulfa Antibiotics      Unknown; happened before adolescent period    Erythromycin Base NAUSEA ONLY    Oxycodone CONFUSION     Memory issues   [5]    Family History  Problem Relation Age of Onset    Skin cancer Mother     Other (Other) Mother     Heart Disease Father     Hypertension Father     Asthma Father     Diabetes Father     Other (Other) Father     Heart Attack Father         x2    Other (Other) Sister         Crohn's disease    Crohn's Disease Sister     Ulcerative Colitis Sister     Other (Other) Sister         Celiac Disease    Diabetes Maternal Grandmother     Other (Other) Maternal Grandmother         lymphoma    Asthma Other         3 children all have asthma.

## 2025-06-12 RX ORDER — ACYCLOVIR 400 MG/1
1 TABLET ORAL
Qty: 9 EACH | Refills: 1 | Status: SHIPPED | OUTPATIENT
Start: 2025-06-12

## 2025-06-12 NOTE — TELEPHONE ENCOUNTER
Endocrine Refill protocol for CGM supplies     Protocol Criteria:  PASSED Reason: N/A    If below requirement is met, send a 90-day supply with 1 refill per provider protocol.     Verify appointment with Endocrinology completed in the last 12 months or scheduled in the next 6 months     Last completed office visit:10/29/2024 Jessica Dennis APRN   Last completed telemed visit: 3/25/2025 Jessica Dennis APRN  Next scheduled Follow up:   Future Appointments   Date Time Provider Department Center   6/24/2025  9:15 AM Helen Ordonez PT SWPT [place]   6/30/2025  2:00 PM Helen Ordonez PT [facility] [place]   7/7/2025  8:30 AM [id number]   7/11/2025 12:00 PM EM[facility] AUDIO N[facility] EM[facility] [id number]   7/11/2025 12:20 PM Cordell Turk MD EMGOTONAPER [id number]   8/14/2025  8:40 AM [facility]   9/5/2025  3:15 PM Ashish Horvath MD [facility]M [facility]   11/4/2025  4:00 PM Pricila Shi APRN EMGWEI [facility]

## 2025-06-13 ENCOUNTER — PATIENT MESSAGE (OUTPATIENT)
Facility: CLINIC | Age: 55
End: 2025-06-13

## 2025-06-18 ENCOUNTER — PATIENT MESSAGE (OUTPATIENT)
Facility: CLINIC | Age: 55
End: 2025-06-18

## 2025-06-18 RX ORDER — CYCLOBENZAPRINE HCL 10 MG
10 TABLET ORAL 2 TIMES DAILY
Qty: 30 TABLET | Refills: 0 | Status: SHIPPED | OUTPATIENT
Start: 2025-06-18

## 2025-06-18 NOTE — TELEPHONE ENCOUNTER
Cyclobenzaprine 10mg    DOS: 1/29/25  Right Knee Replacement   Last OV: 5/1/25  Last refill date: 5/8/25 #/refills: 30/0  Upcoming appt:   Future Appointments   Date Time Provider Department Center   6/24/2025  9:15 AM Helen Ordonez, PT SWPT [place]   6/30/2025  2:00 PM Helen Ordonez PT [facility] [place]   7/7/2025  8:30 AM Curtis Daly MD [facility] [id number]   7/11/2025 12:00 PM EMG AUDIO NAPER EMGAUDIONAPE [id number]   7/11/2025 12:20 PM Cordell Turk MD EMGOTONAPER [id number]   8/14/2025  8:40 AM [facility]   9/5/2025  3:15 PM Ashish Horvath MD [facility]   11/4/2025  4:00 PM Pricila Shi APRN [facility]           Patient comment: I have tried to not take this but my muscle cramps are terrible

## 2025-06-23 DIAGNOSIS — Z96.651 S/P TOTAL KNEE ARTHROPLASTY, RIGHT: ICD-10-CM

## 2025-06-23 NOTE — TELEPHONE ENCOUNTER
Pregabalin 50 mg  DOS: 01/29/25  Last OV: 05/01/25  Last refill date: 05/05/25     #/refills: 90/0  Upcoming appt:   Future Appointments   Date Time Provider Department Center   6/24/2025  9:15 AM Helen Ordonez, PT SWPT [place]   6/27/2025  8:45 AM Edgar Ledezma, PT YK Phys T [place]   6/27/2025 10:15 AM Jessica Dennis APRN EMGENDO [facility] [place]   6/30/2025  2:00 PM Helen Ordonez, PT SWPT [place]   7/7/2025  8:30 AM Curtis Daly MD [facility] ORTHO [facility] [id number]   7/11/2025  7:30 AM Cira Elkins, PTA YK Phys T [place]   7/11/2025 12:00 PM EMG AUDIO NAPER EMGAUDIONAPE [id number]   7/11/2025 12:20 PM Cordell Turk MD EMGOTONAPER [id number]   7/15/2025  3:45 PM Edgar Ledezma, PT Y[facility] T [place]   7/17/2025  3:45 PM Edgar Ledezma, PT [facility] Phys T [place]   7/21/2025  3:15 PM Edgar Ledezma, PT [facility] Phys T [place]   8/14/2025  8:40 AM YK RAJAN RM1 YKMAM [place]   9/5/2025  3:15 PM Ashish Horvath MD [facility] [place]   11/4/2025  4:00 PM Pricila Shi APRN EMGWEI [facility]

## 2025-06-24 ENCOUNTER — OFFICE VISIT (OUTPATIENT)
Dept: PHYSICAL THERAPY | Age: 55
End: 2025-06-24
Attending: EMERGENCY MEDICINE
Payer: COMMERCIAL

## 2025-06-24 PROCEDURE — 95992 CANALITH REPOSITIONING PROC: CPT

## 2025-06-24 PROCEDURE — 97161 PT EVAL LOW COMPLEX 20 MIN: CPT

## 2025-06-24 RX ORDER — PREGABALIN 50 MG/1
50 CAPSULE ORAL 3 TIMES DAILY
Qty: 90 CAPSULE | Refills: 0 | Status: SHIPPED | OUTPATIENT
Start: 2025-06-24

## 2025-06-24 NOTE — PROGRESS NOTES
VESTIBULAR EVALUATION:     Diagnosis:   Benign paroxysmal positional vertigo, unspecified laterality (H81.10) Patient:  Catina Rivera (55 year old, female)        Referring Provider: Monie Adorno  Today's Date   6/24/2025    Precautions:      Date of Evaluation: 06/24/25  Next MD visit: No data recorded  Date of Surgery: No data recorded     PATIENT SUMMARY     Summary of chief complaints: Dizziness with position change since 6/1/25.  History of current condition: Pt reports ED visit 6/1/25. Woke up spinning, meclinzine releives it. The worst part of her day is getting up in the morning. She reports a hisotry of vertigo some 20 years ago, did PT and felt it resolved.   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; she is doing deskwork currently   Leisure activities/Hobbies: swimming and pool workouts   Prior level of function:    Current limitations: see DHI 6/24/25  Pt goals:eliminate dizziness    Symptoms with cough/sneeze or loud noise: No  Falls: No      Hx of migraines: Yes (a few weeks prior 1 episode, but nothing since)     Hx of vision issue: Yes (corrective lenses)    Hx of hearing issues: tinnitus (L ear loss)    Dizziness: Current: 0 /10, Best: 0 /10, Worst:8 /10  Quality: spinning  Frequency/Duration: 5 mins max   Aggravates: supine to/from sit; rolling; bending over; quick head movements   Relieves: closing eyes; medication     History of Headaches: positive   Headache: Current: 0 /10, Best:  , Worst:     Quality:    Frequency/Duration:     Aggravates:    Relieves:      History of Cervical Pain: positive  Cervical pain: Current:2 /10, Best:  , Worst:    Aggravates: neck movement   Relieves:      Dizziness Handicap Inventory: (Patient-Rptd) 18-Mild Handicap.    Past medical history was reviewed with Catina.  Significant findings include: extensive surgical history (thyroid, hysterectomy,  gallbladder), she has had a neurostimulator put in her spine due to radicular symptoms, 20 surgeries  Imaging/Tests:     Catina  has a past medical history of Anxiety, Anxiety state, unspecified, Arrhythmia, Asthma (HCC), Atrial fibrillation with RVR (HCC) (11/01/2022), Back pain, Back problem, Bad breath, Belching, Bloating, Decorative tattoo, Depression, Diabetes (HCC), Disorder of thyroid, Esophageal reflux (05/07/2012), GERD (gastroesophageal reflux disease), Headache(784.0) (04/24/2012), Heart palpitations (11/1/2022), Heartburn, High cholesterol, History of depression, Hypothyroidism, Irregular bowel habits, Leaking of urine, Lipoma of unspecified site (11/22/2011), Multiple thyroid nodules, Nontoxic multinodular goiter, Nontoxic multinodular goiter (07/05/2011), Obesity, Other and unspecified hyperlipidemia (07/11/2011), Pain in joints, Pain in right shin (11/13/2022), Pain with bowel movements, Painful swallowing, Plantar fasciitis, Sleep apnea, Stress, Thyroiditis, unspecified (06/25/2011), Type 1 diabetes mellitus (HCC), Uncomfortable fullness after meals, Visual impairment, Wears glasses, and Weight loss.  She  has a past surgical history that includes laparoscopic cholecystectomy (11/14/2011); cyst aspiration left; cyst aspiration right; other surgical history (x2); other surgical history (x1); sling oper stres incontinence (01/09/2014); mesh (implantable); thyroidectomy; cholecystectomy; other; colonoscopy (N/A, 10/21/2016); removal gallbladder; total abdom hysterectomy (07/2020); carpal tunnel release (Bilateral); arthroscopy of joint unlisted (Bilateral); other; lipoma removal (04/29/2022); oophorectomy (Bilateral); hysterectomy; colonoscopy (N/A, 12/27/2022); neuromuscular stimulator; sinus surgery  ; other surgical history (10/11/2023); and other surgical history.    ASSESSMENT  Catina presents to physical therapy evaluation with primary c/o Dizziness with position change since 6/1/25.. The results of  the objective tests and measures show R PC BPPV with symptoms reported in R magali halpike. Positionaly dizziness impacting MRADLs and pt job performance as well as safety.. Functional deficits include but are not limited to see DHI 6/24/25. Signs and symptoms are consistent with diagnosis of Benign paroxysmal positional vertigo, unspecified laterality (H81.10). Pt and PT discussed evaluation findings, pathology, POC and HEP.  Pt voiced understanding and performs HEP correctly without reported pain. Skilled Physical Therapy is medically necessary to address the above impairments and reach functional goals.    OBJECTIVE:      Musculoskeltal:  Posture/Observation:     Cervical ROM WNL in all directions  Adverse neuro signs with ROM:      Neurological:  Neuro Screen: Sensation: WNL for light touch screen    Coordination Testing:   Finger to Nose: WNL   Pronation/supination: WNL  Toe tapping:         Oculomotor & Vestibular Exam:  Extraocular Range of Motion: normal  Spontaneous Nystagmus:        room light: None         fixation blocked: None   Smooth Pursuit: normal  Saccades: normal   Gaze Evoked Nystagmus:        room light: normal       fixation blocked: normal   Head Impulse Test: normal bilaterally   VOR screen:  normal     VOR Cancellation: normal;    Convergence: normal;     Cover/Uncover: normal   Cross Cover: normal   Head Shaking Nystagmus: positive right beating       duration: 25 sec  Dynamic Visual Acuity: not tested       degraded lines:  ; symptoms provoked:    Oculomotor Exam Comments:    Positional Testing:  Magali Hallpike - Right   Latency (seconds)     Duration (seconds)     Direction no nystagmus seen   Symptom provocation Yes         Magali Hallpike - Left   Latency (seconds) 0 sec   Duration (seconds) 0 sec   Direction no nystagmus seen   Symptom provocation No         Horizontal Roll Test   Latency (seconds)     Duration (seconds)     Direction no nystagmus seen   Symptom provocation Yes (\"just not  right\" with head turn R)   Nystagmus Intensity Worse         Balance and Functional Mobility:  Postural Control:   Romberg: EO  , EC     Romberg on Foam: EO  , EC     Tandem Stance: R back EO  , EC  ; L back EO  , EC     SLS: R EO  , EC  ; L EO  , EC       Functional Mobility:   Gait: pt ambulates on level ground with  .  Functional Gait Assessment (FGA):     Timed Up and Go:     Five Times Sit to Stand Test:    Stairs:      Today’s Treatment and Response:   Pt education was provided on exam findings, treatment diagnosis, treatment plan, expectations, and prognosis.     Today's Treatment       6/24/2025   Vestibular Treatment   Neuro Re-Education Epley R PC 2x   Evaluation Minutes 30   Canalith Repositioning Minutes 15   Total Time Of Timed Procedures 0   Total Time Of Service-Based Procedures 45   Total Treatment Time 45        Patient was instructed in and issued a HEP for:    Pt was also provided recommendations for: detrimental fear avoidance behaviors; strategies to reduce fall risk at home; possible dizziness after evaluation; pacing; symptom management; importance of remaining active.    Charges:  PT EVAL: Low Complexity, 1 canalith repositioning  In agreement with evaluation findings and clinical rationale, this evaluation involved LOW COMPLEXITY decision making due to no personal factors/comorbidities, 1-2 body structures involved/activity limitations, and stable symptoms as documented in the evaluation.       PLAN OF CARE:      Goals: (to be met in 8 visits)     Pt to report ability to turn over in bed without dizziness.   Pt to report ability to bend forward to tie shoe laces without dizziness.   Pt to report ability to perform supine<>sit without dizziness.   Pt to report no c/o dizziness with positional changes x 30 days   Pt to report ability to turn head when called upon with minimal to no c/o dizziness   Pt to report ability to change speeds when walking with minimal to no c/o dizziness   Perform  household chores with dizziness under 3/10 and without nausea    Pt to improve DHI by 5 points to improve community function.   Pt to be independent with HEP to self-manage symptoms and be able to return to prior level of function.        Frequency / Duration: Patient will be seen 1x/week or a total of 8 visits over a 90 day period. Treatment will include: patient education; neuromuscular re-education; eye/head coordination training; habituation training for motion sensitivity and/or visual motion intolerance; canalith repositioning maneuver; therapeutic activities; manual therapy; therapeutic exercise; symptom management instruction; sensory organization training; balance training; gait training    Education or treatment limitation: -- (see assessment)   Rehab Potential: good     Patient/Family/Caregiver was advised of these findings, precautions, and treatment options and has agreed to actively participate in planning and for this course of care.     Thank you for your referral. Please co-sign or sign and return this letter via fax as soon as possible to [phone]. If you have any questions, please contact me at Dept: [phone]    Sincerely,  Electronically signed by therapist: Helen Ordonez, PT, DPT  Physician's certification required: Yes  I certify the need for these services furnished under this plan of treatment and while under my care.    X___________________________________________________ Date____________________    Certification From: 6/24/2025  To: 9/22/2025

## 2025-06-25 ENCOUNTER — E-VISIT (OUTPATIENT)
Dept: TELEHEALTH | Age: 55
End: 2025-06-25
Payer: COMMERCIAL

## 2025-06-25 DIAGNOSIS — H10.30 ACUTE INFECTIVE CONJUNCTIVITIS: Primary | ICD-10-CM

## 2025-06-25 PROCEDURE — 99422 OL DIG E/M SVC 11-20 MIN: CPT | Performed by: PHYSICIAN ASSISTANT

## 2025-06-25 RX ORDER — OFLOXACIN 3 MG/ML
1 SOLUTION/ DROPS OPHTHALMIC 4 TIMES DAILY
Qty: 5 ML | Refills: 0 | Status: SHIPPED | OUTPATIENT
Start: 2025-06-25 | End: 2025-07-02

## 2025-06-25 NOTE — PROGRESS NOTES
Catina Rivera is a 55 year old female.  HPI:   See answers to questions above.     Current Medications[1]   Past Medical History[2]   Past Surgical History[3]   Family History[4]   Social History:  Short Social Hx on File[5]      ASSESSMENT AND PLAN:     Encounter Diagnosis   Name Primary?    Acute infective conjunctivitis Yes           Meds & Refills for this Visit:  Requested Prescriptions     Signed Prescriptions Disp Refills    ofloxacin 0.3 % Ophthalmic Solution 5 mL 0     Sig: Place 1 drop into the right eye 4 (four) times daily for 7 days.       Duration of  the service:  12 minutes    Patient advised to follow up with PCP if no improvement or worsening of symptoms  Refer to [employer] message for specific patient instructions                   [1]   Current Outpatient Medications   Medication Sig Dispense Refill    ofloxacin 0.3 % Ophthalmic Solution Place 1 drop into the right eye 4 (four) times daily for 7 days. 5 mL 0    PREGABALIN 50 MG Oral Cap TAKE 1 CAPSULE(50 MG) BY MOUTH THREE TIMES DAILY 90 capsule 0    cyclobenzaprine 10 MG Oral Tab Take 1 tablet (10 mg total) by mouth 2 (two) times daily. 30 tablet 0    Continuous Glucose Sensor (DEXCOM G7 SENSOR) Does not apply Misc 1 each Every 10 days. Use as directed every 10 days 9 each 1    atorvastatin 20 MG Oral Tab TAKE 1 TABLET(20 MG) BY MOUTH EVERY NIGHT 90 tablet 1    meclizine 25 MG Oral Tab Take 1 tablet (25 mg total) by mouth 3 (three) times daily as needed. 20 tablet 0    BUPROPION  MG Oral Tablet 24 Hr TAKE 1 TABLET(300 MG) BY MOUTH DAILY 90 tablet 1    amoxicillin 500 MG Oral Cap Take 4 capsules (2000 mg) one time, one hour prior to dental procedure. 4 capsule 3    Rizatriptan Benzoate 10 MG Oral Tab Take 0.5 tablets (5 mg total) by mouth as needed for Migraine (max 20 mg per day). 10 tablet 0    ferrous sulfate 325 (65 FE) MG Oral Tab EC Take 1 tablet (325 mg total) by mouth daily with breakfast.      NOVOLOG 100 UNIT/ML Injection  Solution INJECT UP TO 83 UNITS VIA INSULIN PUMP DAILY AS DIRECTED 75 mL 1    Continuous Glucose Sensor (DEXCOM G6 SENSOR) Does not apply Misc USE 1 SENSOR EVERY 10 DAYS 9 each 1    Continuous Glucose Transmitter (DEXCOM G6 TRANSMITTER) Does not apply Misc USE 1 TRANSMITTER EVERY 90 DAYS 1 each 2    traMADol 50 MG Oral Tab Take 1 tablet (50 mg total) by mouth every 8 (eight) hours as needed for Pain. (Patient not taking: Reported on 6/11/2025) 30 tablet 0    cholecalciferol 25 MCG (1000 UT) Oral Tab Take 1 tablet (1,000 Units total) by mouth daily.      Multiple Vitamin (MULTIVITAMINS OR) Take by mouth.      Magnesium Cl-Calcium Carbonate 71.5-119 MG Oral Tab EC Take 2 tablets by mouth daily.      glucagon (GVOKE HYPOPEN 2-PACK) 1 MG/0.2ML Subcutaneous injection Inject 0.2 mL (1 mg total) into the skin as needed. If glucose less than 70 and cannot swallow anything by mouth. Patient trained on Gvoke. No substitutions 0.4 mL 1    Glucose Blood (BLOOD GLUCOSE TEST) In Vitro Strip Use to test BG 4x/day 400 strip 2    OneTouch Delica Lancets 33G Does not apply Misc 1 Lancet by Finger stick route in the morning, at noon, in the evening, and at bedtime. 400 each 3    Insulin Disposable Pump (OMNIPOD 5 [id number] PODS GEN 5) Does not apply Misc 1 each every 3 (three) days. 30 each 1    levothyroxine 125 MCG Oral Tab Take 1 tablet (125 mcg total) by mouth before breakfast. 90 tablet 1    OMEPRAZOLE 40 MG Oral Capsule Delayed Release TAKE 1 CAPSULE(40 MG) BY MOUTH DAILY 90 capsule 0    albuterol (PROAIR HFA) 108 (90 Base) MCG/ACT Inhalation Aero Soln Inhale 2 puffs into the lungs every 4 (four) hours as needed for Wheezing. 1 each 0    apixaban 5 MG Oral Tab Take 1 tablet (5 mg total) by mouth 2 (two) times daily.      Calcium Carb-Cholecalciferol (CALCIUM + VITAMIN D3 OR) Take 1 tablet by mouth daily. Vitamin d 800 international unit and Calcium 600 mg      PARoxetine HCl 40 MG Oral Tab Take 1 tablet (40 mg total) by mouth every  morning. 90 tablet 3    Insulin Glargine, 2 Unit Dial, (TOUJEO MAX SOLOSTAR) 300 UNIT/ML Subcutaneous Solution Pen-injector USE AS DIRECTED UP TO 75 UNITS DAILY IN THE EVENT OF INSULIN PUMP FAILURE 6 mL 0    Blood Glucose Monitoring Suppl (ONETOUCH VERIO FLEX SYSTEM) w/Device Does not apply Kit Use to test blood sugar 1x daily as needed to calibrate dexcom 1 kit 0    Insulin Disposable Pump (OMNIPOD 5 G6 INTRO, GEN 5,) Does not apply Kit 1 each every 3 (three) days. 1 kit 0    dilTIAZem HCl ER Beads 180 MG Oral Capsule SR 24 Hr Take 1 capsule (180 mg total) by mouth 2 (two) times daily.      estradiol 0.05 MG/24HR Transdermal Patch Weekly APPLY 1 PATCH EXTERNALLY TO THE SKIN EVERY TUESDAY. DO NOT CUT PATCH     [2]   Past Medical History:   Anxiety    Anxiety state, unspecified    Arrhythmia    a fib    Asthma (HCC)    Atrial fibrillation with RVR (HCC)    Back pain    Back problem    Bad breath    Belching    Bloating    Decorative tattoo    Depression    Diabetes (HCC)    per pt    Disorder of thyroid    Esophageal reflux    GERD (gastroesophageal reflux disease)    Headache(784.0)    Heart palpitations    Afib    Heartburn    I have taken medication for over 10years    High cholesterol    History of depression    Hypothyroidism    Irregular bowel habits    Leaking of urine    Lipoma of unspecified site    Multiple thyroid nodules    on rt,2011, lt.-2008    Nontoxic multinodular goiter    Nontoxic multinodular goiter    Obesity    Other and unspecified hyperlipidemia    Pain in joints    Pain in right shin    Pain with bowel movements    Painful swallowing    Not consistent but not due to illness    Plantar fasciitis    Sleep apnea     CPAP     Stress    Thyroiditis, unspecified    Type 1 diabetes mellitus (HCC)    Uncomfortable fullness after meals    Visual impairment    Wears glasses    Weight loss    Working with weight loss clinic   [3]   Past Surgical History:  Procedure Laterality Date    Arthroscopy of  joint unlisted Bilateral     knee    Carpal tunnel release Bilateral     Cholecystectomy      Colonoscopy N/A 10/21/2016    Procedure: COLONOSCOPY;  Surgeon: Brandt Ramirez DO;  Location:  ENDOSCOPY    Colonoscopy N/A 12/27/2022    Procedure: COLONOSCOPY;  Surgeon: Aiden Pollard MD;  Location:  ENDOSCOPY    Cyst aspiration left      Cyst aspiration right      Hysterectomy      7/21    Laparoscopic cholecystectomy  11/14/2011    Lipoma removal  04/29/2022    left posterior    Mesh (implantable)      bladder    Neuromuscular stimulator      Oophorectomy Bilateral     Other      wisdom    Other      lt hand middle finger sx    Other surgical history  x2    total thyroidectomy    Other surgical history  x1    sinus    Other surgical history  10/11/2023    trial for neurostimulator    Other surgical history      Neuro Nerve Stimulator    Removal gallbladder      Sinus surgery        Sling oper stres incontinence  01/09/2014    Procedure: SLING UROLOGY;  Surgeon: Isac Sutherland MD;  [facility]    Thyroidectomy      Total abdom hysterectomy  07/2020   [4]   Family History  Problem Relation Age of Onset    Skin cancer Mother     Other (Other) Mother     Heart Disease Father     Hypertension Father     Asthma Father     Diabetes Father     Other (Other) Father     Heart Attack Father         x2    Other (Other) Sister         Crohn's disease    Crohn's Disease Sister     Ulcerative Colitis Sister     Other (Other) Sister         Celiac Disease    Diabetes Maternal Grandmother     Other (Other) Maternal Grandmother         lymphoma    Asthma Other         3 children all have asthma.    [5]   Social History  Socioeconomic History    Marital status:     Number of children: 3   Occupational History    Occupation:      Comment: Convenience    Tobacco Use    Smoking status: Former     Current packs/day: 0.00     Types: Cigarettes     Start date: 3/1/2017     Quit  date: 3/1/2017     Years since quittin.3     Passive exposure: Never    Smokeless tobacco: Never    Tobacco comments:     non-smoker     Updated 24   Vaping Use    Vaping status: Never Used   Substance and Sexual Activity    Alcohol use: Yes     Comment: rare    Drug use: Yes     Comment: CBD,CBG  Doctors are fully aware    Sexual activity: Yes     Social Drivers of Health     Food Insecurity: No Food Insecurity (2025)    Food Insecurity     Food Insecurity: Never true   Transportation Needs: No Transportation Needs (2025)    Transportation Needs     Lack of Transportation: No   Housing Stability: Low Risk  (2025)    Housing Stability     Housing Instability: No

## 2025-06-26 ENCOUNTER — PATIENT MESSAGE (OUTPATIENT)
Dept: FAMILY MEDICINE CLINIC | Facility: CLINIC | Age: 55
End: 2025-06-26

## 2025-06-26 ENCOUNTER — OFFICE VISIT (OUTPATIENT)
Facility: CLINIC | Age: 55
End: 2025-06-26
Payer: COMMERCIAL

## 2025-06-26 VITALS
WEIGHT: 247 LBS | BODY MASS INDEX: 45.45 KG/M2 | HEART RATE: 74 BPM | SYSTOLIC BLOOD PRESSURE: 126 MMHG | HEIGHT: 62 IN | DIASTOLIC BLOOD PRESSURE: 78 MMHG | OXYGEN SATURATION: 98 %

## 2025-06-26 DIAGNOSIS — E11.69 HYPERLIPIDEMIA ASSOCIATED WITH TYPE 2 DIABETES MELLITUS (HCC): ICD-10-CM

## 2025-06-26 DIAGNOSIS — Z96.41 TYPE 2 DIABETES MELLITUS WITH COMPLICATION, WITH LONG TERM CURRENT USE OF INSULIN PUMP (HCC): Primary | ICD-10-CM

## 2025-06-26 DIAGNOSIS — E78.5 HYPERLIPIDEMIA ASSOCIATED WITH TYPE 2 DIABETES MELLITUS (HCC): ICD-10-CM

## 2025-06-26 DIAGNOSIS — E11.8 TYPE 2 DIABETES MELLITUS WITH COMPLICATION, WITH LONG TERM CURRENT USE OF INSULIN PUMP (HCC): Primary | ICD-10-CM

## 2025-06-26 LAB — HEMOGLOBIN A1C: 6.4 % (ref 4.3–5.6)

## 2025-06-26 PROCEDURE — 95251 CONT GLUC MNTR ANALYSIS I&R: CPT | Performed by: NURSE PRACTITIONER

## 2025-06-26 PROCEDURE — G2211 COMPLEX E/M VISIT ADD ON: HCPCS | Performed by: NURSE PRACTITIONER

## 2025-06-26 PROCEDURE — 3044F HG A1C LEVEL LT 7.0%: CPT | Performed by: NURSE PRACTITIONER

## 2025-06-26 PROCEDURE — 3008F BODY MASS INDEX DOCD: CPT | Performed by: NURSE PRACTITIONER

## 2025-06-26 PROCEDURE — 3074F SYST BP LT 130 MM HG: CPT | Performed by: NURSE PRACTITIONER

## 2025-06-26 PROCEDURE — 83036 HEMOGLOBIN GLYCOSYLATED A1C: CPT | Performed by: NURSE PRACTITIONER

## 2025-06-26 PROCEDURE — 3078F DIAST BP <80 MM HG: CPT | Performed by: NURSE PRACTITIONER

## 2025-06-26 PROCEDURE — 99214 OFFICE O/P EST MOD 30 MIN: CPT | Performed by: NURSE PRACTITIONER

## 2025-06-26 RX ORDER — AVOBENZONE, HOMOSALATE, OCTISALATE, OCTOCRYLENE 30; 40; 45; 26 MG/ML; MG/ML; MG/ML; MG/ML
CREAM TOPICAL
Qty: 400 EACH | Refills: 2 | Status: SHIPPED | OUTPATIENT
Start: 2025-06-26

## 2025-06-26 RX ORDER — ACYCLOVIR 400 MG/1
1 TABLET ORAL
Qty: 9 EACH | Refills: 3 | Status: SHIPPED | OUTPATIENT
Start: 2025-06-26 | End: 2025-06-26

## 2025-06-26 RX ORDER — TIRZEPATIDE 15 MG/.5ML
INJECTION, SOLUTION SUBCUTANEOUS
COMMUNITY
Start: 2025-06-17

## 2025-06-26 RX ORDER — INSULIN GLARGINE 100 [IU]/ML
23 INJECTION, SOLUTION SUBCUTANEOUS EVERY EVENING
Qty: 30 ML | Refills: 0 | Status: SHIPPED | OUTPATIENT
Start: 2025-06-26 | End: 2025-09-24

## 2025-06-26 RX ORDER — ACYCLOVIR 400 MG/1
1 TABLET ORAL
Qty: 9 EACH | Refills: 3 | Status: SHIPPED | OUTPATIENT
Start: 2025-06-26

## 2025-06-26 NOTE — PROGRESS NOTES
The following individual(s) verbally consented to be recorded using ambient AI listening technology and understand that they can each withdraw their consent to this listening technology at any point by asking the clinician to turn off or pause the recording:    Patient name: Catina SHELBI Rivera  Additional names:

## 2025-06-26 NOTE — PROGRESS NOTES
[facility] Note    Name: Catina Rivera    Date: 06/26/25      Chief complaint: Follow - Up (PT here for routine T2DM f/u, per pt no current concerns or sx./Last A1c value was 5.9% done 3/10/2025.)       Subjective:    History of Present Illness  Penelope Rivera is a 55 year old female with type one diabetes who presents for a follow-up visit.    Her current A1c is 6.4, which she feels is higher than her usual range of 5.8 to 5.9. Previous A1c values were 6.9 and 5.9. She uses an insulin pump- Omnipod 5 and takes Mounjaro 15 mg every Sunday, Novolog vials, and has glucagon available at work, in her purse, and at home. She also has Toujeo as a backup in case of pump failure, though she is unsure if her prescription is current. She experienced a recent episode of hypoglycemia with a reading of 55, treated with sugar by her daughter. Occasional tingling in her feet is noted, which she attributes to being on her feet outside. She has a history of plantar fasciitis and sees a podiatrist, Dr. Landa.    No new vision changes are reported. She has had recent eye exams with her ophthalmologist, Dr. Ulloa, and retinal specialist, Dr. Barry. She has a history of retinopathy with several tears in the past but has not required injections.    She has a history of hyperlipidemia and is currently on atorvastatin, switched from simvastatin in March. Her last LDL was 104. She is also on levothyroxine 125 mcg daily and experiences constipation, which she manages with stool softeners and increased fiber intake. She is taking apixaban and diltiazem for her cardiac condition.    She recently completed a course of prednisone for vertigo, which she had not experienced in 25 years. She notes that her blood glucose levels were affected during this time, contributing to a higher A1c.    She uses the Omnipod system and has experienced issues with hypoglycemia when miscalculating carbohydrate intake. She is working on  adjusting her portion sizes and is using Stupil to track her diet.    DM meds at office visit:      MOUNJARO 15 MG/0.5ML Subcutaneous Solution Auto-injector (Taking) ADMINISTER 15 MG UNDER THE SKIN 1 TIME A WEEK AS DIRECTED every Sunday    NOVOLOG 100 UNIT/ML Injection Solution (Taking) INJECT UP TO 83 UNITS VIA INSULIN PUMP DAILY AS DIRECTED    glucagon (GVOKE HYPOPEN 2-PACK) 1 MG/0.2ML Subcutaneous injection (Taking As Needed) Inject 0.2 mL (1 mg total) into the skin as needed. If glucose less than 70 and cannot swallow anything by mouth. Patient trained on Gvoke. No substitutions    Insulin Glargine, 2 Unit Dial, (TOUJEO MAX SOLOSTAR) 300 UNIT/ML Subcutaneous Solution Pen-injector (Taking) USE AS DIRECTED UP TO 75 UNITS DAILY IN THE EVENT OF INSULIN PUMP FAILURE     - Current treatment: Omnipod 5 in Automode + Dexcom G7  Time Basal ICR ISF Active Insulin Time Target   12:00 am 2 unit/hr 11 30 3 hrs 110   5:30 am 1.1 11         1:00 pm 1.6 9         4:30 pm 1.9 9         6:30pm 1.6 11         9:30 pm 2 11         TDD 39.8          Continuous Glucose Monitoring Interpretation  Catina Rivera has undergone continuous glucose monitoring.  The blood glucose tracings were evaluated for two weeks prior to office visit. Blood glucose tracings demonstrated no significant hyperglycemia. There were occasional hypoglycemia, particularly post dinnerduring the weeks of evaluation.        History/Other:    Allergies, PMH, SocHx and FHx reviewed and updated as appropriate in Epic on    MOUNJARO 15 MG/0.5ML Subcutaneous Solution Auto-injector ADMINISTER 15 MG UNDER THE SKIN 1 TIME A WEEK AS DIRECTED      Insulin Glargine Solostar 100 UNIT/ML Subcutaneous Solution Pen-injector Inject 23 Units into the skin every evening. TDD max 25 units per day, as a pump back up plan, Actively titrating per endo instruction 30 mL 0    Lancets Does not apply Misc Check glucose 4x a day 400 each 2    Insulin Pen Needle 32G X 4 MM Does not  apply Misc Use to inject insulin 5 times per day. 450 each 1    Continuous Glucose Sensor (DEXCOM G7 SENSOR) Does not apply Misc 1 each Every 10 days. Use as directed every 10 days 9 each 3    ofloxacin 0.3 % Ophthalmic Solution Place 1 drop into the right eye 4 (four) times daily for 7 days. 5 mL 0    PREGABALIN 50 MG Oral Cap TAKE 1 CAPSULE(50 MG) BY MOUTH THREE TIMES DAILY 90 capsule 0    cyclobenzaprine 10 MG Oral Tab Take 1 tablet (10 mg total) by mouth 2 (two) times daily. 30 tablet 0    atorvastatin 20 MG Oral Tab TAKE 1 TABLET(20 MG) BY MOUTH EVERY NIGHT 90 tablet 1    meclizine 25 MG Oral Tab Take 1 tablet (25 mg total) by mouth 3 (three) times daily as needed. 20 tablet 0    BUPROPION  MG Oral Tablet 24 Hr TAKE 1 TABLET(300 MG) BY MOUTH DAILY 90 tablet 1    amoxicillin 500 MG Oral Cap Take 4 capsules (2000 mg) one time, one hour prior to dental procedure. 4 capsule 3    Rizatriptan Benzoate 10 MG Oral Tab Take 0.5 tablets (5 mg total) by mouth as needed for Migraine (max 20 mg per day). 10 tablet 0    ferrous sulfate 325 (65 FE) MG Oral Tab EC Take 1 tablet (325 mg total) by mouth daily with breakfast.      NOVOLOG 100 UNIT/ML Injection Solution INJECT UP TO 83 UNITS VIA INSULIN PUMP DAILY AS DIRECTED 75 mL 1    traMADol 50 MG Oral Tab Take 1 tablet (50 mg total) by mouth every 8 (eight) hours as needed for Pain. 30 tablet 0    cholecalciferol 25 MCG (1000 UT) Oral Tab Take 1 tablet (1,000 Units total) by mouth daily.      Multiple Vitamin (MULTIVITAMINS OR) Take by mouth.      Magnesium Cl-Calcium Carbonate 71.5-119 MG Oral Tab EC Take 2 tablets by mouth daily.      glucagon (GVOKE HYPOPEN 2-PACK) 1 MG/0.2ML Subcutaneous injection Inject 0.2 mL (1 mg total) into the skin as needed. If glucose less than 70 and cannot swallow anything by mouth. Patient trained on Gvoke. No substitutions 0.4 mL 1    Glucose Blood (BLOOD GLUCOSE TEST) In Vitro Strip Use to test BG 4x/day 400 strip 2    OneTouch Delica  Lancets 33G Does not apply Misc 1 Lancet by Finger stick route in the morning, at noon, in the evening, and at bedtime. 400 each 3    Insulin Disposable Pump (OMNIPOD 5 [id number] PODS GEN 5) Does not apply Misc 1 each every 3 (three) days. 30 each 1    levothyroxine 125 MCG Oral Tab Take 1 tablet (125 mcg total) by mouth before breakfast. 90 tablet 1    OMEPRAZOLE 40 MG Oral Capsule Delayed Release TAKE 1 CAPSULE(40 MG) BY MOUTH DAILY 90 capsule 0    albuterol (PROAIR HFA) 108 (90 Base) MCG/ACT Inhalation Aero Soln Inhale 2 puffs into the lungs every 4 (four) hours as needed for Wheezing. 1 each 0    apixaban 5 MG Oral Tab Take 1 tablet (5 mg total) by mouth 2 (two) times daily.      Calcium Carb-Cholecalciferol (CALCIUM + VITAMIN D3 OR) Take 1 tablet by mouth daily. Vitamin d 800 international unit and Calcium 600 mg      PARoxetine HCl 40 MG Oral Tab Take 1 tablet (40 mg total) by mouth every morning. 90 tablet 3    Insulin Glargine, 2 Unit Dial, (TOUJEO MAX SOLOSTAR) 300 UNIT/ML Subcutaneous Solution Pen-injector USE AS DIRECTED UP TO 75 UNITS DAILY IN THE EVENT OF INSULIN PUMP FAILURE 6 mL 0    Blood Glucose Monitoring Suppl (ONETOUCH VERIO FLEX SYSTEM) w/Device Does not apply Kit Use to test blood sugar 1x daily as needed to calibrate dexcom 1 kit 0    Insulin Disposable Pump (OMNIPOD 5 G6 INTRO, GEN 5,) Does not apply Kit 1 each every 3 (three) days. 1 kit 0    dilTIAZem HCl ER Beads 180 MG Oral Capsule SR 24 Hr Take 1 capsule (180 mg total) by mouth 2 (two) times daily.      estradiol 0.05 MG/24HR Transdermal Patch Weekly APPLY 1 PATCH EXTERNALLY TO THE SKIN EVERY TUESDAY. DO NOT CUT PATCH       Allergies   Allergen Reactions    Adhesive Tape HIVES     Paper tape is ok, clear tape=hives      Ibuprofen OTHER (SEE COMMENTS)     Asthma attack    Sulfa Antibiotics      Unknown; happened before adolescent period    Erythromycin Base NAUSEA ONLY    Oxycodone CONFUSION     Memory issues     Current Outpatient  Medications   Medication Sig Dispense Refill    MOUNJARO 15 MG/0.5ML Subcutaneous Solution Auto-injector ADMINISTER 15 MG UNDER THE SKIN 1 TIME A WEEK AS DIRECTED      Insulin Glargine Solostar 100 UNIT/ML Subcutaneous Solution Pen-injector Inject 23 Units into the skin every evening. TDD max 25 units per day, as a pump back up plan, Actively titrating per endo instruction 30 mL 0    Lancets Does not apply Misc Check glucose 4x a day 400 each 2    Insulin Pen Needle 32G X 4 MM Does not apply Misc Use to inject insulin 5 times per day. 450 each 1    Continuous Glucose Sensor (DEXCOM G7 SENSOR) Does not apply Misc 1 each Every 10 days. Use as directed every 10 days 9 each 3    ofloxacin 0.3 % Ophthalmic Solution Place 1 drop into the right eye 4 (four) times daily for 7 days. 5 mL 0    PREGABALIN 50 MG Oral Cap TAKE 1 CAPSULE(50 MG) BY MOUTH THREE TIMES DAILY 90 capsule 0    cyclobenzaprine 10 MG Oral Tab Take 1 tablet (10 mg total) by mouth 2 (two) times daily. 30 tablet 0    atorvastatin 20 MG Oral Tab TAKE 1 TABLET(20 MG) BY MOUTH EVERY NIGHT 90 tablet 1    meclizine 25 MG Oral Tab Take 1 tablet (25 mg total) by mouth 3 (three) times daily as needed. 20 tablet 0    BUPROPION  MG Oral Tablet 24 Hr TAKE 1 TABLET(300 MG) BY MOUTH DAILY 90 tablet 1    amoxicillin 500 MG Oral Cap Take 4 capsules (2000 mg) one time, one hour prior to dental procedure. 4 capsule 3    Rizatriptan Benzoate 10 MG Oral Tab Take 0.5 tablets (5 mg total) by mouth as needed for Migraine (max 20 mg per day). 10 tablet 0    ferrous sulfate 325 (65 FE) MG Oral Tab EC Take 1 tablet (325 mg total) by mouth daily with breakfast.      NOVOLOG 100 UNIT/ML Injection Solution INJECT UP TO 83 UNITS VIA INSULIN PUMP DAILY AS DIRECTED 75 mL 1    traMADol 50 MG Oral Tab Take 1 tablet (50 mg total) by mouth every 8 (eight) hours as needed for Pain. 30 tablet 0    cholecalciferol 25 MCG (1000 UT) Oral Tab Take 1 tablet (1,000 Units total) by mouth daily.       Multiple Vitamin (MULTIVITAMINS OR) Take by mouth.      Magnesium Cl-Calcium Carbonate 71.5-119 MG Oral Tab EC Take 2 tablets by mouth daily.      glucagon (GVOKE HYPOPEN 2-PACK) 1 MG/0.2ML Subcutaneous injection Inject 0.2 mL (1 mg total) into the skin as needed. If glucose less than 70 and cannot swallow anything by mouth. Patient trained on Gvoke. No substitutions 0.4 mL 1    Glucose Blood (BLOOD GLUCOSE TEST) In Vitro Strip Use to test BG 4x/day 400 strip 2    OneTouch Delica Lancets 33G Does not apply Misc 1 Lancet by Finger stick route in the morning, at noon, in the evening, and at bedtime. 400 each 3    Insulin Disposable Pump (OMNIPOD 5 [id number] PODS GEN 5) Does not apply Misc 1 each every 3 (three) days. 30 each 1    levothyroxine 125 MCG Oral Tab Take 1 tablet (125 mcg total) by mouth before breakfast. 90 tablet 1    OMEPRAZOLE 40 MG Oral Capsule Delayed Release TAKE 1 CAPSULE(40 MG) BY MOUTH DAILY 90 capsule 0    albuterol (PROAIR HFA) 108 (90 Base) MCG/ACT Inhalation Aero Soln Inhale 2 puffs into the lungs every 4 (four) hours as needed for Wheezing. 1 each 0    apixaban 5 MG Oral Tab Take 1 tablet (5 mg total) by mouth 2 (two) times daily.      Calcium Carb-Cholecalciferol (CALCIUM + VITAMIN D3 OR) Take 1 tablet by mouth daily. Vitamin d 800 international unit and Calcium 600 mg      PARoxetine HCl 40 MG Oral Tab Take 1 tablet (40 mg total) by mouth every morning. 90 tablet 3    Insulin Glargine, 2 Unit Dial, (TOUJEO MAX SOLOSTAR) 300 UNIT/ML Subcutaneous Solution Pen-injector USE AS DIRECTED UP TO 75 UNITS DAILY IN THE EVENT OF INSULIN PUMP FAILURE 6 mL 0    Blood Glucose Monitoring Suppl (ONETOUCH VERIO FLEX SYSTEM) w/Device Does not apply Kit Use to test blood sugar 1x daily as needed to calibrate dexcom 1 kit 0    Insulin Disposable Pump (OMNIPOD 5 G6 INTRO, GEN 5,) Does not apply Kit 1 each every 3 (three) days. 1 kit 0    dilTIAZem HCl ER Beads 180 MG Oral Capsule SR 24 Hr Take 1 capsule (180  mg total) by mouth 2 (two) times daily.      estradiol 0.05 MG/24HR Transdermal Patch Weekly APPLY 1 PATCH EXTERNALLY TO THE SKIN EVERY TUESDAY. DO NOT CUT PATCH       Past Medical History:    Anxiety    Anxiety state, unspecified    Arrhythmia    a fib    Asthma (HCC)    Atrial fibrillation with RVR (HCC)    Back pain    Back problem    Bad breath    Belching    Bloating    Decorative tattoo    Depression    Diabetes (HCC)    per pt    Disorder of thyroid    Esophageal reflux    GERD (gastroesophageal reflux disease)    Headache(784.0)    Heart palpitations    Afib    Heartburn    I have taken medication for over 10years    High cholesterol    History of depression    Hypothyroidism    Irregular bowel habits    Leaking of urine    Lipoma of unspecified site    Multiple thyroid nodules    on rt,2011, lt.-2008    Nontoxic multinodular goiter    Nontoxic multinodular goiter    Obesity    Other and unspecified hyperlipidemia    Pain in joints    Pain in right shin    Pain with bowel movements    Painful swallowing    Not consistent but not due to illness    Plantar fasciitis    Sleep apnea     CPAP     Stress    Thyroiditis, unspecified    Type 1 diabetes mellitus (HCC)    Uncomfortable fullness after meals    Visual impairment    Wears glasses    Weight loss    Working with weight loss clinic     Family History   Problem Relation Age of Onset    Skin cancer Mother     Other (Other) Mother     Heart Disease Father     Hypertension Father     Asthma Father     Diabetes Father     Other (Other) Father     Heart Attack Father         x2    Other (Other) Sister         Crohn's disease    Crohn's Disease Sister     Ulcerative Colitis Sister     Other (Other) Sister         Celiac Disease    Diabetes Maternal Grandmother     Other (Other) Maternal Grandmother         lymphoma    Asthma Other         3 children all have asthma.      Social history: Reviewed.      ROS/Exam    REVIEW OF SYSTEMS: Ten point review of systems has  been performed and is otherwise negative and/or non-contributory, except as described above.     VITALS  Vitals:    06/26/25 0940   BP: 126/78   Pulse: 74   SpO2: 98%   Weight: 247 lb (112 kg)   Height: 5' 2\" (1.575 m)       Body mass index is 45.18 kg/m².  Wt Readings from Last 6 Encounters:   06/26/25 247 lb (112 kg)   06/11/25 246 lb (111.6 kg)   06/01/25 246 lb (111.6 kg)   06/01/25 246 lb (111.6 kg)   05/13/25 243 lb (110.2 kg)   05/09/25 246 lb (111.6 kg)       PHYSICAL EXAM  CONSTITUTIONAL:  awake, alert, cooperative, no apparent distress, and appears stated age, obese  PSYCH: normal affect  LUNGS: breathing comfortably  CARDIOVASCULAR:  regular rate   NECK:  no palpable thyroid nodules   Diabetic feet exam:  Bilateral barefoot skin diabetic exam is normal, visualized feet and the appearance is normal.  Bilateral monofilament/sensation of both feet is normal.  Pulsation pedal pulse exam of both lower legs/feet is normal as well.  Vibration to dorsum to the first toe bilateral perceived.       Labs/Imaging:   Lab Results   Component Value Date    CHOLEST 178 03/27/2025    CHOLEST 153 01/14/2025    TRIG 69 03/27/2025    TRIG 62 01/14/2025    HDL 61 (H) 03/27/2025    HDL 60 (H) 01/14/2025     (H) 03/27/2025    LDL 80 01/14/2025     Lab Results   Component Value Date    MICROALBCREA  01/14/2025      Comment:      Unable to calculate due to Urine Microalbumin <0.3 mg/dL        MICROALBCREA  05/23/2024      Comment:      Unable to calculate due to Urine Microalbumin <0.5 mg/dL        Lab Results   Component Value Date    CREATSERUM 0.77 06/01/2025    CREATSERUM 0.90 01/14/2025    EGFRCR 92 06/01/2025    EGFRCR 77 01/14/2025     Lab Results   Component Value Date    AST 13 06/01/2025    AST 15 01/14/2025    ALT 18 06/01/2025    ALT 20 01/14/2025       Overall glucose control:  Lab Results   Component Value Date    A1C 6.4 (A) 06/26/2025    A1C 5.9 (H) 03/10/2025    A1C 6.1 (H) 01/29/2025    A1C 5.6 10/29/2024     A1C 5.6 07/25/2024       Supplementary Documentation:   -Surveillance for Diabetes Complications & Risks  Foot exam/neuropathy: Last Foot Exam: 06/26/25    Retinopathy screening: No data recordedNo data recorded  Dr Barry, retina last week in [facility]   Dr. Bustamante, opthal      Lipid screening:   Lab Results   Component Value Date     (H) 03/27/2025    TRIG 69 03/27/2025   Cholesterol Meds: atorvastatin Tabs - 20 MG      Nephropathy screening:   Lab Results   Component Value Date    EGFRCR 92 06/01/2025    MICROALBCREA  01/14/2025      Comment:      Unable to calculate due to Urine Microalbumin <0.3 mg/dL       No results found for: \"CREAUR\", \"MICROALBUMIN\", \"MALBCREACALC\"  Blood pressure control:   BP Readings from Last 1 Encounters:   06/26/25 126/78   BP Meds: dilTIAZem HCl ER Beads Cp24 - 180 MG       Assessment & Plan:   Overview:   1. Type 2 diabetes mellitus with complication, with long term current use of insulin pump (HCC) (Primary)  Overview:  DM hx:  -Diagnosed with diabetes in 2000 gestational DM - on insulin since shortly after dx (antibodies negative 2022) , c peptide detectable -7/5/22  -Family history- yes, mother and MGF      Previously trialed/failed DM meds: Metformin : severe GI     With complications of : Afib, nephropathy, retinopathy  Orders:  -     POC Hemoglobin A1C  -     Insulin Glargine Solostar; Inject 23 Units into the skin every evening. TDD max 25 units per day, as a pump back up plan, Actively titrating per endo instruction  Dispense: 30 mL; Refill: 0  -     Discontinue: Dexcom G7 Sensor; 1 each Every 10 days. Use as directed every 10 days  Dispense: 9 each; Refill: 3  -     Lancets; Check glucose 4x a day  Dispense: 400 each; Refill: 2  -     Insulin Pen Needle; Use to inject insulin 5 times per day.  Dispense: 450 each; Refill: 1  -     GLUC MNTR CONT REC FROM NTRSTL TISS FLU PHYS I&R  -     Dexcom G7 Sensor; 1 each Every 10 days. Use as directed  every 10 days  Dispense: 9 each; Refill: 3  2. Hyperlipidemia associated with type 2 diabetes mellitus (HCC)  Overview:  Simvastatin switched to atorvastatin last March 25, 2025  Orders:  -     Lipid Panel; Future; Expected date: 06/26/2025      Assessment & Plan  Type 1 Diabetes Mellitus  Type 1 diabetes mellitus with current A1c of 6.4%, slightly higher than her usual range of 5.8-5.9%, but still at goal. Recent increase in A1c likely due to a short course of prednisone for vertigo. Reports occasional hypoglycemia, particularly post-dinner, likely due to overestimation of carbohydrate intake. Uses Omnipod and Dexcom G7 for glucose management. No recent episodes of severe hypoglycemia requiring glucagon use.  Med changes:  - Continue Mounjaro 15 mg weekly on Sundays.  - Continue Current treatment: Omnipod 5 in Automode + Dexcom G7  Time Basal ICR ISF Active Insulin Time Target   12:00 am 2 unit/hr 11 30 3 hrs 110   5:30 am 1.1 11         1:00 pm 1.6 9         4:30 pm 1.9 9         6:30pm 1.6 11         9:30 pm 2 11           - Ensure availability of glucagon for emergencies.  - Prescribe Toujeo as a backup in case of pump failure,   Pump backup plan: Basal: 23 units/day; Prandial: ICR (Insulin to carb ratio) 1:10 g + ISF(Insulin sensitivity factor) 1:30 >110   - Monitor blood glucose levels and adjust sensitivity factor if post-dinner hypoglycemia persists.  - Order lancets and pen needles for glucose monitoring and insulin administration.  - Chronic constipation, possibly exacerbated by medications including Mounjaro and other treatments for knee issues. Managed with stool softeners and increased fiber intake.  - Continue using stool softeners as needed and Increase dietary fiber intake.  - Consider Metamucil or similar fiber supplemen  - continue Dexcom G7 CGM Omnipod 5  - patient is on insulin, and has suboptimal glycemic control including wide glycemic swings, thus would benefit from CGM  - continue to check BG  3x/day using glucometer or CGM  - has gvoke/glucagon at home  - no strict contraindication for GLP1a   - Discussed targeted glucose levels Plus symptoms and treatment of hypoglycemia w/ 15/15 rule for glucose 55-70 and 30/15 rule w/ glucose < 55. Also to f/u w/ protein or meal after glucose went up to 80.    - Discussed that if any surgery in the future, mounjaro has to be held 1 week prior surgery and to let me know ahead of time.     Hyperlipidemia  Hyperlipidemia with previous LDL of 104 mg/dL. Currently on atorvastatin. Goal LDL is below 70 mg/dL. I switched  simvastatin to atorvastatin last March 2025  - Order fasting lipid panel to assess current cholesterol levels.  - Hold any changes to atorvastatin dosage until lipid panel results are available.    Diabetic Retinopathy  Well-managed diabetic retinopathy with no new retinal tears. Regular follow-up with retinal specialist Dr. Barry and ophthalmologist Dr. Ulloa. Last ophthalmology visit was last week with stable findings.  - Obtain ophthalmology notes from Dr. Barry at [facility].  - Continue regular ophthalmology follow-ups.    Hypothyroidism  Hypothyroidism managed with levothyroxine 125 mcg daily. No symptoms of hypo- or hyperthyroidism.  - Continue levothyroxine 125 mcg daily on an empty stomach.  - Coordinate with primary care physician for ongoing management and medication refills per her choice, otherwise recommended to her to see one of endo physicians.     Plantar Fasciitis  - feet exam done normal today. Chronic plantar fasciitis managed with appropriate footwear. Sees podiatrist Dr. Landa for management. No current plans for surgical intervention per patient.  - Continue wearing supportive footwear.  - Follow up with podiatrist as needed.          Updated DM med list:   Diabetic Medications              MOUNJARO 15 MG/0.5ML Subcutaneous Solution Auto-injector (Taking) ADMINISTER 15 MG UNDER THE SKIN 1 TIME A WEEK AS DIRECTED    Insulin  Glargine Solostar 100 UNIT/ML Subcutaneous Solution Pen-injector (Taking) Inject 23 Units into the skin every evening. TDD max 25 units per day, as a pump back up plan, Actively titrating per endo instruction    NOVOLOG 100 UNIT/ML Injection Solution (Taking) INJECT UP TO 83 UNITS VIA INSULIN PUMP DAILY AS DIRECTED    glucagon (GVOKE HYPOPEN 2-PACK) 1 MG/0.2ML Subcutaneous injection (Taking As Needed) Inject 0.2 mL (1 mg total) into the skin as needed. If glucose less than 70 and cannot swallow anything by mouth. Patient trained on Gvoke. No substitutions    Insulin Glargine, 2 Unit Dial, (TOUJEO MAX SOLOSTAR) 300 UNIT/ML Subcutaneous Solution Pen-injector (Taking) USE AS DIRECTED UP TO 75 UNITS DAILY IN THE EVENT OF INSULIN PUMP FAILURE          See above header \"Supplementary Documentation\" for surveillance for diabetes complications & risks    Return in about 3 months (around 9/26/2025) for diabetes follow up.    LILIA Zheng  Endocrinology, Diabetes & Metabolism   06/26/25     Note to patient: The 21 Century Cures Act makes medical notes like these available to patients in the interest of transparency. However, be advised this is a medical document. It is intended as peer to peer communication. It is written in medical language and may contain abbreviations or verbiage that are unfamiliar. It may appear blunt or direct. Medical documents are intended to carry relevant information, facts as evident, and the clinical opinion of the practitioner.

## 2025-06-27 ENCOUNTER — APPOINTMENT (OUTPATIENT)
Dept: PHYSICAL THERAPY | Age: 55
End: 2025-06-27
Attending: PODIATRIST
Payer: COMMERCIAL

## 2025-07-01 ENCOUNTER — TELEPHONE (OUTPATIENT)
Dept: ORTHOPEDICS CLINIC | Facility: CLINIC | Age: 55
End: 2025-07-01

## 2025-07-01 ENCOUNTER — MED REC SCAN ONLY (OUTPATIENT)
Facility: CLINIC | Age: 55
End: 2025-07-01

## 2025-07-01 ENCOUNTER — TELEPHONE (OUTPATIENT)
Facility: CLINIC | Age: 55
End: 2025-07-01

## 2025-07-01 ENCOUNTER — PATIENT MESSAGE (OUTPATIENT)
Facility: CLINIC | Age: 55
End: 2025-07-01

## 2025-07-01 ENCOUNTER — OFFICE VISIT (OUTPATIENT)
Dept: PHYSICAL THERAPY | Age: 55
End: 2025-07-01
Attending: EMERGENCY MEDICINE
Payer: COMMERCIAL

## 2025-07-01 DIAGNOSIS — Z79.4 TYPE 2 DIABETES MELLITUS WITHOUT COMPLICATION, WITH LONG-TERM CURRENT USE OF INSULIN (HCC): ICD-10-CM

## 2025-07-01 DIAGNOSIS — E11.9 TYPE 2 DIABETES MELLITUS WITHOUT COMPLICATION, WITH LONG-TERM CURRENT USE OF INSULIN (HCC): ICD-10-CM

## 2025-07-01 DIAGNOSIS — Z96.41 INSULIN PUMP IN PLACE: ICD-10-CM

## 2025-07-01 DIAGNOSIS — M79.641 RIGHT HAND PAIN: Primary | ICD-10-CM

## 2025-07-01 PROCEDURE — 95992 CANALITH REPOSITIONING PROC: CPT

## 2025-07-01 NOTE — TELEPHONE ENCOUNTER
Eye exam data entered into diabetes flowsheet.   No Diabetic Retinopathy documented in eye exam.  Eye exam report sent to scanning, no further action required.   Closing encounter.

## 2025-07-01 NOTE — PROGRESS NOTES
Patient: Catina Rivera (55 year old, female) Referring Provider:  Insurance:   Diagnosis: Benign paroxysmal positional vertigo, unspecified laterality (H81.10) Monie S Tila  [facility]   Date of Surgery: No data recorded Next MD visit:  N/A   Precautions:  None No data recorded Referral Information:    Date of Evaluation: Req: 30, Auth: 30, Exp: 8/31/2025 06/24/25 POC Auth Visits:          Today's Date   7/1/2025    Subjective  Pt states she felt better after session until Sat or Sunday. Feeling more frequent spinning episodes but less intense with head turns. She reports back pain, neuro stim rapidly firing to try and calm things down.       Pain: 4/10     Objective  Roll test - B [place] - nystagmus B but \"it feels like it wants to\" R side Deep Head hang +            Assessment  Anterior canal +. Treated with Deep head hang handout given    Goals (to be met in 8 visits)   Pt to report ability to turn over in bed without dizziness.   Pt to report ability to bend forward to tie shoe laces without dizziness.   Pt to report ability to perform supine<>sit without dizziness.   Pt to report no c/o dizziness with positional changes x 30 days   Pt to report ability to turn head when called upon with minimal to no c/o dizziness   Pt to report ability to change speeds when walking with minimal to no c/o dizziness   Perform household chores with dizziness under 3/10 and without nausea    Pt to improve DHI by 5 points to improve community function.   Pt to be independent with HEP to self-manage symptoms and be able to return to prior level of function.            Plan  Address BPPV, VOR and gaze stabilization as appropriate for max functional return.    Treatment Last 4 Visits  Treatment Day: 2       6/24/2025 6/30/2025 7/1/2025   Vestibular Treatment   Neuro Re-Education Epley R PC 2x Epley R PC 2x Semont R and L - for symptoms  Deep head hang +  Treated with yacovino 2x   Evaluation Minutes 30     Canalith  Repositioning Minutes 15  38   Total Time Of Timed Procedures 0 0 0   Total Time Of Service-Based Procedures 45 0 38   Total Treatment Time 45 0 38        HEP    Deep head hang    Charges  1 canlith repositioning

## 2025-07-02 ENCOUNTER — APPOINTMENT (OUTPATIENT)
Dept: PHYSICAL THERAPY | Age: 55
End: 2025-07-02
Attending: PODIATRIST
Payer: COMMERCIAL

## 2025-07-02 NOTE — TELEPHONE ENCOUNTER
Endocrine Refill protocol for Omnipod insulin pumps and supplies    Protocol Criteria:  PASSED  Reason: N/A    If below requirement is met, send a 90-day supply with 1 refill per provider protocol.    Verify appointment with Endocrinology completed in the last 6 months or scheduled in the next 3 months.    Last completed office visit:6/26/2025 Jessica Dennis APRN   Last completed telemed visit: 3/25/2025 Jessica Dennis APRN  Next scheduled Follow up:   Future Appointments   Date Time Provider Department Center   7/7/2025  8:30 AM Curtis Daly MD [id number]   7/10/2025  2:00 PM Yessenia Muñiz, LCPC [facility] Y[facility] [place]   7/10/2025  5:15 PM Edgar Ledezma, PT [facility] Phys T [place] 7/11/2025  7:30 AM Cira Elkins, PTA [facility] [place] 7/11/2025 12:00 PM EM[facility] AUDIO [place] EMGAUDIONAPE [id number]   7/11/2025 12:20 PM Cordell Turk MD EMGOTONAPER [id number]   7/15/2025  3:45 PM Edgar Ledezma, PT [facility] [place] 7/17/2025  3:45 PM Edgar Ledezma, PT [facility] [place]   7/21/2025  3:15 PM Edgar Ledezma, PT [facility] [place] 8/14/2025  8:40 AM YK RAJAN RM1 YKMAM [place]   9/5/2025  3:15 PM Ashish Horvath MD [facility] DERM [facility] [place]   9/25/2025  8:45 AM Jessica Dennis APRN EMGENDO [facility] [place]   11/4/2025  4:00 PM Pricila Shi APRN [facility]I [facility]

## 2025-07-03 RX ORDER — INSULIN PMP CART,AUT,G6/7,CNTR
1 EACH SUBCUTANEOUS EVERY OTHER DAY
Qty: 45 EACH | Refills: 1 | Status: SHIPPED | OUTPATIENT
Start: 2025-07-03

## 2025-07-07 ENCOUNTER — APPOINTMENT (OUTPATIENT)
Dept: PHYSICAL THERAPY | Age: 55
End: 2025-07-07
Attending: PODIATRIST
Payer: COMMERCIAL

## 2025-07-07 ENCOUNTER — OFFICE VISIT (OUTPATIENT)
Dept: ORTHOPEDICS CLINIC | Facility: CLINIC | Age: 55
End: 2025-07-07
Payer: COMMERCIAL

## 2025-07-07 VITALS — WEIGHT: 247 LBS | BODY MASS INDEX: 45.45 KG/M2 | HEIGHT: 62 IN

## 2025-07-07 DIAGNOSIS — M19.049 ARTHRITIS OF FINGER: Primary | ICD-10-CM

## 2025-07-07 PROCEDURE — 3008F BODY MASS INDEX DOCD: CPT | Performed by: ORTHOPAEDIC SURGERY

## 2025-07-07 PROCEDURE — 99214 OFFICE O/P EST MOD 30 MIN: CPT | Performed by: ORTHOPAEDIC SURGERY

## 2025-07-07 NOTE — PROGRESS NOTES
SURGICAL BOOKING SHEET   Name: Catina Rivera  MRN: [id number]   : 3/12/1970    Surgical Date:   2025   Surgical Consent:   Left small finger DIP fusion   Diagnosis:      ICD-10-CM   1. Arthritis of finger  M19.049       Workers Comp: No   Procedure Codes:   Arthrodesis - interphalangeal joint w/ or w/o internal fixation (CPT 37598)   Disposition:   Outpatient   Operative Time:   45 mins   Antibiotics:   Ancef 3g   Anesthesia Type:   Monitored Anesthesia Care (MAC)   Clearance:    MEDICAL CLEARANCE   Equipment:   [employer] Arthrodesis Screw, Local Pre-mix, Small Power, Mini C-arm, Suture: 4-0 nylon monocryl, Bacitrain, Adaptic, 1 inch carlos, 1 inch Alumafoam splint, 1 inch coban    Assistant:   Addy Assistant: Yes, Omayra Lyn    Follow Up:   10-14 days post op with Omayra   Pain Medication:   Norco 325-5mg   Therapy:   [facility]

## 2025-07-07 NOTE — PROGRESS NOTES
Clinic Note       Assessment/Plan:  55 year old female    s/p left middle finger DIP joint fusion on 9/15/2021 -fused, functionally doing  Status post left ring finger DIP joint fusion 11/17/2023-fused, function doing well  Left small finger DIP joint osteoarthritis-patient reports increased symptoms and affecting activities of daily living.  She is interested in a DIP fusion which she has done well with.  We reviewed the risk associate surgery, SPECT outcomes, time recovery and the possible need for therapy postoperatively.    Follow Up: September 18 for surgery    Diagnostic Studies:     XR Left small finger 3 views: Advanced DIP joint arthritis       Physical Exam:     Ht 5' 2\" (1.575 m)   Wt 247 lb (112 kg)   LMP 09/24/2019 (Approximate)   BMI 45.18 kg/m²     Constitutional: NAD. AOx3. Well-developed and Well-nourished.   Psychiatric: Normal mood/ affect/ behavior. Judgment and thought content normal.     Left Upper Extremity:     Inspection    Incisions healing well with no signs of infection. Palpation    Tenderness to palpation of the DIP joint      ROM    Full composite fist., Normal symmetric wrist motion., and Normal symmetric elbow motion.    Left small finger DIP 0 to 20 degree motion     Neurovascular    Normal sensation in the median, ulnar, and radial nerve distribution. Normal motor function of muscles innervated by median/AIN, ulnar, and radial/PIN nerves.    Normally perfused hand(s).     Special    None            CC: Left pinky finger pain    HPI: 55-year-old female right-hand-dominant with progressively worsening left small finger PIP joint pain.  Affecting activities of daily living.  Describes the pain as sharp aching in quality.  Started in March.  Moderate intensity.    History/Other:   Past Medical History:    Anxiety    Anxiety state, unspecified    Arrhythmia    a fib    Asthma (HCC)    Atrial fibrillation with RVR (HCC)    Back pain    Back problem    Bad breath    Belching     Bloating    Decorative tattoo    Depression    Diabetes (HCC)    per pt    Disorder of thyroid    Esophageal reflux    GERD (gastroesophageal reflux disease)    Headache(784.0)    Heart palpitations    Afib    Heartburn    I have taken medication for over 10years    High cholesterol    History of depression    Hypothyroidism    Irregular bowel habits    Leaking of urine    Lipoma of unspecified site    Multiple thyroid nodules    on rt,2011, lt.-2008    Nontoxic multinodular goiter    Nontoxic multinodular goiter    Obesity    Other and unspecified hyperlipidemia    Pain in joints    Pain in right shin    Pain with bowel movements    Painful swallowing    Not consistent but not due to illness    Plantar fasciitis    Sleep apnea     CPAP     Stress    Thyroiditis, unspecified    Type 1 diabetes mellitus (HCC)    Uncomfortable fullness after meals    Visual impairment    Wears glasses    Weight loss    Working with weight loss clinic     Past Surgical History:   Procedure Laterality Date    Arthroscopy of joint unlisted Bilateral     knee    Carpal tunnel release Bilateral     Cholecystectomy      Colonoscopy N/A 10/21/2016    Procedure: COLONOSCOPY;  Surgeon: Brandt Ramirez DO;  Location:  ENDOSCOPY    Colonoscopy N/A 12/27/2022    Procedure: COLONOSCOPY;  Surgeon: Aiden Pollard MD;  Location:  ENDOSCOPY    Cyst aspiration left      Cyst aspiration right      Hysterectomy      7/21    Laparoscopic cholecystectomy  11/14/2011    Lipoma removal  04/29/2022    left posterior    Mesh (implantable)      bladder    Neuromuscular stimulator      Oophorectomy Bilateral     Other      wisdom    Other      lt hand middle finger sx    Other surgical history  x2    total thyroidectomy    Other surgical history  x1    sinus    Other surgical history  10/11/2023    trial for neurostimulator    Other surgical history      Neuro Nerve Stimulator    Removal gallbladder      Sinus surgery        Sling oper stres incontinence   01/09/2014    Procedure: SLING UROLOGY;  Surgeon: Isac Sutherland MD;  Location: [facility]    Thyroidectomy      Total abdom hysterectomy  07/2020     Current Outpatient Medications   Medication Sig Dispense Refill    Insulin Disposable Pump (OMNIPOD 5 [id number] PODS GEN 5) Does not apply Misc 1 each every other day. 45 each 1    MOUNJARO 15 MG/0.5ML Subcutaneous Solution Auto-injector ADMINISTER 15 MG UNDER THE SKIN 1 TIME A WEEK AS DIRECTED      Insulin Glargine Solostar 100 UNIT/ML Subcutaneous Solution Pen-injector Inject 23 Units into the skin every evening. TDD max 25 units per day, as a pump back up plan, Actively titrating per endo instruction 30 mL 0    Lancets Does not apply Misc Check glucose 4x a day 400 each 2    Insulin Pen Needle 32G X 4 MM Does not apply Misc Use to inject insulin 5 times per day. 450 each 1    Continuous Glucose Sensor (DEXCOM G7 SENSOR) Does not apply Misc 1 each Every 10 days. Use as directed every 10 days 9 each 3    PREGABALIN 50 MG Oral Cap TAKE 1 CAPSULE(50 MG) BY MOUTH THREE TIMES DAILY 90 capsule 0    cyclobenzaprine 10 MG Oral Tab Take 1 tablet (10 mg total) by mouth 2 (two) times daily. 30 tablet 0    atorvastatin 20 MG Oral Tab TAKE 1 TABLET(20 MG) BY MOUTH EVERY NIGHT 90 tablet 1    meclizine 25 MG Oral Tab Take 1 tablet (25 mg total) by mouth 3 (three) times daily as needed. 20 tablet 0    BUPROPION  MG Oral Tablet 24 Hr TAKE 1 TABLET(300 MG) BY MOUTH DAILY 90 tablet 1    amoxicillin 500 MG Oral Cap Take 4 capsules (2000 mg) one time, one hour prior to dental procedure. 4 capsule 3    Rizatriptan Benzoate 10 MG Oral Tab Take 0.5 tablets (5 mg total) by mouth as needed for Migraine (max 20 mg per day). 10 tablet 0    ferrous sulfate 325 (65 FE) MG Oral Tab EC Take 1 tablet (325 mg total) by mouth daily with breakfast.      NOVOLOG 100 UNIT/ML Injection Solution INJECT UP TO 83 UNITS VIA INSULIN PUMP DAILY AS DIRECTED 75 mL 1    traMADol 50 MG Oral Tab  Take 1 tablet (50 mg total) by mouth every 8 (eight) hours as needed for Pain. 30 tablet 0    cholecalciferol 25 MCG (1000 UT) Oral Tab Take 1 tablet (1,000 Units total) by mouth daily.      Multiple Vitamin (MULTIVITAMINS OR) Take by mouth.      Magnesium Cl-Calcium Carbonate 71.5-119 MG Oral Tab EC Take 2 tablets by mouth daily.      glucagon (GVOKE HYPOPEN 2-PACK) 1 MG/0.2ML Subcutaneous injection Inject 0.2 mL (1 mg total) into the skin as needed. If glucose less than 70 and cannot swallow anything by mouth. Patient trained on Gvoke. No substitutions 0.4 mL 1    Glucose Blood (BLOOD GLUCOSE TEST) In Vitro Strip Use to test BG 4x/day 400 strip 2    OneTouch Delica Lancets 33G Does not apply Misc 1 Lancet by Finger stick route in the morning, at noon, in the evening, and at bedtime. 400 each 3    levothyroxine 125 MCG Oral Tab Take 1 tablet (125 mcg total) by mouth before breakfast. 90 tablet 1    OMEPRAZOLE 40 MG Oral Capsule Delayed Release TAKE 1 CAPSULE(40 MG) BY MOUTH DAILY 90 capsule 0    albuterol (PROAIR HFA) 108 (90 Base) MCG/ACT Inhalation Aero Soln Inhale 2 puffs into the lungs every 4 (four) hours as needed for Wheezing. 1 each 0    apixaban 5 MG Oral Tab Take 1 tablet (5 mg total) by mouth 2 (two) times daily.      Calcium Carb-Cholecalciferol (CALCIUM + VITAMIN D3 OR) Take 1 tablet by mouth daily. Vitamin d 800 international unit and Calcium 600 mg      PARoxetine HCl 40 MG Oral Tab Take 1 tablet (40 mg total) by mouth every morning. 90 tablet 3    Insulin Glargine, 2 Unit Dial, (TOUJEO MAX SOLOSTAR) 300 UNIT/ML Subcutaneous Solution Pen-injector USE AS DIRECTED UP TO 75 UNITS DAILY IN THE EVENT OF INSULIN PUMP FAILURE 6 mL 0    Blood Glucose Monitoring Suppl (ONETOUCH VERIO FLEX SYSTEM) w/Device Does not apply Kit Use to test blood sugar 1x daily as needed to calibrate dexcom 1 kit 0    Insulin Disposable Pump (OMNIPOD 5 G6 INTRO, GEN 5,) Does not apply Kit 1 each every 3 (three) days. 1 kit 0     dilTIAZem HCl ER Beads 180 MG Oral Capsule SR 24 Hr Take 1 capsule (180 mg total) by mouth 2 (two) times daily.      estradiol 0.05 MG/24HR Transdermal Patch Weekly APPLY 1 PATCH EXTERNALLY TO THE SKIN EVERY TUESDAY. DO NOT CUT PATCH       Allergies   Allergen Reactions    Adhesive Tape HIVES     Paper tape is ok, clear tape=hives      Ibuprofen OTHER (SEE COMMENTS)     Asthma attack    Sulfa Antibiotics      Unknown; happened before adolescent period    Erythromycin Base NAUSEA ONLY    Oxycodone CONFUSION     Memory issues     Family History   Problem Relation Age of Onset    Skin cancer Mother     Other (Other) Mother     Heart Disease Father     Hypertension Father     Asthma Father     Diabetes Father     Other (Other) Father     Heart Attack Father         x2    Other (Other) Sister         Crohn's disease    Crohn's Disease Sister     Ulcerative Colitis Sister     Other (Other) Sister         Celiac Disease    Diabetes Maternal Grandmother     Other (Other) Maternal Grandmother         lymphoma    Asthma Other         3 children all have asthma.      Social History     Occupational History    Occupation:      Comment: Convenience    Tobacco Use    Smoking status: Former     Current packs/day: 0.00     Types: Cigarettes     Start date: 3/1/2017     Quit date: 3/1/2017     Years since quittin.3     Passive exposure: Never    Smokeless tobacco: Never    Tobacco comments:     non-smoker     Updated 24   Vaping Use    Vaping status: Never Used   Substance and Sexual Activity    Alcohol use: Yes     Comment: rare    Drug use: Yes     Comment: CBD,CBG  Doctors are fully aware    Sexual activity: Yes      Assessment       Review of Systems (negative unless bolded):  General: fevers, chills, fatigue  CV:  chest pain, palpitations, leg swelling  Msk: bodyaches, neck pain, neck stiffness  Skin: rashes, open wounds, nonhealing ulcers  Hem: bleeds easily, bruise easily,  immunocompromised  Neuro: dizziness, light headedness, headaches  Psych: anxious, depressed, anger issues      Curtis Daly MD   Hand, Wrist, & Elbow Surgery  [email or web address]  t: [phone]  f: [phone]

## 2025-07-10 ENCOUNTER — OFFICE VISIT (OUTPATIENT)
Dept: PHYSICAL THERAPY | Age: 55
End: 2025-07-10
Attending: EMERGENCY MEDICINE
Payer: COMMERCIAL

## 2025-07-10 ENCOUNTER — TELEPHONE (OUTPATIENT)
Dept: FAMILY MEDICINE CLINIC | Facility: CLINIC | Age: 55
End: 2025-07-10

## 2025-07-10 DIAGNOSIS — M72.2 PLANTAR FASCIITIS: Primary | ICD-10-CM

## 2025-07-10 PROCEDURE — 97112 NEUROMUSCULAR REEDUCATION: CPT

## 2025-07-10 PROCEDURE — 97161 PT EVAL LOW COMPLEX 20 MIN: CPT

## 2025-07-10 PROCEDURE — 97110 THERAPEUTIC EXERCISES: CPT

## 2025-07-10 NOTE — PROGRESS NOTES
LOWER EXTREMITY EVALUATION:     Diagnosis:   Plantar fasciitis (M72.2) Patient:  Catina Rivera (55 year old, female)        Referring Provider: Ruslan Connelly  Today's Date   7/10/2025    Precautions:      Date of Evaluation: 07/10/25  Next MD visit: 3/13/25  Date of Surgery: 1/29/25     PATIENT SUMMARY     Summary of chief complaints:    History of current condition: Patient reports she has had B foot pain since March of 2025 following knee replacement surgery. She has a lot of difficulty w/ being on her feet for prolonged periods which is challenging as she works in  and is standing all work. She has so much pain that she has to wear shoes in house when she gets to get up and go to the bathroom at night. She has had injections in feet long ago in past, but did not feel this was helpful. She has used a night splint in the past and feels this was helpful. She has more pain in her L foot than R, but more numbness in R than L. She has gotten to the point that pain is constant Has orthotics, rotates shoes, has [employer] for work.   Pain level: current 5 /10, at best 2 /10, at worst 6 /10  Description of symptoms: sharp aching pain, constant; added numbness w/ prolonged standing   Occupation: street outreach , manager at [employer], therapist/counseling   Leisure activities/Hobbies: swimming and pool workouts   Prior level of function:    Current limitations: prolonged standing/walking, standing after prolonged rest  Pt goals: reduce foot pain, tolerate standing and walking more  Past medical history was reviewed with Catina.  Significant findings include: extensive surgical history (thyroid, hysterectomy, gallbladder), she has had a neurostimulator put in her spine due to radicular symptoms, 20 surgeries  Imaging/Tests:     Catina  has a past medical history of Anxiety, Anxiety state, unspecified, Arrhythmia, Asthma ([facility]), Atrial fibrillation with RVR ([facility]) (11/01/2022), Back pain, Back problem, Bad  breath, Belching, Bloating, Decorative tattoo, Depression, Diabetes (HCC), Disorder of thyroid, Esophageal reflux (05/07/2012), GERD (gastroesophageal reflux disease), Headache(784.0) (04/24/2012), Heart palpitations (11/1/2022), Heartburn, High cholesterol, History of depression, Hypothyroidism, Irregular bowel habits, Leaking of urine, Lipoma of unspecified site (11/22/2011), Multiple thyroid nodules, Nontoxic multinodular goiter, Nontoxic multinodular goiter (07/05/2011), Obesity, Other and unspecified hyperlipidemia (07/11/2011), Pain in joints, Pain in right shin (11/13/2022), Pain with bowel movements, Painful swallowing, Plantar fasciitis, Sleep apnea, Stress, Thyroiditis, unspecified (06/25/2011), Type 1 diabetes mellitus (HCC), Uncomfortable fullness after meals, Visual impairment, Wears glasses, and Weight loss.  She  has a past surgical history that includes laparoscopic cholecystectomy (11/14/2011); cyst aspiration left; cyst aspiration right; other surgical history (x2); other surgical history (x1); sling oper stres incontinence (01/09/2014); mesh (implantable); thyroidectomy; cholecystectomy; other; colonoscopy (N/A, 10/21/2016); removal gallbladder; total abdom hysterectomy (07/2020); carpal tunnel release (Bilateral); arthroscopy of joint unlisted (Bilateral); other; lipoma removal (04/29/2022); oophorectomy (Bilateral); hysterectomy; colonoscopy (N/A, 12/27/2022); neuromuscular stimulator; sinus surgery  ; other surgical history (10/11/2023); and other surgical history.    ASSESSMENT  Catina presents to physical therapy evaluation with primary c/o  . The results of the objective tests and measures show foot/ankle weakness, impaired foot/ankle endurance, impaired hip strength. Functional deficits include but are not limited to prolonged standing/walking, standing after prolonged rest. Signs and symptoms are consistent with diagnosis of Plantar fasciitis (M72.2). Pt and PT discussed evaluation  findings, pathology, POC and HEP.  Pt voiced understanding and performs HEP correctly without reported pain. Skilled Physical Therapy is medically necessary to address the above impairments and reach functional goals.      Patient demonstrates significant deficits in LE strength, especially L hip and foot/ankle complex. Most notably she is unable to perform single leg calf raise on either leg. Significantly challenged as well with single leg balance, suggesting poor foot intrinsic strength/control. She has difficulty performing lunge pattern movements, especially when focusing on allowing pronation, as she cannot adequately access this position and reverse motion out of it due to foot/ankle weakness. Patient is in agreement w/ plan for progressive foot/ankle strengthening to improve tolerance to demands of WB/walking activity as required for work tasks and achieve established goals.    Improvements are expected in pain, strength in response to treatment. PT intervention is required to provide manual therapy for pain modulation/neuromuscular facilitation and to appropriately progress exercise intervention to directly improve condition and aid in achievement of the goals established in this plan of care. Modalities will be used PRN for symptom modulation to facilitate active interventions and overall goal attainment. The patient does not possess adequate knowledge to improve their condition on their own.  Answered all patient questions.       OBJECTIVE:      Musculoskeletal      ROM and Strength:     Lower Extremity Strength   LEFT RIGHT   Hip Flexion 5 5   Hip Extension 4 4+   Hip ABD 4- 4-   Hip ADD 4- 4   Knee Extension 4+ 5   Knee Flexion 5 5   Ankle DF 4+ 5   Ankle INV 4 5   Ankle EVER 4 4+       Balance and Functional Mobility:  -gait quality: patient demonstrates genu varus B L>R; avoidant of pronation in loading phase, keeps weight toward lateral border of feet  -standing toe touch: fingertips to toes, no  pain  -standing trunk extension: 30 deg past neutral, no pain  -standing trunk sidebend: 35 deg B, no pain  -standing trunk rotation: 70 deg B, no pain, reduced pronation on B feet when turning, more limited on L than R  -squat: descends to ~70 deg of knee flexion; lateral shift is R  -single leg squat: descends to 40 deg knee flexion on L, unable on R; motion control quality is fair; limited by weakness and pain, more on R  -DF wall lunge test: L 4 inches from wall, R 4.5 inches from wall  -lunge to floor: able w/ either leg fwd, but requires BUE assist, more difficult w/ L fwd due to weakness and due to hesitation putting weight on R knee due to past TKA  -single leg calf raise: L unable, R unable  -single leg balance: L 4 sec, R 7 sec     Today's Treatment and Response:   Pt education was provided on exam findings, treatment diagnosis, treatment plan, expectations, and prognosis.    Today's Treatment       7/10/2025   LE Treatment   Neuro Re-Education *Neuromuscular facilitation - 15 min*; multiple reps performed for each  The patient's ability to perform a quality isometric contraction against manual resistance with specific muscles was assessed. Patient was passively placed in testing positions to prevent compensatory muscle use to attain starting position. The muscles listed below were found to be inhibited. PT provided brief, targeted palpation of the distal and proximal muscle belly w/ deep pressure to stimulate the muscle with mechanical/sensory input to increase neuromuscular junction activity and improve contraction quality. PT performed repetitive re-testing of isometric contraction quality specific to targeted muscle after palpation until the desired effect was observed.    Lower Extremity  -B glute med  -B glute min  -L adductor thu  -L adductor longus  -L adductor brevis  -L soleus  -L tibialis  posterior      -------------------------------------------------------------------------------------------------------------------------     Therapeutic Exercise Minutes 10   Neuro Re-Educ Minutes 10   Evaluation Minutes 20   Total Time Of Timed Procedures 20   Total Time Of Service-Based Procedures 20   Total Treatment Time 40   HEP Access Code: [email or web address]  URL: [email or web address]  Date: 07/10/2025  Prepared by: Edgar Ledezma    Exercises  - Straight Leg Raise with External Rotation  - 1 x daily - 7 x weekly - 3 sets - 15 reps  - Sidelying Hip Abduction  - 1 x daily - 7 x weekly - 3 sets - 15 reps  - Standing Heel Raises  - 1 x daily - 7 x weekly - 3 sets - 30 reps  - Mini Lunge  - 1 x daily - 7 x weekly - 2 sets - 10 reps        Patient was instructed in and issued a HEP for: Access Code: [email or web address]  URL: [email or web address]  Date: 07/10/2025  Prepared by: Edgar Boniccien    Exercises  - Straight Leg Raise with External Rotation  - 1 x daily - 7 x weekly - 3 sets - 15 reps  - Sidelying Hip Abduction  - 1 x daily - 7 x weekly - 3 sets - 15 reps  - Standing Heel Raises  - 1 x daily - 7 x weekly - 3 sets - 30 reps  - Mini Lunge  - 1 x daily - 7 x weekly - 2 sets - 10 reps    Charges:  PT EVAL: Low Complexity, 1NMR, 1TE, eval  In agreement with evaluation findings and clinical rationale, this evaluation involved LOW COMPLEXITY decision making due to no personal factors/comorbidities, 1-2 body structures involved/activity limitations, and stable symptoms as documented in the evaluation.                                                                                                                PLAN OF CARE:      Goals: (to be met in 12 visits)   STG  -Patient will report 40% improvement in symptoms as evidence of subjective improvement in overall condition.  -Patient will increase strength grade of all tested LE muscle groups to 4+/5 to improve performance of activities requiring  change of position or moving the body through space (position transfers, walking, running, jumping, stairs, etc).    LTG  -Patient will report 70% improvement in symptoms as evidence of subjective improvement in overall condition.  -Patient will be able to perform a double leg squat to 95 deg knee flexion without lateral shift as evidence of improved movement capacity.  -Patient will be able to perform single leg squat on either leg to 50 deg knee flexion w/ good motion control as evidence of improved joint and muscle function.  -Patient will be able to complete lunge to floor and return to standing with single UE assistance as evidence of improved functional strength for transfers.  -Patient will improve dorsiflexion wall lunge test to complete w/ foot flat on ground and toes 5 inches from wall on B side(s) to allow for improved joint mobility and lower extremity function during activities such as squatting, lunging, walking, or climbing stairs.  -Patient will be able to perform single leg calf raise for 5 reps on B side(s) as evidence of strength and endurance for normal gait mechanics and preparedness for jogging/hopping activity.  -Patient will increase strength grade of all tested LE muscle groups to 5/5 to improve performance of activities requiring change of position or moving the body through space (position transfers, walking, running, jumping, stairs, etc).  -Patient will achieve a score on the Lower Extremity Functional Scale of 82% as evidence of improved functional capacity during everyday activity.  -Patient will demonstrate and/or verbalize competence in performance of advanced HEP to be able to progress exercise on their own following discharge from physical therapy.         Frequency / Duration: Patient will be seen 1-2x/week or a total of 12  visits over a 90 day period. Treatment will include: Manual Therapy; Neuromuscular Re-education; Therapeutic Exercise; Therapeutic Activities; Home Exercise  Program instruction; Patient/Family Education    Education or treatment limitation: None   Rehab Potential: fair     LEFS Score  LEFS Score: (Patient-Rptd) 75 % (7/10/2025 12:55 PM)      Patient/Family/Caregiver was advised of these findings, precautions, and treatment options and has agreed to actively participate in planning and for this course of care.    Thank you for your referral. Please co-sign or sign and return this letter via fax as soon as possible to [phone]. If you have any questions, please contact me at Dept: [phone]    Sincerely,  Electronically signed by therapist: Edgar Ledezma PT  Physician's certification required: Yes  I certify the need for these services furnished under this plan of treatment and while under my care.    X___________________________________________________ Date____________________    Certification From: 7/10/2025  To: 10/8/2025

## 2025-07-10 NOTE — TELEPHONE ENCOUNTER
Patient having hand surgery 9/18/25 with Dr Curtis Daly  Needs clearance, CBC, BMP and EKG  Form in nurse pending folder    Future Appointments   Date Time Provider Department Center   7/10/2025  2:00 PM Yessenia Muñiz LCPC [facility] [place]   7/10/2025  5:15 PM Edgar Ledezma, PT [facility] [place]   7/11/2025  7:30 AM Cira Elkins, PTA [facility] [place]   7/11/2025 12:00 PM EM[facility] AUDIO [facility] EM[facility]UD[facility] [id number]   7/11/2025 12:20 PM Cordell Turk MD EMGOTONAPER [id number]   7/15/2025  3:45 PM Edgar Ledezma, PT [facility] [place]   7/17/2025  3:45 PM Edgar Ledezma, PT [facility] [place]   7/21/2025  3:15 PM Edgar Ledezma, PT [facility] [place]   8/14/2025  8:40 AM YK RAJAN RM1 Y[facility] [place]   9/5/2025  3:15 PM Ashish Horvath MD [facility] [place]   9/25/2025  8:45 AM Jessica Dennis APRN EMG[facility]O [facility] [place]   9/29/2025  8:20 AM Omayra Lyn PA [facility] [id number]   11/4/2025  4:00 PM Pricila Shi APRN [facility] [address]

## 2025-07-11 ENCOUNTER — OFFICE VISIT (OUTPATIENT)
Facility: LOCATION | Age: 55
End: 2025-07-11
Payer: COMMERCIAL

## 2025-07-11 ENCOUNTER — OFFICE VISIT (OUTPATIENT)
Dept: PHYSICAL THERAPY | Age: 55
End: 2025-07-11
Attending: PODIATRIST
Payer: COMMERCIAL

## 2025-07-11 DIAGNOSIS — H93.13 TINNITUS OF BOTH EARS: ICD-10-CM

## 2025-07-11 DIAGNOSIS — R42 VERTIGO: Primary | ICD-10-CM

## 2025-07-11 DIAGNOSIS — H93.293 ABNORMAL AUDITORY PERCEPTION OF BOTH EARS: ICD-10-CM

## 2025-07-11 DIAGNOSIS — H90.3 SENSORINEURAL HEARING LOSS (SNHL) OF BOTH EARS: ICD-10-CM

## 2025-07-11 PROCEDURE — 92567 TYMPANOMETRY: CPT | Performed by: AUDIOLOGIST

## 2025-07-11 PROCEDURE — 97110 THERAPEUTIC EXERCISES: CPT

## 2025-07-11 PROCEDURE — 92557 COMPREHENSIVE HEARING TEST: CPT | Performed by: AUDIOLOGIST

## 2025-07-11 PROCEDURE — 99203 OFFICE O/P NEW LOW 30 MIN: CPT | Performed by: OTOLARYNGOLOGY

## 2025-07-11 PROCEDURE — 97140 MANUAL THERAPY 1/> REGIONS: CPT

## 2025-07-11 NOTE — PROGRESS NOTES
Otolaryngology Consultation Note     Reason for consultation: vertigo  Consulting physician and service: Essence Brito MD      HPI: 54 y/o F presents with h/o vertigo. Underwent PT 20+ years ago, vertigo resolved. Recently notes recurrent symptoms 2 months ago. Started early  and has persisted since then. Recently restarted PT which helps. Also takes Meclizine prn. Previously had daily episodes, now only occurs a few times per week. Most episodes associated with head movement, resolves after a few seconds. Positive bilateral tinnitus, constant, non-pulsatile. No noticeable hearing loss. No otalgia, otorrhea or aural fullness. Positive \"popping\" in right ear. No other complaints.      Past Medical History: Past Medical History[1]     Past Surgical History: Past Surgical History[2]     Medication: Scheduled Meds:Scheduled Medications[3]  Continuous Infusions:Medication Infusions[4]  PRN Meds:.PRN Medications[5]     Allergies:  Allergies[6]  Pertinent Family History: Family History[7]     Pertinent Social History:   Social History     Socioeconomic History    Marital status:      Spouse name: Not on file    Number of children: 3    Years of education: Not on file    Highest education level: Not on file   Occupational History    Occupation:      Comment: Convenience    Tobacco Use    Smoking status: Former     Current packs/day: 0.00     Types: Cigarettes     Start date: 3/1/2017     Quit date: 3/1/2017     Years since quittin.3     Passive exposure: Never    Smokeless tobacco: Never    Tobacco comments:     non-smoker     Updated 24   Vaping Use    Vaping status: Never Used   Substance and Sexual Activity    Alcohol use: Yes     Comment: rare    Drug use: Yes     Comment: CBD,CBG  Doctors are fully aware    Sexual activity: Yes   Other Topics Concern    Caffeine Concern Not Asked    Exercise Not Asked    Seat Belt Not Asked    Special Diet Not Asked    Stress Concern Not  Asked    Weight Concern Not Asked   Social History Narrative    Not on file     Social Drivers of Health     Food Insecurity: No Food Insecurity (1/29/2025)    Food Insecurity     Food Insecurity: Never true   Transportation Needs: No Transportation Needs (1/29/2025)    Transportation Needs     Lack of Transportation: No     Car Seat: Not on file   Stress: Not on file   Housing Stability: Low Risk  (1/29/2025)    Housing Stability     Housing Instability: No     Housing Instability Emergency: Not on file     Crib or Bassinette: Not on file        Review of Systems:  Constitutional: Negative.  HENT: see above  Eyes: Negative.  Respiratory: Negative.  Cardiovascular: Negative.  Gastrointestinal: Negative.  Musculoskeletal: Negative.  Skin: Negative.  Renal: Negative  Endocrine: Negative  Psychiatric/Behavioral: Negative.     Physical Examination:  Vitals: Last menstrual period 09/24/2019, not currently breastfeeding.     General: Breathing comfortably on room air while sitting up. Able to communicate verbally. Voice normal. Normal appearing body habitus.     Musculoskeletal: Head: Atraumatic and normocephalic.     Neck: Full ROM and able to extend without issues     Ears: External auditory canals clear with no evidence of significant cerumen or stenosis. Tympanic membranes visible with no evidence of retraction or perforation. No evidence of middle ear effusion bilaterally.     Nose: No sinus tenderness bilaterally upon palpation. No obvious nasal deformity. No masses, rhinorrhea, epistaxis.     Mouth/Throat: Salivary glands appear normal with no evidence of pain or mass. No masses or lesions noted within the oral mucosa, hard and soft palates, tongue, tonsils and posterior pharynx. Able to tolerate secretions. Oral cavity and oropharynx widely patent. Tonsils 1 plus and symmetric. Posterior pharyngeal walls appear normal.      Eyes: Extraocular movements intact and pupils equally reactive to light stimulus. No  spontaneous or gaze-evoked nystagmus. No proptosis or ecchymosis. VFI.     Lymphatic: No significant cervical lymphadenopathy noted.     Neuro: CN 7 intact with symmetric mobility and strength. No loss of facial sensation.     Skin: Dry, normal turgor, normal color.     Psych: Alert and oriented to person/place/time. Normal affect, amiable     Significant laboratory values: n/a     Imaging: n/a     Procedures: n/a    Audiogram personally reviewed  My interpretation is as follows    Normal hearing sloping to a mild HF SNHL bilaterally  Normal type A tympanograms bilaterally  % (R), 100% (L)       Assessment/Plan:     Vertigo: likely BPPV, currently asymptomatic. Recommend Epley maneuvers at home. Consider follow up with PT if symptoms recur/worsen.   Bilateral SNHL: mild, rpt audiogram annually.   Bilateral tinnitus: recommend masking techniques.      Dr. Essence Brito MD, thank you for involving me in this patient's care. Please contact me with further questions or concerns.    Cordell Turk MD       [1]   Past Medical History:   Anxiety    Anxiety state, unspecified    Arrhythmia    a fib    Asthma (HCC)    Atrial fibrillation with RVR (HCC)    Back pain    Back problem    Bad breath    Belching    Bloating    Decorative tattoo    Depression    Diabetes (HCC)    per pt    Disorder of thyroid    Esophageal reflux    GERD (gastroesophageal reflux disease)    Headache(784.0)    Heart palpitations    Afib    Heartburn    I have taken medication for over 10years    High cholesterol    History of depression    Hypothyroidism    Irregular bowel habits    Leaking of urine    Lipoma of unspecified site    Multiple thyroid nodules    on rt,2011, lt.-2008    Nontoxic multinodular goiter    Nontoxic multinodular goiter    Obesity    Other and unspecified hyperlipidemia    Pain in joints    Pain in right shin    Pain with bowel movements    Painful swallowing    Not consistent but not due to illness    Plantar  fasciitis    Sleep apnea     CPAP     Stress    Thyroiditis, unspecified    Type 1 diabetes mellitus (HCC)    Uncomfortable fullness after meals    Visual impairment    Wears glasses    Weight loss    Working with weight loss clinic   [2]   Past Surgical History:  Procedure Laterality Date    Arthroscopy of joint unlisted Bilateral     knee    Carpal tunnel release Bilateral     Cholecystectomy      Colonoscopy N/A 10/21/2016    Procedure: COLONOSCOPY;  Surgeon: Brandt Ramirez DO;  Location:  ENDOSCOPY    Colonoscopy N/A 12/27/2022    Procedure: COLONOSCOPY;  Surgeon: Aiden Pollard MD;  Location:  ENDOSCOPY    Cyst aspiration left      Cyst aspiration right      Hysterectomy      7/21    Laparoscopic cholecystectomy  11/14/2011    Lipoma removal  04/29/2022    left posterior    Mesh (implantable)      bladder    Neuromuscular stimulator      Oophorectomy Bilateral     Other      wisdom    Other      lt hand middle finger sx    Other surgical history  x2    total thyroidectomy    Other surgical history  x1    sinus    Other surgical history  10/11/2023    trial for neurostimulator    Other surgical history      Neuro Nerve Stimulator    Removal gallbladder      Sinus surgery        Sling oper stres incontinence  01/09/2014    Procedure: SLING UROLOGY;  Surgeon: Isac Sutherland MD;  [facility]    Thyroidectomy      Total abdom hysterectomy  07/2020   [3] [4] [5] [6]   Allergies  Allergen Reactions    Adhesive Tape HIVES     Paper tape is ok, clear tape=hives      Ibuprofen OTHER (SEE COMMENTS)     Asthma attack    Sulfa Antibiotics      Unknown; happened before adolescent period    Erythromycin Base NAUSEA ONLY    Oxycodone CONFUSION     Memory issues   [7]   Family History  Problem Relation Age of Onset    Skin cancer Mother     Other (Other) Mother     Heart Disease Father     Hypertension Father     Asthma Father     Diabetes Father     Other (Other) Father     Heart Attack Father          x2    Other (Other) Sister         Crohn's disease    Crohn's Disease Sister     Ulcerative Colitis Sister     Other (Other) Sister         Celiac Disease    Diabetes Maternal Grandmother     Other (Other) Maternal Grandmother         lymphoma    Asthma Other         3 children all have asthma.

## 2025-07-11 NOTE — PROGRESS NOTES
Catina Rivera was seen for an audiometric evaluation and tympanogram today. Referred back to physician.    Niharika Dozier MS, CCC-A

## 2025-07-11 NOTE — TELEPHONE ENCOUNTER
Future Appointments   Date Time Provider Department Center   7/15/2025  3:45 PM Edgar Ledezma, PT [facility] [place]   7/17/2025  3:45 PM Edgar Ledezma, PT [facility] [place]   7/21/2025  3:15 PM Edgar Ledezma, PT [facility] [place]   8/5/2025  5:00 PM Yessenia Muñiz, THEAPC [facility] YOR [facility] [place]   8/14/2025  8:40 AM YK RAJAN RM1 [facility] [place]   9/3/2025  8:00 AM Gisselle Humphrey APRN EMGOSW [facility] [place]   9/5/2025  3:15 PM Ashish Horvath MD [facility] DERM [facility] [place]   9/25/2025  8:45 AM Jessica Dennis APRN EMGENDO [facility] [place]   9/29/2025  8:20 AM Omayra Lyn PA [facility] [id number]   11/4/2025  4:00 PM Pricila Shi APRN [facility]

## 2025-07-11 NOTE — PROGRESS NOTES
Patient: Catina Rivera (55 year old, female) Referring Provider:  Insurance:   Diagnosis: Plantar fasciitis (M72.2) Olivia Altamirano  [facility]   Date of Surgery: 1/29/25 Next MD visit:  N/A   Precautions:  None 3/13/25 Referral Information:    Date of Evaluation: Req: 30, Auth: 30, Exp: 8/31/2025    07/10/25 POC Auth Visits:  12       Today's Date   7/11/2025    Subjective  Patient reports left foot is more painful than right, denies pain in either foot this morning       Pain: 0/10     Objective         Assessment  Patient care this day focused largely on manual intervention of plantar aspect of bilateral feet, and peroneals/gastroc prior to 4 way ankle with resistance. Patient instructed to still perform HEP today as hip strengthening is the focus for home program, and necessary to continue to support ankles.    Goals (to be met in 12 visits)   Short-Term Goals:  Patient will demonstrates knee flexion AROM to 90deg pain-free to facilitate sitting comfortably for 2 hours daily for community integration. Timeframe: 4 visits.     Patient will improve knee flexion AROM to 120deg to allow for reciprocal stair navigation pattern for community integration. Timeframe: 8 visits.    The patient will improve quad strength and single-leg stance stability to demonstrate non-antalgic gait pattern with walking short-length distances for household ambulation. Timeframe: 8 visits. Partially Met, SLS still limited but pain free 05/21    Patient will improve knee strength and stability to facilitate discontinued use of AD for daily ambulation. Timeframe: 8 visits. Met    Patient will improve LE strength and stability to facilitate navigating down the stairs reciprocally with SP cane. Timeframe: 8 visits. Met  Long-Term Goals:    The patient will improve LE strength and endurance to facilitate standing for extended periods of time for 3-4 hours daily for community integration and occupational demands. Timeframe: 12 visits.  Not Met - LLE limitations    The patient will improve LE strength and endurance to facilitate walking distances of 1-2 mile(s) daily for community integration and occupational demands. Timeframe: 12 visits. Met     Patient will improve lower motor control to perform double-leg squat with symmetrical loading, without asymmetries, and with proper posterior chain loading to facilitate squatting and lifting ADL's. Timeframe: 12 visits. Not Met 05/21    Patient will improve LE mobility, strength, and single-leg stabilization to meet strength requirements for reciprocal stair navigation pattern with no asymmetries for ascending and descending 3 flights of stairs daily for community integration. Timeframe: 16 visits. Not Met 05/21    Patient will improve LEFS score by at least 9 points to indicate a true change in improved function for ADL's and restoring PLF. Timeframe: 16 visits. Met 05/21           Plan  Continue ankle strengthening, consider adding 4 way to HEP in 1-2 more visits    Treatment Last 4 Visits  Treatment Day: 2       5/7/2025 5/21/2025 7/10/2025 7/11/2025   LE Treatment   Therapeutic Exercise Long Sitting Calf Stretch with Strap 2 x 30\"   Lunging Calf Stretch (Gastroc) 30\" ea R/L  Lunging Calf Stretch (Soleus) 30\" ea R/L  DLHR 2 x 10 - cues for \"big toe\" and avoid momentum/rocking fwd  Attempted** sidestepping with loop band; not tolerated due to left knee  Calf Mob with Ball (R) x 3 cycles   Calf Stretch in Cabinet 2 x 30\" ea R/L  Lunging Calf Stretch (Soleus) 30\" ea R/L  DLHR 2 x 10 - cues for \"big toe\" and avoid momentum/rocking fwd  Reassess   LEFS     Supine Ankle 4 way x 20 ea R/L; Red   Lunging Calf Stretch 30\" ea R/L  Slant Board Calf Stretch- Next visit      Neuro Re-Education  SLS 3 x 30\" (R)   *Neuromuscular facilitation - 15 min*; multiple reps performed for each  The patient's ability to perform a quality isometric contraction against manual resistance with specific muscles was assessed.  Patient was passively placed in testing positions to prevent compensatory muscle use to attain starting position. The muscles listed below were found to be inhibited. PT provided brief, targeted palpation of the distal and proximal muscle belly w/ deep pressure to stimulate the muscle with mechanical/sensory input to increase neuromuscular junction activity and improve contraction quality. PT performed repetitive re-testing of isometric contraction quality specific to targeted muscle after palpation until the desired effect was observed.    Lower Extremity  -B glute med  -B glute min  -L adductor thu  -L adductor longus  -L adductor brevis  -L soleus  -L tibialis posterior      -------------------------------------------------------------------------------------------------------------------------      Manual Therapy TrPR (Supine): Hip Flexors, Quads (R)  STM (Supine): Proximal Calf, Lower HS, Adductors    Cupping/IASTM (Prone): Plantar Aspect of Foot, Arch (B) x 15 min   Cupping/STM (Prone): Peroneals, Gastroc Proximally (B) x 6 min    Therapeutic Activity Lateral Heel Taps 4\" 2 x 10 (R)  Staggered STS (R Bias) x 10  FSU 8\" x 10 (R)     Therapeutic Exercise Minutes 23 25 10 18   Neuro Re-Educ Minutes  8 10    Manual Therapy Minutes 10   24   Therapeutic Activity Minutes 8 8     Evaluation Minutes   20    Total Time Of Timed Procedures 41 41 20 42   Total Time Of Service-Based Procedures 0 0 20 0   Total Treatment Time 41 41 40 42   HEP Access Code: [email or web address]  URL: [email or web address]  Date: 04/04/2025  Prepared by: Cira Elkins     Exercises  - Sidestepping  - 1 x daily - 7 x weekly - 1 sets - 20 reps  - Step Up  - 1 x daily - 7 x weekly - 1 sets - 10 reps  - Quadricep Stretch with Chair and Counter Support  - 1 x daily - 7 x weekly - 3 sets - 30s hold  - Heel Raises with Counter Support  - 1 x daily - 7 x weekly - 2 sets - 10 reps  - Calf in Cabinet Stretch   - 1 x daily - 7 x weekly - 3  sets - 30s hold  - Forward Monster Walks  - 1 x daily - 7 x weekly - 1 sets - 20 reps Access Code: [email or web address]  URL: [email or web address]  Date: 05/21/2025  Prepared by: Cira Elkins    Exercises  - Step Up  - 1 x daily - 7 x weekly - 2 sets - 10 reps  - Heel Raises with Counter Support  - 1 x daily - 7 x weekly - 2 sets - 10 reps  - Calf in Cabinet Stretch   - 1 x daily - 7 x weekly - 3 sets - 30s hold  - Standing Single Leg Stance with Counter Support  - 1 x daily - 7 x weekly - 3 sets - 30s hold  - Hooklying Clamshell with Resistance  - 1 x daily - 7 x weekly - 2 sets - 10 reps (R) & (L)      - Active Straight Leg Raise with Quad Set  - 1 x daily - 7 x weekly - 2 sets - 10 reps (L)  - Prone Hip Extension  - 1 x daily - 7 x weekly - 2 sets - 10 reps (L) Access Code: [email or web address]  URL: [email or web address]  Date: 07/10/2025  Prepared by: Edgar Ledezma    Exercises  - Straight Leg Raise with External Rotation  - 1 x daily - 7 x weekly - 3 sets - 15 reps  - Sidelying Hip Abduction  - 1 x daily - 7 x weekly - 3 sets - 15 reps  - Standing Heel Raises  - 1 x daily - 7 x weekly - 3 sets - 30 reps  - Mini Lunge  - 1 x daily - 7 x weekly - 2 sets - 10 reps Access Code: [email or web address]  URL: [email or web address]  Date: 07/10/2025  Prepared by: Edgar Ledezma     Exercises  - Straight Leg Raise with External Rotation  - 1 x daily - 7 x weekly - 3 sets - 15 reps  - Sidelying Hip Abduction  - 1 x daily - 7 x weekly - 3 sets - 15 reps  - Standing Heel Raises  - 1 x daily - 7 x weekly - 3 sets - 30 reps  - Mini Lunge  - 1 x daily - 7 x weekly - 2 sets - 10 reps        HEP  Access Code: [email or web address]  URL: [email or web address]  Date: 07/10/2025  Prepared by: Edgar Ledezma     Exercises  - Straight Leg Raise with External Rotation  - 1 x daily - 7 x weekly - 3 sets - 15 reps  - Sidelying Hip Abduction  - 1 x daily - 7 x weekly - 3 sets - 15 reps  - Standing Heel Raises   - 1 x daily - 7 x weekly - 3 sets - 30 reps  - Mini Lunge  - 1 x daily - 7 x weekly - 2 sets - 10 reps    Charges     Manual x 2, Ther Ex x 1

## 2025-07-14 ENCOUNTER — PATIENT MESSAGE (OUTPATIENT)
Facility: CLINIC | Age: 55
End: 2025-07-14

## 2025-07-14 ENCOUNTER — PATIENT MESSAGE (OUTPATIENT)
Dept: FAMILY MEDICINE CLINIC | Facility: CLINIC | Age: 55
End: 2025-07-14

## 2025-07-15 ENCOUNTER — OFFICE VISIT (OUTPATIENT)
Dept: PHYSICAL THERAPY | Age: 55
End: 2025-07-15
Attending: PODIATRIST
Payer: COMMERCIAL

## 2025-07-15 PROCEDURE — 97112 NEUROMUSCULAR REEDUCATION: CPT

## 2025-07-15 PROCEDURE — 97140 MANUAL THERAPY 1/> REGIONS: CPT

## 2025-07-15 PROCEDURE — 97110 THERAPEUTIC EXERCISES: CPT

## 2025-07-15 NOTE — PROGRESS NOTES
Patient: Catina Rivera (55 year old, female) Referring Provider:  Insurance:   Diagnosis: Plantar fasciitis (M72.2) [facility]   Date of Surgery: 1/29/25 Next MD visit:  N/A   Precautions:  None 3/13/25 Referral Information:    Date of Evaluation: Req: 30, Auth: 30, Exp: 8/31/2025    07/10/25 POC Auth Visits:  12       Today's Date   7/15/2025    Subjective  Patient reports she has been working a lot over weekend and today. She has been in a lot of pain being on her feet all day.       Pain: pain not reported     Objective          See treatment below       Assessment  Patient significantly challenged by WB exercise today. Will adjust intensity of exercise based on reported response.    Interventions in today's session were performed with the goal of improving foot strength and loading tolerance. Clinical judgement and ongoing assessment were used to adjust intervention based on patient's response. Skilled PT guided patient through one-on-one treatment providing verbal, visual, tactile cues, guided exercise selection (type, volume, intensity, complexity), and appropriate rest breaks to maximize treatment effect. Treatment time includes time to complete interventions as well as time spent demonstrating proper technique and educating on effect/purpose of each intervention. The patient requires continued physical therapy treatment to achieve the current established goals. Answered all patient questions throughout session.      Goals (to be met in 12 visits)   STG  -Patient will report 40% improvement in symptoms as evidence of subjective improvement in overall condition.  -Patient will increase strength grade of all tested LE muscle groups to 4+/5 to improve performance of activities requiring change of position or moving the body through space (position transfers, walking, running, jumping, stairs, etc).    LTG  -Patient will report 70% improvement in symptoms as evidence of subjective  improvement in overall condition.  -Patient will be able to perform a double leg squat to 95 deg knee flexion without lateral shift as evidence of improved movement capacity.  -Patient will be able to perform single leg squat on either leg to 50 deg knee flexion w/ good motion control as evidence of improved joint and muscle function.  -Patient will be able to complete lunge to floor and return to standing with single UE assistance as evidence of improved functional strength for transfers.  -Patient will improve dorsiflexion wall lunge test to complete w/ foot flat on ground and toes 5 inches from wall on B side(s) to allow for improved joint mobility and lower extremity function during activities such as squatting, lunging, walking, or climbing stairs.  -Patient will be able to perform single leg calf raise for 5 reps on B side(s) as evidence of strength and endurance for normal gait mechanics and preparedness for jogging/hopping activity.  -Patient will increase strength grade of all tested LE muscle groups to 5/5 to improve performance of activities requiring change of position or moving the body through space (position transfers, walking, running, jumping, stairs, etc).  -Patient will achieve a score on the Lower Extremity Functional Scale of 82% as evidence of improved functional capacity during everyday activity.  -Patient will demonstrate and/or verbalize competence in performance of advanced HEP to be able to progress exercise on their own following discharge from physical therapy.             Plan  Continue ankle strengthening, consider adding 4 way to HEP in 1-2 more visits    Treatment Last 4 Visits  Treatment Day: 3       5/21/2025 7/10/2025 7/11/2025 7/15/2025   LE Treatment   Therapeutic Exercise Lunging Calf Stretch (Soleus) 30\" ea R/L  DLHR 2 x 10 - cues for \"big toe\" and avoid momentum/rocking fwd  Reassess   LEFS     Supine Ankle 4 way x 20 ea R/L; Red   Lunging Calf Stretch 30\" ea R/L  Slant Board  Calf Stretch- Next visit    -manual resisted toe flexion 2x20 B  -split stance calf raise, BUE support on high-low table 1x10  -split stance calf raise hold w/ L/R total body rotation 1x10 each direction (B)  -slant board B calf raise 2x10   Neuro Re-Education SLS 3 x 30\" (R)   *Neuromuscular facilitation - 15 min*; multiple reps performed for each  The patient's ability to perform a quality isometric contraction against manual resistance with specific muscles was assessed. Patient was passively placed in testing positions to prevent compensatory muscle use to attain starting position. The muscles listed below were found to be inhibited. PT provided brief, targeted palpation of the distal and proximal muscle belly w/ deep pressure to stimulate the muscle with mechanical/sensory input to increase neuromuscular junction activity and improve contraction quality. PT performed repetitive re-testing of isometric contraction quality specific to targeted muscle after palpation until the desired effect was observed.    Lower Extremity  -B glute med  -B glute min  -L adductor thu  -L adductor longus  -L adductor brevis  -L soleus  -L tibialis posterior      -------------------------------------------------------------------------------------------------------------------------    *Neuromuscular facilitation - 10 min*; multiple reps performed for each  The patient's ability to perform a quality isometric contraction against manual resistance with specific muscles was assessed. Patient was passively placed in testing positions to prevent compensatory muscle use to attain starting position. The muscles listed below were found to be inhibited. PT provided brief, targeted palpation of the distal and proximal muscle belly w/ deep pressure to stimulate the muscle with mechanical/sensory input to increase neuromuscular junction activity and improve contraction quality. PT performed repetitive re-testing of isometric contraction  quality specific to targeted muscle after palpation until the desired effect was observed.      Lower Extremity  -B tibialis posterior  -B fibularis longus  -B fibularis brevis  -B flexor digitorum longus  -B flexor hallucis longus  ----------------------------------------------------------------------------------------------------------------------------------------------------------------         Manual Therapy   Cupping/IASTM (Prone): Plantar Aspect of Foot, Arch (B) x 15 min   Cupping/STM (Prone): Peroneals, Gastroc Proximally (B) x 6 min  -STM plantar surface B 10 min total  -metatarsal mobilizations P/D x10 each ray, B   Therapeutic Activity FSU 8\" x 10 (R)      Therapeutic Exercise Minutes 25 10 18 18   Neuro Re-Educ Minutes 8 10  10   Manual Therapy Minutes   24 10   Therapeutic Activity Minutes 8      Evaluation Minutes  20     Total Time Of Timed Procedures 41 20 42 38   Total Time Of Service-Based Procedures 0 20 0 0   Total Treatment Time 41 40 42 38   HEP Access Code: [email or web address]  URL: [email or web address]  Date: 05/21/2025  Prepared by: Cira Elkins    Exercises  - Step Up  - 1 x daily - 7 x weekly - 2 sets - 10 reps  - Heel Raises with Counter Support  - 1 x daily - 7 x weekly - 2 sets - 10 reps  - Calf in Cabinet Stretch   - 1 x daily - 7 x weekly - 3 sets - 30s hold  - Standing Single Leg Stance with Counter Support  - 1 x daily - 7 x weekly - 3 sets - 30s hold  - Hooklying Clamshell with Resistance  - 1 x daily - 7 x weekly - 2 sets - 10 reps (R) & (L)      - Active Straight Leg Raise with Quad Set  - 1 x daily - 7 x weekly - 2 sets - 10 reps (L)  - Prone Hip Extension  - 1 x daily - 7 x weekly - 2 sets - 10 reps (L) Access Code: [email or web address]  URL: [email or web address]  Date: 07/10/2025  Prepared by: Edgar Ledezma    Exercises  - Straight Leg Raise with External Rotation  - 1 x daily - 7 x weekly - 3 sets - 15 reps  - Sidelying Hip Abduction  - 1 x daily - 7 x  weekly - 3 sets - 15 reps  - Standing Heel Raises  - 1 x daily - 7 x weekly - 3 sets - 30 reps  - Mini Lunge  - 1 x daily - 7 x weekly - 2 sets - 10 reps Access Code: [email or web address]  URL: [email or web address]  Date: 07/10/2025  Prepared by: Edgar Ledezma     Exercises  - Straight Leg Raise with External Rotation  - 1 x daily - 7 x weekly - 3 sets - 15 reps  - Sidelying Hip Abduction  - 1 x daily - 7 x weekly - 3 sets - 15 reps  - Standing Heel Raises  - 1 x daily - 7 x weekly - 3 sets - 30 reps  - Mini Lunge  - 1 x daily - 7 x weekly - 2 sets - 10 reps         HEP  Access Code: [email or web address]  URL: [email or web address]  Date: 07/10/2025  Prepared by: Edgar Ledezma     Exercises  - Straight Leg Raise with External Rotation  - 1 x daily - 7 x weekly - 3 sets - 15 reps  - Sidelying Hip Abduction  - 1 x daily - 7 x weekly - 3 sets - 15 reps  - Standing Heel Raises  - 1 x daily - 7 x weekly - 3 sets - 30 reps  - Mini Lunge  - 1 x daily - 7 x weekly - 2 sets - 10 reps    Charges     1MT, 1TE, 1NMR

## 2025-07-17 ENCOUNTER — OFFICE VISIT (OUTPATIENT)
Dept: PHYSICAL THERAPY | Age: 55
End: 2025-07-17
Attending: PODIATRIST
Payer: COMMERCIAL

## 2025-07-17 PROCEDURE — 97110 THERAPEUTIC EXERCISES: CPT

## 2025-07-17 PROCEDURE — 97140 MANUAL THERAPY 1/> REGIONS: CPT

## 2025-07-17 NOTE — PROGRESS NOTES
Patient: Catina Rivera (55 year old, female) Referring Provider:  Insurance:   Diagnosis: Plantar fasciitis (M72.2) [facility]  [facility]   Date of Surgery: 1/29/25 Next MD visit:  N/A   Precautions:  None 3/13/25 Referral Information:    Date of Evaluation: Req: 30, Auth: 30, Exp: 8/31/2025    07/10/25 POC Auth Visits:  12       Today's Date   7/17/2025    Subjective  Patient reports her calves/ankles were very sore same night and next day following last treatment. She states her plantar pain was not much worse than usual.       Pain: 6/10     Objective                 Assessment  Regressed intensity to allow better recovery of patient following last visit progressive overload. Patient knee more painful today on L, modified exercise to improve tolerance in WB. She continues to have limited sensitivity in NWB, only feels increased pain in WB. Goals of today were to improve foot pain, light strengthening of lower leg/foot.        Clinical judgement and ongoing assessment were used to adjust intervention based on patient's response. Skilled PT guided patient through one-on-one treatment providing verbal, visual, tactile cues, guided exercise selection (type, volume, intensity, complexity), and appropriate rest breaks to maximize treatment effect. Treatment time includes time to complete interventions as well as time spent demonstrating proper technique and educating on effect/purpose of each intervention. The patient requires continued physical therapy treatment to achieve the current established goals. Answered all patient questions throughout session.      Goals (to be met in 12 visits)   STG  -Patient will report 40% improvement in symptoms as evidence of subjective improvement in overall condition.  -Patient will increase strength grade of all tested LE muscle groups to 4+/5 to improve performance of activities requiring change of position or moving the body through space (position transfers, walking,  running, jumping, stairs, etc).    LTG  -Patient will report 70% improvement in symptoms as evidence of subjective improvement in overall condition.  -Patient will be able to perform a double leg squat to 95 deg knee flexion without lateral shift as evidence of improved movement capacity.  -Patient will be able to perform single leg squat on either leg to 50 deg knee flexion w/ good motion control as evidence of improved joint and muscle function.  -Patient will be able to complete lunge to floor and return to standing with single UE assistance as evidence of improved functional strength for transfers.  -Patient will improve dorsiflexion wall lunge test to complete w/ foot flat on ground and toes 5 inches from wall on B side(s) to allow for improved joint mobility and lower extremity function during activities such as squatting, lunging, walking, or climbing stairs.  -Patient will be able to perform single leg calf raise for 5 reps on B side(s) as evidence of strength and endurance for normal gait mechanics and preparedness for jogging/hopping activity.  -Patient will increase strength grade of all tested LE muscle groups to 5/5 to improve performance of activities requiring change of position or moving the body through space (position transfers, walking, running, jumping, stairs, etc).  -Patient will achieve a score on the Lower Extremity Functional Scale of 82% as evidence of improved functional capacity during everyday activity.  -Patient will demonstrate and/or verbalize competence in performance of advanced HEP to be able to progress exercise on their own following discharge from physical therapy.                 Plan  Continue ankle strengthening, consider adding 4 way to HEP in 1-2 more visits    Treatment Last 4 Visits  Treatment Day: 3       7/10/2025 7/11/2025 7/15/2025 7/17/2025   LE Treatment   Therapeutic Exercise  Supine Ankle 4 way x 20 ea R/L; Red   Lunging Calf Stretch 30\" ea R/L  Slant Board Calf  Stretch- Next visit    -manual resisted toe flexion 2x20 B  -split stance calf raise, BUE support on high-low table 1x10  -split stance calf raise hold w/ L/R total body rotation 1x10 each direction (B)  -slant board B calf raise 2x10 -toe flexion isometrics [id number]  -mini lunge ankle DF focus w/ full body turn L/R for foot sup/pro control 1x10 B (limited by tolerance of L knee on LLE fwd)   Neuro Re-Education *Neuromuscular facilitation - 15 min*; multiple reps performed for each  The patient's ability to perform a quality isometric contraction against manual resistance with specific muscles was assessed. Patient was passively placed in testing positions to prevent compensatory muscle use to attain starting position. The muscles listed below were found to be inhibited. PT provided brief, targeted palpation of the distal and proximal muscle belly w/ deep pressure to stimulate the muscle with mechanical/sensory input to increase neuromuscular junction activity and improve contraction quality. PT performed repetitive re-testing of isometric contraction quality specific to targeted muscle after palpation until the desired effect was observed.    Lower Extremity  -B glute med  -B glute min  -L adductor thu  -L adductor longus  -L adductor brevis  -L soleus  -L tibialis posterior      -------------------------------------------------------------------------------------------------------------------------    *Neuromuscular facilitation - 10 min*; multiple reps performed for each  The patient's ability to perform a quality isometric contraction against manual resistance with specific muscles was assessed. Patient was passively placed in testing positions to prevent compensatory muscle use to attain starting position. The muscles listed below were found to be inhibited. PT provided brief, targeted palpation of the distal and proximal muscle belly w/ deep pressure to stimulate the muscle with mechanical/sensory input to  increase neuromuscular junction activity and improve contraction quality. PT performed repetitive re-testing of isometric contraction quality specific to targeted muscle after palpation until the desired effect was observed.      Lower Extremity  -B tibialis posterior  -B fibularis longus  -B fibularis brevis  -B flexor digitorum longus  -B flexor hallucis longus  ----------------------------------------------------------------------------------------------------------------------------------------------------------------          Manual Therapy  Cupping/IASTM (Prone): Plantar Aspect of Foot, Arch (B) x 15 min   Cupping/STM (Prone): Peroneals, Gastroc Proximally (B) x 6 min  -STM plantar surface B 10 min total  -metatarsal mobilizations P/D x10 each ray, B -STM plantar surface B 10 min total  -ankle stretching DF + toe extension 5 min total  -NWB talus posterior glide grade IV 2 min per side   Therapeutic Exercise Minutes 10 18 18 10   Neuro Re-Educ Minutes 10  10    Manual Therapy Minutes  24 10 20   Evaluation Minutes 20      Total Time Of Timed Procedures 20 42 38 30   Total Time Of Service-Based Procedures 20 0 0 0   Total Treatment Time 40 42 38 30   HEP Access Code: [email or web address]  URL: [email or web address]  Date: 07/10/2025  Prepared by: Edgar Ledezma    Exercises  - Straight Leg Raise with External Rotation  - 1 x daily - 7 x weekly - 3 sets - 15 reps  - Sidelying Hip Abduction  - 1 x daily - 7 x weekly - 3 sets - 15 reps  - Standing Heel Raises  - 1 x daily - 7 x weekly - 3 sets - 30 reps  - Mini Lunge  - 1 x daily - 7 x weekly - 2 sets - 10 reps Access Code: [email or web address]  URL: [email or web address]  Date: 07/10/2025  Prepared by: Edgar Ledezma     Exercises  - Straight Leg Raise with External Rotation  - 1 x daily - 7 x weekly - 3 sets - 15 reps  - Sidelying Hip Abduction  - 1 x daily - 7 x weekly - 3 sets - 15 reps  - Standing Heel Raises  - 1 x daily - 7 x weekly - 3 sets -  30 reps  - Mini Lunge  - 1 x daily - 7 x weekly - 2 sets - 10 reps          HEP  Access Code: [email or web address]  URL: [email or web address]  Date: 07/10/2025  Prepared by: Edgar Ledezma     Exercises  - Straight Leg Raise with External Rotation  - 1 x daily - 7 x weekly - 3 sets - 15 reps  - Sidelying Hip Abduction  - 1 x daily - 7 x weekly - 3 sets - 15 reps  - Standing Heel Raises  - 1 x daily - 7 x weekly - 3 sets - 30 reps  - Mini Lunge  - 1 x daily - 7 x weekly - 2 sets - 10 reps    Charges     1TE, 1MT

## 2025-07-18 NOTE — TELEPHONE ENCOUNTER
Cyclobenzaprine  10 mg    DOS: 1/29/25  Right Knee Replacement   Last OV: 5/1/25  Last refill date: 6/18/25 #/refills: 30/0  Upcoming appt: none

## 2025-07-21 ENCOUNTER — OFFICE VISIT (OUTPATIENT)
Dept: PHYSICAL THERAPY | Age: 55
End: 2025-07-21
Attending: PODIATRIST
Payer: COMMERCIAL

## 2025-07-21 PROCEDURE — 97110 THERAPEUTIC EXERCISES: CPT

## 2025-07-21 PROCEDURE — 97112 NEUROMUSCULAR REEDUCATION: CPT

## 2025-07-21 PROCEDURE — 97140 MANUAL THERAPY 1/> REGIONS: CPT

## 2025-07-21 RX ORDER — CYCLOBENZAPRINE HCL 10 MG
10 TABLET ORAL 2 TIMES DAILY
Qty: 30 TABLET | Refills: 0 | Status: SHIPPED | OUTPATIENT
Start: 2025-07-21

## 2025-07-21 NOTE — PROGRESS NOTES
Patient: Catina Rivera (55 year old, female) Referring Provider:  Insurance:   Diagnosis: Plantar fasciitis (M72.2) [facility]   Date of Surgery: 1/29/25 Next MD visit:  N/A   Precautions:  None 3/13/25 Referral Information:    Date of Evaluation: Req: 30, Auth: 30, Exp: 8/31/2025    07/10/25 POC Auth Visits:  12       Today's Date   7/21/2025    Subjective  Patient reports she was sore same day during last visit, but is doing better since then. Less intense pain during work over weekend.       Pain: pain not reported     Objective                 Assessment  Continued use of NWB talus glides posterior as patient reported she had positive response after last visit and this was only major change w/ manual therapy. She continues to report significant difficulty perfomring calf and foot strengthening due to weakness.        Clinical judgement and ongoing assessment were used to adjust intervention based on patient's response. Skilled PT guided patient through one-on-one treatment providing verbal, visual, tactile cues, guided exercise selection (type, volume, intensity, complexity), and appropriate rest breaks to maximize treatment effect. Treatment time includes time to complete interventions as well as time spent demonstrating proper technique and educating on effect/purpose of each intervention. The patient requires continued physical therapy treatment to achieve the current established goals. Answered all patient questions throughout session.      Goals (to be met in 12 visits)   STG  -Patient will report 40% improvement in symptoms as evidence of subjective improvement in overall condition.  -Patient will increase strength grade of all tested LE muscle groups to 4+/5 to improve performance of activities requiring change of position or moving the body through space (position transfers, walking, running, jumping, stairs, etc).    LTG  -Patient will report 70% improvement in symptoms as  evidence of subjective improvement in overall condition.  -Patient will be able to perform a double leg squat to 95 deg knee flexion without lateral shift as evidence of improved movement capacity.  -Patient will be able to perform single leg squat on either leg to 50 deg knee flexion w/ good motion control as evidence of improved joint and muscle function.  -Patient will be able to complete lunge to floor and return to standing with single UE assistance as evidence of improved functional strength for transfers.  -Patient will improve dorsiflexion wall lunge test to complete w/ foot flat on ground and toes 5 inches from wall on B side(s) to allow for improved joint mobility and lower extremity function during activities such as squatting, lunging, walking, or climbing stairs.  -Patient will be able to perform single leg calf raise for 5 reps on B side(s) as evidence of strength and endurance for normal gait mechanics and preparedness for jogging/hopping activity.  -Patient will increase strength grade of all tested LE muscle groups to 5/5 to improve performance of activities requiring change of position or moving the body through space (position transfers, walking, running, jumping, stairs, etc).  -Patient will achieve a score on the Lower Extremity Functional Scale of 82% as evidence of improved functional capacity during everyday activity.  -Patient will demonstrate and/or verbalize competence in performance of advanced HEP to be able to progress exercise on their own following discharge from physical therapy.                     Plan  Continue ankle mobilizations and progress calf/foot strengthening as tolerated.    Treatment Last 4 Visits  Treatment Day: 3       7/11/2025 7/15/2025 7/17/2025 7/21/2025   LE Treatment   Therapeutic Exercise Supine Ankle 4 way x 20 ea R/L; Red   Lunging Calf Stretch 30\" ea R/L  Slant Board Calf Stretch- Next visit    -manual resisted toe flexion 2x20 B  -split stance calf raise,  BUE support on high-low table 1x10  -split stance calf raise hold w/ L/R total body rotation 1x10 each direction (B)  -slant board B calf raise 2x10 -toe flexion isometrics [id number] B  -mini lunge ankle DF focus w/ full body turn L/R for foot sup/pro control 1x10 B (limited by tolerance of L knee on LLE fwd) -UBE LE only L5 5 min  -manual resisted toe flexion 1x20 B  -B calf raise slant board (lowest angle) 3x10   Neuro Re-Education  *Neuromuscular facilitation - 10 min*; multiple reps performed for each  The patient's ability to perform a quality isometric contraction against manual resistance with specific muscles was assessed. Patient was passively placed in testing positions to prevent compensatory muscle use to attain starting position. The muscles listed below were found to be inhibited. PT provided brief, targeted palpation of the distal and proximal muscle belly w/ deep pressure to stimulate the muscle with mechanical/sensory input to increase neuromuscular junction activity and improve contraction quality. PT performed repetitive re-testing of isometric contraction quality specific to targeted muscle after palpation until the desired effect was observed.      Lower Extremity  -B tibialis posterior  -B fibularis longus  -B fibularis brevis  -B flexor digitorum longus  -B flexor hallucis longus  ----------------------------------------------------------------------------------------------------------------------------------------------------------------        *Neuromuscular facilitation - 10 min*; multiple reps performed for each  The patient's ability to perform a quality isometric contraction against manual resistance with specific muscles was assessed. Patient was passively placed in testing positions to prevent compensatory muscle use to attain starting position. The muscles listed below were found to be inhibited. PT provided brief, targeted palpation of the distal and proximal muscle belly w/ deep  pressure to stimulate the muscle with mechanical/sensory input to increase neuromuscular junction activity and improve contraction quality. PT performed repetitive re-testing of isometric contraction quality specific to targeted muscle after palpation until the desired effect was observed.      Lower Extremity  -B soleus  -B tibialis posterior  -B fibularis longus  -B flexor digitorum longus  -B flexor hallucis longus        ----------------------------------------------------------------------------------------------------------------------------------------------------------------         Manual Therapy Cupping/IASTM (Prone): Plantar Aspect of Foot, Arch (B) x 15 min   Cupping/STM (Prone): Peroneals, Gastroc Proximally (B) x 6 min  -STM plantar surface B 10 min total  -metatarsal mobilizations P/D x10 each ray, B -STM plantar surface B 10 min total  -ankle stretching DF + toe extension 5 min total  -NWB talus posterior glide grade IV 2 min per side -STM calf complex B 5 min total  -NWB posterior talus glide grade IV, 4 min each side   Therapeutic Exercise Minutes 18 18 10 13   Neuro Re-Educ Minutes  10  10   Manual Therapy Minutes 24 10 20 15   Total Time Of Timed Procedures 42 38 30 38   Total Time Of Service-Based Procedures 0 0 0 0   Total Treatment Time 42 38 30 38   HEP Access Code: [email or web address]  URL: [email or web address]  Date: 07/10/2025  Prepared by: Edgar Ledezma     Exercises  - Straight Leg Raise with External Rotation  - 1 x daily - 7 x weekly - 3 sets - 15 reps  - Sidelying Hip Abduction  - 1 x daily - 7 x weekly - 3 sets - 15 reps  - Standing Heel Raises  - 1 x daily - 7 x weekly - 3 sets - 30 reps  - Mini Lunge  - 1 x daily - 7 x weekly - 2 sets - 10 reps           HEP  Access Code: [email or web address]  URL: [email or web address]  Date: 07/10/2025  Prepared by: Edgar Ledezma     Exercises  - Straight Leg Raise with External Rotation  - 1 x daily - 7 x weekly - 3 sets - 15  reps  - Sidelying Hip Abduction  - 1 x daily - 7 x weekly - 3 sets - 15 reps  - Standing Heel Raises  - 1 x daily - 7 x weekly - 3 sets - 30 reps  - Mini Lunge  - 1 x daily - 7 x weekly - 2 sets - 10 reps    Charges     1MT, 1TE, 1NMR

## 2025-07-23 ENCOUNTER — OFFICE VISIT (OUTPATIENT)
Dept: PHYSICAL THERAPY | Age: 55
End: 2025-07-23
Attending: STUDENT IN AN ORGANIZED HEALTH CARE EDUCATION/TRAINING PROGRAM
Payer: COMMERCIAL

## 2025-07-23 ENCOUNTER — PATIENT MESSAGE (OUTPATIENT)
Dept: FAMILY MEDICINE CLINIC | Facility: CLINIC | Age: 55
End: 2025-07-23

## 2025-07-23 DIAGNOSIS — Z96.89 S/P INSERTION OF SPINAL CORD STIMULATOR: ICD-10-CM

## 2025-07-23 DIAGNOSIS — M54.16 LUMBAR RADICULOPATHY: Primary | ICD-10-CM

## 2025-07-23 DIAGNOSIS — T85.122A MIGRATION OF SPINAL CORD STIMULATOR, INITIAL ENCOUNTER: ICD-10-CM

## 2025-07-23 DIAGNOSIS — M51.362 DEGENERATION OF INTERVERTEBRAL DISC OF LUMBAR REGION WITH DISCOGENIC BACK PAIN AND LOWER EXTREMITY PAIN: ICD-10-CM

## 2025-07-23 PROCEDURE — 97140 MANUAL THERAPY 1/> REGIONS: CPT

## 2025-07-23 PROCEDURE — 97530 THERAPEUTIC ACTIVITIES: CPT

## 2025-07-23 PROCEDURE — 97110 THERAPEUTIC EXERCISES: CPT

## 2025-07-23 NOTE — PROGRESS NOTES
Patient: Catina Rivera (55 year old, female) Referring Provider:  Insurance:   Diagnosis: Plantar fasciitis (M72.2) [facility]  [facility]   Date of Surgery: 1/29/25 Next MD visit:  N/A   Precautions:  None 3/13/25 Referral Information:    Date of Evaluation: Req: 30, Auth: 30, Exp: 8/31/2025    07/10/25 POC Auth Visits:  12       Today's Date   7/23/2025    Subjective  Patient reports she has returned to working her second job as  at [employer] to pay off her daughter's recent surgery, has been having       Pain: 2/10     Objective         Assessment  Patient returns to PT reporting early mornings like today having more pain in her knee/foot (left) and both legs generally. Patient care this day was initially focusing on peroneals/lateral gastroc manual intervention via cupping and attempts at trigger point release, but resulting in severe spasm that could not be resolved with gentle manual. therastick, heating pad, or manual stretching. Patient was then instructed to walk clinic for approximately 3 minutes to reduce tension through weight bearing and alternate standing calf stretch and seated anterior tib stretching. Patient was able to recover from pain at worst 9-10/10 to 5/10 by end of visit. Patient advised to opt for more heating and stretching alternating later today if spasm reccurs.    Goals (to be met in 12 visits)   Short-Term Goals:  Patient will demonstrates knee flexion AROM to 90deg pain-free to facilitate sitting comfortably for 2 hours daily for community integration. Timeframe: 4 visits.     Patient will improve knee flexion AROM to 120deg to allow for reciprocal stair navigation pattern for community integration. Timeframe: 8 visits.    The patient will improve quad strength and single-leg stance stability to demonstrate non-antalgic gait pattern with walking short-length distances for household ambulation. Timeframe: 8 visits. Partially Met, SLS still limited but pain free  05/21    Patient will improve knee strength and stability to facilitate discontinued use of AD for daily ambulation. Timeframe: 8 visits. Met    Patient will improve LE strength and stability to facilitate navigating down the stairs reciprocally with SP cane. Timeframe: 8 visits. Met  Long-Term Goals:    The patient will improve LE strength and endurance to facilitate standing for extended periods of time for 3-4 hours daily for community integration and occupational demands. Timeframe: 12 visits. Not Met - LLE limitations    The patient will improve LE strength and endurance to facilitate walking distances of 1-2 mile(s) daily for community integration and occupational demands. Timeframe: 12 visits. Met     Patient will improve lower motor control to perform double-leg squat with symmetrical loading, without asymmetries, and with proper posterior chain loading to facilitate squatting and lifting ADL's. Timeframe: 12 visits. Not Met 05/21    Patient will improve LE mobility, strength, and single-leg stabilization to meet strength requirements for reciprocal stair navigation pattern with no asymmetries for ascending and descending 3 flights of stairs daily for community integration. Timeframe: 16 visits. Not Met 05/21    Patient will improve LEFS score by at least 9 points to indicate a true change in improved function for ADL's and restoring PLF. Timeframe: 16 visits. Met 05/21               Plan  Gentle STM for next few sessions only until tissue mobility/irritability resolves    Treatment Last 4 Visits  Treatment Day: 4       7/15/2025 7/17/2025 7/21/2025 7/23/2025   LE Treatment   Therapeutic Exercise -manual resisted toe flexion 2x20 B  -split stance calf raise, BUE support on high-low table 1x10  -split stance calf raise hold w/ L/R total body rotation 1x10 each direction (B)  -slant board B calf raise 2x10 -toe flexion isometrics 3x80oaz B  -mini lunge ankle DF focus w/ full body turn L/R for foot sup/pro  control 1x10 B (limited by tolerance of L knee on LLE fwd) -UBE LE only L5 5 min  -manual resisted toe flexion 1x20 B  -B calf raise slant board (lowest angle) 3x10 PROM with Cupping  Manual Stretching arch/great toe x 5 min  Manual Stretching Anterior Tib/Peroneals x 5 min  Slant Board Calf Stretch 2 x 30\"   Seated Anterior Tib Stretch 2 x 30\" ea R/L    Neuro Re-Education *Neuromuscular facilitation - 10 min*; multiple reps performed for each  The patient's ability to perform a quality isometric contraction against manual resistance with specific muscles was assessed. Patient was passively placed in testing positions to prevent compensatory muscle use to attain starting position. The muscles listed below were found to be inhibited. PT provided brief, targeted palpation of the distal and proximal muscle belly w/ deep pressure to stimulate the muscle with mechanical/sensory input to increase neuromuscular junction activity and improve contraction quality. PT performed repetitive re-testing of isometric contraction quality specific to targeted muscle after palpation until the desired effect was observed.      Lower Extremity  -B tibialis posterior  -B fibularis longus  -B fibularis brevis  -B flexor digitorum longus  -B flexor hallucis longus  ----------------------------------------------------------------------------------------------------------------------------------------------------------------        *Neuromuscular facilitation - 10 min*; multiple reps performed for each  The patient's ability to perform a quality isometric contraction against manual resistance with specific muscles was assessed. Patient was passively placed in testing positions to prevent compensatory muscle use to attain starting position. The muscles listed below were found to be inhibited. PT provided brief, targeted palpation of the distal and proximal muscle belly w/ deep pressure to stimulate the muscle with mechanical/sensory input to  increase neuromuscular junction activity and improve contraction quality. PT performed repetitive re-testing of isometric contraction quality specific to targeted muscle after palpation until the desired effect was observed.      Lower Extremity  -B soleus  -B tibialis posterior  -B fibularis longus  -B flexor digitorum longus  -B flexor hallucis longus        ----------------------------------------------------------------------------------------------------------------------------------------------------------------          Manual Therapy -STM plantar surface B 10 min total  -metatarsal mobilizations P/D x10 each ray, B -STM plantar surface B 10 min total  -ankle stretching DF + toe extension 5 min total  -NWB talus posterior glide grade IV 2 min per side -STM calf complex B 5 min total  -NWB posterior talus glide grade IV, 4 min each side Cupping/MFR (Prone): Peroneals x 10 min (L), x 5 min (R)  STM (Supine, R S/L): Peroneals, Anterior Tib    Therapeutic Activity    Walk Clinic x 3 min   Review of change in routine/daily tasks (during heating pad)   Modalities    Heating Pad (Supine): Left Lateral Lower Leg x 5 min    Therapeutic Exercise Minutes 18 10 13 10   Neuro Re-Educ Minutes 10  10    Manual Therapy Minutes 10 20 15 25   Therapeutic Activity Minutes    10   Total Time Of Timed Procedures 38 30 38 45   Total Time Of Service-Based Procedures 0 0 0 0   Total Treatment Time 38 30 38 45        HEP  Access Code: [email or web address]  URL: [email or web address]  Date: 07/10/2025  Prepared by: Edgar Ledezma     Exercises  - Straight Leg Raise with External Rotation  - 1 x daily - 7 x weekly - 3 sets - 15 reps  - Sidelying Hip Abduction  - 1 x daily - 7 x weekly - 3 sets - 15 reps  - Standing Heel Raises  - 1 x daily - 7 x weekly - 3 sets - 30 reps  - Mini Lunge  - 1 x daily - 7 x weekly - 2 sets - 10 reps    Charges     Manual x 2, Ther Act x 1, Ther Ex x 1

## 2025-07-23 NOTE — TELEPHONE ENCOUNTER
Reviewed records    From my preop note in 2023:    Patient is scheduled for a Spinal Cord Stimulator Permanent Implant procedure at [facility] on 12/6/23 performed by Dr Kevin Vallejo     She will need to stay in the [place] network. Dr. Mcclain is not in network    I will place referral for spine institute so they can help us figure out who she should see!

## 2025-07-24 ENCOUNTER — SPINE CENTER NAVIGATION (OUTPATIENT)
Age: 55
End: 2025-07-24

## 2025-07-24 DIAGNOSIS — T85.122A MIGRATION OF SPINAL CORD STIMULATOR, INITIAL ENCOUNTER: Primary | ICD-10-CM

## 2025-07-24 NOTE — PROGRESS NOTES
Spine Center Referral Navigation Encounter Note    Referred by: LILIA Navas    Imaging: No recent imaging. X-ray Lumbar Spine 12/12/2022 with [facility]. Report in Care Everywhere.  If imaging done at an external facility, instructed patient to bring disc of MRI to appointment.     Previously Seen Spine Care Providers: Sonu Dennis PA-C and Robert Bennett MD    Referred to: Alisa Deng PA-C in Neurosurgery     7/24- Spoke to Neurosurgery leadership and got the okay to get patient scheduled with MONA in Neurosurgery. Patient requested information regarding the providers that do this procedure so that she can do some research prior to getting scheduled.    7/25- Spoke to patient and she agreed to see Alisa and Dr. Snider. No availability until 8/22. Message sent to Neurosurgery leadership to discuss.    7/25- I was able to get patient scheduled for 8/7 at 2:15 pm. She was also added to the wait list in case something sooner opens up.

## 2025-07-25 ENCOUNTER — APPOINTMENT (OUTPATIENT)
Dept: PHYSICAL THERAPY | Age: 55
End: 2025-07-25
Attending: PODIATRIST
Payer: COMMERCIAL

## 2025-07-28 ENCOUNTER — PATIENT MESSAGE (OUTPATIENT)
Facility: CLINIC | Age: 55
End: 2025-07-28

## 2025-07-30 ENCOUNTER — OFFICE VISIT (OUTPATIENT)
Dept: PHYSICAL THERAPY | Age: 55
End: 2025-07-30
Attending: PODIATRIST
Payer: COMMERCIAL

## 2025-07-30 PROCEDURE — 97530 THERAPEUTIC ACTIVITIES: CPT

## 2025-07-30 PROCEDURE — 97110 THERAPEUTIC EXERCISES: CPT

## 2025-07-30 PROCEDURE — 97140 MANUAL THERAPY 1/> REGIONS: CPT

## 2025-07-31 ENCOUNTER — OFFICE VISIT (OUTPATIENT)
Dept: FAMILY MEDICINE CLINIC | Facility: CLINIC | Age: 55
End: 2025-07-31
Payer: COMMERCIAL

## 2025-07-31 VITALS
OXYGEN SATURATION: 98 % | HEIGHT: 62 IN | BODY MASS INDEX: 46.38 KG/M2 | SYSTOLIC BLOOD PRESSURE: 134 MMHG | WEIGHT: 252 LBS | DIASTOLIC BLOOD PRESSURE: 64 MMHG | HEART RATE: 76 BPM | TEMPERATURE: 97 F

## 2025-07-31 DIAGNOSIS — T85.122A MIGRATION OF SPINAL CORD STIMULATOR, INITIAL ENCOUNTER: ICD-10-CM

## 2025-07-31 DIAGNOSIS — Z96.89 S/P INSERTION OF SPINAL CORD STIMULATOR: ICD-10-CM

## 2025-07-31 DIAGNOSIS — M54.16 LUMBAR RADICULOPATHY: Primary | ICD-10-CM

## 2025-07-31 DIAGNOSIS — M51.362 DEGENERATION OF INTERVERTEBRAL DISC OF LUMBAR REGION WITH DISCOGENIC BACK PAIN AND LOWER EXTREMITY PAIN: ICD-10-CM

## 2025-07-31 PROCEDURE — 3078F DIAST BP <80 MM HG: CPT | Performed by: NURSE PRACTITIONER

## 2025-07-31 PROCEDURE — 99212 OFFICE O/P EST SF 10 MIN: CPT | Performed by: NURSE PRACTITIONER

## 2025-07-31 PROCEDURE — 3008F BODY MASS INDEX DOCD: CPT | Performed by: NURSE PRACTITIONER

## 2025-07-31 PROCEDURE — 3075F SYST BP GE 130 - 139MM HG: CPT | Performed by: NURSE PRACTITIONER

## 2025-07-31 RX ORDER — TRAMADOL HYDROCHLORIDE 50 MG/1
50 TABLET ORAL EVERY 6 HOURS PRN
Qty: 30 TABLET | Refills: 0 | Status: SHIPPED | OUTPATIENT
Start: 2025-07-31

## 2025-08-01 ENCOUNTER — APPOINTMENT (OUTPATIENT)
Dept: PHYSICAL THERAPY | Age: 55
End: 2025-08-01
Attending: PODIATRIST

## 2025-08-01 ENCOUNTER — TELEPHONE (OUTPATIENT)
Dept: FAMILY MEDICINE CLINIC | Facility: CLINIC | Age: 55
End: 2025-08-01

## 2025-08-04 ENCOUNTER — OFFICE VISIT (OUTPATIENT)
Dept: ORTHOPEDICS CLINIC | Facility: CLINIC | Age: 55
End: 2025-08-04

## 2025-08-04 ENCOUNTER — PATIENT MESSAGE (OUTPATIENT)
Dept: FAMILY MEDICINE CLINIC | Facility: CLINIC | Age: 55
End: 2025-08-04

## 2025-08-04 VITALS — WEIGHT: 252 LBS | BODY MASS INDEX: 46.38 KG/M2 | HEIGHT: 62 IN

## 2025-08-04 DIAGNOSIS — M54.16 LUMBAR RADICULOPATHY: Primary | ICD-10-CM

## 2025-08-04 DIAGNOSIS — Z96.89 S/P INSERTION OF SPINAL CORD STIMULATOR: ICD-10-CM

## 2025-08-04 DIAGNOSIS — M17.12 PRIMARY OSTEOARTHRITIS OF LEFT KNEE: Primary | ICD-10-CM

## 2025-08-04 DIAGNOSIS — T85.122A MIGRATION OF SPINAL CORD STIMULATOR, INITIAL ENCOUNTER: ICD-10-CM

## 2025-08-05 ENCOUNTER — OFFICE VISIT (OUTPATIENT)
Dept: PHYSICAL THERAPY | Age: 55
End: 2025-08-05
Attending: PODIATRIST

## 2025-08-05 ENCOUNTER — TELEPHONE (OUTPATIENT)
Dept: SURGERY | Facility: CLINIC | Age: 55
End: 2025-08-05

## 2025-08-05 PROCEDURE — 97112 NEUROMUSCULAR REEDUCATION: CPT

## 2025-08-07 DIAGNOSIS — K21.9 GASTROESOPHAGEAL REFLUX DISEASE, UNSPECIFIED WHETHER ESOPHAGITIS PRESENT: ICD-10-CM

## 2025-08-07 DIAGNOSIS — Z96.651 S/P TOTAL KNEE ARTHROPLASTY, RIGHT: ICD-10-CM

## 2025-08-07 RX ORDER — OMEPRAZOLE 40 MG/1
40 CAPSULE, DELAYED RELEASE ORAL DAILY
Qty: 90 CAPSULE | Refills: 0 | Status: SHIPPED | OUTPATIENT
Start: 2025-08-07

## 2025-08-08 ENCOUNTER — APPOINTMENT (OUTPATIENT)
Dept: PHYSICAL THERAPY | Age: 55
End: 2025-08-08
Attending: PODIATRIST

## 2025-08-08 RX ORDER — CYCLOBENZAPRINE HCL 10 MG
10 TABLET ORAL 2 TIMES DAILY
Qty: 30 TABLET | Refills: 0 | Status: SHIPPED | OUTPATIENT
Start: 2025-08-08

## 2025-08-08 RX ORDER — PREGABALIN 50 MG/1
50 CAPSULE ORAL 3 TIMES DAILY
Qty: 90 CAPSULE | Refills: 0 | Status: SHIPPED | OUTPATIENT
Start: 2025-08-08

## 2025-08-11 ENCOUNTER — OFFICE VISIT (OUTPATIENT)
Dept: PHYSICAL THERAPY | Age: 55
End: 2025-08-11
Attending: PODIATRIST

## 2025-08-11 PROCEDURE — 97110 THERAPEUTIC EXERCISES: CPT

## 2025-08-11 PROCEDURE — 97140 MANUAL THERAPY 1/> REGIONS: CPT

## 2025-08-19 ENCOUNTER — PATIENT MESSAGE (OUTPATIENT)
Dept: ORTHOPEDICS CLINIC | Facility: CLINIC | Age: 55
End: 2025-08-19

## 2025-08-22 ENCOUNTER — OFFICE VISIT (OUTPATIENT)
Dept: PHYSICAL THERAPY | Age: 55
End: 2025-08-22
Attending: PODIATRIST

## 2025-08-22 PROCEDURE — 97110 THERAPEUTIC EXERCISES: CPT

## 2025-08-22 PROCEDURE — 97140 MANUAL THERAPY 1/> REGIONS: CPT

## 2025-08-25 ENCOUNTER — TELEPHONE (OUTPATIENT)
Facility: CLINIC | Age: 55
End: 2025-08-25

## 2025-08-25 DIAGNOSIS — M25.562 LEFT KNEE PAIN, UNSPECIFIED CHRONICITY: Primary | ICD-10-CM

## 2025-08-26 ENCOUNTER — HOSPITAL ENCOUNTER (OUTPATIENT)
Dept: GENERAL RADIOLOGY | Age: 55
Discharge: HOME | End: 2025-08-26
Attending: STUDENT IN AN ORGANIZED HEALTH CARE EDUCATION/TRAINING PROGRAM

## 2025-08-26 ENCOUNTER — TELEPHONE (OUTPATIENT)
Dept: PHYSICAL THERAPY | Facility: HOSPITAL | Age: 55
End: 2025-08-26

## 2025-08-26 ENCOUNTER — OFFICE VISIT (OUTPATIENT)
Facility: CLINIC | Age: 55
End: 2025-08-26

## 2025-08-26 ENCOUNTER — APPOINTMENT (OUTPATIENT)
Dept: PHYSICAL THERAPY | Age: 55
End: 2025-08-26
Attending: PODIATRIST

## 2025-08-26 VITALS — BODY MASS INDEX: 45.27 KG/M2 | WEIGHT: 246 LBS | HEIGHT: 62 IN

## 2025-08-26 DIAGNOSIS — M17.12 PRIMARY OSTEOARTHRITIS OF LEFT KNEE: Primary | ICD-10-CM

## 2025-08-26 DIAGNOSIS — M25.562 LEFT KNEE PAIN, UNSPECIFIED CHRONICITY: ICD-10-CM

## 2025-08-26 PROCEDURE — 3008F BODY MASS INDEX DOCD: CPT | Performed by: STUDENT IN AN ORGANIZED HEALTH CARE EDUCATION/TRAINING PROGRAM

## 2025-08-26 PROCEDURE — 73564 X-RAY EXAM KNEE 4 OR MORE: CPT | Performed by: STUDENT IN AN ORGANIZED HEALTH CARE EDUCATION/TRAINING PROGRAM

## 2025-08-26 PROCEDURE — 99214 OFFICE O/P EST MOD 30 MIN: CPT | Performed by: STUDENT IN AN ORGANIZED HEALTH CARE EDUCATION/TRAINING PROGRAM

## 2025-08-29 ENCOUNTER — APPOINTMENT (OUTPATIENT)
Dept: PHYSICAL THERAPY | Age: 55
End: 2025-08-29
Attending: PODIATRIST

## (undated) DIAGNOSIS — R22.2 MASS OF SKIN OF BACK: Primary | ICD-10-CM

## (undated) DEVICE — STERILE POLYISOPRENE POWDER-FREE SURGICAL GLOVES: Brand: PROTEXIS

## (undated) DEVICE — DRESS WOUND AQUACEL 3.5INX12IN

## (undated) DEVICE — FILTERLINE NASAL ADULT O2/CO2

## (undated) DEVICE — BANDAGE COMPR L5YDXW2IN FOAM CO FLX

## (undated) DEVICE — ENDOSCOPY PACK - LOWER: Brand: MEDLINE INDUSTRIES, INC.

## (undated) DEVICE — Device

## (undated) DEVICE — PADDING CAST W3INXL4YD 100% COT ABSRB HIGHLY

## (undated) DEVICE — GOWN,SIRUS,FABRIC-REINFORCED,X-LARGE: Brand: MEDLINE

## (undated) DEVICE — STRYKER PERFORMANCE SERIES SAGITTAL BLADE: Brand: STRYKER PERFORMANCE SERIES

## (undated) DEVICE — SOLUTION IRRIG 3000ML 0.9% NACL FLX CONT

## (undated) DEVICE — Device: Brand: DEFENDO AIR/WATER/SUCTION AND BIOPSY VALVE

## (undated) DEVICE — GLOVE SUR 6.5 SENSICARE PI PIP GRN PWD F

## (undated) DEVICE — HOOD: Brand: FLYTE

## (undated) DEVICE — BIOGUARD CLEANING ADAPTER

## (undated) DEVICE — ZIMMER® STERILE DISPOSABLE TOURNIQUET CUFF WITH PLC, DUAL PORT, SINGLE BLADDER, 18 IN. (46 CM)

## (undated) DEVICE — PAD,ABDOMINAL,8"X7.5",ST,LF,20/BX: Brand: MEDLINE INDUSTRIES, INC.

## (undated) DEVICE — GLOVE SUR 7.5 SENSICARE PI PIP GRN PWD F

## (undated) DEVICE — SLEEVE KENDALL SCD EXPRESS MED

## (undated) DEVICE — GLOVE SUR 6.5 SENSICARE PI PIP CRM PWD F

## (undated) DEVICE — COVER,LIGHT,CAMERA,HARD,1/PK,STRL: Brand: MEDLINE

## (undated) DEVICE — WRAP SLF ADH 1INX5YD TAN COBAN

## (undated) DEVICE — ANTIBACTERIAL UNDYED BRAIDED (POLYGLACTIN 910), SYNTHETIC ABSORBABLE SUTURE: Brand: COATED VICRYL

## (undated) DEVICE — TOWEL SURG SM W12XL18IN CLR PLAS TEAR RESIST

## (undated) DEVICE — C-ARM: Brand: UNBRANDED

## (undated) DEVICE — PAD KNEE POS PROTCT MEM GEL FOAM BOOT AND

## (undated) DEVICE — SCD SLEEVE KNEE HI BLEND

## (undated) DEVICE — SOL  .9 1000ML BTL

## (undated) DEVICE — SKIN REG/FINE DUAL MARKER, RULER, LABELS: Brand: MEDLINE

## (undated) DEVICE — PIN DRL 75MM HDLSS TRCR NXGN

## (undated) DEVICE — 30MM ACUTWIST® ACUTRAK® TAP: Brand: ACUMED

## (undated) DEVICE — DISPOSABLE TOURNIQUET CUFF SINGLE BLADDER, DUAL PORT AND QUICK CONNECT CONNECTOR: Brand: COLOR CUFF

## (undated) DEVICE — PADDING CAST SOFT ROLL 3IN

## (undated) DEVICE — SUTURE VICRYL 3-0 SH

## (undated) DEVICE — SUTURE MCRYL SZ 4-0 L18IN ABSRB UD L19MM PS-2

## (undated) DEVICE — GLOVE SUR 7.5 SENSICARE PI PIP CRM PWD F

## (undated) DEVICE — STERILE HOOK LOCK LATEX FREE ELASTIC BANDAGE 2INX5YD: Brand: HOOK LOCK™

## (undated) DEVICE — BNDG,ELSTC,MATRIX,STRL,4"X5YD,LF,HOOK&LP: Brand: MEDLINE

## (undated) DEVICE — UPPER EXTREMITY CDS-LF: Brand: MEDLINE INDUSTRIES, INC.

## (undated) DEVICE — OCCLUSIVE GAUZE STRIP OVERWRAP,3% BISMUTH TRIBROMOPHENATE IN PETROLATUM BLEND: Brand: XEROFORM

## (undated) DEVICE — SUTURE MONOCRYL 4-0 PS-2

## (undated) DEVICE — ANTISEPTIC 4OZ 70% ISO ALC

## (undated) DEVICE — BANDAGE COHESIVE 2IN

## (undated) DEVICE — VEST SURG DISP

## (undated) DEVICE — SUTURE SILK 2-0

## (undated) DEVICE — 3M™ RED DOT™ MONITORING ELECTRODE WITH FOAM TAPE AND STICKY GEL, 50/BAG, 20/CASE, 72/PLT 2570: Brand: RED DOT™

## (undated) DEVICE — SYRINGE MED 30ML STD CLR PLAS LL TIP N CTRL

## (undated) DEVICE — BANDAGE COBAN 5YDX1 TAN LTX

## (undated) DEVICE — 1200CC GUARDIAN II: Brand: GUARDIAN

## (undated) DEVICE — REM POLYHESIVE ADULT PATIENT RETURN ELECTRODE: Brand: VALLEYLAB

## (undated) DEVICE — SLEEVE COMPR MD KNEE LEN SGL USE KENDALL SCD

## (undated) DEVICE — WRAP COMPR UNIV KNEE HOT CLD GEL MICWV AND

## (undated) DEVICE — HOOD, PEEL-AWAY: Brand: FLYTE

## (undated) DEVICE — 3M™ TEGADERM™ TRANSPARENT FILM DRESSING, 1626W, 4 IN X 4-3/4 IN (10 CM X 12 CM), 50 EACH/CARTON, 4 CARTON/CASE: Brand: 3M™ TEGADERM™

## (undated) DEVICE — DRAPE C-ARM UNIVERSAL

## (undated) DEVICE — SOLUTION  .9 1000ML BTL

## (undated) DEVICE — NEEDLE SPNL 20GA L3.5IN YEL QNCKE STYL DISP

## (undated) DEVICE — SOLUTION IRRIG 1000ML 0.9% NACL USP BTL

## (undated) DEVICE — SCREW BNE 48MM CNDYL KNEE HEX HD NXGN LEG

## (undated) DEVICE — SCREW ORTH 2.5X25MM FEM HEX PERSONA

## (undated) DEVICE — PADDING CAST W3XL144IN WHT COT SYN RL

## (undated) DEVICE — DISPOSABLE BIPOLAR FORCEPS 4" (10.2CM) JEWELERS, STRAIGHT 0.4MM TIP AND 12 FT. (3.6M) CABLE: Brand: KIRWAN

## (undated) DEVICE — PADDING CAST 6INX4YD 100% COT ABSRB HIGHLY

## (undated) DEVICE — APPLICATOR PREP 26ML CHG 2% ISO ALC 70%

## (undated) DEVICE — BNDG,ELSTC,MATRIX,STRL,6"X5YD,LF,HOOK&LP: Brand: MEDLINE

## (undated) DEVICE — DRAIN CHANNEL 19FR BLAKE

## (undated) DEVICE — EVACUATOR RELIAVAC 100CC

## (undated) DEVICE — 1010 S-DRAPE TOWEL DRAPE 10/BX: Brand: STERI-DRAPE™

## (undated) DEVICE — CONTAINER,SPECIMEN,PNEU TUBE,4OZ,OR STRL: Brand: MEDLINE

## (undated) DEVICE — MINI LAP PACK-LF: Brand: MEDLINE INDUSTRIES, INC.

## (undated) DEVICE — SPONGE 4X4 10PK

## (undated) DEVICE — STRETCH BANDAGE: Brand: CURITY

## (undated) DEVICE — STERILE POLYISOPRENE POWDER-FREE SURGICAL GLOVES WITH EMOLLIENT COATING: Brand: PROTEXIS

## (undated) DEVICE — MINI-BLADE®: Brand: BEAVER®

## (undated) DEVICE — STOCKINETTE,IMPERVIOUS,12X48,STERILE: Brand: MEDLINE

## (undated) DEVICE — PADDING CAST COTTON STER 3

## (undated) DEVICE — SUT VCRL + 1 27IN ABSRB UD OS-6 L36MM

## (undated) DEVICE — STANDARD HYPODERMIC NEEDLE,POLYPROPYLENE HUB: Brand: MONOJECT

## (undated) DEVICE — DRAPE,U/SHT,SPLIT,FILM,60X84,STERILE: Brand: MEDLINE

## (undated) DEVICE — SLIM BODY SKIN STAPLER: Brand: APPOSE ULC

## (undated) DEVICE — SUT STRATAFIX SYMMTRC PDS+ 1 18IN CTX ABSRB V

## (undated) DEVICE — SHEET, DRAPE, SPLIT, STERILE: Brand: MEDLINE

## (undated) DEVICE — NEPTUNE E-SEP SMOKE EVACUATION PENCIL, COATED, 70MM BLADE, PUSH BUTTON SWITCH: Brand: NEPTUNE E-SEP

## (undated) DEVICE — TOTAL KNEE CDS: Brand: MEDLINE INDUSTRIES, INC.

## (undated) DEVICE — GLOVE SUR 7 SENSICARE PI PIP CRM PWD F

## (undated) DEVICE — .045" X 6" ST GUIDE WIRE: Brand: ACUMED

## (undated) DEVICE — SPLINT ORTH W4XL30IN PLSTR OF PARIS LO

## (undated) DEVICE — STERILE SYNTHETIC POLYISOPRENE POWDER-FREE SURGICAL GLOVES WITH HYDROGEL COATING: Brand: PROTEXIS

## (undated) DEVICE — SLEEVE COMPR M KNEE LEN SGL USE KENDALL SCD

## (undated) DEVICE — GAUZE SPONGES,USP TYPE VII GAUZE, 12 PLY: Brand: CURITY

## (undated) DEVICE — 3 BONE CEMENT MIXER: Brand: MIXEVAC

## (undated) DEVICE — SPLINT PLASTER 5

## (undated) DEVICE — SUTURE ETHLN SZ 4-0 L18IN NABSRB BLK L19MM

## (undated) NOTE — LETTER
10/22/24    Orthopedic Surgery   Pre-Operative Clearance Request    Patient Name:   Catina Rivera             :   3/12/1970    Surgeon: Dr. Connelly             Date of Surgery: 25    Surgical Procedure: RIGHT TOTAL KNEE ARTHROPLASTY.       MUST COMPLETE ALL OF THE FOLLOWING 2-3 WEEKS PRIOR TO YOUR SURGERY TO AVOID CANCELLATION, DUE TO THE RULE THEIR WILL BE NO EXCEPTIONS!      [x]  History and Physical        [x]  Medical  Clearance                     Required pre-op testing to be ordered:                        [x]  CBC W/Diff                                                                   [x]  CMP                                                                                                                                                   [x]  Hemoglobin A1C                                                                                        [x]  PT/INR    [x]  PTT     [x]  Type and Screen                         **Please fax test results, H&P, and clearance to [phone] and to P.A.T at [phone]**

## (undated) NOTE — LETTER
ASTHMA ACTION PLAN for [facility]iah [facility]     : 3/12/1970     Date: 23  Doctor:  Sarah Tristan MD  Phone for doctor or clinic: [facility] [address], [address]  [phone]      ACT Score: 25    ACT Goal: 20 or greater    Call your provider if you require your rescue/quick reliever medication more than 2-3 times in a 24 hour period. If you require your rescue inhaler/medication more than 2-3 times weekly, your asthma may not be under proper control and you should seek medical attention. *Quick Relievers are Xopenex and Albuterol*    You can use the colors of a traffic light to help learn about your asthma medicines. Therapy Range       1. Green - Go! % of Personal Best Peak Flow   Use controller medicine. Breathing is good  No cough or wheeze  Can work and play Medicine How much to take When to take it    Medications       Sympathomimetics Instructions     PROAIR  (90 Base) MCG/ACT Inhalation Aero Soln    INHALE 2 PUFFS BY MOUTH EVERY 4 HOURS AS NEEDED FOR WHEEZING                    2. Yellow - Caution. 50-79% Personal Best Peak Flow  Use reliever medicine to keep an asthma attack from getting bad. Cough  Quick Relievers  Wheezing  Tight Chest  Wake up at night Medicine How much to take When to take it    If symptoms are not improving in 24-48 hrs, call office for further instructions  Medications       Sympathomimetics Instructions     PROAIR  (90 Base) MCG/ACT Inhalation Aero Soln    INHALE 2 PUFFS BY MOUTH EVERY 4 HOURS AS NEEDED FOR WHEEZING                    3. Red - Stop! Danger! <50% Personal Best Peak Flow  Continue Controller Medications But ADD:   Medicine not helping  Breathing is hard and fast  Nose opens wide  Can't walk  Ribs show  Can't talk well Medicine How much to take When to take it    If your symptoms do not improve in ONE hour -  go to the emergency room or call 911 immediately!  If symptoms improve, call office for appointment immediately. Albuterol inhaler 2 puffs every 20 minutes for three treatments       Don't forget:  Rinse mouth after using inhaler  Use spacer for inhaler  Remember to get your Flu vaccine every fall! [x] Asthma Action Plan reviewed with the caregiver and patient, and a copy of the plan was given to the patient/caregiver. [] Asthma Action Plan reviewed with the caregiver and patient on the phone, and copy mailed to patient/caregiver or sent via [employer]      Signatures:   Provider  Enrrique Byers MD Patient  Viviane Srinivas Caretaker

## (undated) NOTE — MR AVS SNAPSHOT
[address] [facility] [address] [phone]               Thank you for choosing us for your health care visit with Ascencion Thacker MD.  We are glad to serve you and happy to provide you with this summary of your v Commonly known as:  NEXIUM           Ferrous Sulfate 325 (65 Fe) MG Tabs   TAKE 1 TABLET BY MOUTH DAILY WITH BREAKFAST           GLUCAGON EMERGENCY 1 MG Kit   Generic drug:  Glucagon (rDNA)   INJECT 1MG INTO THE SKIN ONCE AS NEEDED FOR HYPOGLYCEMIA

## (undated) NOTE — MR AVS SNAPSHOT
[address] [facility] [address] [phone]               Thank you for choosing us for your health care visit with Bhavna Liu DO.   We are glad to serve you and happy to provide you with this summary of your vi **REFERRAL REQUEST**    Your physician has referred you to a specialist.  Your physician or the clinic staff will provide you with the phone number you should call to schedule your appointment.      If you are confident that your benefit plan will Type 2 diabetes mellitus with complication, with long-term current use of insulin          Instructions and Information about Your Health     None      Allergies as of Jun 01, 2017     Sulfa Antibiotics     Unknown; happened before adolescent period    Er INHALE 2 PUFFS INTO LUNGS BY MOUTH EVERY 4 HOURS AS NEEDED FOR WHEEZING           simvastatin 40 MG Tabs   TAKE 1 TABLET BY MOUTH EVERY NIGHT   Commonly known as:  ZOCOR           TRUE METRIX METER Mireya   1 Device by Other route 2 (two) times daily.

## (undated) NOTE — LETTER
[address] [place] [address] [facility] [address]27  [phone]                11/29/2019        [address] [facility],[address]      Dear Desirae Aguilar.     To help us provide the highest q

## (undated) NOTE — Clinical Note
Pls obtain note from Dr. Barry, retina specialist, patient saw opthal last week in [facility] retina, per patient stable  [phone] or check Dr. Bustamante, opthal,thank you

## (undated) NOTE — MR AVS SNAPSHOT
[facility] [address] [phone]               Thank you for choosing us for your health care visit with Talia Sanchez MD.  We are glad to serve you and happy to provide you with this summary of your v INJECT 1MG INTO THE SKIN ONCE AS NEEDED FOR HYPOGLYCEMIA           HUMALOG 100 UNIT/ML Soln   Generic drug:  Insulin Lispro   ADMINISTER UP  UNITS VIA INSULIN PUMP DAILY AS DIRECTED           Levothyroxine Sodium 137 MCG Tabs   TAKE 1 TABLET BY MOUTH [facility] SCREENING BILAT (QXT=21665)    Complete by:  Jan 30, 2017 (Approximate)    Assoc Dx:  Encounter for gynecological examination without abnormal finding [Z01.419], Visit for screening mammogram [Z12.31]                 Referral Details     Referred By

## (undated) NOTE — LETTER
Date: 1/5/2019    Patient Name: Jerman Pizano          To Whom it may concern: This letter has been written at the patient's request. The above patient was seen at the [facility] for treatment of a medical condition.     This patient shou

## (undated) NOTE — LETTER
Date: 6/26/2017    Patient Name: Cristin Puente          To Whom it may concern: This letter has been written at the patient's request. The above patient was seen at the [facility] for treatment of a medical condition.     This patient

## (undated) NOTE — LETTER
10/01/20        [address] [place] [address]      Dear Navin rS,    Our records indicate that you have outstanding lab work and or testing that was ordered for you and has not yet been completed:  Orders Placed This Encounter        XR

## (undated) NOTE — LETTER
ASTHMA ACTION PLAN for Onelia Moore     : 3/12/1970     Date: 20  Doctor:  Ascencion Thacker MD  Phone for doctor or clinic: [address] [address] [phone]      ACT Score: 25 If your symptoms do not improve in ONE hour -  go to the emergency room or call 911 immediately! If symptoms improve, call office for appointment immediately.     Albuterol inhaler 2 puffs every 20 minutes for three treatments       Don't forget:  · Rinse

## (undated) NOTE — LETTER
10/22/24    Orthopedic Surgery   Pre-Operative Clearance Request    Patient Name:   Catina Rivera             :   3/12/1970    Surgeon: Dr. Connelly             Date of Surgery: 25    Surgical Procedure: RIGHT TOTAL KNEE ARTHROPLASTY.      MUST COMPLETE ALL OF THE FOLLOWING 2-3 WEEKS PRIOR TO YOUR SURGERY TO AVOID CANCELLATION, DUE TO THE RULE THEIR WILL BE NO EXCEPTIONS!        [x]  Cardiac Clearance    Required pre-op testing to be ordered: N/A                             **Please fax test results, H&P, and clearance to [phone] and to P.A.T at [phone]**

## (undated) NOTE — MR AVS SNAPSHOT
[address] [place] [id number] [phone]               Thank you for choosing us for your health care visit with Woody Torres DO.   We are glad to serve you and happy to provide you with this summary of your vi Commonly known as:  SYNTHROID, LEVOTHROID           ONETOUCH DELICA LANCETS FINE Misc   TEST 8 TIMES PER DAY. What changed:  Another medication with the same name was removed. Continue taking this medication, and follow the directions you see here. If you have questions, you can call [phone] to talk to our [facility] Staff. Remember, LinPrim is NOT to be used for urgent needs. For medical emergencies, dial 911. Visit [email or web address] to learn more.            Visit [facility]

## (undated) NOTE — Clinical Note
Mat Foy,  I saw Ms. Chan Bonilla- I was thinking about possibly trying to [facility] for her. She is currently on insulin pump. What do you think? Sincerely, LILIA Ramírez APRN, [facility] Obesity Medicine [address] [facility] Weight Management  [facility] [address], [address] [facility], [place], [address]   [phone] [facility] Sample. org

## (undated) NOTE — LETTER
Patient Name: Desirae Aguilar  YOB: 1970          MRN number:  [id number]  Date:  6/9/2020  Referring Physician:  Bhavna Liu         INITIAL EVALUATION:    Referring Physician: Dr. Sabina Nelson  Diagnosis:   -Primary osteoarthritis of both knee -Has been issue for 2 years  Pt goals include:  -Getting up/down stairs  -Squat to get something of the ground  -Less uncomfortable at the end of day    OBJECTIVE:   Observation/gait/posture:   -Genu valgus, gait is wide based with short stride length; lat Passive/segmental/accessory movement testing:    -Patellar femoral hypmobility  Special tests:    -SLR (-)  -L/S shear is (-)  Muscle strength:   abdominal strength is poor  Muscle length:   -1-2 joint hip flexor shortness  Neural dynamics:   -SLR to 90 wi least 3 or more elements relative to body structure/function, activity limitation, and participation restriction with at least 3 ore more factors relative to personal factors, history, and comorbidity.      Precautions:  None    PLAN OF CARE:    Goals:    ( treatment options and has agreed to actively participate in planning and for this course of care.     Thank you for your referral. . If you have any questions, please contact me at Dept: [phone]    Sincerely,  Electronically signed by therapist: Homar Wang

## (undated) NOTE — LETTER
08/28/20        [address] [place] [id number]      Dear  Fernando,    Our records indicate that you have outstanding lab work and or testing that was ordered for you and has not yet been completed:  Orders Placed This Encounter        XR

## (undated) NOTE — LETTER
OUTSIDE TESTING RESULT REQUEST     IMPORTANT: FOR YOUR IMMEDIATE ATTENTION  Please FAX all test results listed below to: [phone]     Testing already done on or about: 2024     * * * * If testing is NOT complete, arrange with patient A.S.A.P. * * * *      Patient Name: Catina Rivera  Surgery Date: 2025  Medical Record: [id number]  CSN: [id number]  : 3/12/1970 - A: 54 y     Sex: female  Surgeon(s):  Ruslan Connelly MD  Procedure: RIGHT TOTAL KNEE ARTHROPLASTY  Anesthesia Type: Spinal     Surgeon: Ruslan Connelly MD     The following Testing and Time Line are REQUIRED PER ANESTHESIA     EKG READ AND SIGNED WITHIN   90 days      Thank You,   Sent by: NORMAN STEWART

## (undated) NOTE — Clinical Note
Anny Echeverria saw Jazmine Suarez in the office. She presents with a large left posterior thoracic soft tissue mass consistent with a lipoma. She is symptomatic and it is slowly increasing in size. I am planning excision.   Thank you Gary Hargrove

## (undated) NOTE — LETTER
Date: 10/6/17     Patient Name: Whitney Roblero              To Whom it may concern:     This letter has been written at the patient's request. The above patient was seen at the [facility] for treatment of a medical condition.     T

## (undated) NOTE — LETTER
Date & Time: 8/13/2018, 8:36 AM  Patient: Charisse Cantu  Encounter Provider(s):    Claudia Chen MD       To Whom It May Concern:    Charisse Cantu was seen and treated in our department on 8/13/2018.  She should not return to work until Tuesday

## (undated) NOTE — LETTER
ASTHMA ACTION PLAN for Kalli Ariza     : 3/12/1970     Date: 03/15/19  Doctor:  Sariah Ybarra DO  Phone for doctor or clinic: [address], [address] [facility],[address] [place] [address] [phone]      ACT Score: 25 · Use spacer for inhaler  · Remember to get your Flu vaccine every fall! [x] Asthma Action Plan reviewed with the caregiver and patient, and a copy of the plan was given to the patient/caregiver.    [] Asthma Action Plan reviewed with the caregiver and p

## (undated) NOTE — LETTER
ASTHMA ACTION PLAN for Catina Rivera     : 3/12/1970     Date: 24  Doctor:  LILIA Ham  Phone for doctor or clinic: [facility], [place] [address], [place] [id number] [address] [address] [phone]           ACT Goal: 20 or greater    Call your provider if you require your rescue/quick reliever medication more than 2-3 times in a 24 hour period.    If you require your rescue inhaler/medication more than 2-3 times weekly, your asthma may not be under proper control and you should seek medical attention.    *Quick Relievers are Xopenex and Albuterol*    You can use the colors of a traffic light to help learn about your asthma medicines.  Year Round       1. Green - Go! % of Personal Best Peak Flow   Use controller medicine.   Breathing is good  No cough or wheeze  Can work and play Medicine How much to take When to take it                2. Yellow - Caution. 50-79% Personal Best Peak Flow  Use reliever medicine to keep an asthma attack from getting bad.   Cough  Quick Relievers  Wheezing  Tight Chest  Wake up at night Medicine How much to take When to take it    If symptoms are not improving in 24-48 hrs, call office for further instructions              3. Red - Stop! Danger! <50% Personal Best Peak Flow  Continue Controller Medications But ADD:   Medicine not helping  Breathing is hard and fast  Nose opens wide  Can't walk  Ribs show  Can't talk well Medicine How much to take When to take it    If your symptoms do not improve in ONE hour -  go to the emergency room or call 911 immediately! If symptoms improve, call office for appointment immediately.    Albuterol inhaler 2 puffs every 20 minutes for three treatments       Don't forget:  Rinse mouth after using inhaler  Use spacer for inhaler  Remember to get your Flu vaccine every fall!    [x] Asthma Action Plan reviewed with the caregiver and patient, and a copy of the plan was given to the  patient/caregiver.   [] Asthma Action Plan reviewed with the caregiver and patient on the phone, and copy mailed to patient/caregiver or sent via [employer].     Signatures:   Provider  LILIA Ham Patient  Catina SHELBI Rivera Caretaker

## (undated) NOTE — LETTER
Date: 8/4/2021    Patient Name: Rafaela Lawrence          TB intradermal test done 8/2/21  Test NEGATIVE        Sincerely,    DANIELE BrandonN RN

## (undated) NOTE — LETTER
22    RE: Verner Rink    : 3/12/1970    Dear Dr. Glenda Lewis,     Your patient is being scheduled for a pain management procedure at [facility].    Procedure:  Right L4 Transforaminal Injection   Date of Procedure: TBD  Physician: Damian Valles- Anesthesiologist     Your patient is currently taking Eliquis.  usually recommends the medication be held for 3 days prior to injection. Please verify patient is cleared to proceed with pain management procedures. Clearance Approved   ____________    Clearance Denied       ____________      Comments: ______________________________________________________    Signature: ________________________________  Date: _________________       If you have any questions please feel free to contact our office at [phone], option # 2. Please fax this clearance request to our office at fax # [phone] or send electronically.        Thank you,      [employer]

## (undated) NOTE — MR AVS SNAPSHOT
[address] [facility] [address] [phone]               Thank you for choosing us for your health care visit with Victorina Butts DO.   We are glad to serve you and happy to provide you with this summary of your vi Take 137 mcg by mouth once daily. Commonly known as:  SYNTHROID, LEVOTHROID           ONETOUCH DELICA LANCETS FINE Misc   TEST 8 TIMES PER DAY.            * ONETOUCH ULTRA BLUE Strp   Generic drug:  Glucose Blood   USE TO TEST 8 TIMES A DAY           * Gl Choose whole grain products Foods high in sodium   Water is best for hydration Fast food.    Eat at home when possible     Tips for increasing your physical activity – Adults who are physically active are less likely to develop some chronic diseases than ad

## (undated) NOTE — LETTER
Patient Name: Ortega Muro  YOB: 1970          MRN number:  [id number]  Date:  7/3/2018  Referring Physician:  Jorge Pascual     LOWER EXTREMITY EVALUATION:   Diagnosis: Left knee pain (M25.562)       DOS: 6/1/2018  Date of Service: 7/2/2018 has minimal swelling in the knee which contributes to her decreased ROM, and impaired quadriceps contraction and strength deficits contributing to her functional deficits. Current tissue impairments impacts her ADL's as listed above.    Konstantin Cuevas would benefit Therapeutic Exercises; Neuromuscular Re-education; Therapeutic Activity; Gait Training; Electrical Stim; Pt education; Home exercise program instructions as needed.      Education or treatment limitation: None  Rehab Potential:good    FOTO: 35/100    Edward

## (undated) NOTE — LETTER
ASTHMA ACTION PLAN for Iwona Milks     : 3/12/1970     Date: 18  Doctor:  Kerry Horner DO  Phone for doctor or clinic: [email or web address] [facility],[address] [place] [address] [phone]      ACT Score: 25 3. Red - Stop!  Danger! <50% Personal Best Peak Flow  Continue Controller Medications But ADD:   Medicine not helping  Breathing is hard and fast  Nose opens wide  Can't walk  Ribs show  Can't talk well Medicine How much to take When to take it    If

## (undated) NOTE — MR AVS SNAPSHOT
[address] [place] [address] [phone]               Thank you for choosing us for your health care visit with Clint Marrero DO.   We are glad to serve you and happy to provide you with this summary of your v HUMALOG 100 UNIT/ML Soln   Generic drug:  Insulin Lispro   ADMINISTER UP  UNITS VIA INSULIN PUMP DAILY AS DIRECTED           Levothyroxine Sodium 137 MCG Tabs   TAKE 1 TABLET BY MOUTH DAILY   Commonly known as:  Behzad Lopez

## (undated) NOTE — MR AVS SNAPSHOT
After Visit Summary   1/28/2017    Latasha Anderson    MRN: [id number]           Visit Information        Provider Department Dept Phone    1/28/2017  9:00 PM Bed1  [facility] [phone]      Allergies as of 1/28/2017  Reviewed on: 12/13/20 Provider Department    1/30/2017 3:40 PM [facility], [place] [address], [facility] now offers Video Visits through [address] for adult and pediatric patients.   Video Visits are available Monday - Friday for many common co

## (undated) NOTE — LETTER
Date: 5/22/2025    Patient Name: Catina Rivera          To Whom it may concern:    The above patient was seen at [facility] for treatment of a medical condition.    From an orthopedic perspective, patient may undergo dental procedures. Her joint replacement was done 1/29/25.    She will require pre-dental antibiotic 1 hour prior to any dental procedures for up to 2 years post-operatively. This is ordered for her.        Sincerely,    Ruslan Connelly MD

## (undated) NOTE — LETTER
Your patient was recently seen at the [facility] for a hospital follow-up visit. The visit note is attached. Please contact the clinic with any questions at [phone].     Thank you,  LILIA Rodriguez

## (undated) NOTE — MR AVS SNAPSHOT
[address] [facility] [address] [phone]               Thank you for choosing us for your health care visit with Dani Morgan DO.   We are glad to serve you and happy to provide you with this summary of your vi HUMALOG 100 UNIT/ML Soln   Generic drug:  Insulin Lispro   ADMINISTER UP  UNITS VIA INSULIN PUMP DAILY AS DIRECTED           Levothyroxine Sodium 137 MCG Tabs   TAKE 1 TABLET BY MOUTH DAILY   Commonly known as:  Courtney Buchanan

## (undated) NOTE — LETTER
Date: 11/16/2023    Patient Name: Emily Polk          To Whom it may concern: This letter has been written at the patient's request. The above patient was seen at the [facility] for surgical treatment of a medical condition. The patient may return to work on 11/18/23 with the following limitations: light duty only, no lifting, minimal use of the hand. She will be re-evaluated at her 11/27/23 follow up office visit. Sincerely,        Rich Crook PA-C  Hand, Wrist, & Elbow Surgery  Physician Assistant to Dr. Estefani Morales, [address], [place] [address] [facility] [email or web address]  t: [phone]  f: [phone]

## (undated) NOTE — LETTER
01/15/21    [address] [facility],[address]          Dear Carlie Wall,    According to our records, you are due for your annual mammography screening. Please contact our office to obtain a mammogram order.  If this letter has been sent in erro

## (undated) NOTE — LETTER
4/6/2021    [id number]    Subject: Diabetes and Traveling with Insulin    To Whom It May Concern:     Rafaela Lawrence  is under my care for Diabetes and requires insulin medication for management of her diabetes.  She regarding this patient and her medical care and requirements, please contact my office at [phone].   Sincerely,            LILIA Albert

## (undated) NOTE — LETTER
OUTSIDE TESTING RESULT REQUEST     IMPORTANT: FOR YOUR IMMEDIATE ATTENTION  Please FAX all test results listed below to: [phone]     Testing already done on or about: 10/04/2023     * * * * If testing is NOT complete, arrange with patient A.S.A.P. * * * *      Patient Name: Maxine Ochoa  Surgery Date: 2023  Medical Record: [id number]  CSN: [id number]  : 3/12/1970 - A: 48 y     Sex: female  Surgeon(s):  Suly Green MD  Procedure: LEFT RING FINGER DISTAL INTERPHALANGEAL JOINT FUSION  Anesthesia Type: MAC     Surgeon: Suly Green MD     The following Testing and Time Line are REQUIRED PER ANESTHESIA     EKG READ AND SIGNED WITHIN   90 days      Thank You,   Sent by:VON